# Patient Record
Sex: MALE | Race: WHITE | NOT HISPANIC OR LATINO | Employment: OTHER | URBAN - METROPOLITAN AREA
[De-identification: names, ages, dates, MRNs, and addresses within clinical notes are randomized per-mention and may not be internally consistent; named-entity substitution may affect disease eponyms.]

---

## 2017-01-06 ENCOUNTER — TREATMENT (OUTPATIENT)
Dept: CARDIAC REHAB | Facility: HOSPITAL | Age: 72
End: 2017-01-06

## 2017-01-06 DIAGNOSIS — Z00.00 PREVENTATIVE HEALTH CARE: Primary | ICD-10-CM

## 2017-01-09 ENCOUNTER — TREATMENT (OUTPATIENT)
Dept: CARDIAC REHAB | Facility: HOSPITAL | Age: 72
End: 2017-01-09

## 2017-01-09 DIAGNOSIS — Z00.00 PREVENTATIVE HEALTH CARE: Primary | ICD-10-CM

## 2017-01-11 ENCOUNTER — TREATMENT (OUTPATIENT)
Dept: CARDIAC REHAB | Facility: HOSPITAL | Age: 72
End: 2017-01-11

## 2017-01-11 DIAGNOSIS — Z00.00 PREVENTATIVE HEALTH CARE: Primary | ICD-10-CM

## 2017-01-13 ENCOUNTER — TREATMENT (OUTPATIENT)
Dept: CARDIAC REHAB | Facility: HOSPITAL | Age: 72
End: 2017-01-13

## 2017-01-13 DIAGNOSIS — Z00.00 PREVENTATIVE HEALTH CARE: Primary | ICD-10-CM

## 2017-01-15 ENCOUNTER — APPOINTMENT (OUTPATIENT)
Dept: GENERAL RADIOLOGY | Facility: HOSPITAL | Age: 72
End: 2017-01-15

## 2017-01-15 ENCOUNTER — HOSPITAL ENCOUNTER (INPATIENT)
Facility: HOSPITAL | Age: 72
LOS: 1 days | Discharge: HOME | End: 2017-01-16
Attending: EMERGENCY MEDICINE | Admitting: INTERNAL MEDICINE

## 2017-01-15 DIAGNOSIS — R07.9 CHEST PAIN, UNSPECIFIED TYPE: Primary | ICD-10-CM

## 2017-01-15 DIAGNOSIS — I25.110 CORONARY ARTERY DISEASE INVOLVING NATIVE CORONARY ARTERY OF NATIVE HEART WITH UNSTABLE ANGINA PECTORIS (HCC): ICD-10-CM

## 2017-01-15 DIAGNOSIS — R74.8 ELEVATED LIVER ENZYMES: ICD-10-CM

## 2017-01-15 DIAGNOSIS — R07.2 PRECORDIAL PAIN: ICD-10-CM

## 2017-01-15 DIAGNOSIS — I25.810 CORONARY ARTERY DISEASE INVOLVING CORONARY BYPASS GRAFT OF NATIVE HEART WITHOUT ANGINA PECTORIS: ICD-10-CM

## 2017-01-15 LAB
ALBUMIN SERPL-MCNC: 3.8 G/DL (ref 3.2–4.8)
ALBUMIN/GLOB SERPL: 1.5 G/DL (ref 1.5–2.5)
ALP SERPL-CCNC: 81 U/L (ref 25–100)
ALT SERPL W P-5'-P-CCNC: 111 U/L (ref 7–40)
ANION GAP SERPL CALCULATED.3IONS-SCNC: 11 MMOL/L (ref 3–11)
AST SERPL-CCNC: 85 U/L (ref 0–33)
BASOPHILS # BLD AUTO: 0.03 10*3/MM3 (ref 0–0.2)
BASOPHILS NFR BLD AUTO: 0.5 % (ref 0–1)
BILIRUB SERPL-MCNC: 0.6 MG/DL (ref 0.3–1.2)
BNP SERPL-MCNC: 134 PG/ML (ref 0–100)
BUN BLD-MCNC: 16 MG/DL (ref 9–23)
BUN/CREAT SERPL: 20 (ref 7–25)
CALCIUM SPEC-SCNC: 9.2 MG/DL (ref 8.7–10.4)
CHLORIDE SERPL-SCNC: 109 MMOL/L (ref 99–109)
CO2 SERPL-SCNC: 26 MMOL/L (ref 20–31)
CREAT BLD-MCNC: 0.8 MG/DL (ref 0.6–1.3)
DEPRECATED RDW RBC AUTO: 47.6 FL (ref 37–54)
EOSINOPHIL # BLD AUTO: 0.41 10*3/MM3 (ref 0.1–0.3)
EOSINOPHIL NFR BLD AUTO: 6.2 % (ref 0–3)
ERYTHROCYTE [DISTWIDTH] IN BLOOD BY AUTOMATED COUNT: 14.6 % (ref 11.3–14.5)
GFR SERPL CREATININE-BSD FRML MDRD: 95 ML/MIN/1.73
GLOBULIN UR ELPH-MCNC: 2.6 GM/DL
GLUCOSE BLD-MCNC: 143 MG/DL (ref 70–100)
HCT VFR BLD AUTO: 39.2 % (ref 38.9–50.9)
HGB BLD-MCNC: 12.6 G/DL (ref 13.1–17.5)
HOLD SPECIMEN: NORMAL
HOLD SPECIMEN: NORMAL
IMM GRANULOCYTES # BLD: 0.02 10*3/MM3 (ref 0–0.03)
IMM GRANULOCYTES NFR BLD: 0.3 % (ref 0–0.6)
LIPASE SERPL-CCNC: 53 U/L (ref 6–51)
LYMPHOCYTES # BLD AUTO: 2.02 10*3/MM3 (ref 0.6–4.8)
LYMPHOCYTES NFR BLD AUTO: 30.7 % (ref 24–44)
MCH RBC QN AUTO: 28.6 PG (ref 27–31)
MCHC RBC AUTO-ENTMCNC: 32.1 G/DL (ref 32–36)
MCV RBC AUTO: 89.1 FL (ref 80–99)
MONOCYTES # BLD AUTO: 0.57 10*3/MM3 (ref 0–1)
MONOCYTES NFR BLD AUTO: 8.7 % (ref 0–12)
NEUTROPHILS # BLD AUTO: 3.52 10*3/MM3 (ref 1.5–8.3)
NEUTROPHILS NFR BLD AUTO: 53.6 % (ref 41–71)
PLATELET # BLD AUTO: 225 10*3/MM3 (ref 150–450)
PMV BLD AUTO: 10.6 FL (ref 6–12)
POTASSIUM BLD-SCNC: 4.1 MMOL/L (ref 3.5–5.5)
PROT SERPL-MCNC: 6.4 G/DL (ref 5.7–8.2)
RBC # BLD AUTO: 4.4 10*6/MM3 (ref 4.2–5.76)
SODIUM BLD-SCNC: 146 MMOL/L (ref 132–146)
TROPONIN I SERPL-MCNC: 0 NG/ML (ref 0–0.07)
TROPONIN I SERPL-MCNC: 0 NG/ML (ref 0–0.07)
TROPONIN I SERPL-MCNC: <0.006 NG/ML
WBC NRBC COR # BLD: 6.57 10*3/MM3 (ref 3.5–10.8)
WHOLE BLOOD HOLD SPECIMEN: NORMAL
WHOLE BLOOD HOLD SPECIMEN: NORMAL

## 2017-01-15 PROCEDURE — 83880 ASSAY OF NATRIURETIC PEPTIDE: CPT | Performed by: EMERGENCY MEDICINE

## 2017-01-15 PROCEDURE — 84484 ASSAY OF TROPONIN QUANT: CPT

## 2017-01-15 PROCEDURE — 83690 ASSAY OF LIPASE: CPT | Performed by: EMERGENCY MEDICINE

## 2017-01-15 PROCEDURE — 99223 1ST HOSP IP/OBS HIGH 75: CPT | Performed by: FAMILY MEDICINE

## 2017-01-15 PROCEDURE — 84484 ASSAY OF TROPONIN QUANT: CPT | Performed by: FAMILY MEDICINE

## 2017-01-15 PROCEDURE — 93005 ELECTROCARDIOGRAM TRACING: CPT | Performed by: EMERGENCY MEDICINE

## 2017-01-15 PROCEDURE — 71010 HC CHEST PA OR AP: CPT

## 2017-01-15 PROCEDURE — 80053 COMPREHEN METABOLIC PANEL: CPT | Performed by: EMERGENCY MEDICINE

## 2017-01-15 PROCEDURE — 85025 COMPLETE CBC W/AUTO DIFF WBC: CPT | Performed by: EMERGENCY MEDICINE

## 2017-01-15 PROCEDURE — 93005 ELECTROCARDIOGRAM TRACING: CPT

## 2017-01-15 PROCEDURE — 99285 EMERGENCY DEPT VISIT HI MDM: CPT

## 2017-01-15 PROCEDURE — 99221 1ST HOSP IP/OBS SF/LOW 40: CPT | Performed by: INTERNAL MEDICINE

## 2017-01-15 RX ORDER — FINASTERIDE 5 MG/1
5 TABLET, FILM COATED ORAL DAILY
Status: DISCONTINUED | OUTPATIENT
Start: 2017-01-15 | End: 2017-01-16 | Stop reason: HOSPADM

## 2017-01-15 RX ORDER — TORSEMIDE 20 MG/1
10 TABLET ORAL DAILY
Status: DISCONTINUED | OUTPATIENT
Start: 2017-01-15 | End: 2017-01-16 | Stop reason: HOSPADM

## 2017-01-15 RX ORDER — ATORVASTATIN CALCIUM 40 MG/1
40 TABLET, FILM COATED ORAL NIGHTLY
Status: DISCONTINUED | OUTPATIENT
Start: 2017-01-15 | End: 2017-01-16 | Stop reason: HOSPADM

## 2017-01-15 RX ORDER — SODIUM CHLORIDE 0.9 % (FLUSH) 0.9 %
1-10 SYRINGE (ML) INJECTION AS NEEDED
Status: DISCONTINUED | OUTPATIENT
Start: 2017-01-15 | End: 2017-01-16 | Stop reason: HOSPADM

## 2017-01-15 RX ORDER — EPLERENONE 25 MG/1
25 TABLET, FILM COATED ORAL
Status: DISCONTINUED | OUTPATIENT
Start: 2017-01-15 | End: 2017-01-16 | Stop reason: HOSPADM

## 2017-01-15 RX ORDER — CLOPIDOGREL BISULFATE 75 MG/1
75 TABLET ORAL DAILY
Status: DISCONTINUED | OUTPATIENT
Start: 2017-01-15 | End: 2017-01-16 | Stop reason: HOSPADM

## 2017-01-15 RX ORDER — CARVEDILOL 12.5 MG/1
12.5 TABLET ORAL 2 TIMES DAILY
Status: DISCONTINUED | OUTPATIENT
Start: 2017-01-15 | End: 2017-01-16 | Stop reason: HOSPADM

## 2017-01-15 RX ORDER — POLYETHYLENE GLYCOL 3350 17 G/17G
17 POWDER, FOR SOLUTION ORAL EVERY OTHER DAY
Status: DISCONTINUED | OUTPATIENT
Start: 2017-01-15 | End: 2017-01-16 | Stop reason: HOSPADM

## 2017-01-15 RX ORDER — LEVOTHYROXINE SODIUM 0.03 MG/1
25 TABLET ORAL
Status: DISCONTINUED | OUTPATIENT
Start: 2017-01-15 | End: 2017-01-16 | Stop reason: HOSPADM

## 2017-01-15 RX ORDER — SODIUM CHLORIDE 0.9 % (FLUSH) 0.9 %
10 SYRINGE (ML) INJECTION AS NEEDED
Status: DISCONTINUED | OUTPATIENT
Start: 2017-01-15 | End: 2017-01-15

## 2017-01-15 RX ORDER — AMIODARONE HYDROCHLORIDE 200 MG/1
200 TABLET ORAL EVERY MORNING
Status: DISCONTINUED | OUTPATIENT
Start: 2017-01-15 | End: 2017-01-16 | Stop reason: HOSPADM

## 2017-01-15 RX ADMIN — EPLERENONE 25 MG: 25 TABLET ORAL at 16:06

## 2017-01-15 RX ADMIN — LEVOTHYROXINE SODIUM 25 MCG: 25 TABLET ORAL at 11:56

## 2017-01-15 RX ADMIN — ATORVASTATIN CALCIUM 40 MG: 40 TABLET, FILM COATED ORAL at 20:38

## 2017-01-15 RX ADMIN — CLOPIDOGREL 75 MG: 75 TABLET, FILM COATED ORAL at 11:56

## 2017-01-15 RX ADMIN — CARVEDILOL 12.5 MG: 12.5 TABLET, FILM COATED ORAL at 11:56

## 2017-01-15 RX ADMIN — TORSEMIDE 10 MG: 20 TABLET ORAL at 11:57

## 2017-01-15 RX ADMIN — SACUBITRIL AND VALSARTAN 1 TABLET: 49; 51 TABLET, FILM COATED ORAL at 12:00

## 2017-01-15 RX ADMIN — AMIODARONE HYDROCHLORIDE 200 MG: 200 TABLET ORAL at 11:57

## 2017-01-15 RX ADMIN — FINASTERIDE 5 MG: 5 TABLET, FILM COATED ORAL at 11:58

## 2017-01-15 RX ADMIN — CARVEDILOL 12.5 MG: 12.5 TABLET, FILM COATED ORAL at 17:37

## 2017-01-15 RX ADMIN — SACUBITRIL AND VALSARTAN 1 TABLET: 49; 51 TABLET, FILM COATED ORAL at 17:37

## 2017-01-15 NOTE — PLAN OF CARE
Problem: Patient Care Overview (Adult)  Goal: Plan of Care Review  Outcome: Ongoing (interventions implemented as appropriate)    01/15/17 7173   Coping/Psychosocial Response Interventions   Plan Of Care Reviewed With patient;spouse   Patient Care Overview   Progress progress toward functional goals as expected   Outcome Evaluation   Outcome Summary/Follow up Plan no c/p's of chest pain noted today. npo after mn for heart cath tomorrow. liver enzymes elevated.         Problem: Pain, Acute (Adult)  Goal: Identify Related Risk Factors and Signs and Symptoms  Outcome: Outcome(s) achieved Date Met:  01/15/17  Goal: Acceptable Pain Control/Comfort Level  Outcome: Outcome(s) achieved Date Met:  01/15/17    Problem: Acute Coronary Syndrome (ACS) (Adult)  Goal: Signs and Symptoms of Listed Potential Problems Will be Absent or Manageable (Acute Coronary Syndrome)  Outcome: Ongoing (interventions implemented as appropriate)

## 2017-01-15 NOTE — ED PROVIDER NOTES
Subjective   Patient is a 71 y.o. male presenting with chest pain.   History provided by:  Patient  Chest Pain   Pain location:  Substernal area  Pain quality: pressure    Pain radiates to:  Does not radiate  Pain severity:  Severe  Onset quality:  Sudden  Duration:  3 hours  Timing:  Intermittent  Progression:  Improving  Chronicity:  Recurrent  Context: at rest    Context: not drug use, not eating, not intercourse, not movement and not raising an arm    Relieved by:  Nothing  Worsened by:  Nothing  Ineffective treatments:  None tried  Associated symptoms: anxiety, cough, near-syncope and shortness of breath    Associated symptoms: no abdominal pain, no altered mental status, no diaphoresis, no dizziness, no nausea, no numbness and no vomiting    Risk factors: coronary artery disease and prior DVT/PE    Risk factors: no aortic disease, no diabetes mellitus, no Luis Daniel-Danlos syndrome, no high cholesterol, no hypertension, not obese and not pregnant        Review of Systems   Constitutional: Negative for diaphoresis.   Respiratory: Positive for cough and shortness of breath.    Cardiovascular: Positive for chest pain and near-syncope.   Gastrointestinal: Negative for abdominal pain, nausea and vomiting.   Neurological: Negative for dizziness and numbness.   All other systems reviewed and are negative.      Past Medical History   Diagnosis Date   • Abnormal CT scan, chest 12/28/2016   • BPH (benign prostatic hypertrophy) 12/28/2016   • CAD (coronary artery disease)    • Congestive heart failure    • DVT, lower extremity      right   • Dyslipidemia    • Edema    • IHD (ischemic heart disease) 12/28/2016   • LBBB (left bundle branch block) 12/28/2016   • Lung nodule    • Polio      as a child   • Pulmonary embolism    • Pulmonary emphysema 12/28/2016   • Sepsis      A. Secondary to [place] trauma with hematuria and urosepsis requiring hospitalization May 2015   • Ventricular dysrhythmia 12/28/2016       No Known  Allergies    Past Surgical History   Procedure Laterality Date   • Cardiac defibrillator placement        A. ICD generator change out with upgrade to BiV pacemaker implantable cardioverter    defibrillator, 12/17/2007.   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 Status post cystoscopy with clot evacuation and fulguration of the prostate secondary to hematuria, 5/29/2015.   • Foot surgery     • Prostate surgery       A. Status post laser vaporization, 08/19/2009.       Family History   Problem Relation Age of Onset   • Heart failure Father    • Stroke Father        Social History     Social History   • Marital status:      Spouse name: N/A   • Number of children: N/A   • Years of education: N/A     Social History Main Topics   • Smoking status: Former Smoker     Types: Cigarettes     Quit date: 04/1995   • Smokeless tobacco: None   • Alcohol use No   • Drug use: No   • Sexual activity: Defer     Other Topics Concern   • None     Social History Narrative           Objective   Physical Exam   Constitutional: He is oriented to person, place, and time. He appears well-developed and well-nourished. No distress.   HENT:   Head: Normocephalic and atraumatic.   Eyes: Conjunctivae and EOM are normal. Pupils are equal, round, and reactive to light.   Neck: Normal range of motion. Neck supple. No thyromegaly present.   Cardiovascular: Normal rate, regular rhythm and normal heart sounds.  Exam reveals no gallop and no friction rub.    No murmur heard.  Pulmonary/Chest: Effort normal and breath sounds normal. No respiratory distress. He exhibits no tenderness.   Abdominal: Soft. There is no tenderness.   Musculoskeletal: Normal range of motion. He exhibits no edema.   Lymphadenopathy:     He has no cervical adenopathy.   Neurological: He is alert and oriented to person, place, and time.   Skin: Skin is warm and dry.   Psychiatric: He has a normal mood and affect.   Nursing note and vitals  reviewed.      Procedures         ED Course  ED Course        Course of Care    Results for MARGOT BAIRD (MRN [id number]) as of 1/16/2017 06:13   Ref. Range 1/15/2017 05:22   Troponin I Latest Ref Range: 0.00 - 0.07 ng/mL 0.00   BNP Latest Ref Range: 0.0 - 100.0 pg/mL 134.0 (H)   Glucose Latest Ref Range: 70 - 100 mg/dL 143 (H)   Sodium Latest Ref Range: 132 - 146 mmol/L 146   Potassium Latest Ref Range: 3.5 - 5.5 mmol/L 4.1   CO2 Latest Ref Range: 20.0 - 31.0 mmol/L 26.0   Chloride Latest Ref Range: 99 - 109 mmol/L 109   Anion Gap Latest Ref Range: 3.0 - 11.0 mmol/L 11.0   Creatinine Latest Ref Range: 0.60 - 1.30 mg/dL 0.80   BUN Latest Ref Range: 9 - 23 mg/dL 16   BUN/Creatinine Ratio Latest Ref Range: 7.0 - 25.0  20.0   Calcium Latest Ref Range: 8.7 - 10.4 mg/dL 9.2   eGFR Non African Amer Latest Ref Range: >60 mL/min/1.73 95   Alkaline Phosphatase Latest Ref Range: 25 - 100 U/L 81   Total Protein Latest Ref Range: 5.7 - 8.2 g/dL 6.4   ALT (SGPT) Latest Ref Range: 7 - 40 U/L 111 (H)   AST (SGOT) Latest Ref Range: 0 - 33 U/L 85 (H)   Total Bilirubin Latest Ref Range: 0.3 - 1.2 mg/dL 0.6   Albumin Latest Ref Range: 3.20 - 4.80 g/dL 3.80   Globulin Latest Units: gm/dL 2.6   A/G Ratio Latest Ref Range: 1.5 - 2.5 g/dL 1.5   Lipase Latest Ref Range: 6 - 51 U/L 53 (H)   WBC Latest Ref Range: 3.50 - 10.80 10*3/mm3 6.57   RBC Latest Ref Range: 4.20 - 5.76 10*6/mm3 4.40   Hemoglobin Latest Ref Range: 13.1 - 17.5 g/dL 12.6 (L)   Hematocrit Latest Ref Range: 38.9 - 50.9 % 39.2   RDW Latest Ref Range: 11.3 - 14.5 % 14.6 (H)   MCV Latest Ref Range: 80.0 - 99.0 fL 89.1   MCH Latest Ref Range: 27.0 - 31.0 pg 28.6   MCHC Latest Ref Range: 32.0 - 36.0 g/dL 32.1   MPV Latest Ref Range: 6.0 - 12.0 fL 10.6   Platelets Latest Ref Range: 150 - 450 10*3/mm3 225   RDW-SD Latest Ref Range: 37.0 - 54.0 fl 47.6   Neutrophil % Latest Ref Range: 41.0 - 71.0 % 53.6   Lymphocyte % Latest Ref Range: 24.0 - 44.0 % 30.7   Monocyte % Latest  Ref Range: 0.0 - 12.0 % 8.7   Eosinophil % Latest Ref Range: 0.0 - 3.0 % 6.2 (H)   Basophil % Latest Ref Range: 0.0 - 1.0 % 0.5   Immature Grans % Latest Ref Range: 0.0 - 0.6 % 0.3   Neutrophils, Absolute Latest Ref Range: 1.50 - 8.30 10*3/mm3 3.52   Lymphocytes, Absolute Latest Ref Range: 0.60 - 4.80 10*3/mm3 2.02   Monocytes, Absolute Latest Ref Range: 0.00 - 1.00 10*3/mm3 0.57   Eosinophils, Absolute Latest Ref Range: 0.10 - 0.30 10*3/mm3 0.41 (H)   Basophils, Absolute Latest Ref Range: 0.00 - 0.20 10*3/mm3 0.03   Immature Grans, Absolute Latest Ref Range: 0.00 - 0.03 10*3/mm3 0.02         Lab Results (last 24 hours)     Procedure Component Value Units Date/Time    POC Troponin, Rapid [[id number]]  (Normal) Collected:  01/15/17 0728    Specimen:  Blood Updated:  01/15/17 0745     Troponin I 0.00 ng/mL       Serial Number: [id number]    : [id number]       Troponin [[id number]]  (Normal) Collected:  01/15/17 1449    Specimen:  Blood Updated:  01/15/17 1529     Troponin I <0.006 ng/mL     Narrative:       Ultra Troponin I Reference Range:         <=0.039 ng/mL: Negative    0.04-0.779 ng/mL: Indeterminate Range. Suspicious of MI.  Clinical correlation required.       >=0.78  ng/mL: Consistent with myocardial injury.  Clinical correlation required.          Note: In addition to lab results from this visit, the labs listed above may include labs taken at another facility or during a different encounter within the last 24 hours. Please correlate lab times with ED admission and discharge times for further clarification of the services performed during this visit.    XR Chest 1 View   Final Result   Abnormal     No acute findings.         THIS DOCUMENT HAS BEEN ELECTRONICALLY SIGNED BY BINA MARI MD          Vitals:    01/15/17 1700 01/15/17 2138 01/16/17 0100 01/16/17 0500   BP: 115/74 110/80 (!) 88/50 115/75   BP Location: Left arm Left arm Left arm Left arm   Patient Position: Lying Lying Lying Lying   Pulse: 70  74     Resp: 18 18 18 18   Temp: 97.2 °F (36.2 °C) 97.6 °F (36.4 °C) 97.8 °F (36.6 °C) 97.6 °F (36.4 °C)   TempSrc: Oral Oral Oral Oral   SpO2: 94%  92%    Weight:       Height:           Medications   clopidogrel (PLAVIX) tablet 75 mg (75 mg Oral Given 1/15/17 1156)   atorvastatin (LIPITOR) tablet 40 mg (40 mg Oral Given 1/15/17 2038)   amiodarone (PACERONE) tablet 200 mg (200 mg Oral Given 1/16/17 0600)   carvedilol (COREG) tablet 12.5 mg (12.5 mg Oral Given 1/15/17 1737)   eplerenone (INSPRA) tablet 25 mg (25 mg Oral Given 1/15/17 1606)   finasteride (PROSCAR) tablet 5 mg (5 mg Oral Given 1/15/17 1158)   levothyroxine (SYNTHROID, LEVOTHROID) tablet 25 mcg (25 mcg Oral Given 1/16/17 0600)   polyethylene glycol (MIRALAX) powder 17 g (17 g Oral Not Given 1/15/17 1158)   sacubitril-valsartan (ENTRESTO) 49-51 MG tablet 1 tablet (1 tablet Oral Given 1/15/17 1737)   torsemide (DEMADEX) tablet 10 mg (10 mg Oral Given 1/15/17 1157)   sodium chloride 0.9 % flush 1-10 mL (not administered)       ECG/EMG Results (last 24 hours)     Procedure Component Value Units Date/Time    ECG 12 Lead [[id number]] Collected:  01/15/17 0501     Updated:  01/15/17 0504        Discussed case with Dr. Mustafa.  Given pt's significant history and risk factors, he will be admitted for further evaluation and management.            MDM    Final diagnoses:   Chest pain, unspecified type   Elevated liver enzymes            Koko Riley DO  01/16/17 0614

## 2017-01-15 NOTE — CONSULTS
[facility] at [facility]  Consultation H&P    Patient: Jonnie Roth  1945  [phone]      Treatment Team:   Attending Provider: Maryann Mustafa MD  Nurse Practitioner: CHENTE Mota  Consulting Physician: Luiz Green MD  Admitting Provider: Maryann Mustafa MD   1/15/2017    DATE OF CONSULTATION: 1/15/2017 11:04 AM     IDENTIFICATION: A 71 y.o. male disabled .    REASON FOR CONSULTATION: chest pain    PROBLEM LIST:  1. Ischemic heart disease:  a. Remote progressive angina pectoris/acute extensive anterolateral myocardial infarction/delayed presentation with thrombolysis/severe 3-vessel coronary atherosclerosis with severe single vessel involvement/PTCA with intracoronary stent deployment proximal-mid segment LAD/moderate-severe compensated left ventricular dysfunction. LVEF (0.35)/abnormal positive signal averaged EKG/oral anticoagulation, April 1995.  b. Recent NYHA class I-II angina pectoris/class III CHF/abnormal quantitative SPECT gated Cardiolite GXT, July 1998.  c. Stable persistent MUGA, LVEF (0.33, January 1999 and January 2000).   d. Residual NYHA class I angina pectoris/CHF with reduced MUGA scan: LVEF (0.25), April 2002.  e. Left heart catheterization with distal circumflex disease: EF 20%, September 2002.   f. MUGA in May 2003: EF 32%.   g. Sestamibi GXT on 04/08/2005: No ischemia. EF 29%.   h. Residual NYHA class I angina pectoris/CHF with reduced acceptable MUGA scan: EF (0.32) and acceptable Pacesetter PCD interrogation/reprogramming, May 2003 with interrogation, September 2004.  i. Echocardiogram, September 2009 with EF 30%  j. Left heart catheterization by Dr. Bhupinder Gonzalez, August 2010, with LVEF of 35%, with 3-vessel native coronary artery disease, 95% mid-LAD status post PTCA and stenting with two 3 mm stents by Dr. Llanes.  k. Echocardiogram, 06/07/2012: Left ventricular ejection fraction of 30%.  l. Hospitalization, 02/01/2014, for non-ST elevation  MI, left heart catheterization by Dr. Ayala that demonstrated 60% mid stenosis of the right coronary artery that remains unchanged compared to previous, widely patent stents of the LAD with severe LV dysfunction of approximately 25%.   m. Left heart catheterization status post drug-eluting stent to the distal LAD and mid-RCA with an EF of 20% to 25% by Dr. Diego Ayala, 04/20/2015.   n. Residual CCS Class I/II angina pectoris/NYHA Class II exertional dyspnea and fatigue syndrome.  2. Sudden cardiac death with primary ventricular fibrillation status post Pacesetter [place] ICD, [place], [place] in September 2000:   a. ICD generator change out with upgrade to a biventricular pacemaker ICD on 12/17/2007, [employer] device.  b. ICD discharge, 08/09/2012, secondary to ventricular flutter with initiation of amiodarone therapy.   c. Hospitalization, 02/01/2014, secondary to ICD discharge for ventricular tachycardia with subsequent discontinuation of Coreg and initiation of sotalol therapy.   d. Hospitalization, February 2014, secondary to ICD discharge for VT with subsequent discontinuation of Coreg and initiation of sotalol.  e. Echocardiogram, 04/07/2015: EF less than 20%.  f. Hospitalization secondary to VF and ICD shocks, 04/18/2015.  g. NIPS procedure with defibrillation threshold testing for VF and noninvasive program stimulation, 04/18/2015.   h. Initiation of mexiletine for recurrent ventricular tachycardia with ICD shocks on 05/05/2015 with amiodarone load, recurrent VT and ICD shocks with hospitalization 05/16/2015-05/18/2015.   i. EP study with RFA of large anterior left ventricular scar extending from mid-left ventricular wall down apex by Dr. Ferdinand Alba, 05/21/2015.   3. Dyslipidemia.  4. Status post remote operations.  5. Remote chronic tobacco use/abnormal chest x-ray.  6. Left bundle branch block.  7. Burks trauma with hematuria with urosepsis requiring hospitalization, now resolved, May 2015.  8. Pulmonary  embolism, spring 2015.   9. Recent apparent hypothyroidism/replacement therapy - data deficit, January 2016.    Past Medical History   Diagnosis Date   • Abnormal CT scan, chest 12/28/2016   • BPH (benign prostatic hypertrophy) 12/28/2016   • CAD (coronary artery disease)    • Congestive heart failure    • DVT, lower extremity      right   • Dyslipidemia    • Edema    • IHD (ischemic heart disease) 12/28/2016   • LBBB (left bundle branch block) 12/28/2016   • Lung nodule    • Polio      as a child   • Pulmonary embolism    • Pulmonary emphysema 12/28/2016   • Sepsis      A. Secondary to Burks trauma with hematuria and urosepsis requiring hospitalization May 2015   • Ventricular dysrhythmia 12/28/2016     Past Surgical History   Procedure Laterality Date   • Cardiac defibrillator placement        A. ICD generator change out with upgrade to BiV pacemaker implantable cardioverter    defibrillator, 12/17/2007.   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 Status post cystoscopy with clot evacuation and fulguration of the prostate secondary to hematuria, 5/29/2015.   • Foot surgery     • Prostate surgery       A. Status post laser vaporization, 08/19/2009.       Allergies  No Known Allergies    Current Medications    Current Facility-Administered Medications:   •  amiodarone (PACERONE) tablet 200 mg, 200 mg, Oral, QAM, Susie Rowan, APRN  •  atorvastatin (LIPITOR) tablet 40 mg, 40 mg, Oral, Daily, Susie Rowan, APRN  •  carvedilol (COREG) tablet 12.5 mg, 12.5 mg, Oral, BID, Susie Rowan, APRN  •  clopidogrel (PLAVIX) tablet 75 mg, 75 mg, Oral, Daily, uSsie Rowan, APRN  •  eplerenone (INSPRA) tablet 25 mg, 25 mg, Oral, Q24H, Susie Rowan, APRN  •  finasteride (PROSCAR) tablet 5 mg, 5 mg, Oral, Daily, Susie Rowan, APRN  •  levothyroxine (SYNTHROID, LEVOTHROID) tablet 25 mcg, 25 mcg, Oral, Q AM, Susie Rowan, APRN  •  polyethylene glycol (MIRALAX) powder 17 g, 17 g, Oral, Every Other Day, Susie Rowan, APRN  •   rivaroxaban (XARELTO) tablet 20 mg, 20 mg, Oral, Daily With Dinner, CHENTE Mota  •  sacubitril-valsartan (ENTRESTO) 49-51 MG tablet 1 tablet, 1 tablet, Oral, BID, CHENTE Mota  •  sodium chloride 0.9 % flush 1-10 mL, 1-10 mL, Intravenous, PRN, CHENTE Mota  •  torsemide (DEMADEX) tablet 10 mg, 10 mg, Oral, Daily, CHENTE Mota       History of Present Illness   Patient is a 71-year-old male with history of ischemic heart disease, hypertension, dyslipidemia, and has a dual chamber ICD who presents to [facility] emergency department on 01/15/2017 after being awoken at 2 AM with substernal chest pressure that was nonradiating and did cause shortness of breath without nausea or diaphoresis.  He took 2 nitroglycerin which only minimally improved his symptoms and he called EMS.  He was transported to add cephalexin and given 2 more doses of sublingual nitroglycerin as well as a dose of IV morphine and had improvement in symptoms.  He has since been symptom free.  He's had 2 negative troponins and no significant EKG changes.  He performed cardiac rehabilitation on Friday without anginal limitations.  He states his blood pressures are low normal on his contrast.  He states he has not missed any medications.  He denies any changes in his health over the last month.  Currently on interview denies chest pain, dyspnea, dyspnea on exertion, orthopnea, PND, lower extremity edema, or palpitations.    ROS  ROS  All systems have been reviewed and are negative section as mentioned in history of present illness.    SOCIAL HX  Social History     Social History   • Marital status:      Spouse name: N/A   • Number of children: N/A   • Years of education: N/A     Occupational History   • Not on file.     Social History Main Topics   • Smoking status: Former Smoker     Types: Cigarettes     Quit date: 04/1995   • Smokeless tobacco: Not on file   • Alcohol use No   • Drug use: No   • Sexual activity:  Defer     Other Topics Concern   • Not on file     Social History Narrative       FAMILY HX  Family History   Problem Relation Age of Onset   • Heart failure Father    • Stroke Father        OBJECTIVE:  Vitals:    01/15/17 0630 01/15/17 0700 01/15/17 0730 01/15/17 1006   BP: 105/69 111/72 111/78 107/70   BP Location:    Right arm   Pulse: 71 77 76 72   Resp:    20   Temp:    97.5 °F (36.4 °C)   TempSrc:    Oral   SpO2: 91% 94% 93% 94%   Weight:       Height:            I/O this shift:  In: -   Out: 225 [Urine:225]      PHYSICAL EXAMINATION:  Physical Exam   Constitutional: He is oriented to person, place, and time. He appears well-developed and well-nourished. No distress.   HENT:   Head: Normocephalic and atraumatic.   Nose: Nose normal.   Mouth/Throat: Uvula is midline, oropharynx is clear and moist and mucous membranes are normal.   Eyes: Conjunctivae and EOM are normal. Pupils are equal, round, and reactive to light. No scleral icterus.   Neck: Normal range of motion. Neck supple. No hepatojugular reflux and no JVD present. Carotid bruit is not present. No tracheal deviation present. No thyromegaly present.   Cardiovascular: Normal rate, regular rhythm, S1 normal, S2 normal, intact distal pulses and normal pulses.  PMI is not displaced.  Exam reveals no gallop, no distant heart sounds, no friction rub, no midsystolic click and no opening snap.    No murmur heard.  Pulses:       Radial pulses are 2+ on the right side, and 2+ on the left side.        Dorsalis pedis pulses are 2+ on the right side, and 2+ on the left side.        Posterior tibial pulses are 2+ on the right side, and 2+ on the left side.   Pulmonary/Chest: Effort normal and breath sounds normal. He has no wheezes. He has no rhonchi. He has no rales.   Abdominal: Soft. Bowel sounds are normal. He exhibits no mass. There is no tenderness. There is no guarding.   Musculoskeletal: Normal range of motion. He exhibits no edema or tenderness.    Lymphadenopathy:     He has no cervical adenopathy.   Neurological: He is alert and oriented to person, place, and time.   Skin: Skin is warm, dry and intact. No rash noted. No cyanosis or erythema. Nails show no clubbing.   Psychiatric: He has a normal mood and affect. His behavior is normal.   Nursing note and vitals reviewed.      Diagnostic Data:  Lab Results (last 24 hours)     Procedure Component Value Units Date/Time    POC Troponin, Rapid [[id number]]  (Normal) Collected:  01/15/17 0522    Specimen:  Blood Updated:  01/15/17 0540     Troponin I 0.00 ng/mL       Serial Number: [id number]    : [id number]       CBC & Differential [[id number]] Collected:  01/15/17 0522    Specimen:  Blood Updated:  01/15/17 0611    Narrative:       The following orders were created for panel order CBC & Differential.  Procedure                               Abnormality         Status                     ---------                               -----------         ------                     CBC Auto Differential[[id number]]         Abnormal            Final result                 Please view results for these tests on the individual orders.    CBC Auto Differential [[id number]]  (Abnormal) Collected:  01/15/17 0522    Specimen:  Blood Updated:  01/15/17 0611     WBC 6.57 10*3/mm3      RBC 4.40 10*6/mm3      Hemoglobin 12.6 (L) g/dL      Hematocrit 39.2 %      MCV 89.1 fL      MCH 28.6 pg      MCHC 32.1 g/dL      RDW 14.6 (H) %      RDW-SD 47.6 fl      MPV 10.6 fL      Platelets 225 10*3/mm3      Neutrophil % 53.6 %      Lymphocyte % 30.7 %      Monocyte % 8.7 %      Eosinophil % 6.2 (H) %      Basophil % 0.5 %      Immature Grans % 0.3 %      Neutrophils, Absolute 3.52 10*3/mm3      Lymphocytes, Absolute 2.02 10*3/mm3      Monocytes, Absolute 0.57 10*3/mm3      Eosinophils, Absolute 0.41 (H) 10*3/mm3      Basophils, Absolute 0.03 10*3/mm3      Immature Grans, Absolute 0.02 10*3/mm3     BNP [[id number]]  (Abnormal) Collected:  01/15/17  0522    Specimen:  Blood Updated:  01/15/17 0646     .0 (H) pg/mL     Comprehensive Metabolic Panel [[id number]]  (Abnormal) Collected:  01/15/17 0522    Specimen:  Blood Updated:  01/15/17 0657     Glucose 143 (H) mg/dL      BUN 16 mg/dL      Creatinine 0.80 mg/dL      Sodium 146 mmol/L      Potassium 4.1 mmol/L      Chloride 109 mmol/L      CO2 26.0 mmol/L      Calcium 9.2 mg/dL      Total Protein 6.4 g/dL      Albumin 3.80 g/dL      ALT (SGPT) 111 (H) U/L      AST (SGOT) 85 (H) U/L      Alkaline Phosphatase 81 U/L      Total Bilirubin 0.6 mg/dL      eGFR Non African Amer 95 mL/min/1.73      Globulin 2.6 gm/dL      A/G Ratio 1.5 g/dL      BUN/Creatinine Ratio 20.0      Anion Gap 11.0 mmol/L     Narrative:       [employer] Guidelines    Stage                           Description                             GFR                      1                               Normal or High                          90+  2                               Mild decrease                            60-89  3                               Moderate decrease                   30-59  4                               Severe decrease                       15-29  5                               Kidney failure                             <15    Lipase [[id number]]  (Abnormal) Collected:  01/15/17 0522    Specimen:  Blood Updated:  01/15/17 0657     Lipase 53 (H) U/L     POC Troponin, Rapid [[id number]]  (Normal) Collected:  01/15/17 0728    Specimen:  Blood Updated:  01/15/17 0745     Troponin I 0.00 ng/mL       Serial Number: [id number]    : [id number]       [place] Draw [[id number]] Collected:  01/15/17 0522    Specimen:  Blood Updated:  01/15/17 1001    Narrative:       The following orders were created for panel order [place] Draw.  Procedure                               Abnormality         Status                     ---------                               -----------         ------                     Light Blue  Top[[id number]]                                    Final result               Green Top (Gel)[[id number]]                                   Final result               Lavender Top[[id number]]                                      Final result               Gold Top - SST[[id number]]                                    Final result               Green Top (No Gel)[[id number]]                                                             Please view results for these tests on the individual orders.    Light Blue Top [[id number]] Collected:  01/15/17 0522    Specimen:  Blood Updated:  01/15/17 1001     Extra Tube hold for add-on       Auto resulted       Green Top (Gel) [[id number]] Collected:  01/15/17 0522    Specimen:  Blood Updated:  01/15/17 1001     Extra Tube Hold for add-ons.       Auto resulted.       Lavender Top [[id number]] Collected:  01/15/17 0522    Specimen:  Blood Updated:  01/15/17 1001     Extra Tube hold for add-on       Auto resulted       Gold Top - SST [[id number]] Collected:  01/15/17 0522    Specimen:  Blood Updated:  01/15/17 1001     Extra Tube Hold for add-ons.       Auto resulted.               Principal Problem:    Chest pain  Active Problems:    h/o Pulmonary embolism    Dyslipidemia    h/o DVT, lower extremity    Congestive heart failure    CAD (coronary artery disease)        ASSESSMENT/PLAN:  1.  Acute coronary syndrome: 2 negative troponin so far.  Continue to trend.  When necessary nitroglycerin sublingual for recurrent episodes of chest pain.  We'll plan to undergo undergo left heart catheterization during this admission.  He took NOAC last pm so may need to delay for wash out period.  2.  Ischemic heart disease: Reevaluation with left heart catheterization.  Restart home medications.  Interrogate ICD.  3.  Dyslipidemia: Statin.    PAULA Bolton dictating as scribe for Dr. Green.  11:04 AM 1/15/2017   lavonne MAN md, personally performed the services described in this documentation as scribed  by the above named individual in my presence, and it is both accurate and complete.  1/15/2017  11:38 AM

## 2017-01-15 NOTE — H&P
[facility] Medicine Services  HISTORY AND PHYSICAL    Primary Care Physician: Brian Lewis MD    Subjective     Chief Complaint:  Chest pain/pressure    History of Present Illness:   71-year-old female with significant cardiac history, 7 stents (last 2 placed 2015), defibrillator, and congestive heart failure presented to the ED with sudden onset substernal chest pain/pressure.  The pain woke him up from sleep, was somewhat relieved with nitroglycerin but quickly returned after several minutes.  Patient was brought to the ED by EMS.  He received morphine in route and has been pain-free since that time.  Patient is being admitted to the hospitalist service for further evaluation and treatment.  Cardiology will be consulted.      Review of Systems   Constitutional: Negative for chills, diaphoresis and fever.   HENT: Negative for hearing loss.    Eyes: Negative for visual disturbance.   Respiratory: Positive for chest tightness and shortness of breath.    Cardiovascular: Positive for chest pain. Negative for palpitations and leg swelling.   Gastrointestinal: Negative for diarrhea, nausea and vomiting.   Genitourinary: Positive for dysuria. Negative for hematuria.   Neurological: Negative for syncope.   Psychiatric/Behavioral: Negative for confusion.        Past Medical History:   Past Medical History   Diagnosis Date   • Abnormal CT scan, chest 12/28/2016   • BPH (benign prostatic hypertrophy) 12/28/2016   • CAD (coronary artery disease)    • Congestive heart failure    • DVT, lower extremity      right   • Dyslipidemia    • Edema    • IHD (ischemic heart disease) 12/28/2016   • LBBB (left bundle branch block) 12/28/2016   • Lung nodule    • Polio      as a child   • Pulmonary embolism    • Pulmonary emphysema 12/28/2016   • Sepsis      A. Secondary to [place] trauma with hematuria and urosepsis requiring hospitalization May 2015   • Ventricular dysrhythmia 12/28/2016       Past  Surgical History:  Past Surgical History   Procedure Laterality Date   • Cardiac defibrillator placement        A. ICD generator change out with upgrade to BiV pacemaker implantable cardioverter    defibrillator, 12/17/2007.   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 Status post cystoscopy with clot evacuation and fulguration of the prostate secondary to hematuria, 5/29/2015.   • Foot surgery     • Prostate surgery       A. Status post laser vaporization, 08/19/2009.       Family History: family history includes Heart failure in his father; Stroke in his father.    Social History:  reports that he quit smoking about 21 years ago. His smoking use included Cigarettes. He does not have any smokeless tobacco history on file. He reports that he does not drink alcohol or use illicit drugs.    Medications:  Prescriptions Prior to Admission   Medication Sig Dispense Refill Last Dose   • amiodarone (PACERONE) 100 MG tablet Take  by mouth daily.   Taking   • atorvastatin (LIPITOR) 40 MG tablet Take 1 tablet by mouth daily. 90 tablet 1    • carvedilol (COREG) 12.5 MG tablet Take  by mouth 2 (two) times a day.   Taking   • clopidogrel (PLAVIX) 75 MG tablet Take  by mouth daily.   Taking   • eplerenone (INSPRA) 25 MG tablet Take  by mouth.   Taking   • finasteride (PROSCAR) 5 MG tablet Take  by mouth daily.   Taking   • levothyroxine (SYNTHROID, LEVOTHROID) 25 MCG tablet Take  by mouth daily.   Taking   • melatonin 3 MG tablet Take  by mouth daily.   Taking   • Multiple Vitamins-Minerals (MULTIVITAMIN ADULT PO) Take  by mouth daily.   Taking   • nitroglycerin (NITROSTAT) 0.4 MG SL tablet Place  under the tongue.   Taking   • polyethylene glycol (MIRALAX) packet Take 17 g by mouth every other day.   Taking   • rivaroxaban (XARELTO) 20 MG tablet Take  by mouth.   Taking   • sacubitril-valsartan (ENTRESTO) 49-51 MG tablet Take 1 tablet by mouth 2 (two) times a day. 60 tablet 1    • torsemide (DEMADEX) 10 MG  "tablet Take 1 tablet by mouth Daily. 30 tablet 3        Allergies:  No Known Allergies      Objective     Physical Exam:  Vital Signs:   Visit Vitals   • /70 (BP Location: Right arm)   • Pulse 72   • Temp 97.5 °F (36.4 °C) (Oral)   • Resp 20   • Ht 73\" (185.4 cm)   • Wt 210 lb (95.3 kg)   • SpO2 94%   • BMI 27.71 kg/m2     Physical Exam   Constitutional: He is oriented to person, place, and time. He appears well-developed and well-nourished. No distress.   HENT:   Head: Normocephalic.   Neck: Neck supple.   Cardiovascular: Normal rate and regular rhythm.  Exam reveals no gallop and no friction rub.    No murmur heard.  Pulmonary/Chest: Effort normal and breath sounds normal. No respiratory distress.   Abdominal: Soft. Bowel sounds are normal. He exhibits no distension.   Neurological: He is alert and oriented to person, place, and time.   Equal strength bilaterally   Skin: Skin is warm.   Psychiatric: He has a normal mood and affect.   Vitals reviewed.      Results Reviewed:    Results from last 7 days  Lab Units 01/15/17  0522   WBC 10*3/mm3 6.57   HEMOGLOBIN g/dL 12.6*   PLATELETS 10*3/mm3 225       Results from last 7 days  Lab Units 01/15/17  0522   SODIUM mmol/L 146   POTASSIUM mmol/L 4.1   TOTAL CO2 mmol/L 26.0   CREATININE mg/dL 0.80   GLUCOSE mg/dL 143*   CALCIUM mg/dL 9.2     Component      Latest Ref Rng 1/15/2017 1/15/2017           5:22 AM  7:28 AM   Troponin I      0.00 - 0.07 ng/mL 0.00 0.00         Assessment / Plan     Assessment/Problem List:   Principal Problem:    Chest pain  Active Problems:    Chronic combined systolic and diastolic congestive heart failure (EF<20%)    Hyperlipidemia    IHD (ischemic heart disease)    LBBB (left bundle branch block)    h/o Pulmonary embolism    Dyslipidemia    h/o DVT, lower extremity    CAD (coronary artery disease)          Plan:  --extensive PCI and arrythmia history related to ICM, high risk for stent occlusion or new significant disease   --cardiology " consult for likely LHC surveillance   --interrogate BiVICD  --trend troponins (neg x2 so far)  --on ASA/Plavix/Xarelto/statin/BB/ARB....holding Xarelto for now in anticipation of LHC (last dose last pm 1/14/17)          DVT prophylaxis: xarelto/mechanical  Code Status:  Full code      Maryann Mustafa MD 01/15/17 12:03 PM      Brief Attending Note       I have seen and examined the patient, performing an independent face-to-face diagnostic evaluation with plan of care reviewed and developed with the advanced practice clinician (APC).      Brief Summary Statement/HPI:   Pt with significant ischemic heart disease related to multivessel CAD with history of multpile PCI interventions and ICD for Vfib arrest, presents with CP similar to his previous ischemias which woke him from sleep described as midsternal, deep pressure associated with dyspnea and sense of doom with mild nausea.  Relieved by nitro and morphine.  Extensive Cardiology outpt notes and history reviewed.  Currently CP free and his dyspnea is at his normal baseline (moderate LU and mild dyspnea with conversation).      Attending Physical Exam:  Temp:  [97.5 °F (36.4 °C)] 97.5 °F (36.4 °C)  Heart Rate:  [69-77] 72  Resp:  [20-24] 20  BP: (105-118)/(69-78) 107/70  Constitutional: no acute distress, awake, alert, mildly dyspneic with conversation  Eyes: PERRLA, sclerae anicteric, no conjunctival injection  Neck: supple, no thyromegaly, trachea midline  Respiratory: Clear to auscultation bilaterally, no crackles , nonlabored respirations at rest  Cardiovascular: RRR, no murmurs  Gastrointestinal: Positive bowel sounds, soft, nontender, nondistended  Musculoskeletal: No bilateral ankle edema, no clubbing or cyanosis to bilateral lower extremities  Psychiatric: oriented x 3, appropriate affect, cooperative  Neurologic: Strength symmetric in all extremities, Cranial Nerves grossly intact to confrontation         Brief Assessment/Plan :      See above for further  detailed assessment and plan developed with [facility] which I have reviewed and/or edited.    I believe this patient meets INPATIENT status due to the need for care which can only be reasonably provided in an hospital setting such as aggressive/expedited ancillary services and/or consultation services and the necessity for IV medications, close physician monitoring and/or the possible need for procedures.  In such, I feel patient’s risk for adverse outcomes and need for care warrant INPATIENT evaluation and predict the patient’s care encounter to likely last beyond 2 midnights.    Maryann Mustafa MD  01/15/17  12:03 PM

## 2017-01-15 NOTE — Clinical Note
Hemostasis started on the Artery.  Radial compression device applied to vessel. Hemostasis achieved successfully. .

## 2017-01-16 VITALS
SYSTOLIC BLOOD PRESSURE: 105 MMHG | TEMPERATURE: 97.8 F | HEART RATE: 88 BPM | WEIGHT: 210 LBS | HEIGHT: 73 IN | DIASTOLIC BLOOD PRESSURE: 73 MMHG | RESPIRATION RATE: 16 BRPM | OXYGEN SATURATION: 95 % | BODY MASS INDEX: 27.83 KG/M2

## 2017-01-16 LAB
ANION GAP SERPL CALCULATED.3IONS-SCNC: 4 MMOL/L (ref 3–11)
BASOPHILS # BLD AUTO: 0.02 10*3/MM3 (ref 0–0.2)
BASOPHILS NFR BLD AUTO: 0.2 % (ref 0–1)
BUN BLD-MCNC: 15 MG/DL (ref 9–23)
BUN/CREAT SERPL: 16.7 (ref 7–25)
CALCIUM SPEC-SCNC: 9.5 MG/DL (ref 8.7–10.4)
CHLORIDE SERPL-SCNC: 106 MMOL/L (ref 99–109)
CO2 SERPL-SCNC: 32 MMOL/L (ref 20–31)
CREAT BLD-MCNC: 0.9 MG/DL (ref 0.6–1.3)
DEPRECATED RDW RBC AUTO: 47.7 FL (ref 37–54)
EOSINOPHIL # BLD AUTO: 0.41 10*3/MM3 (ref 0.1–0.3)
EOSINOPHIL NFR BLD AUTO: 4.7 % (ref 0–3)
ERYTHROCYTE [DISTWIDTH] IN BLOOD BY AUTOMATED COUNT: 14.7 % (ref 11.3–14.5)
GFR SERPL CREATININE-BSD FRML MDRD: 83 ML/MIN/1.73
GLUCOSE BLD-MCNC: 88 MG/DL (ref 70–100)
HCT VFR BLD AUTO: 42.5 % (ref 38.9–50.9)
HGB BLD-MCNC: 13.5 G/DL (ref 13.1–17.5)
IMM GRANULOCYTES # BLD: 0.02 10*3/MM3 (ref 0–0.03)
IMM GRANULOCYTES NFR BLD: 0.2 % (ref 0–0.6)
LYMPHOCYTES # BLD AUTO: 2.52 10*3/MM3 (ref 0.6–4.8)
LYMPHOCYTES NFR BLD AUTO: 28.7 % (ref 24–44)
MCH RBC QN AUTO: 28.4 PG (ref 27–31)
MCHC RBC AUTO-ENTMCNC: 31.8 G/DL (ref 32–36)
MCV RBC AUTO: 89.3 FL (ref 80–99)
MONOCYTES # BLD AUTO: 0.76 10*3/MM3 (ref 0–1)
MONOCYTES NFR BLD AUTO: 8.6 % (ref 0–12)
NEUTROPHILS # BLD AUTO: 5.06 10*3/MM3 (ref 1.5–8.3)
NEUTROPHILS NFR BLD AUTO: 57.6 % (ref 41–71)
PLATELET # BLD AUTO: 218 10*3/MM3 (ref 150–450)
PMV BLD AUTO: 10.8 FL (ref 6–12)
POTASSIUM BLD-SCNC: 3.9 MMOL/L (ref 3.5–5.5)
RBC # BLD AUTO: 4.76 10*6/MM3 (ref 4.2–5.76)
SODIUM BLD-SCNC: 142 MMOL/L (ref 132–146)
TROPONIN I SERPL-MCNC: <0.006 NG/ML
WBC NRBC COR # BLD: 8.79 10*3/MM3 (ref 3.5–10.8)

## 2017-01-16 PROCEDURE — 93458 L HRT ARTERY/VENTRICLE ANGIO: CPT | Performed by: INTERNAL MEDICINE

## 2017-01-16 PROCEDURE — 84484 ASSAY OF TROPONIN QUANT: CPT | Performed by: NURSE PRACTITIONER

## 2017-01-16 PROCEDURE — C1769 GUIDE WIRE: HCPCS | Performed by: INTERNAL MEDICINE

## 2017-01-16 PROCEDURE — C1894 INTRO/SHEATH, NON-LASER: HCPCS | Performed by: INTERNAL MEDICINE

## 2017-01-16 PROCEDURE — 0 IOPAMIDOL PER 1 ML: Performed by: INTERNAL MEDICINE

## 2017-01-16 PROCEDURE — 99239 HOSP IP/OBS DSCHRG MGMT >30: CPT | Performed by: INTERNAL MEDICINE

## 2017-01-16 PROCEDURE — 4A023N7 MEASUREMENT OF CARDIAC SAMPLING AND PRESSURE, LEFT HEART, PERCUTANEOUS APPROACH: ICD-10-PCS | Performed by: INTERNAL MEDICINE

## 2017-01-16 PROCEDURE — 85025 COMPLETE CBC W/AUTO DIFF WBC: CPT | Performed by: NURSE PRACTITIONER

## 2017-01-16 PROCEDURE — 25010000002 HEPARIN (PORCINE) PER 1000 UNITS: Performed by: INTERNAL MEDICINE

## 2017-01-16 PROCEDURE — B2111ZZ FLUOROSCOPY OF MULTIPLE CORONARY ARTERIES USING LOW OSMOLAR CONTRAST: ICD-10-PCS | Performed by: INTERNAL MEDICINE

## 2017-01-16 PROCEDURE — B2151ZZ FLUOROSCOPY OF LEFT HEART USING LOW OSMOLAR CONTRAST: ICD-10-PCS | Performed by: INTERNAL MEDICINE

## 2017-01-16 PROCEDURE — 80048 BASIC METABOLIC PNL TOTAL CA: CPT | Performed by: NURSE PRACTITIONER

## 2017-01-16 RX ORDER — LIDOCAINE HYDROCHLORIDE 10 MG/ML
INJECTION, SOLUTION INFILTRATION; PERINEURAL AS NEEDED
Status: DISCONTINUED | OUTPATIENT
Start: 2017-01-16 | End: 2017-01-16 | Stop reason: HOSPADM

## 2017-01-16 RX ORDER — ASPIRIN 325 MG
325 TABLET, DELAYED RELEASE (ENTERIC COATED) ORAL DAILY
Status: DISCONTINUED | OUTPATIENT
Start: 2017-01-17 | End: 2017-01-16 | Stop reason: HOSPADM

## 2017-01-16 RX ORDER — PANTOPRAZOLE SODIUM 40 MG/1
40 TABLET, DELAYED RELEASE ORAL DAILY
Qty: 30 TABLET | Refills: 1 | Status: SHIPPED | OUTPATIENT
Start: 2017-01-16 | End: 2017-10-01

## 2017-01-16 RX ORDER — SODIUM CHLORIDE 9 MG/ML
100 INJECTION, SOLUTION INTRAVENOUS CONTINUOUS
Status: ACTIVE | OUTPATIENT
Start: 2017-01-16 | End: 2017-01-16

## 2017-01-16 RX ORDER — ASPIRIN 325 MG
325 TABLET ORAL ONCE
Status: DISCONTINUED | OUTPATIENT
Start: 2017-01-16 | End: 2017-01-16 | Stop reason: HOSPADM

## 2017-01-16 RX ADMIN — AMIODARONE HYDROCHLORIDE 200 MG: 200 TABLET ORAL at 06:00

## 2017-01-16 RX ADMIN — SACUBITRIL AND VALSARTAN 1 TABLET: 49; 51 TABLET, FILM COATED ORAL at 09:08

## 2017-01-16 RX ADMIN — FINASTERIDE 5 MG: 5 TABLET, FILM COATED ORAL at 09:10

## 2017-01-16 RX ADMIN — TORSEMIDE 10 MG: 20 TABLET ORAL at 09:09

## 2017-01-16 RX ADMIN — CARVEDILOL 12.5 MG: 12.5 TABLET, FILM COATED ORAL at 17:59

## 2017-01-16 RX ADMIN — CLOPIDOGREL 75 MG: 75 TABLET, FILM COATED ORAL at 09:08

## 2017-01-16 RX ADMIN — EPLERENONE 25 MG: 25 TABLET ORAL at 09:20

## 2017-01-16 RX ADMIN — LEVOTHYROXINE SODIUM 25 MCG: 25 TABLET ORAL at 06:00

## 2017-01-16 RX ADMIN — SACUBITRIL AND VALSARTAN 1 TABLET: 49; 51 TABLET, FILM COATED ORAL at 17:58

## 2017-01-16 NOTE — DISCHARGE SUMMARY
[facility] Medicine Services  DISCHARGE SUMMARY       Date of Admission: 1/15/2017  Date of Discharge:  1/16/2017  Primary Care Physician: Brian Lewis MD    Discharge Diagnoses:  Chest pain, resolved   -negative left heart cath this admission (see below) but (per discussion w/ cards) possibly vasospastic angina vs gerd/esophageal spasm   -continue home medications, use prn nitroglycerin   -started PPI therapy  History of Chronic combined systolic and diastolic congestive heart failure (EF<20%), compensated  Hx Vfib arrest (icd pacer)  Hyperlipidemia  Hx CAD (ischemic heart disease)  Hx LBBB (left bundle branch block)  h/o Pulmonary embolism (on xarelto as outpt)  h/o DVT, lower extremity (on xarelto as outpt)    Presenting Problem/History of Present Illness  Chest pain, unspecified type [R07.9]  Chest pain, unspecified type [R07.9]     Chief Complaint on Day of Discharge: eager for discharge    History of Present Illness on Day of Discharge:   No chest pain, s/p left heart cath and still feeling well, no dyspnea, requiring no supplemental oxygen.  Hospital Course  Patient is a 71 y.o. male presented with chest pain similar (but not identical) to his prior ischemic pain. Resolved w/ nitroglycerin. Negative cardiac enzymes, but due to high risk and medical history. Underwent left heart cath which showed patent stents/coronaries (per discusison w/ dr. lucero of cardiology-the official report still pending in system). Cardiology recommends discharge home on previous medications. i will add protonix in case gerd/gastrointestinal related. Will ask [facility] to set up follow up w/ pcp in a week and cards per their recs.    Procedures Performed  Procedure(s):    Left Heart Catheterization (dr. lucero 1/16/17):  Official result pending but per discussion with dr. lucero patent stents and coronaries.     Consults:   Consults     Date and Time Order Name Status Description    1/15/2017 1012 Inpatient  "Consult to Cardiology Completed           Pertinent Test Results:    XR Chest, 1 View.    CLINICAL HISTORY:    71 years old, male; Signs and symptoms; Angina; Additional info: Chest pain   triage protocol    TECHNIQUE:    Frontal view of the chest.    COMPARISON:    No relevant prior studies available.    FINDINGS:    Lungs:  The lungs are clear.    Pleural space:  Unremarkable.  No pneumothorax.    Heart:  Mildly enlarged.    Mediastinum:  Unremarkable.    Bones/joints:  Unremarkable.    Tubes, lines and devices:  Pacemaker leads projecting over the right atrium,   right ventricle, and coronary sinus.          Impression:           No acute findings.              Condition on Discharge:  stable    Physical Exam on Discharge:  Visit Vitals   • /73   • Pulse 88   • Temp 97.8 °F (36.6 °C) (Oral)   • Resp 16   • Ht 73\" (185.4 cm)   • Wt 210 lb (95.3 kg)   • SpO2 95%   • BMI 27.71 kg/m2     Physical Exam  Alert, oriented x 4, nontoxic  Ncat, oroph clear  rrr  ctab  abd soft, obese, nontender  No cce  No extremity rash    Discharge Disposition  Home or Self Care    Discharge Medications   Jonnie Roth   Home Medication Instructions SANDI:[id number]    Printed on:01/16/17 3970   Medication Information                      amiodarone (PACERONE) 100 MG tablet  Take  by mouth daily.             atorvastatin (LIPITOR) 40 MG tablet  Take 1 tablet by mouth daily.             carvedilol (COREG) 12.5 MG tablet  Take  by mouth 2 (two) times a day.             clopidogrel (PLAVIX) 75 MG tablet  Take  by mouth daily.             eplerenone (INSPRA) 25 MG tablet  Take  by mouth.             finasteride (PROSCAR) 5 MG tablet  Take  by mouth daily.             levothyroxine (SYNTHROID, LEVOTHROID) 25 MCG tablet  Take  by mouth daily.             melatonin 3 MG tablet  Take  by mouth daily.             Multiple Vitamins-Minerals (MULTIVITAMIN ADULT PO)  Take  by mouth daily.             nitroglycerin (NITROSTAT) 0.4 MG SL " tablet  Place  under the tongue.             pantoprazole (PROTONIX) 40 MG EC tablet  Take 1 tablet by mouth Daily.             polyethylene glycol (MIRALAX) packet  Take 17 g by mouth every other day.             rivaroxaban (XARELTO) 20 MG tablet  Take  by mouth.             sacubitril-valsartan (ENTRESTO) 49-51 MG tablet  Take 1 tablet by mouth 2 (two) times a day.             torsemide (DEMADEX) 10 MG tablet  Take 1 tablet by mouth Daily.                 Discharge Diet: cardiac diet    Discharge Care Plan / Instructions: discharge home    Activity at Discharge: ad jackie    Follow-up Appointments  Future Appointments  Date Time Provider Department Center   1/18/2017 8:30 AM [facility]   1/20/2017 8:30 AM [facility]   1/23/2017 8:30 AM [facility] PHASE III -  [facility]   1/25/2017 8:30 AM [facility] PHASE III -  [facility]   1/27/2017 8:30 AM [facility] PH[facility] III -  [facility]   1/30/2017 8:30 AM [facility] PHASE III -  [facility]   2/1/2017 8:30 AM [facility]   2/3/2017 8:30 AM [facility] PHASE III -  [facility]   2/6/2017 8:30 AM [facility] PHASE III -  [facility]   2/8/2017 8:30 AM [facility] PHASE III -  [facility]   2/10/2017 8:30 AM [facility] PHASE III -  [facility]   2/13/2017 8:30 AM [facility] PHASE III -  [facility]   2/14/2017 3:15 PM Bhupinder Gonzalez MD [facility] None   2/15/2017 8:30 AM [facility] PHASE III -  [facility]   2/17/2017 8:30 AM [facility] PHASE III -  [facility]   2/20/2017 8:30 AM [facility] PHASE III -  [facility]   2/22/2017 8:30 AM [facility] PHASE III -  [facility]   2/24/2017 8:30 AM [facility]   2/27/2017 8:30 AM [facility] PHASE III -  [facility]   3/1/2017 8:30 AM [facility]   3/3/2017 8:30 AM [facility]   3/6/2017 8:30 AM [facility]   3/8/2017 8:30 AM [facility]   3/10/2017 8:30 AM [facility]   3/13/2017 8:30 AM [facility]   3/15/2017 8:30 AM [facility]   3/17/2017 8:30 AM [facility]   3/20/2017 8:30 AM [facility]   3/22/2017 8:30 AM [facility]   3/24/2017 8:30 AM [facility]   3/27/2017 8:30 AM [facility]   3/29/2017 8:30 AM [facility]   3/31/2017 8:30 AM [facility]   4/18/2017 9:45 AM Ferdinand Alba MD [facility] None     Referrals and Follow-ups to Schedule     Follow-Up    As directed    PCP 1 week  Cardiology per their recs                  Nigel Jett MD 01/16/17 3:30 PM    Time: 35 min total spent at bedside, reviewing data, discussing w/ consultant, and preparing/documenting discharge    Please note that portions of this note may have been completed with a voice recognition program. Efforts were made to edit the dictations, but occasionally words are mistranscribed.

## 2017-01-16 NOTE — PLAN OF CARE
Problem: Patient Care Overview (Adult)  Goal: Plan of Care Review  Outcome: Outcome(s) achieved Date Met:  01/16/17 01/16/17 4212   Coping/Psychosocial Response Interventions   Plan Of Care Reviewed With patient;spouse   Outcome Evaluation   Outcome Summary/Follow up Plan pt. being d/jeanmarie home       Goal: Discharge Needs Assessment  Outcome: Outcome(s) achieved Date Met:  01/16/17    Problem: Acute Coronary Syndrome (ACS) (Adult)  Goal: Signs and Symptoms of Listed Potential Problems Will be Absent or Manageable (Acute Coronary Syndrome)  Outcome: Outcome(s) achieved Date Met:  01/16/17

## 2017-01-16 NOTE — PROGRESS NOTES
Jonnie Rtoh  [id number]  1945   LOS: 1 day   Patient Care Team:  Brian Lewis MD as PCP - General (Internal Medicine)    Chief Complaint:  CHEST PAIN    Subjective      Currently comfortable off oxygen therapy.  He denies any recurrent symptoms of chest pressure, nausea, emesis, abdominal pain, pleurisy, cough, fever, or chills.  No complaints of focal motor-sensory changes.  He has been off Xarelto for 36 hours.        Objective     Vital Sign Min/Max for last 24 hours  Temp  Min: 97.2 °F (36.2 °C)  Max: 97.8 °F (36.6 °C)   BP  Min: 88/50  Max: 117/81   Pulse  Min: 70  Max: 78   Resp  Min: 18  Max: 20   SpO2  Min: 92 %  Max: 95 %           Last 2 weights    01/15/17  0505   Weight: 210 lb (95.3 kg)         Intake/Output Summary (Last 24 hours) at 01/16/17 0803  Last data filed at 01/16/17 0600   Gross per 24 hour   Intake    360 ml   Output   2825 ml   Net  -2465 ml       Physical Exam:     General Appearance:    Alert, cooperative, in no acute distress   Lungs:     Clear to auscultation,respirations regular, even and                   unlabored    Heart:    Irregular and normal rate, normal S1 and S2, grade 2/6            murmur, no gallop, no rub, no click   Abdomen:  Extremities:   Soft, non-tender        No edema             Pulses:   Pulses palpable and equal bilaterally      Results Review:     Results from last 7 days  Lab Units 01/16/17  0556 01/15/17  0522   SODIUM mmol/L 142 146   POTASSIUM mmol/L 3.9 4.1   CHLORIDE mmol/L 106 109   TOTAL CO2 mmol/L 32.0* 26.0   BUN mg/dL 15 16   CREATININE mg/dL 0.90 0.80   GLUCOSE mg/dL 88 143*   CALCIUM mg/dL 9.5 9.2       Results from last 7 days  Lab Units 01/16/17  0556 01/15/17  0522   WBC 10*3/mm3 8.79 6.57   HEMOGLOBIN g/dL 13.5 12.6*   HEMATOCRIT % 42.5 39.2   PLATELETS 10*3/mm3 218 225               Results from last 7 days  Lab Units 01/16/17  0556 01/15/17  1449   TROPONIN I ng/mL <0.006 <0.006     NO CXR / EKG.    Medication Review:  REVIEWED    Assessment/Plan      Prolonged rest chest discomfort without findings of myocardial injury.  His symptoms suggest acute coronary syndrome.  He had a right upper quadrant abdominal ultrasound performed at the direction of Dr. Brian Lewis one month ago without findings of gallbladder disease.  We will continue current medications and await results of diagnostic coronary angiography.  The procedure, risks, and potential complications discussed with the patient and his wife in room.      Principal Problem:    Chest pain  Active Problems:    Chronic combined systolic and diastolic congestive heart failure (EF<20%)    Hyperlipidemia    IHD (ischemic heart disease)    LBBB (left bundle branch block)    h/o Pulmonary embolism    Dyslipidemia    h/o DVT, lower extremity    CAD (coronary artery disease)        01/16/17  8:03 AM

## 2017-01-16 NOTE — PROGRESS NOTES
Discharge Planning Assessment  [facility]     Patient Name: Jonnie Roth  MRN: [id number]  Today's Date: 1/16/2017    Admit Date: 1/15/2017          Discharge Needs Assessment       01/16/17 1145    Living Environment    Lives With spouse    Living Arrangements house   L[facility] in [facility]    Provides Primary Care For no one    Quality Of Family Relationships supportive;helpful;involved    Able to Return to Prior Living Arrangements yes    Discharge Needs Assessment    Concerns To Be Addressed denies needs/concerns at this time;no discharge needs identified    Readmission Within The Last 30 Days no previous admission in last 30 days    Anticipated Changes Related to Illness none    Equipment Currently Used at Home none    Equipment Needed After Discharge none    Transportation Available car    Discharge Contact Information if Applicable [phone], or wife Trena at [phone]            Discharge Plan       01/16/17 1147    Case Management/Social Work Plan    Plan Home    Patient/Family In Agreement With Plan yes    Additional Comments Met with pt at bedside, Denies HH or DME. Is independent of ADL's. Has Medicare A&B with [employer] supplement with script coverage that is afffordable. Uses [facility] for 30 days fills and [facility] order for long term scripts. Denies any needs at this time.         Discharge Placement     No information found        Expected Discharge Date and Time     Expected Discharge Date Expected Discharge Time    Jan 17, 2017               Demographic Summary       01/16/17 1145    Referral Information    Referral Source admission list    Contact Information    Permission Granted to Share Information With family/designee;   May share info with wife    Primary Care Physician Information    Name Dr. Brian Lewis            Functional Status       01/16/17 1145    Functional Status Prior    Ambulation 0-->independent    Transferring 0-->independent     Toileting 0-->independent    Bathing 0-->independent    Dressing 0-->independent    Eating 0-->independent    Communication 0-->understands/communicates without difficulty    Swallowing 0-->swallows foods/liquids without difficulty    IADL    Medications independent    Meal Preparation independent    Housekeeping independent    Laundry independent    Shopping independent    Oral Care independent    Activity Tolerance    Current Activity Limitations none    Usual Activity Tolerance moderate    Current Activity Tolerance fair            Psychosocial     None            Abuse/Neglect     None            Legal     None            Substance Abuse     None            Patient Forms     None          Susie Almendarez

## 2017-01-16 NOTE — PLAN OF CARE
Problem: Patient Care Overview (Adult)  Goal: Plan of Care Review  Outcome: Ongoing (interventions implemented as appropriate)    Problem: Acute Coronary Syndrome (ACS) (Adult)  Goal: Signs and Symptoms of Listed Potential Problems Will be Absent or Manageable (Acute Coronary Syndrome)  Outcome: Ongoing (interventions implemented as appropriate)

## 2017-01-20 ENCOUNTER — TREATMENT (OUTPATIENT)
Dept: CARDIAC REHAB | Facility: HOSPITAL | Age: 72
End: 2017-01-20

## 2017-01-20 DIAGNOSIS — Z00.00 PREVENTATIVE HEALTH CARE: Primary | ICD-10-CM

## 2017-01-20 NOTE — PROGRESS NOTES
Attended Phase III Cardiac Rehab. No medication or health history changes reported. See flow sheet for details.

## 2017-01-23 ENCOUNTER — TREATMENT (OUTPATIENT)
Dept: CARDIAC REHAB | Facility: HOSPITAL | Age: 72
End: 2017-01-23

## 2017-01-23 DIAGNOSIS — Z00.00 PREVENTATIVE HEALTH CARE: Primary | ICD-10-CM

## 2017-01-25 ENCOUNTER — TREATMENT (OUTPATIENT)
Dept: CARDIAC REHAB | Facility: HOSPITAL | Age: 72
End: 2017-01-25

## 2017-01-25 DIAGNOSIS — Z00.00 PREVENTATIVE HEALTH CARE: Primary | ICD-10-CM

## 2017-01-26 ENCOUNTER — DOCUMENTATION (OUTPATIENT)
Dept: CARDIOLOGY | Facility: CLINIC | Age: 72
End: 2017-01-26

## 2017-01-26 NOTE — PROGRESS NOTES
Pt calling to make sure [facility] connecting.  I confirmed that it is.  Discussed advisery.  They stated understanding.

## 2017-01-27 ENCOUNTER — TREATMENT (OUTPATIENT)
Dept: CARDIAC REHAB | Facility: HOSPITAL | Age: 72
End: 2017-01-27

## 2017-01-27 DIAGNOSIS — Z00.00 PREVENTATIVE HEALTH CARE: Primary | ICD-10-CM

## 2017-02-01 ENCOUNTER — TREATMENT (OUTPATIENT)
Dept: CARDIAC REHAB | Facility: HOSPITAL | Age: 72
End: 2017-02-01

## 2017-02-01 DIAGNOSIS — Z00.00 PREVENTATIVE HEALTH CARE: Primary | ICD-10-CM

## 2017-02-03 ENCOUNTER — TREATMENT (OUTPATIENT)
Dept: CARDIAC REHAB | Facility: HOSPITAL | Age: 72
End: 2017-02-03

## 2017-02-03 DIAGNOSIS — Z00.00 PREVENTATIVE HEALTH CARE: Primary | ICD-10-CM

## 2017-02-06 ENCOUNTER — TREATMENT (OUTPATIENT)
Dept: CARDIAC REHAB | Facility: HOSPITAL | Age: 72
End: 2017-02-06

## 2017-02-06 DIAGNOSIS — Z00.00 PREVENTATIVE HEALTH CARE: Primary | ICD-10-CM

## 2017-02-08 ENCOUNTER — TREATMENT (OUTPATIENT)
Dept: CARDIAC REHAB | Facility: HOSPITAL | Age: 72
End: 2017-02-08

## 2017-02-08 DIAGNOSIS — Z00.00 PREVENTATIVE HEALTH CARE: Primary | ICD-10-CM

## 2017-02-09 DIAGNOSIS — R93.89 ABNORMAL CT SCAN: Primary | ICD-10-CM

## 2017-02-13 ENCOUNTER — HOSPITAL ENCOUNTER (OUTPATIENT)
Dept: CT IMAGING | Facility: HOSPITAL | Age: 72
Discharge: HOME | End: 2017-02-13
Admitting: PHYSICIAN ASSISTANT

## 2017-02-13 DIAGNOSIS — R22.1 NECK SWELLING: ICD-10-CM

## 2017-02-13 PROCEDURE — 70491 CT SOFT TISSUE NECK W/DYE: CPT

## 2017-02-13 PROCEDURE — 0 IOPAMIDOL 61 % SOLUTION: Performed by: PHYSICIAN ASSISTANT

## 2017-02-13 RX ADMIN — IOPAMIDOL 75 ML: 612 INJECTION, SOLUTION INTRAVENOUS at 11:17

## 2017-02-14 ENCOUNTER — OFFICE VISIT (OUTPATIENT)
Dept: CARDIOLOGY | Facility: CLINIC | Age: 72
End: 2017-02-14

## 2017-02-14 VITALS
BODY MASS INDEX: 28.76 KG/M2 | HEIGHT: 73 IN | HEART RATE: 80 BPM | SYSTOLIC BLOOD PRESSURE: 101 MMHG | WEIGHT: 217 LBS | DIASTOLIC BLOOD PRESSURE: 67 MMHG

## 2017-02-14 DIAGNOSIS — I50.42 CHRONIC COMBINED SYSTOLIC AND DIASTOLIC CONGESTIVE HEART FAILURE (HCC): ICD-10-CM

## 2017-02-14 DIAGNOSIS — I49.9 VENTRICULAR DYSRHYTHMIA: ICD-10-CM

## 2017-02-14 DIAGNOSIS — E78.2 MIXED HYPERLIPIDEMIA: ICD-10-CM

## 2017-02-14 DIAGNOSIS — I25.810 CORONARY ARTERY DISEASE INVOLVING CORONARY BYPASS GRAFT OF NATIVE HEART WITHOUT ANGINA PECTORIS: Primary | ICD-10-CM

## 2017-02-14 DIAGNOSIS — E78.5 DYSLIPIDEMIA: ICD-10-CM

## 2017-02-14 PROCEDURE — 99213 OFFICE O/P EST LOW 20 MIN: CPT | Performed by: INTERNAL MEDICINE

## 2017-02-14 PROCEDURE — 93000 ELECTROCARDIOGRAM COMPLETE: CPT | Performed by: INTERNAL MEDICINE

## 2017-02-14 RX ORDER — AMOXICILLIN 500 MG
CAPSULE ORAL NIGHTLY
COMMUNITY
End: 2017-10-01

## 2017-02-14 NOTE — PROGRESS NOTES
Subjective:     Encounter Date:02/14/2017      Patient ID: Jonnie Roth is a 72 y.o.  white male, disabled , from [place], [place].   .  INTERNIST: Brian Lewis [facility], [facility]  INTERVENTIONAL CARDIOLOGIST: Diego Ayala MD, [facility], [facility]   ELECTROPHYSIOLOGIST: Ferdinand Alba MD, [facility], [facility]  UROLOGIST: Simon Morin MD   GASTROENTEROLOGIST: Jose Barnard MD    Chief Complaint:   Chief Complaint   Patient presents with   • Shortness of Breath     HFU   • Edema     neck     Problem List:  1. Ischemic heart disease:  a. Remote progressive angina pectoris/acute extensive anterolateral myocardial infarction/delayed presentation with thrombolysis/severe 3-vessel coronary atherosclerosis with severe single vessel involvement/PTCA with intracoronary stent deployment proximal-mid segment LAD/moderate-severe compensated left ventricular dysfunction. LVEF (0.35)/abnormal positive signal averaged EKG/oral anticoagulation, April 1995.  b. Recent NYHA class I-II angina pectoris/class III CHF/abnormal quantitative SPECT gated Cardiolite GXT, July 1998.  c. Stable persistent MUGA, LVEF (0.33, January 1999 and January 2000).   d. Residual NYHA class I angina pectoris/CHF with reduced MUGA scan: LVEF (0.25), April 2002.  e. Left heart catheterization with distal circumflex disease: EF 20%, September 2002.   f. MUGA in May 2003: EF 32%.   g. Sestamibi GXT on 04/08/2005: No ischemia. EF 29%.   h. Residual NYHA class I angina pectoris/CHF with reduced acceptable MUGA scan: EF (0.32) and acceptable Pacesetter PCD interrogation/reprogramming, May 2003 with interrogation, September 2004.  i. Echocardiogram, September 2009 with EF 30%  j. Left heart catheterization by Dr. Bhupinder Gonzalez, August 2010, with LVEF of 35%, with 3-vessel native coronary artery disease, 95% mid-LAD status post PTCA and stenting with two 3 mm stents by Dr. Llanes.  k. Echocardiogram, 06/07/2012: Left ventricular ejection fraction  of 30%.  l. Hospitalization, 02/01/2014, for non-ST elevation MI, left heart catheterization by Dr. Ayala that demonstrated 60% mid stenosis of the right coronary artery that remains unchanged compared to previous, widely patent stents of the LAD with severe LV dysfunction of approximately 25%.   m. Left heart catheterization status post drug-eluting stent to the distal LAD and mid-RCA with an EF of 20% to 25% by Dr. Diego Ayala, 04/20/2015.   n. Left heart catheterization 1/16/17; nonobstructive CAD with patent previously placed stents, dilated cardiomyopathy with severe LV systolic dysfunction, EF less than 20%, normal hemodynamics, recommendations for continued medical therapy and risk factor, evaluation for noncardiac etiology of symptoms  o. Residual CCS Class I/II angina pectoris/NYHA Class II exertional dyspnea and fatigue syndrome.  2. Sudden cardiac death with primary ventricular fibrillation status post Pacesetter [place] ICD, [place], [place] in September 2000:   a. ICD generator change out with upgrade to a biventricular pacemaker ICD on 12/17/2007, [employer] device.  b. ICD discharge, 08/09/2012, secondary to ventricular flutter with initiation of amiodarone therapy.   c. Hospitalization, 02/01/2014, secondary to ICD discharge for ventricular tachycardia with subsequent discontinuation of Coreg and initiation of sotalol therapy.   d. Hospitalization, February 2014, secondary to ICD discharge for VT with subsequent discontinuation of Coreg and initiation of sotalol.  e. Echocardiogram, 04/07/2015: EF less than 20%.  f. Hospitalization secondary to VF and ICD shocks, 04/18/2015.  g. NIPS procedure with defibrillation threshold testing for VF and noninvasive program stimulation, 04/18/2015.   h. Initiation of mexiletine for recurrent ventricular tachycardia with ICD shocks on 05/05/2015 with amiodarone load, recurrent VT and ICD shocks with hospitalization 05/16/2015-05/18/2015.   i. EP study with RFA of  large anterior left ventricular scar extending from mid-left ventricular wall down apex by Dr. Ferdinand Alba, 05/21/2015.   3. Dyslipidemia.  4. Status post remote operations.  5. Remote chronic tobacco use/abnormal chest x-ray.  6. Left bundle branch block.  7. Burks trauma with hematuria with urosepsis requiring hospitalization, now resolved, May 2015.  8. Pulmonary embolism, spring 2015.   9. Recent apparent hypothyroidism/replacement therapy - data deficit, January 2016.    No Known Allergies      Current Outpatient Prescriptions:   •  amiodarone (PACERONE) 100 MG tablet, Take 200 mg by mouth Daily., Disp: , Rfl:   •  atorvastatin (LIPITOR) 40 MG tablet, Take 1 tablet by mouth daily., Disp: 90 tablet, Rfl: 1  •  carvedilol (COREG) 12.5 MG tablet, Take 6.25 mg by mouth 2 (Two) Times a Day., Disp: , Rfl:   •  clopidogrel (PLAVIX) 75 MG tablet, Take  by mouth daily., Disp: , Rfl:   •  eplerenone (INSPRA) 25 MG tablet, Take  by mouth., Disp: , Rfl:   •  finasteride (PROSCAR) 5 MG tablet, Take  by mouth daily., Disp: , Rfl:   •  levothyroxine (SYNTHROID, LEVOTHROID) 25 MCG tablet, Take  by mouth daily., Disp: , Rfl:   •  melatonin 3 MG tablet, Take  by mouth daily., Disp: , Rfl:   •  Multiple Vitamins-Minerals (MULTIVITAMIN ADULT PO), Take  by mouth daily., Disp: , Rfl:   •  nitroglycerin (NITROSTAT) 0.4 MG SL tablet, Place  under the tongue., Disp: , Rfl:   •  pantoprazole (PROTONIX) 40 MG EC tablet, Take 1 tablet by mouth Daily., Disp: 30 tablet, Rfl: 1  •  polyethylene glycol (MIRALAX) packet, Take 17 g by mouth every other day., Disp: , Rfl:   •  rivaroxaban (XARELTO) 20 MG tablet, Take  by mouth., Disp: , Rfl:   •  sacubitril-valsartan (ENTRESTO) 49-51 MG tablet, Take 1 tablet by mouth 2 (two) times a day., Disp: 60 tablet, Rfl: 1  •  torsemide (DEMADEX) 10 MG tablet, Take 1 tablet by mouth Daily., Disp: 30 tablet, Rfl: 3    History of Present Illness  Patient denies any chest pain, increased shortness of  "breath, fatigue, palpitations, edema.  He had a left heart catheterization last month which was acceptable.  He has a left jaw/neck pain and nodule that he had a CT scan on yesterday.  At first they thought it was an abscessed tooth but that was ruled out.  He is on doxycycline right now.  He had his flu vaccination last fall.  He goes to cardiac rehabilitation 3 times per week.  He had his device interrogated last month when he was in the hospital and he was found to have a 7 month longevity so he will have an ICD generator change soon.    ROS   Obtained and otherwise negative except as outlined in problem list and HPI.      ECG 12 Lead  Date/Time: 2/14/2017 3:34 PM  Performed by: CHING JERONIMO  Authorized by: CHING JERONIMO   Rhythm comments: AV dual paced rhythm, 77 bpm                 Objective:       Vitals:    02/14/17 1501 02/14/17 1503   BP: 109/75 101/67   BP Location: Left arm Left arm   Patient Position: Sitting Standing   Pulse: 80 80   Weight: 217 lb (98.4 kg)    Height: 73\" (185.4 cm)      Body mass index is 28.63 kg/(m^2).   Last weight in August 2016 was 207 pounds    Physical Exam   Constitutional: He appears well-developed and well-nourished.   HENT:   Head: Normocephalic and atraumatic.   Mouth/Throat: Oropharynx is clear and moist.   Neck: Neck supple. No JVD present. Carotid bruit is not present. No thyromegaly present.   Cardiovascular: Normal rate and regular rhythm.  Exam reveals no gallop, no S3 and no friction rub.    Murmur heard.   Medium-pitched systolic murmur is present with a grade of 2/6  at the lower left sternal border  Pulses:       Carotid pulses are 1+ on the right side, and 1+ on the left side.       Radial pulses are 1+ on the right side, and 1+ on the left side.        Femoral pulses are 1+ on the right side, and 1+ on the left side.       Popliteal pulses are 1+ on the right side, and 1+ on the left side.        Dorsalis pedis pulses are 1+ on the right side, and 1+ on " the left side.        Posterior tibial pulses are 1+ on the right side, and 1+ on the left side.   Pulmonary/Chest: Effort normal and breath sounds normal.   Abdominal: Soft. He exhibits no mass. There is no hepatosplenomegaly. There is no tenderness.   Lymphadenopathy:     He has no cervical adenopathy.   Neurological: He is alert.   Skin: Skin is warm, dry and intact.   Left radial artery catheterization site nominal       Lab Review:   Lab Results   Component Value Date    GLUCOSE 88 01/16/2017    BUN 15 01/16/2017    CREATININE 0.90 01/16/2017    EGFRIFNONA 83 01/16/2017    BCR 16.7 01/16/2017    CO2 32.0 (H) 01/16/2017    CALCIUM 9.5 01/16/2017    ALBUMIN 3.80 01/15/2017    LABIL2 1.5 01/15/2017    AST 85 (H) 01/15/2017     (H) 01/15/2017       Lab Results   Component Value Date    WBC 8.79 01/16/2017    HGB 13.5 01/16/2017    HCT 42.5 01/16/2017    MCV 89.3 01/16/2017     01/16/2017       Lab Results   Component Value Date    HGBA1C 6.4 (H) 01/30/2014       Lab Results   Component Value Date    TSH 3.832 03/25/2016       Lab Results   Component Value Date    TRIG 203 (H) 01/30/2014     Lab Results   Component Value Date    HDL 31 (L) 01/30/2014         Assessment:     Overall continued acceptable course with no interim cardiopulmonary complaints with acceptable functional status. We will defer additional diagnostic or therapeutic intervention from a cardiac perspective at this time. Patient with severe cardiomyopathy EF <20%; continue current cardiac medications. The assessment and recommendations were discussed with the patient and his wife who accompanies him to the office today.     Diagnosis Plan   1. Coronary artery disease involving coronary bypass graft of native heart without angina pectoris     2. Chronic combined systolic and diastolic congestive heart failure (EF<20%)     3. Mixed hyperlipidemia     4. Ventricular dysrhythmia, s/p ICD in 2000 and RFA May 2015, on amiodarone     5.  Dyslipidemia            Plan:         1. Patient to continue current medications and close follow up with the above providers.  2. Tentative cardiology follow up in 6 months or patient may return sooner PRN.        Scribed for Bhupinder Gonzalez MD by CHENTE Duncan. 2/14/2017  3:08 PM    I, Bhupinder Gonzalez MD, [facility], personally performed the services described in this documentation as scribed by the above named individual in my presence, and it is both accurate and complete. At 3:38 PM on 02/14/2017

## 2017-02-17 ENCOUNTER — TREATMENT (OUTPATIENT)
Dept: CARDIAC REHAB | Facility: HOSPITAL | Age: 72
End: 2017-02-17

## 2017-02-17 DIAGNOSIS — Z00.00 PREVENTATIVE HEALTH CARE: Primary | ICD-10-CM

## 2017-02-20 ENCOUNTER — TREATMENT (OUTPATIENT)
Dept: CARDIAC REHAB | Facility: HOSPITAL | Age: 72
End: 2017-02-20

## 2017-02-20 DIAGNOSIS — Z00.00 PREVENTATIVE HEALTH CARE: Primary | ICD-10-CM

## 2017-02-27 ENCOUNTER — TREATMENT (OUTPATIENT)
Dept: CARDIAC REHAB | Facility: HOSPITAL | Age: 72
End: 2017-02-27

## 2017-02-27 DIAGNOSIS — Z00.00 PREVENTATIVE HEALTH CARE: Primary | ICD-10-CM

## 2017-03-03 ENCOUNTER — TREATMENT (OUTPATIENT)
Dept: CARDIAC REHAB | Facility: HOSPITAL | Age: 72
End: 2017-03-03

## 2017-03-03 DIAGNOSIS — Z00.00 PREVENTATIVE HEALTH CARE: Primary | ICD-10-CM

## 2017-03-06 ENCOUNTER — TREATMENT (OUTPATIENT)
Dept: CARDIAC REHAB | Facility: HOSPITAL | Age: 72
End: 2017-03-06

## 2017-03-06 DIAGNOSIS — Z00.00 PREVENTATIVE HEALTH CARE: Primary | ICD-10-CM

## 2017-03-08 ENCOUNTER — TREATMENT (OUTPATIENT)
Dept: CARDIAC REHAB | Facility: HOSPITAL | Age: 72
End: 2017-03-08

## 2017-03-08 DIAGNOSIS — Z00.00 PREVENTATIVE HEALTH CARE: Primary | ICD-10-CM

## 2017-03-08 NOTE — PROGRESS NOTES
Adult Outpatient Nutrition--cardiac rehab    Patient Name:  Jonnie Roth  YOB: 1945  MRN: [id number]        Assessment Date:  3/8/2017 9:55am      RD measured pt's waist circumference while pt attended CR today; waist circumference= 45.5inches. Pt reports today's wt= 214lb and ht as 73 inches, therefore BMI is 28.2 (overwt).  RD provided BMI/Waist circumference counseling, and provided written materials to pt, re: BMI and waist circumference goals.  RD encouraged pt toward wt loss,  and to consider increasing frequency of weekly exercise since he exercises 3x/wk now. Pt states he has gained 10lb unintentionally over the past 6-7 months, and is motivated to work toward wt loss.  Wife present with pt, and supportive.                  Comments:  RD avail prn.        Electronically signed by:  Ailyn Mccarthy MS,ANUSHA,LD  03/08/17 10:20 AM

## 2017-03-10 ENCOUNTER — TREATMENT (OUTPATIENT)
Dept: CARDIAC REHAB | Facility: HOSPITAL | Age: 72
End: 2017-03-10

## 2017-03-10 DIAGNOSIS — Z00.00 PREVENTATIVE HEALTH CARE: Primary | ICD-10-CM

## 2017-03-13 PROBLEM — I50.9 CONGESTIVE HEART FAILURE: Status: ACTIVE | Noted: 2017-03-13

## 2017-03-14 ENCOUNTER — TREATMENT (OUTPATIENT)
Dept: CARDIAC REHAB | Facility: HOSPITAL | Age: 72
End: 2017-03-14

## 2017-03-14 DIAGNOSIS — Z00.00 PREVENTATIVE HEALTH CARE: Primary | ICD-10-CM

## 2017-03-15 ENCOUNTER — TREATMENT (OUTPATIENT)
Dept: CARDIAC REHAB | Facility: HOSPITAL | Age: 72
End: 2017-03-15

## 2017-03-15 ENCOUNTER — HOSPITAL ENCOUNTER (OUTPATIENT)
Dept: CT IMAGING | Facility: HOSPITAL | Age: 72
Discharge: HOME | End: 2017-03-15
Attending: INTERNAL MEDICINE | Admitting: INTERNAL MEDICINE

## 2017-03-15 DIAGNOSIS — Z00.00 PREVENTATIVE HEALTH CARE: Primary | ICD-10-CM

## 2017-03-15 DIAGNOSIS — R93.89 ABNORMAL CT SCAN: ICD-10-CM

## 2017-03-15 PROCEDURE — 71250 CT THORAX DX C-: CPT

## 2017-03-16 NOTE — PROGRESS NOTES
Patients wife called Cardiac Rehab staff to report Mr. Roth had fallen on his right knee this morning.  Mrs. Roth informed staff that he had a subsequent hematoma on kneecap. She placed an ace wrap on knee and is applying ice.  CR staff advised Mrs. Roth to brant edges of hematoma with permanent marker and monitor for enlargement and call PCP if hematoma progresses. Mrs Roth agreeable and teach back verified.

## 2017-03-17 ENCOUNTER — TREATMENT (OUTPATIENT)
Dept: CARDIAC REHAB | Facility: HOSPITAL | Age: 72
End: 2017-03-17

## 2017-03-17 DIAGNOSIS — Z00.00 PREVENTATIVE HEALTH CARE: Primary | ICD-10-CM

## 2017-03-20 ENCOUNTER — TREATMENT (OUTPATIENT)
Dept: CARDIAC REHAB | Facility: HOSPITAL | Age: 72
End: 2017-03-20

## 2017-03-20 DIAGNOSIS — Z00.00 PREVENTATIVE HEALTH CARE: Primary | ICD-10-CM

## 2017-03-22 ENCOUNTER — TREATMENT (OUTPATIENT)
Dept: CARDIAC REHAB | Facility: HOSPITAL | Age: 72
End: 2017-03-22

## 2017-03-22 DIAGNOSIS — Z00.00 PREVENTATIVE HEALTH CARE: Primary | ICD-10-CM

## 2017-03-23 ENCOUNTER — OFFICE VISIT (OUTPATIENT)
Dept: PULMONOLOGY | Facility: CLINIC | Age: 72
End: 2017-03-23

## 2017-03-23 VITALS
RESPIRATION RATE: 16 BRPM | HEART RATE: 84 BPM | TEMPERATURE: 96.9 F | BODY MASS INDEX: 29.24 KG/M2 | WEIGHT: 220.6 LBS | HEIGHT: 73 IN | SYSTOLIC BLOOD PRESSURE: 108 MMHG | DIASTOLIC BLOOD PRESSURE: 74 MMHG | OXYGEN SATURATION: 94 %

## 2017-03-23 DIAGNOSIS — I25.810 CORONARY ARTERY DISEASE INVOLVING CORONARY BYPASS GRAFT OF NATIVE HEART WITHOUT ANGINA PECTORIS: ICD-10-CM

## 2017-03-23 DIAGNOSIS — I26.99 OTHER PULMONARY EMBOLISM WITHOUT ACUTE COR PULMONALE, UNSPECIFIED CHRONICITY (HCC): ICD-10-CM

## 2017-03-23 DIAGNOSIS — I50.42 CHRONIC COMBINED SYSTOLIC AND DIASTOLIC CONGESTIVE HEART FAILURE (HCC): ICD-10-CM

## 2017-03-23 DIAGNOSIS — R93.89 ABNORMAL CT SCAN, CHEST: ICD-10-CM

## 2017-03-23 DIAGNOSIS — R91.1 LUNG NODULE: Primary | ICD-10-CM

## 2017-03-23 DIAGNOSIS — I49.9 VENTRICULAR DYSRHYTHMIA: ICD-10-CM

## 2017-03-23 PROCEDURE — 99213 OFFICE O/P EST LOW 20 MIN: CPT | Performed by: INTERNAL MEDICINE

## 2017-03-23 NOTE — PROGRESS NOTES
Pulmonary Follow-up      Patient Care Team:  Brian Lewis MD as PCP - General (Internal Medicine)    Chief Complaint / Reason: Follow-up of lung nodules, history of pulmonary embolism.      Chief Complaint   Patient presents with   • POST CT       Subjective    72-year-old male who I saw once in the hospital in May 2015 when he had a small left upper lobe segmental pulmonary embolism. He also had 2 incidental subcentimeter pulmonary nodules. He is on chronic anticoagulation. He has a history of ischemic cardiomyopathy and has a biventricular ICD. He has had previous drug-eluting stents, including an RCA stent placed in April 2015.     Interval History:   The patient is feeling quite well from a respiratory standpoint.  He has some mild exertional dyspnea which is improved from previous.  He has mild lower extremity edema also improved.  He denies any fevers, chills, or cough.  He denies unexplained weight loss or hemoptysis.  He is here to follow-up on a repeat CT scan.    History taken from: patient    PMH/FH/Social History were reviewed and updated appropriately in the electronic medical record.     Review of Systems:   Review of Systems   Constitutional: Positive for fatigue and unexpected weight change (gain due to age / inactivity he thinks). Negative for activity change, appetite change, chills, diaphoresis and fever.   HENT: Negative for congestion, dental problem, ear pain, hearing loss, nosebleeds, postnasal drip, rhinorrhea and sinus pressure.    Eyes: Negative for pain, discharge, redness and visual disturbance.   Respiratory: Positive for shortness of breath. Negative for apnea, cough, choking, chest tightness, wheezing and stridor.    Cardiovascular: Negative for chest pain, palpitations and leg swelling.   Gastrointestinal: Negative for abdominal distention, abdominal pain, blood in stool, constipation, diarrhea, nausea and vomiting.   Endocrine: Negative for cold intolerance, heat  intolerance, polydipsia and polyuria.   Genitourinary: Negative for dysuria, frequency, hematuria and urgency.   Musculoskeletal: Negative for arthralgias, joint swelling and myalgias.   Skin: Negative for color change, rash and wound.   Allergic/Immunologic: Negative for environmental allergies and immunocompromised state.   Neurological: Positive for light-headedness. Negative for dizziness, syncope, weakness, numbness and headaches.   Hematological: Negative for adenopathy. Bruises/bleeds easily.   Psychiatric/Behavioral: Negative for dysphoric mood, sleep disturbance and suicidal ideas. The patient is not nervous/anxious.    All other systems reviewed and are negative.    All other systems were reviewed and are negative.  Exceptions are noted in the subjective or above.      Objective     Vital Signs  Temp:  [96.9 °F (36.1 °C)] 96.9 °F (36.1 °C)  Heart Rate:  [84] 84  Resp:  [16] 16  BP: (108)/(74) 108/74     Body mass index is 29.1 kg/(m^2).  Oxygen saturation: 94% on room air.  Physical Exam:     Constitutional:    Alert, cooperative, in no acute distress   Head:    Normocephalic, without obvious abnormality, atraumatic   Eyes:            Lids and lashes normal, conjunctivae and sclerae normal, no   icterus, no pallor, corneas clear, PER   ENMT:   Ears appear intact with no abnormalities noted      No oral lesions, no thrush, oral mucosa moist   Neck:   No adenopathy, supple, trachea midline, no thyromegaly, no JVD       Lungs/Resp:     Normal effort, symmetric chest rise, no crepitus, mild bibasilar rales, no chest wall tenderness                 Heart/CV:    Regular rhythm and normal rate, normal S1 and S2, no            murmur   Abdomen/GI:     Soft, non-tender, non-distended, normal bowel sounds   :     Deferred   Extremities/MSK:   No clubbing or cyanosis. Trace edema.  Normal tone.  No deformities.    Pulses:   Pulses palpable and equal bilaterally   Skin:   No bleeding, bruising or rash   Heme/Lymph:   " No cervical or supraclavicular adenopathy.    Neurologic:    Psychiatric:       Moves all extremities with no obvious focal motor deficit.  Cranial nerves 2 - 12 grossly intact   Oriented x 3, normal affect.             Results Review:     I reviewed the patient's new clinical results.       Imaging:  I reviewed the patient's new imaging including reviewing the images and radiologist's report.  CT scan of the chest done on 3/15/2017 was read as, \"1.  Bilateral diffuse perimeter scarring and fibrosis is noted.  2.  Bilateral diffuse subcentimeter scattered noncalcified nodules are noted.  3.  Pulmonary pattern is stable when compared to 2/22/2016.  New progressive disease is not identified.  All nodules are morphologically stable.  4.  There is cardiomegaly without pericardial effusion.  Central mediastinal mass or adenopathy is not identified.  5.  Impressive dense calcium burden is noted in the coronary circulation.\".    PFTs: None today    Medication Review:       No current facility-administered medications for this visit.     Assessment/Plan   Problems Addressed this Visit        Cardiovascular and Mediastinum    Coronary artery disease involving coronary bypass graft of native heart without angina pectoris    Chronic combined systolic and diastolic congestive heart failure (EF<20%)    Ventricular dysrhythmia, s/p ICD in 2000 and RFA May 2015, on amiodarone    h/o Pulmonary embolism       Respiratory    Lung nodule - Primary       Other    Abnormal CT scan, chest         72-year-old male with a history of congestive heart failure and ventricular arrhythmias, pulmonary embolism, chronic anticoagulation with Xarelto is here for follow-up.  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    Plan:  1.  No need to follow-up with repeat CT scans.  The patient quit smoking 20 years ago and therefore does not meet criteria for low dose CT " screening for lung cancer.  2.  Recommend continued anticoagulation.  3.  We'll follow-up on an as-needed basis.          James Spaulding MD  03/23/17  5:36 PM    *. Please note that portions of this note were completed with a voice recognition program. Efforts were made to edit the dictations, but occasionally words are mistranscribed.

## 2017-03-24 ENCOUNTER — TREATMENT (OUTPATIENT)
Dept: CARDIAC REHAB | Facility: HOSPITAL | Age: 72
End: 2017-03-24

## 2017-03-24 DIAGNOSIS — Z00.00 PREVENTATIVE HEALTH CARE: Primary | ICD-10-CM

## 2017-03-27 ENCOUNTER — TREATMENT (OUTPATIENT)
Dept: CARDIAC REHAB | Facility: HOSPITAL | Age: 72
End: 2017-03-27

## 2017-03-27 DIAGNOSIS — Z00.00 PREVENTATIVE HEALTH CARE: Primary | ICD-10-CM

## 2017-03-29 ENCOUNTER — TREATMENT (OUTPATIENT)
Dept: CARDIAC REHAB | Facility: HOSPITAL | Age: 72
End: 2017-03-29

## 2017-03-29 DIAGNOSIS — Z00.00 PREVENTATIVE HEALTH CARE: Primary | ICD-10-CM

## 2017-03-31 DIAGNOSIS — E78.5 DYSLIPIDEMIA: Primary | ICD-10-CM

## 2017-03-31 RX ORDER — ATORVASTATIN CALCIUM 40 MG/1
TABLET, FILM COATED ORAL
Qty: 90 TABLET | Refills: 0 | Status: SHIPPED | OUTPATIENT
Start: 2017-03-31 | End: 2017-06-27 | Stop reason: SDUPTHER

## 2017-04-03 ENCOUNTER — TREATMENT (OUTPATIENT)
Dept: CARDIAC REHAB | Facility: HOSPITAL | Age: 72
End: 2017-04-03

## 2017-04-03 DIAGNOSIS — Z00.00 PREVENTATIVE HEALTH CARE: Primary | ICD-10-CM

## 2017-04-05 ENCOUNTER — TREATMENT (OUTPATIENT)
Dept: CARDIAC REHAB | Facility: HOSPITAL | Age: 72
End: 2017-04-05

## 2017-04-05 DIAGNOSIS — Z00.00 PREVENTATIVE HEALTH CARE: Primary | ICD-10-CM

## 2017-04-06 ENCOUNTER — DOCUMENTATION (OUTPATIENT)
Dept: CARDIOLOGY | Facility: CLINIC | Age: 72
End: 2017-04-06

## 2017-04-07 ENCOUNTER — TREATMENT (OUTPATIENT)
Dept: CARDIAC REHAB | Facility: HOSPITAL | Age: 72
End: 2017-04-07

## 2017-04-07 DIAGNOSIS — Z00.00 PREVENTATIVE HEALTH CARE: Primary | ICD-10-CM

## 2017-04-10 ENCOUNTER — TREATMENT (OUTPATIENT)
Dept: CARDIAC REHAB | Facility: HOSPITAL | Age: 72
End: 2017-04-10

## 2017-04-10 DIAGNOSIS — Z00.00 PREVENTATIVE HEALTH CARE: Primary | ICD-10-CM

## 2017-04-18 ENCOUNTER — OFFICE VISIT (OUTPATIENT)
Dept: CARDIOLOGY | Facility: CLINIC | Age: 72
End: 2017-04-18

## 2017-04-18 VITALS
WEIGHT: 218.6 LBS | SYSTOLIC BLOOD PRESSURE: 100 MMHG | DIASTOLIC BLOOD PRESSURE: 64 MMHG | BODY MASS INDEX: 28.97 KG/M2 | HEIGHT: 73 IN | HEART RATE: 76 BPM

## 2017-04-18 DIAGNOSIS — I47.20 VENTRICULAR TACHYCARDIA (HCC): Primary | ICD-10-CM

## 2017-04-18 DIAGNOSIS — I50.42 CHRONIC COMBINED SYSTOLIC AND DIASTOLIC CONGESTIVE HEART FAILURE (HCC): ICD-10-CM

## 2017-04-18 DIAGNOSIS — I25.810 CORONARY ARTERY DISEASE INVOLVING CORONARY BYPASS GRAFT OF NATIVE HEART WITHOUT ANGINA PECTORIS: ICD-10-CM

## 2017-04-18 DIAGNOSIS — I44.7 LBBB (LEFT BUNDLE BRANCH BLOCK): ICD-10-CM

## 2017-04-18 PROCEDURE — 93289 INTERROG DEVICE EVAL HEART: CPT | Performed by: INTERNAL MEDICINE

## 2017-04-18 PROCEDURE — 99213 OFFICE O/P EST LOW 20 MIN: CPT | Performed by: INTERNAL MEDICINE

## 2017-04-18 NOTE — PROGRESS NOTES
Jonnie Roth  1945  [phone]      04/18/2017    [facility] CARDIOLOGY     Brian Lweis MD  [address] [place] [address]    Chief Complaint   Patient presents with   • Palpitations   • Congestive Heart Failure       Problem List:   PROBLEM LIST:  1. Ischemic heart disease:  a. Remote progressive angina pectoris/acute extensive anterolateral myocardial infarction/delayed presentation with thrombolysis/severe 3-vessel coronary atherosclerosis with severe single vessel involvement/PTCA with intracoronary stent deployment proximal-mid segment LAD/moderate-severe compensated left ventricular dysfunction. LVEF (0.35)/abnormal positive signal averaged EKG/oral anticoagulation, April 1995.  b. Recent NYHA class I-II angina pectoris/class III CHF/abnormal quantitative SPECT gated Cardiolite GXT, July 1998.  c. Stable persistent MUGA, LVEF (0.33, January 1999 and January 2000).   d. Residual NYHA class I angina pectoris/CHF with reduced MUGA scan: LVEF (0.25), April 2002.  e. Left heart catheterization with distal circumflex disease: EF 20%, September 2002.   f. MUGA in May 2003: EF 32%.   g. Sestamibi GXT on 04/08/2005: No ischemia. EF 29%.   h. Residual NYHA class I angina pectoris/CHF with reduced acceptable MUGA scan: EF (0.32) and acceptable Pacesetter PCD interrogation/reprogramming, May 2003 with interrogation, September 2004.  i. Echocardiogram, September 2009 with EF 30%  j. Left heart catheterization by Dr. Bhupinder Gonzalez, August 2010, with LVEF of 35%, with 3-vessel native coronary artery disease, 95% mid-LAD status post PTCA and stenting with two 3 mm stents by Dr. Llanes.  k. Echocardiogram, 06/07/2012: Left ventricular ejection fraction of 30%.  l. Hospitalization, 02/01/2014, for non-ST elevation MI, left heart catheterization by Dr. Ayala that demonstrated 60% mid stenosis of the right coronary artery that remains unchanged compared to  previous, widely patent stents of the LAD with severe LV dysfunction of approximately 25%.   m. Left heart catheterization status post drug-eluting stent to the distal LAD and mid-RCA with an EF of 20% to 25% by Dr. Diego Ayala, 04/20/2015.   2. Sudden cardiac death with primary ventricular fibrillation status post Pacesetter [employer] ICD, [place], [place] in September 2000:   a. ICD generator change out with upgrade to a biventricular pacemaker ICD on 12/17/2007, [employer] device.  b. ICD discharge, 08/09/2012, secondary to ventricular flutter with initiation of amiodarone therapy.   c. Hospitalization, 02/01/2014, secondary to ICD discharge for ventricular tachycardia with subsequent discontinuation of Coreg and initiation of sotalol therapy.   d. Hospitalization, February 2014, secondary to ICD discharge for VT with subsequent discontinuation of Coreg and initiation of sotalol.  e. Echocardiogram, 04/07/2015: EF less than 20%.  f. Hospitalization secondary to VF and ICD shocks, 04/18/2015.  g. NIPS procedure with defibrillation threshold testing for VF and noninvasive program stimulation, 04/18/2015.   h. Initiation of mexiletine for recurrent ventricular tachycardia with ICD shocks on 05/05/2015 with amiodarone load, recurrent VT and ICD shocks with hospitalization 05/16/2015-05/18/2015.   i. EP study with RFA of large anterior left ventricular scar extending from mid-left ventricular wall down apex by Dr. Ferdinand Alba, 05/21/2015.   3. Dyslipidemia.  4. Status post remote operations.  5. Remote chronic tobacco use/abnormal chest x-ray.  6. Left bundle branch block.  7. Burks trauma with hematuria with urosepsis requiring hospitalization, now resolved, May 2015.  8. Pulmonary embolism, spring 2015      Allergies  No Known Allergies    Current Medications    Current Outpatient Prescriptions:   •  atorvastatin (LIPITOR) 40 MG tablet, TAKE 1 TABLET BY MOUTH DAILY, Disp: 90 tablet, Rfl: 0  •  carvedilol (COREG) 12.5  MG tablet, Take 6.25 mg by mouth 2 (Two) Times a Day., Disp: , Rfl:   •  clopidogrel (PLAVIX) 75 MG tablet, Take  by mouth daily., Disp: , Rfl:   •  eplerenone (INSPRA) 25 MG tablet, Take 12.5 mg by mouth Daily., Disp: , Rfl:   •  finasteride (PROSCAR) 5 MG tablet, Take  by mouth daily., Disp: , Rfl:   •  L-Tryptophan 500 MG capsule, Take  by mouth Every Night. Pt takes 3 tablets at bedtime, Disp: , Rfl:   •  levothyroxine (SYNTHROID, LEVOTHROID) 25 MCG tablet, Take  by mouth daily., Disp: , Rfl:   •  melatonin 3 MG tablet, Take  by mouth daily., Disp: , Rfl:   •  Multiple Vitamins-Minerals (MULTIVITAMIN ADULT PO), Take  by mouth daily., Disp: , Rfl:   •  nitroglycerin (NITROSTAT) 0.4 MG SL tablet, Place  under the tongue., Disp: , Rfl:   •  pantoprazole (PROTONIX) 40 MG EC tablet, Take 1 tablet by mouth Daily., Disp: 30 tablet, Rfl: 1  •  polyethylene glycol (MIRALAX) packet, Take 17 g by mouth every other day., Disp: , Rfl:   •  rivaroxaban (XARELTO) 20 MG tablet, Take  by mouth Daily., Disp: , Rfl:   •  sacubitril-valsartan (ENTRESTO) 49-51 MG tablet, Take 1 tablet by mouth 2 (two) times a day., Disp: 60 tablet, Rfl: 1  •  torsemide (DEMADEX) 10 MG tablet, Take 1 tablet by mouth Daily. (Patient taking differently: Take 10 mg by mouth Daily. Alternating days between 5mg and 10mg), Disp: 30 tablet, Rfl: 3    History of Present Illness   HPI    Pt presents for follow up of CHF/VT/CAD. Since the pt has seen us, I stopped amiodarone 10 days ago after a CT scan of the chest showed possible fibrosis. No palpitations, CP, LH, and dizziness. Denies any ICD shocks, or TIA/CVA symptoms. Overall feels well except for chronic SOB with exertion. Does not were oxygen. Was diagnosed with sleep apnea awaiting CPAP trial.     ROS:  General:  + fatigue, no weight gain or loss  Cardiovascular:  Denies CP, PND, syncope, near syncope, edema or palpitations.  Pulmonary:  Denies LU, cough, or wheezing    Vitals:    04/18/17 0948   BP:  "100/64   BP Location: Right arm   Patient Position: Sitting   Pulse: 76   Weight: 218 lb 9.6 oz (99.2 kg)   Height: 73\" (185.4 cm)       PE:  General: NAD  Neck: no JVD, no carotid bruits, no TM  Heart RRR, NL S1, S2, S4 present, no rubs, murmurs  Lungs: CTA, no wheezes, rhonchi, or rales  Abd: soft, non-tender, NL BS  Ext: No musculoskeletal deformities, no edema, cyanosis, or clubbing  Psych: normal mood and affect    Diagnostic Data:  Procedures.    1. Ventricular tachycardia    2. Chronic combined systolic and diastolic congestive heart failure (EF<20%)    3. Coronary artery disease involving coronary bypass graft of native heart without angina pectoris    4. LBBB (left bundle branch block)        ICD interrogation: NL fxn, NL battery fxn, No VT or AF, 5 months on battery    Plan:  1) VT: off amiodarone for 10 days: monitor   Continue present medications. ICD function normal.   2) CHF s/p BiV ICD: doing well all things considered. Better on entresto  3) Anticoagulation  Continue NOAC        F/up in 6 months, 3M device         "

## 2017-04-21 ENCOUNTER — TREATMENT (OUTPATIENT)
Dept: CARDIAC REHAB | Facility: HOSPITAL | Age: 72
End: 2017-04-21

## 2017-04-21 DIAGNOSIS — Z00.00 PREVENTATIVE HEALTH CARE: Primary | ICD-10-CM

## 2017-04-24 ENCOUNTER — TREATMENT (OUTPATIENT)
Dept: CARDIAC REHAB | Facility: HOSPITAL | Age: 72
End: 2017-04-24

## 2017-04-24 DIAGNOSIS — Z00.00 PREVENTATIVE HEALTH CARE: Primary | ICD-10-CM

## 2017-04-26 ENCOUNTER — TREATMENT (OUTPATIENT)
Dept: CARDIAC REHAB | Facility: HOSPITAL | Age: 72
End: 2017-04-26

## 2017-04-26 DIAGNOSIS — Z00.00 PREVENTATIVE HEALTH CARE: Primary | ICD-10-CM

## 2017-04-28 ENCOUNTER — TREATMENT (OUTPATIENT)
Dept: CARDIAC REHAB | Facility: HOSPITAL | Age: 72
End: 2017-04-28

## 2017-04-28 DIAGNOSIS — Z00.00 PREVENTATIVE HEALTH CARE: Primary | ICD-10-CM

## 2017-05-01 ENCOUNTER — TREATMENT (OUTPATIENT)
Dept: CARDIAC REHAB | Facility: HOSPITAL | Age: 72
End: 2017-05-01

## 2017-05-01 DIAGNOSIS — Z00.00 PREVENTATIVE HEALTH CARE: Primary | ICD-10-CM

## 2017-05-03 ENCOUNTER — TREATMENT (OUTPATIENT)
Dept: CARDIAC REHAB | Facility: HOSPITAL | Age: 72
End: 2017-05-03

## 2017-05-03 DIAGNOSIS — Z00.00 PREVENTATIVE HEALTH CARE: Primary | ICD-10-CM

## 2017-05-22 ENCOUNTER — TREATMENT (OUTPATIENT)
Dept: CARDIAC REHAB | Facility: HOSPITAL | Age: 72
End: 2017-05-22

## 2017-05-22 DIAGNOSIS — Z00.00 PREVENTATIVE HEALTH CARE: Primary | ICD-10-CM

## 2017-05-24 ENCOUNTER — TRANSCRIBE ORDERS (OUTPATIENT)
Dept: CARDIAC REHAB | Facility: HOSPITAL | Age: 72
End: 2017-05-24

## 2017-05-24 ENCOUNTER — TREATMENT (OUTPATIENT)
Dept: CARDIAC REHAB | Facility: HOSPITAL | Age: 72
End: 2017-05-24

## 2017-05-24 DIAGNOSIS — Z00.00 PREVENTATIVE HEALTH CARE: Primary | ICD-10-CM

## 2017-06-08 ENCOUNTER — HOSPITAL ENCOUNTER (OUTPATIENT)
Dept: GENERAL RADIOLOGY | Facility: HOSPITAL | Age: 72
Discharge: HOME | End: 2017-06-08
Admitting: PHYSICIAN ASSISTANT

## 2017-06-08 ENCOUNTER — TRANSCRIBE ORDERS (OUTPATIENT)
Dept: ADMINISTRATIVE | Facility: HOSPITAL | Age: 72
End: 2017-06-08

## 2017-06-08 DIAGNOSIS — R05.9 COUGH: Primary | ICD-10-CM

## 2017-06-08 PROCEDURE — 71020 HC CHEST PA AND LATERAL: CPT

## 2017-06-27 RX ORDER — ATORVASTATIN CALCIUM 40 MG/1
TABLET, FILM COATED ORAL
Qty: 90 TABLET | Refills: 0 | Status: SHIPPED | OUTPATIENT
Start: 2017-06-27 | End: 2017-10-01

## 2017-07-03 ENCOUNTER — TREATMENT (OUTPATIENT)
Dept: CARDIAC REHAB | Facility: HOSPITAL | Age: 72
End: 2017-07-03

## 2017-07-03 DIAGNOSIS — Z00.00 PREVENTATIVE HEALTH CARE: Primary | ICD-10-CM

## 2017-07-05 ENCOUNTER — TREATMENT (OUTPATIENT)
Dept: CARDIAC REHAB | Facility: HOSPITAL | Age: 72
End: 2017-07-05

## 2017-07-05 DIAGNOSIS — Z00.00 PREVENTATIVE HEALTH CARE: Primary | ICD-10-CM

## 2017-07-07 ENCOUNTER — TREATMENT (OUTPATIENT)
Dept: CARDIAC REHAB | Facility: HOSPITAL | Age: 72
End: 2017-07-07

## 2017-07-07 DIAGNOSIS — Z00.00 PREVENTATIVE HEALTH CARE: Primary | ICD-10-CM

## 2017-07-10 ENCOUNTER — TREATMENT (OUTPATIENT)
Dept: CARDIAC REHAB | Facility: HOSPITAL | Age: 72
End: 2017-07-10

## 2017-07-10 DIAGNOSIS — Z00.00 PREVENTATIVE HEALTH CARE: Primary | ICD-10-CM

## 2017-07-14 ENCOUNTER — TREATMENT (OUTPATIENT)
Dept: CARDIAC REHAB | Facility: HOSPITAL | Age: 72
End: 2017-07-14

## 2017-07-14 DIAGNOSIS — Z00.00 PREVENTATIVE HEALTH CARE: Primary | ICD-10-CM

## 2017-07-18 ENCOUNTER — TREATMENT (OUTPATIENT)
Dept: CARDIAC REHAB | Facility: HOSPITAL | Age: 72
End: 2017-07-18

## 2017-07-18 DIAGNOSIS — Z00.00 PREVENTATIVE HEALTH CARE: Primary | ICD-10-CM

## 2017-07-19 ENCOUNTER — TREATMENT (OUTPATIENT)
Dept: CARDIAC REHAB | Facility: HOSPITAL | Age: 72
End: 2017-07-19

## 2017-07-19 DIAGNOSIS — Z00.00 PREVENTATIVE HEALTH CARE: Primary | ICD-10-CM

## 2017-07-21 ENCOUNTER — TREATMENT (OUTPATIENT)
Dept: CARDIAC REHAB | Facility: HOSPITAL | Age: 72
End: 2017-07-21

## 2017-07-21 DIAGNOSIS — Z00.00 PREVENTATIVE HEALTH CARE: Primary | ICD-10-CM

## 2017-07-24 ENCOUNTER — TREATMENT (OUTPATIENT)
Dept: CARDIAC REHAB | Facility: HOSPITAL | Age: 72
End: 2017-07-24

## 2017-07-24 DIAGNOSIS — Z00.00 PREVENTATIVE HEALTH CARE: Primary | ICD-10-CM

## 2017-07-26 ENCOUNTER — TREATMENT (OUTPATIENT)
Dept: CARDIAC REHAB | Facility: HOSPITAL | Age: 72
End: 2017-07-26

## 2017-07-26 DIAGNOSIS — Z00.00 PREVENTATIVE HEALTH CARE: Primary | ICD-10-CM

## 2017-07-28 ENCOUNTER — TREATMENT (OUTPATIENT)
Dept: CARDIAC REHAB | Facility: HOSPITAL | Age: 72
End: 2017-07-28

## 2017-07-28 DIAGNOSIS — Z00.00 PREVENTATIVE HEALTH CARE: Primary | ICD-10-CM

## 2017-07-31 ENCOUNTER — TREATMENT (OUTPATIENT)
Dept: CARDIAC REHAB | Facility: HOSPITAL | Age: 72
End: 2017-07-31

## 2017-07-31 DIAGNOSIS — Z00.00 PREVENTATIVE HEALTH CARE: Primary | ICD-10-CM

## 2017-08-02 ENCOUNTER — TREATMENT (OUTPATIENT)
Dept: CARDIAC REHAB | Facility: HOSPITAL | Age: 72
End: 2017-08-02

## 2017-08-02 DIAGNOSIS — Z00.00 PREVENTATIVE HEALTH CARE: Primary | ICD-10-CM

## 2017-08-07 ENCOUNTER — TREATMENT (OUTPATIENT)
Dept: CARDIAC REHAB | Facility: HOSPITAL | Age: 72
End: 2017-08-07

## 2017-08-07 DIAGNOSIS — Z00.00 PREVENTATIVE HEALTH CARE: Primary | ICD-10-CM

## 2017-08-09 ENCOUNTER — TREATMENT (OUTPATIENT)
Dept: CARDIAC REHAB | Facility: HOSPITAL | Age: 72
End: 2017-08-09

## 2017-08-09 DIAGNOSIS — Z00.00 PREVENTATIVE HEALTH CARE: Primary | ICD-10-CM

## 2017-08-11 ENCOUNTER — TREATMENT (OUTPATIENT)
Dept: CARDIAC REHAB | Facility: HOSPITAL | Age: 72
End: 2017-08-11

## 2017-08-11 DIAGNOSIS — Z00.00 PREVENTATIVE HEALTH CARE: Primary | ICD-10-CM

## 2017-08-14 ENCOUNTER — LAB (OUTPATIENT)
Dept: LAB | Facility: HOSPITAL | Age: 72
End: 2017-08-14

## 2017-08-14 ENCOUNTER — TREATMENT (OUTPATIENT)
Dept: CARDIAC REHAB | Facility: HOSPITAL | Age: 72
End: 2017-08-14

## 2017-08-14 DIAGNOSIS — Z00.00 PREVENTATIVE HEALTH CARE: Primary | ICD-10-CM

## 2017-08-14 DIAGNOSIS — E78.5 DYSLIPIDEMIA: ICD-10-CM

## 2017-08-14 LAB
ARTICHOKE IGE QN: 77 MG/DL (ref 0–130)
CHOLEST SERPL-MCNC: 130 MG/DL (ref 0–200)
HDLC SERPL-MCNC: 40 MG/DL (ref 40–60)
TRIGL SERPL-MCNC: 70 MG/DL (ref 0–150)

## 2017-08-14 PROCEDURE — 80061 LIPID PANEL: CPT | Performed by: INTERNAL MEDICINE

## 2017-08-16 ENCOUNTER — CLINICAL SUPPORT NO REQUIREMENTS (OUTPATIENT)
Dept: CARDIOLOGY | Facility: CLINIC | Age: 72
End: 2017-08-16

## 2017-08-16 ENCOUNTER — TREATMENT (OUTPATIENT)
Dept: CARDIAC REHAB | Facility: HOSPITAL | Age: 72
End: 2017-08-16

## 2017-08-16 DIAGNOSIS — Z00.00 PREVENTATIVE HEALTH CARE: Primary | ICD-10-CM

## 2017-08-16 DIAGNOSIS — I49.9 VENTRICULAR DYSRHYTHMIA: ICD-10-CM

## 2017-08-16 DIAGNOSIS — I50.42 CHRONIC COMBINED SYSTOLIC AND DIASTOLIC CONGESTIVE HEART FAILURE (HCC): ICD-10-CM

## 2017-08-16 PROCEDURE — 93295 DEV INTERROG REMOTE 1/2/MLT: CPT | Performed by: INTERNAL MEDICINE

## 2017-08-16 PROCEDURE — 93296 REM INTERROG EVL PM/IDS: CPT | Performed by: INTERNAL MEDICINE

## 2017-08-18 ENCOUNTER — TREATMENT (OUTPATIENT)
Dept: CARDIAC REHAB | Facility: HOSPITAL | Age: 72
End: 2017-08-18

## 2017-08-18 ENCOUNTER — OFFICE VISIT (OUTPATIENT)
Dept: CARDIOLOGY | Facility: CLINIC | Age: 72
End: 2017-08-18

## 2017-08-18 VITALS
HEART RATE: 84 BPM | BODY MASS INDEX: 28.1 KG/M2 | HEIGHT: 73 IN | SYSTOLIC BLOOD PRESSURE: 97 MMHG | WEIGHT: 212 LBS | DIASTOLIC BLOOD PRESSURE: 75 MMHG

## 2017-08-18 DIAGNOSIS — I50.42 CHRONIC COMBINED SYSTOLIC AND DIASTOLIC CONGESTIVE HEART FAILURE (HCC): ICD-10-CM

## 2017-08-18 DIAGNOSIS — I25.810 CORONARY ARTERY DISEASE INVOLVING CORONARY BYPASS GRAFT OF NATIVE HEART WITHOUT ANGINA PECTORIS: Primary | ICD-10-CM

## 2017-08-18 DIAGNOSIS — E78.5 DYSLIPIDEMIA: ICD-10-CM

## 2017-08-18 DIAGNOSIS — R93.89 ABNORMAL CT SCAN, CHEST: ICD-10-CM

## 2017-08-18 DIAGNOSIS — Z00.00 PREVENTATIVE HEALTH CARE: Primary | ICD-10-CM

## 2017-08-18 DIAGNOSIS — I26.99 OTHER PULMONARY EMBOLISM WITHOUT ACUTE COR PULMONALE, UNSPECIFIED CHRONICITY (HCC): ICD-10-CM

## 2017-08-18 DIAGNOSIS — I47.20 VENTRICULAR TACHYCARDIA (HCC): ICD-10-CM

## 2017-08-18 PROCEDURE — 99214 OFFICE O/P EST MOD 30 MIN: CPT | Performed by: INTERNAL MEDICINE

## 2017-08-21 ENCOUNTER — TREATMENT (OUTPATIENT)
Dept: CARDIAC REHAB | Facility: HOSPITAL | Age: 72
End: 2017-08-21

## 2017-08-21 ENCOUNTER — TELEPHONE (OUTPATIENT)
Dept: CARDIOLOGY | Facility: CLINIC | Age: 72
End: 2017-08-21

## 2017-08-21 DIAGNOSIS — Z00.00 PREVENTATIVE HEALTH CARE: Primary | ICD-10-CM

## 2017-08-21 NOTE — TELEPHONE ENCOUNTER
Called and left message to inform pt we are going to go ahead and schedule a battery change in about 6 weeks.

## 2017-08-23 ENCOUNTER — TREATMENT (OUTPATIENT)
Dept: CARDIAC REHAB | Facility: HOSPITAL | Age: 72
End: 2017-08-23

## 2017-08-23 DIAGNOSIS — Z00.00 PREVENTATIVE HEALTH CARE: Primary | ICD-10-CM

## 2017-08-25 ENCOUNTER — TREATMENT (OUTPATIENT)
Dept: CARDIAC REHAB | Facility: HOSPITAL | Age: 72
End: 2017-08-25

## 2017-08-25 DIAGNOSIS — Z00.00 PREVENTATIVE HEALTH CARE: Primary | ICD-10-CM

## 2017-08-28 ENCOUNTER — TREATMENT (OUTPATIENT)
Dept: CARDIAC REHAB | Facility: HOSPITAL | Age: 72
End: 2017-08-28

## 2017-08-28 DIAGNOSIS — Z00.00 PREVENTATIVE HEALTH CARE: Primary | ICD-10-CM

## 2017-08-28 PROBLEM — I50.22 CHRONIC LEFT VENTRICULAR SYSTOLIC HEART FAILURE: Status: ACTIVE | Noted: 2017-08-28

## 2017-08-30 ENCOUNTER — TREATMENT (OUTPATIENT)
Dept: CARDIAC REHAB | Facility: HOSPITAL | Age: 72
End: 2017-08-30

## 2017-08-30 DIAGNOSIS — Z00.00 PREVENTATIVE HEALTH CARE: Primary | ICD-10-CM

## 2017-09-01 RX ORDER — RIVAROXABAN 20 MG/1
TABLET, FILM COATED ORAL
Qty: 90 TABLET | Refills: 2 | Status: SHIPPED | OUTPATIENT
Start: 2017-09-01 | End: 2017-10-01

## 2017-09-01 RX ORDER — SACUBITRIL AND VALSARTAN 49; 51 MG/1; MG/1
TABLET, FILM COATED ORAL
Qty: 180 TABLET | Refills: 2 | Status: SHIPPED | OUTPATIENT
Start: 2017-09-01 | End: 2017-10-01

## 2017-09-13 ENCOUNTER — PREP FOR SURGERY (OUTPATIENT)
Dept: OTHER | Facility: HOSPITAL | Age: 72
End: 2017-09-13

## 2017-09-13 DIAGNOSIS — I50.22 CHRONIC SYSTOLIC CHF (CONGESTIVE HEART FAILURE) (HCC): Primary | ICD-10-CM

## 2017-09-13 RX ORDER — VANCOMYCIN/0.9 % SOD CHLORIDE 1.5G/250ML
15 PLASTIC BAG, INJECTION (ML) INTRAVENOUS
Status: CANCELLED | OUTPATIENT
Start: 2017-09-14 | End: 2017-09-15

## 2017-09-13 RX ORDER — SODIUM CHLORIDE 0.9 % (FLUSH) 0.9 %
1-10 SYRINGE (ML) INJECTION AS NEEDED
Status: CANCELLED | OUTPATIENT
Start: 2017-09-13

## 2017-09-13 RX ORDER — PROMETHAZINE HYDROCHLORIDE 25 MG/ML
12.5 INJECTION, SOLUTION INTRAMUSCULAR; INTRAVENOUS EVERY 4 HOURS PRN
Status: CANCELLED | OUTPATIENT
Start: 2017-09-13

## 2017-09-13 RX ORDER — ACETAMINOPHEN 325 MG/1
650 TABLET ORAL EVERY 4 HOURS PRN
Status: CANCELLED | OUTPATIENT
Start: 2017-09-13

## 2017-09-22 RX ORDER — CLOPIDOGREL BISULFATE 75 MG/1
TABLET ORAL
Qty: 90 TABLET | Refills: 2 | Status: SHIPPED | OUTPATIENT
Start: 2017-09-22 | End: 2017-10-01

## 2017-10-01 ENCOUNTER — APPOINTMENT (OUTPATIENT)
Dept: PREADMISSION TESTING | Facility: HOSPITAL | Age: 72
End: 2017-10-01

## 2017-10-01 DIAGNOSIS — I50.22 CHRONIC SYSTOLIC CHF (CONGESTIVE HEART FAILURE) (HCC): ICD-10-CM

## 2017-10-01 LAB
ANION GAP SERPL CALCULATED.3IONS-SCNC: 6 MMOL/L (ref 3–11)
BUN BLD-MCNC: 19 MG/DL (ref 9–23)
BUN/CREAT SERPL: 23.8 (ref 7–25)
CALCIUM SPEC-SCNC: 8.9 MG/DL (ref 8.7–10.4)
CHLORIDE SERPL-SCNC: 106 MMOL/L (ref 99–109)
CO2 SERPL-SCNC: 24 MMOL/L (ref 20–31)
CREAT BLD-MCNC: 0.8 MG/DL (ref 0.6–1.3)
DEPRECATED RDW RBC AUTO: 48.4 FL (ref 37–54)
ERYTHROCYTE [DISTWIDTH] IN BLOOD BY AUTOMATED COUNT: 14.3 % (ref 11.3–14.5)
GFR SERPL CREATININE-BSD FRML MDRD: 95 ML/MIN/1.73
GLUCOSE BLD-MCNC: 114 MG/DL (ref 70–100)
HBA1C MFR BLD: 6.4 % (ref 4.8–5.6)
HCT VFR BLD AUTO: 47.1 % (ref 38.9–50.9)
HGB BLD-MCNC: 15.2 G/DL (ref 13.1–17.5)
INR PPP: 1.16
MCH RBC QN AUTO: 29.5 PG (ref 27–31)
MCHC RBC AUTO-ENTMCNC: 32.3 G/DL (ref 32–36)
MCV RBC AUTO: 91.5 FL (ref 80–99)
PLATELET # BLD AUTO: 174 10*3/MM3 (ref 150–450)
PMV BLD AUTO: 10 FL (ref 6–12)
POTASSIUM BLD-SCNC: 4.3 MMOL/L (ref 3.5–5.5)
PROTHROMBIN TIME: 12.7 SECONDS (ref 9.6–11.5)
RBC # BLD AUTO: 5.15 10*6/MM3 (ref 4.2–5.76)
SODIUM BLD-SCNC: 136 MMOL/L (ref 132–146)
WBC NRBC COR # BLD: 12.49 10*3/MM3 (ref 3.5–10.8)

## 2017-10-01 PROCEDURE — 85610 PROTHROMBIN TIME: CPT | Performed by: PHYSICIAN ASSISTANT

## 2017-10-01 PROCEDURE — 85027 COMPLETE CBC AUTOMATED: CPT | Performed by: PHYSICIAN ASSISTANT

## 2017-10-01 PROCEDURE — 36415 COLL VENOUS BLD VENIPUNCTURE: CPT

## 2017-10-01 PROCEDURE — 80048 BASIC METABOLIC PNL TOTAL CA: CPT | Performed by: PHYSICIAN ASSISTANT

## 2017-10-01 PROCEDURE — 83036 HEMOGLOBIN GLYCOSYLATED A1C: CPT | Performed by: PHYSICIAN ASSISTANT

## 2017-10-01 RX ORDER — NITROGLYCERIN 0.4 MG/1
0.4 TABLET SUBLINGUAL
COMMUNITY
End: 2020-01-16 | Stop reason: SDUPTHER

## 2017-10-01 RX ORDER — ATORVASTATIN CALCIUM 40 MG/1
40 TABLET, FILM COATED ORAL NIGHTLY
COMMUNITY
End: 2018-01-03 | Stop reason: SDUPTHER

## 2017-10-01 RX ORDER — POLYETHYLENE GLYCOL 3350 17 G/17G
17 POWDER, FOR SOLUTION ORAL EVERY OTHER DAY
COMMUNITY

## 2017-10-01 RX ORDER — CARVEDILOL 12.5 MG/1
12.5 TABLET ORAL 2 TIMES DAILY WITH MEALS
COMMUNITY
End: 2023-03-07 | Stop reason: SDUPTHER

## 2017-10-01 RX ORDER — CLOPIDOGREL BISULFATE 75 MG/1
75 TABLET ORAL EVERY MORNING
Status: ON HOLD | COMMUNITY
End: 2022-09-13 | Stop reason: SDUPTHER

## 2017-10-01 RX ORDER — PANTOPRAZOLE SODIUM 40 MG/1
40 TABLET, DELAYED RELEASE ORAL EVERY MORNING
COMMUNITY

## 2017-10-01 RX ORDER — LEVOTHYROXINE SODIUM 0.05 MG/1
50 TABLET ORAL EVERY MORNING
COMMUNITY

## 2017-10-01 RX ORDER — FINASTERIDE 5 MG/1
5 TABLET, FILM COATED ORAL EVERY MORNING
COMMUNITY

## 2017-10-01 NOTE — DISCHARGE INSTRUCTIONS
The following instructions given during Pre Admission Testing visit:    Do not eat or drink anything after MN except for sips of water with your a.m. Prescription meds unless otherwise instructed by your physician.    Glasses and jewelry may be worn, but dentures must be removed prior to cath/procedure.    Leave any items you consider valuable at home.    Family members may wait in [facility] waiting area during procedure.    Bring all medications in their original containers the day of procedure.    Bring photo ID and insurance cards on the day of procedure.    Need to make arrangements for transportation prior to discharge.    The following handouts were given:     Heart Cath pathway (if applicable)   Cardiac Cath booklet published by [facility]    OR appropriate [facility] procedure booklet    If applicable, pt instructed to bring CPAP mask and tubing the day of procedure.

## 2017-10-02 ENCOUNTER — HOSPITAL ENCOUNTER (OUTPATIENT)
Facility: HOSPITAL | Age: 72
Setting detail: OBSERVATION
Discharge: HOME | End: 2017-10-02
Attending: INTERNAL MEDICINE | Admitting: INTERNAL MEDICINE

## 2017-10-02 VITALS
DIASTOLIC BLOOD PRESSURE: 72 MMHG | HEART RATE: 69 BPM | WEIGHT: 211.86 LBS | SYSTOLIC BLOOD PRESSURE: 120 MMHG | BODY MASS INDEX: 28.08 KG/M2 | TEMPERATURE: 96.9 F | OXYGEN SATURATION: 94 % | RESPIRATION RATE: 11 BRPM | HEIGHT: 73 IN

## 2017-10-02 DIAGNOSIS — I50.22 CHRONIC LEFT VENTRICULAR SYSTOLIC HEART FAILURE (HCC): ICD-10-CM

## 2017-10-02 PROCEDURE — 25010000002 ONDANSETRON PER 1 MG: Performed by: INTERNAL MEDICINE

## 2017-10-02 PROCEDURE — C1882 AICD, OTHER THAN SING/DUAL: HCPCS | Performed by: INTERNAL MEDICINE

## 2017-10-02 PROCEDURE — 25010000002 MIDAZOLAM PER 1 MG: Performed by: INTERNAL MEDICINE

## 2017-10-02 PROCEDURE — G0378 HOSPITAL OBSERVATION PER HR: HCPCS

## 2017-10-02 PROCEDURE — 94799 UNLISTED PULMONARY SVC/PX: CPT

## 2017-10-02 PROCEDURE — S0260 H&P FOR SURGERY: HCPCS | Performed by: INTERNAL MEDICINE

## 2017-10-02 PROCEDURE — 94660 CPAP INITIATION&MGMT: CPT

## 2017-10-02 PROCEDURE — 25010000002 VANCOMYCIN HCL IN NACL 1.5-0.9 GM/500ML-% SOLUTION: Performed by: PHYSICIAN ASSISTANT

## 2017-10-02 PROCEDURE — 93641 EP EVL 1/2CHMB PAC CVDFB TST: CPT | Performed by: INTERNAL MEDICINE

## 2017-10-02 PROCEDURE — 33264 RMVL & RPLCMT DFB GEN MLT LD: CPT | Performed by: INTERNAL MEDICINE

## 2017-10-02 PROCEDURE — 25010000002 FENTANYL CITRATE (PF) 100 MCG/2ML SOLUTION: Performed by: INTERNAL MEDICINE

## 2017-10-02 DEVICE — ICD UNIFY ASSURA CRTD CD335740C: Type: IMPLANTABLE DEVICE | Site: CHEST WALL | Status: FUNCTIONAL

## 2017-10-02 RX ORDER — SODIUM CHLORIDE 0.9 % (FLUSH) 0.9 %
1-10 SYRINGE (ML) INJECTION AS NEEDED
Status: DISCONTINUED | OUTPATIENT
Start: 2017-10-02 | End: 2017-10-02 | Stop reason: HOSPADM

## 2017-10-02 RX ORDER — HYDROCODONE BITARTRATE AND ACETAMINOPHEN 5; 325 MG/1; MG/1
1 TABLET ORAL EVERY 4 HOURS PRN
Status: DISCONTINUED | OUTPATIENT
Start: 2017-10-02 | End: 2017-10-02 | Stop reason: HOSPADM

## 2017-10-02 RX ORDER — MIDAZOLAM HYDROCHLORIDE 1 MG/ML
INJECTION INTRAMUSCULAR; INTRAVENOUS AS NEEDED
Status: DISCONTINUED | OUTPATIENT
Start: 2017-10-02 | End: 2017-10-02 | Stop reason: HOSPADM

## 2017-10-02 RX ORDER — FENTANYL CITRATE 50 UG/ML
INJECTION, SOLUTION INTRAMUSCULAR; INTRAVENOUS AS NEEDED
Status: DISCONTINUED | OUTPATIENT
Start: 2017-10-02 | End: 2017-10-02 | Stop reason: HOSPADM

## 2017-10-02 RX ORDER — ONDANSETRON 2 MG/ML
4 INJECTION INTRAMUSCULAR; INTRAVENOUS EVERY 6 HOURS PRN
Status: DISCONTINUED | OUTPATIENT
Start: 2017-10-02 | End: 2017-10-02 | Stop reason: HOSPADM

## 2017-10-02 RX ORDER — ACETAMINOPHEN 325 MG/1
650 TABLET ORAL EVERY 4 HOURS PRN
Status: DISCONTINUED | OUTPATIENT
Start: 2017-10-02 | End: 2017-10-02 | Stop reason: HOSPADM

## 2017-10-02 RX ORDER — FLUMAZENIL 0.1 MG/ML
INJECTION INTRAVENOUS AS NEEDED
Status: DISCONTINUED | OUTPATIENT
Start: 2017-10-02 | End: 2017-10-02 | Stop reason: HOSPADM

## 2017-10-02 RX ORDER — PROMETHAZINE HYDROCHLORIDE 25 MG/ML
12.5 INJECTION, SOLUTION INTRAMUSCULAR; INTRAVENOUS EVERY 4 HOURS PRN
Status: DISCONTINUED | OUTPATIENT
Start: 2017-10-02 | End: 2017-10-02 | Stop reason: HOSPADM

## 2017-10-02 RX ORDER — ONDANSETRON 2 MG/ML
INJECTION INTRAMUSCULAR; INTRAVENOUS AS NEEDED
Status: DISCONTINUED | OUTPATIENT
Start: 2017-10-02 | End: 2017-10-02 | Stop reason: HOSPADM

## 2017-10-02 RX ORDER — BUPIVACAINE HYDROCHLORIDE 5 MG/ML
INJECTION, SOLUTION EPIDURAL; INTRACAUDAL AS NEEDED
Status: DISCONTINUED | OUTPATIENT
Start: 2017-10-02 | End: 2017-10-02 | Stop reason: HOSPADM

## 2017-10-02 RX ORDER — EPLERENONE 25 MG/1
12.5 TABLET, FILM COATED ORAL EVERY MORNING
COMMUNITY
End: 2022-12-13 | Stop reason: HOSPADM

## 2017-10-02 RX ORDER — LIDOCAINE HYDROCHLORIDE 10 MG/ML
INJECTION, SOLUTION INFILTRATION; PERINEURAL AS NEEDED
Status: DISCONTINUED | OUTPATIENT
Start: 2017-10-02 | End: 2017-10-02 | Stop reason: HOSPADM

## 2017-10-02 RX ORDER — SODIUM CHLORIDE 9 MG/ML
INJECTION, SOLUTION INTRAVENOUS CONTINUOUS PRN
Status: DISCONTINUED | OUTPATIENT
Start: 2017-10-02 | End: 2017-10-02 | Stop reason: HOSPADM

## 2017-10-02 RX ORDER — VANCOMYCIN/0.9 % SOD CHLORIDE 1.5G/250ML
15 PLASTIC BAG, INJECTION (ML) INTRAVENOUS
Status: COMPLETED | OUTPATIENT
Start: 2017-10-02 | End: 2017-10-02

## 2017-10-02 RX ADMIN — VANCOMYCIN HCL-SODIUM CHLORIDE IV SOLN 1.5 GM/250ML-0.9% 1500 MG: 1.5-0.9/25 SOLUTION at 08:10

## 2017-10-02 NOTE — H&P
[facility] - Cardiology History & Physical     Jonnie Roth  1945  2501/1      PCP:  Brian Lewis MD  Jonnie Roth is a 72 y.o.  male.    10/02/17    Chief Complaint:  [employer] BiV ICD at [place]  PROBLEM LIST:  1. Ischemic heart disease:  a. Remote progressive angina pectoris/acute extensive anterolateral myocardial infarction/delayed presentation with thrombolysis/severe 3-vessel coronary atherosclerosis with severe single vessel involvement/PTCA with intracoronary stent deployment proximal-mid segment LAD/moderate-severe compensated left ventricular dysfunction. LVEF (0.35)/abnormal positive signal averaged EKG/oral anticoagulation, April 1995.  b. Recent NYHA class I-II angina pectoris/class III CHF/abnormal quantitative SPECT gated Cardiolite GXT, July 1998.  c. Stable persistent MUGA, LVEF (0.33, January 1999 and January 2000).   d. Residual NYHA class I angina pectoris/CHF with reduced MUGA scan: LVEF (0.25), April 2002.  e. Left heart catheterization with distal circumflex disease: EF 20%, September 2002.   f. MUGA in May 2003: EF 32%.   g. Sestamibi GXT on 04/08/2005: No ischemia. EF 29%.   h. Residual NYHA class I angina pectoris/CHF with reduced acceptable MUGA scan: EF (0.32) and acceptable Pacesetter PCD interrogation/reprogramming, May 2003 with interrogation, September 2004.  i. Echocardiogram, September 2009 with EF 30%  j. Left heart catheterization by Dr. Bhupinder Gonzalez, August 2010, with LVEF of 35%, with 3-vessel native coronary artery disease, 95% mid-LAD status post PTCA and stenting with two 3 mm stents by Dr. Llanes.  k. Echocardiogram, 06/07/2012: Left ventricular ejection fraction of 30%.  l. Hospitalization, 02/01/2014, for non-ST elevation MI, left heart catheterization by Dr. Ayala that demonstrated 60% mid stenosis of the right coronary artery that remains unchanged compared to previous, widely patent stents of the LAD with severe  LV dysfunction of approximately 25%.   m. Left heart catheterization status post drug-eluting stent to the distal LAD and mid-RCA with an EF of 20% to 25% by Dr. Diego Ayala, 04/20/2015.   2. Sudden cardiac death with primary ventricular fibrillation status post Pacesetter [place] ICD, [place], [place] in September 2000:   a. ICD generator change out with upgrade to a biventricular pacemaker ICD on 12/17/2007, [employer] device.  b. ICD discharge, 08/09/2012, secondary to ventricular flutter with initiation of amiodarone therapy.   c. Hospitalization, 02/01/2014, secondary to ICD discharge for ventricular tachycardia with subsequent discontinuation of Coreg and initiation of sotalol therapy.   d. Hospitalization, February 2014, secondary to ICD discharge for VT with subsequent discontinuation of Coreg and initiation of sotalol.  e. Echocardiogram, 04/07/2015: EF less than 20%.  f. Hospitalization secondary to VF and ICD shocks, 04/18/2015.  g. NIPS procedure with defibrillation threshold testing for VF and noninvasive program stimulation, 04/18/2015.   h. Initiation of mexiletine for recurrent ventricular tachycardia with ICD shocks on 05/05/2015 with amiodarone load, recurrent VT and ICD shocks with hospitalization 05/16/2015-05/18/2015.   i. EP study with RFA of large anterior left ventricular scar extending from mid-left ventricular wall down apex by Dr. Ferdinand Alba, 05/21/2015.   3. Dyslipidemia.  4. Remote chronic tobacco use/abnormal chest x-ray.  5. Left bundle branch block.  6. Burks trauma with hematuria with urosepsis requiring hospitalization, now resolved, May 2015.  7. Pulmonary embolism, spring 2015        Allergies:  has No Known Allergies.    Prescriptions Prior to Admission   Medication Sig Dispense Refill Last Dose   • atorvastatin (LIPITOR) 40 MG tablet Take 40 mg by mouth Every Night.   10/1/2017 at Unknown time   • carvedilol (COREG) 12.5 MG tablet Take 12.5 mg by mouth 2 (Two) Times a Day With  Meals.   10/2/2017 at 0510   • clopidogrel (PLAVIX) 75 MG tablet Take 75 mg by mouth Every Morning.   10/2/2017 at 0510   • eplerenone (INSPRA) 25 MG tablet Take 12.5 mg by mouth Every Morning.   10/2/2017 at 0510   • finasteride (PROSCAR) 5 MG tablet Take 5 mg by mouth Every Morning.   10/2/2017 at 0510   • L-TRYPTOPHAN PO Take 1,000 mg by mouth Every Night.   10/1/2017 at Unknown time   • levothyroxine (SYNTHROID, LEVOTHROID) 25 MCG tablet Take 25 mcg by mouth Every Morning.   10/2/2017 at 0510   • MELATONIN PO Take 9 mg by mouth Every Night.   10/1/2017 at Unknown time   • pantoprazole (PROTONIX) 40 MG EC tablet Take 40 mg by mouth Every Morning.   10/2/2017 at 0510   • rivaroxaban (XARELTO) 20 MG tablet Take 20 mg by mouth Every Evening.   10/1/2017 at Unknown time   • sacubitril-valsartan (ENTRESTO) 49-51 MG tablet Take 1 tablet by mouth 2 (Two) Times a Day.   10/2/2017 at 0510   • TORSEMIDE PO Take 5 mg by mouth Every Morning.   10/1/2017 at Unknown time   • nitroglycerin (NITROSTAT) 0.4 MG SL tablet Place 0.4 mg under the tongue Every 5 (Five) Minutes As Needed for Chest Pain. Take no more than 3 doses in 15 minutes.   Unknown at Unknown time   • polyethylene glycol (MIRALAX) packet Take 17 g by mouth Every Other Day.   10/1/2017       CARDIAC RISK FACTORS:   advanced age (older than 55 for men, 65 for women), dyslipidemia, hypertension, male gender and smoking/ tobacco exposure.     HPI:  Mr. Roth is a 71 yo CM followed by both Dr. Gonzalez and Dr. Alba for Chronic Systolic CHF with a BiV ICD.  He was recently seen in clinic by Dr. Gonzalez on 8/18/17 and was doing quite well.  He continues with routine exercise through cardiac rehab.     A remote interrogation through our device clinic showed that his device was nearing BAILEY and he is here today to undergo generator change. He has had no ED visits, no illnesses or change to medications since he was seen in the clinic.          Social History     Social  "History   • Marital status:      Spouse name: N/A   • Number of children: N/A   • Years of education: N/A     Occupational History   • Not on file.     Social History Main Topics   • Smoking status: Former Smoker     Packs/day: 2.00     Years: 30.00     Types: Cigarettes     Quit date: 04/1995   • Smokeless tobacco: Never Used   • Alcohol use No   • Drug use: No   • Sexual activity: Defer     Other Topics Concern   • Not on file     Social History Narrative     Family History   Problem Relation Age of Onset   • Heart failure Father    • Stroke Father      Past Surgical History:   Procedure Laterality Date   • BIVENTRICULLAR IMPLANTABLE CARDIOVERTER DEFIBRILLATOR PLACEMENT     • CARDIAC CATHETERIZATION N/A 1/16/2017    Procedure: Left Heart Cath;  Surgeon: Diego Ayala MD;  Location: [facility] CATH INVASIVE LOCATION;  Service:    • CARDIAC DEFIBRILLATOR PLACEMENT       A. ICD generator change out with upgrade to BiV pacemaker implantable cardioverter    defibrillator, 12/17/2007. Ventricular fibrillation s/p pacesetter [place] ICD in [place]. [place] 09/2000   • COLONOSCOPY     • CORONARY ANGIOPLASTY WITH STENT PLACEMENT     • CYSTOSCOPY URETERAL TUMOR FULGURATION  05/29/2015     A. Status post cystoscopy with clot evacuation and fulguration of the prostate secondary to hematuria, 5/29/2015.   • FOOT SURGERY     • PROSTATE SURGERY      A. Status post laser vaporization, 08/19/2009.       Review of Systems:  Pertinent positives are listed above and in physical exam.  All others have been reviewed and are negative.     Objective:   Vitals:   height is 73\" (185.4 cm) and weight is 211 lb 13.8 oz (96.1 kg). His tympanic temperature is 96.9 °F (36.1 °C). His blood pressure is 104/73 and his pulse is 76. His respiration is 18 and oxygen saturation is 94%.  No intake or output data in the 24 hours ending 10/02/17 0735      Physical Exam:  General Appearance:    Alert, cooperative, in no acute distress   Head:    " Normocephalic, without obvious abnormality, atraumatic   Eyes:            Lids and lashes normal, conjunctivae and sclerae normal, no   icterus, no pallor, corneas clear, PERRLA   Ears:    Ears appear intact with no abnormalities noted   Throat:   No oral lesions, no thrush, oral mucosa moist   Neck:   No adenopathy, supple, trachea midline, no thyromegaly, no     carotid bruit, no JVD   Back:     No kyphosis present, no scoliosis present, no skin lesions,       erythema or scars, no tenderness to percussion or                   palpation,   range of motion normal   Lungs:     Clear to auscultation,respirations regular, even and                   unlabored    Heart:    Regular rhythm and normal rate, normal S1 and S2, 2/6            murmur, no gallop, no rub, no click       Abdomen:     Normal bowel sounds, no masses, no organomegaly, soft        non-tender, non-distended, no guarding, no rebound                 tenderness       Extremities:   Moves all extremities well,  no cyanosis, no redness, no edema   Pulses:   Pulses palpable and equal bilaterally   Skin:   No bleeding, bruising or rash   Lymph nodes:   No palpable adenopathy   Neurologic:   Cranial nerves 2 - 12 grossly intact, sensation intact, DTR        present and equal bilaterally          Results Review:  I personally reviewed the patient's clinical results.    Results from last 7 days  Lab Units 10/01/17  1355   WBC 10*3/mm3 12.49*   HEMOGLOBIN g/dL 15.2   HEMATOCRIT % 47.1   PLATELETS 10*3/mm3 174       Results from last 7 days  Lab Units 10/01/17  1355   SODIUM mmol/L 136   POTASSIUM mmol/L 4.3   CHLORIDE mmol/L 106   CO2 mmol/L 24.0   BUN mg/dL 19   CREATININE mg/dL 0.80   CALCIUM mg/dL 8.9   GLUCOSE mg/dL 114*           Results from last 7 days  Lab Units 10/01/17  1355   INR  1.16                   Radiology:  Imaging Results (last 72 hours)     ** No results found for the last 72 hours. **          Tele:  AV Paced    Assessment and Plan:    1.   BiV ICD at BAILEY:  Patient to undergo generator change today.  Risks and benefits reviewed.  Further recommendations based on outcomes.    I discussed the patients findings and my recommendations with the patient, any present family members, and the nursing staff.  Ferdinand Alba MD saw and examined patient, verified hx and PE, read all radiographic studies, reviewed labs and micro data, and formulated dx, plan for treatment and all medical decision making.      Adelia Ames PA-C for Ferdinand Alba MD  10/02/17 7:35 AM      EMR Dragon/Transcription disclaimer:   Much of this encounter note is an electronic transcription/translation of spoken language to printed text. The electronic translation of spoken language may permit erroneous, or at times, nonsensical words or phrases to be inadvertently transcribed; Although I have reviewed the note for such errors, some may still exist.          I, Ferdinand Alba MD, personally performed the services face to face as described and documented by the above named individual. I have made any necessary edits and it is both accurate and complete 10/2/2017  8:50 AM

## 2017-10-02 NOTE — PLAN OF CARE
Problem: Patient Care Overview (Adult)  Goal: Plan of Care Review  Outcome: Ongoing (interventions implemented as appropriate)  HERE FOR BIV ICD GEN CHANGE; PROCEDURE TEACHING DONE AT [facility] PER PAULA SANCHEZ    10/02/17 0714   Coping/Psychosocial Response Interventions   Plan Of Care Reviewed With patient;spouse   Patient Care Overview   Progress no change         Problem: Cardiac Rhythm Management Device (Adult)  Goal: Signs and Symptoms of Listed Potential Problems Will be Absent or Manageable (Cardiac Rhythm Management Device)  Outcome: Ongoing (interventions implemented as appropriate)  ORIENTED TO ROOM AND PROCEDURE UPON ARRIVAL TO [facility]     10/02/17 0618   Cardiac Rhythm Management Device   Problems Assessed (Cardiac Rhythm Management Device) other (see comments)   Problems Present (Cardiac Rhythm Management Device) other (see comments)

## 2017-10-09 ENCOUNTER — OFFICE VISIT (OUTPATIENT)
Dept: CARDIOLOGY | Facility: CLINIC | Age: 72
End: 2017-10-09

## 2017-10-09 DIAGNOSIS — I50.42 CHRONIC COMBINED SYSTOLIC AND DIASTOLIC CONGESTIVE HEART FAILURE (HCC): Primary | ICD-10-CM

## 2017-10-09 PROCEDURE — 99024 POSTOP FOLLOW-UP VISIT: CPT | Performed by: INTERNAL MEDICINE

## 2017-10-09 NOTE — PROGRESS NOTES
WOUND CHECK    10/09/2017    Jonnie Roth, : 1945    WOUND CHECK    B/P:  (Sitting) 110/70 RIGHT ARM    (Standing)     Pulse: 71    Patient has fever: [] Temperature if indicated: 97.6     Wound Location: LEFT INFRACLAVICULAR     Dressing Removed [x]        Old Dressing Appearance:  Clean, dry []                 Old, bloody drainage [x]   MINIMAL                             Moist, serous drainage []                Moist, thick yellow/green drainage []       Wound Appearance: Redness []                  Drainage []                  Culture obtained []        Color: N/A     Consistency: N/A     Amount: none         Gloves used, wound cleansed with sterile 4x4 and peroxide [x]       MD notified [] MD orders:   Antibiotic started []  If checked, type   Other:     Appointment for follow-up scheduled for 3 months post procedure [x]    Future Appointments  Date Time Provider Department Center   10/11/2017 9:30 AM [facility]   10/13/2017 9:30 AM [facility]   10/16/2017 9:30 AM [facility]   10/18/2017 9:30 AM [facility]   10/20/2017 9:30 AM [facility]   10/23/2017 9:30 AM [facility]   10/25/2017 9:30 AM [facility]   10/27/2017 9:30 AM [facility]   10/30/2017 9:30 AM [facility]   2017 9:30 AM [facility]   11/3/2017 9:30 AM [facility]   2017 9:30 AM [facility]   2017 9:30 AM [facility]   11/10/2017 9:30 AM [facility]   11/13/2017 9:30 AM [facility]   11/15/2017 9:30 AM [facility]   11/17/2017 9:30 AM [facility]   11/20/2017 9:30 AM [facility]   11/22/2017 9:30 AM [facility]   11/24/2017 9:30 AM [facility]   11/27/2017 9:30 AM [facility]   11/29/2017 9:30 AM [facility]   12/1/2017 9:30 AM [facility]   12/4/2017 9:30 AM [facility]   12/6/2017 9:30 AM [facility] III [facility]   12/8/2017 9:30 AM [facility] III [facility]   12/11/2017 9:30 AM [facility] III [facility]   12/13/2017 9:30 AM [facility] III [facility]   12/15/2017 9:30 AM [facility] III [facility]   12/18/2017 9:30 AM [facility] III [facility]   12/20/2017 9:30 AM [facility]   12/22/2017 9:30 AM [facility] III [facility]   12/25/2017 9:30 AM [facility] III [facility]   12/27/2017 9:30 AM [facility] III [facility]   12/29/2017 9:30 AM [facility] III [facility]   1/3/2018 2:45 PM MD [facility] Manjarrez [facility] None   2/23/2018 9:15 AM MD [facility] Douglass [facility] None           Mare Turcios MA, 10/09/17      MD Signature:______________________________ Completed By/Date:

## 2017-10-11 ENCOUNTER — TREATMENT (OUTPATIENT)
Dept: CARDIAC REHAB | Facility: HOSPITAL | Age: 72
End: 2017-10-11

## 2017-10-11 DIAGNOSIS — Z00.00 PREVENTATIVE HEALTH CARE: Primary | ICD-10-CM

## 2017-10-13 ENCOUNTER — TREATMENT (OUTPATIENT)
Dept: CARDIAC REHAB | Facility: HOSPITAL | Age: 72
End: 2017-10-13

## 2017-10-13 DIAGNOSIS — Z00.00 PREVENTATIVE HEALTH CARE: Primary | ICD-10-CM

## 2017-10-16 ENCOUNTER — TREATMENT (OUTPATIENT)
Dept: CARDIAC REHAB | Facility: HOSPITAL | Age: 72
End: 2017-10-16

## 2017-10-16 DIAGNOSIS — Z00.00 PREVENTATIVE HEALTH CARE: Primary | ICD-10-CM

## 2017-10-18 ENCOUNTER — APPOINTMENT (OUTPATIENT)
Dept: CARDIAC REHAB | Facility: HOSPITAL | Age: 72
End: 2017-10-18

## 2017-10-20 ENCOUNTER — APPOINTMENT (OUTPATIENT)
Dept: CARDIAC REHAB | Facility: HOSPITAL | Age: 72
End: 2017-10-20

## 2017-10-23 ENCOUNTER — APPOINTMENT (OUTPATIENT)
Dept: CARDIAC REHAB | Facility: HOSPITAL | Age: 72
End: 2017-10-23

## 2017-10-23 ENCOUNTER — TREATMENT (OUTPATIENT)
Dept: CARDIAC REHAB | Facility: HOSPITAL | Age: 72
End: 2017-10-23

## 2017-10-23 DIAGNOSIS — Z00.00 PREVENTATIVE HEALTH CARE: Primary | ICD-10-CM

## 2017-10-25 ENCOUNTER — TREATMENT (OUTPATIENT)
Dept: CARDIAC REHAB | Facility: HOSPITAL | Age: 72
End: 2017-10-25

## 2017-10-25 ENCOUNTER — APPOINTMENT (OUTPATIENT)
Dept: CARDIAC REHAB | Facility: HOSPITAL | Age: 72
End: 2017-10-25

## 2017-10-25 DIAGNOSIS — Z00.00 PREVENTATIVE HEALTH CARE: Primary | ICD-10-CM

## 2017-10-27 ENCOUNTER — APPOINTMENT (OUTPATIENT)
Dept: CARDIAC REHAB | Facility: HOSPITAL | Age: 72
End: 2017-10-27

## 2017-10-30 ENCOUNTER — TREATMENT (OUTPATIENT)
Dept: CARDIAC REHAB | Facility: HOSPITAL | Age: 72
End: 2017-10-30

## 2017-10-30 ENCOUNTER — APPOINTMENT (OUTPATIENT)
Dept: CARDIAC REHAB | Facility: HOSPITAL | Age: 72
End: 2017-10-30

## 2017-10-30 DIAGNOSIS — Z00.00 PREVENTATIVE HEALTH CARE: Primary | ICD-10-CM

## 2017-10-31 ENCOUNTER — CLINICAL SUPPORT NO REQUIREMENTS (OUTPATIENT)
Dept: CARDIOLOGY | Facility: CLINIC | Age: 72
End: 2017-10-31

## 2017-10-31 DIAGNOSIS — I47.20 VENTRICULAR TACHYCARDIA (HCC): ICD-10-CM

## 2017-11-01 ENCOUNTER — TREATMENT (OUTPATIENT)
Dept: CARDIAC REHAB | Facility: HOSPITAL | Age: 72
End: 2017-11-01

## 2017-11-01 ENCOUNTER — APPOINTMENT (OUTPATIENT)
Dept: CARDIAC REHAB | Facility: HOSPITAL | Age: 72
End: 2017-11-01

## 2017-11-01 DIAGNOSIS — Z00.00 PREVENTATIVE HEALTH CARE: Primary | ICD-10-CM

## 2017-11-03 ENCOUNTER — TREATMENT (OUTPATIENT)
Dept: CARDIAC REHAB | Facility: HOSPITAL | Age: 72
End: 2017-11-03

## 2017-11-03 ENCOUNTER — APPOINTMENT (OUTPATIENT)
Dept: CARDIAC REHAB | Facility: HOSPITAL | Age: 72
End: 2017-11-03

## 2017-11-03 DIAGNOSIS — Z00.00 PREVENTATIVE HEALTH CARE: Primary | ICD-10-CM

## 2017-11-06 ENCOUNTER — APPOINTMENT (OUTPATIENT)
Dept: CARDIAC REHAB | Facility: HOSPITAL | Age: 72
End: 2017-11-06

## 2017-11-06 ENCOUNTER — TREATMENT (OUTPATIENT)
Dept: CARDIAC REHAB | Facility: HOSPITAL | Age: 72
End: 2017-11-06

## 2017-11-06 DIAGNOSIS — Z00.00 PREVENTATIVE HEALTH CARE: Primary | ICD-10-CM

## 2017-11-08 ENCOUNTER — APPOINTMENT (OUTPATIENT)
Dept: CARDIAC REHAB | Facility: HOSPITAL | Age: 72
End: 2017-11-08

## 2017-11-08 ENCOUNTER — TREATMENT (OUTPATIENT)
Dept: CARDIAC REHAB | Facility: HOSPITAL | Age: 72
End: 2017-11-08

## 2017-11-08 DIAGNOSIS — Z00.00 PREVENTATIVE HEALTH CARE: Primary | ICD-10-CM

## 2017-11-10 ENCOUNTER — APPOINTMENT (OUTPATIENT)
Dept: CARDIAC REHAB | Facility: HOSPITAL | Age: 72
End: 2017-11-10

## 2017-11-13 ENCOUNTER — APPOINTMENT (OUTPATIENT)
Dept: CARDIAC REHAB | Facility: HOSPITAL | Age: 72
End: 2017-11-13

## 2017-11-15 ENCOUNTER — APPOINTMENT (OUTPATIENT)
Dept: CARDIAC REHAB | Facility: HOSPITAL | Age: 72
End: 2017-11-15

## 2017-11-15 ENCOUNTER — TREATMENT (OUTPATIENT)
Dept: CARDIAC REHAB | Facility: HOSPITAL | Age: 72
End: 2017-11-15

## 2017-11-15 DIAGNOSIS — Z00.00 PREVENTATIVE HEALTH CARE: Primary | ICD-10-CM

## 2017-11-17 ENCOUNTER — TREATMENT (OUTPATIENT)
Dept: CARDIAC REHAB | Facility: HOSPITAL | Age: 72
End: 2017-11-17

## 2017-11-17 ENCOUNTER — APPOINTMENT (OUTPATIENT)
Dept: CARDIAC REHAB | Facility: HOSPITAL | Age: 72
End: 2017-11-17

## 2017-11-17 DIAGNOSIS — Z00.00 PREVENTATIVE HEALTH CARE: Primary | ICD-10-CM

## 2017-11-20 ENCOUNTER — TREATMENT (OUTPATIENT)
Dept: CARDIAC REHAB | Facility: HOSPITAL | Age: 72
End: 2017-11-20

## 2017-11-20 ENCOUNTER — APPOINTMENT (OUTPATIENT)
Dept: CARDIAC REHAB | Facility: HOSPITAL | Age: 72
End: 2017-11-20

## 2017-11-20 DIAGNOSIS — Z00.00 PREVENTATIVE HEALTH CARE: Primary | ICD-10-CM

## 2017-11-22 ENCOUNTER — APPOINTMENT (OUTPATIENT)
Dept: CARDIAC REHAB | Facility: HOSPITAL | Age: 72
End: 2017-11-22

## 2017-11-24 ENCOUNTER — APPOINTMENT (OUTPATIENT)
Dept: CARDIAC REHAB | Facility: HOSPITAL | Age: 72
End: 2017-11-24

## 2017-11-27 ENCOUNTER — TREATMENT (OUTPATIENT)
Dept: CARDIAC REHAB | Facility: HOSPITAL | Age: 72
End: 2017-11-27

## 2017-11-27 ENCOUNTER — APPOINTMENT (OUTPATIENT)
Dept: CARDIAC REHAB | Facility: HOSPITAL | Age: 72
End: 2017-11-27

## 2017-11-27 DIAGNOSIS — Z00.00 PREVENTATIVE HEALTH CARE: Primary | ICD-10-CM

## 2017-11-29 ENCOUNTER — TREATMENT (OUTPATIENT)
Dept: CARDIAC REHAB | Facility: HOSPITAL | Age: 72
End: 2017-11-29

## 2017-11-29 ENCOUNTER — APPOINTMENT (OUTPATIENT)
Dept: CARDIAC REHAB | Facility: HOSPITAL | Age: 72
End: 2017-11-29

## 2017-11-29 DIAGNOSIS — Z00.00 PREVENTATIVE HEALTH CARE: Primary | ICD-10-CM

## 2017-12-01 ENCOUNTER — APPOINTMENT (OUTPATIENT)
Dept: CARDIAC REHAB | Facility: HOSPITAL | Age: 72
End: 2017-12-01

## 2017-12-04 ENCOUNTER — APPOINTMENT (OUTPATIENT)
Dept: CARDIAC REHAB | Facility: HOSPITAL | Age: 72
End: 2017-12-04

## 2017-12-04 ENCOUNTER — TREATMENT (OUTPATIENT)
Dept: CARDIAC REHAB | Facility: HOSPITAL | Age: 72
End: 2017-12-04

## 2017-12-04 DIAGNOSIS — Z00.00 PREVENTATIVE HEALTH CARE: Primary | ICD-10-CM

## 2017-12-06 ENCOUNTER — APPOINTMENT (OUTPATIENT)
Dept: CARDIAC REHAB | Facility: HOSPITAL | Age: 72
End: 2017-12-06

## 2017-12-06 ENCOUNTER — TREATMENT (OUTPATIENT)
Dept: CARDIAC REHAB | Facility: HOSPITAL | Age: 72
End: 2017-12-06

## 2017-12-06 DIAGNOSIS — Z00.00 PREVENTATIVE HEALTH CARE: Primary | ICD-10-CM

## 2017-12-08 ENCOUNTER — APPOINTMENT (OUTPATIENT)
Dept: CARDIAC REHAB | Facility: HOSPITAL | Age: 72
End: 2017-12-08

## 2017-12-08 RX ORDER — ATORVASTATIN CALCIUM 40 MG/1
TABLET, FILM COATED ORAL
Qty: 90 TABLET | Refills: 0 | Status: SHIPPED | OUTPATIENT
Start: 2017-12-08 | End: 2018-03-20 | Stop reason: SDUPTHER

## 2017-12-11 ENCOUNTER — APPOINTMENT (OUTPATIENT)
Dept: CARDIAC REHAB | Facility: HOSPITAL | Age: 72
End: 2017-12-11

## 2017-12-11 ENCOUNTER — TREATMENT (OUTPATIENT)
Dept: CARDIAC REHAB | Facility: HOSPITAL | Age: 72
End: 2017-12-11

## 2017-12-11 DIAGNOSIS — Z00.00 PREVENTATIVE HEALTH CARE: Primary | ICD-10-CM

## 2017-12-13 ENCOUNTER — TREATMENT (OUTPATIENT)
Dept: CARDIAC REHAB | Facility: HOSPITAL | Age: 72
End: 2017-12-13

## 2017-12-13 ENCOUNTER — APPOINTMENT (OUTPATIENT)
Dept: CARDIAC REHAB | Facility: HOSPITAL | Age: 72
End: 2017-12-13

## 2017-12-13 DIAGNOSIS — Z00.00 PREVENTATIVE HEALTH CARE: Primary | ICD-10-CM

## 2017-12-15 ENCOUNTER — TREATMENT (OUTPATIENT)
Dept: CARDIAC REHAB | Facility: HOSPITAL | Age: 72
End: 2017-12-15

## 2017-12-15 ENCOUNTER — APPOINTMENT (OUTPATIENT)
Dept: CARDIAC REHAB | Facility: HOSPITAL | Age: 72
End: 2017-12-15

## 2017-12-15 DIAGNOSIS — Z00.00 PREVENTATIVE HEALTH CARE: Primary | ICD-10-CM

## 2017-12-18 ENCOUNTER — APPOINTMENT (OUTPATIENT)
Dept: CARDIAC REHAB | Facility: HOSPITAL | Age: 72
End: 2017-12-18

## 2017-12-18 ENCOUNTER — TREATMENT (OUTPATIENT)
Dept: CARDIAC REHAB | Facility: HOSPITAL | Age: 72
End: 2017-12-18

## 2017-12-18 DIAGNOSIS — Z00.00 PREVENTATIVE HEALTH CARE: Primary | ICD-10-CM

## 2017-12-20 ENCOUNTER — APPOINTMENT (OUTPATIENT)
Dept: CARDIAC REHAB | Facility: HOSPITAL | Age: 72
End: 2017-12-20

## 2017-12-20 ENCOUNTER — TREATMENT (OUTPATIENT)
Dept: CARDIAC REHAB | Facility: HOSPITAL | Age: 72
End: 2017-12-20

## 2017-12-20 DIAGNOSIS — Z00.00 PREVENTATIVE HEALTH CARE: Primary | ICD-10-CM

## 2017-12-22 ENCOUNTER — APPOINTMENT (OUTPATIENT)
Dept: CARDIAC REHAB | Facility: HOSPITAL | Age: 72
End: 2017-12-22

## 2017-12-25 ENCOUNTER — APPOINTMENT (OUTPATIENT)
Dept: CARDIAC REHAB | Facility: HOSPITAL | Age: 72
End: 2017-12-25

## 2017-12-27 ENCOUNTER — TREATMENT (OUTPATIENT)
Dept: CARDIAC REHAB | Facility: HOSPITAL | Age: 72
End: 2017-12-27

## 2017-12-27 ENCOUNTER — APPOINTMENT (OUTPATIENT)
Dept: CARDIAC REHAB | Facility: HOSPITAL | Age: 72
End: 2017-12-27

## 2017-12-27 ENCOUNTER — TRANSCRIBE ORDERS (OUTPATIENT)
Dept: CARDIAC REHAB | Facility: HOSPITAL | Age: 72
End: 2017-12-27

## 2017-12-27 DIAGNOSIS — Z00.00 PREVENTATIVE HEALTH CARE: Primary | ICD-10-CM

## 2017-12-29 ENCOUNTER — APPOINTMENT (OUTPATIENT)
Dept: CARDIAC REHAB | Facility: HOSPITAL | Age: 72
End: 2017-12-29

## 2017-12-29 ENCOUNTER — TREATMENT (OUTPATIENT)
Dept: CARDIAC REHAB | Facility: HOSPITAL | Age: 72
End: 2017-12-29

## 2017-12-29 DIAGNOSIS — Z00.00 PREVENTATIVE HEALTH CARE: Primary | ICD-10-CM

## 2018-01-03 ENCOUNTER — APPOINTMENT (OUTPATIENT)
Dept: LAB | Facility: HOSPITAL | Age: 73
End: 2018-01-03

## 2018-01-03 ENCOUNTER — TREATMENT (OUTPATIENT)
Dept: CARDIAC REHAB | Facility: HOSPITAL | Age: 73
End: 2018-01-03

## 2018-01-03 ENCOUNTER — OFFICE VISIT (OUTPATIENT)
Dept: CARDIOLOGY | Facility: CLINIC | Age: 73
End: 2018-01-03

## 2018-01-03 VITALS
BODY MASS INDEX: 28.13 KG/M2 | HEART RATE: 70 BPM | HEIGHT: 74 IN | DIASTOLIC BLOOD PRESSURE: 60 MMHG | WEIGHT: 219.2 LBS | SYSTOLIC BLOOD PRESSURE: 102 MMHG

## 2018-01-03 DIAGNOSIS — Z00.00 PREVENTATIVE HEALTH CARE: ICD-10-CM

## 2018-01-03 DIAGNOSIS — I47.20 VENTRICULAR TACHYCARDIA (HCC): Primary | ICD-10-CM

## 2018-01-03 DIAGNOSIS — I25.9 IHD (ISCHEMIC HEART DISEASE): ICD-10-CM

## 2018-01-03 DIAGNOSIS — I50.22 CHRONIC LEFT VENTRICULAR SYSTOLIC HEART FAILURE (HCC): ICD-10-CM

## 2018-01-03 DIAGNOSIS — I26.99 OTHER PULMONARY EMBOLISM WITHOUT ACUTE COR PULMONALE, UNSPECIFIED CHRONICITY (HCC): ICD-10-CM

## 2018-01-03 LAB
ANION GAP SERPL CALCULATED.3IONS-SCNC: 10 MMOL/L (ref 3–11)
BUN BLD-MCNC: 23 MG/DL (ref 9–23)
BUN/CREAT SERPL: 25.6 (ref 7–25)
CALCIUM SPEC-SCNC: 9.1 MG/DL (ref 8.7–10.4)
CHLORIDE SERPL-SCNC: 100 MMOL/L (ref 99–109)
CO2 SERPL-SCNC: 28 MMOL/L (ref 20–31)
CREAT BLD-MCNC: 0.9 MG/DL (ref 0.6–1.3)
GFR SERPL CREATININE-BSD FRML MDRD: 83 ML/MIN/1.73
GLUCOSE BLD-MCNC: 109 MG/DL (ref 70–100)
POTASSIUM BLD-SCNC: 4.4 MMOL/L (ref 3.5–5.5)
SODIUM BLD-SCNC: 138 MMOL/L (ref 132–146)

## 2018-01-03 PROCEDURE — 93284 PRGRMG EVAL IMPLANTABLE DFB: CPT | Performed by: INTERNAL MEDICINE

## 2018-01-03 PROCEDURE — 36415 COLL VENOUS BLD VENIPUNCTURE: CPT | Performed by: INTERNAL MEDICINE

## 2018-01-03 PROCEDURE — 99213 OFFICE O/P EST LOW 20 MIN: CPT | Performed by: INTERNAL MEDICINE

## 2018-01-03 PROCEDURE — 80048 BASIC METABOLIC PNL TOTAL CA: CPT | Performed by: INTERNAL MEDICINE

## 2018-01-03 NOTE — PROGRESS NOTES
Jonnie Roth  1945  [phone]      01/03/2018    [facility] CARDIOLOGY     Brian Lewis MD  [address] [place] [address]    Chief Complaint   Patient presents with   • Cardiomyopathy   • Congestive Heart Failure       Problem List:   1. Ischemic heart disease:  a. Remote progressive angina pectoris/acute extensive anterolateral myocardial infarction/delayed presentation with thrombolysis/severe 3-vessel coronary atherosclerosis with severe single vessel involvement/PTCA with intracoronary stent deployment proximal-mid segment LAD/moderate-severe compensated left ventricular dysfunction. LVEF (0.35)/abnormal positive signal averaged EKG/oral anticoagulation, April 1995.  b. Recent NYHA class I-II angina pectoris/class III CHF/abnormal quantitative SPECT gated Cardiolite GXT, July 1998.  c. Stable persistent MUGA, LVEF (0.33, January 1999 and January 2000).   d. Residual NYHA class I angina pectoris/CHF with reduced MUGA scan: LVEF (0.25), April 2002.  e. Left heart catheterization with distal circumflex disease: EF 20%, September 2002.   f. MUGA in May 2003: EF 32%.   g. Sestamibi GXT on 04/08/2005: No ischemia. EF 29%.   h. Residual NYHA class I angina pectoris/CHF with reduced acceptable MUGA scan: EF (0.32) and acceptable Pacesetter PCD interrogation/reprogramming, May 2003 with interrogation, September 2004.  i. Echocardiogram, September 2009 with EF 30%  j. Left heart catheterization by Dr. Bhupinder Gonzalez, August 2010, with LVEF of 35%, with 3-vessel native coronary artery disease, 95% mid-LAD status post PTCA and stenting with two 3 mm stents by Dr. Llanes.  k. Echocardiogram, 06/07/2012: Left ventricular ejection fraction of 30%.  l. Hospitalization, 02/01/2014, for non-ST elevation MI, left heart catheterization by Dr. Ayala that demonstrated 60% mid stenosis of the right coronary artery that remains unchanged compared to previous, widely  patent stents of the LAD with severe LV dysfunction of approximately 25%.   m. Left heart catheterization status post drug-eluting stent to the distal LAD and mid-RCA with an EF of 20% to 25% by Dr. Diego Ayala, 04/20/2015.   2. Sudden cardiac death with primary ventricular fibrillation status post Pacesetter [place] ICD, [place], [place] in September 2000:   a. ICD generator change out with upgrade to a biventricular pacemaker ICD on 12/17/2007, [employer] device.  b. ICD discharge, 08/09/2012, secondary to ventricular flutter with initiation of amiodarone therapy.   c. Hospitalization, 02/01/2014, secondary to ICD discharge for ventricular tachycardia with subsequent discontinuation of Coreg and initiation of sotalol therapy.   d. Hospitalization, February 2014, secondary to ICD discharge for VT with subsequent discontinuation of Coreg and initiation of sotalol.  e. Echocardiogram, 04/07/2015: EF less than 20%.  f. Hospitalization secondary to VF and ICD shocks, 04/18/2015.  g. NIPS procedure with defibrillation threshold testing for VF and noninvasive program stimulation, 04/18/2015.   h. Initiation of mexiletine for recurrent ventricular tachycardia with ICD shocks on 05/05/2015 with amiodarone load, recurrent VT and ICD shocks with hospitalization 05/16/2015-05/18/2015.   i. EP study with RFA of large anterior left ventricular scar extending from mid-left ventricular wall down apex by Dr. Ferdinand Alba, 05/21/2015.  j. ICD generator change 10/2/17   3. Dyslipidemia.  4. Status post remote operations.  5. Remote chronic tobacco use/abnormal chest x-ray.  6. Left bundle branch block.  7. Burks trauma with hematuria with urosepsis requiring hospitalization, now resolved, May 2015.  8. Pulmonary embolism, spring 2015     Allergies  No Known Allergies    Current Medications    Current Outpatient Prescriptions:   •  atorvastatin (LIPITOR) 40 MG tablet, TAKE 1 TABLET BY MOUTH DAILY (NEED LAB WORK FOR ADDITIONAL  REFILLS), Disp: 90 tablet, Rfl: 0  •  carvedilol (COREG) 12.5 MG tablet, Take 12.5 mg by mouth 2 (Two) Times a Day With Meals., Disp: , Rfl:   •  clopidogrel (PLAVIX) 75 MG tablet, Take 75 mg by mouth Every Morning., Disp: , Rfl:   •  eplerenone (INSPRA) 25 MG tablet, Take 12.5 mg by mouth Every Morning., Disp: , Rfl:   •  finasteride (PROSCAR) 5 MG tablet, Take 5 mg by mouth Every Morning., Disp: , Rfl:   •  L-TRYPTOPHAN PO, Take 1,000 mg by mouth Every Night., Disp: , Rfl:   •  levothyroxine (SYNTHROID, LEVOTHROID) 25 MCG tablet, Take 25 mcg by mouth Every Morning., Disp: , Rfl:   •  MELATONIN PO, Take 9 mg by mouth Every Night., Disp: , Rfl:   •  Multiple Vitamin (MULTI VITAMIN DAILY PO), Take  by mouth., Disp: , Rfl:   •  nitroglycerin (NITROSTAT) 0.4 MG SL tablet, Place 0.4 mg under the tongue Every 5 (Five) Minutes As Needed for Chest Pain. Take no more than 3 doses in 15 minutes., Disp: , Rfl:   •  pantoprazole (PROTONIX) 40 MG EC tablet, Take 40 mg by mouth Every Morning., Disp: , Rfl:   •  polyethylene glycol (MIRALAX) packet, Take 17 g by mouth Every Other Day., Disp: , Rfl:   •  rivaroxaban (XARELTO) 20 MG tablet, Take 20 mg by mouth Every Evening., Disp: , Rfl:   •  sacubitril-valsartan (ENTRESTO) 49-51 MG tablet, Take 1 tablet by mouth 2 (Two) Times a Day., Disp: , Rfl:   •  TORSEMIDE PO, Take 5 mg by mouth Every Morning., Disp: , Rfl:     History of Present Illness   HPI    Pt presents for follow up of CHF/VT/LBBB. Since the pt has seen us, pt denies any palpitations,  SOB, CP, LH, and dizziness. Denies any hospitalizations, ER visits, ICD shocks, or TIA/CVA symptoms. Overall feels well. No side effects to medications. Chronic LU without change. Biggest issue with wt gain past two months with increase in diurectics over past two weeks    ROS:  General:  + fatigue, + 7 weight loss  Cardiovascular:  Denies  PND, syncope, near syncope, + edema No palpitations.  Pulmonary:  + LU, No cough, or  "wheezing    Vitals:    01/03/18 1406   BP: 102/60   BP Location: Left arm   Patient Position: Sitting   Pulse: 70   Weight: 99.4 kg (219 lb 3.2 oz)   Height: 188 cm (74\")       PE:  General: NAD  Neck: no JVD, no carotid bruits, no TM  Heart RRR, NL S1, S2, S4 present, no rubs, murmurs  Lungs: CTA, no wheezes, rhonchi, or rales  Abd: soft, non-tender, NL BS  Ext: No musculoskeletal deformities, no edema, cyanosis, or clubbing  Psych: normal mood and affect    Diagnostic Data:  Procedures.    1. Ventricular tachycardia    2. Chronic left ventricular systolic heart failure    3. IHD (ischemic heart disease)    4. Other pulmonary embolism without acute cor pulmonale, unspecified chronicity         ICD interrogation: NL fxn, NL battery fxn, No VT or AF, 99% BiV paced, LV threshold 4.75 at 1.5 ms.  All other LV configurations resulted in PN stimulation    Plan:  1) VT s/p RFA: no recurrence  2) CHF Class III now Class II on meds. LV lead threshold elevated. No other options Monitor for now: check BMP stay on higer dose of torsemide  Continue present medications. ICD function normal.   3) Anticoagulation  Continue NOAC/plavix  4) HTN  Wt loss, exercise, salt reduction  5) PE on xarelto  6) CAD on plavix    F/up in 6 months         "

## 2018-01-10 ENCOUNTER — TREATMENT (OUTPATIENT)
Dept: CARDIAC REHAB | Facility: HOSPITAL | Age: 73
End: 2018-01-10

## 2018-01-10 DIAGNOSIS — Z00.00 PREVENTATIVE HEALTH CARE: Primary | ICD-10-CM

## 2018-01-12 ENCOUNTER — TREATMENT (OUTPATIENT)
Dept: CARDIAC REHAB | Facility: HOSPITAL | Age: 73
End: 2018-01-12

## 2018-01-12 DIAGNOSIS — Z00.00 PREVENTATIVE HEALTH CARE: Primary | ICD-10-CM

## 2018-01-22 ENCOUNTER — TREATMENT (OUTPATIENT)
Dept: CARDIAC REHAB | Facility: HOSPITAL | Age: 73
End: 2018-01-22

## 2018-01-22 DIAGNOSIS — Z00.00 PREVENTATIVE HEALTH CARE: Primary | ICD-10-CM

## 2018-01-24 ENCOUNTER — TREATMENT (OUTPATIENT)
Dept: CARDIAC REHAB | Facility: HOSPITAL | Age: 73
End: 2018-01-24

## 2018-01-24 DIAGNOSIS — Z00.00 PREVENTATIVE HEALTH CARE: Primary | ICD-10-CM

## 2018-01-29 ENCOUNTER — TREATMENT (OUTPATIENT)
Dept: CARDIAC REHAB | Facility: HOSPITAL | Age: 73
End: 2018-01-29

## 2018-01-29 DIAGNOSIS — Z00.00 PREVENTATIVE HEALTH CARE: Primary | ICD-10-CM

## 2018-01-31 ENCOUNTER — TREATMENT (OUTPATIENT)
Dept: CARDIAC REHAB | Facility: HOSPITAL | Age: 73
End: 2018-01-31

## 2018-01-31 DIAGNOSIS — Z00.00 PREVENTATIVE HEALTH CARE: Primary | ICD-10-CM

## 2018-02-02 ENCOUNTER — TREATMENT (OUTPATIENT)
Dept: CARDIAC REHAB | Facility: HOSPITAL | Age: 73
End: 2018-02-02

## 2018-02-02 DIAGNOSIS — Z00.00 PREVENTATIVE HEALTH CARE: Primary | ICD-10-CM

## 2018-02-09 ENCOUNTER — TREATMENT (OUTPATIENT)
Dept: CARDIAC REHAB | Facility: HOSPITAL | Age: 73
End: 2018-02-09

## 2018-02-09 DIAGNOSIS — Z00.00 PREVENTATIVE HEALTH CARE: Primary | ICD-10-CM

## 2018-02-14 ENCOUNTER — TREATMENT (OUTPATIENT)
Dept: CARDIAC REHAB | Facility: HOSPITAL | Age: 73
End: 2018-02-14

## 2018-02-14 DIAGNOSIS — Z00.00 PREVENTATIVE HEALTH CARE: Primary | ICD-10-CM

## 2018-02-16 ENCOUNTER — TREATMENT (OUTPATIENT)
Dept: CARDIAC REHAB | Facility: HOSPITAL | Age: 73
End: 2018-02-16

## 2018-02-16 DIAGNOSIS — Z00.00 PREVENTATIVE HEALTH CARE: Primary | ICD-10-CM

## 2018-02-22 ENCOUNTER — TREATMENT (OUTPATIENT)
Dept: CARDIAC REHAB | Facility: HOSPITAL | Age: 73
End: 2018-02-22

## 2018-02-22 DIAGNOSIS — Z00.00 PREVENTATIVE HEALTH CARE: Primary | ICD-10-CM

## 2018-02-23 ENCOUNTER — OFFICE VISIT (OUTPATIENT)
Dept: CARDIOLOGY | Facility: CLINIC | Age: 73
End: 2018-02-23

## 2018-02-23 ENCOUNTER — TREATMENT (OUTPATIENT)
Dept: CARDIAC REHAB | Facility: HOSPITAL | Age: 73
End: 2018-02-23

## 2018-02-23 VITALS
SYSTOLIC BLOOD PRESSURE: 94 MMHG | BODY MASS INDEX: 27.96 KG/M2 | HEIGHT: 73 IN | HEART RATE: 72 BPM | WEIGHT: 211 LBS | DIASTOLIC BLOOD PRESSURE: 66 MMHG

## 2018-02-23 DIAGNOSIS — I50.42 CHRONIC COMBINED SYSTOLIC AND DIASTOLIC CONGESTIVE HEART FAILURE (HCC): ICD-10-CM

## 2018-02-23 DIAGNOSIS — Z00.00 PREVENTATIVE HEALTH CARE: Primary | ICD-10-CM

## 2018-02-23 DIAGNOSIS — E78.5 DYSLIPIDEMIA: ICD-10-CM

## 2018-02-23 DIAGNOSIS — I25.9 IHD (ISCHEMIC HEART DISEASE): Primary | ICD-10-CM

## 2018-02-23 PROCEDURE — 99214 OFFICE O/P EST MOD 30 MIN: CPT | Performed by: INTERNAL MEDICINE

## 2018-02-23 NOTE — PROGRESS NOTES
Subjective:     Encounter Date:02/23/2018    Patient ID: Jonnie Roth is a 73 y.o.  white male, disabled , from [place], [place].      INTERNIST: Brian Lewis MD, [facility]  INTERVENTIONAL CARDIOLOGIST: Diego Ayala MD, [facility], [facility]   ELECTROPHYSIOLOGIST: Ferdinand Alba MD, [facility], [facility]  UROLOGIST: Simon Morin MD   GASTROENTEROLOGIST: Jose Barnard MD    Chief Complaint:   Chief Complaint   Patient presents with   • Coronary Artery Disease     Problem List:  1. Ischemic heart disease:  a. Remote progressive angina pectoris/acute extensive anterolateral myocardial infarction/delayed presentation with thrombolysis/severe 3-vessel coronary atherosclerosis with severe single vessel involvement/PTCA with intracoronary stent deployment proximal-mid segment LAD/moderate-severe compensated left ventricular dysfunction. LVEF (0.35)/abnormal positive signal averaged EKG/oral anticoagulation, April 1995.  b. Remote NYHA class I-II angina pectoris/class III CHF/abnormal quantitative SPECT gated Cardiolite GXT, July 1998.  c. Stable persistent MUGA, LVEF (0.33, January 1999 and January 2000).   d. Residual NYHA class I angina pectoris/CHF with reduced MUGA scan: LVEF (0.25), April 2002.  e. Left heart catheterization with distal circumflex disease: EF 20%, September 2002.   f. MUGA in May 2003: EF 32%.   g. Sestamibi GXT on 04/08/2005: No ischemia. EF 29%.   h. Residual NYHA class I angina pectoris/CHF with reduced acceptable MUGA scan: EF (0.32) and acceptable Pacesetter PCD interrogation/reprogramming, May 2003 with interrogation, September 2004.  i. Echocardiogram, September 2009 with EF 30%  j. Left heart catheterization by Dr. Bhupinder Gonzalez, August 2010, with LVEF of 35%, with 3-vessel native coronary artery disease, 95% mid-LAD status post PTCA and stenting with two 3 mm stents by Dr. Llanes.  k. Echocardiogram, 06/07/2012: Left ventricular ejection fraction of  30%.  l. Hospitalization, 02/01/2014, for non-ST elevation MI, left heart catheterization by Dr. Ayala that demonstrated 60% mid stenosis of the right coronary artery that remains unchanged compared to previous, widely patent stents of the LAD with severe LV dysfunction of approximately 25%.   m. Left heart catheterization status post drug-eluting stent to the distal LAD and mid-RCA with an EF of 20% to 25% by Dr. Diego Ayala, 04/20/2015.   n. Left heart catheterization 1/16/17; nonobstructive CAD with patent previously placed stents, dilated cardiomyopathy with severe LV systolic dysfunction, EF less than 20%, normal hemodynamics, recommendations for continued medical therapy and risk factor, evaluation for noncardiac etiology of symptoms.  o. Residual CCS Class I/II angina pectoris/NYHA Class II exertional dyspnea and fatigue syndrome, summer 2017  p. Residual CCS 0 angina pectoris/NYHA class I-II exertional dyspnea and fatigue, February 2018.  2. Sudden cardiac death with primary ventricular fibrillation status post Pacesetter [place] ICD, [place], [place] in September 2000:   a. ICD generator change out with upgrade to a biventricular pacemaker ICD on 12/17/2007, [employer] device.  b. ICD discharge, 08/09/2012, secondary to ventricular flutter with initiation of amiodarone therapy.   c. Hospitalization, 02/01/2014, secondary to ICD discharge for ventricular tachycardia with subsequent discontinuation of Coreg and initiation of sotalol therapy.   d. Hospitalization, February 2014, secondary to ICD discharge for VT with subsequent discontinuation of Coreg and initiation of sotalol.  e. Echocardiogram, 04/07/2015: EF less than 20%.  f. Hospitalization secondary to VF and ICD shocks, 04/18/2015.  g. NIPS procedure with defibrillation threshold testing for VF and noninvasive program stimulation, 04/18/2015.   h. Initiation of mexiletine for recurrent ventricular tachycardia with ICD shocks on 05/05/2015 with amiodarone  load, recurrent VT and ICD shocks with hospitalization 05/16/2015-05/18/2015.   i. EP study with RFA of large anterior left ventricular scar extending from mid-left ventricular wall down apex by Dr. Ferdinand Alba, 05/21/2015.   j. Acceptable ICD interrogation with normal function, normal battery without V-tach or atrial fibrillation, 5 months on battery, April 2017, with subsequent [employer] and [employer] replacement and acceptable DFT, October 2017, and acceptable interrogation, January 2018.  3. Dyslipidemia.  4. Status post remote operations.  5. Remote chronic tobacco use/abnormal chest x-ray with stable abnormal thoracic  CT scan without contrast demonstrating bilateral diffuse perimeter scarring and fibrosis with scattered subcentimeter noncalcified nodules (unchanged from February 2016), March 2017.  6. Left bundle branch block.  7. Burks trauma with hematuria with urosepsis requiring hospitalization, now resolved, May 2015.  8. Pulmonary embolism, spring 2015.   9. Remote apparent hypothyroidism/replacement therapy - data deficit, January 2016.  10. Remote diagnosis of obstructive sleep apnea with CPAP use, 2017.     No Known Allergies      Current Outpatient Prescriptions:   •  atorvastatin (LIPITOR) 40 MG tablet, TAKE 1 TABLET BY MOUTH DAILY (NEED LAB WORK FOR ADDITIONAL REFILLS), Disp: 90 tablet, Rfl: 0  •  calcium citrate-vitamin d (CALCITRATE) 315-250 MG-UNIT tablet tablet, Take 1 tablet by mouth Daily., Disp: , Rfl:   •  carvedilol (COREG) 12.5 MG tablet, Take 6.25 mg by mouth 2 (Two) Times a Day With Meals., Disp: , Rfl:   •  clopidogrel (PLAVIX) 75 MG tablet, Take 75 mg by mouth Every Morning., Disp: , Rfl:   •  eplerenone (INSPRA) 25 MG tablet, Take 12.5 mg by mouth Every Morning., Disp: , Rfl:   •  finasteride (PROSCAR) 5 MG tablet, Take 5 mg by mouth Every Morning., Disp: , Rfl:   •  L-TRYPTOPHAN PO, Take 1,000 mg by mouth Every Night., Disp: , Rfl:   •  levothyroxine (SYNTHROID,  LEVOTHROID) 25 MCG tablet, Take 25 mcg by mouth Every Morning., Disp: , Rfl:   •  MELATONIN PO, Take 9 mg by mouth Every Night., Disp: , Rfl:   •  nitroglycerin (NITROSTAT) 0.4 MG SL tablet, Place 0.4 mg under the tongue Every 5 (Five) Minutes As Needed for Chest Pain. Take no more than 3 doses in 15 minutes., Disp: , Rfl:   •  pantoprazole (PROTONIX) 40 MG EC tablet, Take 40 mg by mouth Every Morning., Disp: , Rfl:   •  rivaroxaban (XARELTO) 20 MG tablet, Take 20 mg by mouth Every Evening., Disp: , Rfl:   •  sacubitril-valsartan (ENTRESTO) 49-51 MG tablet, Take 1 tablet by mouth 2 (Two) Times a Day., Disp: , Rfl:   •  TORSEMIDE PO, Take 5 mg by mouth Every Morning., Disp: , Rfl:   •  polyethylene glycol (MIRALAX) packet, Take 17 g by mouth Every Other Day., Disp: , Rfl:     HISTORY OF PRESENT ILLNESS: Patient returns for scheduled 6-month followup. He says that they did get to take [place] trip out west and discusses all the places they visited.  They did have an early snowstorm in [facility], so they did not get to see much of the park because of the weather.  They really enjoyed their trip.  He states that he took an oxygenator with him and kept it in the car and used it every now and then when he got short of breath.  He had no problems heart-wise while he was there.  He is up and about on a daily basis and has no symptoms from a cardiopulmonary perspective.  He continues to attend cardiac rehab and just came from there this morning.  He received influenza immunization in the fall.  He had his ICD replaced in October 2017, and that site is healed well.  He indicates no chest pain, tightness, or pressure.  He notes occasional lightheadedness and fatigue.  He and his wife are planning to go back to [place] this summer and may also go to [facility].  Patient otherwise denies chest pain, shortness of breath, PND, edema, palpitations, syncope or presyncope at this time.        Review of  "Systems   Constitution: Positive for malaise/fatigue, night sweats and weight loss.   Endocrine: Positive for cold intolerance and heat intolerance.   Hematologic/Lymphatic: Bruises/bleeds easily.   Neurological: Positive for dizziness.      Obtained and otherwise negative except as outlined in problem list and HPI.    Procedures       Objective:       Vitals:    02/23/18 0909 02/23/18 0911   BP: 95/75 94/66   BP Location: Left arm Left arm   Patient Position: Sitting Standing   Pulse: 83 72   Weight: 95.7 kg (211 lb)    Height: 185.4 cm (73\")      Body mass index is 27.84 kg/(m^2).   Last weight:  212 lbs.    Physical Exam   Constitutional: He is oriented to person, place, and time. He appears well-developed and well-nourished.   Neck: No JVD present. Carotid bruit is not present. No thyromegaly present.   Cardiovascular: Regular rhythm, S1 normal and S2 normal.  Exam reveals distant heart sounds. Exam reveals no gallop, no S3 and no friction rub.    Murmur heard.   Medium-pitched early systolic murmur is present with a grade of 2/6  at the lower left sternal border  Pulses:       Carotid pulses are 1+ on the right side, and 1+ on the left side.       Radial pulses are 1+ on the right side, and 1+ on the left side.        Femoral pulses are 1+ on the right side, and 1+ on the left side.       Popliteal pulses are 1+ on the right side, and 1+ on the left side.        Dorsalis pedis pulses are 1+ on the right side, and 1+ on the left side.        Posterior tibial pulses are 1+ on the right side, and 1+ on the left side.   Pulmonary/Chest: Effort normal. He has decreased breath sounds. He has no wheezes. He has no rhonchi. He has no rales.   PCD site in the left precordium is well healed   Abdominal: Soft. He exhibits no mass. There is no hepatosplenomegaly. There is no tenderness. There is no guarding.   Bowel sounds audible x4   Musculoskeletal: Normal range of motion. He exhibits no edema.   Lymphadenopathy:     He " has no cervical adenopathy.   Neurological: He is alert and oriented to person, place, and time.   Skin: Skin is warm, dry and intact. No rash noted.   Vitals reviewed.        Lab Review:   Lab Results   Component Value Date    GLUCOSE 109 (H) 01/03/2018    BUN 23 01/03/2018    CREATININE 0.90 01/03/2018    EGFRIFNONA 83 01/03/2018    BCR 25.6 (H) 01/03/2018    CO2 28.0 01/03/2018    CALCIUM 9.1 01/03/2018    ALBUMIN 3.80 01/15/2017    LABIL2 1.5 01/15/2017    AST 85 (H) 01/15/2017     (H) 01/15/2017       Lab Results   Component Value Date    WBC 12.49 (H) 10/01/2017    HGB 15.2 10/01/2017    HCT 47.1 10/01/2017    MCV 91.5 10/01/2017     10/01/2017       Lab Results   Component Value Date    HGBA1C 6.40 (H) 10/01/2017       Lab Results   Component Value Date    TSH 3.832 03/25/2016       Lab Results   Component Value Date    CHOL 130 08/14/2017     Lab Results   Component Value Date    TRIG 70 08/14/2017    TRIG 203 (H) 01/30/2014     Lab Results   Component Value Date    HDL 40 08/14/2017    HDL 31 (L) 01/30/2014     Lab Results   Component Value Date    LDL 77 08/14/2017    LDL 65 01/30/2014       Lab Results   Component Value Date    BNP 77.0 06/08/2017           Assessment:   Overall continued acceptable course with no interim cardiopulmonary complaints with acceptable functional status. We will defer additional diagnostic or therapeutic intervention from a cardiac perspective at this time.       Diagnosis Plan   1. IHD (ischemic heart disease)  No recurrent angina pectoris or CHF.     2. Chronic combined systolic and diastolic congestive heart failure (EF<20%)  Acceptable control on current treatment with marginal systolic blood pressure reading   3. Dyslipidemia  Acceptable control          Plan:         1. Patient to continue current medications and close follow up with the above providers.  2. Tentative cardiology follow up in August 2018, or patient may return sooner PRN.       Transcribed  by Rosana Swenson for Dr. Bhupinder Gonzalez at 9:28 AM on 02/23/2018      I, Bhupinder Gonzalez MD, FAC, personally performed the services described in this documentation as scribed by the above named individual in my presence, and it is both accurate and complete. At 10:32 AM on 02/23/2018

## 2018-02-28 ENCOUNTER — TREATMENT (OUTPATIENT)
Dept: CARDIAC REHAB | Facility: HOSPITAL | Age: 73
End: 2018-02-28

## 2018-02-28 DIAGNOSIS — Z00.00 PREVENTATIVE HEALTH CARE: Primary | ICD-10-CM

## 2018-03-02 ENCOUNTER — TREATMENT (OUTPATIENT)
Dept: CARDIAC REHAB | Facility: HOSPITAL | Age: 73
End: 2018-03-02

## 2018-03-02 DIAGNOSIS — Z00.00 PREVENTATIVE HEALTH CARE: Primary | ICD-10-CM

## 2018-03-06 ENCOUNTER — TREATMENT (OUTPATIENT)
Dept: CARDIAC REHAB | Facility: HOSPITAL | Age: 73
End: 2018-03-06

## 2018-03-06 DIAGNOSIS — Z00.00 PREVENTATIVE HEALTH CARE: Primary | ICD-10-CM

## 2018-03-08 ENCOUNTER — TREATMENT (OUTPATIENT)
Dept: CARDIAC REHAB | Facility: HOSPITAL | Age: 73
End: 2018-03-08

## 2018-03-08 DIAGNOSIS — Z00.00 PREVENTATIVE HEALTH CARE: Primary | ICD-10-CM

## 2018-03-09 RX ORDER — TORSEMIDE 5 MG/1
TABLET ORAL
Qty: 90 TABLET | Refills: 2 | Status: SHIPPED | OUTPATIENT
Start: 2018-03-09 | End: 2018-11-25 | Stop reason: SDUPTHER

## 2018-03-15 ENCOUNTER — TREATMENT (OUTPATIENT)
Dept: CARDIAC REHAB | Facility: HOSPITAL | Age: 73
End: 2018-03-15

## 2018-03-15 DIAGNOSIS — Z00.00 PREVENTATIVE HEALTH CARE: Primary | ICD-10-CM

## 2018-03-16 ENCOUNTER — TREATMENT (OUTPATIENT)
Dept: CARDIAC REHAB | Facility: HOSPITAL | Age: 73
End: 2018-03-16

## 2018-03-16 DIAGNOSIS — Z00.00 PREVENTATIVE HEALTH CARE: Primary | ICD-10-CM

## 2018-03-20 ENCOUNTER — TREATMENT (OUTPATIENT)
Dept: CARDIAC REHAB | Facility: HOSPITAL | Age: 73
End: 2018-03-20

## 2018-03-20 DIAGNOSIS — Z00.00 PREVENTATIVE HEALTH CARE: Primary | ICD-10-CM

## 2018-03-21 RX ORDER — ATORVASTATIN CALCIUM 40 MG/1
TABLET, FILM COATED ORAL
Qty: 90 TABLET | Refills: 2 | Status: SHIPPED | OUTPATIENT
Start: 2018-03-21 | End: 2019-11-21

## 2018-03-23 ENCOUNTER — TREATMENT (OUTPATIENT)
Dept: CARDIAC REHAB | Facility: HOSPITAL | Age: 73
End: 2018-03-23

## 2018-03-23 DIAGNOSIS — Z00.00 PREVENTATIVE HEALTH CARE: Primary | ICD-10-CM

## 2018-03-28 ENCOUNTER — TREATMENT (OUTPATIENT)
Dept: CARDIAC REHAB | Facility: HOSPITAL | Age: 73
End: 2018-03-28

## 2018-03-28 DIAGNOSIS — Z00.00 PREVENTATIVE HEALTH CARE: Primary | ICD-10-CM

## 2018-03-30 ENCOUNTER — TREATMENT (OUTPATIENT)
Dept: CARDIAC REHAB | Facility: HOSPITAL | Age: 73
End: 2018-03-30

## 2018-03-30 DIAGNOSIS — Z00.00 PREVENTATIVE HEALTH CARE: Primary | ICD-10-CM

## 2018-04-02 ENCOUNTER — TREATMENT (OUTPATIENT)
Dept: CARDIAC REHAB | Facility: HOSPITAL | Age: 73
End: 2018-04-02

## 2018-04-02 DIAGNOSIS — Z00.00 PREVENTATIVE HEALTH CARE: Primary | ICD-10-CM

## 2018-04-04 ENCOUNTER — TREATMENT (OUTPATIENT)
Dept: CARDIAC REHAB | Facility: HOSPITAL | Age: 73
End: 2018-04-04

## 2018-04-04 DIAGNOSIS — Z00.00 PREVENTATIVE HEALTH CARE: Primary | ICD-10-CM

## 2018-04-10 ENCOUNTER — TREATMENT (OUTPATIENT)
Dept: CARDIAC REHAB | Facility: HOSPITAL | Age: 73
End: 2018-04-10

## 2018-04-10 DIAGNOSIS — Z00.00 PREVENTATIVE HEALTH CARE: Primary | ICD-10-CM

## 2018-04-12 ENCOUNTER — TREATMENT (OUTPATIENT)
Dept: CARDIAC REHAB | Facility: HOSPITAL | Age: 73
End: 2018-04-12

## 2018-04-12 DIAGNOSIS — Z00.00 PREVENTATIVE HEALTH CARE: Primary | ICD-10-CM

## 2018-04-13 ENCOUNTER — TREATMENT (OUTPATIENT)
Dept: CARDIAC REHAB | Facility: HOSPITAL | Age: 73
End: 2018-04-13

## 2018-04-13 DIAGNOSIS — Z00.00 PREVENTATIVE HEALTH CARE: Primary | ICD-10-CM

## 2018-04-16 ENCOUNTER — TREATMENT (OUTPATIENT)
Dept: CARDIAC REHAB | Facility: HOSPITAL | Age: 73
End: 2018-04-16

## 2018-04-16 DIAGNOSIS — Z00.00 PREVENTATIVE HEALTH CARE: Primary | ICD-10-CM

## 2018-04-19 ENCOUNTER — TREATMENT (OUTPATIENT)
Dept: CARDIAC REHAB | Facility: HOSPITAL | Age: 73
End: 2018-04-19

## 2018-04-19 DIAGNOSIS — Z00.00 PREVENTATIVE HEALTH CARE: Primary | ICD-10-CM

## 2018-04-20 ENCOUNTER — TREATMENT (OUTPATIENT)
Dept: CARDIAC REHAB | Facility: HOSPITAL | Age: 73
End: 2018-04-20

## 2018-04-20 DIAGNOSIS — Z00.00 PREVENTATIVE HEALTH CARE: Primary | ICD-10-CM

## 2018-04-24 ENCOUNTER — TREATMENT (OUTPATIENT)
Dept: CARDIAC REHAB | Facility: HOSPITAL | Age: 73
End: 2018-04-24

## 2018-04-24 DIAGNOSIS — Z00.00 PREVENTATIVE HEALTH CARE: Primary | ICD-10-CM

## 2018-04-27 ENCOUNTER — TREATMENT (OUTPATIENT)
Dept: CARDIAC REHAB | Facility: HOSPITAL | Age: 73
End: 2018-04-27

## 2018-04-27 DIAGNOSIS — Z00.00 PREVENTATIVE HEALTH CARE: Primary | ICD-10-CM

## 2018-04-30 ENCOUNTER — TREATMENT (OUTPATIENT)
Dept: CARDIAC REHAB | Facility: HOSPITAL | Age: 73
End: 2018-04-30

## 2018-04-30 DIAGNOSIS — Z00.00 PREVENTATIVE HEALTH CARE: Primary | ICD-10-CM

## 2018-05-04 ENCOUNTER — TREATMENT (OUTPATIENT)
Dept: CARDIAC REHAB | Facility: HOSPITAL | Age: 73
End: 2018-05-04

## 2018-05-04 DIAGNOSIS — Z00.00 PREVENTATIVE HEALTH CARE: Primary | ICD-10-CM

## 2018-05-07 ENCOUNTER — TREATMENT (OUTPATIENT)
Dept: CARDIAC REHAB | Facility: HOSPITAL | Age: 73
End: 2018-05-07

## 2018-05-07 DIAGNOSIS — Z00.00 PREVENTATIVE HEALTH CARE: Primary | ICD-10-CM

## 2018-05-09 ENCOUNTER — CLINICAL SUPPORT NO REQUIREMENTS (OUTPATIENT)
Dept: CARDIOLOGY | Facility: CLINIC | Age: 73
End: 2018-05-09

## 2018-05-09 DIAGNOSIS — I50.42 CHRONIC COMBINED SYSTOLIC AND DIASTOLIC CONGESTIVE HEART FAILURE (HCC): ICD-10-CM

## 2018-05-09 DIAGNOSIS — I47.20 VENTRICULAR TACHYCARDIA (HCC): ICD-10-CM

## 2018-05-09 PROCEDURE — 93296 REM INTERROG EVL PM/IDS: CPT | Performed by: INTERNAL MEDICINE

## 2018-05-09 PROCEDURE — 93295 DEV INTERROG REMOTE 1/2/MLT: CPT | Performed by: INTERNAL MEDICINE

## 2018-05-14 RX ORDER — SACUBITRIL AND VALSARTAN 49; 51 MG/1; MG/1
TABLET, FILM COATED ORAL
Qty: 180 TABLET | Refills: 1 | Status: SHIPPED | OUTPATIENT
Start: 2018-05-14 | End: 2018-10-29 | Stop reason: SDUPTHER

## 2018-05-14 RX ORDER — RIVAROXABAN 20 MG/1
TABLET, FILM COATED ORAL
Qty: 90 TABLET | Refills: 1 | Status: SHIPPED | OUTPATIENT
Start: 2018-05-14 | End: 2018-09-07 | Stop reason: SDUPTHER

## 2018-05-16 ENCOUNTER — TREATMENT (OUTPATIENT)
Dept: CARDIAC REHAB | Facility: HOSPITAL | Age: 73
End: 2018-05-16

## 2018-05-16 DIAGNOSIS — Z00.00 PREVENTATIVE HEALTH CARE: Primary | ICD-10-CM

## 2018-05-21 ENCOUNTER — TREATMENT (OUTPATIENT)
Dept: CARDIAC REHAB | Facility: HOSPITAL | Age: 73
End: 2018-05-21

## 2018-05-21 DIAGNOSIS — Z00.00 PREVENTATIVE HEALTH CARE: Primary | ICD-10-CM

## 2018-05-23 ENCOUNTER — TREATMENT (OUTPATIENT)
Dept: CARDIAC REHAB | Facility: HOSPITAL | Age: 73
End: 2018-05-23

## 2018-05-23 DIAGNOSIS — Z00.00 PREVENTATIVE HEALTH CARE: Primary | ICD-10-CM

## 2018-06-06 ENCOUNTER — TREATMENT (OUTPATIENT)
Dept: CARDIAC REHAB | Facility: HOSPITAL | Age: 73
End: 2018-06-06

## 2018-06-06 DIAGNOSIS — Z00.00 PREVENTATIVE HEALTH CARE: Primary | ICD-10-CM

## 2018-06-08 ENCOUNTER — TREATMENT (OUTPATIENT)
Dept: CARDIAC REHAB | Facility: HOSPITAL | Age: 73
End: 2018-06-08

## 2018-06-08 DIAGNOSIS — Z00.00 PREVENTATIVE HEALTH CARE: Primary | ICD-10-CM

## 2018-06-11 ENCOUNTER — TREATMENT (OUTPATIENT)
Dept: CARDIAC REHAB | Facility: HOSPITAL | Age: 73
End: 2018-06-11

## 2018-06-11 DIAGNOSIS — Z00.00 PREVENTATIVE HEALTH CARE: Primary | ICD-10-CM

## 2018-06-13 ENCOUNTER — TREATMENT (OUTPATIENT)
Dept: CARDIAC REHAB | Facility: HOSPITAL | Age: 73
End: 2018-06-13

## 2018-06-13 DIAGNOSIS — Z00.00 PREVENTATIVE HEALTH CARE: Primary | ICD-10-CM

## 2018-06-18 ENCOUNTER — TREATMENT (OUTPATIENT)
Dept: CARDIAC REHAB | Facility: HOSPITAL | Age: 73
End: 2018-06-18

## 2018-06-18 DIAGNOSIS — Z00.00 PREVENTATIVE HEALTH CARE: Primary | ICD-10-CM

## 2018-06-20 ENCOUNTER — TREATMENT (OUTPATIENT)
Dept: CARDIAC REHAB | Facility: HOSPITAL | Age: 73
End: 2018-06-20

## 2018-06-20 DIAGNOSIS — Z00.00 PREVENTATIVE HEALTH CARE: Primary | ICD-10-CM

## 2018-06-22 ENCOUNTER — TREATMENT (OUTPATIENT)
Dept: CARDIAC REHAB | Facility: HOSPITAL | Age: 73
End: 2018-06-22

## 2018-06-22 DIAGNOSIS — Z00.00 PREVENTATIVE HEALTH CARE: Primary | ICD-10-CM

## 2018-06-23 NOTE — IP AVS SNAPSHOT
AFTER VISIT SUMMARY             Jonnie Roth           About your hospitalization     You were admitted on:  January 15, 2017 You last received care in the:  [address]       Procedures & Surgeries      Procedure(s) (LRB):  Left Heart Cath (N/A)     1/15/2017 - 1/16/2017     Surgeon(s):  Diego Ayala MD  -------------------      Medications    If you or your caregiver advised us that you are currently taking a medication and that medication is marked below as “Resume”, this simply indicates that we have reviewed those medications to make sure our new therapy recommendations do not interfere.  If you have concerns about medications other than those new ones which we are prescribing today, please consult the physician who prescribed them (or your primary physician).  Our review of your home medications is not meant to indicate that we are directing their use.             Your Medications      START taking these medications     pantoprazole 40 MG EC tablet   Take 1 tablet by mouth Daily.   Commonly known as:  PROTONIX             CONTINUE taking these medications     amiodarone 100 MG tablet   Take  by mouth daily.   Last time this was given:  1/16/2017  6:00 AM   Commonly known as:  PACERONE           atorvastatin 40 MG tablet   Take 1 tablet by mouth daily.   Last time this was given:  1/15/2017  8:38 PM   Commonly known as:  LIPITOR           carvedilol 12.5 MG tablet   Take  by mouth 2 (two) times a day.   Last time this was given:  1/15/2017  5:37 PM   Commonly known as:  COREG           clopidogrel 75 MG tablet   Take  by mouth daily.   Last time this was given:  1/16/2017  9:08 AM   Commonly known as:  PLAVIX           eplerenone 25 MG tablet   Take  by mouth.   Last time this was given:  1/16/2017  9:20 AM   Commonly known as:  INSPRA           finasteride 5 MG tablet   Take  by mouth daily.   Last time this was given:  1/16/2017  9:10 AM   Commonly known as:  PROSCAR           Problem: Pain:  Goal: Pain level will decrease  Pain level will decrease   Outcome: Met This Shift    Goal: Control of acute pain  Control of acute pain   Outcome: Met This Shift    Goal: Control of chronic pain  Control of chronic pain   Outcome: Met This Shift levothyroxine 25 MCG tablet   Take  by mouth daily.   Last time this was given:  1/16/2017  6:00 AM   Commonly known as:  SYNTHROID, LEVOTHROID           melatonin 3 MG tablet   Take  by mouth daily.           MULTIVITAMIN ADULT PO   Take  by mouth daily.           nitroglycerin 0.4 MG SL tablet   Place  under the tongue.   Commonly known as:  NITROSTAT           polyethylene glycol packet   Take 17 g by mouth every other day.   Commonly known as:  MIRALAX           sacubitril-valsartan 49-51 MG tablet   Take 1 tablet by mouth 2 (two) times a day.   Last time this was given:  1/16/2017  9:08 AM   Commonly known as:  ENTRESTO           torsemide 10 MG tablet   Take 1 tablet by mouth Daily.   Last time this was given:  1/16/2017  9:09 AM   Commonly known as:  DEMADEX           XARELTO 20 MG tablet   Take  by mouth.   Generic drug:  rivaroxaban                Where to Get Your Medications      These medications were sent to [address] [place] [address] AT [facility] [phone] [facility] [phone] [address] [address]     Phone:  [phone]     pantoprazole 40 MG EC tablet                  Your Medications      Your Medication List           Morning Noon Evening Bedtime As Needed    amiodarone 100 MG tablet   Take  by mouth daily.   Commonly known as:  PACERONE                                atorvastatin 40 MG tablet   Take 1 tablet by mouth daily.   Commonly known as:  LIPITOR                                carvedilol 12.5 MG tablet   Take  by mouth 2 (two) times a day.   Commonly known as:  COREG                                clopidogrel 75 MG tablet   Take  by mouth daily.   Commonly known as:  PLAVIX                                eplerenone 25 MG tablet   Take  by mouth.   Commonly known as:  INSPRA                                finasteride 5 MG tablet   Take  by mouth daily.   Commonly known as:  PROSCAR                                levothyroxine 25 MCG  tablet   Take  by mouth daily.   Commonly known as:  SYNTHROID, LEVOTHROID                                melatonin 3 MG tablet   Take  by mouth daily.                                MULTIVITAMIN ADULT PO   Take  by mouth daily.                                nitroglycerin 0.4 MG SL tablet   Place  under the tongue.   Commonly known as:  NITROSTAT                                pantoprazole 40 MG EC tablet   Take 1 tablet by mouth Daily.   Commonly known as:  PROTONIX                                polyethylene glycol packet   Take 17 g by mouth every other day.   Commonly known as:  MIRALAX                                sacubitril-valsartan 49-51 MG tablet   Take 1 tablet by mouth 2 (two) times a day.   Commonly known as:  ENTRESTO                                torsemide 10 MG tablet   Take 1 tablet by mouth Daily.   Commonly known as:  DEMADEX                                XARELTO 20 MG tablet   Take  by mouth.   Generic drug:  rivaroxaban                                         Instructions for After Discharge        Discharge References/Attachments     CHEST PAIN (NONSPECIFIC) (ENGLISH)    RADIAL SITE CARE (ENGLISH)    PANTOPRAZOLE TABLETS (ENGLISH)       Follow-ups for After Discharge        Follow-up Information     Follow up with Bhupinder Gonzalez MD Follow up in 1 month(s).    Specialty:  Cardiology    Contact information:    [address]  [facility] [address]  [place] [id number] [phone]          Follow up with Brian Lewis MD .    Specialty:  Internal Medicine    Why:  monday 01/23/17  1 p.m. with oscar starr p.aKeily    Contact information:    [address]  [facility] [address]  [place] [id number] [phone]        Referrals and Follow-ups to Schedule     Follow-Up    As directed    PCP 1 week  Cardiology per their recs             Scheduled Appointments     Jan 18, 2017  8:30 AM EST   PHASE III with [facility] PHASE III - [facility] REHAB   [facility] CARDIAC REHABILIATATION ([place])     [address] [facility] [address]   [phone]            Jan 20, 2017  8:30 AM EST   PHASE III with [facility] PHASE III - [facility]   [facility] ([place])    [address] [place] [address]   [phone]            Jan 23, 2017  8:30 AM EST   PHASE III with [facility] PHASE III - [facility]   [facility] ([place])    [address] [place] [address]   [phone]            Jan 25, 2017  8:30 AM EST   PHASE III with [facility] PHASE III - [facility]   [facility] ([place])    [address] [place] [address]   [phone]            Jan 27, 2017  8:30 AM EST   PHASE III with [facility] PHASE III - [facility]   [facility] ([place])    [address] [place] [address]   [phone]            Jan 30, 2017  8:30 AM EST   PHASE III with [facility] PHASE III - [facility]   [facility] ([place])    [address] [place] [address]   [phone]            Feb 01, 2017  8:30 AM EST   PHASE III with [facility] PHASE III - [facility]   [facility] ([place])    [address] [place] [address]   [phone]            Feb 03, 2017  8:30 AM EST   PHASE III with [facility] PHASE III - [facility]   [facility] ([place])    [address] [place] [address]   [phone]            Feb 06, 2017  8:30 AM EST   PHASE III with [facility] PHASE III - [facility]   [facility] ([place])    [address] [place] [address]   [phone]            Feb 08, 2017  8:30 AM EST   PHASE III with [facility] PHASE III - [facility]   [facility]  ([place])    [address] [place] [id number]   [phone]            Feb 10, 2017  8:30 AM EST   PHASE III with [facility] PHASE III - [facility]   [facility] ([place])    [address] [place] [address]   [phone]            Feb 13, 2017  8:30 AM EST   PHASE III with [facility] PHASE III - [facility]   [facility] ([place])    [address] [place] [address]   [phone]            Feb 14, 2017  3:15 PM EST   Follow Up with Bhupinder Gonzalez MD   [facility] (--)    [address] [place] [address] [address]  [facility] [address]   [phone]           Arrive 15 minutes prior to appointment.            Feb 15, 2017  8:30 AM EST   PHASE III with [facility] PHASE III - [facility] RE[facility]   [facility] CARDIAC REHABILIATATION ([place])    [address] [place] [address]   [phone]            Feb 17, 2017  8:30 AM EST   PHASE III with [facility] PHASE III - [facility] RE[facility]   [facility] ([place])    [address] [place] [address]   [phone]            Feb 20, 2017  8:30 AM EST   PHASE III with [facility] PHASE III - [facility] RE[facility]   [facility] ([place])    [address] [place] [address]   [phone]            Feb 22, 2017  8:30 AM EST   PHASE III with [facility] PHASE III - [facility] RE[facility]   [facility] ([place])    [address] [place] [address]   [phone]            Feb 24, 2017  8:30 AM EST   PHASE III with [facility] PHASE III - [facility] RE[facility]   [facility] ([place])    [address] [place] [address]   [phone]            Feb 27, 2017  8:30 AM EST   PHASE III with [facility] PHASE III -  [facility] REH[facility]    [facility] ([place])    [address] [facility] [address]   [phone]            Mar 01, 2017  8:30 AM EST   PHASE III with [facility] PHASE III - [facility] RE[facility]   [facility] ([place])    [address] [place] [address]   [phone]            Mar 03, 2017  8:30 AM EST   PHASE III with [facility] PHASE III - [facility] RE[facility]   [facility] ([place])    [address] [place] [address]   [phone]            Mar 06, 2017  8:30 AM EST   PHASE III with [facility] PHASE III - [facility] RE[facility]   [facility] ([place])    [address] [place] [address]   [phone]            Mar 08, 2017  8:30 AM EST   PHASE III with [facility] PHASE III - [facility] RE[facility]   [facility] ([place])    [address] [place] [address]   [phone]            Mar 10, 2017  8:30 AM EST   PHASE III with [facility] PHASE III - [facility] RE[facility]   [facility] ([place])    [address] [place] [address]   [phone]            Mar 13, 2017  8:30 AM EDT   PHASE III with [facility] PHASE III - [facility] REH[facility]   [facility] ([place])    [address] [place] [address]   [phone]            Mar 15, 2017  8:30 AM EDT   PHASE III with [facility] PHASE III - [facility] REH[facility]   [facility] ([place])    [address] [place] [address]   [phone]            Mar 17, 2017  8:30 AM EDT   PHASE III with [facility] PHASE III - [facility] RE[facility]   [facility] ([place])    [address] [place] [address]   [phone]            Mar 20, 2017  8:30 AM EDT   PHASE III with [facility] PHASE III - [facility]  [facility] REHAB   [facility] ([place])    [address] [facility] [address] [phone]            Mar 22, 2017  8:30 AM EDT   PHASE III with [facility] PHASE III - [facility] REH[facility]   [facility] ([place])    [address] [place] [address]   [phone]            Mar 24, 2017  8:30 AM EDT   PHASE III with [facility] PHASE III - [facility] RE[facility]   [facility] ([place])    [address] [place] [address]   [phone]            Mar 27, 2017  8:30 AM EDT   PHASE III with [facility] PHASE III - [facility] REHAB   [facility] CARDIAC RE[facility]ILIATATION ([place])    [address] [place] [address]   [phone]            Mar 29, 2017  8:30 AM EDT   PHASE III with [facility]H PHASE III - [facility] REHAB   [facility] ([place])    [address] [place] [address]   [phone]            Mar 31, 2017  8:30 AM EDT   PHASE III with [facility]H PHASE III - [facility] REHAB   [facility] RE[facility]ATATION ([place])    [address] [place] [address]   [phone]            Apr 18, 2017  9:45 AM EDT   Follow Up with Ferdinand Alba MD   [facility] (--)    [address] [place] [address] [address]  [facility] [address]   [phone]           Arrive 15 minutes prior to appointment.              9flats Signup     Our records indicate that you have an active [employer] account.    You can view your After Visit Summary by going to [email or web address] and logging in with your 9flats username and password.  If you don't have a 9flats username and password but a parent or guardian has access to your record, the parent or guardian should login with their own 9flats username and password and access your record to view  the After Visit Summary.    If you have questions, you can email [email or web address] or call [phone] to talk to our MyChart staff.  Remember, MyChart is NOT to be used for urgent needs.  For medical emergencies, dial 911.           Summary of Your Hospitalization        Reason for Hospitalization     Your primary diagnosis was:  Chest Pain    Your diagnoses also included:  Blood Clot To Lung, Dyslipidemia, Blood Clot In Leg, Coronary Heart Disease, Heart Failure, Ihd (Ischemic Heart Disease), High Cholesterol Or Triglycerides, Lbbb (Left Bundle Branch Block)      Care Providers     Provider Service Role Specialty    Nigel Jett MD Medicine Attending Provider Hospitalist      Your Allergies  Date Reviewed: 1/15/2017    No active allergies      Patient Belongings Returned     Document Return of Belongings Flowsheet     Were the patient bedside belongings sent home?   --   Belongings Retrieved from Security & Sent Home   --    Belongings Sent to Safe   --   Medications Retrieved from Pharmacy & Sent Home   --              More Information      Nonspecific Chest Pain   Chest pain can be caused by many different conditions. There is always a chance that your pain could be related to something serious, such as a heart attack or a blood clot in your lungs. Chest pain can also be caused by conditions that are not life-threatening. If you have chest pain, it is very important to follow up with your health care provider.  CAUSES   Chest pain can be caused by:  · Heartburn.  · Pneumonia or bronchitis.  · Anxiety or stress.  · Inflammation around your heart (pericarditis) or lung (pleuritis or pleurisy).  · A blood clot in your lung.  · A collapsed lung (pneumothorax). It can develop suddenly on its own (spontaneous pneumothorax) or from trauma to the chest.  · Shingles infection (varicella-zoster virus).  · Heart attack.  · Damage to the bones, muscles, and cartilage that make up your chest wall. This can  include:    Bruised bones due to injury.    Strained muscles or cartilage due to frequent or repeated coughing or overwork.    Fracture to one or more ribs.    Sore cartilage due to inflammation (costochondritis).  RISK FACTORS   Risk factors for chest pain may include:  · Activities that increase your risk for trauma or injury to your chest.  · Respiratory infections or conditions that cause frequent coughing.  · Medical conditions or overeating that can cause heartburn.  · Heart disease or family history of heart disease.  · Conditions or health behaviors that increase your risk of developing a blood clot.  · Having had chicken pox (varicella zoster).  SIGNS AND SYMPTOMS  Chest pain can feel like:  · Burning or tingling on the surface of your chest or deep in your chest.  · Crushing, pressure, aching, or squeezing pain.  · Dull or sharp pain that is worse when you move, cough, or take a deep breath.  · Pain that is also felt in your back, neck, shoulder, or arm, or pain that spreads to any of these areas.  Your chest pain may come and go, or it may stay constant.  DIAGNOSIS  Lab tests or other studies may be needed to find the cause of your pain. Your health care provider may have you take a test called an ambulatory ECG (electrocardiogram). An ECG records your heartbeat patterns at the time the test is performed. You may also have other tests, such as:  · Transthoracic echocardiogram (TTE). During echocardiography, sound waves are used to create a picture of all of the heart structures and to look at how blood flows through your heart.  · Transesophageal echocardiogram ([place]). This is a more advanced imaging test that obtains images from inside your body. It allows your health care provider to see your heart in finer detail.  · Cardiac monitoring. This allows your health care provider to monitor your heart rate and rhythm in real time.  · Holter monitor. This is a portable device that records your heartbeat and  can help to diagnose abnormal heartbeats. It allows your health care provider to track your heart activity for several days, if needed.  · Stress tests. These can be done through exercise or by taking medicine that makes your heart beat more quickly.  · Blood tests.  · Imaging tests.  TREATMENT   Your treatment depends on what is causing your chest pain. Treatment may include:  · Medicines. These may include:    Acid blockers for heartburn.    Anti-inflammatory medicine.    Pain medicine for inflammatory conditions.    Antibiotic medicine, if an infection is present.    Medicines to dissolve blood clots.    Medicines to treat coronary artery disease.  · Supportive care for conditions that do not require medicines. This may include:    Resting.    Applying heat or cold packs to injured areas.    Limiting activities until pain decreases.  HOME CARE INSTRUCTIONS  · If you were prescribed an antibiotic medicine, finish it all even if you start to feel better.  · Avoid any activities that bring on chest pain.  · Do not use any tobacco products, including cigarettes, chewing tobacco, or electronic cigarettes. If you need help quitting, ask your health care provider.  · Do not drink alcohol.  · Take medicines only as directed by your health care provider.  · Keep all follow-up visits as directed by your health care provider. This is important. This includes any further testing if your chest pain does not go away.  · If heartburn is the cause for your chest pain, you may be told to keep your head raised (elevated) while sleeping. This reduces the chance that acid will go from your stomach into your esophagus.  · Make lifestyle changes as directed by your health care provider. These may include:    Getting regular exercise. Ask your health care provider to suggest some activities that are safe for you.    Eating a heart-healthy diet. A registered dietitian can help you to learn healthy eating options.    Maintaining a  healthy weight.    Managing diabetes, if necessary.    Reducing stress.  SEEK MEDICAL CARE IF:  · Your chest pain does not go away after treatment.  · You have a rash with blisters on your chest.  · You have a fever.  SEEK IMMEDIATE MEDICAL CARE IF:   · Your chest pain is worse.  · You have an increasing cough, or you cough up blood.  · You have severe abdominal pain.  · You have severe weakness.  · You faint.  · You have chills.  · You have sudden, unexplained chest discomfort.  · You have sudden, unexplained discomfort in your arms, back, neck, or jaw.  · You have shortness of breath at any time.  · You suddenly start to sweat, or your skin gets clammy.  · You feel nauseous or you vomit.  · You suddenly feel light-headed or dizzy.  · Your heart begins to beat quickly, or it feels like it is skipping beats.  These symptoms may represent a serious problem that is an emergency. Do not wait to see if the symptoms will go away. Get medical help right away. Call your local emergency services (911 in the [place] Do not drive yourself to the hospital.     This information is not intended to replace advice given to you by your health care provider. Make sure you discuss any questions you have with your health care provider.     Document Released: 09/27/2006 Document Revised: 01/08/2016 Document Reviewed: 07/24/2015  [employer] Interactive Patient Education ©2016 [employer]          Radial Site Care  Refer to this sheet in the next few weeks. These instructions provide you with information about caring for yourself after your procedure. Your health care provider may also give you more specific instructions. Your treatment has been planned according to current medical practices, but problems sometimes occur. Call your health care provider if you have any problems or questions after your procedure.  WHAT TO EXPECT AFTER THE PROCEDURE  After your procedure, it is typical to have the following:  · Bruising at the radial site  that usually fades within 1-2 weeks.  · Blood collecting in the tissue (hematoma) that may be painful to the touch. It should usually decrease in size and tenderness within 1-2 weeks.  HOME CARE INSTRUCTIONS  · Take medicines only as directed by your health care provider.  · You may shower 24-48 hours after the procedure or as directed by your health care provider. Remove the bandage (dressing) and gently wash the site with plain soap and water. Pat the area dry with a clean towel. Do not rub the site, because this may cause bleeding.  · Do not take baths, swim, or use a hot tub until your health care provider approves.  · Check your insertion site every day for redness, swelling, or drainage.  · Do not apply powder or lotion to the site.  · Do not flex or bend the affected arm for 24 hours or as directed by your health care provider.  · Do not push or pull heavy objects with the affected arm for 24 hours or as directed by your health care provider.  · Do not lift over 10 lb (4.5 kg) for 5 days after your procedure or as directed by your health care provider.  · Ask your health care provider when it is okay to:    Return to work or school.    Resume usual physical activities or sports.    Resume sexual activity.  · Do not drive home if you are discharged the same day as the procedure. Have someone else drive you.  · You may drive 24 hours after the procedure unless otherwise instructed by your health care provider.  · Do not operate machinery or power tools for 24 hours after the procedure.  · If your procedure was done as an outpatient procedure, which means that you went home the same day as your procedure, a responsible adult should be with you for the first 24 hours after you arrive home.  · Keep all follow-up visits as directed by your health care provider. This is important.  SEEK MEDICAL CARE IF:  · You have a fever.  · You have chills.  · You have increased bleeding from the radial site. Hold pressure on the  site.  SEEK IMMEDIATE MEDICAL CARE IF:  · You have unusual pain at the radial site.  · You have redness, warmth, or swelling at the radial site.  · You have drainage (other than a small amount of blood on the dressing) from the radial site.  · The radial site is bleeding, and the bleeding does not stop after 30 minutes of holding steady pressure on the site.  · Your arm or hand becomes pale, cool, tingly, or numb.     This information is not intended to replace advice given to you by your health care provider. Make sure you discuss any questions you have with your health care provider.     Document Released: 01/20/2012 Document Revised: 01/08/2016 Document Reviewed: 07/06/2015  [employer] Interactive Patient Education ©2016 [employer]          Pantoprazole tablets  What is this medicine?  PANTOPRAZOLE (pan TOE pra zole) prevents the production of acid in the stomach. It is used to treat gastroesophageal reflux disease (GERD), inflammation of the esophagus, and Zollinger-Jack syndrome.  This medicine may be used for other purposes; ask your health care provider or pharmacist if you have questions.  What should I tell my health care provider before I take this medicine?  They need to know if you have any of these conditions:  -liver disease  -low levels of magnesium in the blood  -an unusual or allergic reaction to omeprazole, lansoprazole, pantoprazole, rabeprazole, other medicines, foods, dyes, or preservatives  -pregnant or trying to get pregnant  -breast-feeding  How should I use this medicine?  Take this medicine by mouth. Swallow the tablets whole with a drink of water. Follow the directions on the prescription label. Do not crush, break, or chew. Take your medicine at regular intervals. Do not take your medicine more often than directed.  Talk to your pediatrician regarding the use of this medicine in children. While this drug may be prescribed for children as young as 5 years for selected conditions,  precautions do apply.  Overdosage: If you think you have taken too much of this medicine contact a poison control center or emergency room at once.  NOTE: This medicine is only for you. Do not share this medicine with others.  What if I miss a dose?  If you miss a dose, take it as soon as you can. If it is almost time for your next dose, take only that dose. Do not take double or extra doses.  What may interact with this medicine?  Do not take this medicine with any of the following medications:  -atazanavir  -nelfinavir  This medicine may also interact with the following medications:  -ampicillin  -delavirdine  -erlotinib  -iron salts  -medicines for fungal infections like ketoconazole, itraconazole and voriconazole  -methotrexate  -mycophenolate mofetil  -warfarin  This list may not describe all possible interactions. Give your health care provider a list of all the medicines, herbs, non-prescription drugs, or dietary supplements you use. Also tell them if you smoke, drink alcohol, or use illegal drugs. Some items may interact with your medicine.  What should I watch for while using this medicine?  It can take several days before your stomach pain gets better. Check with your doctor or health care professional if your condition does not start to get better, or if it gets worse.  You may need blood work done while you are taking this medicine.  What side effects may I notice from receiving this medicine?  Side effects that you should report to your doctor or health care professional as soon as possible:  -allergic reactions like skin rash, itching or hives, swelling of the face, lips, or tongue  -bone, muscle or joint pain  -breathing problems  -chest pain or chest tightness  -dark yellow or brown urine  -dizziness  -fast, irregular heartbeat  -feeling faint or lightheaded  -fever or sore throat  -muscle spasm  -palpitations  -redness, blistering, peeling or loosening of the skin, including inside the  mouth  -seizures  -tremors  -unusual bleeding or bruising  -unusually weak or tired  -yellowing of the eyes or skin  Side effects that usually do not require medical attention (Report these to your doctor or health care professional if they continue or are bothersome.):  -constipation  -diarrhea  -dry mouth  -headache  -nausea  This list may not describe all possible side effects. Call your doctor for medical advice about side effects. You may report side effects to FDA at 3-587-VYC-6137.  Where should I keep my medicine?  Keep out of the reach of children.  Store at room temperature between 15 and 30 degrees C (59 and 86 degrees F). Protect from light and moisture. Throw away any unused medicine after the expiration date.  NOTE: This sheet is a summary. It may not cover all possible information. If you have questions about this medicine, talk to your doctor, pharmacist, or health care provider.     © 2016, [employer]. (2016-02-05 14:45:56)         PREVENTING SURGICAL SITE INFECTIONS     Surgical Site Infections FAQs  What is a Surgical Site Infection (SSI)?  A surgical site infection is an infection that occurs after surgery in the part of the body where the surgery took place. Most patients who have surgery do not develop an infection. However, infections develop in about 1 to 3 out of every 100 patients who have surgery.  Some of the common symptoms of a surgical site infection are:  · Redness and pain around the area where you had surgery  · Drainage of cloudy fluid from your surgical wound  · Fever  Can SSIs be treated?  Yes. Most surgical site infections can be treated with antibiotics. The antibiotic given to you depends on the bacteria (germs) causing the infection. Sometimes patients with SSIs also need another surgery to treat the infection.  What are some of the things that hospitals are doing to prevent SSIs?  To prevent SSIs, doctors, nurses, and other healthcare providers:  · Clean their  hands and arms up to their elbows with an antiseptic agent just before the surgery.  · Clean their hands with soap and water or an alcohol-based hand rub before and after caring for each patient.  · May remove some of your hair immediately before your surgery using electric clippers if the hair is in the same area where the procedure will occur. They should not shave you with a razor.  · Wear special hair covers, masks, gowns, and gloves during surgery to keep the surgery area clean.  · Give you antibiotics before your surgery starts. In most cases, you should get antibiotics within 60 minutes before the surgery starts and the antibiotics should be stopped within 24 hours after surgery.  · Clean the skin at the site of your surgery with a special soap that kills germs.  What can I do to help prevent SSIs?  Before your surgery:  · Tell your doctor about other medical problems you may have. Health problems such as allergies, diabetes, and obesity could affect your surgery and your treatment.  · Quit smoking. Patients who smoke get more infections. Talk to your doctor about how you can quit before your surgery.  · Do not shave near where you will have surgery. Shaving with a razor can irritate your skin and make it easier to develop an infection.  At the time of your surgery:  · Speak up if someone tries to shave you with a razor before surgery. Ask why you need to be shaved and talk with your surgeon if you have any concerns.  · Ask if you will get antibiotics before surgery.  After your surgery:  · Make sure that your healthcare providers clean their hands before examining you, either with soap and water or an alcohol-based hand rub.    If you do not see your providers clean their hands, please ask them to do so.  · Family and friends who visit you should not touch the surgical wound or dressings.  · Family and friends should clean their hands with soap and water or an alcohol-based hand rub before and after visiting  you. If you do not see them clean their hands, ask them to clean their hands.  What do I need to do when I go home from the hospital?  · Before you go home, your doctor or nurse should explain everything you need to know about taking care of your wound. Make sure you understand how to care for your wound before you leave the hospital.  · Always clean your hands before and after caring for your wound.  · Before you go home, make sure you know who to contact if you have questions or problems after you get home.  · If you have any symptoms of an infection, such as redness and pain at the surgery site, drainage, or fever, call your doctor immediately.  If you have additional questions, please ask your doctor or nurse.  Developed and co-sponsored by The Society for Healthcare Epidemiology of [place] (SHEA); Infectious Diseases Society of [place] (IDSA); American Hospital Association; Association for Professionals in Infection Control and Epidemiology (APIC); Centers for Disease Control and Prevention (CDC); and The Joint Commission.     This information is not intended to replace advice given to you by your health care provider. Make sure you discuss any questions you have with your health care provider.     Document Released: 12/23/2014 Document Revised: 01/08/2016 Document Reviewed: 03/02/2016  [employer] Interactive Patient Education ©2016 [employer]             SYMPTOMS OF A STROKE    Call 911 or have someone take you to the Emergency Department if you have any of the following:    · Sudden numbness or weakness of your face, arm or leg especially on one side of the body  · Sudden confusion, diffiiculty speaking or trouble understanding   · Changes in your vision or loss of sight in one eye  · Sudden severe headache with no known cause  · sudden dizziness, trouble walking, loss of balance or coordination    It is important to seek emergency care right away if you have further stroke symptoms. If you get emergency  help quickly, the powerful clot-dissolving medicines can reduce the disabilities caused by a stroke.     For more information:    American Stroke Association  5-294-8-STROKE  www.strokeassociation.org           IF YOU SMOKE OR USE TOBACCO PLEASE READ THE FOLLOWING:    Why is smoking bad for me?  Smoking increases the risk of heart disease, lung disease, vascular disease, stroke, and cancer.     If you smoke, STOP!    If you would like more information on quitting smoking, please visit the [employer] website: [email or web address]   This link will provide additional resources including the QUIT line and the [employer] support groups.     For more information:    American Cancer Society  ([phone]    American Heart Association  1-222.372.1655               YOU ARE THE MOST IMPORTANT FACTOR IN YOUR RECOVERY.     Follow all instructions carefully.     I have reviewed my discharge instructions with my nurse, including the following information, if applicable:     Information about my illness and diagnosis   Follow up appointments (including lab draws)   Wound Care   Equipment Needs   Medications (new and continuing) along with side effects   Preventative information such as vaccines and smoking cessations   Diet   Pain   I know when to contact my Doctor's office or seek emergency care      I want my nurse to describe the side effects of my medications: YES NO   If the answer is no, I understand the side effects of my medications: YES NO   My nurse described the side effects of my medications in a way that I could understand: YES NO   I have taken my personal belongings and my own medications with me at discharge: YES NO            I have received this information and my questions have been answered. I have discussed any concerns I see with this plan with the nurse or physician. I understand these instructions.    Signature of Patient or Responsible Person:  _____________________________________    Date: _________________  Time: __________________    Signature of Healthcare Provider: _______________________________________  Date: _________________  Time: __________________

## 2018-06-25 ENCOUNTER — TREATMENT (OUTPATIENT)
Dept: CARDIAC REHAB | Facility: HOSPITAL | Age: 73
End: 2018-06-25

## 2018-06-25 DIAGNOSIS — Z00.00 PREVENTATIVE HEALTH CARE: Primary | ICD-10-CM

## 2018-06-27 ENCOUNTER — TREATMENT (OUTPATIENT)
Dept: CARDIAC REHAB | Facility: HOSPITAL | Age: 73
End: 2018-06-27

## 2018-06-27 DIAGNOSIS — Z00.00 PREVENTATIVE HEALTH CARE: Primary | ICD-10-CM

## 2018-06-29 ENCOUNTER — TREATMENT (OUTPATIENT)
Dept: CARDIAC REHAB | Facility: HOSPITAL | Age: 73
End: 2018-06-29

## 2018-06-29 ENCOUNTER — TRANSCRIBE ORDERS (OUTPATIENT)
Dept: CARDIAC REHAB | Facility: HOSPITAL | Age: 73
End: 2018-06-29

## 2018-06-29 DIAGNOSIS — Z00.00 PREVENTATIVE HEALTH CARE: Primary | ICD-10-CM

## 2018-07-03 ENCOUNTER — TREATMENT (OUTPATIENT)
Dept: CARDIAC REHAB | Facility: HOSPITAL | Age: 73
End: 2018-07-03

## 2018-07-03 DIAGNOSIS — Z00.00 PREVENTATIVE HEALTH CARE: Primary | ICD-10-CM

## 2018-07-05 ENCOUNTER — TREATMENT (OUTPATIENT)
Dept: CARDIAC REHAB | Facility: HOSPITAL | Age: 73
End: 2018-07-05

## 2018-07-05 DIAGNOSIS — Z00.00 PREVENTATIVE HEALTH CARE: Primary | ICD-10-CM

## 2018-07-06 ENCOUNTER — TREATMENT (OUTPATIENT)
Dept: CARDIAC REHAB | Facility: HOSPITAL | Age: 73
End: 2018-07-06

## 2018-07-06 DIAGNOSIS — Z00.00 PREVENTATIVE HEALTH CARE: Primary | ICD-10-CM

## 2018-07-09 ENCOUNTER — TREATMENT (OUTPATIENT)
Dept: CARDIAC REHAB | Facility: HOSPITAL | Age: 73
End: 2018-07-09

## 2018-07-09 DIAGNOSIS — Z00.00 PREVENTATIVE HEALTH CARE: Primary | ICD-10-CM

## 2018-07-11 ENCOUNTER — TREATMENT (OUTPATIENT)
Dept: CARDIAC REHAB | Facility: HOSPITAL | Age: 73
End: 2018-07-11

## 2018-07-11 DIAGNOSIS — Z00.00 PREVENTATIVE HEALTH CARE: Primary | ICD-10-CM

## 2018-07-13 ENCOUNTER — TREATMENT (OUTPATIENT)
Dept: CARDIAC REHAB | Facility: HOSPITAL | Age: 73
End: 2018-07-13

## 2018-07-13 DIAGNOSIS — Z00.00 PREVENTATIVE HEALTH CARE: Primary | ICD-10-CM

## 2018-07-16 ENCOUNTER — TREATMENT (OUTPATIENT)
Dept: CARDIAC REHAB | Facility: HOSPITAL | Age: 73
End: 2018-07-16

## 2018-07-16 DIAGNOSIS — Z00.00 PREVENTATIVE HEALTH CARE: Primary | ICD-10-CM

## 2018-07-23 ENCOUNTER — TREATMENT (OUTPATIENT)
Dept: CARDIAC REHAB | Facility: HOSPITAL | Age: 73
End: 2018-07-23

## 2018-07-23 DIAGNOSIS — Z00.00 PREVENTATIVE HEALTH CARE: Primary | ICD-10-CM

## 2018-07-26 ENCOUNTER — TREATMENT (OUTPATIENT)
Dept: CARDIAC REHAB | Facility: HOSPITAL | Age: 73
End: 2018-07-26

## 2018-07-26 DIAGNOSIS — Z00.00 PREVENTATIVE HEALTH CARE: ICD-10-CM

## 2018-07-30 ENCOUNTER — TREATMENT (OUTPATIENT)
Dept: CARDIAC REHAB | Facility: HOSPITAL | Age: 73
End: 2018-07-30

## 2018-07-30 DIAGNOSIS — Z00.00 PREVENTATIVE HEALTH CARE: Primary | ICD-10-CM

## 2018-08-03 ENCOUNTER — TREATMENT (OUTPATIENT)
Dept: CARDIAC REHAB | Facility: HOSPITAL | Age: 73
End: 2018-08-03

## 2018-08-03 DIAGNOSIS — Z00.00 PREVENTATIVE HEALTH CARE: Primary | ICD-10-CM

## 2018-08-06 ENCOUNTER — TREATMENT (OUTPATIENT)
Dept: CARDIAC REHAB | Facility: HOSPITAL | Age: 73
End: 2018-08-06

## 2018-08-06 DIAGNOSIS — Z00.00 PREVENTATIVE HEALTH CARE: Primary | ICD-10-CM

## 2018-08-08 ENCOUNTER — TREATMENT (OUTPATIENT)
Dept: CARDIAC REHAB | Facility: HOSPITAL | Age: 73
End: 2018-08-08

## 2018-08-08 DIAGNOSIS — Z00.00 PREVENTATIVE HEALTH CARE: Primary | ICD-10-CM

## 2018-08-10 ENCOUNTER — TREATMENT (OUTPATIENT)
Dept: CARDIAC REHAB | Facility: HOSPITAL | Age: 73
End: 2018-08-10

## 2018-08-10 DIAGNOSIS — Z00.00 PREVENTATIVE HEALTH CARE: Primary | ICD-10-CM

## 2018-08-15 ENCOUNTER — TREATMENT (OUTPATIENT)
Dept: CARDIAC REHAB | Facility: HOSPITAL | Age: 73
End: 2018-08-15

## 2018-08-15 DIAGNOSIS — Z00.00 PREVENTATIVE HEALTH CARE: Primary | ICD-10-CM

## 2018-08-17 ENCOUNTER — TREATMENT (OUTPATIENT)
Dept: CARDIAC REHAB | Facility: HOSPITAL | Age: 73
End: 2018-08-17

## 2018-08-17 DIAGNOSIS — Z00.00 PREVENTATIVE HEALTH CARE: Primary | ICD-10-CM

## 2018-08-20 ENCOUNTER — TREATMENT (OUTPATIENT)
Dept: CARDIAC REHAB | Facility: HOSPITAL | Age: 73
End: 2018-08-20

## 2018-08-20 DIAGNOSIS — Z00.00 PREVENTATIVE HEALTH CARE: Primary | ICD-10-CM

## 2018-08-22 ENCOUNTER — TREATMENT (OUTPATIENT)
Dept: CARDIAC REHAB | Facility: HOSPITAL | Age: 73
End: 2018-08-22

## 2018-08-22 DIAGNOSIS — Z00.00 PREVENTATIVE HEALTH CARE: Primary | ICD-10-CM

## 2018-08-27 ENCOUNTER — TREATMENT (OUTPATIENT)
Dept: CARDIAC REHAB | Facility: HOSPITAL | Age: 73
End: 2018-08-27

## 2018-08-27 DIAGNOSIS — Z00.00 PREVENTATIVE HEALTH CARE: Primary | ICD-10-CM

## 2018-08-29 ENCOUNTER — OFFICE VISIT (OUTPATIENT)
Dept: CARDIOLOGY | Facility: CLINIC | Age: 73
End: 2018-08-29

## 2018-08-29 ENCOUNTER — TREATMENT (OUTPATIENT)
Dept: CARDIAC REHAB | Facility: HOSPITAL | Age: 73
End: 2018-08-29

## 2018-08-29 VITALS
WEIGHT: 215.4 LBS | DIASTOLIC BLOOD PRESSURE: 62 MMHG | HEIGHT: 74 IN | SYSTOLIC BLOOD PRESSURE: 110 MMHG | BODY MASS INDEX: 27.64 KG/M2 | HEART RATE: 70 BPM

## 2018-08-29 DIAGNOSIS — Z00.00 PREVENTATIVE HEALTH CARE: Primary | ICD-10-CM

## 2018-08-29 DIAGNOSIS — I50.20 SYSTOLIC CONGESTIVE HEART FAILURE, UNSPECIFIED CONGESTIVE HEART FAILURE CHRONICITY: Primary | ICD-10-CM

## 2018-08-29 PROCEDURE — 93284 PRGRMG EVAL IMPLANTABLE DFB: CPT | Performed by: PHYSICIAN ASSISTANT

## 2018-08-29 PROCEDURE — 99214 OFFICE O/P EST MOD 30 MIN: CPT | Performed by: PHYSICIAN ASSISTANT

## 2018-08-29 NOTE — PROGRESS NOTES
[facility]   OFFICE NOTE      Jonnie Roth  1945  PCP: Brian Lewis MD    SUBJECTIVE:   Jonnie Roth is a 73 y.o. male seen for a follow up visit regarding the following: VT, ICM, CHF, [employer] BIVICD    CC:VT  Problem List:  1. Ischemic heart disease:  a. Remote progressive angina pectoris/acute extensive anterolateral myocardial infarction/delayed presentation with thrombolysis/severe 3-vessel coronary atherosclerosis with severe single vessel involvement/PTCA with intracoronary stent deployment proximal-mid segment LAD/moderate-severe compensated left ventricular dysfunction. LVEF (0.35)/abnormal positive signal averaged EKG/oral anticoagulation, April 1995.  b. Remote NYHA class I-II angina pectoris/class III CHF/abnormal quantitative SPECT gated Cardiolite GXT, July 1998.  c. Stable persistent MUGA, LVEF (0.33, January 1999 and January 2000).   d. Residual NYHA class I angina pectoris/CHF with reduced MUGA scan: LVEF (0.25), April 2002.  e. Left heart catheterization with distal circumflex disease: EF 20%, September 2002.   f. MUGA in May 2003: EF 32%.   g. Sestamibi GXT on 04/08/2005: No ischemia. EF 29%.   h. Residual NYHA class I angina pectoris/CHF with reduced acceptable MUGA scan: EF (0.32) and acceptable Pacesetter PCD interrogation/reprogramming, May 2003 with interrogation, September 2004.  i. Echocardiogram, September 2009 with EF 30%  j. Left heart catheterization by Dr. Bhupinder Gonzalez, August 2010, with LVEF of 35%, with 3-vessel native coronary artery disease, 95% mid-LAD status post PTCA and stenting with two 3 mm stents by Dr. Llanes.  k. Echocardiogram, 06/07/2012: Left ventricular ejection fraction of 30%.  l. Hospitalization, 02/01/2014, for non-ST elevation MI, left heart catheterization by Dr. Ayala that demonstrated 60% mid stenosis of the right coronary artery that remains unchanged compared to previous, widely patent stents of  the LAD with severe LV dysfunction of approximately 25%.   m. Left heart catheterization status post drug-eluting stent to the distal LAD and mid-RCA with an EF of 20% to 25% by Dr. Diego Ayala, 04/20/2015.   n. Left heart catheterization 1/16/17; nonobstructive CAD with patent previously placed stents, dilated cardiomyopathy with severe LV systolic dysfunction, EF less than 20%, normal hemodynamics, recommendations for continued medical therapy and risk factor, evaluation for noncardiac etiology of symptoms.  o. Residual CCS Class I/II angina pectoris/NYHA Class II exertional dyspnea and fatigue syndrome, summer 2017  p. Residual CCS 0 angina pectoris/NYHA class I-II exertional dyspnea and fatigue, February 2018.  2. Sudden cardiac death with primary ventricular fibrillation status post Pacesetter [place] ICD, [place], [place] in September 2000:   a. ICD generator change out with upgrade to a biventricular pacemaker ICD on 12/17/2007, [employer]i device.  b. ICD discharge, 08/09/2012, secondary to ventricular flutter with initiation of amiodarone therapy.   c. Hospitalization, 02/01/2014, secondary to ICD discharge for ventricular tachycardia with subsequent discontinuation of Coreg and initiation of sotalol therapy.   d. Hospitalization, February 2014, secondary to ICD discharge for VT with subsequent discontinuation of Coreg and initiation of sotalol.  e. Echocardiogram, 04/07/2015: EF less than 20%.  f. Hospitalization secondary to VF and ICD shocks, 04/18/2015.  g. NIPS procedure with defibrillation threshold testing for VF and noninvasive program stimulation, 04/18/2015.   h. Initiation of mexiletine for recurrent ventricular tachycardia with ICD shocks on 05/05/2015 with amiodarone load, recurrent VT and ICD shocks with hospitalization 05/16/2015-05/18/2015.   i. EP study with RFA of large anterior left ventricular scar extending from mid-left ventricular wall down apex by Dr. Ferdinand Alba, 05/21/2015.    j. Acceptable ICD interrogation with normal function, normal battery without V-tach or atrial fibrillation, 5 months on battery, April 2017, with subsequent BAILEY and [employer] replacement and acceptable DFT, October 2017, and acceptable interrogation, January 2018.  3. Dyslipidemia.  4. Status post remote operations.  5. Remote chronic tobacco use/abnormal chest x-ray with stable abnormal thoracic  CT scan without contrast demonstrating bilateral diffuse perimeter scarring and fibrosis with scattered subcentimeter noncalcified nodules (unchanged from February 2016), March 2017.  6. Left bundle branch block.  7. Burks trauma with hematuria with urosepsis requiring hospitalization, now resolved, May 2015.  8. Pulmonary embolism, spring 2015.   9. Remote apparent hypothyroidism/replacement therapy - data deficit, January 2016.  10. Remote diagnosis of obstructive sleep apnea with CPAP use, 2017.     HPI:   Mr. Barth was seen in follow-up today regarding his history of VT, ischemic cardiomyopathy, chronic congestive heart failure, and [employer]i bi-V device.  He is a pleasant 73-year-old general extensive cardiac history.  He reports since our last visit with our office she's had a few episodes of palpitations occurring once or twice a month.  Reports these are random in nature and not associated with exertion.  He denies any ICD shocks.  Denies sudden syncope events.  He reports his heart failure symptoms are controlled.  He states he is tolerating his current medications.  He goes to cardiac rehabilitation 3 times a week with no symptoms he states he works very hard during this time he has no symptoms of chest pain or palpitations while doing cardiac rehabilitation.        ROS:  Review of Symptoms:  General: no recent weight loss/gain, weakness or fatigue  Skin: no rashes, lumps, or other skin changes  HEENT: no dizziness, lightheadedness, or vision changes  Respiratory: no cough or  hemoptysis  Cardiovascular: + palpitations, and tachycardia, No ICD shocks  Gastrointestinal: no black/tarry stools or diarrhea  Urinary: no change in frequency or urgency  Peripheral Vascular: no claudication or leg cramps  Musculoskeletal: no muscle or joint pain/stiffness  Psychiatric: no depression or excessive stress  Neurological: no sensory or motor loss, no syncope  Hematologic: no anemia, easy bruising or bleeding  Endocrine: no thyroid problems, nor heat or cold intolerance    Cardiac PMH: (Old records have been reviewed and summarized below)      Past Medical History, Past Surgical History, Family history, Social History, and Medications were all reviewed with the patient today and updated as necessary.         No Known Allergies  Patient Active Problem List   Diagnosis   • Coronary artery disease involving coronary bypass graft of native heart without angina pectoris   • Chronic combined systolic and diastolic congestive heart failure (EF<20%)   • Ventricular tachycardia (CMS/HCC)   • Hyperlipidemia   • Pulmonary embolus (CMS/HCC)   • Abnormal CT scan, chest   • BPH (benign prostatic hypertrophy)   • IHD (ischemic heart disease)   • LBBB (left bundle branch block)   • Lung nodule   • Ventricular dysrhythmia, s/p ICD in 2000 and RFA May 2015, on amiodarone   • Pulmonary emphysema (CMS/HCC)   • h/o Pulmonary embolism   • Dyslipidemia   • h/o DVT, lower extremity   • CAD (coronary artery disease)   • Chest pain   • Congestive heart failure (CMS/HCC)   • Chronic left ventricular systolic heart failure (CMS/HCC)     Past Medical History:   Diagnosis Date   • Abnormal CT scan, chest 12/28/2016   • BPH (benign prostatic hypertrophy) 12/28/2016   • CAD (coronary artery disease)    • Congestive heart failure (CMS/HCC)    • Disease of thyroid gland    • DVT, lower extremity (CMS/HCC)     right   • Dyslipidemia    • Edema    • Enlarged prostate    • H/O chest x-ray 06/05/2015    Stable chest x ray with no evidence  of pneumonia or other active disease   • H/O chest x-ray 05/23/2015    Negative chest, no active disease. Chronic findings are noted above   • H/O chest x-ray 05/11/2015    No acute chest patholgy, stable   • H/O echocardiogram 05/24/2015    Est EF 20-25%. Mid anteroseptal , mid anterior, mid anterolateral, mid inferospetal, apical septal , apical anterior, apical lateral and apical inferior wall segments are akinetic. All valves are structurally and functionally normal with no hemodynamically sig valve disease. RV cavity size normal   • History of heart attack    • History of transfusion    • IHD (ischemic heart disease) 12/28/2016   • LBBB (left bundle branch block) 12/28/2016   • Lung nodule    • Polio     as a child   • Pulmonary embolism (CMS/HCC)    • Pulmonary emphysema (CMS/HCC) 12/28/2016   • Sepsis (CMS/HCC)     A. Secondary to Burks trauma with hematuria and urosepsis requiring hospitalization May 2015   • Sleep apnea with use of continuous positive airway pressure (CPAP)    • Ventricular dysrhythmia 12/28/2016   • Wears glasses      Past Surgical History:   Procedure Laterality Date   • BIVENTRICULLAR IMPLANTABLE CARDIOVERTER DEFIBRILLATOR PLACEMENT     • CARDIAC CATHETERIZATION N/A 1/16/2017    Procedure: Left Heart Cath;  Surgeon: Diego Ayala MD;  Location:  [facility];  Service:    • CARDIAC DEFIBRILLATOR PLACEMENT       A. ICD generator change out with upgrade to BiV pacemaker implantable cardioverter    defibrillator, 12/17/2007. Ventricular fibrillation s/p pacesetter [place] ICD in [place]. [place] 09/2000   • CARDIAC ELECTROPHYSIOLOGY PROCEDURE N/A 10/2/2017    Procedure: BIV ICD  generator change [facility];  Surgeon: Ferdinand Alba MD;  Location:  [facility];  Service:    • COLONOSCOPY     • CORONARY ANGIOPLASTY WITH STENT PLACEMENT     • CYSTOSCOPY URETERAL TUMOR FULGURATION  05/29/2015     A. Status post cystoscopy with clot evacuation and fulguration of the prostate  "secondary to hematuria, 5/29/2015.   • FOOT SURGERY     • PROSTATE SURGERY      A. Status post laser vaporization, 08/19/2009.     Family History   Problem Relation Age of Onset   • Heart failure Father    • Stroke Father      Social History   Substance Use Topics   • Smoking status: Former Smoker     Packs/day: 2.00     Years: 30.00     Types: Cigarettes     Quit date: 04/1995   • Smokeless tobacco: Never Used   • Alcohol use No         PHYSICAL EXAM:    /62 (BP Location: Left arm, Patient Position: Sitting)   Pulse 70   Ht 188 cm (74\")   Wt 97.7 kg (215 lb 6.4 oz)   BMI 27.66 kg/m²        Wt Readings from Last 5 Encounters:   08/29/18 97.7 kg (215 lb 6.4 oz)   02/23/18 95.7 kg (211 lb)   01/03/18 99.4 kg (219 lb 3.2 oz)   10/02/17 96.1 kg (211 lb 13.8 oz)   08/18/17 96.2 kg (212 lb)       BP Readings from Last 5 Encounters:   08/29/18 110/62   02/23/18 94/66   01/03/18 102/60   10/02/17 120/72   08/18/17 97/75       General appearance - Alert, well appearing, and in no distress   Mental status - Affect appropriate to mood.  Eyes - Sclerae anicteric,  ENMT - Hearing grossly normal bilaterally, Dental hygiene good.  Neck - Carotids upstroke normal bilaterally, no bruits, no JVD.  Resp - Clear to auscultation, no wheezes, rales or rhonchi, symmetric air entry.  Heart - Normal rate, regular rhythm, normal S1, S2, no murmurs, rubs, clicks or gallops.  GI - Soft, nontender, nondistended, no masses or organomegaly.  Neurological - Grossly intact - normal speech, no focal findings  Musculoskeletal - No joint tenderness, deformity or swelling, no muscular tenderness noted.  Extremities - Peripheral pulses normal, trace pedal edema, no clubbing or cyanosis.  Skin - Normal coloration and turgor.  Psych -  oriented to person, place, and time.    Medical problems and test results were reviewed with the patient today.     No results found for this or any previous visit (from the past 672 hour(s)).      EKG: (EKG has " been independently visualized by me and summarized below)    ECG 12 Lead  Date/Time: 8/29/2018 11:28 AM  Performed by: FOUZIA BRUMFIELD  Authorized by: FOUZIA BRUMFIELD   Comparison: compared with previous ECG from 2/4/2017  Rhythm: paced  Rate: normal  QRS axis: normal  Clinical impression: abnormal ECG            Device Interrogation:  [employer] K[employer]i bi-V ICD device.  A paced 99%.  RV and LV paced 97%.  P wave 1.2 mV.  Atrial threshold 0.5 V at 0.5 ms.  Atrial impedance 520 ohms.  RV threshold 0.75 V at 0.5 ms.  R-wave grade 12.0 mV.  RV impedance 3:30 ohms.  Shock impedance 49 ohms.  LV paced 97% with LV threshold 3.75 V at 1.5 ms.  LV impedance 700 ohms.  Battery voltage is 2.3 years.chargeTime 9.3 seconds.  3 episodes NSVT with 1 episode March 2018 requiring ATP.    ASSESSMENT   1) VT s/p RFA: One episode 4/18 ATP.  No recurrent events.  Increase BB.   2) CHF Class III now Class II on meds:  No recent worsening symptoms.  Titrate coreg 12.5 mg BID  3) Anticoagulation  Continue NOAC/plavix  4) HTN: controlled  Wt loss, exercise, salt reduction  5) PE on xarelto  6) CAD on plavix, No angina with cardiac Rehab.   7)BIVICD:  Lead threshold elevated, Continue to monitor. No further options at this time.     PLAN  · Continue current medical therpay, except increase coreg 12.5mg BID  · Follow up with Dr. Alba 6 months       8/29/2018  10:56 AM    Will Natacha NICHOLS

## 2018-09-07 ENCOUNTER — OFFICE VISIT (OUTPATIENT)
Dept: CARDIOLOGY | Facility: CLINIC | Age: 73
End: 2018-09-07

## 2018-09-07 VITALS
DIASTOLIC BLOOD PRESSURE: 61 MMHG | WEIGHT: 212.4 LBS | HEIGHT: 74 IN | SYSTOLIC BLOOD PRESSURE: 94 MMHG | BODY MASS INDEX: 27.26 KG/M2 | HEART RATE: 71 BPM

## 2018-09-07 DIAGNOSIS — I49.9 VENTRICULAR DYSRHYTHMIA: ICD-10-CM

## 2018-09-07 DIAGNOSIS — I50.42 CHRONIC COMBINED SYSTOLIC AND DIASTOLIC CONGESTIVE HEART FAILURE (HCC): ICD-10-CM

## 2018-09-07 DIAGNOSIS — I25.810 CORONARY ARTERY DISEASE INVOLVING CORONARY BYPASS GRAFT OF NATIVE HEART WITHOUT ANGINA PECTORIS: Primary | ICD-10-CM

## 2018-09-07 DIAGNOSIS — E78.5 DYSLIPIDEMIA: ICD-10-CM

## 2018-09-07 PROCEDURE — 99214 OFFICE O/P EST MOD 30 MIN: CPT | Performed by: INTERNAL MEDICINE

## 2018-09-07 NOTE — PROGRESS NOTES
Subjective:     Encounter Date:09/07/2018    Patient ID: Jonnie Roth is a 73 y.o.  white male, disabled , from [place], [place].      INTERNIST: Brian Lewis MD, [facility]  INTERVENTIONAL CARDIOLOGIST: Diego Ayala MD, [facility], [facility]   ELECTROPHYSIOLOGIST: Ferdinand Alba MD, [facility], [facility]  UROLOGIST: Simon Morin MD   GASTROENTEROLOGIST: Jose Barnard MD    Chief Complaint:   Chief Complaint   Patient presents with   • Coronary Artery Disease     Problem List:  1. Ischemic heart disease:  a. Remote progressive angina pectoris/acute extensive anterolateral myocardial infarction/delayed presentation with thrombolysis/severe 3-vessel coronary atherosclerosis with severe single vessel involvement/PTCA with intracoronary stent deployment proximal-mid segment LAD/moderate-severe compensated left ventricular dysfunction. LVEF (0.35)/abnormal positive signal averaged EKG/oral anticoagulation, April 1995.  b. Remote NYHA class I-II angina pectoris/class III CHF/abnormal quantitative SPECT gated Cardiolite GXT, July 1998.  c. Stable persistent MUGA, LVEF (0.33, January 1999 and January 2000).   d. Residual NYHA class I angina pectoris/CHF with reduced MUGA scan: LVEF (0.25), April 2002.  e. Left heart catheterization with distal circumflex disease: EF 20%, September 2002.   f. MUGA in May 2003: EF 32%.   g. Sestamibi GXT on 04/08/2005: No ischemia. EF 29%.   h. Residual NYHA class I angina pectoris/CHF with reduced acceptable MUGA scan: EF (0.32) and acceptable Pacesetter PCD interrogation/reprogramming, May 2003 with interrogation, September 2004.  i. Echocardiogram, September 2009 with EF 30%  j. Left heart catheterization by Dr. Bhupinder Gonzalez, August 2010, with LVEF of 35%, with 3-vessel native coronary artery disease, 95% mid-LAD status post PTCA and stenting with two 3 mm stents by Dr. Llanes.  k. Echocardiogram, 06/07/2012: Left ventricular ejection fraction of  30%.  l. Hospitalization, 02/01/2014, for non-ST elevation MI, left heart catheterization by Dr. Ayala that demonstrated 60% mid stenosis of the right coronary artery that remains unchanged compared to previous, widely patent stents of the LAD with severe LV dysfunction of approximately 25%.   m. Left heart catheterization status post drug-eluting stent to the distal LAD and mid-RCA with an EF of 20% to 25% by Dr. Diego Ayala, 04/20/2015.   n. Left heart catheterization 1/16/17; nonobstructive CAD with patent previously placed stents, dilated cardiomyopathy with severe LV systolic dysfunction, EF less than 20%, normal hemodynamics, recommendations for continued medical therapy and risk factor, evaluation for noncardiac etiology of symptoms.  o. Residual CCS Class I/II angina pectoris/NYHA Class II exertional dyspnea and fatigue syndrome, summer 2017  p. Residual CCS 0 angina pectoris/NYHA class I-II exertional dyspnea and fatigue, February 2018, September 2018.  2. Sudden cardiac death with primary ventricular fibrillation status post Pacesetter [place] ICD, [place], [place] in September 2000:   a. ICD generator change out with upgrade to a biventricular pacemaker ICD on 12/17/2007, [employer] device.  b. ICD discharge, 08/09/2012, secondary to ventricular flutter with initiation of amiodarone therapy.   c. Hospitalization, 02/01/2014, secondary to ICD discharge for ventricular tachycardia with subsequent discontinuation of Coreg and initiation of sotalol therapy.   d. Hospitalization, February 2014, secondary to ICD discharge for VT with subsequent discontinuation of Coreg and initiation of sotalol.  e. Echocardiogram, 04/07/2015: EF less than 20%.  f. Hospitalization secondary to VF and ICD shocks, 04/18/2015.  g. NIPS procedure with defibrillation threshold testing for VF and noninvasive program stimulation, 04/18/2015.   h. Initiation of mexiletine for recurrent ventricular tachycardia with ICD shocks on 05/05/2015  with amiodarone load, recurrent VT and ICD shocks with hospitalization 05/16/2015-05/18/2015.   i. EP study with RFA of large anterior left ventricular scar extending from mid-left ventricular wall down apex by Dr. Ferdinand Alba, 05/21/2015.   j. Acceptable ICD interrogation with normal function, normal battery without V-tach or atrial fibrillation, 5 months on battery, April 2017, with subsequent [employer] and [employer] replacement and acceptable DFT, October 2017, and acceptable interrogation, January 2018, August 2018.  3. Dyslipidemia.  4. Status post remote operations.  5. Remote chronic tobacco use/abnormal chest x-ray with stable abnormal thoracic  CT scan without contrast demonstrating bilateral diffuse perimeter scarring and fibrosis with scattered subcentimeter noncalcified nodules (unchanged from February 2016), March 2017.  6. Left bundle branch block.  7. Burks trauma with hematuria with urosepsis requiring hospitalization, May 2015.  8. Pulmonary embolism, spring 2015.   9. Remote apparent hypothyroidism/replacement therapy - data deficit, January 2016.  10. Remote diagnosis of obstructive sleep apnea with CPAP use, 2017.     No Known Allergies      Current Outpatient Prescriptions:   •  atorvastatin (LIPITOR) 40 MG tablet, TAKE 1 TABLET BY MOUTH DAILY (NEED LAB WORK FOR ADDITIONAL REFILLS), Disp: 90 tablet, Rfl: 2  •  carvedilol (COREG) 12.5 MG tablet, Take 12.5 mg by mouth 2 (Two) Times a Day With Meals., Disp: , Rfl:   •  clopidogrel (PLAVIX) 75 MG tablet, Take 75 mg by mouth Every Morning., Disp: , Rfl:   •  ENTRESTO 49-51 MG tablet, TAKE 1 TABLET BY MOUTH TWICE DAILY, Disp: 180 tablet, Rfl: 1  •  eplerenone (INSPRA) 25 MG tablet, Take 12.5 mg by mouth Every Morning., Disp: , Rfl:   •  finasteride (PROSCAR) 5 MG tablet, Take 5 mg by mouth Every Morning., Disp: , Rfl:   •  L-TRYPTOPHAN PO, Take 1,000 mg by mouth Every Night., Disp: , Rfl:   •  levothyroxine (SYNTHROID, LEVOTHROID) 25 MCG  "tablet, Take 25 mcg by mouth Every Morning., Disp: , Rfl:   •  MELATONIN PO, Take 9 mg by mouth Every Night., Disp: , Rfl:   •  nitroglycerin (NITROSTAT) 0.4 MG SL tablet, Place 0.4 mg under the tongue Every 5 (Five) Minutes As Needed for Chest Pain. Take no more than 3 doses in 15 minutes., Disp: , Rfl:   •  pantoprazole (PROTONIX) 40 MG EC tablet, Take 40 mg by mouth Every Morning., Disp: , Rfl:   •  polyethylene glycol (MIRALAX) packet, Take 17 g by mouth Every Other Day., Disp: , Rfl:   •  rivaroxaban (XARELTO) 20 MG tablet, Take 20 mg by mouth Every Evening., Disp: , Rfl:   •  torsemide (DEMADEX) 5 MG tablet, TAKE 1 TABLET BY MOUTH DAILY AS DIRECTED, Disp: 90 tablet, Rfl: 2    HISTORY OF PRESENT ILLNESS: Patient returns for scheduled 6-month followup. He is accompanied to the office today by his wife, and they are both wearing masks because they have colds.  He goes to rehab 3 days a week and has no symptoms from a cardiopulmonary perspective with his daily activities.  He has \"random\" chest pain in the center of his chest that is a \"stinging.\"  It lasts only a few seconds and goes away on its own.  It does not seem to be related to anything he is doing.  He has not had any labs drawn in the interim, but his wife indicates he is scheduled for a checkup within the next month or so; they will ask that our office be provided the results of any labs that are done.  It has been 18 years this weekend since the patient suffered sudden cardiac death at an airport in [place] when he had traveled there for the [employer] football game at that time.  He and his wife are now planning to go back out to [facility] later this month because the last time they went, they got snowed out.  They fly into [place] to meet up with another couple that lives in [place], [place].  His wife says that he has been working on losing weight.  Patient otherwise denies chest pain, shortness of breath, PND, edema, palpitations, syncope or " "presyncope at this time.      Review of Systems   Constitution: Positive for weakness.   Cardiovascular: Positive for chest pain and dyspnea on exertion.   Respiratory: Positive for cough (currently has cold).    Endocrine: Positive for cold intolerance.   Hematologic/Lymphatic: Bruises/bleeds easily.   Skin: Positive for dry skin.   Musculoskeletal: Positive for muscle weakness.   Neurological: Positive for excessive daytime sleepiness and dizziness.      Obtained and otherwise negative except as outlined in problem list and HPI.    Procedures       Objective:       Vitals:    09/07/18 0919 09/07/18 0920   BP: 103/59 94/61   BP Location: Right arm Right arm   Patient Position: Sitting Standing   Pulse: 70 71   Weight: 96.3 kg (212 lb 6.4 oz)    Height: 188 cm (74\")      Body mass index is 27.27 kg/m².   Last weight:  211 lbs.    Physical Exam   Constitutional: He is oriented to person, place, and time. He appears well-developed and well-nourished.   Neck: No JVD present. Carotid bruit is not present. No thyromegaly present.   Cardiovascular: Regular rhythm, S1 normal and S2 normal.  Exam reveals no gallop, no S3 and no friction rub.    Murmur heard.   Medium-pitched blowing early systolic murmur is present with a grade of 2/6  at the apex  Pulses:       Carotid pulses are 1+ on the right side, and 1+ on the left side.       Radial pulses are 1+ on the right side, and 1+ on the left side.        Femoral pulses are 1+ on the right side, and 1+ on the left side.       Popliteal pulses are 1+ on the right side, and 1+ on the left side.        Dorsalis pedis pulses are 1+ on the right side, and 1+ on the left side.        Posterior tibial pulses are 1+ on the right side, and 1+ on the left side.   Pulmonary/Chest: Effort normal. He has no wheezes. He has rhonchi (scattered). He has rales in the left lower field.   Left precordial pacemaker site is nominal   Abdominal: Soft. He exhibits no mass. There is no " hepatosplenomegaly. There is no tenderness. There is no guarding.   Bowel sounds audible x4   Musculoskeletal: Normal range of motion. He exhibits no edema.   Lymphadenopathy:     He has no cervical adenopathy.   Neurological: He is alert and oriented to person, place, and time.   Skin: Skin is warm, dry and intact. No rash noted.   Vitals reviewed.        Lab Review:   Lab Results   Component Value Date    GLUCOSE 109 (H) 01/03/2018    BUN 23 01/03/2018    CREATININE 0.90 01/03/2018    EGFRIFNONA 83 01/03/2018    BCR 25.6 (H) 01/03/2018    CO2 28.0 01/03/2018    CALCIUM 9.1 01/03/2018    ALBUMIN 3.80 01/15/2017    AST 85 (H) 01/15/2017     (H) 01/15/2017       Lab Results   Component Value Date    WBC 12.49 (H) 10/01/2017    HGB 15.2 10/01/2017    HCT 47.1 10/01/2017    MCV 91.5 10/01/2017     10/01/2017       Lab Results   Component Value Date    HGBA1C 6.40 (H) 10/01/2017       Lab Results   Component Value Date    TSH 3.832 03/25/2016       Lab Results   Component Value Date    CHOL 130 08/14/2017     Lab Results   Component Value Date    TRIG 70 08/14/2017    TRIG 203 (H) 01/30/2014     Lab Results   Component Value Date    HDL 40 08/14/2017    HDL 31 (L) 01/30/2014     Lab Results   Component Value Date    LDL 77 08/14/2017    LDL 65 01/30/2014       Lab Results   Component Value Date    BNP 77.0 06/08/2017           Assessment:   Overall continued acceptable course with no interim cardiopulmonary complaints with acceptable functional status. We will defer additional diagnostic or therapeutic intervention from a cardiac perspective at this time.  We have encouraged him to continue his phase III outpatient cardiac rehabilitation and to obtain influenza immunization this autumn.  Hopefully, we will be allowed to review his upcoming laboratory studies.       Diagnosis Plan   1. Coronary artery disease involving coronary bypass graft of native heart without angina pectoris  No recurrent angina  pectoris or CHF on limited activity; continue current treatment     2. Chronic combined systolic and diastolic congestive heart failure (EF<20%)  Continue current treatment   3. Ventricular dysrhythmia, s/p ICD in 2000 and RFA May 2015, on amiodarone  No breakthrough VT/VF   4. Dyslipidemia  No recent data to review          Plan:         1. Patient to continue current medications and close follow up with the above providers.  2. Tentative cardiology follow up in March or April 2019, or patient may return sooner PRN.    Transcribed by Rosana Swenson for Dr. Bhupinder Gonzalez at 9:26 AM on 09/07/2018    I, Bhupinder Gonzalez MD, [facility], personally performed the services described in this documentation as scribed by the above named individual in my presence, and it is both accurate and complete. At 9:59 AM on 09/07/2018

## 2018-09-10 ENCOUNTER — TREATMENT (OUTPATIENT)
Dept: CARDIAC REHAB | Facility: HOSPITAL | Age: 73
End: 2018-09-10

## 2018-09-10 DIAGNOSIS — Z00.00 PREVENTATIVE HEALTH CARE: Primary | ICD-10-CM

## 2018-09-12 ENCOUNTER — TREATMENT (OUTPATIENT)
Dept: CARDIAC REHAB | Facility: HOSPITAL | Age: 73
End: 2018-09-12

## 2018-09-12 DIAGNOSIS — Z00.00 PREVENTATIVE HEALTH CARE: Primary | ICD-10-CM

## 2018-09-14 ENCOUNTER — TREATMENT (OUTPATIENT)
Dept: CARDIAC REHAB | Facility: HOSPITAL | Age: 73
End: 2018-09-14

## 2018-09-14 DIAGNOSIS — Z00.00 PREVENTATIVE HEALTH CARE: Primary | ICD-10-CM

## 2018-09-17 ENCOUNTER — TREATMENT (OUTPATIENT)
Dept: CARDIAC REHAB | Facility: HOSPITAL | Age: 73
End: 2018-09-17

## 2018-09-17 DIAGNOSIS — Z00.00 PREVENTATIVE HEALTH CARE: Primary | ICD-10-CM

## 2018-09-19 ENCOUNTER — TREATMENT (OUTPATIENT)
Dept: CARDIAC REHAB | Facility: HOSPITAL | Age: 73
End: 2018-09-19

## 2018-09-19 DIAGNOSIS — Z00.00 PREVENTATIVE HEALTH CARE: Primary | ICD-10-CM

## 2018-09-21 ENCOUNTER — TREATMENT (OUTPATIENT)
Dept: CARDIAC REHAB | Facility: HOSPITAL | Age: 73
End: 2018-09-21

## 2018-09-21 DIAGNOSIS — Z00.00 PREVENTATIVE HEALTH CARE: Primary | ICD-10-CM

## 2018-09-24 ENCOUNTER — TREATMENT (OUTPATIENT)
Dept: CARDIAC REHAB | Facility: HOSPITAL | Age: 73
End: 2018-09-24

## 2018-09-24 DIAGNOSIS — Z00.00 PREVENTATIVE HEALTH CARE: Primary | ICD-10-CM

## 2018-09-26 ENCOUNTER — TREATMENT (OUTPATIENT)
Dept: CARDIAC REHAB | Facility: HOSPITAL | Age: 73
End: 2018-09-26

## 2018-09-26 DIAGNOSIS — Z00.00 PREVENTATIVE HEALTH CARE: Primary | ICD-10-CM

## 2018-09-28 ENCOUNTER — TREATMENT (OUTPATIENT)
Dept: CARDIAC REHAB | Facility: HOSPITAL | Age: 73
End: 2018-09-28

## 2018-09-28 DIAGNOSIS — Z00.00 PREVENTATIVE HEALTH CARE: Primary | ICD-10-CM

## 2018-10-31 RX ORDER — RIVAROXABAN 20 MG/1
TABLET, FILM COATED ORAL
Qty: 90 TABLET | Refills: 3 | Status: SHIPPED | OUTPATIENT
Start: 2018-10-31 | End: 2019-10-15 | Stop reason: SDUPTHER

## 2018-10-31 RX ORDER — SACUBITRIL AND VALSARTAN 49; 51 MG/1; MG/1
TABLET, FILM COATED ORAL
Qty: 180 TABLET | Refills: 3 | Status: SHIPPED | OUTPATIENT
Start: 2018-10-31 | End: 2019-10-15 | Stop reason: SDUPTHER

## 2018-11-05 ENCOUNTER — TREATMENT (OUTPATIENT)
Dept: CARDIAC REHAB | Facility: HOSPITAL | Age: 73
End: 2018-11-05

## 2018-11-05 DIAGNOSIS — Z00.00 PREVENTATIVE HEALTH CARE: Primary | ICD-10-CM

## 2018-11-07 ENCOUNTER — TREATMENT (OUTPATIENT)
Dept: CARDIAC REHAB | Facility: HOSPITAL | Age: 73
End: 2018-11-07

## 2018-11-07 DIAGNOSIS — Z00.00 PREVENTATIVE HEALTH CARE: Primary | ICD-10-CM

## 2018-11-08 ENCOUNTER — CLINICAL SUPPORT NO REQUIREMENTS (OUTPATIENT)
Dept: CARDIOLOGY | Facility: CLINIC | Age: 73
End: 2018-11-08

## 2018-11-08 ENCOUNTER — TELEPHONE (OUTPATIENT)
Dept: CARDIOLOGY | Facility: CLINIC | Age: 73
End: 2018-11-08

## 2018-11-08 DIAGNOSIS — I47.20 VENTRICULAR TACHYCARDIA (HCC): ICD-10-CM

## 2018-11-08 DIAGNOSIS — I50.42 CHRONIC COMBINED SYSTOLIC AND DIASTOLIC CONGESTIVE HEART FAILURE (HCC): ICD-10-CM

## 2018-11-08 NOTE — TELEPHONE ENCOUNTER
Called pt due to Merlin home monitor isn't connecting.  Left my name and number for pt to return my call.    Pt called back and going to send cellular adapter.

## 2018-11-09 ENCOUNTER — TREATMENT (OUTPATIENT)
Dept: CARDIAC REHAB | Facility: HOSPITAL | Age: 73
End: 2018-11-09

## 2018-11-09 DIAGNOSIS — Z00.00 PREVENTATIVE HEALTH CARE: Primary | ICD-10-CM

## 2018-11-16 ENCOUNTER — TREATMENT (OUTPATIENT)
Dept: CARDIAC REHAB | Facility: HOSPITAL | Age: 73
End: 2018-11-16

## 2018-11-16 DIAGNOSIS — Z00.00 PREVENTATIVE HEALTH CARE: Primary | ICD-10-CM

## 2018-11-19 ENCOUNTER — TREATMENT (OUTPATIENT)
Dept: CARDIAC REHAB | Facility: HOSPITAL | Age: 73
End: 2018-11-19

## 2018-11-19 DIAGNOSIS — Z00.00 PREVENTATIVE HEALTH CARE: Primary | ICD-10-CM

## 2018-11-21 ENCOUNTER — TREATMENT (OUTPATIENT)
Dept: CARDIAC REHAB | Facility: HOSPITAL | Age: 73
End: 2018-11-21

## 2018-11-21 DIAGNOSIS — Z00.00 PREVENTATIVE HEALTH CARE: Primary | ICD-10-CM

## 2018-11-26 ENCOUNTER — TREATMENT (OUTPATIENT)
Dept: CARDIAC REHAB | Facility: HOSPITAL | Age: 73
End: 2018-11-26

## 2018-11-26 DIAGNOSIS — Z00.00 PREVENTATIVE HEALTH CARE: Primary | ICD-10-CM

## 2018-11-27 RX ORDER — TORSEMIDE 5 MG/1
TABLET ORAL
Qty: 90 TABLET | Refills: 1 | Status: SHIPPED | OUTPATIENT
Start: 2018-11-27 | End: 2019-06-11 | Stop reason: SDUPTHER

## 2018-11-28 ENCOUNTER — TREATMENT (OUTPATIENT)
Dept: CARDIAC REHAB | Facility: HOSPITAL | Age: 73
End: 2018-11-28

## 2018-11-28 DIAGNOSIS — Z00.00 PREVENTATIVE HEALTH CARE: Primary | ICD-10-CM

## 2018-11-30 ENCOUNTER — TREATMENT (OUTPATIENT)
Dept: CARDIAC REHAB | Facility: HOSPITAL | Age: 73
End: 2018-11-30

## 2018-11-30 DIAGNOSIS — Z00.00 PREVENTATIVE HEALTH CARE: Primary | ICD-10-CM

## 2018-12-07 ENCOUNTER — TREATMENT (OUTPATIENT)
Dept: CARDIAC REHAB | Facility: HOSPITAL | Age: 73
End: 2018-12-07

## 2018-12-07 DIAGNOSIS — Z00.00 PREVENTATIVE HEALTH CARE: Primary | ICD-10-CM

## 2018-12-10 ENCOUNTER — TREATMENT (OUTPATIENT)
Dept: CARDIAC REHAB | Facility: HOSPITAL | Age: 73
End: 2018-12-10

## 2018-12-10 DIAGNOSIS — Z00.00 PREVENTATIVE HEALTH CARE: Primary | ICD-10-CM

## 2018-12-12 ENCOUNTER — TREATMENT (OUTPATIENT)
Dept: CARDIAC REHAB | Facility: HOSPITAL | Age: 73
End: 2018-12-12

## 2018-12-12 DIAGNOSIS — Z00.00 PREVENTATIVE HEALTH CARE: Primary | ICD-10-CM

## 2018-12-14 ENCOUNTER — TREATMENT (OUTPATIENT)
Dept: CARDIAC REHAB | Facility: HOSPITAL | Age: 73
End: 2018-12-14

## 2018-12-14 DIAGNOSIS — Z00.00 PREVENTATIVE HEALTH CARE: Primary | ICD-10-CM

## 2018-12-17 ENCOUNTER — TREATMENT (OUTPATIENT)
Dept: CARDIAC REHAB | Facility: HOSPITAL | Age: 73
End: 2018-12-17

## 2018-12-17 DIAGNOSIS — Z00.00 PREVENTATIVE HEALTH CARE: Primary | ICD-10-CM

## 2018-12-19 ENCOUNTER — TREATMENT (OUTPATIENT)
Dept: CARDIAC REHAB | Facility: HOSPITAL | Age: 73
End: 2018-12-19

## 2018-12-19 DIAGNOSIS — Z00.00 PREVENTATIVE HEALTH CARE: Primary | ICD-10-CM

## 2018-12-21 ENCOUNTER — TREATMENT (OUTPATIENT)
Dept: CARDIAC REHAB | Facility: HOSPITAL | Age: 73
End: 2018-12-21

## 2018-12-21 DIAGNOSIS — Z00.00 PREVENTATIVE HEALTH CARE: Primary | ICD-10-CM

## 2018-12-28 ENCOUNTER — TRANSCRIBE ORDERS (OUTPATIENT)
Dept: CARDIAC REHAB | Facility: HOSPITAL | Age: 73
End: 2018-12-28

## 2018-12-28 ENCOUNTER — TREATMENT (OUTPATIENT)
Dept: CARDIAC REHAB | Facility: HOSPITAL | Age: 73
End: 2018-12-28

## 2018-12-28 DIAGNOSIS — Z00.00 PREVENTATIVE HEALTH CARE: Primary | ICD-10-CM

## 2019-01-04 ENCOUNTER — TREATMENT (OUTPATIENT)
Dept: CARDIAC REHAB | Facility: HOSPITAL | Age: 74
End: 2019-01-04

## 2019-01-04 DIAGNOSIS — Z00.00 PREVENTATIVE HEALTH CARE: Primary | ICD-10-CM

## 2019-01-07 ENCOUNTER — TREATMENT (OUTPATIENT)
Dept: CARDIAC REHAB | Facility: HOSPITAL | Age: 74
End: 2019-01-07

## 2019-01-07 DIAGNOSIS — Z00.00 PREVENTATIVE HEALTH CARE: Primary | ICD-10-CM

## 2019-01-09 ENCOUNTER — TREATMENT (OUTPATIENT)
Dept: CARDIAC REHAB | Facility: HOSPITAL | Age: 74
End: 2019-01-09

## 2019-01-09 DIAGNOSIS — Z00.00 PREVENTATIVE HEALTH CARE: Primary | ICD-10-CM

## 2019-01-11 ENCOUNTER — TREATMENT (OUTPATIENT)
Dept: CARDIAC REHAB | Facility: HOSPITAL | Age: 74
End: 2019-01-11

## 2019-01-11 DIAGNOSIS — Z00.00 PREVENTATIVE HEALTH CARE: Primary | ICD-10-CM

## 2019-01-14 ENCOUNTER — TREATMENT (OUTPATIENT)
Dept: CARDIAC REHAB | Facility: HOSPITAL | Age: 74
End: 2019-01-14

## 2019-01-14 DIAGNOSIS — Z00.00 PREVENTATIVE HEALTH CARE: Primary | ICD-10-CM

## 2019-01-16 ENCOUNTER — TREATMENT (OUTPATIENT)
Dept: CARDIAC REHAB | Facility: HOSPITAL | Age: 74
End: 2019-01-16

## 2019-01-16 DIAGNOSIS — Z00.00 PREVENTATIVE HEALTH CARE: Primary | ICD-10-CM

## 2019-01-18 ENCOUNTER — TREATMENT (OUTPATIENT)
Dept: CARDIAC REHAB | Facility: HOSPITAL | Age: 74
End: 2019-01-18

## 2019-01-18 DIAGNOSIS — Z00.00 PREVENTATIVE HEALTH CARE: Primary | ICD-10-CM

## 2019-01-21 ENCOUNTER — TREATMENT (OUTPATIENT)
Dept: CARDIAC REHAB | Facility: HOSPITAL | Age: 74
End: 2019-01-21

## 2019-01-21 DIAGNOSIS — Z00.00 PREVENTATIVE HEALTH CARE: Primary | ICD-10-CM

## 2019-01-23 ENCOUNTER — TREATMENT (OUTPATIENT)
Dept: CARDIAC REHAB | Facility: HOSPITAL | Age: 74
End: 2019-01-23

## 2019-01-23 DIAGNOSIS — Z00.00 PREVENTATIVE HEALTH CARE: Primary | ICD-10-CM

## 2019-01-25 ENCOUNTER — TREATMENT (OUTPATIENT)
Dept: CARDIAC REHAB | Facility: HOSPITAL | Age: 74
End: 2019-01-25

## 2019-01-25 DIAGNOSIS — Z00.00 PREVENTATIVE HEALTH CARE: Primary | ICD-10-CM

## 2019-01-28 ENCOUNTER — TREATMENT (OUTPATIENT)
Dept: CARDIAC REHAB | Facility: HOSPITAL | Age: 74
End: 2019-01-28

## 2019-01-28 DIAGNOSIS — Z00.00 PREVENTATIVE HEALTH CARE: Primary | ICD-10-CM

## 2019-01-29 ENCOUNTER — TREATMENT (OUTPATIENT)
Dept: CARDIAC REHAB | Facility: HOSPITAL | Age: 74
End: 2019-01-29

## 2019-01-29 DIAGNOSIS — Z00.00 PREVENTATIVE HEALTH CARE: ICD-10-CM

## 2019-02-01 ENCOUNTER — TREATMENT (OUTPATIENT)
Dept: CARDIAC REHAB | Facility: HOSPITAL | Age: 74
End: 2019-02-01

## 2019-02-01 DIAGNOSIS — Z00.00 PREVENTATIVE HEALTH CARE: Primary | ICD-10-CM

## 2019-02-08 ENCOUNTER — TREATMENT (OUTPATIENT)
Dept: CARDIAC REHAB | Facility: HOSPITAL | Age: 74
End: 2019-02-08

## 2019-02-08 DIAGNOSIS — Z00.00 PREVENTATIVE HEALTH CARE: Primary | ICD-10-CM

## 2019-02-11 ENCOUNTER — TREATMENT (OUTPATIENT)
Dept: CARDIAC REHAB | Facility: HOSPITAL | Age: 74
End: 2019-02-11

## 2019-02-11 DIAGNOSIS — Z00.00 PREVENTATIVE HEALTH CARE: Primary | ICD-10-CM

## 2019-02-12 ENCOUNTER — CLINICAL SUPPORT NO REQUIREMENTS (OUTPATIENT)
Dept: CARDIOLOGY | Facility: CLINIC | Age: 74
End: 2019-02-12

## 2019-02-12 DIAGNOSIS — I50.42 CHRONIC COMBINED SYSTOLIC AND DIASTOLIC CONGESTIVE HEART FAILURE (HCC): ICD-10-CM

## 2019-02-12 DIAGNOSIS — I47.20 VENTRICULAR TACHYCARDIA (HCC): Primary | ICD-10-CM

## 2019-02-12 PROCEDURE — 93296 REM INTERROG EVL PM/IDS: CPT | Performed by: INTERNAL MEDICINE

## 2019-02-12 PROCEDURE — 93295 DEV INTERROG REMOTE 1/2/MLT: CPT | Performed by: INTERNAL MEDICINE

## 2019-02-13 ENCOUNTER — TREATMENT (OUTPATIENT)
Dept: CARDIAC REHAB | Facility: HOSPITAL | Age: 74
End: 2019-02-13

## 2019-02-13 DIAGNOSIS — Z00.00 PREVENTATIVE HEALTH CARE: Primary | ICD-10-CM

## 2019-02-18 ENCOUNTER — TREATMENT (OUTPATIENT)
Dept: CARDIAC REHAB | Facility: HOSPITAL | Age: 74
End: 2019-02-18

## 2019-02-18 DIAGNOSIS — Z00.00 PREVENTATIVE HEALTH CARE: Primary | ICD-10-CM

## 2019-02-22 ENCOUNTER — TREATMENT (OUTPATIENT)
Dept: CARDIAC REHAB | Facility: HOSPITAL | Age: 74
End: 2019-02-22

## 2019-02-22 DIAGNOSIS — Z00.00 PREVENTATIVE HEALTH CARE: Primary | ICD-10-CM

## 2019-02-25 ENCOUNTER — TREATMENT (OUTPATIENT)
Dept: CARDIAC REHAB | Facility: HOSPITAL | Age: 74
End: 2019-02-25

## 2019-02-25 DIAGNOSIS — Z00.00 PREVENTATIVE HEALTH CARE: Primary | ICD-10-CM

## 2019-03-01 ENCOUNTER — TREATMENT (OUTPATIENT)
Dept: CARDIAC REHAB | Facility: HOSPITAL | Age: 74
End: 2019-03-01

## 2019-03-01 DIAGNOSIS — Z00.00 PREVENTATIVE HEALTH CARE: Primary | ICD-10-CM

## 2019-03-04 ENCOUNTER — TREATMENT (OUTPATIENT)
Dept: CARDIAC REHAB | Facility: HOSPITAL | Age: 74
End: 2019-03-04

## 2019-03-04 DIAGNOSIS — Z00.00 PREVENTATIVE HEALTH CARE: Primary | ICD-10-CM

## 2019-03-06 ENCOUNTER — TREATMENT (OUTPATIENT)
Dept: CARDIAC REHAB | Facility: HOSPITAL | Age: 74
End: 2019-03-06

## 2019-03-06 DIAGNOSIS — Z00.00 PREVENTATIVE HEALTH CARE: Primary | ICD-10-CM

## 2019-03-08 ENCOUNTER — TREATMENT (OUTPATIENT)
Dept: CARDIAC REHAB | Facility: HOSPITAL | Age: 74
End: 2019-03-08

## 2019-03-08 DIAGNOSIS — Z00.00 PREVENTATIVE HEALTH CARE: Primary | ICD-10-CM

## 2019-03-18 ENCOUNTER — TREATMENT (OUTPATIENT)
Dept: CARDIAC REHAB | Facility: HOSPITAL | Age: 74
End: 2019-03-18

## 2019-03-18 DIAGNOSIS — Z00.00 PREVENTATIVE HEALTH CARE: Primary | ICD-10-CM

## 2019-03-20 ENCOUNTER — TREATMENT (OUTPATIENT)
Dept: CARDIAC REHAB | Facility: HOSPITAL | Age: 74
End: 2019-03-20

## 2019-03-20 DIAGNOSIS — Z00.00 PREVENTATIVE HEALTH CARE: Primary | ICD-10-CM

## 2019-03-25 ENCOUNTER — TREATMENT (OUTPATIENT)
Dept: CARDIAC REHAB | Facility: HOSPITAL | Age: 74
End: 2019-03-25

## 2019-03-25 DIAGNOSIS — Z00.00 PREVENTATIVE HEALTH CARE: Primary | ICD-10-CM

## 2019-03-26 NOTE — PROGRESS NOTES
Subjective:     Encounter Date:03/28/2019      Patient ID: Jonnie Roth is a 74 y.o.   white male, disabled , from [place], [place].      INTERNIST: Brian Lewis MD, [facility]  INTERVENTIONAL CARDIOLOGIST: Diego Ayala MD, [facility], [facility]   ELECTROPHYSIOLOGIST: Ferdinand Alba MD, [facility], [facility] [[email or web address]]  UROLOGIST: Simon Morin MD   GASTROENTEROLOGIST: Jose Barnard MD.    Chief Complaint:   Chief Complaint   Patient presents with   • Coronary Artery Disease   • Systolic congestive heart failure   • Shortness of Breath   • Numbness     Feet   • Fatigue     Problem List:  1. Ischemic heart disease:  a. Remote progressive angina pectoris/acute extensive anterolateral myocardial infarction/delayed presentation with thrombolysis/severe 3-vessel coronary atherosclerosis with severe single vessel involvement/PTCA with intracoronary stent deployment proximal-mid segment LAD/moderate-severe compensated left ventricular dysfunction. LVEF (0.35)/abnormal positive signal averaged EKG/oral anticoagulation, April 1995.  b. Remote NYHA class I-II angina pectoris/class III CHF/abnormal quantitative SPECT gated Cardiolite GXT, July 1998.  c. Stable persistent MUGA, LVEF (0.33, January 1999 and January 2000).   d. Residual NYHA class I angina pectoris/CHF with reduced MUGA scan: LVEF (0.25), April 2002.  e. Left heart catheterization with distal circumflex disease: EF 20%, September 2002.   f. MUGA in May 2003: EF 32%.   g. Sestamibi GXT on 04/08/2005: No ischemia. EF 29%.   h. Residual NYHA class I angina pectoris/CHF with reduced acceptable MUGA scan: EF (0.32) and acceptable Pacesetter PCD interrogation/reprogramming, May 2003 with interrogation, September 2004.  i. Echocardiogram, September 2009 with EF 30%  j. Left heart catheterization by Dr. Bhupinder Gonzalez, August 2010, with LVEF of 35%, with 3-vessel native coronary artery disease, 95% mid-LAD status post PTCA and stenting with two 3 mm stents by   Job nuñez. Echocardiogram, 06/07/2012: Left ventricular ejection fraction of 30%.  l. Hospitalization, 02/01/2014, for non-ST elevation MI, left heart catheterization by Dr. Ayala that demonstrated 60% mid stenosis of the right coronary artery that remains unchanged compared to previous, widely patent stents of the LAD with severe LV dysfunction of approximately 25%.   m. Left heart catheterization status post drug-eluting stent to the distal LAD and mid-RCA with an EF of 20% to 25% by Dr. Diego Ayala, 04/20/2015.   n. Left heart catheterization 1/16/17; nonobstructive CAD with patent previously placed stents, dilated cardiomyopathy with severe LV systolic dysfunction, EF less than 20%, normal hemodynamics, recommendations for continued medical therapy and risk factor, evaluation for noncardiac etiology of symptoms.  o. Residual CCS Class I/II angina pectoris/NYHA Class II exertional dyspnea and fatigue syndrome, summer 2017  p. Residual CCS 0 angina pectoris/NYHA class I-II exertional dyspnea and fatigue, February 2018, September 2018, March 2019.  2. Sudden cardiac death with primary ventricular fibrillation status post Pacesetter [place] ICD, [place], [place] in September 2000:   a. ICD generator change out with upgrade to a biventricular pacemaker ICD on 12/17/2007, [employer] device.  b. ICD discharge, 08/09/2012, secondary to ventricular flutter with initiation of amiodarone therapy.   c. Hospitalization, 02/01/2014, secondary to ICD discharge for ventricular tachycardia with subsequent discontinuation of Coreg and initiation of sotalol therapy.   d. Hospitalization, February 2014, secondary to ICD discharge for VT with subsequent discontinuation of Coreg and initiation of sotalol.  e. Echocardiogram, 04/07/2015: EF less than 20%.  f. Hospitalization secondary to VF and ICD shocks, 04/18/2015.  g. NIPS procedure with defibrillation threshold testing for VF and noninvasive program stimulation, 04/18/2015.    h. Initiation of mexiletine for recurrent ventricular tachycardia with ICD shocks on 05/05/2015 with amiodarone load, recurrent VT and ICD shocks with hospitalization 05/16/2015-05/18/2015.   i. EP study with RFA of large anterior left ventricular scar extending from mid-left ventricular wall down apex by Dr. Ferdinand Alba, 05/21/2015.   j. Acceptable ICD interrogation with normal function, normal battery without V-tach or atrial fibrillation, 5 months on battery, April 2017, with subsequent BAILEY and [employer] replacement and acceptable DFT, October 2017, and acceptable interrogation, January 2018, August 2018, February 2019.  3. Dyslipidemia.  4. Status post remote operations.  5. Remote chronic tobacco use/abnormal chest x-ray with stable abnormal thoracic  CT scan without contrast demonstrating bilateral diffuse perimeter scarring and fibrosis with scattered subcentimeter noncalcified nodules (unchanged from February 2016), March 2017.  6. Left bundle branch block.  7. Burks trauma with hematuria with urosepsis requiring hospitalization, May 2015.  8. Pulmonary embolism, spring 2015.   9. Remote apparent hypothyroidism/replacement therapy - data deficit, January 2016.  10. Remote diagnosis of obstructive sleep apnea with CPAP use, 2017.  11. Bilateral cataract extraction November 2018, February 2019  No Known Allergies      Current Outpatient Medications:   •  atorvastatin (LIPITOR) 40 MG tablet, TAKE 1 TABLET BY MOUTH DAILY (NEED LAB WORK FOR ADDITIONAL REFILLS), Disp: 90 tablet, Rfl: 2  •  carvedilol (COREG) 12.5 MG tablet, Take 12.5 mg by mouth 2 (Two) Times a Day With Meals., Disp: , Rfl:   •  clopidogrel (PLAVIX) 75 MG tablet, Take 75 mg by mouth Every Morning., Disp: , Rfl:   •  ENTRESTO 49-51 MG tablet, TAKE 1 TABLET BY MOUTH TWICE DAILY, Disp: 180 tablet, Rfl: 3  •  eplerenone (INSPRA) 25 MG tablet, Take 12.5 mg by mouth Every Morning., Disp: , Rfl:   •  finasteride (PROSCAR) 5 MG tablet,  "Take 5 mg by mouth Every Morning., Disp: , Rfl:   •  L-TRYPTOPHAN PO, Take 1,000 mg by mouth Every Night., Disp: , Rfl:   •  levothyroxine (SYNTHROID, LEVOTHROID) 25 MCG tablet, Take 25 mcg by mouth Every Morning., Disp: , Rfl:   •  MELATONIN PO, Take 9 mg by mouth Every Night., Disp: , Rfl:   •  nitroglycerin (NITROSTAT) 0.4 MG SL tablet, Place 0.4 mg under the tongue Every 5 (Five) Minutes As Needed for Chest Pain. Take no more than 3 doses in 15 minutes., Disp: , Rfl:   •  pantoprazole (PROTONIX) 40 MG EC tablet, Take 40 mg by mouth Every Morning., Disp: , Rfl:   •  polyethylene glycol (MIRALAX) packet, Take 17 g by mouth Every Other Day., Disp: , Rfl:   •  torsemide (DEMADEX) 5 MG tablet, TAKE 1 TABLET BY MOUTH DAILY AS DIRECTED, Disp: 90 tablet, Rfl: 1  •  XARELTO 20 MG tablet, TAKE 1 TABLET BY MOUTH EVERY DAY, Disp: 90 tablet, Rfl: 3    HISTORY OF PRESENT ILLNESS:  Patient is here for six-month follow-up.  The patient has been going to cardiac rehab since September 2018.  He had an acceptable ICD interrogation February 2019.  He will sometimes have some shortness of breath on exertion, but this has not worsened over the past 6-12 months.  He is enjoying going to cardiac rehab and feels that it has helped.  He \"tries to push myself, unlike other people that also are attending cardiac rehab.\"  He denies any chest pain, palpitations, dizziness, presyncope, or syncope.  He recently saw Dr. Lewis and he wanted to start him on nicotinic acid.  The patient and his wife had questions regarding Dr. Gonzalez's letter regarding his laboratory testing.  Dr. Gonzalez in the letter suggested either a low dose of nicotinic acid or to begin a PCSK9 inhibitor.  The patient and his wife will discuss this with Dr. Lewis when they follow-up with him.  He had recent cataract surgery in November 2018 and February 2019.      Review of Systems   Constitution: Positive for night sweats and weight loss.   HENT: Positive for tinnitus.  " "  Respiratory: Positive for shortness of breath and snoring.    Hematologic/Lymphatic: Bruises/bleeds easily.   Skin: Positive for dry skin.   Genitourinary: Positive for frequency.   Neurological: Positive for excessive daytime sleepiness and dizziness.      Obtained and otherwise negative except as outlined in problem list and HPI.    Procedures       Objective:       Vitals:    03/28/19 1004 03/28/19 1007   BP: 111/72 102/67   BP Location: Right arm Right arm   Patient Position: Sitting Standing   Pulse: 70 72   Weight: 94.5 kg (208 lb 6.4 oz)    Height: 185.4 cm (73\")      Body mass index is 27.5 kg/m².  Last weight September 2018 was 212 pounds  Physical Exam   Constitutional: He is oriented to person, place, and time. He appears well-developed and well-nourished.   Neck: No JVD present. Carotid bruit is not present. No thyromegaly present.   Cardiovascular: Regular rhythm, S1 normal and S2 normal. Exam reveals no gallop, no S3 and no friction rub.   Murmur heard.  High-pitched blowing holosystolic murmur is present with a grade of 2/6 at the apex.  Pulses:       Dorsalis pedis pulses are 1+ on the right side, and 1+ on the left side.        Posterior tibial pulses are 1+ on the right side, and 1+ on the left side.   Pulmonary/Chest: Effort normal. He has decreased breath sounds. He has no wheezes. He has no rhonchi. He has no rales.   Abdominal: Soft. He exhibits no mass. There is no hepatosplenomegaly. There is no tenderness. There is no guarding.   Bowel sounds audible x4   Musculoskeletal: Normal range of motion. He exhibits no edema.   Lymphadenopathy:     He has no cervical adenopathy.   Neurological: He is alert and oriented to person, place, and time.   Skin: Skin is warm, dry and intact. No rash noted.   Vitals reviewed.        Lab Review:   Lab Results   Component Value Date    GLUCOSE 109 (H) 01/03/2018    BUN 23 01/03/2018    CREATININE 0.90 01/03/2018    EGFRIFNONA 83 01/03/2018    BCR 25.6 (H) " 01/03/2018    CO2 28.0 01/03/2018    CALCIUM 9.1 01/03/2018    ALBUMIN 3.80 01/15/2017    AST 85 (H) 01/15/2017     (H) 01/15/2017       Lab Results   Component Value Date    WBC 12.49 (H) 10/01/2017    HGB 15.2 10/01/2017    HCT 47.1 10/01/2017    MCV 91.5 10/01/2017     10/01/2017       Lab Results   Component Value Date    HGBA1C 6.40 (H) 10/01/2017       Lab Results   Component Value Date    TSH 3.832 03/25/2016       Lab Results   Component Value Date    CHOL 130 08/14/2017     Lab Results   Component Value Date    TRIG 70 08/14/2017    TRIG 203 (H) 01/30/2014     Lab Results   Component Value Date    HDL 40 08/14/2017    HDL 31 (L) 01/30/2014     Lab Results   Component Value Date    LDL 77 08/14/2017    LDL 65 01/30/2014       Lab Results   Component Value Date    BNP 77.0 06/08/2017     ·   2/8/19 (reviewed per physician letter):  · Lipoprotein 190 with recommendations for attempting a trial of nicotinic acid or PCSK 9 inhibitor  · Hemoglobin A1c 6.1%  · Lipid panel: Cholesterol 122, triglycerides 137, HDL 32  · [employer] biVICD interrogation 2/12/19: Normal biventricular ICD function, longevity 1.9 years, atrial pacing 98%, biventricular pacing 99%, DDDR 70-1 30, less than 0.1% mode switching    Assessment:     Overall continued acceptable course with no interim cardiopulmonary complaints with acceptable functional status. We will defer additional diagnostic or therapeutic intervention from a cardiac perspective at this time.  He had an acceptable BiVICD interrogation February 2019.  Consideration of PCSK9 inhibitor versus low dose nicotinic acid will be deferred to Dr. Lewis. I encouraged the patient to continue cardiac rehab.        Diagnosis Plan   1. Coronary artery disease involving coronary bypass graft of native heart without angina pectoris  Stable   2. Chronic combined systolic and diastolic congestive heart failure (EF<20%)   Patient encouraged to continue cardiac rehab   3.  Dyslipidemia  PCSK9 inhibitor versus low dose nicotinic acid deferred to Dr. Lewis          Plan:         1. Patient to continue current medications and close follow up with the above providers.  2. Tentative cardiology follow up in 6 months or patient may return sooner PRN.      Electronically signed by CHENTE Rhodes, 03/28/19, 12:52 PM.

## 2019-03-27 ENCOUNTER — TREATMENT (OUTPATIENT)
Dept: CARDIAC REHAB | Facility: HOSPITAL | Age: 74
End: 2019-03-27

## 2019-03-27 DIAGNOSIS — Z00.00 PREVENTATIVE HEALTH CARE: Primary | ICD-10-CM

## 2019-03-28 ENCOUNTER — OFFICE VISIT (OUTPATIENT)
Dept: CARDIOLOGY | Facility: CLINIC | Age: 74
End: 2019-03-28

## 2019-03-28 VITALS
HEIGHT: 73 IN | BODY MASS INDEX: 27.62 KG/M2 | SYSTOLIC BLOOD PRESSURE: 102 MMHG | HEART RATE: 72 BPM | DIASTOLIC BLOOD PRESSURE: 67 MMHG | WEIGHT: 208.4 LBS

## 2019-03-28 DIAGNOSIS — I50.42 CHRONIC COMBINED SYSTOLIC AND DIASTOLIC CONGESTIVE HEART FAILURE (HCC): ICD-10-CM

## 2019-03-28 DIAGNOSIS — I25.810 CORONARY ARTERY DISEASE INVOLVING CORONARY BYPASS GRAFT OF NATIVE HEART WITHOUT ANGINA PECTORIS: Primary | ICD-10-CM

## 2019-03-28 DIAGNOSIS — E78.5 DYSLIPIDEMIA: ICD-10-CM

## 2019-03-28 PROCEDURE — 99214 OFFICE O/P EST MOD 30 MIN: CPT | Performed by: NURSE PRACTITIONER

## 2019-03-29 ENCOUNTER — TREATMENT (OUTPATIENT)
Dept: CARDIAC REHAB | Facility: HOSPITAL | Age: 74
End: 2019-03-29

## 2019-03-29 DIAGNOSIS — Z00.00 PREVENTATIVE HEALTH CARE: Primary | ICD-10-CM

## 2019-04-01 ENCOUNTER — TREATMENT (OUTPATIENT)
Dept: CARDIAC REHAB | Facility: HOSPITAL | Age: 74
End: 2019-04-01

## 2019-04-01 DIAGNOSIS — Z00.00 PREVENTATIVE HEALTH CARE: Primary | ICD-10-CM

## 2019-04-03 ENCOUNTER — OFFICE VISIT (OUTPATIENT)
Dept: CARDIOLOGY | Facility: CLINIC | Age: 74
End: 2019-04-03

## 2019-04-03 ENCOUNTER — TREATMENT (OUTPATIENT)
Dept: CARDIAC REHAB | Facility: HOSPITAL | Age: 74
End: 2019-04-03

## 2019-04-03 VITALS
HEART RATE: 70 BPM | BODY MASS INDEX: 27.51 KG/M2 | OXYGEN SATURATION: 97 % | HEIGHT: 73 IN | DIASTOLIC BLOOD PRESSURE: 70 MMHG | SYSTOLIC BLOOD PRESSURE: 108 MMHG | WEIGHT: 207.6 LBS

## 2019-04-03 DIAGNOSIS — I50.42 CHRONIC COMBINED SYSTOLIC AND DIASTOLIC CONGESTIVE HEART FAILURE (HCC): ICD-10-CM

## 2019-04-03 DIAGNOSIS — I25.810 CORONARY ARTERY DISEASE INVOLVING CORONARY BYPASS GRAFT OF NATIVE HEART WITHOUT ANGINA PECTORIS: ICD-10-CM

## 2019-04-03 DIAGNOSIS — Z00.00 PREVENTATIVE HEALTH CARE: Primary | ICD-10-CM

## 2019-04-03 DIAGNOSIS — I26.09 OTHER ACUTE PULMONARY EMBOLISM WITH ACUTE COR PULMONALE (HCC): ICD-10-CM

## 2019-04-03 DIAGNOSIS — I47.20 VENTRICULAR TACHYCARDIA (HCC): Primary | ICD-10-CM

## 2019-04-03 PROCEDURE — 99213 OFFICE O/P EST LOW 20 MIN: CPT | Performed by: INTERNAL MEDICINE

## 2019-04-03 PROCEDURE — 93284 PRGRMG EVAL IMPLANTABLE DFB: CPT | Performed by: INTERNAL MEDICINE

## 2019-04-03 NOTE — PROGRESS NOTES
Jonnie Roth  1945  [phone]    04/03/2019    [facility] CARDIOLOGY     Borders, Brian Ferraro MD  [address] [place] [address]    Chief Complaint   Patient presents with   • Ventricular tachycardia       Problem List:     1. Ischemic heart disease:  a. Remote progressive angina pectoris/acute extensive anterolateral myocardial infarction/delayed presentation with thrombolysis/severe 3-vessel coronary atherosclerosis with severe single vessel involvement/PTCA with intracoronary stent deployment proximal-mid segment LAD/moderate-severe compensated left ventricular dysfunction. LVEF (0.35)/abnormal positive signal averaged EKG/oral anticoagulation, April 1995.  b. Remote NYHA class I-II angina pectoris/class III CHF/abnormal quantitative SPECT gated Cardiolite GXT, July 1998.  c. Stable persistent MUGA, LVEF (0.33, January 1999 and January 2000).   d. Residual NYHA class I angina pectoris/CHF with reduced MUGA scan: LVEF (0.25), April 2002.  e. Left heart catheterization with distal circumflex disease: EF 20%, September 2002.   f. MUGA in May 2003: EF 32%.   g. Sestamibi GXT on 04/08/2005: No ischemia. EF 29%.   h. Residual NYHA class I angina pectoris/CHF with reduced acceptable MUGA scan: EF (0.32) and acceptable Pacesetter PCD interrogation/reprogramming, May 2003 with interrogation, September 2004.  i. Echocardiogram, September 2009 with EF 30%  j. Left heart catheterization by Dr. Bhupinder Gonzalez, August 2010, with LVEF of 35%, with 3-vessel native coronary artery disease, 95% mid-LAD status post PTCA and stenting with two 3 mm stents by Dr. Llanes.  k. Echocardiogram, 06/07/2012: Left ventricular ejection fraction of 30%.  l. Hospitalization, 02/01/2014, for non-ST elevation MI, left heart catheterization by Dr. Ayala that demonstrated 60% mid stenosis of the right coronary artery that remains unchanged compared to previous, widely patent stents of the  LAD with severe LV dysfunction of approximately 25%.   m. Left heart catheterization status post drug-eluting stent to the distal LAD and mid-RCA with an EF of 20% to 25% by Dr. Diego Ayala, 04/20/2015.   n. Left heart catheterization 1/16/17; nonobstructive CAD with patent previously placed stents, dilated cardiomyopathy with severe LV systolic dysfunction, EF less than 20%, normal hemodynamics, recommendations for continued medical therapy and risk factor, evaluation for noncardiac etiology of symptoms.  o. Residual CCS Class I/II angina pectoris/NYHA Class II exertional dyspnea and fatigue syndrome, summer 2017  p. Residual CCS 0 angina pectoris/NYHA class I-II exertional dyspnea and fatigue, February 2018, September 2018, March 2019.  2. Sudden cardiac death with primary ventricular fibrillation status post Pacesetter [place] ICD, [place], [place] in September 2000:   a. ICD generator change out with upgrade to a biventricular pacemaker ICD on 12/17/2007, [employer] device.  b. ICD discharge, 08/09/2012, secondary to ventricular flutter with initiation of amiodarone therapy.   c. Hospitalization, 02/01/2014, secondary to ICD discharge for ventricular tachycardia with subsequent discontinuation of Coreg and initiation of sotalol therapy.   d. Hospitalization, February 2014, secondary to ICD discharge for VT with subsequent discontinuation of Coreg and initiation of sotalol.  e. Echocardiogram, 04/07/2015: EF less than 20%.  f. Hospitalization secondary to VF and ICD shocks, 04/18/2015.  g. NIPS procedure with defibrillation threshold testing for VF and noninvasive program stimulation, 04/18/2015.   h. Initiation of mexiletine for recurrent ventricular tachycardia with ICD shocks on 05/05/2015 with amiodarone load, recurrent VT and ICD shocks with hospitalization 05/16/2015-05/18/2015.   i. EP study with RFA of large anterior left ventricular scar extending from mid-left ventricular wall down apex by Dr. Streeter  Anjali, 05/21/2015.   j. Acceptable ICD interrogation with normal function, normal battery without V-tach or atrial fibrillation, 5 months on battery, April 2017, with subsequent [employer] and [employer] replacement and acceptable DFT, October 2017, and acceptable interrogation, January 2018, August 2018, February 2019.  3. Dyslipidemia.  4. Status post remote operations.  5. Remote chronic tobacco use/abnormal chest x-ray with stable abnormal thoracic  CT scan without contrast demonstrating bilateral diffuse perimeter scarring and fibrosis with scattered subcentimeter noncalcified nodules (unchanged from February 2016), March 2017.  6. Left bundle branch block.  7. Burks trauma with hematuria with urosepsis requiring hospitalization, May 2015.  8. Pulmonary embolism, spring 2015.   9. Remote apparent hypothyroidism/replacement therapy - data deficit, January 2016.  10. Remote diagnosis of obstructive sleep apnea with CPAP use, 2017.  11. Bilateral cataract extraction November 2018, February 2019      Allergies  No Known Allergies    Current Medications    Current Outpatient Medications:   •  atorvastatin (LIPITOR) 40 MG tablet, TAKE 1 TABLET BY MOUTH DAILY (NEED LAB WORK FOR ADDITIONAL REFILLS), Disp: 90 tablet, Rfl: 2  •  carvedilol (COREG) 12.5 MG tablet, Take 12.5 mg by mouth 2 (Two) Times a Day With Meals., Disp: , Rfl:   •  clopidogrel (PLAVIX) 75 MG tablet, Take 75 mg by mouth Every Morning., Disp: , Rfl:   •  ENTRESTO 49-51 MG tablet, TAKE 1 TABLET BY MOUTH TWICE DAILY, Disp: 180 tablet, Rfl: 3  •  eplerenone (INSPRA) 25 MG tablet, Take 12.5 mg by mouth Every Morning., Disp: , Rfl:   •  finasteride (PROSCAR) 5 MG tablet, Take 5 mg by mouth Every Morning., Disp: , Rfl:   •  L-TRYPTOPHAN PO, Take 1,000 mg by mouth Every Night., Disp: , Rfl:   •  levothyroxine (SYNTHROID, LEVOTHROID) 25 MCG tablet, Take 25 mcg by mouth Every Morning., Disp: , Rfl:   •  MELATONIN PO, Take 9 mg by mouth Every Night.,  "Disp: , Rfl:   •  metFORMIN (GLUCOPHAGE) 500 MG tablet, metformin 500 mg tablet  take 2 tablets (1,000 mg) by oral route once daily with the evening meal for 90 days, Disp: , Rfl:   •  nitroglycerin (NITROSTAT) 0.4 MG SL tablet, Place 0.4 mg under the tongue Every 5 (Five) Minutes As Needed for Chest Pain. Take no more than 3 doses in 15 minutes., Disp: , Rfl:   •  pantoprazole (PROTONIX) 40 MG EC tablet, Take 40 mg by mouth Every Morning., Disp: , Rfl:   •  polyethylene glycol (MIRALAX) packet, Take 17 g by mouth Every Other Day., Disp: , Rfl:   •  torsemide (DEMADEX) 5 MG tablet, TAKE 1 TABLET BY MOUTH DAILY AS DIRECTED (Patient taking differently: TAKE 1 TABLET BY MOUTH DAILY), Disp: 90 tablet, Rfl: 1  •  XARELTO 20 MG tablet, TAKE 1 TABLET BY MOUTH EVERY DAY, Disp: 90 tablet, Rfl: 3    History of Present Illness:     Pt presents for follow up of VT, ICM, CHF, and BiV ICD device check. Since we last saw the pt, he has overall been doing well. He is working out three days per week. He denies exertional SOB, CP, LH, and dizziness or syncope. He has occasional mild orthostasis.  Denies any hospitalizations, ER visits, bleeding on Xarelto, or TIA/CVA symptoms.     ROS:  General:  Denies fatigue, weight gain + intentional 15lb loss  Cardiovascular:  Denies CP, PND, syncope, near syncope, - edema or palpitations.  Pulmonary:  Denies LU, cough, or wheezing      Vitals:    04/03/19 1119   BP: 108/70   BP Location: Left arm   Patient Position: Sitting   Pulse: 70   SpO2: 97%   Weight: 94.2 kg (207 lb 9.6 oz)   Height: 185.4 cm (73\")     Body mass index is 27.39 kg/m².  PE:  General: NAD. A & O x 3  Neck: no JVD, no carotid bruits, no TM  Heart RRR, NL S1, S2, S4 present, no rubs, +murmur  Lungs: CTA, no wheezes, rhonchi, or rales  Abd: soft, non-tender, NL BS  Ext: No musculoskeletal deformities, no edema, cyanosis, or clubbing  Psych: normal mood and affect.     Diagnostic Data:    BiV ICD Manual Interrogation: normal " function. LV threshold chronically high. 4.0 V @ 1.5 ms. 1.9 years. (was 3.75 V @ 1.5 ms in August 2018) 98% BiV paced. Less than 1 % mode switched. 3 NSVT. No sustained VT.     Procedures    1. Ventricular tachycardia (CMS/HCC)    2. Chronic combined systolic and diastolic congestive heart failure (EF<20%)    3. Coronary artery disease involving coronary bypass graft of native heart without angina pectoris    4. Other acute pulmonary embolism with acute cor pulmonale (CMS/HCC)          Plan:  1. VT:  - s/p RFA in 2015 with no recurrence  - no sustained VT on interrogation    2. Chronic CHF Systolic:  Class II on meds coreg and Entresto  - BiV ICD check within normal limits. LV threshold is chronically elevated and stable.   - consider echocardiogram at next visit    3. CAD:  - on Plavix, follows with Dr. Gonzalez. Stable    4. History of PE:  - on Xarelto      Electronically signed by PAULA Arrington, 04/03/19, 12:03 PM.

## 2019-04-05 ENCOUNTER — TREATMENT (OUTPATIENT)
Dept: CARDIAC REHAB | Facility: HOSPITAL | Age: 74
End: 2019-04-05

## 2019-04-05 DIAGNOSIS — Z00.00 PREVENTATIVE HEALTH CARE: Primary | ICD-10-CM

## 2019-04-08 ENCOUNTER — TREATMENT (OUTPATIENT)
Dept: CARDIAC REHAB | Facility: HOSPITAL | Age: 74
End: 2019-04-08

## 2019-04-08 DIAGNOSIS — Z00.00 PREVENTATIVE HEALTH CARE: Primary | ICD-10-CM

## 2019-04-10 ENCOUNTER — TREATMENT (OUTPATIENT)
Dept: CARDIAC REHAB | Facility: HOSPITAL | Age: 74
End: 2019-04-10

## 2019-04-10 DIAGNOSIS — Z00.00 PREVENTATIVE HEALTH CARE: Primary | ICD-10-CM

## 2019-04-15 ENCOUNTER — TREATMENT (OUTPATIENT)
Dept: CARDIAC REHAB | Facility: HOSPITAL | Age: 74
End: 2019-04-15

## 2019-04-15 DIAGNOSIS — Z00.00 PREVENTATIVE HEALTH CARE: Primary | ICD-10-CM

## 2019-04-17 ENCOUNTER — TREATMENT (OUTPATIENT)
Dept: CARDIAC REHAB | Facility: HOSPITAL | Age: 74
End: 2019-04-17

## 2019-04-17 DIAGNOSIS — Z00.00 PREVENTATIVE HEALTH CARE: Primary | ICD-10-CM

## 2019-04-19 ENCOUNTER — TREATMENT (OUTPATIENT)
Dept: CARDIAC REHAB | Facility: HOSPITAL | Age: 74
End: 2019-04-19

## 2019-04-19 DIAGNOSIS — Z00.00 PREVENTATIVE HEALTH CARE: Primary | ICD-10-CM

## 2019-04-24 ENCOUNTER — TREATMENT (OUTPATIENT)
Dept: CARDIAC REHAB | Facility: HOSPITAL | Age: 74
End: 2019-04-24

## 2019-04-24 DIAGNOSIS — Z00.00 PREVENTATIVE HEALTH CARE: Primary | ICD-10-CM

## 2019-04-26 ENCOUNTER — TREATMENT (OUTPATIENT)
Dept: CARDIAC REHAB | Facility: HOSPITAL | Age: 74
End: 2019-04-26

## 2019-04-26 DIAGNOSIS — Z00.00 PREVENTATIVE HEALTH CARE: Primary | ICD-10-CM

## 2019-04-29 ENCOUNTER — TREATMENT (OUTPATIENT)
Dept: CARDIAC REHAB | Facility: HOSPITAL | Age: 74
End: 2019-04-29

## 2019-04-29 DIAGNOSIS — Z00.00 PREVENTATIVE HEALTH CARE: Primary | ICD-10-CM

## 2019-05-15 ENCOUNTER — CLINICAL SUPPORT NO REQUIREMENTS (OUTPATIENT)
Dept: CARDIOLOGY | Facility: CLINIC | Age: 74
End: 2019-05-15

## 2019-05-15 DIAGNOSIS — I47.20 VENTRICULAR TACHYCARDIA (HCC): ICD-10-CM

## 2019-05-15 DIAGNOSIS — I50.42 CHRONIC COMBINED SYSTOLIC AND DIASTOLIC CONGESTIVE HEART FAILURE (HCC): ICD-10-CM

## 2019-05-15 PROCEDURE — 93296 REM INTERROG EVL PM/IDS: CPT | Performed by: INTERNAL MEDICINE

## 2019-05-15 PROCEDURE — 93295 DEV INTERROG REMOTE 1/2/MLT: CPT | Performed by: INTERNAL MEDICINE

## 2019-06-03 ENCOUNTER — TREATMENT (OUTPATIENT)
Dept: CARDIAC REHAB | Facility: HOSPITAL | Age: 74
End: 2019-06-03

## 2019-06-03 DIAGNOSIS — Z00.00 PREVENTATIVE HEALTH CARE: Primary | ICD-10-CM

## 2019-06-07 ENCOUNTER — TREATMENT (OUTPATIENT)
Dept: CARDIAC REHAB | Facility: HOSPITAL | Age: 74
End: 2019-06-07

## 2019-06-07 DIAGNOSIS — Z00.00 PREVENTATIVE HEALTH CARE: Primary | ICD-10-CM

## 2019-06-10 ENCOUNTER — TREATMENT (OUTPATIENT)
Dept: CARDIAC REHAB | Facility: HOSPITAL | Age: 74
End: 2019-06-10

## 2019-06-10 DIAGNOSIS — Z00.00 PREVENTATIVE HEALTH CARE: Primary | ICD-10-CM

## 2019-06-11 RX ORDER — TORSEMIDE 5 MG/1
TABLET ORAL
Qty: 90 TABLET | Refills: 3 | Status: SHIPPED | OUTPATIENT
Start: 2019-06-11 | End: 2020-01-16 | Stop reason: SDUPTHER

## 2019-06-12 ENCOUNTER — TREATMENT (OUTPATIENT)
Dept: CARDIAC REHAB | Facility: HOSPITAL | Age: 74
End: 2019-06-12

## 2019-06-12 DIAGNOSIS — Z00.00 PREVENTATIVE HEALTH CARE: Primary | ICD-10-CM

## 2019-06-14 ENCOUNTER — TREATMENT (OUTPATIENT)
Dept: CARDIAC REHAB | Facility: HOSPITAL | Age: 74
End: 2019-06-14

## 2019-06-14 DIAGNOSIS — Z00.00 PREVENTATIVE HEALTH CARE: Primary | ICD-10-CM

## 2019-06-17 ENCOUNTER — TREATMENT (OUTPATIENT)
Dept: CARDIAC REHAB | Facility: HOSPITAL | Age: 74
End: 2019-06-17

## 2019-06-17 DIAGNOSIS — Z00.00 PREVENTATIVE HEALTH CARE: Primary | ICD-10-CM

## 2019-06-19 ENCOUNTER — TREATMENT (OUTPATIENT)
Dept: CARDIAC REHAB | Facility: HOSPITAL | Age: 74
End: 2019-06-19

## 2019-06-19 DIAGNOSIS — Z00.00 PREVENTATIVE HEALTH CARE: Primary | ICD-10-CM

## 2019-06-21 ENCOUNTER — TREATMENT (OUTPATIENT)
Dept: CARDIAC REHAB | Facility: HOSPITAL | Age: 74
End: 2019-06-21

## 2019-06-21 DIAGNOSIS — Z00.00 PREVENTATIVE HEALTH CARE: Primary | ICD-10-CM

## 2019-06-24 ENCOUNTER — TREATMENT (OUTPATIENT)
Dept: CARDIAC REHAB | Facility: HOSPITAL | Age: 74
End: 2019-06-24

## 2019-06-24 DIAGNOSIS — Z00.00 PREVENTATIVE HEALTH CARE: Primary | ICD-10-CM

## 2019-06-27 ENCOUNTER — TREATMENT (OUTPATIENT)
Dept: CARDIAC REHAB | Facility: HOSPITAL | Age: 74
End: 2019-06-27

## 2019-06-27 DIAGNOSIS — Z00.00 PREVENTATIVE HEALTH CARE: Primary | ICD-10-CM

## 2019-06-28 ENCOUNTER — TREATMENT (OUTPATIENT)
Dept: CARDIAC REHAB | Facility: HOSPITAL | Age: 74
End: 2019-06-28

## 2019-06-28 DIAGNOSIS — Z00.00 PREVENTATIVE HEALTH CARE: Primary | ICD-10-CM

## 2019-07-01 ENCOUNTER — TREATMENT (OUTPATIENT)
Dept: CARDIAC REHAB | Facility: HOSPITAL | Age: 74
End: 2019-07-01

## 2019-07-01 DIAGNOSIS — Z00.00 PREVENTATIVE HEALTH CARE: Primary | ICD-10-CM

## 2019-07-03 ENCOUNTER — TREATMENT (OUTPATIENT)
Dept: CARDIAC REHAB | Facility: HOSPITAL | Age: 74
End: 2019-07-03

## 2019-07-03 DIAGNOSIS — Z00.00 PREVENTATIVE HEALTH CARE: Primary | ICD-10-CM

## 2019-07-05 ENCOUNTER — TREATMENT (OUTPATIENT)
Dept: CARDIAC REHAB | Facility: HOSPITAL | Age: 74
End: 2019-07-05

## 2019-07-05 DIAGNOSIS — Z00.00 PREVENTATIVE HEALTH CARE: Primary | ICD-10-CM

## 2019-07-08 ENCOUNTER — TREATMENT (OUTPATIENT)
Dept: CARDIAC REHAB | Facility: HOSPITAL | Age: 74
End: 2019-07-08

## 2019-07-08 DIAGNOSIS — Z00.00 PREVENTATIVE HEALTH CARE: Primary | ICD-10-CM

## 2019-07-10 ENCOUNTER — TREATMENT (OUTPATIENT)
Dept: CARDIAC REHAB | Facility: HOSPITAL | Age: 74
End: 2019-07-10

## 2019-07-10 DIAGNOSIS — Z00.00 PREVENTATIVE HEALTH CARE: Primary | ICD-10-CM

## 2019-07-15 ENCOUNTER — TREATMENT (OUTPATIENT)
Dept: CARDIAC REHAB | Facility: HOSPITAL | Age: 74
End: 2019-07-15

## 2019-07-15 DIAGNOSIS — Z00.00 PREVENTATIVE HEALTH CARE: Primary | ICD-10-CM

## 2019-07-17 ENCOUNTER — TREATMENT (OUTPATIENT)
Dept: CARDIAC REHAB | Facility: HOSPITAL | Age: 74
End: 2019-07-17

## 2019-07-17 DIAGNOSIS — Z00.00 PREVENTATIVE HEALTH CARE: Primary | ICD-10-CM

## 2019-07-22 ENCOUNTER — TREATMENT (OUTPATIENT)
Dept: CARDIAC REHAB | Facility: HOSPITAL | Age: 74
End: 2019-07-22

## 2019-07-22 DIAGNOSIS — Z00.00 PREVENTATIVE HEALTH CARE: Primary | ICD-10-CM

## 2019-07-24 ENCOUNTER — TREATMENT (OUTPATIENT)
Dept: CARDIAC REHAB | Facility: HOSPITAL | Age: 74
End: 2019-07-24

## 2019-07-24 DIAGNOSIS — Z00.00 PREVENTATIVE HEALTH CARE: Primary | ICD-10-CM

## 2019-07-26 ENCOUNTER — TREATMENT (OUTPATIENT)
Dept: CARDIAC REHAB | Facility: HOSPITAL | Age: 74
End: 2019-07-26

## 2019-07-26 DIAGNOSIS — Z00.00 PREVENTATIVE HEALTH CARE: Primary | ICD-10-CM

## 2019-07-29 ENCOUNTER — TREATMENT (OUTPATIENT)
Dept: CARDIAC REHAB | Facility: HOSPITAL | Age: 74
End: 2019-07-29

## 2019-07-29 DIAGNOSIS — Z00.00 PREVENTATIVE HEALTH CARE: Primary | ICD-10-CM

## 2019-07-31 ENCOUNTER — TREATMENT (OUTPATIENT)
Dept: CARDIAC REHAB | Facility: HOSPITAL | Age: 74
End: 2019-07-31

## 2019-07-31 DIAGNOSIS — Z00.00 PREVENTATIVE HEALTH CARE: Primary | ICD-10-CM

## 2019-08-02 ENCOUNTER — TREATMENT (OUTPATIENT)
Dept: CARDIAC REHAB | Facility: HOSPITAL | Age: 74
End: 2019-08-02

## 2019-08-02 DIAGNOSIS — Z00.00 PREVENTATIVE HEALTH CARE: Primary | ICD-10-CM

## 2019-08-05 ENCOUNTER — TREATMENT (OUTPATIENT)
Dept: CARDIAC REHAB | Facility: HOSPITAL | Age: 74
End: 2019-08-05

## 2019-08-05 ENCOUNTER — TELEPHONE (OUTPATIENT)
Dept: CARDIOLOGY | Facility: CLINIC | Age: 74
End: 2019-08-05

## 2019-08-05 DIAGNOSIS — Z00.00 PREVENTATIVE HEALTH CARE: Primary | ICD-10-CM

## 2019-08-05 NOTE — TELEPHONE ENCOUNTER
Patient's wife called and asked if pt needed prophylactic antibiotics for patient having a root canal 8/6/19. Pt has ICD.    Advised pt's wife that prophylactic antibiotics are not indicated for dental procedures with ICDs.    Pt's wife verbalizes understanding and agreeable to plan.

## 2019-08-07 ENCOUNTER — TREATMENT (OUTPATIENT)
Dept: CARDIAC REHAB | Facility: HOSPITAL | Age: 74
End: 2019-08-07

## 2019-08-07 DIAGNOSIS — Z00.00 PREVENTATIVE HEALTH CARE: Primary | ICD-10-CM

## 2019-08-09 ENCOUNTER — TREATMENT (OUTPATIENT)
Dept: CARDIAC REHAB | Facility: HOSPITAL | Age: 74
End: 2019-08-09

## 2019-08-09 DIAGNOSIS — Z00.00 PREVENTATIVE HEALTH CARE: Primary | ICD-10-CM

## 2019-08-12 ENCOUNTER — TREATMENT (OUTPATIENT)
Dept: CARDIAC REHAB | Facility: HOSPITAL | Age: 74
End: 2019-08-12

## 2019-08-12 DIAGNOSIS — Z00.00 PREVENTATIVE HEALTH CARE: Primary | ICD-10-CM

## 2019-08-14 ENCOUNTER — TREATMENT (OUTPATIENT)
Dept: CARDIAC REHAB | Facility: HOSPITAL | Age: 74
End: 2019-08-14

## 2019-08-14 DIAGNOSIS — Z00.00 PREVENTATIVE HEALTH CARE: Primary | ICD-10-CM

## 2019-08-20 ENCOUNTER — TREATMENT (OUTPATIENT)
Dept: CARDIAC REHAB | Facility: HOSPITAL | Age: 74
End: 2019-08-20

## 2019-08-20 DIAGNOSIS — Z00.00 PREVENTATIVE HEALTH CARE: Primary | ICD-10-CM

## 2019-08-21 ENCOUNTER — TREATMENT (OUTPATIENT)
Dept: CARDIAC REHAB | Facility: HOSPITAL | Age: 74
End: 2019-08-21

## 2019-08-21 DIAGNOSIS — Z00.00 PREVENTATIVE HEALTH CARE: Primary | ICD-10-CM

## 2019-08-23 ENCOUNTER — TREATMENT (OUTPATIENT)
Dept: CARDIAC REHAB | Facility: HOSPITAL | Age: 74
End: 2019-08-23

## 2019-08-23 DIAGNOSIS — Z00.00 PREVENTATIVE HEALTH CARE: Primary | ICD-10-CM

## 2019-08-26 ENCOUNTER — TREATMENT (OUTPATIENT)
Dept: CARDIAC REHAB | Facility: HOSPITAL | Age: 74
End: 2019-08-26

## 2019-08-26 DIAGNOSIS — Z00.00 PREVENTATIVE HEALTH CARE: Primary | ICD-10-CM

## 2019-08-28 ENCOUNTER — TREATMENT (OUTPATIENT)
Dept: CARDIAC REHAB | Facility: HOSPITAL | Age: 74
End: 2019-08-28

## 2019-08-28 DIAGNOSIS — Z00.00 PREVENTATIVE HEALTH CARE: Primary | ICD-10-CM

## 2019-09-05 ENCOUNTER — TREATMENT (OUTPATIENT)
Dept: CARDIAC REHAB | Facility: HOSPITAL | Age: 74
End: 2019-09-05

## 2019-09-05 DIAGNOSIS — Z00.00 PREVENTATIVE HEALTH CARE: Primary | ICD-10-CM

## 2019-09-09 ENCOUNTER — TREATMENT (OUTPATIENT)
Dept: CARDIAC REHAB | Facility: HOSPITAL | Age: 74
End: 2019-09-09

## 2019-09-09 DIAGNOSIS — Z00.00 PREVENTATIVE HEALTH CARE: Primary | ICD-10-CM

## 2019-09-11 ENCOUNTER — TREATMENT (OUTPATIENT)
Dept: CARDIAC REHAB | Facility: HOSPITAL | Age: 74
End: 2019-09-11

## 2019-09-11 DIAGNOSIS — Z00.00 PREVENTATIVE HEALTH CARE: Primary | ICD-10-CM

## 2019-09-13 ENCOUNTER — TREATMENT (OUTPATIENT)
Dept: CARDIAC REHAB | Facility: HOSPITAL | Age: 74
End: 2019-09-13

## 2019-09-13 DIAGNOSIS — Z00.00 PREVENTATIVE HEALTH CARE: Primary | ICD-10-CM

## 2019-09-16 ENCOUNTER — TREATMENT (OUTPATIENT)
Dept: CARDIAC REHAB | Facility: HOSPITAL | Age: 74
End: 2019-09-16

## 2019-09-16 DIAGNOSIS — Z00.00 PREVENTATIVE HEALTH CARE: Primary | ICD-10-CM

## 2019-09-18 ENCOUNTER — TREATMENT (OUTPATIENT)
Dept: CARDIAC REHAB | Facility: HOSPITAL | Age: 74
End: 2019-09-18

## 2019-09-18 DIAGNOSIS — Z00.00 PREVENTATIVE HEALTH CARE: Primary | ICD-10-CM

## 2019-09-20 ENCOUNTER — TREATMENT (OUTPATIENT)
Dept: CARDIAC REHAB | Facility: HOSPITAL | Age: 74
End: 2019-09-20

## 2019-09-20 DIAGNOSIS — Z00.00 PREVENTATIVE HEALTH CARE: Primary | ICD-10-CM

## 2019-09-23 ENCOUNTER — TREATMENT (OUTPATIENT)
Dept: CARDIAC REHAB | Facility: HOSPITAL | Age: 74
End: 2019-09-23

## 2019-09-23 DIAGNOSIS — Z00.00 PREVENTATIVE HEALTH CARE: Primary | ICD-10-CM

## 2019-09-25 ENCOUNTER — TREATMENT (OUTPATIENT)
Dept: CARDIAC REHAB | Facility: HOSPITAL | Age: 74
End: 2019-09-25

## 2019-09-25 DIAGNOSIS — Z00.00 PREVENTATIVE HEALTH CARE: Primary | ICD-10-CM

## 2019-09-27 ENCOUNTER — TREATMENT (OUTPATIENT)
Dept: CARDIAC REHAB | Facility: HOSPITAL | Age: 74
End: 2019-09-27

## 2019-09-27 DIAGNOSIS — Z00.00 PREVENTATIVE HEALTH CARE: Primary | ICD-10-CM

## 2019-09-30 ENCOUNTER — TREATMENT (OUTPATIENT)
Dept: CARDIAC REHAB | Facility: HOSPITAL | Age: 74
End: 2019-09-30

## 2019-09-30 DIAGNOSIS — Z00.00 PREVENTATIVE HEALTH CARE: Primary | ICD-10-CM

## 2019-10-02 ENCOUNTER — TREATMENT (OUTPATIENT)
Dept: CARDIAC REHAB | Facility: HOSPITAL | Age: 74
End: 2019-10-02

## 2019-10-02 DIAGNOSIS — Z00.00 PREVENTATIVE HEALTH CARE: Primary | ICD-10-CM

## 2019-10-07 ENCOUNTER — TREATMENT (OUTPATIENT)
Dept: CARDIAC REHAB | Facility: HOSPITAL | Age: 74
End: 2019-10-07

## 2019-10-07 DIAGNOSIS — Z00.00 PREVENTATIVE HEALTH CARE: Primary | ICD-10-CM

## 2019-10-09 ENCOUNTER — TREATMENT (OUTPATIENT)
Dept: CARDIAC REHAB | Facility: HOSPITAL | Age: 74
End: 2019-10-09

## 2019-10-09 DIAGNOSIS — Z00.00 PREVENTATIVE HEALTH CARE: Primary | ICD-10-CM

## 2019-10-11 ENCOUNTER — TREATMENT (OUTPATIENT)
Dept: CARDIAC REHAB | Facility: HOSPITAL | Age: 74
End: 2019-10-11

## 2019-10-11 DIAGNOSIS — Z00.00 PREVENTATIVE HEALTH CARE: Primary | ICD-10-CM

## 2019-10-14 ENCOUNTER — TREATMENT (OUTPATIENT)
Dept: CARDIAC REHAB | Facility: HOSPITAL | Age: 74
End: 2019-10-14

## 2019-10-14 DIAGNOSIS — Z00.00 PREVENTATIVE HEALTH CARE: Primary | ICD-10-CM

## 2019-10-15 RX ORDER — SACUBITRIL AND VALSARTAN 49; 51 MG/1; MG/1
TABLET, FILM COATED ORAL
Qty: 180 TABLET | Refills: 3 | Status: SHIPPED | OUTPATIENT
Start: 2019-10-15 | End: 2020-01-16 | Stop reason: SDUPTHER

## 2019-10-15 RX ORDER — RIVAROXABAN 20 MG/1
TABLET, FILM COATED ORAL
Qty: 90 TABLET | Refills: 3 | Status: SHIPPED | OUTPATIENT
Start: 2019-10-15 | End: 2019-10-16 | Stop reason: DRUGHIGH

## 2019-10-16 ENCOUNTER — TREATMENT (OUTPATIENT)
Dept: CARDIAC REHAB | Facility: HOSPITAL | Age: 74
End: 2019-10-16

## 2019-10-16 DIAGNOSIS — Z00.00 PREVENTATIVE HEALTH CARE: Primary | ICD-10-CM

## 2019-10-16 NOTE — PROGRESS NOTES
[facility] sent a letter questioning pt's Xarelto 20 mg daily with Plavix 75 mg daily.    Per KTS, pt is on Xarelto for PE prevention and dose needs to be altered to Xarelto 10 mg daily.  New RX sent to [facility].

## 2019-10-18 ENCOUNTER — OFFICE VISIT (OUTPATIENT)
Dept: CARDIOLOGY | Facility: CLINIC | Age: 74
End: 2019-10-18

## 2019-10-18 ENCOUNTER — TREATMENT (OUTPATIENT)
Dept: CARDIAC REHAB | Facility: HOSPITAL | Age: 74
End: 2019-10-18

## 2019-10-18 VITALS
SYSTOLIC BLOOD PRESSURE: 112 MMHG | HEIGHT: 73 IN | DIASTOLIC BLOOD PRESSURE: 71 MMHG | HEART RATE: 71 BPM | WEIGHT: 205 LBS | BODY MASS INDEX: 27.17 KG/M2

## 2019-10-18 DIAGNOSIS — I26.09 OTHER ACUTE PULMONARY EMBOLISM WITH ACUTE COR PULMONALE (HCC): ICD-10-CM

## 2019-10-18 DIAGNOSIS — E78.5 DYSLIPIDEMIA: ICD-10-CM

## 2019-10-18 DIAGNOSIS — Z00.00 PREVENTATIVE HEALTH CARE: Primary | ICD-10-CM

## 2019-10-18 DIAGNOSIS — I25.9 IHD (ISCHEMIC HEART DISEASE): ICD-10-CM

## 2019-10-18 DIAGNOSIS — J43.9 PULMONARY EMPHYSEMA, UNSPECIFIED EMPHYSEMA TYPE (HCC): ICD-10-CM

## 2019-10-18 DIAGNOSIS — I50.22 CHRONIC LEFT VENTRICULAR SYSTOLIC HEART FAILURE (HCC): Primary | ICD-10-CM

## 2019-10-18 PROCEDURE — 99214 OFFICE O/P EST MOD 30 MIN: CPT | Performed by: INTERNAL MEDICINE

## 2019-10-18 NOTE — PROGRESS NOTES
Subjective:     Encounter Date:10/18/2019    Patient ID: Jonnie Roth is a 74 y.o.  white male, disabled , from [place], [place].      INTERNIST: Brian Lewis MD, [facility]  INTERVENTIONAL CARDIOLOGIST: Diego Ayala MD, [facility], [facility]   ELECTROPHYSIOLOGIST: Ferdinand Alba MD, [facility], [facility]  UROLOGIST: Simon Morin MD   GASTROENTEROLOGIST: Jose Barnard MD    Chief Complaint:   Chief Complaint   Patient presents with   • Coronary Artery Disease   • IHD (ischemic heart disease)     Problem List:  1. Ischemic heart disease:  a. Remote progressive angina pectoris/acute extensive anterolateral myocardial infarction/delayed presentation with thrombolysis/severe 3-vessel coronary atherosclerosis with severe single vessel involvement/PTCA with intracoronary stent deployment proximal-mid segment LAD/moderate-severe compensated left ventricular dysfunction. LVEF (0.35)/abnormal positive signal averaged EKG/oral anticoagulation, April 1995.  b. Remote NYHA class I-II angina pectoris/class III CHF/abnormal quantitative SPECT gated Cardiolite GXT, July 1998.  c. Stable persistent MUGA, LVEF (0.33, January 1999 and January 2000).   d. Residual NYHA class I angina pectoris/CHF with reduced MUGA scan: LVEF (0.25), April 2002.  e. Left heart catheterization with distal circumflex disease: EF 20%, September 2002.   f. MUGA in May 2003: EF 32%.   g. Sestamibi GXT on 04/08/2005: No ischemia. EF 0.29.   h. Residual NYHA class I angina pectoris/CHF with reduced acceptable MUGA scan: EF (0.32) and acceptable Pacesetter PCD interrogation/reprogramming, May 2003 with interrogation, September 2004.  i. Echocardiogram, September 2009 with EF 30%  j. Left heart catheterization by Dr. Bhupinder Gonzalez, August 2010, with LVEF of 35%, with 3-vessel native coronary artery disease, 95% mid-LAD status post PTCA and stenting with two 3 mm stents by Dr. Llanes.  k. Echocardiogram, 06/07/2012: Left ventricular ejection  fraction of 30%.  l. Hospitalization, 02/01/2014, for non-ST elevation MI, left heart catheterization by Dr. Ayala that demonstrated 60% mid stenosis of the right coronary artery that remains unchanged compared to previous, widely patent stents of the LAD with severe LV dysfunction of approximately 25%.   m. Left heart catheterization status post drug-eluting stent to the distal LAD and mid-RCA with an EF of 20% to 25% by Dr. Diego Ayala, 04/20/2015.   n. Left heart catheterization 1/16/17; nonobstructive CAD with patent previously placed stents, dilated cardiomyopathy with severe LV systolic dysfunction, EF less than 20%, normal hemodynamics, recommendations for continued medical therapy and risk factor, evaluation for noncardiac etiology of symptoms.  o. Residual CCS Class I/II angina pectoris/NYHA Class II exertional dyspnea and fatigue syndrome, summer 2017  p. Residual CCS 0 angina pectoris/NYHA class I-II exertional dyspnea and fatigue, February 2018, September 2018, March 2019, October 2019  2. Sudden cardiac death with primary ventricular fibrillation status post Pacesetter [place] ICD, [place], [place] in September 2000:   a. ICD generator change out with upgrade to a biventricular pacemaker ICD on 12/17/2007, [employer] device.  b. ICD discharge, 08/09/2012, secondary to ventricular flutter with initiation of amiodarone therapy.   c. Hospitalization, 02/01/2014, secondary to ICD discharge for ventricular tachycardia with subsequent discontinuation of Coreg and initiation of sotalol therapy.   d. Hospitalization, February 2014, secondary to ICD discharge for VT with subsequent discontinuation of Coreg and initiation of sotalol.  e. Echocardiogram, 04/07/2015: EF less than 20%.  f. Hospitalization secondary to VF and ICD shocks, 04/18/2015.  g. NIPS procedure with defibrillation threshold testing for VF and noninvasive program stimulation, 04/18/2015.   h. Initiation of mexiletine for recurrent ventricular  tachycardia with ICD shocks on 05/05/2015 with amiodarone load, recurrent VT and ICD shocks with hospitalization 05/16/2015-05/18/2015.   i. EP study with RFA of large anterior left ventricular scar extending from mid-left ventricular wall down apex by Dr. Ferdinand Alba, 05/21/2015.   j. Acceptable ICD interrogation with normal function, normal battery without V-tach or atrial fibrillation, 5 months on battery, April 2017, with subsequent [employer]LEY and [employer] replacement and acceptable DFT, October 2017, and acceptable interrogation, January 2018, August 2018, February 2019.  3. Dyslipidemia.  4. Status post remote operations.  5. Remote chronic tobacco use/abnormal chest x-ray with stable abnormal thoracic  CT scan without contrast demonstrating bilateral diffuse perimeter scarring and fibrosis with scattered subcentimeter noncalcified nodules (unchanged from February 2016), March 2017.  6. Left bundle branch block.  7. Burks trauma with hematuria with urosepsis requiring hospitalization, May 2015.  8. Pulmonary embolism, spring 2015.   9. Remote apparent hypothyroidism/replacement therapy - data deficit, January 2016.  10. Remote diagnosis of obstructive sleep apnea with CPAP use, 2017.  11. Bilateral cataract extraction November 2018, February 2019    No Known Allergies      Current Outpatient Medications:   •  atorvastatin (LIPITOR) 40 MG tablet, TAKE 1 TABLET BY MOUTH DAILY (NEED LAB WORK FOR ADDITIONAL REFILLS), Disp: 90 tablet, Rfl: 2  •  carvedilol (COREG) 12.5 MG tablet, Take 12.5 mg by mouth 2 (Two) Times a Day With Meals., Disp: , Rfl:   •  clopidogrel (PLAVIX) 75 MG tablet, Take 75 mg by mouth Every Morning., Disp: , Rfl:   •  ENTRESTO 49-51 MG tablet, TAKE 1 TABLET BY MOUTH TWICE DAILY, Disp: 180 tablet, Rfl: 3  •  eplerenone (INSPRA) 25 MG tablet, Take 12.5 mg by mouth Every Morning., Disp: , Rfl:   •  finasteride (PROSCAR) 5 MG tablet, Take 5 mg by mouth Every Morning., Disp: , Rfl:   •   "L-TRYPTOPHAN PO, Take 1,000 mg by mouth Every Night., Disp: , Rfl:   •  levothyroxine (SYNTHROID, LEVOTHROID) 25 MCG tablet, Take 25 mcg by mouth Every Morning., Disp: , Rfl:   •  MELATONIN PO, Take 9 mg by mouth Every Night., Disp: , Rfl:   •  metFORMIN (GLUCOPHAGE) 500 MG tablet, metformin 500 mg tablet  take 2 tablets (1,000 mg) by oral route once daily with the evening meal for 90 days, Disp: , Rfl:   •  nitroglycerin (NITROSTAT) 0.4 MG SL tablet, Place 0.4 mg under the tongue Every 5 (Five) Minutes As Needed for Chest Pain. Take no more than 3 doses in 15 minutes., Disp: , Rfl:   •  pantoprazole (PROTONIX) 40 MG EC tablet, Take 40 mg by mouth Every Morning., Disp: , Rfl:   •  polyethylene glycol (MIRALAX) packet, Take 17 g by mouth Every Other Day., Disp: , Rfl:   •  rivaroxaban (XARELTO) 10 MG tablet, Take 1 tablet by mouth Daily. (Patient taking differently: Take 5 mg by mouth Daily.), Disp: 90 tablet, Rfl: 3  •  torsemide (DEMADEX) 5 MG tablet, TAKE 1 TABLET BY MOUTH EVERY DAY AS DIRECTED, Disp: 90 tablet, Rfl: 3    HISTORY OF PRESENT ILLNESS: Patient returns for followup after a nearly 7-month hiatus.  He has been attending cardiac rehab on a regular basis.  He is accompanied to the office today by his wife.  He says he is \"not bad,\" but he seems to have decreased strength that he attributes to being almost 75 years old.  He has no chest pain, tightness, or pressure with his daily activities.  His wife says that he gets short of breath when he talks a lot.  He is able to get outside and walk and go to the grocery without any problems.  He sleeps well with his CPAP.  They have not traveled at all since their trip out west, but they are planning a train trip through the [facility] in June or July 2020.  The patient indicates that he never tried nicotinic acid as discussed after his labs drawn in February 2019; he has an appointment with Dr. Lewis next week for his wellness check and will ask him about " "that.  This is discussed with him today.  He is asked to forward any upcoming lab results to us for review, if at all possible.  Patient is advised that he should be taking the full Xarelto 10 mg daily.  Patient otherwise denies chest pain, severe shortness of breath, PND, edema, palpitations, syncope or presyncope at this time.        Review of Systems   Constitution: Positive for weakness and malaise/fatigue.   Cardiovascular: Positive for dyspnea on exertion.   Respiratory: Positive for shortness of breath.    Endocrine: Positive for cold intolerance and heat intolerance.   Hematologic/Lymphatic: Bruises/bleeds easily (Xarelto?).   Musculoskeletal: Positive for arthritis (shoulder and knees), muscle weakness, neck pain (head back, probable with dizziness) and stiffness.   Genitourinary: Positive for frequency.   Neurological: Positive for excessive daytime sleepiness and dizziness.      All other systems reviewed and otherwise negative.    Procedures       Objective:       Vitals:    10/18/19 0937 10/18/19 0939   BP: 119/77 112/71   BP Location: Left arm Left arm   Patient Position: Sitting Standing   Pulse: 71 71   Weight: 93 kg (205 lb)    Height: 185.4 cm (73\")      Body mass index is 27.05 kg/m².   Last weight:  208 lbs.    Physical Exam   Constitutional: He is oriented to person, place, and time. He appears well-developed and well-nourished.   Neck: No JVD present. Carotid bruit is not present. No thyromegaly present.   Cardiovascular: Regular rhythm, S1 normal and S2 normal. Exam reveals no gallop, no S3 and no friction rub.   Murmur heard.   Medium-pitched early systolic murmur is present with a grade of 2/6 at the lower left sternal border.  Pulses:       Carotid pulses are 1+ on the right side, and 1+ on the left side.       Radial pulses are 1+ on the right side, and 1+ on the left side.        Femoral pulses are 1+ on the right side, and 1+ on the left side.       Popliteal pulses are 1+ on the right " side, and 1+ on the left side.        Dorsalis pedis pulses are 1+ on the right side, and 1+ on the left side.        Posterior tibial pulses are 1+ on the right side, and 1+ on the left side.   Pulmonary/Chest: Effort normal. He has decreased breath sounds. He has no wheezes. He has no rhonchi. He has no rales.   Left precordial PCD site is nominal   Abdominal: Soft. He exhibits no mass. There is no hepatosplenomegaly. There is no tenderness. There is no guarding.   Bowel sounds audible x4   Musculoskeletal: Normal range of motion. He exhibits no edema.   Lymphadenopathy:     He has no cervical adenopathy.   Neurological: He is alert and oriented to person, place, and time.   Skin: Skin is warm, dry and intact. No rash noted.   Vitals reviewed.        Lab Review:   Lab Results   Component Value Date    GLUCOSE 109 (H) 01/03/2018    BUN 23 01/03/2018    CREATININE 0.90 01/03/2018    EGFRIFNONA 83 01/03/2018    BCR 25.6 (H) 01/03/2018    CO2 28.0 01/03/2018    CALCIUM 9.1 01/03/2018    ALBUMIN 3.80 01/15/2017    AST 85 (H) 01/15/2017     (H) 01/15/2017       Lab Results   Component Value Date    WBC 12.49 (H) 10/01/2017    HGB 15.2 10/01/2017    HCT 47.1 10/01/2017    MCV 91.5 10/01/2017     10/01/2017       Lab Results   Component Value Date    HGBA1C 6.40 (H) 10/01/2017       Lab Results   Component Value Date    TSH 3.832 03/25/2016       Lab Results   Component Value Date    CHOL 130 08/14/2017     Lab Results   Component Value Date    TRIG 70 08/14/2017    TRIG 203 (H) 01/30/2014     Lab Results   Component Value Date    HDL 40 08/14/2017    HDL 31 (L) 01/30/2014     Lab Results   Component Value Date    LDL 77 08/14/2017    LDL 65 01/30/2014       Lab Results   Component Value Date    BNP 77.0 06/08/2017           Assessment:   Overall continued acceptable course with no new interim cardiopulmonary complaints with good functional status. We will defer additional diagnostic or therapeutic  intervention from a cardiac perspective at this time.  Hopefully, we will be allowed to review any upcoming laboratory studies with him by letter.       Diagnosis Plan   1. Chronic left ventricular systolic heart failure (CMS/HCC)  Adult Transthoracic Echo Complete W/ Cont if Necessary Per Protocol   2. IHD (ischemic heart disease)  No angina pectoris currently; continue rehabilitation   3. Other acute pulmonary embolism with acute cor pulmonale (CMS/HCC)  Continue Xarelto 10 mg daily for DVT/PTE prophylaxis   4. Pulmonary emphysema, unspecified emphysema type (CMS/HCC)  Stable examination without active bronchospasm/bronchitis   5. Dyslipidemia  No recent laboratory studies to review; continue atorvastatin          Plan:         1. Patient to continue current medications and close follow up with the above providers.  2. Echocardiogram in the next 1-2 months to reassess LV size and function  3. Influenza immunization is strongly recommended.  4. Tentative cardiology follow up in April 2020, or patient may return sooner PRN.    Transcribed by Rosana Swenson for Dr. Bhupinder Gonzalez at 9:46 AM on 10/18/2019    I, Bhupinder Gonzalez MD, [facility], personally performed the services described in this documentation as scribed by the above named individual in my presence, and it is both accurate and complete. At 11:03 AM on 10/18/2019

## 2019-10-21 ENCOUNTER — TREATMENT (OUTPATIENT)
Dept: CARDIAC REHAB | Facility: HOSPITAL | Age: 74
End: 2019-10-21

## 2019-10-21 DIAGNOSIS — Z00.00 PREVENTATIVE HEALTH CARE: Primary | ICD-10-CM

## 2019-10-23 ENCOUNTER — TREATMENT (OUTPATIENT)
Dept: CARDIAC REHAB | Facility: HOSPITAL | Age: 74
End: 2019-10-23

## 2019-10-23 DIAGNOSIS — Z00.00 PREVENTATIVE HEALTH CARE: Primary | ICD-10-CM

## 2019-10-28 ENCOUNTER — TREATMENT (OUTPATIENT)
Dept: CARDIAC REHAB | Facility: HOSPITAL | Age: 74
End: 2019-10-28

## 2019-10-28 DIAGNOSIS — Z00.00 PREVENTATIVE HEALTH CARE: Primary | ICD-10-CM

## 2019-11-01 ENCOUNTER — TREATMENT (OUTPATIENT)
Dept: CARDIAC REHAB | Facility: HOSPITAL | Age: 74
End: 2019-11-01

## 2019-11-01 DIAGNOSIS — Z00.00 PREVENTATIVE HEALTH CARE: Primary | ICD-10-CM

## 2019-11-04 ENCOUNTER — TREATMENT (OUTPATIENT)
Dept: CARDIAC REHAB | Facility: HOSPITAL | Age: 74
End: 2019-11-04

## 2019-11-04 DIAGNOSIS — Z00.00 PREVENTATIVE HEALTH CARE: Primary | ICD-10-CM

## 2019-11-06 ENCOUNTER — TREATMENT (OUTPATIENT)
Dept: CARDIAC REHAB | Facility: HOSPITAL | Age: 74
End: 2019-11-06

## 2019-11-06 DIAGNOSIS — Z00.00 PREVENTATIVE HEALTH CARE: Primary | ICD-10-CM

## 2019-11-08 ENCOUNTER — TREATMENT (OUTPATIENT)
Dept: CARDIAC REHAB | Facility: HOSPITAL | Age: 74
End: 2019-11-08

## 2019-11-08 ENCOUNTER — HOSPITAL ENCOUNTER (OUTPATIENT)
Dept: CARDIOLOGY | Facility: HOSPITAL | Age: 74
Discharge: HOME | End: 2019-11-08
Admitting: INTERNAL MEDICINE

## 2019-11-08 VITALS — HEIGHT: 73 IN | WEIGHT: 205 LBS | BODY MASS INDEX: 27.17 KG/M2

## 2019-11-08 DIAGNOSIS — I50.22 CHRONIC LEFT VENTRICULAR SYSTOLIC HEART FAILURE (HCC): ICD-10-CM

## 2019-11-08 DIAGNOSIS — Z00.00 PREVENTATIVE HEALTH CARE: Primary | ICD-10-CM

## 2019-11-08 LAB
BH CV ECHO MEAS - AO ROOT AREA (BSA CORRECTED): 1.7
BH CV ECHO MEAS - AO ROOT AREA: 11 CM^2
BH CV ECHO MEAS - AO ROOT DIAM: 3.7 CM
BH CV ECHO MEAS - BSA(HAYCOCK): 2.2 M^2
BH CV ECHO MEAS - BSA: 2.2 M^2
BH CV ECHO MEAS - BZI_BMI: 27 KILOGRAMS/M^2
BH CV ECHO MEAS - BZI_METRIC_HEIGHT: 185.4 CM
BH CV ECHO MEAS - BZI_METRIC_WEIGHT: 93 KG
BH CV ECHO MEAS - EDV(CUBED): 183.9 ML
BH CV ECHO MEAS - EDV(MOD-SP2): 282 ML
BH CV ECHO MEAS - EDV(MOD-SP4): 289 ML
BH CV ECHO MEAS - EDV(TEICH): 159.2 ML
BH CV ECHO MEAS - EF(CUBED): -2.2 %
BH CV ECHO MEAS - EF(MOD-BP): 35 %
BH CV ECHO MEAS - EF(MOD-SP2): 39.4 %
BH CV ECHO MEAS - EF(MOD-SP4): 28.4 %
BH CV ECHO MEAS - EF(TEICH): -1.7 %
BH CV ECHO MEAS - ESV(CUBED): 187.9 ML
BH CV ECHO MEAS - ESV(MOD-SP2): 171 ML
BH CV ECHO MEAS - ESV(MOD-SP4): 207 ML
BH CV ECHO MEAS - ESV(TEICH): 161.9 ML
BH CV ECHO MEAS - FS: -0.73 %
BH CV ECHO MEAS - IVS/LVPW: 0.61
BH CV ECHO MEAS - IVSD: 1 CM
BH CV ECHO MEAS - LA DIMENSION: 4.5 CM
BH CV ECHO MEAS - LA/AO: 1.2
BH CV ECHO MEAS - LAD MAJOR: 5.8 CM
BH CV ECHO MEAS - LAT PEAK E' VEL: 2.1 CM/SEC
BH CV ECHO MEAS - LATERAL E/E' RATIO: 15.3
BH CV ECHO MEAS - LV DIASTOLIC VOL/BSA (35-75): 132.9 ML/M^2
BH CV ECHO MEAS - LV MASS(C)D: 314.7 GRAMS
BH CV ECHO MEAS - LV MASS(C)DI: 144.7 GRAMS/M^2
BH CV ECHO MEAS - LV MAX PG: 1.5 MMHG
BH CV ECHO MEAS - LV MEAN PG: 0.65 MMHG
BH CV ECHO MEAS - LV SYSTOLIC VOL/BSA (12-30): 95.2 ML/M^2
BH CV ECHO MEAS - LV V1 MAX: 61.2 CM/SEC
BH CV ECHO MEAS - LV V1 MEAN: 37 CM/SEC
BH CV ECHO MEAS - LV V1 VTI: 10.2 CM
BH CV ECHO MEAS - LVIDD: 7.2 CM
BH CV ECHO MEAS - LVIDS: 5.7 CM
BH CV ECHO MEAS - LVLD AP2: 10.4 CM
BH CV ECHO MEAS - LVLD AP4: 10.6 CM
BH CV ECHO MEAS - LVLS AP2: 9.8 CM
BH CV ECHO MEAS - LVLS AP4: 9.9 CM
BH CV ECHO MEAS - LVOT AREA (M): 4.2 CM^2
BH CV ECHO MEAS - LVOT AREA: 4 CM^2
BH CV ECHO MEAS - LVOT DIAM: 2.3 CM
BH CV ECHO MEAS - LVPWD: 1.1 CM
BH CV ECHO MEAS - MED PEAK E' VEL: 3.7 CM/SEC
BH CV ECHO MEAS - MEDIAL E/E' RATIO: 8.8
BH CV ECHO MEAS - MV A MAX VEL: 64.7 CM/SEC
BH CV ECHO MEAS - MV DEC TIME: 0.23 SEC
BH CV ECHO MEAS - MV E MAX VEL: 34.1 CM/SEC
BH CV ECHO MEAS - MV E/A: 0.53
BH CV ECHO MEAS - PA ACC SLOPE: 581 CM/SEC^2
BH CV ECHO MEAS - PA ACC TIME: 0.12 SEC
BH CV ECHO MEAS - PA PR(ACCEL): 24.3 MMHG
BH CV ECHO MEAS - PI END-D VEL: 76 CM/SEC
BH CV ECHO MEAS - RAP SYSTOLE: 8 MMHG
BH CV ECHO MEAS - RVDD: 2.6 CM
BH CV ECHO MEAS - RVSP: 26 MMHG
BH CV ECHO MEAS - SI(CUBED): -1.9 ML/M^2
BH CV ECHO MEAS - SI(LVOT): 18.8 ML/M^2
BH CV ECHO MEAS - SI(MOD-SP2): 51 ML/M^2
BH CV ECHO MEAS - SI(MOD-SP4): 37.7 ML/M^2
BH CV ECHO MEAS - SI(TEICH): -1.2 ML/M^2
BH CV ECHO MEAS - SV(CUBED): -4.1 ML
BH CV ECHO MEAS - SV(LVOT): 40.9 ML
BH CV ECHO MEAS - SV(MOD-SP2): 111 ML
BH CV ECHO MEAS - SV(MOD-SP4): 82 ML
BH CV ECHO MEAS - SV(TEICH): -2.7 ML
BH CV ECHO MEAS - TAPSE (>1.6): 1.5 CM2
BH CV ECHO MEAS - TR MAX PG: 18 MMHG
BH CV ECHO MEAS - TR MAX VEL: 209.7 CM/SEC
BH CV ECHO MEASUREMENTS AVERAGE E/E' RATIO: 11.76
BH CV VAS BP LEFT ARM: NORMAL MMHG
BH CV XLRA - RV BASE: 3.7 CM
BH CV XLRA - RV LENGTH: 6.6 CM
BH CV XLRA - RV MID: 3.5 CM
BH CV XLRA - TDI S': 5.48 CM/SEC
LEFT ATRIUM VOLUME INDEX: 24.8 ML/M^2
LEFT ATRIUM VOLUME: 54 ML
LV EF 2D ECHO EST: 30 %
MAXIMAL PREDICTED HEART RATE: 145 BPM
STRESS TARGET HR: 123 BPM

## 2019-11-08 PROCEDURE — 93306 TTE W/DOPPLER COMPLETE: CPT

## 2019-11-08 PROCEDURE — 25010000002 SULFUR HEXAFLUORIDE MICROSPH 60.7-25 MG RECONSTITUTED SUSPENSION: Performed by: INTERNAL MEDICINE

## 2019-11-08 PROCEDURE — 93306 TTE W/DOPPLER COMPLETE: CPT | Performed by: INTERNAL MEDICINE

## 2019-11-08 RX ADMIN — SULFUR HEXAFLUORIDE 3 ML: KIT at 08:45

## 2019-11-11 ENCOUNTER — TREATMENT (OUTPATIENT)
Dept: CARDIAC REHAB | Facility: HOSPITAL | Age: 74
End: 2019-11-11

## 2019-11-11 DIAGNOSIS — Z00.00 PREVENTATIVE HEALTH CARE: Primary | ICD-10-CM

## 2019-11-18 ENCOUNTER — TREATMENT (OUTPATIENT)
Dept: CARDIAC REHAB | Facility: HOSPITAL | Age: 74
End: 2019-11-18

## 2019-11-18 DIAGNOSIS — Z00.00 PREVENTATIVE HEALTH CARE: Primary | ICD-10-CM

## 2019-11-20 ENCOUNTER — TREATMENT (OUTPATIENT)
Dept: CARDIAC REHAB | Facility: HOSPITAL | Age: 74
End: 2019-11-20

## 2019-11-20 DIAGNOSIS — Z00.00 PREVENTATIVE HEALTH CARE: Primary | ICD-10-CM

## 2019-11-21 ENCOUNTER — OFFICE VISIT (OUTPATIENT)
Dept: CARDIOLOGY | Facility: CLINIC | Age: 74
End: 2019-11-21

## 2019-11-21 VITALS
HEIGHT: 73 IN | DIASTOLIC BLOOD PRESSURE: 70 MMHG | SYSTOLIC BLOOD PRESSURE: 112 MMHG | HEART RATE: 70 BPM | WEIGHT: 204 LBS | BODY MASS INDEX: 27.04 KG/M2

## 2019-11-21 DIAGNOSIS — I25.810 CORONARY ARTERY DISEASE INVOLVING CORONARY BYPASS GRAFT OF NATIVE HEART WITHOUT ANGINA PECTORIS: ICD-10-CM

## 2019-11-21 DIAGNOSIS — I47.20 VENTRICULAR TACHYCARDIA (HCC): Primary | ICD-10-CM

## 2019-11-21 DIAGNOSIS — I50.42 CHRONIC COMBINED SYSTOLIC AND DIASTOLIC CONGESTIVE HEART FAILURE (HCC): ICD-10-CM

## 2019-11-21 DIAGNOSIS — I26.99 OTHER PULMONARY EMBOLISM WITHOUT ACUTE COR PULMONALE, UNSPECIFIED CHRONICITY (HCC): ICD-10-CM

## 2019-11-21 PROCEDURE — 93284 PRGRMG EVAL IMPLANTABLE DFB: CPT | Performed by: INTERNAL MEDICINE

## 2019-11-21 PROCEDURE — 99213 OFFICE O/P EST LOW 20 MIN: CPT | Performed by: INTERNAL MEDICINE

## 2019-11-21 RX ORDER — ROSUVASTATIN CALCIUM 40 MG/1
40 TABLET, COATED ORAL DAILY
COMMUNITY
End: 2023-03-07 | Stop reason: SDUPTHER

## 2019-11-21 NOTE — PROGRESS NOTES
Jonnie Roth  1945  [phone]      11/21/2019    [facility] CARDIOLOGY     Borders, Brian Ferraro MD  [address] [place] [address]    Chief Complaint   Patient presents with   • VT       1. Sudden cardiac death with primary ventricular fibrillation status post Pacesetter [place] ICD, [place], [place] in September 2000:   a. ICD generator change out with upgrade to a biventricular pacemaker ICD on 12/17/2007, [employer] device.  b. ICD discharge, 08/09/2012, secondary to ventricular flutter with initiation of amiodarone therapy.   c. Hospitalization, 02/01/2014, secondary to ICD discharge for ventricular tachycardia with subsequent discontinuation of Coreg and initiation of sotalol therapy.   d. Hospitalization, February 2014, secondary to ICD discharge for VT with subsequent discontinuation of Coreg and initiation of sotalol.  e. Echocardiogram, 04/07/2015: EF less than 20%.  f. Hospitalization secondary to VF and ICD shocks, 04/18/2015.  g. NIPS procedure with defibrillation threshold testing for VF and noninvasive program stimulation, 04/18/2015.   h. Initiation of mexiletine for recurrent ventricular tachycardia with ICD shocks on 05/05/2015 with amiodarone load, recurrent VT and ICD shocks with hospitalization 05/16/2015-05/18/2015.   i. EP study with RFA of large anterior left ventricular scar extending from mid-left ventricular wall down apex by Dr. Ferdinand Alba, 05/21/2015.     Acceptable ICD interrogation with normal function, normal battery without V-tach or atrial    fibrillation, 5 months on battery, April 2017, with subsequent [employer] and [employer]   replacement and acceptable DFT, October 2017, and acceptable interrogation, January 2018,   August 2018, February 2019.          j.  Echo 11/19 LVEF 30%, mild TR  2. Ischemic heart disease:  a. Remote progressive angina pectoris/acute extensive anterolateral myocardial infarction/delayed  presentation with thrombolysis/severe 3-vessel coronary atherosclerosis with severe single vessel involvement/PTCA with intracoronary stent deployment proximal-mid segment LAD/moderate-severe compensated left ventricular dysfunction. LVEF (0.35)/abnormal positive signal averaged EKG/oral anticoagulation, April 1995.  b. Remote NYHA class I-II angina pectoris/class III CHF/abnormal quantitative SPECT gated Cardiolite GXT, July 1998.  c. Stable persistent MUGA, LVEF (0.33, January 1999 and January 2000).   d. Residual NYHA class I angina pectoris/CHF with reduced MUGA scan: LVEF (0.25), April 2002.  e. Left heart catheterization with distal circumflex disease: EF 20%, September 2002.   f. MUGA in May 2003: EF 32%.   g. Sestamibi GXT on 04/08/2005: No ischemia. EF 29%.   h. Residual NYHA class I angina pectoris/CHF with reduced acceptable MUGA scan: EF (0.32) and acceptable Pacesetter PCD interrogation/reprogramming, May 2003 with interrogation, September 2004.  i. Echocardiogram, September 2009 with EF 30%  j. Left heart catheterization by Dr. Bhupinder Gonzalez, August 2010, with LVEF of 35%, with 3-vessel native coronary artery disease, 95% mid-LAD status post PTCA and stenting with two 3 mm stents by Dr. Llanes.  k. Echocardiogram, 06/07/2012: Left ventricular ejection fraction of 30%.  l. Hospitalization, 02/01/2014, for non-ST elevation MI, left heart catheterization by Dr. Ayala that demonstrated 60% mid stenosis of the right coronary artery that remains unchanged compared to previous, widely patent stents of the LAD with severe LV dysfunction of approximately 25%.   m. Left heart catheterization status post drug-eluting stent to the distal LAD and mid-RCA with an EF of 20% to 25% by Dr. Diego Ayala, 04/20/2015.   n. Left heart catheterization 1/16/17; nonobstructive CAD with patent previously placed stents, dilated cardiomyopathy with severe LV systolic dysfunction, EF less than 20%, normal hemodynamics,  recommendations for continued medical therapy and risk factor, evaluation for noncardiac etiology of symptoms.  o. Residual CCS Class I/II angina pectoris/NYHA Class II exertional dyspnea and fatigue syndrome, summer 2017  p. Residual CCS 0 angina pectoris/NYHA class I-II exertional dyspnea and fatigue, February 2018, September 2018, March 2019.  3. Dyslipidemia.  4. Status post remote operations.  5. Remote chronic tobacco use/abnormal chest x-ray with stable abnormal thoracic  CT scan without contrast demonstrating bilateral diffuse perimeter scarring and fibrosis with scattered subcentimeter noncalcified nodules (unchanged from February 2016), March 2017.  6. Left bundle branch block.  7. Burks trauma with hematuria with urosepsis requiring hospitalization, May 2015.  8. Pulmonary embolism, spring 2015.   9. Remote apparent hypothyroidism/replacement therapy - data deficit, January 2016.  10. Remote diagnosis of obstructive sleep apnea with CPAP use, 2017.  11. Bilateral cataract extraction November 2018, February 2019      Allergies  No Known Allergies    Current Medications    Current Outpatient Medications:   •  carvedilol (COREG) 12.5 MG tablet, Take 12.5 mg by mouth 2 (Two) Times a Day With Meals., Disp: , Rfl:   •  clopidogrel (PLAVIX) 75 MG tablet, Take 75 mg by mouth Every Morning., Disp: , Rfl:   •  ENTRESTO 49-51 MG tablet, TAKE 1 TABLET BY MOUTH TWICE DAILY, Disp: 180 tablet, Rfl: 3  •  eplerenone (INSPRA) 25 MG tablet, Take 12.5 mg by mouth Every Morning., Disp: , Rfl:   •  finasteride (PROSCAR) 5 MG tablet, Take 5 mg by mouth Every Morning., Disp: , Rfl:   •  L-TRYPTOPHAN PO, Take 1,000 mg by mouth Every Night., Disp: , Rfl:   •  levothyroxine (SYNTHROID, LEVOTHROID) 25 MCG tablet, Take 25 mcg by mouth Every Morning., Disp: , Rfl:   •  MELATONIN PO, Take 9 mg by mouth Every Night., Disp: , Rfl:   •  metFORMIN (GLUCOPHAGE) 500 MG tablet, metformin 500 mg tablet  take 2 tablets (1,000 mg) by oral route  "once daily with the evening meal for 90 days, Disp: , Rfl:   •  nitroglycerin (NITROSTAT) 0.4 MG SL tablet, Place 0.4 mg under the tongue Every 5 (Five) Minutes As Needed for Chest Pain. Take no more than 3 doses in 15 minutes., Disp: , Rfl:   •  pantoprazole (PROTONIX) 40 MG EC tablet, Take 40 mg by mouth Every Morning., Disp: , Rfl:   •  polyethylene glycol (MIRALAX) packet, Take 17 g by mouth Every Other Day., Disp: , Rfl:   •  rivaroxaban (XARELTO) 10 MG tablet, Take 1 tablet by mouth Daily., Disp: 90 tablet, Rfl: 3  •  rosuvastatin (CRESTOR) 20 MG tablet, Take 40 mg by mouth Daily., Disp: , Rfl:   •  torsemide (DEMADEX) 5 MG tablet, TAKE 1 TABLET BY MOUTH EVERY DAY AS DIRECTED, Disp: 90 tablet, Rfl: 3    History of Present Illness     Pt presents for follow up of VF/CAD/HTN. Since the pt has seen us, pt denies any palpitations, SOB, CP, LH, and dizziness. Denies any hospitalizations, ER visits, ICD shocks, or TIA/CVA symptoms. Overall feels well. No side effects to medications.    ROS:  General:  Denies fatigue, weight gain or loss  Cardiovascular:  Denies CP, PND, syncope, near syncope, edema or palpitations.  Pulmonary:  Denies LU, cough, or wheezing    Vitals:    11/21/19 1152   BP: 112/70   BP Location: Right arm   Patient Position: Sitting   Pulse: 70   Weight: 92.5 kg (204 lb)   Height: 185.4 cm (73\")       PE:  General: NAD  Neck: no JVD, no carotid bruits, no TM  Heart RRR, NL S1, S2, S4 present, no rubs, murmurs  Lungs: CTA, no wheezes, rhonchi, or rales  Abd: soft, non-tender, NL BS  Ext: No musculoskeletal deformities, no edema, cyanosis, or clubbing  Psych: normal mood and affect    Diagnostic Data:  Procedures.    1. Ventricular tachycardia (CMS/HCC)    2. Coronary artery disease involving coronary bypass graft of native heart without angina pectoris    3. Chronic combined systolic and diastolic congestive heart failure (EF<20%)    4. Other pulmonary embolism without acute cor pulmonale, " unspecified chronicity (CMS/HCC)        ICD interrogation: NL fxn, NL battery fxn, No VT or AF, 98% BV paced, 18 M on battery    Plan:  1. VT:  - s/p RFA in 2015 with no recurrence  - no sustained VT on interrogation     2. Chronic CHF Systolic: LVEF up to 30%  Class II on meds coreg and Entresto  - BiV ICD check within normal limits. LV threshold is chronically elevated and stable.      3. CAD:  - on Plavix, follows with Dr. Gonzalez. Stable     4. History of PE:  - on Xarelto    F/up in 6 months

## 2019-11-22 ENCOUNTER — TREATMENT (OUTPATIENT)
Dept: CARDIAC REHAB | Facility: HOSPITAL | Age: 74
End: 2019-11-22

## 2019-11-22 DIAGNOSIS — Z00.00 PREVENTATIVE HEALTH CARE: Primary | ICD-10-CM

## 2019-11-25 ENCOUNTER — TREATMENT (OUTPATIENT)
Dept: CARDIAC REHAB | Facility: HOSPITAL | Age: 74
End: 2019-11-25

## 2019-11-25 DIAGNOSIS — Z00.00 PREVENTATIVE HEALTH CARE: Primary | ICD-10-CM

## 2019-11-27 ENCOUNTER — TREATMENT (OUTPATIENT)
Dept: CARDIAC REHAB | Facility: HOSPITAL | Age: 74
End: 2019-11-27

## 2019-11-27 DIAGNOSIS — Z00.00 PREVENTATIVE HEALTH CARE: Primary | ICD-10-CM

## 2019-12-02 ENCOUNTER — TREATMENT (OUTPATIENT)
Dept: CARDIAC REHAB | Facility: HOSPITAL | Age: 74
End: 2019-12-02

## 2019-12-02 DIAGNOSIS — Z00.00 PREVENTATIVE HEALTH CARE: Primary | ICD-10-CM

## 2019-12-04 ENCOUNTER — TREATMENT (OUTPATIENT)
Dept: CARDIAC REHAB | Facility: HOSPITAL | Age: 74
End: 2019-12-04

## 2019-12-04 DIAGNOSIS — Z00.00 PREVENTATIVE HEALTH CARE: Primary | ICD-10-CM

## 2019-12-06 ENCOUNTER — TREATMENT (OUTPATIENT)
Dept: CARDIAC REHAB | Facility: HOSPITAL | Age: 74
End: 2019-12-06

## 2019-12-06 DIAGNOSIS — Z00.00 PREVENTATIVE HEALTH CARE: Primary | ICD-10-CM

## 2019-12-09 ENCOUNTER — TREATMENT (OUTPATIENT)
Dept: CARDIAC REHAB | Facility: HOSPITAL | Age: 74
End: 2019-12-09

## 2019-12-09 DIAGNOSIS — Z00.00 PREVENTATIVE HEALTH CARE: Primary | ICD-10-CM

## 2019-12-11 ENCOUNTER — TREATMENT (OUTPATIENT)
Dept: CARDIAC REHAB | Facility: HOSPITAL | Age: 74
End: 2019-12-11

## 2019-12-11 DIAGNOSIS — Z00.00 PREVENTATIVE HEALTH CARE: Primary | ICD-10-CM

## 2019-12-13 ENCOUNTER — TREATMENT (OUTPATIENT)
Dept: CARDIAC REHAB | Facility: HOSPITAL | Age: 74
End: 2019-12-13

## 2019-12-13 DIAGNOSIS — Z00.00 PREVENTATIVE HEALTH CARE: Primary | ICD-10-CM

## 2019-12-16 ENCOUNTER — TREATMENT (OUTPATIENT)
Dept: CARDIAC REHAB | Facility: HOSPITAL | Age: 74
End: 2019-12-16

## 2019-12-16 DIAGNOSIS — Z00.00 PREVENTATIVE HEALTH CARE: Primary | ICD-10-CM

## 2019-12-20 ENCOUNTER — TREATMENT (OUTPATIENT)
Dept: CARDIAC REHAB | Facility: HOSPITAL | Age: 74
End: 2019-12-20

## 2019-12-20 DIAGNOSIS — Z00.00 PREVENTATIVE HEALTH CARE: Primary | ICD-10-CM

## 2019-12-23 ENCOUNTER — TREATMENT (OUTPATIENT)
Dept: CARDIAC REHAB | Facility: HOSPITAL | Age: 74
End: 2019-12-23

## 2019-12-23 DIAGNOSIS — Z00.00 PREVENTATIVE HEALTH CARE: Primary | ICD-10-CM

## 2019-12-27 ENCOUNTER — TREATMENT (OUTPATIENT)
Dept: CARDIAC REHAB | Facility: HOSPITAL | Age: 74
End: 2019-12-27

## 2019-12-27 DIAGNOSIS — Z00.00 PREVENTATIVE HEALTH CARE: Primary | ICD-10-CM

## 2019-12-30 ENCOUNTER — TREATMENT (OUTPATIENT)
Dept: CARDIAC REHAB | Facility: HOSPITAL | Age: 74
End: 2019-12-30

## 2019-12-30 DIAGNOSIS — Z00.00 PREVENTATIVE HEALTH CARE: Primary | ICD-10-CM

## 2020-01-06 ENCOUNTER — TREATMENT (OUTPATIENT)
Dept: CARDIAC REHAB | Facility: HOSPITAL | Age: 75
End: 2020-01-06

## 2020-01-06 DIAGNOSIS — Z00.00 PREVENTATIVE HEALTH CARE: Primary | ICD-10-CM

## 2020-01-08 ENCOUNTER — TREATMENT (OUTPATIENT)
Dept: CARDIAC REHAB | Facility: HOSPITAL | Age: 75
End: 2020-01-08

## 2020-01-08 DIAGNOSIS — Z00.00 PREVENTATIVE HEALTH CARE: Primary | ICD-10-CM

## 2020-01-10 ENCOUNTER — TREATMENT (OUTPATIENT)
Dept: CARDIAC REHAB | Facility: HOSPITAL | Age: 75
End: 2020-01-10

## 2020-01-10 DIAGNOSIS — Z00.00 PREVENTATIVE HEALTH CARE: Primary | ICD-10-CM

## 2020-01-13 ENCOUNTER — TREATMENT (OUTPATIENT)
Dept: CARDIAC REHAB | Facility: HOSPITAL | Age: 75
End: 2020-01-13

## 2020-01-13 DIAGNOSIS — Z00.00 PREVENTATIVE HEALTH CARE: Primary | ICD-10-CM

## 2020-01-15 ENCOUNTER — TREATMENT (OUTPATIENT)
Dept: CARDIAC REHAB | Facility: HOSPITAL | Age: 75
End: 2020-01-15

## 2020-01-15 DIAGNOSIS — Z00.00 PREVENTATIVE HEALTH CARE: Primary | ICD-10-CM

## 2020-01-16 RX ORDER — NITROGLYCERIN 0.4 MG/1
0.4 TABLET SUBLINGUAL
Qty: 30 TABLET | Refills: 11 | Status: SHIPPED | OUTPATIENT
Start: 2020-01-16 | End: 2023-03-07

## 2020-01-16 RX ORDER — TORSEMIDE 5 MG/1
5 TABLET ORAL DAILY
Qty: 90 TABLET | Refills: 1 | Status: SHIPPED | OUTPATIENT
Start: 2020-01-16 | End: 2020-06-17

## 2020-01-17 ENCOUNTER — APPOINTMENT (OUTPATIENT)
Dept: CARDIAC REHAB | Facility: HOSPITAL | Age: 75
End: 2020-01-17

## 2020-01-20 ENCOUNTER — TREATMENT (OUTPATIENT)
Dept: CARDIAC REHAB | Facility: HOSPITAL | Age: 75
End: 2020-01-20

## 2020-01-20 DIAGNOSIS — Z00.00 PREVENTATIVE HEALTH CARE: Primary | ICD-10-CM

## 2020-01-22 ENCOUNTER — TREATMENT (OUTPATIENT)
Dept: CARDIAC REHAB | Facility: HOSPITAL | Age: 75
End: 2020-01-22

## 2020-01-22 DIAGNOSIS — Z00.00 PREVENTATIVE HEALTH CARE: Primary | ICD-10-CM

## 2020-01-24 ENCOUNTER — TREATMENT (OUTPATIENT)
Dept: CARDIAC REHAB | Facility: HOSPITAL | Age: 75
End: 2020-01-24

## 2020-01-24 DIAGNOSIS — Z00.00 PREVENTATIVE HEALTH CARE: Primary | ICD-10-CM

## 2020-01-27 ENCOUNTER — TREATMENT (OUTPATIENT)
Dept: CARDIAC REHAB | Facility: HOSPITAL | Age: 75
End: 2020-01-27

## 2020-01-27 DIAGNOSIS — Z00.00 PREVENTATIVE HEALTH CARE: Primary | ICD-10-CM

## 2020-02-03 ENCOUNTER — TREATMENT (OUTPATIENT)
Dept: CARDIAC REHAB | Facility: HOSPITAL | Age: 75
End: 2020-02-03

## 2020-02-03 DIAGNOSIS — Z00.00 PREVENTATIVE HEALTH CARE: Primary | ICD-10-CM

## 2020-02-05 ENCOUNTER — TREATMENT (OUTPATIENT)
Dept: CARDIAC REHAB | Facility: HOSPITAL | Age: 75
End: 2020-02-05

## 2020-02-05 DIAGNOSIS — Z00.00 PREVENTATIVE HEALTH CARE: Primary | ICD-10-CM

## 2020-02-07 ENCOUNTER — TREATMENT (OUTPATIENT)
Dept: CARDIAC REHAB | Facility: HOSPITAL | Age: 75
End: 2020-02-07

## 2020-02-07 DIAGNOSIS — Z00.00 PREVENTATIVE HEALTH CARE: Primary | ICD-10-CM

## 2020-02-10 ENCOUNTER — TREATMENT (OUTPATIENT)
Dept: CARDIAC REHAB | Facility: HOSPITAL | Age: 75
End: 2020-02-10

## 2020-02-10 DIAGNOSIS — Z00.00 PREVENTATIVE HEALTH CARE: Primary | ICD-10-CM

## 2020-02-12 ENCOUNTER — CLINICAL SUPPORT NO REQUIREMENTS (OUTPATIENT)
Dept: CARDIOLOGY | Facility: CLINIC | Age: 75
End: 2020-02-12

## 2020-02-12 ENCOUNTER — TREATMENT (OUTPATIENT)
Dept: CARDIAC REHAB | Facility: HOSPITAL | Age: 75
End: 2020-02-12

## 2020-02-12 DIAGNOSIS — I50.42 CHRONIC COMBINED SYSTOLIC AND DIASTOLIC CONGESTIVE HEART FAILURE (HCC): ICD-10-CM

## 2020-02-12 DIAGNOSIS — Z00.00 PREVENTATIVE HEALTH CARE: Primary | ICD-10-CM

## 2020-02-12 DIAGNOSIS — I47.20 VENTRICULAR TACHYCARDIA (HCC): ICD-10-CM

## 2020-02-12 PROCEDURE — 93296 REM INTERROG EVL PM/IDS: CPT | Performed by: INTERNAL MEDICINE

## 2020-02-12 PROCEDURE — 93295 DEV INTERROG REMOTE 1/2/MLT: CPT | Performed by: INTERNAL MEDICINE

## 2020-02-17 ENCOUNTER — TREATMENT (OUTPATIENT)
Dept: CARDIAC REHAB | Facility: HOSPITAL | Age: 75
End: 2020-02-17

## 2020-02-17 DIAGNOSIS — Z00.00 PREVENTATIVE HEALTH CARE: Primary | ICD-10-CM

## 2020-02-19 ENCOUNTER — TREATMENT (OUTPATIENT)
Dept: CARDIAC REHAB | Facility: HOSPITAL | Age: 75
End: 2020-02-19

## 2020-02-19 DIAGNOSIS — Z00.00 PREVENTATIVE HEALTH CARE: Primary | ICD-10-CM

## 2020-02-21 ENCOUNTER — TREATMENT (OUTPATIENT)
Dept: CARDIAC REHAB | Facility: HOSPITAL | Age: 75
End: 2020-02-21

## 2020-02-21 DIAGNOSIS — Z00.00 PREVENTATIVE HEALTH CARE: Primary | ICD-10-CM

## 2020-02-24 ENCOUNTER — TELEPHONE (OUTPATIENT)
Dept: CARDIOLOGY | Facility: CLINIC | Age: 75
End: 2020-02-24

## 2020-02-24 ENCOUNTER — TREATMENT (OUTPATIENT)
Dept: CARDIAC REHAB | Facility: HOSPITAL | Age: 75
End: 2020-02-24

## 2020-02-24 DIAGNOSIS — Z00.00 PREVENTATIVE HEALTH CARE: Primary | ICD-10-CM

## 2020-02-24 RX ORDER — AMOXICILLIN 500 MG/1
2000 CAPSULE ORAL ONCE
Qty: 4 CAPSULE | Refills: 0 | Status: SHIPPED | OUTPATIENT
Start: 2020-02-24 | End: 2020-02-24

## 2020-02-24 NOTE — TELEPHONE ENCOUNTER
Noted.  I have no problem with him taking prophylactic antibiotics 1 hour prior to dental extraction in view of his PCD device.  Would hold Plavix x 5 days and would hold Xarelto the night before procedure and resume that night if no further bleeding noted.    Thanks!

## 2020-02-24 NOTE — TELEPHONE ENCOUNTER
Called pt and gave KTS recommendations above.    Pt states he is not allergic to PCNs.    ABX called it. Pt advised to take dose 60 minutes before dental extraction.     Pt verbalizes understanding and agreeable to plan.

## 2020-02-24 NOTE — TELEPHONE ENCOUNTER
Pt's wife called and stated that pt was going to have tooth pulled and they needed to know how long to hold the pt's Plavix and Xarelto, and it pt needed to have prophylactic antibiotics.  Half of pt's back molar has broken off.     Pt has appt with oral surgeon on 3/2/20. Tooth removal not yet scheduled.     Pt states that he has taken prophylactic antiobiotics in the past before a root canal.    How long should pt hold Xarelto and Plavix and is antibiotics necessary for tooth extraction?        Please advise.

## 2020-02-28 ENCOUNTER — TREATMENT (OUTPATIENT)
Dept: CARDIAC REHAB | Facility: HOSPITAL | Age: 75
End: 2020-02-28

## 2020-02-28 DIAGNOSIS — Z00.00 PREVENTATIVE HEALTH CARE: Primary | ICD-10-CM

## 2020-03-02 ENCOUNTER — TREATMENT (OUTPATIENT)
Dept: CARDIAC REHAB | Facility: HOSPITAL | Age: 75
End: 2020-03-02

## 2020-03-02 DIAGNOSIS — Z00.00 PREVENTATIVE HEALTH CARE: Primary | ICD-10-CM

## 2020-03-04 ENCOUNTER — TREATMENT (OUTPATIENT)
Dept: CARDIAC REHAB | Facility: HOSPITAL | Age: 75
End: 2020-03-04

## 2020-03-04 DIAGNOSIS — Z00.00 PREVENTATIVE HEALTH CARE: Primary | ICD-10-CM

## 2020-03-06 ENCOUNTER — TREATMENT (OUTPATIENT)
Dept: CARDIAC REHAB | Facility: HOSPITAL | Age: 75
End: 2020-03-06

## 2020-03-06 DIAGNOSIS — Z00.00 PREVENTATIVE HEALTH CARE: Primary | ICD-10-CM

## 2020-03-09 ENCOUNTER — TREATMENT (OUTPATIENT)
Dept: CARDIAC REHAB | Facility: HOSPITAL | Age: 75
End: 2020-03-09

## 2020-03-09 DIAGNOSIS — Z00.00 PREVENTATIVE HEALTH CARE: Primary | ICD-10-CM

## 2020-03-11 ENCOUNTER — TREATMENT (OUTPATIENT)
Dept: CARDIAC REHAB | Facility: HOSPITAL | Age: 75
End: 2020-03-11

## 2020-03-11 DIAGNOSIS — Z00.00 PREVENTATIVE HEALTH CARE: Primary | ICD-10-CM

## 2020-04-27 ENCOUNTER — OFFICE VISIT (OUTPATIENT)
Dept: CARDIOLOGY | Facility: CLINIC | Age: 75
End: 2020-04-27

## 2020-04-27 VITALS
SYSTOLIC BLOOD PRESSURE: 106 MMHG | DIASTOLIC BLOOD PRESSURE: 65 MMHG | HEART RATE: 70 BPM | HEIGHT: 73 IN | BODY MASS INDEX: 26.64 KG/M2 | WEIGHT: 201 LBS

## 2020-04-27 DIAGNOSIS — I50.42 CHRONIC COMBINED SYSTOLIC AND DIASTOLIC CONGESTIVE HEART FAILURE (HCC): Primary | ICD-10-CM

## 2020-04-27 DIAGNOSIS — E78.2 MIXED HYPERLIPIDEMIA: ICD-10-CM

## 2020-04-27 DIAGNOSIS — I25.9 IHD (ISCHEMIC HEART DISEASE): ICD-10-CM

## 2020-04-27 PROCEDURE — 99441 PR PHYS/QHP TELEPHONE EVALUATION 5-10 MIN: CPT | Performed by: INTERNAL MEDICINE

## 2020-04-27 NOTE — PROGRESS NOTES
Subjective:     Encounter Date:04/27/2020      Patient ID: Jonnie Roth is a 75 y.o.   white male, disabled , from [place], [place].      INTERNIST: Brian Lewis MD, [facility]  INTERVENTIONAL CARDIOLOGIST: Diego Ayala MD, [facility], [facility]   ELECTROPHYSIOLOGIST: Ferdinand Alba MD, [facility], [facility]  UROLOGIST: Simon Morin MD   GASTROENTEROLOGIST: Jose Barnard MD .    Chief Complaint:   Chief Complaint   Patient presents with   • Chronic left ventricular systolic heart failure     f/u     Problem List:  1. Ischemic heart disease:  a. Remote progressive angina pectoris/acute extensive anterolateral myocardial infarction/delayed presentation with thrombolysis/severe 3-vessel coronary atherosclerosis with severe single vessel involvement/PTCA with intracoronary stent deployment proximal-mid segment LAD/moderate-severe compensated left ventricular dysfunction. LVEF (0.35)/abnormal positive signal averaged EKG/oral anticoagulation, April 1995.  b. Remote NYHA class I-II angina pectoris/class III CHF/abnormal quantitative SPECT gated Cardiolite GXT, July 1998.  c. Stable persistent MUGA, LVEF (0.33, January 1999 and January 2000).   d. Residual NYHA class I angina pectoris/CHF with reduced MUGA scan: LVEF (0.25), April 2002.  e. Left heart catheterization with distal circumflex disease: EF 20%, September 2002.   f. MUGA in May 2003: EF 32%.   g. Sestamibi GXT on 04/08/2005: No ischemia. EF 0.29.   h. Residual NYHA class I angina pectoris/CHF with reduced acceptable MUGA scan: EF (0.32) and acceptable Pacesetter PCD interrogation/reprogramming, May 2003 with interrogation, September 2004.  i. Echocardiogram, September 2009 with EF 30%  j. Left heart catheterization by Dr. Bhupinder Gonzalez, August 2010, with LVEF of 35%, with 3-vessel native coronary artery disease, 95% mid-LAD status post PTCA and stenting with two 3 mm stents by Dr. Llanes.  k. Echocardiogram, 06/07/2012: Left ventricular  ejection fraction of 30%.  l. Hospitalization, 02/01/2014, for non-ST elevation MI, left heart catheterization by Dr. Ayala that demonstrated 60% mid stenosis of the right coronary artery that remains unchanged compared to previous, widely patent stents of the LAD with severe LV dysfunction of approximately 25%.   m. Left heart catheterization status post drug-eluting stent to the distal LAD and mid-RCA with an EF of 20% to 25% by Dr. Diego Ayala, 04/20/2015.   n. Left heart catheterization 1/16/17; nonobstructive CAD with patent previously placed stents, dilated cardiomyopathy with severe LV systolic dysfunction, EF less than 20%, normal hemodynamics, recommendations for continued medical therapy and risk factor, evaluation for noncardiac etiology of symptoms.  o. Residual CCS Class I/II angina pectoris/NYHA Class II exertional dyspnea and fatigue syndrome, summer 2017  p. Echocardiogram 11/8/2019: LV severely dilated, the following LV wall segments are hypokinetic: Mid anterior, apical inferior, mid inferoseptal and mid anteroseptal.  The following LV wall segments are dyskinetic: [place].  The following LV wall segments are akinetic: Apical anterior, apical lateral and apical septal.  LV diastolic dysfunction grade 1 consistent with impaired relaxation, LA mild to moderately dilated, mild MR, EF 30%, and anterior apical LV aneurysm is present.  Mild TR, normal RV wall thickness, systolic function and septal motion noted with RV cavity mildly dilated.  No pulmonary hypertension, no pericardial effusion  q. Residual CCS 0 angina pectoris/NYHA class I-II exertional dyspnea and fatigue, February 2018, September 2018, March 2019, October 2019, April 2020  2. Sudden cardiac death with primary ventricular fibrillation status post Pacesetter [place] ICD, [place], [place] in September 2000:   a. ICD generator change out with upgrade to a biventricular pacemaker ICD on 12/17/2007, [employer] device.  b. ICD discharge,  08/09/2012, secondary to ventricular flutter with initiation of amiodarone therapy.   c. Hospitalization, 02/01/2014, secondary to ICD discharge for ventricular tachycardia with subsequent discontinuation of Coreg and initiation of sotalol therapy.   d. Hospitalization, February 2014, secondary to ICD discharge for VT with subsequent discontinuation of Coreg and initiation of sotalol.  e. Echocardiogram, 04/07/2015: EF less than 20%.  f. Hospitalization secondary to VF and ICD shocks, 04/18/2015.  g. NIPS procedure with defibrillation threshold testing for VF and noninvasive program stimulation, 04/18/2015.   h. Initiation of mexiletine for recurrent ventricular tachycardia with ICD shocks on 05/05/2015 with amiodarone load, recurrent VT and ICD shocks with hospitalization 05/16/2015-05/18/2015.   i. EP study with RFA of large anterior left ventricular scar extending from mid-left ventricular wall down apex by Dr. Ferdinand Alba, 05/21/2015.   j. Acceptable ICD interrogation with normal function, normal battery without V-tach or atrial fibrillation, 5 months on battery, April 2017, with subsequent [employer] and [employer] replacement and acceptable DFT, October 2017, and acceptable interrogation, January 2018, August 2018, February 2019, November 2019.  3. Dyslipidemia; on statin therapy.  4. Status post remote operations.  5. Remote chronic tobacco use/abnormal chest x-ray with stable abnormal thoracic  CT scan without contrast demonstrating bilateral diffuse perimeter scarring and fibrosis with scattered subcentimeter noncalcified nodules (unchanged from February 2016), March 2017.  6. Left bundle branch block.  7. Burks trauma with hematuria with urosepsis requiring hospitalization, May 2015.  8. Pulmonary embolism, spring 2015.   9. Remote apparent hypothyroidism/replacement therapy - data deficit, January 2016.  10. Remote diagnosis of obstructive sleep apnea with CPAP use, 2017.  11. Bilateral cataract  extraction November 2018, February 2019    No Known Allergies      Current Outpatient Medications:   •  carvedilol (COREG) 12.5 MG tablet, Take 12.5 mg by mouth 2 (Two) Times a Day With Meals., Disp: , Rfl:   •  clopidogrel (PLAVIX) 75 MG tablet, Take 75 mg by mouth Every Morning., Disp: , Rfl:   •  eplerenone (INSPRA) 25 MG tablet, Take 12.5 mg by mouth Every Morning., Disp: , Rfl:   •  finasteride (PROSCAR) 5 MG tablet, Take 5 mg by mouth Every Morning., Disp: , Rfl:   •  L-TRYPTOPHAN PO, Take 1,000 mg by mouth Every Night., Disp: , Rfl:   •  levothyroxine (SYNTHROID, LEVOTHROID) 25 MCG tablet, Take 25 mcg by mouth Every Morning., Disp: , Rfl:   •  MELATONIN PO, Take 9 mg by mouth Every Night., Disp: , Rfl:   •  metFORMIN (GLUCOPHAGE) 500 MG tablet, metformin 500 mg tablet  take 2 tablets (1,000 mg) by oral route once daily with the evening meal for 90 days, Disp: , Rfl:   •  nitroglycerin (NITROSTAT) 0.4 MG SL tablet, Place 1 tablet under the tongue Every 5 (Five) Minutes As Needed for Chest Pain. Take no more than 3 doses in 15 minutes., Disp: 30 tablet, Rfl: 11  •  pantoprazole (PROTONIX) 40 MG EC tablet, Take 40 mg by mouth Every Morning., Disp: , Rfl:   •  polyethylene glycol (MIRALAX) packet, Take 17 g by mouth Every Other Day., Disp: , Rfl:   •  rivaroxaban (XARELTO) 10 MG tablet, Take 1 tablet by mouth Daily., Disp: 90 tablet, Rfl: 3  •  rosuvastatin (CRESTOR) 20 MG tablet, Take 40 mg by mouth Daily., Disp: , Rfl:   •  sacubitril-valsartan (ENTRESTO) 49-51 MG tablet, Take 1 tablet by mouth 2 (Two) Times a Day., Disp: 180 tablet, Rfl: 3  •  torsemide (DEMADEX) 5 MG tablet, Take 1 tablet by mouth Daily., Disp: 90 tablet, Rfl: 1    HISTORY OF PRESENT ILLNESS:  The patient is here via telephone visit for a 6 month follow up. The patient denies chest pain, shortness of breath, PND, edema, palpitations, syncope or presyncope at this time. He had been able to go to cardiac rehab until the quarantine. He tries to  "ambulate when the weather permits and denies any cardiopulmonary complaints. He has not had any recent laboratory testing but had some a few months ago. He is going to go get tested for covid-19 even thought he is asymptomatic but there is free testing. He uses the [employer] at [employer] to get groceries.  He states that he has had interim laboratory studies with Dr. Brian Lewis that were acceptable; data deficit.  No interim medical or surgical events or prolonged antibiotic therapy.  He hss felt well and has remained asymptomatic.  No current tobacco or NTG use.      Review of Systems   All other systems reviewed and are negative.     All other systems reviewed and otherwise negative.    Procedures       Objective:       Vitals:    04/27/20 1049 04/27/20 1050   BP: 113/69 106/65   BP Location: Right arm Right arm   Patient Position: Sitting Standing   Pulse: 70 70   Weight: 91.2 kg (201 lb)    Height: 185.4 cm (73\")      Body mass index is 26.52 kg/m².  Last weight October 2019 was 205 pounds  Physical Exam  Physical exam not able to be performed due to telehealth visit in view of coronavirus.    Lab Review:   Lab Results   Component Value Date    GLUCOSE 109 (H) 01/03/2018    BUN 23 01/03/2018    CREATININE 0.90 01/03/2018    EGFRIFNONA 83 01/03/2018    BCR 25.6 (H) 01/03/2018    CO2 28.0 01/03/2018    CALCIUM 9.1 01/03/2018    ALBUMIN 3.80 01/15/2017    AST 85 (H) 01/15/2017     (H) 01/15/2017       Lab Results   Component Value Date    WBC 12.49 (H) 10/01/2017    HGB 15.2 10/01/2017    HCT 47.1 10/01/2017    MCV 91.5 10/01/2017     10/01/2017       Lab Results   Component Value Date    HGBA1C 6.40 (H) 10/01/2017       Lab Results   Component Value Date    TSH 3.832 03/25/2016       Lab Results   Component Value Date    CHOL 130 08/14/2017     Lab Results   Component Value Date    TRIG 70 08/14/2017    TRIG 203 (H) 01/30/2014     Lab Results   Component Value Date    HDL 40 08/14/2017    HDL 31 (L) " 01/30/2014     Lab Results   Component Value Date    LDL 77 08/14/2017    LDL 65 01/30/2014         Lab Results   Component Value Date    BNP 77.0 06/08/2017      · Echocardiogram 11/8/2019:    · The left ventricular cavity is severely dilated.  · The following left ventricular wall segments are hypokinetic: mid anterior, apical inferior, mid inferoseptal and mid anteroseptal. The following left ventricular wall segments are dyskinetic: apex. The following left ventricular wall segments are akinetic: apical anterior, apical lateral and apical septal.  · Left ventricular diastolic dysfunction (grade I) consistent with impaired relaxation.  · Left atrial cavity size is mild-to-moderately dilated.  · Mild mitral valve regurgitation is present.  · Estimated EF = 30%.  · Left ventricular systolic function is severely decreased.  · An anterior apical left ventricular aneurysm is present.  · Mild tricuspid valve regurgitation is present.  · Normal right ventricular wall thickness, systolic function and septal motion noted with right ventricular cavity mildly dilated.  · There is no evidence of a left ventricular mass or thrombus present.  · No evidence of pulmonary hypertension is present.  · There is no evidence of pericardial effusion.    · Paceart interrogation 2/12/2020: Battery longevity 1 year, 1 month, atrial pacing 97%, biventricular pacing 98%, less than 1% mode switching, not atrial fibrillation, 5-second episode of PAT    This patient has consented to a telehealth visit via telephone. The visit was scheduled as a telephone visit to comply with patient safety concerns in accordance with CDC recommendations.  All vitals recorded within this visit are reported by the patient.  I spent  25 minutes in total including but not limited to the 10 minutes spent in direct conversation with this patient.           Assessment:       Overall continued acceptable course with no new interim cardiopulmonary complaints with  acceptable functional status. We will defer additional diagnostic or therapeutic intervention from a cardiac perspective at this time. Hopefully we will get to see the patient's laboratory testing.      Diagnosis Plan   1. Chronic combined systolic and diastolic congestive heart failure (EF<20%)  No recurrent angina pectoris or CHF on current activity schedule; continue current treatment   2. IHD (ischemic heart disease)  No recurrent angina pectoris or CHF on current activity schedule; continue current treatment   3. Mixed hyperlipidemia  No new labs to review, continue rosuvastatin          Plan:         1. Patient to continue current medications and close follow up with the above providers.  2. Tentative cardiology follow up in October 2020 or patient may return sooner PRN.      Scribed for Bhupinder Gonzalez MD by Meenakshi Lacey, CHENTE. 4/27/2020  10:53     I, Bhupinder Gonzalez MD, [facility], personally performed the services described in this documentation as scribed by the above named individual in my presence, and it is both accurate and complete. At 11:05 AM on 04/27/2020

## 2020-05-12 ENCOUNTER — TELEPHONE (OUTPATIENT)
Dept: CARDIOLOGY | Facility: CLINIC | Age: 75
End: 2020-05-12

## 2020-05-12 NOTE — TELEPHONE ENCOUNTER
Called pt to ask him to send a transmission. There was no answer so I left my name and number and ask him to return my call.

## 2020-06-17 RX ORDER — TORSEMIDE 5 MG/1
TABLET ORAL
Qty: 90 TABLET | Refills: 1 | Status: SHIPPED | OUTPATIENT
Start: 2020-06-17 | End: 2023-03-07 | Stop reason: SDUPTHER

## 2020-07-01 ENCOUNTER — OFFICE VISIT (OUTPATIENT)
Dept: CARDIOLOGY | Facility: CLINIC | Age: 75
End: 2020-07-01

## 2020-07-01 VITALS
HEIGHT: 73 IN | SYSTOLIC BLOOD PRESSURE: 96 MMHG | WEIGHT: 206.6 LBS | BODY MASS INDEX: 27.38 KG/M2 | DIASTOLIC BLOOD PRESSURE: 58 MMHG | OXYGEN SATURATION: 96 % | TEMPERATURE: 98 F | HEART RATE: 70 BPM

## 2020-07-01 DIAGNOSIS — I47.20 VENTRICULAR TACHYCARDIA (HCC): Primary | ICD-10-CM

## 2020-07-01 DIAGNOSIS — I26.09 OTHER ACUTE PULMONARY EMBOLISM WITH ACUTE COR PULMONALE (HCC): ICD-10-CM

## 2020-07-01 DIAGNOSIS — I50.22 CHRONIC LEFT VENTRICULAR SYSTOLIC HEART FAILURE (HCC): ICD-10-CM

## 2020-07-01 DIAGNOSIS — I25.110 CORONARY ARTERY DISEASE INVOLVING NATIVE CORONARY ARTERY OF NATIVE HEART WITH UNSTABLE ANGINA PECTORIS (HCC): ICD-10-CM

## 2020-07-01 PROCEDURE — 99213 OFFICE O/P EST LOW 20 MIN: CPT | Performed by: INTERNAL MEDICINE

## 2020-07-01 PROCEDURE — 93284 PRGRMG EVAL IMPLANTABLE DFB: CPT | Performed by: INTERNAL MEDICINE

## 2020-07-01 RX ORDER — PHENOL 1.4 %
1 AEROSOL, SPRAY (ML) MUCOUS MEMBRANE DAILY
COMMUNITY

## 2020-07-01 RX ORDER — MELATONIN
4000 DAILY
COMMUNITY

## 2020-07-01 NOTE — PROGRESS NOTES
Jonnie Roth  1945  [phone]      07/01/2020    [facility] CARDIOLOGY     Borders, Brian Ferraro MD  [address] [address]    Chief Complaint   Patient presents with   • Ventricular Tachycardia       Problem List:   1. Sudden cardiac death with primary ventricular fibrillation status post Pacesetter [place] ICD, [place], [place] in September 2000:   a. ICD generator change out with upgrade to a biventricular pacemaker ICD on 12/17/2007, [employer] device.  b. ICD discharge, 08/09/2012, secondary to ventricular flutter with initiation of amiodarone therapy.   c. Hospitalization, 02/01/2014, secondary to ICD discharge for ventricular tachycardia with subsequent discontinuation of Coreg and initiation of sotalol therapy.   d. Hospitalization, February 2014, secondary to ICD discharge for VT with subsequent discontinuation of Coreg and initiation of sotalol.  e. Echocardiogram, 04/07/2015: EF less than 20%.  f. Hospitalization secondary to VF and ICD shocks, 04/18/2015.  g. NIPS procedure with defibrillation threshold testing for VF and noninvasive program stimulation, 04/18/2015.   h. Initiation of mexiletine for recurrent ventricular tachycardia with ICD shocks on 05/05/2015 with amiodarone load, recurrent VT and ICD shocks with hospitalization 05/16/2015-05/18/2015.   i. EP study with RFA of large anterior left ventricular scar extending from mid-left ventricular wall down apex by Dr. Ferdinand Alba, 05/21/2015.                      j.   ICD generator change 10/2017          K.   Echo 11/19 LVEF 30%, mild TR  2. Ischemic heart disease:  a. Remote progressive angina pectoris/acute extensive anterolateral myocardial infarction/delayed presentation with thrombolysis/severe 3-vessel coronary atherosclerosis with severe single vessel involvement/PTCA with intracoronary stent deployment proximal-mid segment LAD/moderate-severe compensated left ventricular  dysfunction. LVEF (0.35)/abnormal positive signal averaged EKG/oral anticoagulation, April 1995.  b. Remote NYHA class I-II angina pectoris/class III CHF/abnormal quantitative SPECT gated Cardiolite GXT, July 1998.  c. Stable persistent MUGA, LVEF (0.33, January 1999 and January 2000).   d. Residual NYHA class I angina pectoris/CHF with reduced MUGA scan: LVEF (0.25), April 2002.  e. Left heart catheterization with distal circumflex disease: EF 20%, September 2002.   f. MUGA in May 2003: EF 32%.   g. Sestamibi GXT on 04/08/2005: No ischemia. EF 29%.   h. Residual NYHA class I angina pectoris/CHF with reduced acceptable MUGA scan: EF (0.32) and acceptable Pacesetter PCD interrogation/reprogramming, May 2003 with interrogation, September 2004.  i. Echocardiogram, September 2009 with EF 30%  j. Left heart catheterization by Dr. Bhupinder Gonzalez, August 2010, with LVEF of 35%, with 3-vessel native coronary artery disease, 95% mid-LAD status post PTCA and stenting with two 3 mm stents by Dr. Llanes.  k. Echocardiogram, 06/07/2012: Left ventricular ejection fraction of 30%.  l. Hospitalization, 02/01/2014, for non-ST elevation MI, left heart catheterization by Dr. Ayala that demonstrated 60% mid stenosis of the right coronary artery that remains unchanged compared to previous, widely patent stents of the LAD with severe LV dysfunction of approximately 25%.   m. Left heart catheterization status post drug-eluting stent to the distal LAD and mid-RCA with an EF of 20% to 25% by Dr. Diego Ayala, 04/20/2015.   n. Left heart catheterization 1/16/17; nonobstructive CAD with patent previously placed stents, dilated cardiomyopathy with severe LV systolic dysfunction, EF less than 20%, normal hemodynamics, recommendations for continued medical therapy and risk factor, evaluation for noncardiac etiology of symptoms.  o. Residual CCS Class I/II angina pectoris/NYHA Class II exertional dyspnea and fatigue syndrome, summer  2017  p. Residual CCS 0 angina pectoris/NYHA class I-II exertional dyspnea and fatigue, February 2018, September 2018, March 2019.  3. Dyslipidemia.  4. Status post remote operations.  5. Remote chronic tobacco use/abnormal chest x-ray with stable abnormal thoracic  CT scan without contrast demonstrating bilateral diffuse perimeter scarring and fibrosis with scattered subcentimeter noncalcified nodules (unchanged from February 2016), March 2017.  6. Left bundle branch block.  7. Burks trauma with hematuria with urosepsis requiring hospitalization, May 2015.  8. Pulmonary embolism, spring 2015.   9. Remote apparent hypothyroidism/replacement therapy - data deficit, January 2016.  10. Remote diagnosis of obstructive sleep apnea with CPAP use, 2017.  11. Bilateral cataract extraction November 2018, February 2019      Allergies  No Known Allergies    Current Medications    Current Outpatient Medications:   •  carvedilol (COREG) 12.5 MG tablet, Take 12.5 mg by mouth 2 (Two) Times a Day With Meals., Disp: , Rfl:   •  cholecalciferol (VITAMIN D3) 25 MCG (1000 UT) tablet, Take 1,000 Units by mouth Daily., Disp: , Rfl:   •  clopidogrel (PLAVIX) 75 MG tablet, Take 75 mg by mouth Every Morning., Disp: , Rfl:   •  eplerenone (INSPRA) 25 MG tablet, Take 12.5 mg by mouth Every Morning., Disp: , Rfl:   •  finasteride (PROSCAR) 5 MG tablet, Take 5 mg by mouth Every Morning., Disp: , Rfl:   •  L-TRYPTOPHAN PO, Take 1,000 mg by mouth Every Night., Disp: , Rfl:   •  levothyroxine (SYNTHROID, LEVOTHROID) 25 MCG tablet, Take 25 mcg by mouth Every Morning., Disp: , Rfl:   •  MELATONIN PO, Take 9 mg by mouth Every Night., Disp: , Rfl:   •  metFORMIN (GLUCOPHAGE) 500 MG tablet, Take 1,000 mg by mouth 2 (Two) Times a Day With Meals., Disp: , Rfl:   •  Multiple Vitamins-Minerals (MULTIVITAMIN ADULTS 50+) tablet, Take 1 tablet by mouth Daily., Disp: , Rfl:   •  nitroglycerin (NITROSTAT) 0.4 MG SL tablet, Place 1 tablet under the tongue Every  "5 (Five) Minutes As Needed for Chest Pain. Take no more than 3 doses in 15 minutes., Disp: 30 tablet, Rfl: 11  •  pantoprazole (PROTONIX) 40 MG EC tablet, Take 40 mg by mouth Every Morning., Disp: , Rfl:   •  polyethylene glycol (MIRALAX) packet, Take 17 g by mouth Every Other Day., Disp: , Rfl:   •  rivaroxaban (XARELTO) 10 MG tablet, Take 1 tablet by mouth Daily., Disp: 90 tablet, Rfl: 3  •  rosuvastatin (CRESTOR) 40 MG tablet, Take 40 mg by mouth Daily., Disp: , Rfl:   •  sacubitril-valsartan (ENTRESTO) 49-51 MG tablet, Take 1 tablet by mouth 2 (Two) Times a Day., Disp: 180 tablet, Rfl: 3  •  torsemide (DEMADEX) 5 MG tablet, TAKE 1 TABLET DAILY, Disp: 90 tablet, Rfl: 1    History of Present Illness     Pt presents for follow up of CAD/VF/VT. Since the pt has seen us, pt denies any palpitations, CP, LH, and dizziness. Denies any hospitalizations, ER visits, ICD shocks, or TIA/CVA symptoms. Overall feels well except for fatigue. No side effects to medications. BP at home stable    ROS:  General:  + fatigue, No weight gain or loss  Cardiovascular:  Denies CP, PND, syncope, near syncope, edema or palpitations.  Pulmonary:  + mild chronic LU, No cough, or wheezing    Vitals:    07/01/20 1131   BP: 96/58   BP Location: Left arm   Patient Position: Sitting   Pulse: 70   Temp: 98 °F (36.7 °C)   SpO2: 96%   Weight: 93.7 kg (206 lb 9.6 oz)   Height: 185.4 cm (73\")       PE:  General: NAD  Neck: no JVD, no carotid bruits, no TM  Heart RRR, NL S1, S2, S4 present, no rubs, murmurs  Lungs: CTA, no wheezes, rhonchi, or rales  Abd: soft, non-tender, NL BS  Ext: No musculoskeletal deformities, no edema, cyanosis, or clubbing  Psych: normal mood and affect    Diagnostic Data:  Procedures.    1. Ventricular tachycardia (CMS/HCC)    2. Chronic left ventricular systolic heart failure (CMS/HCC)    3. Coronary artery disease involving native coronary artery of native heart with unstable angina pectoris (CMS/HCC)    4. Other acute " pulmonary embolism with acute cor pulmonale (CMS/HCC)        ICD interrogation: NL fxn, NL battery fxn, No VT or AF, 98% BiV paced, LV threshold 4V @ 1.5 ms    Plan:  1. VT:  - s/p RFA in 2015 with no recurrence  - no sustained VT on interrogation     2. Chronic CHF Systolic: LVEF up to 30%  Class II on meds coreg and Entresto  - BiV ICD check within normal limits. LV threshold is chronically elevated and stable.      3. CAD:  - on Plavix, follows with Dr. Gonzalez. Stable     4. History of PE:  - on Xarelto    F/up in 6 months

## 2020-07-22 NOTE — PROGRESS NOTES
Subjective:     Encounter Date:08/18/2017      Patient ID: Jonnie Roth is a 72 y.o.  white male, disabled , from [place], [place].     INTERNIST: Brian [facility], [facility]  INTERVENTIONAL CARDIOLOGIST: Diego Ayala MD, [facility], [facility]   ELECTROPHYSIOLOGIST: Ferdinand Alba MD, [facility], [facility]  UROLOGIST: Simon Morin MD   GASTROENTEROLOGIST: Jose Barnard MD    Chief Complaint:   Chief Complaint   Patient presents with   • Coronary Artery Disease   • LU     Problem List:  1. Ischemic heart disease:  a. Remote progressive angina pectoris/acute extensive anterolateral myocardial infarction/delayed presentation with thrombolysis/severe 3-vessel coronary atherosclerosis with severe single vessel involvement/PTCA with intracoronary stent deployment proximal-mid segment LAD/moderate-severe compensated left ventricular dysfunction. LVEF (0.35)/abnormal positive signal averaged EKG/oral anticoagulation, April 1995.  b. Remote NYHA class I-II angina pectoris/class III CHF/abnormal quantitative SPECT gated Cardiolite GXT, July 1998.  c. Stable persistent MUGA, LVEF (0.33, January 1999 and January 2000).   d. Residual NYHA class I angina pectoris/CHF with reduced MUGA scan: LVEF (0.25), April 2002.  e. Left heart catheterization with distal circumflex disease: EF 20%, September 2002.   f. MUGA in May 2003: EF 32%.   g. Sestamibi GXT on 04/08/2005: No ischemia. EF 29%.   h. Residual NYHA class I angina pectoris/CHF with reduced acceptable MUGA scan: EF (0.32) and acceptable Pacesetter PCD interrogation/reprogramming, May 2003 with interrogation, September 2004.  i. Echocardiogram, September 2009 with EF 30%  j. Left heart catheterization by Dr. Bhupinder Gonzalez, August 2010, with LVEF of 35%, with 3-vessel native coronary artery disease, 95% mid-LAD status post PTCA and stenting with two 3 mm stents by Dr. Llanes.  k. Echocardiogram, 06/07/2012: Left ventricular ejection fraction of  30%.  l. Hospitalization, 02/01/2014, for non-ST elevation MI, left heart catheterization by Dr. Ayala that demonstrated 60% mid stenosis of the right coronary artery that remains unchanged compared to previous, widely patent stents of the LAD with severe LV dysfunction of approximately 25%.   m. Left heart catheterization status post drug-eluting stent to the distal LAD and mid-RCA with an EF of 20% to 25% by Dr. Diego Ayala, 04/20/2015.   n. Left heart catheterization 1/16/17; nonobstructive CAD with patent previously placed stents, dilated cardiomyopathy with severe LV systolic dysfunction, EF less than 20%, normal hemodynamics, recommendations for continued medical therapy and risk factor, evaluation for noncardiac etiology of symptoms.  o. Residual CCS Class I/II angina pectoris/NYHA Class II exertional dyspnea and fatigue syndrome.  2. Sudden cardiac death with primary ventricular fibrillation status post Pacesetter [place] ICD, [place], [place] in September 2000:   a. ICD generator change out with upgrade to a biventricular pacemaker ICD on 12/17/2007, [employer] device.  b. ICD discharge, 08/09/2012, secondary to ventricular flutter with initiation of amiodarone therapy.   c. Hospitalization, 02/01/2014, secondary to ICD discharge for ventricular tachycardia with subsequent discontinuation of Coreg and initiation of sotalol therapy.   d. Hospitalization, February 2014, secondary to ICD discharge for VT with subsequent discontinuation of Coreg and initiation of sotalol.  e. Echocardiogram, 04/07/2015: EF less than 20%.  f. Hospitalization secondary to VF and ICD shocks, 04/18/2015.  g. NIPS procedure with defibrillation threshold testing for VF and noninvasive program stimulation, 04/18/2015.   h. Initiation of mexiletine for recurrent ventricular tachycardia with ICD shocks on 05/05/2015 with amiodarone load, recurrent VT and ICD shocks with hospitalization 05/16/2015-05/18/2015.   i. EP study with RFA of large  anterior left ventricular scar extending from mid-left ventricular wall down apex by Dr. Ferdinand Alba, 05/21/2015.   j. Acceptable ICD interrogation with normal function, normal battery without V-tach or atrial fibrillation, 5 months on battery, April 2017.  3. Dyslipidemia.  4. Status post remote operations.  5. Remote chronic tobacco use/abnormal chest x-ray with stable abnormal thoracic  CT scan without contrast demonstrating bilateral diffuse perimeter scarring and fibrosis with scattered subcentimeter noncalcified nodules (unchanged from February 2016), March 2017.  6. Left bundle branch block.  7. Burks trauma with hematuria with urosepsis requiring hospitalization, now resolved, May 2015.  8. Pulmonary embolism, spring 2015.   9. Remote apparent hypothyroidism/replacement therapy - data deficit, January 2016.  10. Recent diagnosis of obstructive sleep apnea with CPAP use, 2017.    No Known Allergies      Current Outpatient Prescriptions:   •  atorvastatin (LIPITOR) 40 MG tablet, TAKE 1 TABLET BY MOUTH DAILY (NEED LAB WORK FOR ADDITIONAL REFILLS), Disp: 90 tablet, Rfl: 0  •  carvedilol (COREG) 12.5 MG tablet, Take 6.25 mg by mouth 2 (Two) Times a Day., Disp: , Rfl:   •  clopidogrel (PLAVIX) 75 MG tablet, Take  by mouth daily., Disp: , Rfl:   •  eplerenone (INSPRA) 25 MG tablet, Take 12.5 mg by mouth Daily., Disp: , Rfl:   •  finasteride (PROSCAR) 5 MG tablet, Take  by mouth daily., Disp: , Rfl:   •  L-Tryptophan 500 MG capsule, Take  by mouth Every Night. Pt takes 3 tablets at bedtime, Disp: , Rfl:   •  levothyroxine (SYNTHROID, LEVOTHROID) 25 MCG tablet, Take  by mouth Every Night., Disp: , Rfl:   •  melatonin 3 MG tablet, Take  by mouth daily., Disp: , Rfl:   •  Multiple Vitamins-Minerals (MULTIVITAMIN ADULT PO), Take  by mouth daily., Disp: , Rfl:   •  nitroglycerin (NITROSTAT) 0.4 MG SL tablet, Place  under the tongue., Disp: , Rfl:   •  pantoprazole (PROTONIX) 40 MG EC tablet, Take 1 tablet by mouth  "Daily., Disp: 30 tablet, Rfl: 1  •  polyethylene glycol (MIRALAX) packet, Take 17 g by mouth every other day., Disp: , Rfl:   •  rivaroxaban (XARELTO) 20 MG tablet, Take  by mouth Daily., Disp: , Rfl:   •  sacubitril-valsartan (ENTRESTO) 49-51 MG tablet, Take 1 tablet by mouth 2 (two) times a day., Disp: 60 tablet, Rfl: 1  •  torsemide (DEMADEX) 10 MG tablet, Take 1 tablet by mouth Daily. (Patient taking differently: Take 10 mg by mouth Daily. Alternating days between 5mg and 10mg), Disp: 30 tablet, Rfl: 3    History of Present Illness Patient returns for scheduled 6-month followup. He is accompanied to the office today by his wife, and he has come directly from cardiac rehab and says \"she thinks I'm trying to impress you with my sweat!\"  He notes that his wife is concerned about a trip they are planning to take out west in September 2017, with the elevation; he is assured that the route they are taking should be fine and should not cause a problem.  He is encouraged to \"sight-see gently.\"  He notes that he had his device checked remotely last month, and he was told that he has another 4 months on his battery.  He has had no problems heart-wise since his last visit here; he says that he thinks Entresto is a \"magic drug.\"  He has had no episodes of chest pain, tightness, or pressure.  His wife says that he has been diagnosed with sleep apnea and has been getting adjusted to using the CPAP; he has a portable machine to take on their trip.  Patient otherwise denies chest pain, shortness of breath, PND, edema, palpitations, syncope or presyncope at this time.  He continues to participate very regularly in his exercise program.      Review of Systems   Constitution: Positive for diaphoresis.   HENT: Positive for tinnitus.    Eyes: Positive for blurred vision.   Cardiovascular: Positive for dyspnea on exertion.   Respiratory: Positive for shortness of breath, sleep disturbances due to breathing and snoring.    Endocrine: " "Positive for cold intolerance.   Hematologic/Lymphatic: Bruises/bleeds easily.   Skin: Positive for dry skin.   Musculoskeletal: Positive for back pain (periodic).   Neurological: Positive for excessive daytime sleepiness (improved) and dizziness.      Obtained and otherwise negative except as outlined in problem list and HPI.    Procedures       Objective:       Vitals:    08/18/17 0949 08/18/17 0952   BP: 106/78 97/75   BP Location: Left arm Left arm   Patient Position: Sitting Standing   Pulse: 79 84   Weight: 212 lb (96.2 kg)    Height: 73\" (185.4 cm)      Body mass index is 27.97 kg/(m^2).   Last weight:  217 lbs.    Physical Exam   Constitutional: He is oriented to person, place, and time. He appears well-developed and well-nourished.   Neck: No JVD present. Carotid bruit is not present. No thyromegaly present.   Cardiovascular: Regular rhythm, S1 normal and S2 normal.  Exam reveals no gallop, no S3 and no friction rub.    Murmur heard.   Medium-pitched early systolic murmur is present with a grade of 2/6  at the lower left sternal border  Pulses:       Carotid pulses are 1+ on the right side, and 1+ on the left side.       Radial pulses are 1+ on the right side, and 1+ on the left side.        Femoral pulses are 1+ on the right side, and 1+ on the left side.       Popliteal pulses are 1+ on the right side, and 1+ on the left side.        Dorsalis pedis pulses are 1+ on the right side, and 1+ on the left side.        Posterior tibial pulses are 1+ on the right side, and 1+ on the left side.   Pulmonary/Chest: Effort normal. He has decreased breath sounds. He has no wheezes. He has rhonchi. He has no rales.   Left precordial PCD site is nominal   Abdominal: Soft. He exhibits no mass. There is no hepatosplenomegaly. There is no tenderness. There is no guarding.   Lymphadenopathy:     He has no cervical adenopathy.   Neurological: He is alert and oriented to person, place, and time.   Skin: Skin is warm, dry and " intact. No rash noted.   Vitals reviewed.      Lab Review:   Lab Results   Component Value Date    GLUCOSE 88 01/16/2017    BUN 15 01/16/2017    CREATININE 0.90 01/16/2017    EGFRIFNONA 83 01/16/2017    BCR 16.7 01/16/2017    CO2 32.0 (H) 01/16/2017    CALCIUM 9.5 01/16/2017    ALBUMIN 3.80 01/15/2017    LABIL2 1.5 01/15/2017    AST 85 (H) 01/15/2017     (H) 01/15/2017       Lab Results   Component Value Date    WBC 8.79 01/16/2017    HGB 13.5 01/16/2017    HCT 42.5 01/16/2017    MCV 89.3 01/16/2017     01/16/2017       Lab Results   Component Value Date    HGBA1C 6.4 (H) 01/30/2014       Lab Results   Component Value Date    TSH 3.832 03/25/2016       Lab Results   Component Value Date    CHOL 130 08/14/2017     Lab Results   Component Value Date    TRIG 70 08/14/2017    TRIG 203 (H) 01/30/2014     Lab Results   Component Value Date    HDL 40 08/14/2017    HDL 31 (L) 01/30/2014   LDL - 77 (08/14/2017)    BNP - 77.0 (06/08/2017)  BNP - 106.0 (03/27/2017)      Assessment:   Overall continued acceptable course with no interim cardiopulmonary complaints with acceptable functional status. We will defer additional diagnostic or therapeutic intervention from a cardiac perspective at this time.  We will provide him with a travel packet; he seems well prepared for his trip out west from [place] to [facility].       Diagnosis Plan   1. Coronary artery disease involving coronary bypass graft of native heart without angina pectoris  No recurrent angina pectoris or CHF.     2. Chronic combined systolic and diastolic congestive heart failure (EF<20%)  Controlled   3. Ventricular tachycardia  No recurrence; followed closely by Dr. Alba with scheduled reassessment in 2 months   4. Other pulmonary embolism without acute cor pulmonale, unspecified chronicity  Continue Xarelto   5. Dyslipidemia  Controlled; results reviewed with patient and wife in office   6. Abnormal CT scan, chest  Stable          Plan:          1. Patient to continue current medications and close follow up with the above providers.  2. Patient will need a travel pack for his upcoming trip out west in September 2017.  3. Tentative cardiology follow up in February 2018, or patient may return sooner PRN.       Transcribed by Rosana Swenson for Dr. Bhupinder Gonzalez at 9:55 AM on 08/18/2017    I, Bhupinder Gonzalez MD, FACC, personally performed the services described in this documentation as scribed by the above named individual in my presence, and it is both accurate and complete. At 10:15 AM on 08/18/2017     1

## 2020-09-15 ENCOUNTER — TREATMENT (OUTPATIENT)
Dept: CARDIAC REHAB | Facility: HOSPITAL | Age: 75
End: 2020-09-15

## 2020-09-15 DIAGNOSIS — Z00.00 PREVENTATIVE HEALTH CARE: Primary | ICD-10-CM

## 2020-09-17 ENCOUNTER — TREATMENT (OUTPATIENT)
Dept: CARDIAC REHAB | Facility: HOSPITAL | Age: 75
End: 2020-09-17

## 2020-09-17 DIAGNOSIS — Z00.00 PREVENTATIVE HEALTH CARE: Primary | ICD-10-CM

## 2020-09-22 ENCOUNTER — TREATMENT (OUTPATIENT)
Dept: CARDIAC REHAB | Facility: HOSPITAL | Age: 75
End: 2020-09-22

## 2020-09-22 DIAGNOSIS — Z00.00 PREVENTATIVE HEALTH CARE: Primary | ICD-10-CM

## 2020-09-24 ENCOUNTER — TREATMENT (OUTPATIENT)
Dept: CARDIAC REHAB | Facility: HOSPITAL | Age: 75
End: 2020-09-24

## 2020-09-24 DIAGNOSIS — Z00.00 PREVENTATIVE HEALTH CARE: Primary | ICD-10-CM

## 2020-09-29 ENCOUNTER — TREATMENT (OUTPATIENT)
Dept: CARDIAC REHAB | Facility: HOSPITAL | Age: 75
End: 2020-09-29

## 2020-09-29 DIAGNOSIS — Z00.00 PREVENTATIVE HEALTH CARE: Primary | ICD-10-CM

## 2020-10-01 ENCOUNTER — TREATMENT (OUTPATIENT)
Dept: CARDIAC REHAB | Facility: HOSPITAL | Age: 75
End: 2020-10-01

## 2020-10-01 DIAGNOSIS — Z00.00 PREVENTATIVE HEALTH CARE: Primary | ICD-10-CM

## 2020-10-08 ENCOUNTER — TREATMENT (OUTPATIENT)
Dept: CARDIAC REHAB | Facility: HOSPITAL | Age: 75
End: 2020-10-08

## 2020-10-08 DIAGNOSIS — Z00.00 PREVENTATIVE HEALTH CARE: Primary | ICD-10-CM

## 2020-10-13 ENCOUNTER — TREATMENT (OUTPATIENT)
Dept: CARDIAC REHAB | Facility: HOSPITAL | Age: 75
End: 2020-10-13

## 2020-10-13 DIAGNOSIS — Z00.00 PREVENTATIVE HEALTH CARE: Primary | ICD-10-CM

## 2020-10-15 ENCOUNTER — TREATMENT (OUTPATIENT)
Dept: CARDIAC REHAB | Facility: HOSPITAL | Age: 75
End: 2020-10-15

## 2020-10-15 DIAGNOSIS — Z00.00 PREVENTATIVE HEALTH CARE: Primary | ICD-10-CM

## 2020-10-20 ENCOUNTER — TREATMENT (OUTPATIENT)
Dept: CARDIAC REHAB | Facility: HOSPITAL | Age: 75
End: 2020-10-20

## 2020-10-20 DIAGNOSIS — Z00.00 PREVENTATIVE HEALTH CARE: Primary | ICD-10-CM

## 2020-10-22 ENCOUNTER — TREATMENT (OUTPATIENT)
Dept: CARDIAC REHAB | Facility: HOSPITAL | Age: 75
End: 2020-10-22

## 2020-10-22 DIAGNOSIS — Z00.00 PREVENTATIVE HEALTH CARE: Primary | ICD-10-CM

## 2020-10-27 ENCOUNTER — TREATMENT (OUTPATIENT)
Dept: CARDIAC REHAB | Facility: HOSPITAL | Age: 75
End: 2020-10-27

## 2020-10-27 DIAGNOSIS — Z00.00 PREVENTATIVE HEALTH CARE: Primary | ICD-10-CM

## 2020-10-29 ENCOUNTER — TREATMENT (OUTPATIENT)
Dept: CARDIAC REHAB | Facility: HOSPITAL | Age: 75
End: 2020-10-29

## 2020-10-29 DIAGNOSIS — Z00.00 PREVENTATIVE HEALTH CARE: Primary | ICD-10-CM

## 2020-11-03 ENCOUNTER — TREATMENT (OUTPATIENT)
Dept: CARDIAC REHAB | Facility: HOSPITAL | Age: 75
End: 2020-11-03

## 2020-11-03 DIAGNOSIS — Z00.00 PREVENTATIVE HEALTH CARE: Primary | ICD-10-CM

## 2020-11-03 RX ORDER — RIVAROXABAN 10 MG/1
TABLET, FILM COATED ORAL
Qty: 90 TABLET | Refills: 3 | Status: ON HOLD | OUTPATIENT
Start: 2020-11-03 | End: 2021-07-06 | Stop reason: SDUPTHER

## 2020-11-05 ENCOUNTER — TREATMENT (OUTPATIENT)
Dept: CARDIAC REHAB | Facility: HOSPITAL | Age: 75
End: 2020-11-05

## 2020-11-05 DIAGNOSIS — Z00.00 PREVENTATIVE HEALTH CARE: Primary | ICD-10-CM

## 2020-11-10 ENCOUNTER — TREATMENT (OUTPATIENT)
Dept: CARDIAC REHAB | Facility: HOSPITAL | Age: 75
End: 2020-11-10

## 2020-11-10 DIAGNOSIS — Z00.00 PREVENTATIVE HEALTH CARE: Primary | ICD-10-CM

## 2020-11-12 ENCOUNTER — TREATMENT (OUTPATIENT)
Dept: CARDIAC REHAB | Facility: HOSPITAL | Age: 75
End: 2020-11-12

## 2020-11-12 DIAGNOSIS — Z00.00 PREVENTATIVE HEALTH CARE: Primary | ICD-10-CM

## 2020-11-17 ENCOUNTER — TREATMENT (OUTPATIENT)
Dept: CARDIAC REHAB | Facility: HOSPITAL | Age: 75
End: 2020-11-17

## 2020-11-17 DIAGNOSIS — Z00.00 PREVENTATIVE HEALTH CARE: Primary | ICD-10-CM

## 2020-11-18 NOTE — PROGRESS NOTES
Subjective:     Encounter Date:11/20/2020    Patient ID: Jonnie Roth is a 75 y.o.  white male, disabled , from [place], [place].      INTERNIST: Brian Lewis MD, [facility]  INTERVENTIONAL CARDIOLOGIST: Diego Ayala MD, Located within [facility], [facility]   ELECTROPHYSIOLOGIST: Ferdinand Alba MD, Located within [facility], [facility]  UROLOGIST: Simon Morin MD   GASTROENTEROLOGIST: Jose Barnard MD    Chief Complaint:   Chief Complaint   Patient presents with   • Chronic combined systolic and diastolic congestive heart dominick     f/u       Problem List:  1. Ischemic heart disease:  a. Remote progressive angina pectoris/acute extensive anterolateral myocardial infarction/delayed presentation with thrombolysis/severe 3-vessel coronary atherosclerosis with severe single vessel involvement/PTCA with intracoronary stent deployment proximal-mid segment LAD/moderate-severe compensated left ventricular dysfunction. LVEF (0.35)/abnormal positive signal averaged EKG/oral anticoagulation, April 1995.  b. Remote NYHA class I-II angina pectoris/class III CHF/abnormal quantitative SPECT gated Cardiolite GXT, July 1998.  c. Stable persistent MUGA, LVEF (0.33, January 1999 and January 2000).   d. Residual NYHA class I angina pectoris/CHF with reduced MUGA scan: LVEF (0.25), April 2002.  e. Left heart catheterization with distal circumflex disease: EF 20%, September 2002.   f. MUGA in May 2003: EF 32%.   g. Sestamibi GXT on 04/08/2005: No ischemia. EF 0.29.   h. Residual NYHA class I angina pectoris/CHF with reduced acceptable MUGA scan: EF (0.32) and acceptable Pacesetter PCD interrogation/reprogramming, May 2003 with interrogation, September 2004.  i. Echocardiogram, September 2009 with EF 30%  j. Left heart catheterization by Dr. Bhupinder Gonzalez, August 2010, with LVEF of 35%, with 3-vessel native coronary artery disease, 95% mid-LAD status post PTCA and stenting with two 3 mm stents by Dr. Llanes.  k. Echocardiogram, 06/07/2012: Left  ventricular ejection fraction of 30%.  l. Hospitalization, 02/01/2014, for non-ST elevation MI, left heart catheterization by Dr. Ayala that demonstrated 60% mid stenosis of the right coronary artery that remains unchanged compared to previous, widely patent stents of the LAD with severe LV dysfunction of approximately 25%.   m. Left heart catheterization status post drug-eluting stent to the distal LAD and mid-RCA with an EF of 20% to 25% by Dr. Diego Ayala, 04/20/2015.   n. Left heart catheterization 1/16/17; nonobstructive CAD with patent previously placed stents, dilated cardiomyopathy with severe LV systolic dysfunction, EF less than 20%, normal hemodynamics, recommendations for continued medical therapy and risk factor, evaluation for noncardiac etiology of symptoms.  o. Residual CCS Class I/II angina pectoris/NYHA Class II exertional dyspnea and fatigue syndrome, summer 2017  p. Echocardiogram 11/8/2019: LV severely dilated, the following LV wall segments are hypokinetic: Mid anterior, apical inferior, mid inferoseptal and mid anteroseptal.  The following LV wall segments are dyskinetic: [place].  The following LV wall segments are akinetic: Apical anterior, apical lateral and apical septal.  LV diastolic dysfunction grade 1 consistent with impaired relaxation, LA mild to moderately dilated, mild MR, EF 30%, and anterior apical LV aneurysm is present.  Mild TR, normal RV wall thickness, systolic function and septal motion noted with RV cavity mildly dilated.  No pulmonary hypertension, no pericardial effusion  q. Residual CCS 0 angina pectoris/NYHA class II exertional dyspnea and fatigue, February 2018, September 2018, March 2019, October 2019, April 2020, November 2020  2. Sudden cardiac death with primary ventricular fibrillation status post Pacesetter [place] ICD, [place], [place] in September 2000:   a. ICD generator change out with upgrade to a biventricular pacemaker ICD on 12/17/2007, [employer]  device.  b. ICD discharge, 08/09/2012, secondary to ventricular flutter with initiation of amiodarone therapy.   c. Hospitalization, 02/01/2014, secondary to ICD discharge for ventricular tachycardia with subsequent discontinuation of Coreg and initiation of sotalol therapy.   d. Hospitalization, February 2014, secondary to ICD discharge for VT with subsequent discontinuation of Coreg and initiation of sotalol.  e. Echocardiogram, 04/07/2015: EF less than 20%.  f. Hospitalization secondary to VF and ICD shocks, 04/18/2015.  g. NIPS procedure with defibrillation threshold testing for VF and noninvasive program stimulation, 04/18/2015.   h. Initiation of mexiletine for recurrent ventricular tachycardia with ICD shocks on 05/05/2015 with amiodarone load, recurrent VT and ICD shocks with hospitalization 05/16/2015-05/18/2015.   i. EP study with RFA of large anterior left ventricular scar extending from mid-left ventricular wall down apex by Dr. Ferdinand Alba, 05/21/2015.   j. Acceptable ICD interrogation with normal function, normal battery without V-tach or atrial fibrillation, 5 months on battery, April 2017, with subsequent B[employer]LEY and [employer] replacement and acceptable DFT, October 2017, and acceptable interrogation, January 2018, August 2018, February 2019, November 2019, July 2020  3. Dyslipidemia; on statin therapy.  4. Status post remote operations.  5. Remote chronic tobacco use/abnormal chest x-ray with stable abnormal thoracic  CT scan without contrast demonstrating bilateral diffuse perimeter scarring and fibrosis with scattered subcentimeter noncalcified nodules (unchanged from February 2016), March 2017.  6. Left bundle branch block.  7. Burks trauma with hematuria with urosepsis requiring hospitalization, May 2015.  8. Pulmonary embolism, spring 2015.   9. Remote apparent hypothyroidism/replacement therapy - data deficit, January 2016.  10. Remote diagnosis of obstructive sleep apnea with  CPAP use, 2017.  11. Bilateral cataract extraction November 2018, February 2019      No Known Allergies    Current Outpatient Medications:   •  carvedilol (COREG) 12.5 MG tablet, Take 12.5 mg by mouth 2 (Two) Times a Day With Meals., Disp: , Rfl:   •  eplerenone (INSPRA) 25 MG tablet, Take 12.5 mg by mouth Every Morning., Disp: , Rfl:   •  finasteride (PROSCAR) 5 MG tablet, Take 5 mg by mouth Every Morning., Disp: , Rfl:   •  L-TRYPTOPHAN PO, Take 1,000 mg by mouth Every Night., Disp: , Rfl:   •  levothyroxine (SYNTHROID, LEVOTHROID) 25 MCG tablet, Take 25 mcg by mouth Every Morning., Disp: , Rfl:   •  MELATONIN PO, Take 9 mg by mouth Every Night., Disp: , Rfl:   •  metFORMIN (GLUCOPHAGE) 500 MG tablet, Take 1,000 mg by mouth 2 (Two) Times a Day With Meals., Disp: , Rfl:   •  Multiple Vitamins-Minerals (MULTIVITAMIN ADULTS 50+) tablet, Take 1 tablet by mouth Daily., Disp: , Rfl:   •  nitroglycerin (NITROSTAT) 0.4 MG SL tablet, Place 1 tablet under the tongue Every 5 (Five) Minutes As Needed for Chest Pain. Take no more than 3 doses in 15 minutes., Disp: 30 tablet, Rfl: 11  •  pantoprazole (PROTONIX) 40 MG EC tablet, Take 40 mg by mouth Every Morning., Disp: , Rfl:   •  rosuvastatin (CRESTOR) 40 MG tablet, Take 40 mg by mouth Daily., Disp: , Rfl:   •  sacubitril-valsartan (ENTRESTO) 49-51 MG tablet, Take 1 tablet by mouth 2 (Two) Times a Day., Disp: 180 tablet, Rfl: 3  •  Xarelto 10 MG tablet, TAKE 1 TABLET DAILY, Disp: 90 tablet, Rfl: 3  •  cholecalciferol (VITAMIN D3) 25 MCG (1000 UT) tablet, Take 1,000 Units by mouth Daily., Disp: , Rfl:   •  clopidogrel (PLAVIX) 75 MG tablet, Take 75 mg by mouth Every Morning., Disp: , Rfl:   •  polyethylene glycol (MIRALAX) packet, Take 17 g by mouth Every Other Day., Disp: , Rfl:   •  torsemide (DEMADEX) 5 MG tablet, TAKE 1 TABLET DAILY, Disp: 90 tablet, Rfl: 1    History of Present Illness: Patient returns for scheduled 6-month follow up. Patient had a follow up with   "Bry in July 2020 with acceptable ICD interrogation. Since last visit, patient has been doing well overall and has been sheltering in place. He is wanting to participate in the Tam and Tam COVID-19 vaccination trial and is wanting Dr. Gonzalez's advice on if he should move forward with this. His recent laboratory studies with Dr. Lewis have been acceptable- data deficit. He reports Dr. Lewis is still wanting to prescribe Repatha if Dr. Gonzalez concurs. He is able to perform daily activities without cardiopulmonary complaints. He states he \"can do anything\" for a short period of time before becoming shortness of breath. He states he is supposed to have his ICD generator changed in the next 3-4 months. He has had no interim ER visits, hospitalizations, serious illnesses, or surgeries. Patient otherwise denies chest pain, shortness of breath, PND, edema, palpitations, syncope, or presyncope at this time. He is hoping to go to [place] in July 2021 if allowed to travel to take a train trip from [place] to [facility].          ROS   Obtained and negative except as outlined in problem list and HPI.    Procedures       Objective:       Vitals:    11/20/20 1054 11/20/20 1057   BP: 110/62 90/60   BP Location: Right arm Right arm   Patient Position: Sitting Standing   Pulse: 71 74   Weight: 93.9 kg (207 lb)    Height: 185.4 cm (73\")      Body mass index is 27.31 kg/m².  Last weight: 206 lbs    Vitals signs reviewed.   Constitutional:       Appearance: Well-developed.   Neck:      Thyroid: No thyromegaly.      Vascular: No carotid bruit or JVD.      Lymphadenopathy: No cervical adenopathy.   Pulmonary:      Effort: Pulmonary effort is normal.      Breath sounds: Normal breath sounds. No wheezing. No rhonchi. No rales.   Chest:      Comments: Left precordial PCD site nominal  Cardiovascular:      Regular rhythm.      Murmurs: There is a grade 2/6 mid frequency early systolic murmur at the LLSB.      No gallop. No S3 " gallop.   Pulses:     Dorsalis pedis: 1+ bilaterally.     Posterior tibial: 1+ bilaterally.  Abdominal:      Palpations: Abdomen is soft. There is no abdominal mass.      Tenderness: There is no abdominal tenderness. There is no guarding.   Musculoskeletal: Normal range of motion.   Skin:     General: Skin is warm and dry.      Findings: No rash.   Neurological:      Mental Status: Alert and oriented to person, place, and time.           Lab Review: No recent lab results available for review.      Lab Results   Component Value Date    GLUCOSE 109 (H) 01/03/2018    BUN 23 01/03/2018    CREATININE 0.90 01/03/2018    EGFRIFNONA 83 01/03/2018    BCR 25.6 (H) 01/03/2018     01/03/2018    K 4.4 01/03/2018     01/03/2018    MG 2.5 05/18/2015    CO2 28.0 01/03/2018    CALCIUM 9.1 01/03/2018    ALBUMIN 3.80 01/15/2017    AST 85 (H) 01/15/2017     (H) 01/15/2017       Lab Results   Component Value Date    WBC 12.49 (H) 10/01/2017    HGB 15.2 10/01/2017    HCT 47.1 10/01/2017    MCV 91.5 10/01/2017     10/01/2017       Lab Results   Component Value Date    HGBA1C 6.40 (H) 10/01/2017       Lab Results   Component Value Date    TSH 3.832 03/25/2016       Lab Results   Component Value Date    CHOL 130 08/14/2017     Lab Results   Component Value Date    TRIG 70 08/14/2017    TRIG 203 (H) 01/30/2014     Lab Results   Component Value Date    HDL 40 08/14/2017    HDL 31 (L) 01/30/2014     Lab Results   Component Value Date    LDL 77 08/14/2017    LDL 65 01/30/2014             Assessment:       Overall continued acceptable course with no new interim cardiopulmonary complaints with acceptable functional status. We will defer additional diagnostic or therapeutic intervention from a cardiac perspective at this time. In view of the results with Farxiga with reduced hospitalizations and improved mortality we will add 10 mg daily as a trial. He has our fax number and will attempt to have his laboratory results  with us.      Diagnosis Plan   1. Chronic combined systolic and diastolic congestive heart failure (EF<20%)  No recurrent angina pectoris or CHF on current activity schedule; see below    2. Ischemic heart disease  No recurrent angina pectoris or CHF on current activity schedule; continue current treatment    3. Dyslipidemia  No new data to review; Continue current treatment.           Plan:         1. Patient to continue current medications and close follow up with the above providers with the initiation of Farxiga 10 mg as a trial.  2. Tentative cardiology follow up in May 2021 or patient may return sooner PRN.  3. Influenza immunization encouraged.   4. 1 800 card provided; hopefully we will be allowed to review his recent laboratory studies.     Scribed for Bhupinder Gonzalez MD by Tammi Liu. 11/20/2020  11:15 EST     I, Bhupinder Gonzalez MD, [facility], personally performed the services described in this documentation as scribed by the above named individual in my presence, and it is both accurate and complete. At 11:18 EST on 11/20/2020

## 2020-11-19 ENCOUNTER — TREATMENT (OUTPATIENT)
Dept: CARDIAC REHAB | Facility: HOSPITAL | Age: 75
End: 2020-11-19

## 2020-11-19 DIAGNOSIS — Z00.00 PREVENTATIVE HEALTH CARE: Primary | ICD-10-CM

## 2020-11-20 ENCOUNTER — OFFICE VISIT (OUTPATIENT)
Dept: CARDIOLOGY | Facility: CLINIC | Age: 75
End: 2020-11-20

## 2020-11-20 VITALS
HEART RATE: 74 BPM | WEIGHT: 207 LBS | HEIGHT: 73 IN | DIASTOLIC BLOOD PRESSURE: 60 MMHG | SYSTOLIC BLOOD PRESSURE: 90 MMHG | BODY MASS INDEX: 27.43 KG/M2

## 2020-11-20 DIAGNOSIS — I50.42 CHRONIC COMBINED SYSTOLIC AND DIASTOLIC CONGESTIVE HEART FAILURE (HCC): Primary | ICD-10-CM

## 2020-11-20 DIAGNOSIS — I25.9 ISCHEMIC HEART DISEASE: ICD-10-CM

## 2020-11-20 DIAGNOSIS — E78.5 DYSLIPIDEMIA: ICD-10-CM

## 2020-11-20 PROCEDURE — 99214 OFFICE O/P EST MOD 30 MIN: CPT | Performed by: INTERNAL MEDICINE

## 2020-11-24 ENCOUNTER — TREATMENT (OUTPATIENT)
Dept: CARDIAC REHAB | Facility: HOSPITAL | Age: 75
End: 2020-11-24

## 2020-11-24 DIAGNOSIS — Z00.00 PREVENTATIVE HEALTH CARE: Primary | ICD-10-CM

## 2020-12-03 ENCOUNTER — TREATMENT (OUTPATIENT)
Dept: CARDIAC REHAB | Facility: HOSPITAL | Age: 75
End: 2020-12-03

## 2020-12-03 DIAGNOSIS — Z00.00 PREVENTATIVE HEALTH CARE: Primary | ICD-10-CM

## 2020-12-08 ENCOUNTER — TREATMENT (OUTPATIENT)
Dept: CARDIAC REHAB | Facility: HOSPITAL | Age: 75
End: 2020-12-08

## 2020-12-08 DIAGNOSIS — Z00.00 PREVENTATIVE HEALTH CARE: Primary | ICD-10-CM

## 2020-12-10 ENCOUNTER — TREATMENT (OUTPATIENT)
Dept: CARDIAC REHAB | Facility: HOSPITAL | Age: 75
End: 2020-12-10

## 2020-12-10 DIAGNOSIS — Z00.00 PREVENTATIVE HEALTH CARE: Primary | ICD-10-CM

## 2020-12-15 ENCOUNTER — TREATMENT (OUTPATIENT)
Dept: CARDIAC REHAB | Facility: HOSPITAL | Age: 75
End: 2020-12-15

## 2020-12-15 DIAGNOSIS — Z00.00 PREVENTATIVE HEALTH CARE: Primary | ICD-10-CM

## 2020-12-17 ENCOUNTER — TREATMENT (OUTPATIENT)
Dept: CARDIAC REHAB | Facility: HOSPITAL | Age: 75
End: 2020-12-17

## 2020-12-17 DIAGNOSIS — Z00.00 PREVENTATIVE HEALTH CARE: Primary | ICD-10-CM

## 2020-12-22 ENCOUNTER — TREATMENT (OUTPATIENT)
Dept: CARDIAC REHAB | Facility: HOSPITAL | Age: 75
End: 2020-12-22

## 2020-12-22 DIAGNOSIS — Z00.00 PREVENTATIVE HEALTH CARE: Primary | ICD-10-CM

## 2020-12-29 ENCOUNTER — TREATMENT (OUTPATIENT)
Dept: CARDIAC REHAB | Facility: HOSPITAL | Age: 75
End: 2020-12-29

## 2020-12-29 DIAGNOSIS — Z00.00 PREVENTATIVE HEALTH CARE: Primary | ICD-10-CM

## 2021-01-05 ENCOUNTER — TREATMENT (OUTPATIENT)
Dept: CARDIAC REHAB | Facility: HOSPITAL | Age: 76
End: 2021-01-05

## 2021-01-05 DIAGNOSIS — Z00.00 PREVENTATIVE HEALTH CARE: Primary | ICD-10-CM

## 2021-01-12 ENCOUNTER — TREATMENT (OUTPATIENT)
Dept: CARDIAC REHAB | Facility: HOSPITAL | Age: 76
End: 2021-01-12

## 2021-01-12 DIAGNOSIS — Z00.00 PREVENTATIVE HEALTH CARE: Primary | ICD-10-CM

## 2021-01-14 ENCOUNTER — TREATMENT (OUTPATIENT)
Dept: CARDIAC REHAB | Facility: HOSPITAL | Age: 76
End: 2021-01-14

## 2021-01-14 DIAGNOSIS — Z00.00 PREVENTATIVE HEALTH CARE: Primary | ICD-10-CM

## 2021-01-19 ENCOUNTER — TREATMENT (OUTPATIENT)
Dept: CARDIAC REHAB | Facility: HOSPITAL | Age: 76
End: 2021-01-19

## 2021-01-19 DIAGNOSIS — Z00.00 PREVENTATIVE HEALTH CARE: Primary | ICD-10-CM

## 2021-01-26 ENCOUNTER — TREATMENT (OUTPATIENT)
Dept: CARDIAC REHAB | Facility: HOSPITAL | Age: 76
End: 2021-01-26

## 2021-01-26 DIAGNOSIS — Z00.00 PREVENTATIVE HEALTH CARE: Primary | ICD-10-CM

## 2021-01-28 ENCOUNTER — TREATMENT (OUTPATIENT)
Dept: CARDIAC REHAB | Facility: HOSPITAL | Age: 76
End: 2021-01-28

## 2021-01-28 DIAGNOSIS — Z00.00 PREVENTATIVE HEALTH CARE: Primary | ICD-10-CM

## 2021-02-02 ENCOUNTER — TREATMENT (OUTPATIENT)
Dept: CARDIAC REHAB | Facility: HOSPITAL | Age: 76
End: 2021-02-02

## 2021-02-02 DIAGNOSIS — Z00.00 PREVENTATIVE HEALTH CARE: Primary | ICD-10-CM

## 2021-02-09 ENCOUNTER — TREATMENT (OUTPATIENT)
Dept: CARDIAC REHAB | Facility: HOSPITAL | Age: 76
End: 2021-02-09

## 2021-02-09 DIAGNOSIS — Z00.00 PREVENTATIVE HEALTH CARE: Primary | ICD-10-CM

## 2021-02-19 PROCEDURE — 93296 REM INTERROG EVL PM/IDS: CPT | Performed by: INTERNAL MEDICINE

## 2021-02-19 PROCEDURE — 93295 DEV INTERROG REMOTE 1/2/MLT: CPT | Performed by: INTERNAL MEDICINE

## 2021-02-23 ENCOUNTER — TREATMENT (OUTPATIENT)
Dept: CARDIAC REHAB | Facility: HOSPITAL | Age: 76
End: 2021-02-23

## 2021-02-23 DIAGNOSIS — Z00.00 PREVENTATIVE HEALTH CARE: Primary | ICD-10-CM

## 2021-02-25 ENCOUNTER — TREATMENT (OUTPATIENT)
Dept: CARDIAC REHAB | Facility: HOSPITAL | Age: 76
End: 2021-02-25

## 2021-02-25 DIAGNOSIS — Z00.00 PREVENTATIVE HEALTH CARE: Primary | ICD-10-CM

## 2021-03-02 ENCOUNTER — TREATMENT (OUTPATIENT)
Dept: CARDIAC REHAB | Facility: HOSPITAL | Age: 76
End: 2021-03-02

## 2021-03-02 DIAGNOSIS — Z00.00 PREVENTATIVE HEALTH CARE: Primary | ICD-10-CM

## 2021-03-04 ENCOUNTER — TREATMENT (OUTPATIENT)
Dept: CARDIAC REHAB | Facility: HOSPITAL | Age: 76
End: 2021-03-04

## 2021-03-04 DIAGNOSIS — Z00.00 PREVENTATIVE HEALTH CARE: Primary | ICD-10-CM

## 2021-03-09 ENCOUNTER — TREATMENT (OUTPATIENT)
Dept: CARDIAC REHAB | Facility: HOSPITAL | Age: 76
End: 2021-03-09

## 2021-03-09 DIAGNOSIS — Z00.00 PREVENTATIVE HEALTH CARE: Primary | ICD-10-CM

## 2021-03-11 ENCOUNTER — TREATMENT (OUTPATIENT)
Dept: CARDIAC REHAB | Facility: HOSPITAL | Age: 76
End: 2021-03-11

## 2021-03-11 DIAGNOSIS — Z00.00 PREVENTATIVE HEALTH CARE: Primary | ICD-10-CM

## 2021-03-16 ENCOUNTER — TREATMENT (OUTPATIENT)
Dept: CARDIAC REHAB | Facility: HOSPITAL | Age: 76
End: 2021-03-16

## 2021-03-16 DIAGNOSIS — Z00.00 PREVENTATIVE HEALTH CARE: Primary | ICD-10-CM

## 2021-03-17 ENCOUNTER — OFFICE VISIT (OUTPATIENT)
Dept: CARDIOLOGY | Facility: CLINIC | Age: 76
End: 2021-03-17

## 2021-03-17 VITALS
SYSTOLIC BLOOD PRESSURE: 114 MMHG | OXYGEN SATURATION: 96 % | WEIGHT: 211.6 LBS | DIASTOLIC BLOOD PRESSURE: 66 MMHG | TEMPERATURE: 97.3 F | BODY MASS INDEX: 28.04 KG/M2 | HEIGHT: 73 IN | HEART RATE: 70 BPM

## 2021-03-17 DIAGNOSIS — I25.810 CORONARY ARTERY DISEASE INVOLVING CORONARY BYPASS GRAFT OF NATIVE HEART WITHOUT ANGINA PECTORIS: ICD-10-CM

## 2021-03-17 DIAGNOSIS — I50.42 CHRONIC COMBINED SYSTOLIC AND DIASTOLIC CONGESTIVE HEART FAILURE (HCC): ICD-10-CM

## 2021-03-17 DIAGNOSIS — I47.20 VENTRICULAR TACHYCARDIA (HCC): Primary | ICD-10-CM

## 2021-03-17 PROCEDURE — 93284 PRGRMG EVAL IMPLANTABLE DFB: CPT | Performed by: INTERNAL MEDICINE

## 2021-03-17 PROCEDURE — 99213 OFFICE O/P EST LOW 20 MIN: CPT | Performed by: INTERNAL MEDICINE

## 2021-03-17 NOTE — PROGRESS NOTES
Jonnie Roth  1945  [phone]    03/17/2021    [facility] CARDIOLOGY     Referring Provider: No ref. provider found     Brian Lewis MD  [address] [id number]    Chief Complaint   Patient presents with   • Ventricular Tachycardia       Problem List:     1. Sudden cardiac death with primary ventricular fibrillation status post Pacesetter [place] ICD, [place], [place] in September 2000:   a. ICD generator change out with upgrade to a biventricular pacemaker ICD on 12/17/2007, [employer] device.  b. ICD discharge, 08/09/2012, secondary to ventricular flutter with initiation of amiodarone therapy.   c. Hospitalization, 02/01/2014, secondary to ICD discharge for ventricular tachycardia with subsequent discontinuation of Coreg and initiation of sotalol therapy.   d. Hospitalization, February 2014, secondary to ICD discharge for VT with subsequent discontinuation of Coreg and initiation of sotalol.  e. Echocardiogram, 04/07/2015: EF less than 20%.  f. Hospitalization secondary to VF and ICD shocks, 04/18/2015.  g. NIPS procedure with defibrillation threshold testing for VF and noninvasive program stimulation, 04/18/2015.   h. Initiation of mexiletine for recurrent ventricular tachycardia with ICD shocks on 05/05/2015 with amiodarone load, recurrent VT and ICD shocks with hospitalization 05/16/2015-05/18/2015.   i. EP study with RFA of large anterior left ventricular scar extending from mid-left ventricular wall down apex by Dr. Ferdinand Alba, 05/21/2015.                      j.   ICD generator change 10/2017                     K.   Echo 11/19 LVEF 30%, mild TR  2. Ischemic heart disease:  a. Remote progressive angina pectoris/acute extensive anterolateral myocardial infarction/delayed presentation with thrombolysis/severe 3-vessel coronary atherosclerosis with severe single vessel involvement/PTCA with intracoronary stent deployment proximal-mid segment  LAD/moderate-severe compensated left ventricular dysfunction. LVEF (0.35)/abnormal positive signal averaged EKG/oral anticoagulation, April 1995.  b. Remote NYHA class I-II angina pectoris/class III CHF/abnormal quantitative SPECT gated Cardiolite GXT, July 1998.  c. Stable persistent MUGA, LVEF (0.33, January 1999 and January 2000).   d. Residual NYHA class I angina pectoris/CHF with reduced MUGA scan: LVEF (0.25), April 2002.  e. Left heart catheterization with distal circumflex disease: EF 20%, September 2002.   f. MUGA in May 2003: EF 32%.   g. Sestamibi GXT on 04/08/2005: No ischemia. EF 29%.   h. Residual NYHA class I angina pectoris/CHF with reduced acceptable MUGA scan: EF (0.32) and acceptable Pacesetter PCD interrogation/reprogramming, May 2003 with interrogation, September 2004.  i. Echocardiogram, September 2009 with EF 30%  j. Left heart catheterization by Dr. Bhupinder Gonzalez, August 2010, with LVEF of 35%, with 3-vessel native coronary artery disease, 95% mid-LAD status post PTCA and stenting with two 3 mm stents by Dr. Llanes.  k. Echocardiogram, 06/07/2012: Left ventricular ejection fraction of 30%.  l. Hospitalization, 02/01/2014, for non-ST elevation MI, left heart catheterization by Dr. Ayala that demonstrated 60% mid stenosis of the right coronary artery that remains unchanged compared to previous, widely patent stents of the LAD with severe LV dysfunction of approximately 25%.   m. Left heart catheterization status post drug-eluting stent to the distal LAD and mid-RCA with an EF of 20% to 25% by Dr. Diego Ayala, 04/20/2015.   n. Left heart catheterization 1/16/17; nonobstructive CAD with patent previously placed stents, dilated cardiomyopathy with severe LV systolic dysfunction, EF less than 20%, normal hemodynamics, recommendations for continued medical therapy and risk factor, evaluation for noncardiac etiology of symptoms.  o. Residual CCS Class I/II angina pectoris/NYHA Class II exertional  dyspnea and fatigue syndrome, summer 2017  p. Residual CCS 0 angina pectoris/NYHA class I-II exertional dyspnea and fatigue, February 2018, September 2018, March 2019.  3. Dyslipidemia.  4. Status post remote operations.  5. Remote chronic tobacco use/abnormal chest x-ray with stable abnormal thoracic  CT scan without contrast demonstrating bilateral diffuse perimeter scarring and fibrosis with scattered subcentimeter noncalcified nodules (unchanged from February 2016), March 2017.  6. Left bundle branch block.  7. Burks trauma with hematuria with urosepsis requiring hospitalization, May 2015.  8. Pulmonary embolism, spring 2015.   9. Remote apparent hypothyroidism/replacement therapy - data deficit, January 2016.  10. Remote diagnosis of obstructive sleep apnea with CPAP use, 2017.  11. Bilateral cataract extraction November 2018, February 2019    Allergies  No Known Allergies    Current Medications    Current Outpatient Medications:   •  carvedilol (COREG) 12.5 MG tablet, Take 12.5 mg by mouth 2 (Two) Times a Day With Meals., Disp: , Rfl:   •  cholecalciferol (VITAMIN D3) 25 MCG (1000 UT) tablet, Take 1,000 Units by mouth Daily., Disp: , Rfl:   •  clopidogrel (PLAVIX) 75 MG tablet, Take 75 mg by mouth Every Morning., Disp: , Rfl:   •  eplerenone (INSPRA) 25 MG tablet, Take 12.5 mg by mouth Every Morning., Disp: , Rfl:   •  finasteride (PROSCAR) 5 MG tablet, Take 5 mg by mouth Every Morning., Disp: , Rfl:   •  L-TRYPTOPHAN PO, Take 1,000 mg by mouth Every Night., Disp: , Rfl:   •  levothyroxine (SYNTHROID, LEVOTHROID) 25 MCG tablet, Take 25 mcg by mouth Every Morning., Disp: , Rfl:   •  MELATONIN PO, Take 9 mg by mouth Every Night., Disp: , Rfl:   •  metFORMIN (GLUCOPHAGE) 500 MG tablet, Take 1,000 mg by mouth 2 (Two) Times a Day With Meals., Disp: , Rfl:   •  Multiple Vitamins-Minerals (MULTIVITAMIN ADULTS 50+) tablet, Take 1 tablet by mouth Daily., Disp: , Rfl:   •  nitroglycerin (NITROSTAT) 0.4 MG SL tablet,  "Place 1 tablet under the tongue Every 5 (Five) Minutes As Needed for Chest Pain. Take no more than 3 doses in 15 minutes., Disp: 30 tablet, Rfl: 11  •  pantoprazole (PROTONIX) 40 MG EC tablet, Take 40 mg by mouth Every Morning., Disp: , Rfl:   •  polyethylene glycol (MIRALAX) packet, Take 17 g by mouth Every Other Day., Disp: , Rfl:   •  rosuvastatin (CRESTOR) 40 MG tablet, Take 40 mg by mouth Daily., Disp: , Rfl:   •  sacubitril-valsartan (ENTRESTO) 49-51 MG tablet, Take 1 tablet by mouth 2 (Two) Times a Day., Disp: 180 tablet, Rfl: 3  •  torsemide (DEMADEX) 5 MG tablet, TAKE 1 TABLET DAILY, Disp: 90 tablet, Rfl: 1  •  Xarelto 10 MG tablet, TAKE 1 TABLET DAILY, Disp: 90 tablet, Rfl: 3    History of Present Illness     Pt presents for follow up of CAD/VF/VT. Since the pt has seen us, pt denies any palpitations, CP, LH, and dizziness. Denies any hospitalizations, ER visits, ICD shocks, or TIA/CVA symptoms. Overall feels well except for fatigue. No side effects to medications. BP at home stable. Has had both doses of pfizer vaccines and did well. + Chronic LU    ROS:  General:  Denies fatigue, weight gain or loss  Cardiovascular:  Denies CP, PND, syncope, near syncope, +edema - palpitations.  Pulmonary:  + chronic mild LU, No cough, or wheezing      Vitals:    03/17/21 1340   BP: 114/66   BP Location: Left arm   Patient Position: Sitting   Pulse: 70   Temp: 97.3 °F (36.3 °C)   SpO2: 96%   Weight: 96 kg (211 lb 9.6 oz)   Height: 185.4 cm (73\")     Body mass index is 27.92 kg/m².     PE:  General: NAD  Neck: no JVD, no carotid bruits, no TM  Heart RRR, NL S1, S2, S3 S4 no rubs, murmurs  Lungs: CTA, no wheezes, rhonchi, or rales  Abd: soft, non-tender, NL BS  Ext: No musculoskeletal deformities, +trace BLE edema, - cyanosis, or clubbing  Psych: normal mood and affect    Diagnostic Data:  Manual Device interrogation: [employer], BiV ICD, DDDR rate 70 bpm, RA paced 98%, RV paced 98%. LV threshold 4.5v @1.5ms. 4 months on " battery. AMS <1%, longest 20 seconds. NSVT x 4, longest 16 seconds (RV over sensing)    Procedures    1. Ventricular tachycardia (CMS/HCC)    2. Chronic combined systolic and diastolic congestive heart failure (EF<20%)    3. Coronary artery disease involving coronary bypass graft of native heart without angina pectoris          Plan:    1. VT:  - s/p RFA in 2015 with no recurrence  - no sustained VT on interrogation     2. Chronic CHF Systolic: LVEF up to 30%  Class II on meds coreg and Entresto  - BiV ICD check,  LV threshold is chronically elevated and stable. 4 months on battery.    3. CAD:  - on Plavix, follows with Dr. Gonzalez. Stable     4. History of PE:  - on Xarelto     F/up in 6 months    Scribed for Ferdinand Alba MD by CHENTE Olsen. 3/17/2021 14:07 EDT     I, Ferdinand Alba MD, personally performed the services described in this documentation as scribed by the above named individual in my presence, and it is both accurate and complete.  3/17/2021  14:13 EDT

## 2021-03-18 ENCOUNTER — TREATMENT (OUTPATIENT)
Dept: CARDIAC REHAB | Facility: HOSPITAL | Age: 76
End: 2021-03-18

## 2021-03-18 DIAGNOSIS — Z00.00 PREVENTATIVE HEALTH CARE: Primary | ICD-10-CM

## 2021-03-23 ENCOUNTER — TREATMENT (OUTPATIENT)
Dept: CARDIAC REHAB | Facility: HOSPITAL | Age: 76
End: 2021-03-23

## 2021-03-23 DIAGNOSIS — Z00.00 PREVENTATIVE HEALTH CARE: Primary | ICD-10-CM

## 2021-03-25 ENCOUNTER — TREATMENT (OUTPATIENT)
Dept: CARDIAC REHAB | Facility: HOSPITAL | Age: 76
End: 2021-03-25

## 2021-03-25 DIAGNOSIS — Z00.00 PREVENTATIVE HEALTH CARE: Primary | ICD-10-CM

## 2021-03-30 ENCOUNTER — TREATMENT (OUTPATIENT)
Dept: CARDIAC REHAB | Facility: HOSPITAL | Age: 76
End: 2021-03-30

## 2021-03-30 DIAGNOSIS — Z00.00 PREVENTATIVE HEALTH CARE: Primary | ICD-10-CM

## 2021-04-01 ENCOUNTER — TREATMENT (OUTPATIENT)
Dept: CARDIAC REHAB | Facility: HOSPITAL | Age: 76
End: 2021-04-01

## 2021-04-01 DIAGNOSIS — Z00.00 PREVENTATIVE HEALTH CARE: Primary | ICD-10-CM

## 2021-04-06 ENCOUNTER — TREATMENT (OUTPATIENT)
Dept: CARDIAC REHAB | Facility: HOSPITAL | Age: 76
End: 2021-04-06

## 2021-04-06 DIAGNOSIS — Z00.00 PREVENTATIVE HEALTH CARE: Primary | ICD-10-CM

## 2021-04-13 ENCOUNTER — TREATMENT (OUTPATIENT)
Dept: CARDIAC REHAB | Facility: HOSPITAL | Age: 76
End: 2021-04-13

## 2021-04-13 DIAGNOSIS — Z00.00 PREVENTATIVE HEALTH CARE: Primary | ICD-10-CM

## 2021-04-15 ENCOUNTER — TREATMENT (OUTPATIENT)
Dept: CARDIAC REHAB | Facility: HOSPITAL | Age: 76
End: 2021-04-15

## 2021-04-15 DIAGNOSIS — Z00.00 PREVENTATIVE HEALTH CARE: Primary | ICD-10-CM

## 2021-04-22 ENCOUNTER — TREATMENT (OUTPATIENT)
Dept: CARDIAC REHAB | Facility: HOSPITAL | Age: 76
End: 2021-04-22

## 2021-04-22 DIAGNOSIS — Z00.00 PREVENTATIVE HEALTH CARE: Primary | ICD-10-CM

## 2021-04-27 ENCOUNTER — TREATMENT (OUTPATIENT)
Dept: CARDIAC REHAB | Facility: HOSPITAL | Age: 76
End: 2021-04-27

## 2021-04-27 DIAGNOSIS — Z00.00 PREVENTATIVE HEALTH CARE: Primary | ICD-10-CM

## 2021-04-29 ENCOUNTER — TREATMENT (OUTPATIENT)
Dept: CARDIAC REHAB | Facility: HOSPITAL | Age: 76
End: 2021-04-29

## 2021-04-29 DIAGNOSIS — Z00.00 PREVENTATIVE HEALTH CARE: Primary | ICD-10-CM

## 2021-05-04 ENCOUNTER — TREATMENT (OUTPATIENT)
Dept: CARDIAC REHAB | Facility: HOSPITAL | Age: 76
End: 2021-05-04

## 2021-05-04 DIAGNOSIS — Z00.00 PREVENTATIVE HEALTH CARE: Primary | ICD-10-CM

## 2021-05-11 ENCOUNTER — TREATMENT (OUTPATIENT)
Dept: CARDIAC REHAB | Facility: HOSPITAL | Age: 76
End: 2021-05-11

## 2021-05-11 ENCOUNTER — TELEPHONE (OUTPATIENT)
Dept: CARDIOLOGY | Facility: CLINIC | Age: 76
End: 2021-05-11

## 2021-05-11 DIAGNOSIS — Z00.00 PREVENTATIVE HEALTH CARE: Primary | ICD-10-CM

## 2021-05-12 ENCOUNTER — TELEPHONE (OUTPATIENT)
Dept: CARDIOLOGY | Facility: CLINIC | Age: 76
End: 2021-05-12

## 2021-05-12 NOTE — TELEPHONE ENCOUNTER
Patient was seen on 3/17/21. When his device was checked, it showed that he had about 4 months of battery left on his BIV ICD. He is planning a trip the last week of September through the first week in August. He just wants to make sure that this trip will not interfere with scheduling his battery change.     Diagnostic Data:  Manual Device interrogation: [employer], BiV ICD, DDDR rate 70 bpm, RA paced 98%, RV paced 98%. LV threshold 4.5v @1.5ms. 4 months on battery. AMS <1%, longest 20 seconds. NSVT x 4, longest 16 seconds (RV over sensing)hange.

## 2021-05-13 ENCOUNTER — TREATMENT (OUTPATIENT)
Dept: CARDIAC REHAB | Facility: HOSPITAL | Age: 76
End: 2021-05-13

## 2021-05-13 DIAGNOSIS — Z00.00 PREVENTATIVE HEALTH CARE: Primary | ICD-10-CM

## 2021-05-13 DIAGNOSIS — I47.29 VENTRICULAR TACHYCARDIA (PAROXYSMAL) (HCC): Primary | ICD-10-CM

## 2021-05-13 NOTE — TELEPHONE ENCOUNTER
Dr. Alba would like you to check a remote download to see when the patient will need to have his battery changed.

## 2021-05-14 PROBLEM — I47.29 VENTRICULAR TACHYCARDIA (PAROXYSMAL): Status: ACTIVE | Noted: 2021-05-14

## 2021-05-18 ENCOUNTER — TREATMENT (OUTPATIENT)
Dept: CARDIAC REHAB | Facility: HOSPITAL | Age: 76
End: 2021-05-18

## 2021-05-18 DIAGNOSIS — Z00.00 PREVENTATIVE HEALTH CARE: Primary | ICD-10-CM

## 2021-05-20 ENCOUNTER — TREATMENT (OUTPATIENT)
Dept: CARDIAC REHAB | Facility: HOSPITAL | Age: 76
End: 2021-05-20

## 2021-05-20 DIAGNOSIS — Z00.00 PREVENTATIVE HEALTH CARE: Primary | ICD-10-CM

## 2021-05-21 PROCEDURE — 93296 REM INTERROG EVL PM/IDS: CPT | Performed by: INTERNAL MEDICINE

## 2021-05-21 PROCEDURE — 93295 DEV INTERROG REMOTE 1/2/MLT: CPT | Performed by: INTERNAL MEDICINE

## 2021-05-25 ENCOUNTER — TREATMENT (OUTPATIENT)
Dept: CARDIAC REHAB | Facility: HOSPITAL | Age: 76
End: 2021-05-25

## 2021-05-25 DIAGNOSIS — Z00.00 PREVENTATIVE HEALTH CARE: Primary | ICD-10-CM

## 2021-06-02 ENCOUNTER — OFFICE VISIT (OUTPATIENT)
Dept: CARDIOLOGY | Facility: CLINIC | Age: 76
End: 2021-06-02

## 2021-06-02 VITALS
HEIGHT: 73 IN | SYSTOLIC BLOOD PRESSURE: 144 MMHG | DIASTOLIC BLOOD PRESSURE: 66 MMHG | HEART RATE: 80 BPM | OXYGEN SATURATION: 98 % | BODY MASS INDEX: 27.57 KG/M2 | WEIGHT: 208 LBS

## 2021-06-02 DIAGNOSIS — E78.5 DYSLIPIDEMIA: ICD-10-CM

## 2021-06-02 DIAGNOSIS — I50.42 CHRONIC COMBINED SYSTOLIC AND DIASTOLIC CONGESTIVE HEART FAILURE (HCC): Primary | ICD-10-CM

## 2021-06-02 DIAGNOSIS — I25.9 ISCHEMIC HEART DISEASE: ICD-10-CM

## 2021-06-02 PROCEDURE — 99214 OFFICE O/P EST MOD 30 MIN: CPT | Performed by: INTERNAL MEDICINE

## 2021-06-02 NOTE — PROGRESS NOTES
Subjective:     Encounter Date:06/02/2021    Patient ID: Jonnie Roth is a 76 y.o.  white male, disabled , from [place], [place].      INTERNIST: Brian Lewis MD, [facility]  INTERVENTIONAL CARDIOLOGIST: Diego Ayala MD, [facility], [facility]   ELECTROPHYSIOLOGIST: Ferdinand Alba MD, [facility], [facility]  UROLOGIST: Simon Morin MD   GASTROENTEROLOGIST: Jose Barnard MD    Chief Complaint:   Chief Complaint   Patient presents with   • Chronic combined systolic and diastolic congestive heart dominick     f/u       Problem List:  1. Ischemic heart disease:  a. Remote progressive angina pectoris/acute extensive anterolateral myocardial infarction/delayed presentation with thrombolysis/severe 3-vessel coronary atherosclerosis with severe single vessel involvement/PTCA with intracoronary stent deployment proximal-mid segment LAD/moderate-severe compensated left ventricular dysfunction. LVEF (0.35)/abnormal positive signal averaged EKG/oral anticoagulation, April 1995.  b. Remote NYHA class I-II angina pectoris/class III CHF/abnormal quantitative SPECT gated Cardiolite GXT, July 1998.  c. Stable persistent MUGA, LVEF (0.33, January 1999 and January 2000).   d. Residual NYHA class I angina pectoris/CHF with reduced MUGA scan: LVEF (0.25), April 2002.  e. Left heart catheterization with distal circumflex disease: EF 20%, September 2002.   f. MUGA in May 2003: EF 32%.   g. Sestamibi GXT on 04/08/2005: No ischemia. EF 0.29.   h. Residual NYHA class I angina pectoris/CHF with reduced acceptable MUGA scan: EF (0.32) and acceptable Pacesetter PCD interrogation/reprogramming, May 2003 with interrogation, September 2004.  i. Echocardiogram, September 2009 with EF 30%  j. Left heart catheterization by Dr. Bhupinder Gonzalez, August 2010, with LVEF of 35%, with 3-vessel native coronary artery disease, 95% mid-LAD status post PTCA and stenting with two 3 mm stents by Dr. Llanes.  k. Echocardiogram, 06/07/2012: Left  ventricular ejection fraction of 30%.  l. Hospitalization, 02/01/2014, for non-ST elevation MI, left heart catheterization by Dr. Ayala that demonstrated 60% mid stenosis of the right coronary artery that remains unchanged compared to previous, widely patent stents of the LAD with severe LV dysfunction of approximately 25%.   m. Left heart catheterization status post drug-eluting stent to the distal LAD and mid-RCA with an EF of 20% to 25% by Dr. Diego Ayala, 04/20/2015.   n. Left heart catheterization 1/16/17; nonobstructive CAD with patent previously placed stents, dilated cardiomyopathy with severe LV systolic dysfunction, EF less than 20%, normal hemodynamics, recommendations for continued medical therapy and risk factor, evaluation for noncardiac etiology of symptoms.  o. Residual CCS Class I/II angina pectoris/NYHA Class II exertional dyspnea and fatigue syndrome, summer 2017  p. Echocardiogram 11/8/2019: LV severely dilated, the following LV wall segments are hypokinetic: Mid anterior, apical inferior, mid inferoseptal and mid anteroseptal.  The following LV wall segments are dyskinetic: [place].  The following LV wall segments are akinetic: Apical anterior, apical lateral and apical septal.  LV diastolic dysfunction grade 1 consistent with impaired relaxation, LA mild to moderately dilated, mild MR, EF 30%, and anterior apical LV aneurysm is present.  Mild TR, normal RV wall thickness, systolic function and septal motion noted with RV cavity mildly dilated.  No pulmonary hypertension, no pericardial effusion  q. Residual CCS 0 angina pectoris/NYHA class I-II exertional dyspnea and fatigue, February 2018, September 2018, March 2019, October 2019, April 2020, November 2020, June 2021  2. Sudden cardiac death with primary ventricular fibrillation status post Pacesetter [place] ICD, [place], [place] in September 2000:   a. ICD generator change out with upgrade to a biventricular pacemaker ICD on 12/17/2007, [employer]  device.  b. ICD discharge, 08/09/2012, secondary to ventricular flutter with initiation of amiodarone therapy.   c. Hospitalization, 02/01/2014, secondary to ICD discharge for ventricular tachycardia with subsequent discontinuation of Coreg and initiation of sotalol therapy.   d. Hospitalization, February 2014, secondary to ICD discharge for VT with subsequent discontinuation of Coreg and initiation of sotalol.  e. Echocardiogram, 04/07/2015: EF less than 20%.  f. Hospitalization secondary to VF and ICD shocks, 04/18/2015.  g. NIPS procedure with defibrillation threshold testing for VF and noninvasive program stimulation, 04/18/2015.   h. Initiation of mexiletine for recurrent ventricular tachycardia with ICD shocks on 05/05/2015 with amiodarone load, recurrent VT and ICD shocks with hospitalization 05/16/2015-05/18/2015.   i. EP study with RFA of large anterior left ventricular scar extending from mid-left ventricular wall down apex by Dr. Ferdinand Alba, 05/21/2015.   j. Acceptable ICD interrogation with normal function, normal battery without V-tach or atrial fibrillation, 5 months on battery, April 2017, with subsequent [employer] and [employer] replacement and acceptable DFT, October 2017, and acceptable interrogation, January 2018, August 2018, February 2019, November 2019, July 2020, upcoming gen change 7/06/2021  3. Dyslipidemia; on statin therapy.  4. Status post remote operations.  5. Remote chronic tobacco use/abnormal chest x-ray with stable abnormal thoracic  CT scan without contrast demonstrating bilateral diffuse perimeter scarring and fibrosis with scattered subcentimeter noncalcified nodules (unchanged from February 2016), March 2017.  6. Left bundle branch block.  7. Burks trauma with hematuria with urosepsis requiring hospitalization, May 2015.  8. Pulmonary embolism, spring 2015.   9. Remote apparent hypothyroidism/replacement therapy - data deficit, January 2016.  10. Remote diagnosis of  obstructive sleep apnea with CPAP use, 2017.  11. Bilateral cataract extraction November 2018, February 2019  12. Prediabetes      No Known Allergies    Current Outpatient Medications:   •  carvedilol (COREG) 12.5 MG tablet, Take 12.5 mg by mouth 2 (Two) Times a Day With Meals., Disp: , Rfl:   •  cholecalciferol (VITAMIN D3) 25 MCG (1000 UT) tablet, Take 1,000 Units by mouth Daily., Disp: , Rfl:   •  clopidogrel (PLAVIX) 75 MG tablet, Take 75 mg by mouth Every Morning., Disp: , Rfl:   •  eplerenone (INSPRA) 25 MG tablet, Take 12.5 mg by mouth Every Morning., Disp: , Rfl:   •  finasteride (PROSCAR) 5 MG tablet, Take 5 mg by mouth Every Morning., Disp: , Rfl:   •  L-TRYPTOPHAN PO, Take 1,000 mg by mouth Every Night., Disp: , Rfl:   •  levothyroxine (SYNTHROID, LEVOTHROID) 25 MCG tablet, Take 25 mcg by mouth Every Morning., Disp: , Rfl:   •  MELATONIN PO, Take 9 mg by mouth Every Night., Disp: , Rfl:   •  Multiple Vitamins-Minerals (MULTIVITAMIN ADULTS 50+) tablet, Take 1 tablet by mouth Daily., Disp: , Rfl:   •  nitroglycerin (NITROSTAT) 0.4 MG SL tablet, Place 1 tablet under the tongue Every 5 (Five) Minutes As Needed for Chest Pain. Take no more than 3 doses in 15 minutes., Disp: 30 tablet, Rfl: 11  •  pantoprazole (PROTONIX) 40 MG EC tablet, Take 40 mg by mouth Every Morning., Disp: , Rfl:   •  polyethylene glycol (MIRALAX) packet, Take 17 g by mouth Every Other Day., Disp: , Rfl:   •  rosuvastatin (CRESTOR) 40 MG tablet, Take 40 mg by mouth Daily., Disp: , Rfl:   •  sacubitril-valsartan (ENTRESTO) 49-51 MG tablet, Take 1 tablet by mouth 2 (Two) Times a Day., Disp: 180 tablet, Rfl: 3  •  torsemide (DEMADEX) 5 MG tablet, TAKE 1 TABLET DAILY, Disp: 90 tablet, Rfl: 1  •  Xarelto 10 MG tablet, TAKE 1 TABLET DAILY, Disp: 90 tablet, Rfl: 3  •  metFORMIN (GLUCOPHAGE) 1000 MG tablet, Take 1,000 mg by mouth 2 (two) times a day., Disp: , Rfl:     History of Present Illness: Patient returns today for a scheduled 6-month  "follow up.  The patient is scheduled to have a biventricular ICD generator change 07/06/2021 with Dr. Alba.  He has had both of his Moderna covid vaccinations. He notes that his blood pressure when he is at rehab is generally around 140 systolic.  His Coreg was reduced because he was having low blood pressures, but he questions whether he needs to increase his Coreg again for his blood pressure but he does not monitor it at home.  He denies any dizziness, presyncope, syncope, chest pain, or increased shortness of breath.  He has not sustained any defibrillator shocks.  When he is at cardiac rehab twice a week, he uses the Nu[employer], treadmill, and rowing machine.  He had laboratory testing about 2 weeks ago with Dr. Lewis and felt that his lab results were acceptable; data deficit.  He was told that he was prediabetic.  He will try and have his laboratory results sent to us for review.  [place] in [place] 2021 and will journey by train as well as a rented motor home.         Review of Systems   All other systems reviewed and are negative.     Obtained and negative except as outlined in problem list and HPI.    Procedures       Objective:       Vitals:    06/02/21 1055 06/02/21 1056   BP: 139/72 144/66   BP Location: Right arm Right arm   Patient Position: Sitting Standing   Pulse: 76 80   SpO2: 98%    Weight: 94.3 kg (208 lb)    Height: 185.4 cm (73\")    Recheck blood pressure right arm sitting was 104/58  Body mass index is 27.44 kg/m².  Last weight: 211 lb    Vitals reviewed.   Constitutional:       Appearance: Well-developed.   Neck:      Thyroid: No thyromegaly.      Vascular: No carotid bruit or JVD.      Lymphadenopathy: No cervical adenopathy.   Pulmonary:      Effort: Pulmonary effort is normal.      Breath sounds: Normal breath sounds. No wheezing. No rhonchi. No rales.   Cardiovascular:      Regular rhythm.      Murmurs: There is a grade 2/6 mid frequency " harsh early systolic murmur at the LLSB.      No gallop. No S3 gallop.   Pulses:     Dorsalis pedis: 2+ bilaterally.     Posterior tibial: 2+ bilaterally.  Edema:     Peripheral edema absent.   Abdominal:      Palpations: Abdomen is soft. There is no abdominal mass.      Tenderness: There is no abdominal tenderness. There is no guarding.   Musculoskeletal: Normal range of motion. Skin:     General: Skin is warm and dry.      Findings: No rash.      Comments: Left precordial PCD site nominal   Neurological:      Mental Status: Alert and oriented to person, place, and time.           Lab Review:     Lab date: 10/13/2020  • FLP: , , HDL 41, LDL 52  • CMP: Glu 95, BUN 14, Creat 0.86, eGFR 84, Na 143, K 4.7, Cl 107, CO2 25, Ca 9.1  • HbA1c: 5.9  • TSH: 4.590  • B12: 237      Lab Results   Component Value Date    CHOL 130 08/14/2017     Lab Results   Component Value Date    TRIG 70 08/14/2017    TRIG 203 (H) 01/30/2014     Lab Results   Component Value Date    HDL 40 08/14/2017    HDL 31 (L) 01/30/2014     Lab Results   Component Value Date    LDL 77 08/14/2017    LDL 65 01/30/2014             Assessment:       Overall continued acceptable course with no new interim cardiopulmonary complaints with acceptable functional status. We will defer additional diagnostic or therapeutic intervention from a cardiac perspective at this time to initiate a trial of Farxiga to reduce long term risk of progression in his ischemic LV dysfunction.  The patient will try and have his recent laboratory testing from Dr. Lewis sent to us for review.     Diagnosis Plan   1. Chronic combined systolic and diastolic congestive heart failure (EF<20%)  BiVICD generator change with Dr. Alba 7/06/2021   2. Ischemic heart disease  No recurrent angina pectoris or CHF on current activity schedule; continue current treatment   3. Dyslipidemia  No new labs to review, continue rosuvastatin          Plan:         1. Patient to continue  current medications and close follow up with the above providers.  2. Tentative cardiology follow up in November 2021 or patient may return sooner PRN.  3. Samples of Farxiga 10 mg daily issued to patient and he is aware of side effects and will update us concerning symptoms in one to two weeks.     Scribed for Bhupinder Gonzalez MD by Meenakshi Lacey, APRN. 6/2/2021  11:34 EDT    I, Bhupinder Gonzalez MD, [facility], personally performed the services described in this documentation as scribed by the above named individual in my presence, and it is both accurate and complete. At 11:51 EDT on 06/02/2021

## 2021-06-03 ENCOUNTER — TREATMENT (OUTPATIENT)
Dept: CARDIAC REHAB | Facility: HOSPITAL | Age: 76
End: 2021-06-03

## 2021-06-03 DIAGNOSIS — Z00.00 PREVENTATIVE HEALTH CARE: Primary | ICD-10-CM

## 2021-06-03 NOTE — PROGRESS NOTES
Pt. Attended Phase III Cardiac[facility]hab. See flow sheet for details.   
H/O hernia repair    History of cholecystectomy

## 2021-06-08 ENCOUNTER — TREATMENT (OUTPATIENT)
Dept: CARDIAC REHAB | Facility: HOSPITAL | Age: 76
End: 2021-06-08

## 2021-06-08 DIAGNOSIS — Z00.00 PREVENTATIVE HEALTH CARE: Primary | ICD-10-CM

## 2021-06-10 ENCOUNTER — TREATMENT (OUTPATIENT)
Dept: CARDIAC REHAB | Facility: HOSPITAL | Age: 76
End: 2021-06-10

## 2021-06-10 DIAGNOSIS — Z00.00 PREVENTATIVE HEALTH CARE: Primary | ICD-10-CM

## 2021-06-11 ENCOUNTER — PREP FOR SURGERY (OUTPATIENT)
Dept: OTHER | Facility: HOSPITAL | Age: 76
End: 2021-06-11

## 2021-06-11 DIAGNOSIS — I47.20 VENTRICULAR TACHYCARDIA (HCC): Primary | ICD-10-CM

## 2021-06-11 RX ORDER — ACETAMINOPHEN 325 MG/1
650 TABLET ORAL EVERY 4 HOURS PRN
Status: CANCELLED | OUTPATIENT
Start: 2021-06-11

## 2021-06-11 RX ORDER — SODIUM CHLORIDE 0.9 % (FLUSH) 0.9 %
10 SYRINGE (ML) INJECTION AS NEEDED
Status: CANCELLED | OUTPATIENT
Start: 2021-06-11

## 2021-06-11 RX ORDER — SODIUM CHLORIDE 9 MG/ML
1 INJECTION, SOLUTION INTRAVENOUS CONTINUOUS
Status: CANCELLED | OUTPATIENT
Start: 2021-06-11 | End: 2021-06-11

## 2021-06-11 RX ORDER — SODIUM CHLORIDE 0.9 % (FLUSH) 0.9 %
3 SYRINGE (ML) INJECTION EVERY 12 HOURS SCHEDULED
Status: CANCELLED | OUTPATIENT
Start: 2021-06-11

## 2021-06-11 RX ORDER — CEFAZOLIN SODIUM 2 G/100ML
2 INJECTION, SOLUTION INTRAVENOUS ONCE
Status: CANCELLED | OUTPATIENT
Start: 2021-06-11 | End: 2021-06-11

## 2021-06-17 ENCOUNTER — TREATMENT (OUTPATIENT)
Dept: CARDIAC REHAB | Facility: HOSPITAL | Age: 76
End: 2021-06-17

## 2021-06-17 DIAGNOSIS — Z00.00 PREVENTATIVE HEALTH CARE: Primary | ICD-10-CM

## 2021-06-17 NOTE — TELEPHONE ENCOUNTER
"Patient called, stated that he has not had any side effects from Farxiga 10 mg daily and is feeling like he has \"more pumping power\".     Patient would like RX sent to [facility].  "

## 2021-06-22 ENCOUNTER — TREATMENT (OUTPATIENT)
Dept: CARDIAC REHAB | Facility: HOSPITAL | Age: 76
End: 2021-06-22

## 2021-06-22 DIAGNOSIS — Z00.00 PREVENTATIVE HEALTH CARE: Primary | ICD-10-CM

## 2021-06-24 ENCOUNTER — TREATMENT (OUTPATIENT)
Dept: CARDIAC REHAB | Facility: HOSPITAL | Age: 76
End: 2021-06-24

## 2021-06-24 DIAGNOSIS — Z00.00 PREVENTATIVE HEALTH CARE: Primary | ICD-10-CM

## 2021-06-25 ENCOUNTER — TRANSCRIBE ORDERS (OUTPATIENT)
Dept: CARDIAC REHAB | Facility: HOSPITAL | Age: 76
End: 2021-06-25

## 2021-06-25 DIAGNOSIS — Z00.00 PREVENTATIVE HEALTH CARE: Primary | ICD-10-CM

## 2021-06-29 ENCOUNTER — TREATMENT (OUTPATIENT)
Dept: CARDIAC REHAB | Facility: HOSPITAL | Age: 76
End: 2021-06-29

## 2021-06-29 DIAGNOSIS — Z00.00 PREVENTATIVE HEALTH CARE: Primary | ICD-10-CM

## 2021-07-01 ENCOUNTER — PRE-ADMISSION TESTING (OUTPATIENT)
Dept: PREADMISSION TESTING | Facility: HOSPITAL | Age: 76
End: 2021-07-01

## 2021-07-01 ENCOUNTER — TREATMENT (OUTPATIENT)
Dept: CARDIAC REHAB | Facility: HOSPITAL | Age: 76
End: 2021-07-01

## 2021-07-01 DIAGNOSIS — I47.20 VENTRICULAR TACHYCARDIA (HCC): ICD-10-CM

## 2021-07-01 DIAGNOSIS — Z00.00 PREVENTATIVE HEALTH CARE: ICD-10-CM

## 2021-07-01 LAB
ANION GAP SERPL CALCULATED.3IONS-SCNC: 11 MMOL/L (ref 5–15)
BUN SERPL-MCNC: 21 MG/DL (ref 8–23)
BUN/CREAT SERPL: 23.6 (ref 7–25)
CALCIUM SPEC-SCNC: 9.1 MG/DL (ref 8.6–10.5)
CHLORIDE SERPL-SCNC: 105 MMOL/L (ref 98–107)
CO2 SERPL-SCNC: 22 MMOL/L (ref 22–29)
CREAT SERPL-MCNC: 0.89 MG/DL (ref 0.76–1.27)
DEPRECATED RDW RBC AUTO: 43.3 FL (ref 37–54)
ERYTHROCYTE [DISTWIDTH] IN BLOOD BY AUTOMATED COUNT: 13.2 % (ref 12.3–15.4)
GFR SERPL CREATININE-BSD FRML MDRD: 83 ML/MIN/1.73
GLUCOSE SERPL-MCNC: 95 MG/DL (ref 65–99)
HBA1C MFR BLD: 5.8 % (ref 4.8–5.6)
HCT VFR BLD AUTO: 40.1 % (ref 37.5–51)
HGB BLD-MCNC: 12.8 G/DL (ref 13–17.7)
MCH RBC QN AUTO: 28.8 PG (ref 26.6–33)
MCHC RBC AUTO-ENTMCNC: 31.9 G/DL (ref 31.5–35.7)
MCV RBC AUTO: 90.3 FL (ref 79–97)
PLATELET # BLD AUTO: 189 10*3/MM3 (ref 140–450)
PMV BLD AUTO: 10.5 FL (ref 6–12)
POTASSIUM SERPL-SCNC: 4.5 MMOL/L (ref 3.5–5.2)
RBC # BLD AUTO: 4.44 10*6/MM3 (ref 4.14–5.8)
SODIUM SERPL-SCNC: 138 MMOL/L (ref 136–145)
WBC # BLD AUTO: 8.56 10*3/MM3 (ref 3.4–10.8)

## 2021-07-01 PROCEDURE — 36415 COLL VENOUS BLD VENIPUNCTURE: CPT

## 2021-07-01 PROCEDURE — 83036 HEMOGLOBIN GLYCOSYLATED A1C: CPT | Performed by: NURSE PRACTITIONER

## 2021-07-01 PROCEDURE — 85027 COMPLETE CBC AUTOMATED: CPT

## 2021-07-01 PROCEDURE — 80048 BASIC METABOLIC PNL TOTAL CA: CPT

## 2021-07-01 RX ORDER — MULTIPLE VITAMINS W/ MINERALS TAB 9MG-400MCG
1 TAB ORAL 2 TIMES DAILY
COMMUNITY
End: 2021-10-12

## 2021-07-01 RX ORDER — ACETAMINOPHEN 500 MG
1000 TABLET ORAL EVERY 6 HOURS PRN
COMMUNITY
End: 2023-02-20 | Stop reason: SDDI

## 2021-07-01 RX ORDER — LANOLIN ALCOHOL/MO/W.PET/CERES
1000 CREAM (GRAM) TOPICAL DAILY
COMMUNITY

## 2021-07-01 NOTE — DISCHARGE INSTRUCTIONS

## 2021-07-06 ENCOUNTER — HOSPITAL ENCOUNTER (OUTPATIENT)
Facility: HOSPITAL | Age: 76
Discharge: HOME | End: 2021-07-06
Attending: INTERNAL MEDICINE | Admitting: INTERNAL MEDICINE

## 2021-07-06 VITALS
DIASTOLIC BLOOD PRESSURE: 70 MMHG | WEIGHT: 203 LBS | RESPIRATION RATE: 18 BRPM | SYSTOLIC BLOOD PRESSURE: 109 MMHG | OXYGEN SATURATION: 93 % | HEART RATE: 70 BPM | BODY MASS INDEX: 26.9 KG/M2 | TEMPERATURE: 97 F | HEIGHT: 73 IN

## 2021-07-06 DIAGNOSIS — I47.29 VENTRICULAR TACHYCARDIA (PAROXYSMAL) (HCC): ICD-10-CM

## 2021-07-06 DIAGNOSIS — I47.20 VENTRICULAR TACHYCARDIA (HCC): ICD-10-CM

## 2021-07-06 PROCEDURE — 25010000003 LIDOCAINE 1 % SOLUTION: Performed by: INTERNAL MEDICINE

## 2021-07-06 PROCEDURE — C1882 AICD, OTHER THAN SING/DUAL: HCPCS | Performed by: INTERNAL MEDICINE

## 2021-07-06 PROCEDURE — 99152 MOD SED SAME PHYS/QHP 5/>YRS: CPT | Performed by: INTERNAL MEDICINE

## 2021-07-06 PROCEDURE — 33264 RMVL & RPLCMT DFB GEN MLT LD: CPT | Performed by: INTERNAL MEDICINE

## 2021-07-06 PROCEDURE — 25010000003 CEFAZOLIN IN DEXTROSE 2-4 GM/100ML-% SOLUTION: Performed by: NURSE PRACTITIONER

## 2021-07-06 PROCEDURE — 25010000002 FENTANYL CITRATE (PF) 50 MCG/ML SOLUTION: Performed by: INTERNAL MEDICINE

## 2021-07-06 PROCEDURE — 25010000002 MIDAZOLAM PER 1 MG: Performed by: INTERNAL MEDICINE

## 2021-07-06 PROCEDURE — 25010000002 ONDANSETRON PER 1 MG: Performed by: INTERNAL MEDICINE

## 2021-07-06 DEVICE — ICD UNIFY ASSURA CRTD CD335740C: Type: IMPLANTABLE DEVICE | Status: FUNCTIONAL

## 2021-07-06 RX ORDER — ACETAMINOPHEN 325 MG/1
650 TABLET ORAL EVERY 4 HOURS PRN
Status: DISCONTINUED | OUTPATIENT
Start: 2021-07-06 | End: 2021-07-06 | Stop reason: HOSPADM

## 2021-07-06 RX ORDER — SODIUM CHLORIDE 0.9 % (FLUSH) 0.9 %
10 SYRINGE (ML) INJECTION AS NEEDED
Status: DISCONTINUED | OUTPATIENT
Start: 2021-07-06 | End: 2021-07-06 | Stop reason: HOSPADM

## 2021-07-06 RX ORDER — CEFAZOLIN SODIUM 2 G/100ML
2 INJECTION, SOLUTION INTRAVENOUS ONCE
Status: COMPLETED | OUTPATIENT
Start: 2021-07-06 | End: 2021-07-06

## 2021-07-06 RX ORDER — LIDOCAINE HYDROCHLORIDE 10 MG/ML
INJECTION, SOLUTION INFILTRATION; PERINEURAL AS NEEDED
Status: DISCONTINUED | OUTPATIENT
Start: 2021-07-06 | End: 2021-07-06 | Stop reason: HOSPADM

## 2021-07-06 RX ORDER — SODIUM CHLORIDE 0.9 % (FLUSH) 0.9 %
3 SYRINGE (ML) INJECTION EVERY 12 HOURS SCHEDULED
Status: DISCONTINUED | OUTPATIENT
Start: 2021-07-06 | End: 2021-07-06 | Stop reason: HOSPADM

## 2021-07-06 RX ORDER — BUPIVACAINE HYDROCHLORIDE 5 MG/ML
INJECTION, SOLUTION PERINEURAL AS NEEDED
Status: DISCONTINUED | OUTPATIENT
Start: 2021-07-06 | End: 2021-07-06 | Stop reason: HOSPADM

## 2021-07-06 RX ORDER — SODIUM CHLORIDE 9 MG/ML
1 INJECTION, SOLUTION INTRAVENOUS CONTINUOUS
Status: ACTIVE | OUTPATIENT
Start: 2021-07-06 | End: 2021-07-06

## 2021-07-06 RX ORDER — ACETAMINOPHEN 650 MG/1
650 SUPPOSITORY RECTAL EVERY 4 HOURS PRN
Status: DISCONTINUED | OUTPATIENT
Start: 2021-07-06 | End: 2021-07-06 | Stop reason: HOSPADM

## 2021-07-06 RX ORDER — ONDANSETRON 2 MG/ML
4 INJECTION INTRAMUSCULAR; INTRAVENOUS EVERY 6 HOURS PRN
Status: DISCONTINUED | OUTPATIENT
Start: 2021-07-06 | End: 2021-07-06 | Stop reason: HOSPADM

## 2021-07-06 RX ORDER — MIDAZOLAM HYDROCHLORIDE 1 MG/ML
INJECTION INTRAMUSCULAR; INTRAVENOUS AS NEEDED
Status: DISCONTINUED | OUTPATIENT
Start: 2021-07-06 | End: 2021-07-06 | Stop reason: HOSPADM

## 2021-07-06 RX ORDER — FENTANYL CITRATE 50 UG/ML
INJECTION, SOLUTION INTRAMUSCULAR; INTRAVENOUS AS NEEDED
Status: DISCONTINUED | OUTPATIENT
Start: 2021-07-06 | End: 2021-07-06 | Stop reason: HOSPADM

## 2021-07-06 RX ORDER — ONDANSETRON 2 MG/ML
INJECTION INTRAMUSCULAR; INTRAVENOUS AS NEEDED
Status: DISCONTINUED | OUTPATIENT
Start: 2021-07-06 | End: 2021-07-06 | Stop reason: HOSPADM

## 2021-07-06 RX ORDER — HYDROCODONE BITARTRATE AND ACETAMINOPHEN 5; 325 MG/1; MG/1
1 TABLET ORAL EVERY 4 HOURS PRN
Status: DISCONTINUED | OUTPATIENT
Start: 2021-07-06 | End: 2021-07-06 | Stop reason: HOSPADM

## 2021-07-06 RX ORDER — SODIUM CHLORIDE 9 MG/ML
INJECTION, SOLUTION INTRAVENOUS CONTINUOUS PRN
Status: COMPLETED | OUTPATIENT
Start: 2021-07-06 | End: 2021-07-06

## 2021-07-06 RX ADMIN — CEFAZOLIN SODIUM 2 G: 10 INJECTION, POWDER, FOR SOLUTION INTRAVENOUS at 08:11

## 2021-07-06 NOTE — H&P
Cardiology H&P     Jonnie Roth  1945  [phone]    07/06/21    DATE OF ADMISSION: 7/6/2021  [facility]    Borders, Brian Ferraro MD  [address] / [facility] [address]    CC: CHF    Problem List:      1. Sudden cardiac death with primary ventricular fibrillation status post Pacesetter [place] ICD, [place], [place] in September 2000:   a. ICD generator change out with upgrade to a biventricular pacemaker ICD on 12/17/2007, [employer] device.  b. ICD discharge, 08/09/2012, secondary to ventricular flutter with initiation of amiodarone therapy.   c. Hospitalization, 02/01/2014, secondary to ICD discharge for ventricular tachycardia with subsequent discontinuation of Coreg and initiation of sotalol therapy.   d. Hospitalization, February 2014, secondary to ICD discharge for VT with subsequent discontinuation of Coreg and initiation of sotalol.  e. Echocardiogram, 04/07/2015: EF less than 20%.  f. Hospitalization secondary to VF and ICD shocks, 04/18/2015.  g. NIPS procedure with defibrillation threshold testing for VF and noninvasive program stimulation, 04/18/2015.   h. Initiation of mexiletine for recurrent ventricular tachycardia with ICD shocks on 05/05/2015 with amiodarone load, recurrent VT and ICD shocks with hospitalization 05/16/2015-05/18/2015.   i. EP study with RFA of large anterior left ventricular scar extending from mid-left ventricular wall down apex by Dr. Ferdinand Alba, 05/21/2015.                      j.   ICD generator change 10/2017                     K.   Echo 11/19 LVEF 30%, mild TR  2. Ischemic heart disease:  a. Remote progressive angina pectoris/acute extensive anterolateral myocardial infarction/delayed presentation with thrombolysis/severe 3-vessel coronary atherosclerosis with severe single vessel involvement/PTCA with intracoronary stent deployment proximal-mid segment LAD/moderate-severe compensated left ventricular dysfunction. LVEF  (0.35)/abnormal positive signal averaged EKG/oral anticoagulation, April 1995.  b. Remote NYHA class I-II angina pectoris/class III CHF/abnormal quantitative SPECT gated Cardiolite GXT, July 1998.  c. Stable persistent MUGA, LVEF (0.33, January 1999 and January 2000).   d. Residual NYHA class I angina pectoris/CHF with reduced MUGA scan: LVEF (0.25), April 2002.  e. Left heart catheterization with distal circumflex disease: EF 20%, September 2002.   f. MUGA in May 2003: EF 32%.   g. Sestamibi GXT on 04/08/2005: No ischemia. EF 29%.   h. Residual NYHA class I angina pectoris/CHF with reduced acceptable MUGA scan: EF (0.32) and acceptable Pacesetter PCD interrogation/reprogramming, May 2003 with interrogation, September 2004.  i. Echocardiogram, September 2009 with EF 30%  j. Left heart catheterization by Dr. Bhupinder Gonzalez, August 2010, with LVEF of 35%, with 3-vessel native coronary artery disease, 95% mid-LAD status post PTCA and stenting with two 3 mm stents by Dr. Llanes.  k. Echocardiogram, 06/07/2012: Left ventricular ejection fraction of 30%.  l. Hospitalization, 02/01/2014, for non-ST elevation MI, left heart catheterization by Dr. Ayala that demonstrated 60% mid stenosis of the right coronary artery that remains unchanged compared to previous, widely patent stents of the LAD with severe LV dysfunction of approximately 25%.   m. Left heart catheterization status post drug-eluting stent to the distal LAD and mid-RCA with an EF of 20% to 25% by Dr. Diego Ayala, 04/20/2015.   n. Left heart catheterization 1/16/17; nonobstructive CAD with patent previously placed stents, dilated cardiomyopathy with severe LV systolic dysfunction, EF less than 20%, normal hemodynamics, recommendations for continued medical therapy and risk factor, evaluation for noncardiac etiology of symptoms.  o. Residual CCS Class I/II angina pectoris/NYHA Class II exertional dyspnea and fatigue syndrome, summer 2017  p. Residual CCS 0 angina  pectoris/NYHA class I-II exertional dyspnea and fatigue, February 2018, September 2018, March 2019.  3. Dyslipidemia.  4. Status post remote operations.  5. Remote chronic tobacco use/abnormal chest x-ray with stable abnormal thoracic  CT scan without contrast demonstrating bilateral diffuse perimeter scarring and fibrosis with scattered subcentimeter noncalcified nodules (unchanged from February 2016), March 2017.  6. Left bundle branch block.  7. Burks trauma with hematuria with urosepsis requiring hospitalization, May 2015.  8. Pulmonary embolism, spring 2015.   9. Remote apparent hypothyroidism/replacement therapy - data deficit, January 2016.  10. Remote diagnosis of obstructive sleep apnea with CPAP use, 2017.  11. Bilateral cataract extraction November 2018, February 2019        History of Present Illness: Mr. Roth is a pleasant 76-year-old gentleman followed by Dr. Gonzalez and Dr. Alba for chronic systolic heart failure, left bundle branch block, ventricular tachycardia status post 2015 VT ablation, coronary artery disease, and  [employer] biventricular ICD.  As of late he has maintained a stable course.  He continues with regular visits to cardiac rehab.  He has some ongoing fatigue and LU  but no recent CP, syncope, or ICD shocks.   He recently started on Farxiga by Dr. Gonzalez but no other recent medication changes, ER visits, or medical procedures.     He presents today for generator change of his biventricular ICD.  He denies any recent cough, cold, or flu symptoms.  No fevers or chills.  No other signs or symptoms of infection.        No Known Allergies    Prior to Admission Medications     Prescriptions Last Dose Informant Patient Reported? Taking?    acetaminophen (TYLENOL) 500 MG tablet Past Month Medication Bottle Yes Yes    Take 1,000 mg by mouth Every 6 (Six) Hours As Needed for Mild Pain .    carvedilol (COREG) 12.5 MG tablet 7/6/2021 Medication Bottle Yes Yes    Take 12.5 mg by mouth 2  (Two) Times a Day With Meals.    cholecalciferol (VITAMIN D3) 25 MCG (1000 UT) tablet 7/6/2021 Medication Bottle Yes Yes    Take 4,000 Units by mouth Daily.    clopidogrel (PLAVIX) 75 MG tablet 7/6/2021 Medication Bottle Yes Yes    Take 75 mg by mouth Every Morning.    Dapagliflozin Propanediol 10 MG tablet 7/6/2021 Medication Bottle No Yes    Take 10 mg by mouth Daily.    eplerenone (INSPRA) 25 MG tablet 7/6/2021 Medication Bottle Yes Yes    Take 12.5 mg by mouth Every Morning.    finasteride (PROSCAR) 5 MG tablet 7/6/2021 Medication Bottle Yes Yes    Take 5 mg by mouth Every Morning.    L-TRYPTOPHAN PO 7/5/2021 Medication Bottle Yes Yes    Take  by mouth Every Night. 3 TABLETS NIGHTLY-     1500 MG EACH    levothyroxine (SYNTHROID, LEVOTHROID) 25 MCG tablet 7/6/2021 Medication Bottle Yes Yes    Take 25 mcg by mouth Every Morning.    MELATONIN PO 7/5/2021 Medication Bottle Yes Yes    Take 10 mg by mouth Every Night. 2 GUMMIES    metFORMIN (GLUCOPHAGE) 1000 MG tablet 7/5/2021 Medication Bottle Yes Yes    Take 1,000 mg by mouth 2 (two) times a day.    Multiple Vitamins-Minerals (MULTIVITAMIN ADULTS 50+) tablet 7/5/2021 Medication Bottle Yes Yes    Take 1 tablet by mouth Daily.    multivitamin with minerals (VISION VITAMINS PO) 7/5/2021 Medication Bottle Yes Yes    Take 1 tablet by mouth 2 (two) times a day. VISION SHIELD    pantoprazole (PROTONIX) 40 MG EC tablet 7/6/2021 Medication Bottle Yes Yes    Take 40 mg by mouth Every Morning.    polyethylene glycol (MIRALAX) packet Past Week Medication Bottle Yes Yes    Take 17 g by mouth Every Other Day.    rosuvastatin (CRESTOR) 40 MG tablet 7/5/2021 Medication Bottle Yes Yes    Take 40 mg by mouth Daily.    sacubitril-valsartan (ENTRESTO) 49-51 MG tablet 7/6/2021 Medication Bottle No Yes    Take 1 tablet by mouth 2 (Two) Times a Day.    torsemide (DEMADEX) 5 MG tablet 7/6/2021 Medication Bottle No Yes    TAKE 1 TABLET DAILY    Patient taking differently:  Take 5 mg by  mouth Daily.    vitamin B-12 (CYANOCOBALAMIN) 1000 MCG tablet 2021 Medication Bottle Yes Yes    Take 1,000 mcg by mouth Daily.    nitroglycerin (NITROSTAT) 0.4 MG SL tablet Unknown Medication Bottle No No    Place 1 tablet under the tongue Every 5 (Five) Minutes As Needed for Chest Pain. Take no more than 3 doses in 15 minutes.    Xarelto 10 MG tablet 2021 Medication Bottle No No    TAKE 1 TABLET DAILY    Patient taking differently:  Take 10 mg by mouth Daily. LD             Current Facility-Administered Medications:   •  acetaminophen (TYLENOL) tablet 650 mg, 650 mg, Oral, Q4H PRN, Lesley Gilbert APRN  •  ceFAZolin in dextrose (ANCEF) IVPB solution 2 g, 2 g, Intravenous, Once, Lesley Gilbert APRN  •  sodium chloride 0.9 % flush 10 mL, 10 mL, Intravenous, PRN, Lesley Gilbert APRN  •  sodium chloride 0.9 % flush 3 mL, 3 mL, Intravenous, Q12H, Lesley Gilbert APRN  •  sodium chloride 0.9 % infusion, 1 mL/kg/hr (Order-Specific), Intravenous, Continuous, Lesley Gilbert APRN    Social History     Socioeconomic History   • Marital status:      Spouse name: Not on file   • Number of children: Not on file   • Years of education: Not on file   • Highest education level: Not on file   Tobacco Use   • Smoking status: Former Smoker     Packs/day: 2.00     Years: 30.00     Pack years: 60.00     Types: Cigarettes     Quit date: 1995     Years since quittin.2   • Smokeless tobacco: Never Used   Vaping Use   • Vaping Use: Never used   Substance and Sexual Activity   • Alcohol use: No   • Drug use: No   • Sexual activity: Defer       Family History   Problem Relation Age of Onset   • Heart failure Father    • Stroke Father        REVIEW OF SYSTEMS:   CONST:  No weight loss, fever, chills, weakness or +  fatigue.   HEENT:  No visual loss, blurred vision, double vision, yellow sclerae.                   No hearing loss, congestion, sore throat.   SKIN:      No  "rashes, urticaria, ulcers, sores.     RESP:     + ongoing  shortness of breath, No hemoptysis, cough, sputum.   GI:           No anorexia, nausea, vomiting, diarrhea. No abdominal pain, melena.   :         No burning on urination, hematuria or increased frequency.  ENDO:    No diaphoresis, cold or heat intolerance. No polyuria or polydipsia.   NEURO:  No headache, dizziness, syncope, paralysis, ataxia, or parasthesias.                  No change in bowel or bladder control. No history of CVA/TIA  MUSC:    No muscle, back pain, joint pain or stiffness.   HEME:    No anemia, bleeding, bruising. No history of DVT/PE.  PSYCH:  No history of depression, anxiety    Vitals:    07/06/21 0630   BP: 108/67   BP Location: Left arm   Patient Position: Lying   Pulse: 70   Resp: 16   Temp: 97 °F (36.1 °C)   TempSrc: Temporal   SpO2: 96%   Weight: 92.1 kg (203 lb)   Height: 185.4 cm (73\")         Vital Sign Min/Max for last 24 hours  Temp  Min: 97 °F (36.1 °C)  Max: 97 °F (36.1 °C)   BP  Min: 108/67  Max: 108/67   Pulse  Min: 70  Max: 70   Resp  Min: 16  Max: 16   SpO2  Min: 96 %  Max: 96 %   No data recorded    No intake or output data in the 24 hours ending 07/06/21 0719          Physical Exam:  GEN: Well nourished, Well- developed  No acute distress  HEENT: Normocephalic, Atraumatic, PERRLA, moist mucous membranes  NECK: supple, NO JVD, no thyromegaly, no lymphadenopathy  CARD: S1S2  RRR no murmur, gallop, rub  LUNGS: Clear to auscultataion, normal respiratory effort  ABDOMEN: Soft, nontender, normal bowel sounds  EXTREMITIES:No gross deformities,  No clubbing, cyanosis, or edema  SKIN: Warm, dry- left chest ICD site scars noted.   NEURO: No focal deficits  PSYCHIATRIC: Normal affect and mood      I personally viewed and interpreted the patient's EKG/Telemetry/lab data    Data:   Results from last 7 days   Lab Units 07/01/21  0812   WBC 10*3/mm3 8.56   HEMOGLOBIN g/dL 12.8*   HEMATOCRIT % 40.1   PLATELETS 10*3/mm3 189 "     Results from last 7 days   Lab Units 07/01/21  0812   SODIUM mmol/L 138   POTASSIUM mmol/L 4.5   CHLORIDE mmol/L 105   CO2 mmol/L 22.0   BUN mg/dL 21   CREATININE mg/dL 0.89   GLUCOSE mg/dL 95      Results from last 7 days   Lab Units 07/01/21  0812   HEMOGLOBIN A1C % 5.80*       7/3/2021 COVID-19 screening: Negative-ee scanned document under media      No intake or output data in the 24 hours ending 07/06/21 0719    Chest X-Ray:  Imaging Results (Last 24 Hours)     ** No results found for the last 24 hours. **          Telemetry: A/V paced 70s       Assessment and Plan:   1. VT:  - s/p RFA in 2015 with no recurrence  - no sustained VT per most recent interrogation.      2. Chronic CHF Systolic: LVEF up to 30%  -Class II on meds coreg and Entresto   -[employer] BiV ICD with chronically elevated but stable LV threshold.  Generator has reached elective replacement interval.   He presents today for BiV ICD generator change.  The risk, benefits, alternatives of BiV ICD generator change have been reviewed with the patient and wife who is at the bedside today.   He wishes to proceed.  Xarelto held-last dose 7/4/2021.     3. CAD:  - on Plavix, follows with Dr. Gonzalez. Doug     4. History of PE:  - on Xarelto-held since 7/4/2021 anticipation of today's procedure.    Electronically signed by CHENTE Riley, 07/06/21, 7:07 AM EDT.

## 2021-07-13 ENCOUNTER — OFFICE VISIT (OUTPATIENT)
Dept: CARDIOLOGY | Facility: CLINIC | Age: 76
End: 2021-07-13

## 2021-07-13 DIAGNOSIS — I47.20 VENTRICULAR TACHYCARDIA (HCC): Primary | ICD-10-CM

## 2021-07-13 PROCEDURE — 99024 POSTOP FOLLOW-UP VISIT: CPT | Performed by: INTERNAL MEDICINE

## 2021-07-13 NOTE — PROGRESS NOTES
2021    Jonnie Roth, : 1945    WOUND CHECK    B/P:     Pulse:     Patient has fever: [] Temperature if indicated:     Wound Location: left intraclavicular    Dressing Removed []        Old Dressing Appearance:  Clean, dry []                 Old, bloody drainage [x]                            Moist, serous drainage []                Moist, thick yellow/green drainage []       Wound Appearance: Redness []                  Drainage []                  Culture obtained []        Color: Clear     Consistency: n/a     Amount: none         Gloves used, wound cleansed with sterile 4x4 and peroxide [x]       MD notified [] MD orders:   Antibiotic started []  If checked, type   Other:    Appointment for follow-up scheduled for 3 months post procedure [x]    Future Appointments   Date Time Provider Department Center   7/15/2021  7:00 AM [facility]   2021  7:00 AM [facility]   2021  7:00 AM [facility]   2021  7:00 AM [facility]   2021  7:00 AM [facility]   8/3/2021  7:00 AM [facility]   2021  7:00 AM [facility]   8/10/2021  7:00 AM [facility]   2021  7:00 AM [facility]   2021  7:00 AM [facility]   2021  7:00 AM [facility]   2021  7:00 AM [facility]   2021  7:00 AM [facility]   2021  7:00 AM [facility]   2021  7:00 AM [facility]   2021  7:00 AM [facility] -  [facility]   9/9/2021  7:00 AM [facility]   9/14/2021  7:00 AM [facility] III [facility]   9/16/2021  7:00 AM [facility] III [facility]   9/21/2021  7:00 AM [facility] III -  [facility]   9/23/2021  7:00 AM [facility] III -  [facility]   9/28/2021  7:00 AM [facility] III -  [facility]   9/30/2021  7:00 AM [facility] III -  [facility]   10/12/2021  9:15 AM Viet Manzano PA [facility]   3/23/2022  3:45 PM Ferdinand Alba MD [facility]           Darvin Snow MA, 07/13/21      MD Signature:______________________________ Completed By/Date:

## 2021-07-15 ENCOUNTER — TREATMENT (OUTPATIENT)
Dept: CARDIAC REHAB | Facility: HOSPITAL | Age: 76
End: 2021-07-15

## 2021-07-15 DIAGNOSIS — Z00.00 PREVENTATIVE HEALTH CARE: Primary | ICD-10-CM

## 2021-07-20 ENCOUNTER — TREATMENT (OUTPATIENT)
Dept: CARDIAC REHAB | Facility: HOSPITAL | Age: 76
End: 2021-07-20

## 2021-07-20 DIAGNOSIS — Z00.00 PREVENTATIVE HEALTH CARE: Primary | ICD-10-CM

## 2021-07-22 ENCOUNTER — TREATMENT (OUTPATIENT)
Dept: CARDIAC REHAB | Facility: HOSPITAL | Age: 76
End: 2021-07-22

## 2021-07-22 DIAGNOSIS — Z00.00 PREVENTATIVE HEALTH CARE: Primary | ICD-10-CM

## 2021-07-27 ENCOUNTER — TREATMENT (OUTPATIENT)
Dept: CARDIAC REHAB | Facility: HOSPITAL | Age: 76
End: 2021-07-27

## 2021-07-27 DIAGNOSIS — Z00.00 PREVENTATIVE HEALTH CARE: Primary | ICD-10-CM

## 2021-07-29 ENCOUNTER — TREATMENT (OUTPATIENT)
Dept: CARDIAC REHAB | Facility: HOSPITAL | Age: 76
End: 2021-07-29

## 2021-07-29 DIAGNOSIS — Z00.00 PREVENTATIVE HEALTH CARE: Primary | ICD-10-CM

## 2021-08-03 ENCOUNTER — TREATMENT (OUTPATIENT)
Dept: CARDIAC REHAB | Facility: HOSPITAL | Age: 76
End: 2021-08-03

## 2021-08-03 DIAGNOSIS — Z00.00 PREVENTATIVE HEALTH CARE: Primary | ICD-10-CM

## 2021-08-05 ENCOUNTER — TREATMENT (OUTPATIENT)
Dept: CARDIAC REHAB | Facility: HOSPITAL | Age: 76
End: 2021-08-05

## 2021-08-05 DIAGNOSIS — Z00.00 PREVENTATIVE HEALTH CARE: Primary | ICD-10-CM

## 2021-08-10 ENCOUNTER — TREATMENT (OUTPATIENT)
Dept: CARDIAC REHAB | Facility: HOSPITAL | Age: 76
End: 2021-08-10

## 2021-08-10 DIAGNOSIS — Z00.00 PREVENTATIVE HEALTH CARE: Primary | ICD-10-CM

## 2021-08-12 ENCOUNTER — TREATMENT (OUTPATIENT)
Dept: CARDIAC REHAB | Facility: HOSPITAL | Age: 76
End: 2021-08-12

## 2021-08-12 DIAGNOSIS — Z00.00 PREVENTATIVE HEALTH CARE: Primary | ICD-10-CM

## 2021-08-17 ENCOUNTER — TREATMENT (OUTPATIENT)
Dept: CARDIAC REHAB | Facility: HOSPITAL | Age: 76
End: 2021-08-17

## 2021-08-17 DIAGNOSIS — Z00.00 PREVENTATIVE HEALTH CARE: Primary | ICD-10-CM

## 2021-08-19 ENCOUNTER — TREATMENT (OUTPATIENT)
Dept: CARDIAC REHAB | Facility: HOSPITAL | Age: 76
End: 2021-08-19

## 2021-08-19 DIAGNOSIS — Z00.00 PREVENTATIVE HEALTH CARE: Primary | ICD-10-CM

## 2021-08-20 PROCEDURE — 93296 REM INTERROG EVL PM/IDS: CPT | Performed by: INTERNAL MEDICINE

## 2021-08-20 PROCEDURE — 93295 DEV INTERROG REMOTE 1/2/MLT: CPT | Performed by: INTERNAL MEDICINE

## 2021-08-24 ENCOUNTER — TREATMENT (OUTPATIENT)
Dept: CARDIAC REHAB | Facility: HOSPITAL | Age: 76
End: 2021-08-24

## 2021-08-24 DIAGNOSIS — Z00.00 PREVENTATIVE HEALTH CARE: Primary | ICD-10-CM

## 2021-08-26 ENCOUNTER — TREATMENT (OUTPATIENT)
Dept: CARDIAC REHAB | Facility: HOSPITAL | Age: 76
End: 2021-08-26

## 2021-08-26 DIAGNOSIS — Z00.00 PREVENTATIVE HEALTH CARE: Primary | ICD-10-CM

## 2021-08-31 ENCOUNTER — TREATMENT (OUTPATIENT)
Dept: CARDIAC REHAB | Facility: HOSPITAL | Age: 76
End: 2021-08-31

## 2021-08-31 DIAGNOSIS — Z00.00 PREVENTATIVE HEALTH CARE: Primary | ICD-10-CM

## 2021-09-02 ENCOUNTER — TREATMENT (OUTPATIENT)
Dept: CARDIAC REHAB | Facility: HOSPITAL | Age: 76
End: 2021-09-02

## 2021-09-02 DIAGNOSIS — Z00.00 PREVENTATIVE HEALTH CARE: Primary | ICD-10-CM

## 2021-09-07 ENCOUNTER — TREATMENT (OUTPATIENT)
Dept: CARDIAC REHAB | Facility: HOSPITAL | Age: 76
End: 2021-09-07

## 2021-09-07 DIAGNOSIS — Z00.00 PREVENTATIVE HEALTH CARE: Primary | ICD-10-CM

## 2021-09-08 ENCOUNTER — TELEPHONE (OUTPATIENT)
Dept: CARDIOLOGY | Facility: CLINIC | Age: 76
End: 2021-09-08

## 2021-09-08 NOTE — TELEPHONE ENCOUNTER
Fernanda from [facility] called and stated that patient needs to have one tooth extracted with local anesthesia.    Pt's LOV  6/02/21.    What is patient's cardiac risk assessment? How long to hold Xarelto 10 mg daily and Plavix 75 mg daily?    Please advise.              P# [phone]

## 2021-09-08 NOTE — TELEPHONE ENCOUNTER
Noted; low cardiovascular risk for dental extraction.  Would hold Plavix for 5 days and Xarelto for 2 days prior to dental extraction and would take oral AHA SBE prophylaxis in view of PCD in place.      Thanks!

## 2021-09-08 NOTE — TELEPHONE ENCOUNTER
Called Fernanda and gave KTS recommendations above. Fernanda verbalizes understanding and agreeable to plan.     Called pt and gave KTS recommendations above. Pt verbalizes understanding and agreeable to plan.

## 2021-09-16 ENCOUNTER — TREATMENT (OUTPATIENT)
Dept: CARDIAC REHAB | Facility: HOSPITAL | Age: 76
End: 2021-09-16

## 2021-09-16 DIAGNOSIS — Z00.00 PREVENTATIVE HEALTH CARE: Primary | ICD-10-CM

## 2021-09-21 ENCOUNTER — TREATMENT (OUTPATIENT)
Dept: CARDIAC REHAB | Facility: HOSPITAL | Age: 76
End: 2021-09-21

## 2021-09-21 DIAGNOSIS — Z00.00 PREVENTATIVE HEALTH CARE: Primary | ICD-10-CM

## 2021-09-23 ENCOUNTER — TREATMENT (OUTPATIENT)
Dept: CARDIAC REHAB | Facility: HOSPITAL | Age: 76
End: 2021-09-23

## 2021-09-23 DIAGNOSIS — Z00.00 PREVENTATIVE HEALTH CARE: Primary | ICD-10-CM

## 2021-09-28 ENCOUNTER — TREATMENT (OUTPATIENT)
Dept: CARDIAC REHAB | Facility: HOSPITAL | Age: 76
End: 2021-09-28

## 2021-09-28 DIAGNOSIS — Z00.00 PREVENTATIVE HEALTH CARE: Primary | ICD-10-CM

## 2021-09-30 ENCOUNTER — TREATMENT (OUTPATIENT)
Dept: CARDIAC REHAB | Facility: HOSPITAL | Age: 76
End: 2021-09-30

## 2021-09-30 DIAGNOSIS — Z00.00 PREVENTATIVE HEALTH CARE: Primary | ICD-10-CM

## 2021-10-05 ENCOUNTER — TREATMENT (OUTPATIENT)
Dept: CARDIAC REHAB | Facility: HOSPITAL | Age: 76
End: 2021-10-05

## 2021-10-05 DIAGNOSIS — Z00.00 PREVENTATIVE HEALTH CARE: Primary | ICD-10-CM

## 2021-10-07 ENCOUNTER — TREATMENT (OUTPATIENT)
Dept: CARDIAC REHAB | Facility: HOSPITAL | Age: 76
End: 2021-10-07

## 2021-10-07 DIAGNOSIS — Z00.00 PREVENTATIVE HEALTH CARE: Primary | ICD-10-CM

## 2021-10-12 ENCOUNTER — OFFICE VISIT (OUTPATIENT)
Dept: CARDIOLOGY | Facility: CLINIC | Age: 76
End: 2021-10-12

## 2021-10-12 ENCOUNTER — TREATMENT (OUTPATIENT)
Dept: CARDIAC REHAB | Facility: HOSPITAL | Age: 76
End: 2021-10-12

## 2021-10-12 VITALS
SYSTOLIC BLOOD PRESSURE: 118 MMHG | DIASTOLIC BLOOD PRESSURE: 74 MMHG | WEIGHT: 203.8 LBS | OXYGEN SATURATION: 97 % | HEIGHT: 73 IN | BODY MASS INDEX: 27.01 KG/M2 | HEART RATE: 74 BPM

## 2021-10-12 DIAGNOSIS — Z00.00 PREVENTATIVE HEALTH CARE: Primary | ICD-10-CM

## 2021-10-12 DIAGNOSIS — I44.7 LBBB (LEFT BUNDLE BRANCH BLOCK): ICD-10-CM

## 2021-10-12 DIAGNOSIS — I47.29 VENTRICULAR TACHYCARDIA (PAROXYSMAL) (HCC): Primary | ICD-10-CM

## 2021-10-12 DIAGNOSIS — I25.810 CORONARY ARTERY DISEASE INVOLVING CORONARY BYPASS GRAFT OF NATIVE HEART WITHOUT ANGINA PECTORIS: ICD-10-CM

## 2021-10-12 DIAGNOSIS — I50.42 CHRONIC COMBINED SYSTOLIC AND DIASTOLIC CONGESTIVE HEART FAILURE (HCC): ICD-10-CM

## 2021-10-12 PROCEDURE — 93000 ELECTROCARDIOGRAM COMPLETE: CPT | Performed by: PHYSICIAN ASSISTANT

## 2021-10-12 PROCEDURE — 93284 PRGRMG EVAL IMPLANTABLE DFB: CPT | Performed by: PHYSICIAN ASSISTANT

## 2021-10-12 PROCEDURE — 99213 OFFICE O/P EST LOW 20 MIN: CPT | Performed by: PHYSICIAN ASSISTANT

## 2021-10-12 RX ORDER — CHOLECALCIFEROL (VITAMIN D3) 125 MCG
5 CAPSULE ORAL NIGHTLY PRN
COMMUNITY

## 2021-10-12 NOTE — PROGRESS NOTES
Jonnie Roth  1945  [phone]    03/17/2021    [facility] CARDIOLOGY     Referring Provider: No ref. provider found     Brian Lewis MD  [address] [id number]    Chief Complaint   Patient presents with   • Ventricular tachycardia (paroxysmal)   • Coronary Artery Disease       Problem List:     1. Sudden cardiac death with primary ventricular fibrillation status post Pacesetter [place] ICD, [place], [place] in September 2000:   a. ICD generator change out with upgrade to a biventricular pacemaker ICD on 12/17/2007, [employer] device.  b. ICD discharge, 08/09/2012, secondary to ventricular flutter with initiation of amiodarone therapy.   c. Hospitalization, 02/01/2014, secondary to ICD discharge for ventricular tachycardia with subsequent discontinuation of Coreg and initiation of sotalol therapy.   d. Hospitalization, February 2014, secondary to ICD discharge for VT with subsequent discontinuation of Coreg and initiation of sotalol.  e. Echocardiogram, 04/07/2015: EF less than 20%.  f. Hospitalization secondary to VF and ICD shocks, 04/18/2015.  g. NIPS procedure with defibrillation threshold testing for VF and noninvasive program stimulation, 04/18/2015.   h. Initiation of mexiletine for recurrent ventricular tachycardia with ICD shocks on 05/05/2015 with amiodarone load, recurrent VT and ICD shocks with hospitalization 05/16/2015-05/18/2015.   i. EP study with RFA of large anterior left ventricular scar extending from mid-left ventricular wall down apex by Dr. Ferdinand Alba, 05/21/2015.                      j.   ICD generator change 10/2017                     K.   Echo 11/19 LVEF 30%, mild TR  2. Ischemic heart disease:  a. Remote progressive angina pectoris/acute extensive anterolateral myocardial infarction/delayed presentation with thrombolysis/severe 3-vessel coronary atherosclerosis with severe single vessel involvement/PTCA with intracoronary  stent deployment proximal-mid segment LAD/moderate-severe compensated left ventricular dysfunction. LVEF (0.35)/abnormal positive signal averaged EKG/oral anticoagulation, April 1995.  b. Remote NYHA class I-II angina pectoris/class III CHF/abnormal quantitative SPECT gated Cardiolite GXT, July 1998.  c. Stable persistent MUGA, LVEF (0.33, January 1999 and January 2000).   d. Residual NYHA class I angina pectoris/CHF with reduced MUGA scan: LVEF (0.25), April 2002.  e. Left heart catheterization with distal circumflex disease: EF 20%, September 2002.   f. MUGA in May 2003: EF 32%.   g. Sestamibi GXT on 04/08/2005: No ischemia. EF 29%.   h. Residual NYHA class I angina pectoris/CHF with reduced acceptable MUGA scan: EF (0.32) and acceptable Pacesetter PCD interrogation/reprogramming, May 2003 with interrogation, September 2004.  i. Echocardiogram, September 2009 with EF 30%  j. Left heart catheterization by Dr. Bhupinder Gonzalez, August 2010, with LVEF of 35%, with 3-vessel native coronary artery disease, 95% mid-LAD status post PTCA and stenting with two 3 mm stents by Dr. Llanes.  k. Echocardiogram, 06/07/2012: Left ventricular ejection fraction of 30%.  l. Hospitalization, 02/01/2014, for non-ST elevation MI, left heart catheterization by Dr. Ayala that demonstrated 60% mid stenosis of the right coronary artery that remains unchanged compared to previous, widely patent stents of the LAD with severe LV dysfunction of approximately 25%.   m. Left heart catheterization status post drug-eluting stent to the distal LAD and mid-RCA with an EF of 20% to 25% by Dr. Diego Ayala, 04/20/2015.   n. Left heart catheterization 1/16/17; nonobstructive CAD with patent previously placed stents, dilated cardiomyopathy with severe LV systolic dysfunction, EF less than 20%, normal hemodynamics, recommendations for continued medical therapy and risk factor, evaluation for noncardiac etiology of symptoms.  o. Residual CCS Class I/II angina  pectoris/NYHA Class II exertional dyspnea and fatigue syndrome, summer 2017  p. Residual CCS 0 angina pectoris/NYHA class I-II exertional dyspnea and fatigue, February 2018, September 2018, March 2019.  3. Dyslipidemia.  4. Status post remote operations.  5. Remote chronic tobacco use/abnormal chest x-ray with stable abnormal thoracic  CT scan without contrast demonstrating bilateral diffuse perimeter scarring and fibrosis with scattered subcentimeter noncalcified nodules (unchanged from February 2016), March 2017.  6. Left bundle branch block.  7. Burks trauma with hematuria with urosepsis requiring hospitalization, May 2015.  8. Pulmonary embolism, spring 2015.   9. Remote apparent hypothyroidism/replacement therapy - data deficit, January 2016.  10. Remote diagnosis of obstructive sleep apnea with CPAP use, 2017.  11. Bilateral cataract extraction November 2018, February 2019    Allergies  No Known Allergies    Current Medications    Current Outpatient Medications:   •  acetaminophen (TYLENOL) 500 MG tablet, Take 1,000 mg by mouth Every 6 (Six) Hours As Needed for Mild Pain ., Disp: , Rfl:   •  carvedilol (COREG) 12.5 MG tablet, Take 12.5 mg by mouth 2 (Two) Times a Day With Meals., Disp: , Rfl:   •  cholecalciferol (VITAMIN D3) 25 MCG (1000 UT) tablet, Take 4,000 Units by mouth Daily., Disp: , Rfl:   •  clopidogrel (PLAVIX) 75 MG tablet, Take 75 mg by mouth Every Morning., Disp: , Rfl:   •  Dapagliflozin Propanediol 10 MG tablet, Take 10 mg by mouth Daily., Disp: 90 tablet, Rfl: 3  •  eplerenone (INSPRA) 25 MG tablet, Take 12.5 mg by mouth Every Morning., Disp: , Rfl:   •  finasteride (PROSCAR) 5 MG tablet, Take 5 mg by mouth Every Morning., Disp: , Rfl:   •  L-TRYPTOPHAN PO, Take  by mouth Every Night. 3 TABLETS NIGHTLY-     1500 MG EACH, Disp: , Rfl:   •  levothyroxine (SYNTHROID, LEVOTHROID) 25 MCG tablet, Take 25 mcg by mouth Every Morning., Disp: , Rfl:   •  melatonin 5 MG tablet tablet, Take 5 mg by mouth  At Night As Needed. 2 tabs at night, Disp: , Rfl:   •  metFORMIN (GLUCOPHAGE) 1000 MG tablet, Take 1,000 mg by mouth 2 (two) times a day., Disp: , Rfl:   •  Multiple Vitamins-Minerals (ICAPS AREDS 2 PO), Take 1 tablet by mouth 2 (two) times a day., Disp: , Rfl:   •  Multiple Vitamins-Minerals (MULTIVITAMIN ADULTS 50+) tablet, Take 1 tablet by mouth Daily., Disp: , Rfl:   •  nitroglycerin (NITROSTAT) 0.4 MG SL tablet, Place 1 tablet under the tongue Every 5 (Five) Minutes As Needed for Chest Pain. Take no more than 3 doses in 15 minutes., Disp: 30 tablet, Rfl: 11  •  pantoprazole (PROTONIX) 40 MG EC tablet, Take 40 mg by mouth Every Morning., Disp: , Rfl:   •  polyethylene glycol (MIRALAX) packet, Take 17 g by mouth Every Other Day., Disp: , Rfl:   •  rivaroxaban (Xarelto) 10 MG tablet, Take 1 tablet by mouth Daily. First dose Thursday 7/8/2021, Disp: 90 tablet, Rfl: 3  •  rosuvastatin (CRESTOR) 40 MG tablet, Take 40 mg by mouth Daily., Disp: , Rfl:   •  sacubitril-valsartan (ENTRESTO) 49-51 MG tablet, Take 1 tablet by mouth 2 (Two) Times a Day., Disp: 180 tablet, Rfl: 3  •  torsemide (DEMADEX) 5 MG tablet, TAKE 1 TABLET DAILY (Patient taking differently: Take 5 mg by mouth Daily.), Disp: 90 tablet, Rfl: 1  •  vitamin B-12 (CYANOCOBALAMIN) 1000 MCG tablet, Take 1,000 mcg by mouth Daily., Disp: , Rfl:     History of Present Illness     Pt presents for follow up of CAD/VF/VT. Since the pt has seen us, pt denies any palpitations, CP, LH, and dizziness. Denies any hospitalizations, ER visits, ICD shocks, or TIA/CVA symptoms. Overall feels well except for fatigue. No side effects to medications. BP at home stable. Has had both doses of pfizer vaccines and did well. + Chronic LU    ROS:  General:  Denies fatigue, weight gain or loss  Cardiovascular:  Denies CP, PND, syncope, near syncope, +edema - palpitations.  Pulmonary:  + chronic mild LU, No cough, or wheezing      Vitals:    10/12/21 0859   BP: 118/74   BP Location:  "Left arm   Patient Position: Sitting   Pulse: 74   SpO2: 97%   Weight: 92.4 kg (203 lb 12.8 oz)   Height: 185.4 cm (73\")     Body mass index is 26.89 kg/m².     PE:  General: NAD  Neck: no JVD, no carotid bruits, no TM  Heart RRR, NL S1, S2, S3 S4 no rubs, murmurs  Lungs: CTA, no wheezes, rhonchi, or rales  Abd: soft, non-tender, NL BS  Ext: No musculoskeletal deformities, +trace BLE edema, - cyanosis, or clubbing  Psych: normal mood and affect    Diagnostic Data:  Manual Device interrogation: [employer], BiV ICD, DDDR rate 70 bpm, a paced 98%, BiV paced 98%.  P wave 1.4 mV.  R wave 11.0 mV.  Atrial lead and RV lead thresholds stable.  LV threshold elevated 3.0 at 0.15 ms.  Impedance is stable.  Better voltage 90%.  Charge time 10.3 sec charge impedance 43 ohms.  Increased PV ARB due to PMT.          ,   ECG 12 Lead    Date/Time: 10/12/2021 12:01 PM  Performed by: Viet Manzano PA  Authorized by: Viet Manzano PA   Rhythm: paced  Rate: normal  ST Segments: ST segments normal    Clinical impression: normal ECG            1. Ventricular tachycardia (paroxysmal) (HCC)    2. LBBB (left bundle branch block)    3. Coronary artery disease involving coronary bypass graft of native heart without angina pectoris    4. Chronic combined systolic and diastolic congestive heart failure (EF<20%)          Plan:    1. VT:  - s/p RFA in 2015 with no recurrence  - no sustained VT on interrogation     2. Chronic CHF Systolic: LVEF up to 30%  Class II on meds coreg and Entresto  - BiV ICD recent generator change 7/6/2021, interrogation stable today with exception of elevated LV threshold which has been persistent.  3. CAD:  - on Plavix, follows with Dr. Gonzalez. Stable     4. History of PE:  - on Xarelto     F/up in 6 months    Electronically signed by PAULA Alcantar, 10/12/21, 9:33 AM EDT.    "

## 2021-10-14 ENCOUNTER — TREATMENT (OUTPATIENT)
Dept: CARDIAC REHAB | Facility: HOSPITAL | Age: 76
End: 2021-10-14

## 2021-10-14 DIAGNOSIS — Z00.00 PREVENTATIVE HEALTH CARE: Primary | ICD-10-CM

## 2021-10-19 ENCOUNTER — TREATMENT (OUTPATIENT)
Dept: CARDIAC REHAB | Facility: HOSPITAL | Age: 76
End: 2021-10-19

## 2021-10-19 DIAGNOSIS — Z00.00 PREVENTATIVE HEALTH CARE: Primary | ICD-10-CM

## 2021-10-19 PROCEDURE — 93798 PHYS/QHP OP CAR RHAB W/ECG: CPT

## 2021-10-19 NOTE — PROGRESS NOTES
Attended Phase II Cardiac Rehab. No medication or health history changes reported. See [facility] for details.

## 2021-10-21 ENCOUNTER — TREATMENT (OUTPATIENT)
Dept: CARDIAC REHAB | Facility: HOSPITAL | Age: 76
End: 2021-10-21

## 2021-10-21 DIAGNOSIS — Z00.00 PREVENTATIVE HEALTH CARE: Primary | ICD-10-CM

## 2021-10-26 ENCOUNTER — TREATMENT (OUTPATIENT)
Dept: CARDIAC REHAB | Facility: HOSPITAL | Age: 76
End: 2021-10-26

## 2021-10-26 DIAGNOSIS — Z00.00 PREVENTATIVE HEALTH CARE: Primary | ICD-10-CM

## 2021-10-28 ENCOUNTER — TREATMENT (OUTPATIENT)
Dept: CARDIAC REHAB | Facility: HOSPITAL | Age: 76
End: 2021-10-28

## 2021-10-28 DIAGNOSIS — Z00.00 PREVENTATIVE HEALTH CARE: Primary | ICD-10-CM

## 2021-11-02 ENCOUNTER — TREATMENT (OUTPATIENT)
Dept: CARDIAC REHAB | Facility: HOSPITAL | Age: 76
End: 2021-11-02

## 2021-11-02 DIAGNOSIS — Z00.00 PREVENTATIVE HEALTH CARE: Primary | ICD-10-CM

## 2021-11-04 ENCOUNTER — TREATMENT (OUTPATIENT)
Dept: CARDIAC REHAB | Facility: HOSPITAL | Age: 76
End: 2021-11-04

## 2021-11-04 DIAGNOSIS — Z00.00 PREVENTATIVE HEALTH CARE: Primary | ICD-10-CM

## 2021-11-09 ENCOUNTER — TREATMENT (OUTPATIENT)
Dept: CARDIAC REHAB | Facility: HOSPITAL | Age: 76
End: 2021-11-09

## 2021-11-09 DIAGNOSIS — Z00.00 PREVENTATIVE HEALTH CARE: Primary | ICD-10-CM

## 2021-11-11 ENCOUNTER — TREATMENT (OUTPATIENT)
Dept: CARDIAC REHAB | Facility: HOSPITAL | Age: 76
End: 2021-11-11

## 2021-11-11 DIAGNOSIS — Z00.00 PREVENTATIVE HEALTH CARE: Primary | ICD-10-CM

## 2021-11-16 ENCOUNTER — TREATMENT (OUTPATIENT)
Dept: CARDIAC REHAB | Facility: HOSPITAL | Age: 76
End: 2021-11-16

## 2021-11-16 DIAGNOSIS — Z00.00 PREVENTATIVE HEALTH CARE: Primary | ICD-10-CM

## 2021-11-18 ENCOUNTER — TREATMENT (OUTPATIENT)
Dept: CARDIAC REHAB | Facility: HOSPITAL | Age: 76
End: 2021-11-18

## 2021-11-18 DIAGNOSIS — Z00.00 PREVENTATIVE HEALTH CARE: Primary | ICD-10-CM

## 2021-11-18 PROCEDURE — 93798 PHYS/QHP OP CAR RHAB W/ECG: CPT

## 2021-11-19 PROCEDURE — 93295 DEV INTERROG REMOTE 1/2/MLT: CPT | Performed by: INTERNAL MEDICINE

## 2021-11-19 PROCEDURE — 93296 REM INTERROG EVL PM/IDS: CPT | Performed by: INTERNAL MEDICINE

## 2021-11-23 ENCOUNTER — TREATMENT (OUTPATIENT)
Dept: CARDIAC REHAB | Facility: HOSPITAL | Age: 76
End: 2021-11-23

## 2021-11-23 DIAGNOSIS — Z00.00 PREVENTATIVE HEALTH CARE: Primary | ICD-10-CM

## 2021-11-29 RX ORDER — RIVAROXABAN 10 MG/1
TABLET, FILM COATED ORAL
Qty: 90 TABLET | Refills: 3 | Status: ON HOLD | OUTPATIENT
Start: 2021-11-29 | End: 2022-09-13 | Stop reason: SDUPTHER

## 2021-11-30 ENCOUNTER — TREATMENT (OUTPATIENT)
Dept: CARDIAC REHAB | Facility: HOSPITAL | Age: 76
End: 2021-11-30

## 2021-11-30 DIAGNOSIS — Z00.00 PREVENTATIVE HEALTH CARE: Primary | ICD-10-CM

## 2021-12-02 ENCOUNTER — TREATMENT (OUTPATIENT)
Dept: CARDIAC REHAB | Facility: HOSPITAL | Age: 76
End: 2021-12-02

## 2021-12-02 DIAGNOSIS — Z00.00 PREVENTATIVE HEALTH CARE: Primary | ICD-10-CM

## 2021-12-07 ENCOUNTER — TREATMENT (OUTPATIENT)
Dept: CARDIAC REHAB | Facility: HOSPITAL | Age: 76
End: 2021-12-07

## 2021-12-07 DIAGNOSIS — Z00.00 PREVENTATIVE HEALTH CARE: Primary | ICD-10-CM

## 2021-12-09 ENCOUNTER — TREATMENT (OUTPATIENT)
Dept: CARDIAC REHAB | Facility: HOSPITAL | Age: 76
End: 2021-12-09

## 2021-12-09 DIAGNOSIS — Z00.00 PREVENTATIVE HEALTH CARE: Primary | ICD-10-CM

## 2021-12-14 ENCOUNTER — TREATMENT (OUTPATIENT)
Dept: CARDIAC REHAB | Facility: HOSPITAL | Age: 76
End: 2021-12-14

## 2021-12-14 DIAGNOSIS — Z00.00 PREVENTATIVE HEALTH CARE: Primary | ICD-10-CM

## 2021-12-16 ENCOUNTER — TREATMENT (OUTPATIENT)
Dept: CARDIAC REHAB | Facility: HOSPITAL | Age: 76
End: 2021-12-16

## 2021-12-16 DIAGNOSIS — Z00.00 PREVENTATIVE HEALTH CARE: Primary | ICD-10-CM

## 2021-12-22 ENCOUNTER — TRANSCRIBE ORDERS (OUTPATIENT)
Dept: CARDIAC REHAB | Facility: HOSPITAL | Age: 76
End: 2021-12-22

## 2021-12-22 DIAGNOSIS — Z00.00 PREVENTATIVE HEALTH CARE: Primary | ICD-10-CM

## 2021-12-23 ENCOUNTER — TREATMENT (OUTPATIENT)
Dept: CARDIAC REHAB | Facility: HOSPITAL | Age: 76
End: 2021-12-23

## 2021-12-23 DIAGNOSIS — Z00.00 PREVENTATIVE HEALTH CARE: Primary | ICD-10-CM

## 2021-12-28 ENCOUNTER — TREATMENT (OUTPATIENT)
Dept: CARDIAC REHAB | Facility: HOSPITAL | Age: 76
End: 2021-12-28

## 2021-12-28 DIAGNOSIS — Z00.00 PREVENTATIVE HEALTH CARE: Primary | ICD-10-CM

## 2022-01-11 ENCOUNTER — HOSPITAL ENCOUNTER (OUTPATIENT)
Dept: GENERAL RADIOLOGY | Facility: HOSPITAL | Age: 77
Discharge: HOME | End: 2022-01-11
Admitting: INTERNAL MEDICINE

## 2022-01-11 ENCOUNTER — TRANSCRIBE ORDERS (OUTPATIENT)
Dept: ADMINISTRATIVE | Facility: HOSPITAL | Age: 77
End: 2022-01-11

## 2022-01-11 DIAGNOSIS — J20.9 ACUTE BRONCHITIS, UNSPECIFIED ORGANISM: Primary | ICD-10-CM

## 2022-01-11 PROCEDURE — 71046 X-RAY EXAM CHEST 2 VIEWS: CPT

## 2022-02-01 ENCOUNTER — TREATMENT (OUTPATIENT)
Dept: CARDIAC REHAB | Facility: HOSPITAL | Age: 77
End: 2022-02-01

## 2022-02-01 DIAGNOSIS — Z00.00 PREVENTATIVE HEALTH CARE: Primary | ICD-10-CM

## 2022-02-03 ENCOUNTER — TREATMENT (OUTPATIENT)
Dept: CARDIAC REHAB | Facility: HOSPITAL | Age: 77
End: 2022-02-03

## 2022-02-03 DIAGNOSIS — Z00.00 PREVENTATIVE HEALTH CARE: Primary | ICD-10-CM

## 2022-02-08 ENCOUNTER — TREATMENT (OUTPATIENT)
Dept: CARDIAC REHAB | Facility: HOSPITAL | Age: 77
End: 2022-02-08

## 2022-02-08 DIAGNOSIS — Z00.00 PREVENTATIVE HEALTH CARE: Primary | ICD-10-CM

## 2022-02-10 ENCOUNTER — TREATMENT (OUTPATIENT)
Dept: CARDIAC REHAB | Facility: HOSPITAL | Age: 77
End: 2022-02-10

## 2022-02-10 DIAGNOSIS — Z00.00 PREVENTATIVE HEALTH CARE: Primary | ICD-10-CM

## 2022-02-17 ENCOUNTER — TREATMENT (OUTPATIENT)
Dept: CARDIAC REHAB | Facility: HOSPITAL | Age: 77
End: 2022-02-17

## 2022-02-17 DIAGNOSIS — Z00.00 PREVENTATIVE HEALTH CARE: Primary | ICD-10-CM

## 2022-02-18 PROCEDURE — 93296 REM INTERROG EVL PM/IDS: CPT | Performed by: INTERNAL MEDICINE

## 2022-02-18 PROCEDURE — 93295 DEV INTERROG REMOTE 1/2/MLT: CPT | Performed by: INTERNAL MEDICINE

## 2022-02-22 ENCOUNTER — TREATMENT (OUTPATIENT)
Dept: CARDIAC REHAB | Facility: HOSPITAL | Age: 77
End: 2022-02-22

## 2022-02-22 DIAGNOSIS — Z00.00 PREVENTATIVE HEALTH CARE: Primary | ICD-10-CM

## 2022-02-24 ENCOUNTER — TREATMENT (OUTPATIENT)
Dept: CARDIAC REHAB | Facility: HOSPITAL | Age: 77
End: 2022-02-24

## 2022-02-24 DIAGNOSIS — Z00.00 PREVENTATIVE HEALTH CARE: Primary | ICD-10-CM

## 2022-03-03 ENCOUNTER — TREATMENT (OUTPATIENT)
Dept: CARDIAC REHAB | Facility: HOSPITAL | Age: 77
End: 2022-03-03

## 2022-03-03 DIAGNOSIS — Z00.00 PREVENTATIVE HEALTH CARE: Primary | ICD-10-CM

## 2022-03-08 ENCOUNTER — TREATMENT (OUTPATIENT)
Dept: CARDIAC REHAB | Facility: HOSPITAL | Age: 77
End: 2022-03-08

## 2022-03-08 DIAGNOSIS — Z00.00 PREVENTATIVE HEALTH CARE: Primary | ICD-10-CM

## 2022-03-10 ENCOUNTER — TREATMENT (OUTPATIENT)
Dept: CARDIAC REHAB | Facility: HOSPITAL | Age: 77
End: 2022-03-10

## 2022-03-10 DIAGNOSIS — Z00.00 PREVENTATIVE HEALTH CARE: Primary | ICD-10-CM

## 2022-03-22 ENCOUNTER — TREATMENT (OUTPATIENT)
Dept: CARDIAC REHAB | Facility: HOSPITAL | Age: 77
End: 2022-03-22

## 2022-03-22 DIAGNOSIS — Z00.00 PREVENTATIVE HEALTH CARE: Primary | ICD-10-CM

## 2022-03-23 ENCOUNTER — OFFICE VISIT (OUTPATIENT)
Dept: CARDIOLOGY | Facility: CLINIC | Age: 77
End: 2022-03-23

## 2022-03-23 VITALS
HEART RATE: 82 BPM | DIASTOLIC BLOOD PRESSURE: 64 MMHG | SYSTOLIC BLOOD PRESSURE: 114 MMHG | OXYGEN SATURATION: 97 % | HEIGHT: 73 IN | BODY MASS INDEX: 25.87 KG/M2 | WEIGHT: 195.2 LBS

## 2022-03-23 DIAGNOSIS — I25.9 ISCHEMIC HEART DISEASE: ICD-10-CM

## 2022-03-23 DIAGNOSIS — I47.29 VENTRICULAR TACHYCARDIA (PAROXYSMAL): Primary | ICD-10-CM

## 2022-03-23 DIAGNOSIS — I50.22 CHRONIC LEFT VENTRICULAR SYSTOLIC HEART FAILURE: ICD-10-CM

## 2022-03-23 DIAGNOSIS — I27.82 CHRONIC PULMONARY EMBOLISM, UNSPECIFIED PULMONARY EMBOLISM TYPE, UNSPECIFIED WHETHER ACUTE COR PULMONALE PRESENT: ICD-10-CM

## 2022-03-23 PROCEDURE — 99213 OFFICE O/P EST LOW 20 MIN: CPT | Performed by: INTERNAL MEDICINE

## 2022-03-23 PROCEDURE — 93284 PRGRMG EVAL IMPLANTABLE DFB: CPT | Performed by: INTERNAL MEDICINE

## 2022-03-23 NOTE — PROGRESS NOTES
Jonnie Roth  1945  [phone]      03/23/2022    [facility] CARDIOLOGY     Borders, Brian Ferraro MD  [address] [address]    Chief Complaint   Patient presents with   • Ventricular tachycardia (CMS/HCC)       Problem List:      1. Sudden cardiac death with primary ventricular fibrillation status post Pacesetter [place] ICD, [place], [place] in September 2000:   a. ICD generator change out with upgrade to a biventricular pacemaker ICD on 12/17/2007, [employer] device.  b. ICD discharge, 08/09/2012, secondary to ventricular flutter with initiation of amiodarone therapy.   c. Hospitalization, 02/01/2014, secondary to ICD discharge for ventricular tachycardia with subsequent discontinuation of Coreg and initiation of sotalol therapy.   d. Hospitalization, February 2014, secondary to ICD discharge for VT with subsequent discontinuation of Coreg and initiation of sotalol.  e. Echocardiogram, 04/07/2015: EF less than 20%.  f. Hospitalization secondary to VF and ICD shocks, 04/18/2015.  g. NIPS procedure with defibrillation threshold testing for VF and noninvasive program stimulation, 04/18/2015.   h. Initiation of mexiletine for recurrent ventricular tachycardia with ICD shocks on 05/05/2015 with amiodarone load, recurrent VT and ICD shocks with hospitalization 05/16/2015-05/18/2015.   i. EP study with RFA of large anterior left ventricular scar extending from mid-left ventricular wall down apex by Dr. Ferdinand Alba, 05/21/2015.                      j.   ICD generator change 10/2017                     K.   Echo 11/19 LVEF 30%, mild TR          L.  ICD generatot change 07/2021  2. Ischemic heart disease:  a. Remote progressive angina pectoris/acute extensive anterolateral myocardial infarction/delayed presentation with thrombolysis/severe 3-vessel coronary atherosclerosis with severe single vessel involvement/PTCA with intracoronary stent deployment proximal-mid  segment LAD/moderate-severe compensated left ventricular dysfunction. LVEF (0.35)/abnormal positive signal averaged EKG/oral anticoagulation, April 1995.  b. Remote NYHA class I-II angina pectoris/class III CHF/abnormal quantitative SPECT gated Cardiolite GXT, July 1998.  c. Stable persistent MUGA, LVEF (0.33, January 1999 and January 2000).   d. Residual NYHA class I angina pectoris/CHF with reduced MUGA scan: LVEF (0.25), April 2002.  e. Left heart catheterization with distal circumflex disease: EF 20%, September 2002.   f. MUGA in May 2003: EF 32%.   g. Sestamibi GXT on 04/08/2005: No ischemia. EF 29%.   h. Residual NYHA class I angina pectoris/CHF with reduced acceptable MUGA scan: EF (0.32) and acceptable Pacesetter PCD interrogation/reprogramming, May 2003 with interrogation, September 2004.  i. Echocardiogram, September 2009 with EF 30%  j. Left heart catheterization by Dr. Bhupinder Gonzalez, August 2010, with LVEF of 35%, with 3-vessel native coronary artery disease, 95% mid-LAD status post PTCA and stenting with two 3 mm stents by Dr. Llanes.  k. Echocardiogram, 06/07/2012: Left ventricular ejection fraction of 30%.  l. Hospitalization, 02/01/2014, for non-ST elevation MI, left heart catheterization by Dr. Ayala that demonstrated 60% mid stenosis of the right coronary artery that remains unchanged compared to previous, widely patent stents of the LAD with severe LV dysfunction of approximately 25%.   m. Left heart catheterization status post drug-eluting stent to the distal LAD and mid-RCA with an EF of 20% to 25% by Dr. Diego Ayala, 04/20/2015.   n. Left heart catheterization 1/16/17; nonobstructive CAD with patent previously placed stents, dilated cardiomyopathy with severe LV systolic dysfunction, EF less than 20%, normal hemodynamics, recommendations for continued medical therapy and risk factor, evaluation for noncardiac etiology of symptoms.  o. Residual CCS Class I/II angina pectoris/NYHA Class II  exertional dyspnea and fatigue syndrome, summer 2017  p. Residual CCS 0 angina pectoris/NYHA class I-II exertional dyspnea and fatigue, February 2018, September 2018, March 2019.  3. Dyslipidemia.  4. Status post remote operations.  5. Remote chronic tobacco use/abnormal chest x-ray with stable abnormal thoracic  CT scan without contrast demonstrating bilateral diffuse perimeter scarring and fibrosis with scattered subcentimeter noncalcified nodules (unchanged from February 2016), March 2017.  6. Left bundle branch block.  7. Burks trauma with hematuria with urosepsis requiring hospitalization, May 2015.  8. Pulmonary embolism, spring 2015.   9. Remote apparent hypothyroidism/replacement therapy - data deficit, January 2016.  10. Remote diagnosis of obstructive sleep apnea with CPAP use, 2017.  11. Bilateral cataract extraction November 2018, February 2019     Allergies  No Known Allergies    Current Medications    Current Outpatient Medications:   •  acetaminophen (TYLENOL) 500 MG tablet, Take 1,000 mg by mouth Every 6 (Six) Hours As Needed for Mild Pain ., Disp: , Rfl:   •  carvedilol (COREG) 12.5 MG tablet, Take 12.5 mg by mouth 2 (Two) Times a Day With Meals., Disp: , Rfl:   •  cholecalciferol (VITAMIN D3) 25 MCG (1000 UT) tablet, Take 4,000 Units by mouth Daily., Disp: , Rfl:   •  clopidogrel (PLAVIX) 75 MG tablet, Take 75 mg by mouth Every Morning., Disp: , Rfl:   •  Dapagliflozin Propanediol 10 MG tablet, Take 10 mg by mouth Daily., Disp: 90 tablet, Rfl: 3  •  eplerenone (INSPRA) 25 MG tablet, Take 12.5 mg by mouth Every Morning., Disp: , Rfl:   •  finasteride (PROSCAR) 5 MG tablet, Take 5 mg by mouth Every Morning., Disp: , Rfl:   •  L-TRYPTOPHAN PO, Take  by mouth Every Night. 3 TABLETS NIGHTLY-     1500 MG EACH, Disp: , Rfl:   •  levothyroxine (SYNTHROID, LEVOTHROID) 25 MCG tablet, Take 25 mcg by mouth Every Morning., Disp: , Rfl:   •  melatonin 5 MG tablet tablet, Take 5 mg by mouth At Night As Needed. 2  "tabs at night, Disp: , Rfl:   •  metFORMIN (GLUCOPHAGE) 1000 MG tablet, Take 1,000 mg by mouth 2 (two) times a day., Disp: , Rfl:   •  Multiple Vitamins-Minerals (ICAPS AREDS 2 PO), Take 1 tablet by mouth 2 (two) times a day., Disp: , Rfl:   •  Multiple Vitamins-Minerals (MULTIVITAMIN ADULTS 50+) tablet, Take 1 tablet by mouth Daily., Disp: , Rfl:   •  nitroglycerin (NITROSTAT) 0.4 MG SL tablet, Place 1 tablet under the tongue Every 5 (Five) Minutes As Needed for Chest Pain. Take no more than 3 doses in 15 minutes., Disp: 30 tablet, Rfl: 11  •  pantoprazole (PROTONIX) 40 MG EC tablet, Take 40 mg by mouth Every Morning., Disp: , Rfl:   •  polyethylene glycol (MIRALAX) packet, Take 17 g by mouth Every Other Day., Disp: , Rfl:   •  rosuvastatin (CRESTOR) 40 MG tablet, Take 40 mg by mouth Daily., Disp: , Rfl:   •  sacubitril-valsartan (ENTRESTO) 49-51 MG tablet, Take 1 tablet by mouth 2 (Two) Times a Day., Disp: 180 tablet, Rfl: 3  •  torsemide (DEMADEX) 5 MG tablet, TAKE 1 TABLET DAILY (Patient taking differently: Take 5 mg by mouth Daily.), Disp: 90 tablet, Rfl: 1  •  vitamin B-12 (CYANOCOBALAMIN) 1000 MCG tablet, Take 1,000 mcg by mouth Daily., Disp: , Rfl:   •  Xarelto 10 MG tablet, TAKE 1 TABLET DAILY, Disp: 90 tablet, Rfl: 3    History of Present Illness     Pt presents for follow up of VT/CAD/CHF. Since the pt has seen us, pt denies any palpitations, CP, LH, and dizziness. Denies any hospitalizations, ER visits, ICD shocks, or TIA/CVA symptoms. Overall feels well except for chronic LU. No side effects to medications.    ROS:  General:  + fatigue, weight gain or loss  Cardiovascular:  Denies CP, PND, syncope, near syncope, edema or palpitations.  Pulmonary:  + chronic LU, No cough, or wheezing    Vitals:    03/23/22 1542   BP: 114/64   BP Location: Left arm   Patient Position: Sitting   Pulse: 82   SpO2: 97%   Weight: 88.5 kg (195 lb 3.2 oz)   Height: 185.4 cm (73\")       PE:  General: NAD  Neck: no JVD, no " carotid bruits, no TM  Heart RRR, NL S1, S2, S4 present, no rubs, murmurs  Lungs: CTA, no wheezes, rhonchi, or rales  Abd: soft, non-tender, NL BS  Ext: No musculoskeletal deformities, no edema, cyanosis, or clubbing  Psych: normal mood and affect    Diagnostic Data:  Procedures.    1. Ventricular tachycardia (paroxysmal) (HCC)    2. Chronic left ventricular systolic heart failure (HCC)    3. Ischemic heart disease    4. Chronic pulmonary embolism, unspecified pulmonary embolism type, unspecified whether acute cor pulmonale present ([facility])        ICD interrogation: DDDR 98% RA paced, 99% BiV paced. One VT with ATP, LV threshold 2.75V at 1.5 ms    Plan:    1. VT:  - s/p RFA in 2015 with one recurrence during very well     2. Chronic CHF Systolic: LVEF up to 30%  Class II on meds coreg and Entresto  - BiV ICD recent generator change 7/6/2021, interrogation stable today with exception of elevated LV threshold which has been persistent.    3. CAD:  - on Plavix, follows with Dr. Gonzalez. Stable     4. History of PE:  - on Xarelto    F/up in 6 months

## 2022-03-24 ENCOUNTER — TREATMENT (OUTPATIENT)
Dept: CARDIAC REHAB | Facility: HOSPITAL | Age: 77
End: 2022-03-24

## 2022-03-24 DIAGNOSIS — Z00.00 PREVENTATIVE HEALTH CARE: Primary | ICD-10-CM

## 2022-03-29 ENCOUNTER — TREATMENT (OUTPATIENT)
Dept: CARDIAC REHAB | Facility: HOSPITAL | Age: 77
End: 2022-03-29

## 2022-03-29 DIAGNOSIS — Z00.00 PREVENTATIVE HEALTH CARE: Primary | ICD-10-CM

## 2022-03-31 ENCOUNTER — TREATMENT (OUTPATIENT)
Dept: CARDIAC REHAB | Facility: HOSPITAL | Age: 77
End: 2022-03-31

## 2022-03-31 DIAGNOSIS — Z00.00 PREVENTATIVE HEALTH CARE: Primary | ICD-10-CM

## 2022-04-05 ENCOUNTER — TREATMENT (OUTPATIENT)
Dept: CARDIAC REHAB | Facility: HOSPITAL | Age: 77
End: 2022-04-05

## 2022-04-05 DIAGNOSIS — Z00.00 PREVENTATIVE HEALTH CARE: ICD-10-CM

## 2022-04-07 ENCOUNTER — TREATMENT (OUTPATIENT)
Dept: CARDIAC REHAB | Facility: HOSPITAL | Age: 77
End: 2022-04-07

## 2022-04-07 DIAGNOSIS — Z00.00 PREVENTATIVE HEALTH CARE: Primary | ICD-10-CM

## 2022-04-14 ENCOUNTER — TREATMENT (OUTPATIENT)
Dept: CARDIAC REHAB | Facility: HOSPITAL | Age: 77
End: 2022-04-14

## 2022-04-14 DIAGNOSIS — Z00.00 PREVENTATIVE HEALTH CARE: Primary | ICD-10-CM

## 2022-04-19 ENCOUNTER — TREATMENT (OUTPATIENT)
Dept: CARDIAC REHAB | Facility: HOSPITAL | Age: 77
End: 2022-04-19

## 2022-04-19 DIAGNOSIS — Z00.00 PREVENTATIVE HEALTH CARE: Primary | ICD-10-CM

## 2022-04-21 ENCOUNTER — TREATMENT (OUTPATIENT)
Dept: CARDIAC REHAB | Facility: HOSPITAL | Age: 77
End: 2022-04-21

## 2022-04-21 DIAGNOSIS — Z00.00 PREVENTATIVE HEALTH CARE: Primary | ICD-10-CM

## 2022-04-26 ENCOUNTER — TREATMENT (OUTPATIENT)
Dept: CARDIAC REHAB | Facility: HOSPITAL | Age: 77
End: 2022-04-26

## 2022-04-26 DIAGNOSIS — Z00.00 PREVENTATIVE HEALTH CARE: Primary | ICD-10-CM

## 2022-04-28 ENCOUNTER — TREATMENT (OUTPATIENT)
Dept: CARDIAC REHAB | Facility: HOSPITAL | Age: 77
End: 2022-04-28

## 2022-04-28 DIAGNOSIS — Z00.00 PREVENTATIVE HEALTH CARE: Primary | ICD-10-CM

## 2022-05-03 RX ORDER — DAPAGLIFLOZIN 10 MG/1
TABLET, FILM COATED ORAL
Qty: 90 TABLET | Refills: 3 | Status: SHIPPED | OUTPATIENT
Start: 2022-05-03 | End: 2022-09-13 | Stop reason: HOSPADM

## 2022-05-05 ENCOUNTER — TREATMENT (OUTPATIENT)
Dept: CARDIAC REHAB | Facility: HOSPITAL | Age: 77
End: 2022-05-05

## 2022-05-05 DIAGNOSIS — Z00.00 PREVENTATIVE HEALTH CARE: Primary | ICD-10-CM

## 2022-05-10 ENCOUNTER — TREATMENT (OUTPATIENT)
Dept: CARDIAC REHAB | Facility: HOSPITAL | Age: 77
End: 2022-05-10

## 2022-05-10 DIAGNOSIS — Z00.00 PREVENTATIVE HEALTH CARE: Primary | ICD-10-CM

## 2022-05-12 ENCOUNTER — TREATMENT (OUTPATIENT)
Dept: CARDIAC REHAB | Facility: HOSPITAL | Age: 77
End: 2022-05-12

## 2022-05-12 DIAGNOSIS — Z00.00 PREVENTATIVE HEALTH CARE: Primary | ICD-10-CM

## 2022-05-20 PROCEDURE — 93295 DEV INTERROG REMOTE 1/2/MLT: CPT | Performed by: INTERNAL MEDICINE

## 2022-05-20 PROCEDURE — 93296 REM INTERROG EVL PM/IDS: CPT | Performed by: INTERNAL MEDICINE

## 2022-05-24 ENCOUNTER — TREATMENT (OUTPATIENT)
Dept: CARDIAC REHAB | Facility: HOSPITAL | Age: 77
End: 2022-05-24

## 2022-05-24 DIAGNOSIS — Z00.00 PREVENTATIVE HEALTH CARE: Primary | ICD-10-CM

## 2022-05-26 ENCOUNTER — TREATMENT (OUTPATIENT)
Dept: CARDIAC REHAB | Facility: HOSPITAL | Age: 77
End: 2022-05-26

## 2022-05-26 DIAGNOSIS — Z00.00 PREVENTATIVE HEALTH CARE: Primary | ICD-10-CM

## 2022-05-31 ENCOUNTER — TREATMENT (OUTPATIENT)
Dept: CARDIAC REHAB | Facility: HOSPITAL | Age: 77
End: 2022-05-31

## 2022-05-31 DIAGNOSIS — Z00.00 PREVENTATIVE HEALTH CARE: Primary | ICD-10-CM

## 2022-06-07 ENCOUNTER — TREATMENT (OUTPATIENT)
Dept: CARDIAC REHAB | Facility: HOSPITAL | Age: 77
End: 2022-06-07

## 2022-06-07 DIAGNOSIS — Z00.00 PREVENTATIVE HEALTH CARE: Primary | ICD-10-CM

## 2022-06-09 ENCOUNTER — TREATMENT (OUTPATIENT)
Dept: CARDIAC REHAB | Facility: HOSPITAL | Age: 77
End: 2022-06-09

## 2022-06-09 DIAGNOSIS — Z00.00 PREVENTATIVE HEALTH CARE: Primary | ICD-10-CM

## 2022-06-16 ENCOUNTER — TREATMENT (OUTPATIENT)
Dept: CARDIAC REHAB | Facility: HOSPITAL | Age: 77
End: 2022-06-16

## 2022-06-16 DIAGNOSIS — Z00.00 PREVENTATIVE HEALTH CARE: Primary | ICD-10-CM

## 2022-06-23 ENCOUNTER — TREATMENT (OUTPATIENT)
Dept: CARDIAC REHAB | Facility: HOSPITAL | Age: 77
End: 2022-06-23

## 2022-06-23 DIAGNOSIS — Z00.00 PREVENTATIVE HEALTH CARE: Primary | ICD-10-CM

## 2022-06-28 ENCOUNTER — TREATMENT (OUTPATIENT)
Dept: CARDIAC REHAB | Facility: HOSPITAL | Age: 77
End: 2022-06-28

## 2022-06-28 DIAGNOSIS — Z00.00 PREVENTATIVE HEALTH CARE: Primary | ICD-10-CM

## 2022-06-30 ENCOUNTER — TREATMENT (OUTPATIENT)
Dept: CARDIAC REHAB | Facility: HOSPITAL | Age: 77
End: 2022-06-30

## 2022-06-30 DIAGNOSIS — Z00.00 PREVENTATIVE HEALTH CARE: Primary | ICD-10-CM

## 2022-07-05 ENCOUNTER — TREATMENT (OUTPATIENT)
Dept: CARDIAC REHAB | Facility: HOSPITAL | Age: 77
End: 2022-07-05

## 2022-07-05 DIAGNOSIS — Z00.00 PREVENTATIVE HEALTH CARE: Primary | ICD-10-CM

## 2022-07-07 ENCOUNTER — TREATMENT (OUTPATIENT)
Dept: CARDIAC REHAB | Facility: HOSPITAL | Age: 77
End: 2022-07-07

## 2022-07-07 DIAGNOSIS — Z00.00 PREVENTATIVE HEALTH CARE: Primary | ICD-10-CM

## 2022-07-14 ENCOUNTER — TREATMENT (OUTPATIENT)
Dept: CARDIAC REHAB | Facility: HOSPITAL | Age: 77
End: 2022-07-14

## 2022-07-14 DIAGNOSIS — Z00.00 PREVENTATIVE HEALTH CARE: Primary | ICD-10-CM

## 2022-07-19 ENCOUNTER — TREATMENT (OUTPATIENT)
Dept: CARDIAC REHAB | Facility: HOSPITAL | Age: 77
End: 2022-07-19

## 2022-07-19 DIAGNOSIS — Z00.00 PREVENTATIVE HEALTH CARE: Primary | ICD-10-CM

## 2022-07-21 ENCOUNTER — TREATMENT (OUTPATIENT)
Dept: CARDIAC REHAB | Facility: HOSPITAL | Age: 77
End: 2022-07-21

## 2022-07-21 DIAGNOSIS — Z00.00 PREVENTATIVE HEALTH CARE: Primary | ICD-10-CM

## 2022-08-16 ENCOUNTER — TREATMENT (OUTPATIENT)
Dept: CARDIAC REHAB | Facility: HOSPITAL | Age: 77
End: 2022-08-16

## 2022-08-16 DIAGNOSIS — Z00.00 PREVENTATIVE HEALTH CARE: Primary | ICD-10-CM

## 2022-08-18 ENCOUNTER — TREATMENT (OUTPATIENT)
Dept: CARDIAC REHAB | Facility: HOSPITAL | Age: 77
End: 2022-08-18

## 2022-08-18 DIAGNOSIS — Z00.00 PREVENTATIVE HEALTH CARE: Primary | ICD-10-CM

## 2022-08-19 PROCEDURE — 93296 REM INTERROG EVL PM/IDS: CPT | Performed by: INTERNAL MEDICINE

## 2022-08-19 PROCEDURE — 93295 DEV INTERROG REMOTE 1/2/MLT: CPT | Performed by: INTERNAL MEDICINE

## 2022-08-23 ENCOUNTER — TREATMENT (OUTPATIENT)
Dept: CARDIAC REHAB | Facility: HOSPITAL | Age: 77
End: 2022-08-23

## 2022-08-23 DIAGNOSIS — Z00.00 PREVENTATIVE HEALTH CARE: Primary | ICD-10-CM

## 2022-08-25 ENCOUNTER — TREATMENT (OUTPATIENT)
Dept: CARDIAC REHAB | Facility: HOSPITAL | Age: 77
End: 2022-08-25

## 2022-08-25 DIAGNOSIS — Z00.00 PREVENTATIVE HEALTH CARE: Primary | ICD-10-CM

## 2022-08-26 NOTE — PROGRESS NOTES
PHYSICAL THERAPY: SHOULDER Daily Note and PM  (Link to Caseload Tracking: PT Visit Days : 1  Acknowledge Orders Time In/Out  PT Charge Capture  Rehab Caseload Tracker    Santiago Flowers is a 66 y.o. 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 ([facility]) [M05.711]  Rheumatoid arthritis of right shoulder without organ or system involvement with positive rheumatoid factor ([facility] [address] [M05.711]  Procedure(s) (LRB):  REVERSE RIGHT TOTAL SHOULDER ARTHROPLASTY WITH DELTA EXTEND PROSTHESIS, BICEPS TENODESIS (Right)  1 Day Post-Op  Reason for Referral: Pain in Right Shoulder (M25.511)  Stiffness of Right Shoulder, Not elsewhere classified (M25.611)  Other abnormalities of gait and mobility (R26.89)  Outpatient in a bed: Payor: MEDICARE / Plan: MEDICARE PART A / Product Type: *No Product type* /     MOVEMENT PRECAUTIONS   TWICE A DAY   Active and passive range of motion right elbow hand   Active assisted and passive range of motion right Shoulder to tolerance   Pulleys and pendulums   Do not push motion     May use right arm with walker   May put weight on shoulder to use walker   Please train   nwb right shoulder   wbat bilateral lower extremity   Sling right shoulder     REHAB RECOMMENDATIONS:   Recommendation to date pending progress:  Setting:  Home Health Therapy    Equipment:    None     ASSESSMENT:   ASSESSMENT:  Ms. Kari Guzman presents with decreased functional mobility and decreased rom/strength of right UE s/p Procedure(s) (LRB):  REVERSE RIGHT TOTAL SHOULDER ARTHROPLASTY WITH DELTA EXTEND PROSTHESIS, BICEPS TENODESIS (Right). She lives with her  and does need some assistance with adl's. She uses a rollator with gait. She has had lots of joint replacements. Her  will be with her at home. She has no steps. MD bolaños says ok to use her right UE for use with rollator.     She is supine this am and Pt. Attended Phase III CardiacRehab. See flow sheet for details.    agreeable. Reviewed safety and precautions in detail including no behind the back and reviewed HEP and did exercises listed below. She ambulated in the room with rollator to restroom(cna assisted in restroom) and back to the chair. She had no questions. Plans are for HHPT. Will see her this afternoon before going home to review everything with her . Issued HEP sheet and pulleys for use later with HHPT. PM - pt. Still up in chair,  present and very attentive. Pt. Having a lot more pain. Reviewed safety in detail again and precautions with reverse tsa and MD order. Reviewed not putting arm behind back and not pushing up from the chair with it. Reviewed HEP and pt. Performed HEP exercises listed below in chair with  present, more pain with exercises than this am.   assisted in sit to stand for patient as she had some difficulty only able to use left UE. Pt. Ambulated 50 feet with rollator into the pena CGA. Pt. Then used restroom with  assisting as needed. She returned to chair, arm positioned with support and ice pack applied. Pt. Does not sling as it pulls her neck. She uses right UE on walker with gait as ok'd per MD order but doesn't put much weight on her arm at all. No steps at home-they have a stair chair lift. She plans to leave today with HHPT to follow. They had no other questions and felt comfortable going home.      [facility] [address] [address] AM-PAC 6 Clicks Basic Mobility Inpatient Short Form  AM-PAC Mobility Inpatient   How much difficulty turning over in bed?: A Little  How much difficulty sitting down on / standing up from a chair with arms?: A Little  How much difficulty moving from lying on back to sitting on side of bed?: A Little  How much help from another person moving to and from a bed to a chair?: A Little  How much help from another person needed to walk in hospital room?: None  How much help from another person for climbing 3-5 steps with a railing?: A Little  AM-PAC Inpatient Mobility Raw Score : 19  AM-PAC Inpatient T-Scale Score : 45.44  Mobility Inpatient CMS 0-100% Score: 41.77  Mobility Inpatient CMS G-Code Modifier : CK    SUBJECTIVE:   Ms. Carmine Concepcion states, \"ok, more pain\"     Social/Functional Lives With: Spouse  Type of Home: House  Home Layout: One level  Home Access: Level entry  Bathroom Equipment: Shower chair, Toilet raiser  Home Equipment: Rollator    OBJECTIVE:     PAIN: VITAL SIGNS: LINES/DRAINS:   Pre Treatment:         Post Treatment: right shoulder, did not rate the pain but more pain this pm Vitals        Oxygen    None    RESTRICTIONS/PRECAUTIONS:  Restrictions/Precautions: ROM Restrictions, Weight Bearing   TWICE A DAY   Active and passive range of motion right elbow hand   Active assisted and passive range of motion right Shoulder to tolerance   Pulleys and pendulums   Do not push motion     May use right arm with walker   May put weight on shoulder to use walker   Please train   nwb right shoulder   wbat bilateral lower extremity   Sling right shoulder  Right Upper Extremity Weight Bearing: Non Weight Bearing (but MD order says ok to use with rollator)     Restrictions/Precautions: ROM Restrictions, Weight Bearing        HAND DOMINANCE:  Left []  Right [x]      UPPER EXTREMITY GROSS EVALUATION:  RIGHT UE   Within Functional Limits   Abnormal   Comments   Strength [] []  Shoulder very limited due to surgery, can make a fist   Active Range of Motion [] [] AROM RUE (degrees)  RUE AROM : Exceptions  R Shoulder Flexion 0-180: 20 (aa)  R Elbow Flexion 0-145: 100  R Elbow Extension 145-0: 10  R Wrist Flexion 0-80: wfl  R Wrist Extension 0-70: wfl   Passive Range of Motion           [] []        LEFT UE Within Functional Limits   Abnormal   Comments   Strength [] []  Left UE limited, shoulder limited   Active Range of Motion [] []  Left UE limited   Passive Range of Motion [] []       LOWER EXTREMITY GROSS EVALUATION:     Within ACUTE PHYSICAL THERAPY GOALS:   (Developed with and agreed upon by patient and/or caregiver.)  GOALS (1-4 days):  (1.)  Patient will move from supine to sit and sit to supine  in bed with CONTACT GUARD ASSIST.    (2.)  Patient will transfer from bed to chair and chair to bed with CONTACT GUARD ASSIST using the least restrictive device. (3.)  Patient will ambulate with CONTACT GUARD ASSIST for 75 feet with the least restrictive device. (4.)  Patient will be independent with shoulder HEP to increase range of motion per MD orders. ________________________________________________________________________________________________      FREQUENCY AND DURATION: BID for duration of hospital stay or until stated goals are met, whichever comes first.    THERAPY PROGNOSIS: Good    PROBLEM LIST:   (Skilled intervention is medically necessary to address:)  Decreased Activity Tolerance  Decreased AROM/PROM  Decreased Balance  Decreased Coordination  Decreased Gait Ability  Decreased Safety Awareness  Decreased Strength  Decreased Transfer Abilities  Increased Pain   INTERVENTIONS PLANNED:   (Benefits and precautions of physical therapy have been discussed with the patient.)  Therapeutic Activity  Therapeutic Exercise/HEP  Neuromuscular Re-education  Gait Training  Manual Therapy  Education       TREATMENT:       TREATMENT:   Therapeutic Activity (45 Minutes): Therapeutic activity included Lateral Scooting, Ambulation on level ground, Sitting balance , Standing balance, and exercises to improve functional Activity tolerance, Balance, Coordination, Mobility, Strength, and ROM.     TREATMENT GRID:   Date:  8/26 Date:   Date:     ACTIVITY/EXERCISE: AM PM AM PM AM PM   Gripping 10 10       Wrist Flexion/Extension 10 10       Wrist Ulnar/Radial Deviation         Pronation/Supination 10 10       Elbow Flexion/Extension 10 10       Shoulder Flexion/Extension 10aa        Shoulder AB/ADduction         Shoulder IR/ER         Pulleys Pendulums 10 seated 10 seated       Shrugs         ISOMETRIC:                          Flexion         Extension         ABduction         ADduction         Biceps/Triceps         B = bilateral; AA = active assistive; A = active; P = passive      EDUCATION: Education Given To: Patient  Education Provided: Role of Therapy;Home Exercise Program;Transfer Training  Education Method: Demonstration;Verbal;Printed Information/Hand-outs  Education Outcome: Verbalized understanding  EDUCATION:  [x]  Home Exercises  [x]  Sling Application [x]  Movement Precautions   [x]  Pulleys for later with HHPT [x]  Use of Ice  []  Other:      AFTER TREATMENT PRECAUTIONS: Bed/Chair Locked, Call light within reach, Chair, Needs within reach, and Visitors at bedside    INTERDISCIPLINARY COLLABORATION:  RN/ PCT    COMPLIANCE WITH PROGRAM/EXERCISES: compliant most of the time    RECOMMENDATIONS/INTENT FOR NEXT TREATMENT SESSION: Treatment next visit will focus on increasing Ms. Angela Heard independence with bed mobility, transfers, gait training, strength/ROM exercises, modalities for pain, and patient education.      TIME IN/OUT:  Time In: 1330  Time Out: 1415  Minutes: 9889 [address] [place] [address],

## 2022-08-30 ENCOUNTER — TREATMENT (OUTPATIENT)
Dept: CARDIAC REHAB | Facility: HOSPITAL | Age: 77
End: 2022-08-30

## 2022-08-30 DIAGNOSIS — Z00.00 PREVENTATIVE HEALTH CARE: Primary | ICD-10-CM

## 2022-09-06 ENCOUNTER — TREATMENT (OUTPATIENT)
Dept: CARDIAC REHAB | Facility: HOSPITAL | Age: 77
End: 2022-09-06

## 2022-09-06 DIAGNOSIS — Z00.00 PREVENTATIVE HEALTH CARE: Primary | ICD-10-CM

## 2022-09-08 ENCOUNTER — TREATMENT (OUTPATIENT)
Dept: CARDIAC REHAB | Facility: HOSPITAL | Age: 77
End: 2022-09-08

## 2022-09-08 DIAGNOSIS — Z00.00 PREVENTATIVE HEALTH CARE: Primary | ICD-10-CM

## 2022-09-10 ENCOUNTER — HOSPITAL ENCOUNTER (INPATIENT)
Facility: HOSPITAL | Age: 77
LOS: 3 days | Discharge: HOME | End: 2022-09-13
Attending: EMERGENCY MEDICINE | Admitting: INTERNAL MEDICINE

## 2022-09-10 ENCOUNTER — APPOINTMENT (OUTPATIENT)
Dept: CT IMAGING | Facility: HOSPITAL | Age: 77
End: 2022-09-10

## 2022-09-10 ENCOUNTER — APPOINTMENT (OUTPATIENT)
Dept: GENERAL RADIOLOGY | Facility: HOSPITAL | Age: 77
End: 2022-09-10

## 2022-09-10 ENCOUNTER — APPOINTMENT (OUTPATIENT)
Dept: CARDIOLOGY | Facility: HOSPITAL | Age: 77
End: 2022-09-10

## 2022-09-10 DIAGNOSIS — Z45.02 AICD DISCHARGE: ICD-10-CM

## 2022-09-10 DIAGNOSIS — R55 SYNCOPE AND COLLAPSE: Primary | ICD-10-CM

## 2022-09-10 DIAGNOSIS — Z79.01 ANTICOAGULATED: ICD-10-CM

## 2022-09-10 DIAGNOSIS — I50.22 CHRONIC SYSTOLIC CONGESTIVE HEART FAILURE: ICD-10-CM

## 2022-09-10 DIAGNOSIS — I49.9 VENTRICULAR DYSRHYTHMIA: ICD-10-CM

## 2022-09-10 DIAGNOSIS — I47.20 VENTRICULAR TACHYCARDIA: ICD-10-CM

## 2022-09-10 DIAGNOSIS — I49.01 VENTRICULAR FIBRILLATION: ICD-10-CM

## 2022-09-10 DIAGNOSIS — I44.7 LBBB (LEFT BUNDLE BRANCH BLOCK): ICD-10-CM

## 2022-09-10 DIAGNOSIS — S06.5X9A TRAUMATIC SUBDURAL HEMATOMA WITH LOSS OF CONSCIOUSNESS, INITIAL ENCOUNTER: ICD-10-CM

## 2022-09-10 DIAGNOSIS — I25.119 CORONARY ARTERY DISEASE INVOLVING NATIVE CORONARY ARTERY OF NATIVE HEART WITH ANGINA PECTORIS: ICD-10-CM

## 2022-09-10 PROBLEM — I50.9 CONGESTIVE HEART FAILURE: Status: RESOLVED | Noted: 2017-03-13 | Resolved: 2022-09-10

## 2022-09-10 PROBLEM — I47.29 VENTRICULAR TACHYCARDIA (PAROXYSMAL) (HCC): Status: RESOLVED | Noted: 2021-05-14 | Resolved: 2022-09-10

## 2022-09-10 PROBLEM — R07.9 CHEST PAIN: Status: RESOLVED | Noted: 2017-01-15 | Resolved: 2022-09-10

## 2022-09-10 PROBLEM — R74.01 ELEVATED AST (SGOT): Status: ACTIVE | Noted: 2022-09-10

## 2022-09-10 PROBLEM — S06.5XAA SUBDURAL HEMATOMA, POST-TRAUMATIC: Status: ACTIVE | Noted: 2022-09-10

## 2022-09-10 LAB
ALBUMIN SERPL-MCNC: 4.2 G/DL (ref 3.5–5.2)
ALBUMIN/GLOB SERPL: 1.8 G/DL
ALP SERPL-CCNC: 52 U/L (ref 39–117)
ALT SERPL W P-5'-P-CCNC: 25 U/L (ref 1–41)
ANION GAP SERPL CALCULATED.3IONS-SCNC: 13 MMOL/L (ref 5–15)
ASCENDING AORTA: 3 CM
AST SERPL-CCNC: 44 U/L (ref 1–40)
BASOPHILS # BLD AUTO: 0.04 10*3/MM3 (ref 0–0.2)
BASOPHILS NFR BLD AUTO: 0.5 % (ref 0–1.5)
BH CV ECHO MEAS - AO MAX PG: 4.9 MMHG
BH CV ECHO MEAS - AO MEAN PG: 2.12 MMHG
BH CV ECHO MEAS - AO ROOT DIAM: 2.8 CM
BH CV ECHO MEAS - AO V2 MAX: 110.5 CM/SEC
BH CV ECHO MEAS - AO V2 VTI: 18.9 CM
BH CV ECHO MEAS - AVA(I,D): 3.1 CM2
BH CV ECHO MEAS - EDV(CUBED): 272.4 ML
BH CV ECHO MEAS - EDV(MOD-SP2): 178 ML
BH CV ECHO MEAS - EDV(MOD-SP4): 190 ML
BH CV ECHO MEAS - EF(MOD-BP): 14 %
BH CV ECHO MEAS - EF(MOD-SP2): 9.6 %
BH CV ECHO MEAS - EF(MOD-SP4): 16.8 %
BH CV ECHO MEAS - ESV(CUBED): 220.9 ML
BH CV ECHO MEAS - ESV(MOD-SP2): 161 ML
BH CV ECHO MEAS - ESV(MOD-SP4): 158 ML
BH CV ECHO MEAS - FS: 6.7 %
BH CV ECHO MEAS - IVS/LVPW: 0.95 CM
BH CV ECHO MEAS - IVSD: 1.12 CM
BH CV ECHO MEAS - LA DIMENSION: 4.5 CM
BH CV ECHO MEAS - LV MASS(C)D: 337.5 GRAMS
BH CV ECHO MEAS - LV MAX PG: 2.8 MMHG
BH CV ECHO MEAS - LV MEAN PG: 1.32 MMHG
BH CV ECHO MEAS - LV V1 MAX: 82.9 CM/SEC
BH CV ECHO MEAS - LV V1 VTI: 13.1 CM
BH CV ECHO MEAS - LVIDD: 6.5 CM
BH CV ECHO MEAS - LVIDS: 6 CM
BH CV ECHO MEAS - LVOT AREA: 4.4 CM2
BH CV ECHO MEAS - LVOT DIAM: 2.37 CM
BH CV ECHO MEAS - LVPWD: 1.18 CM
BH CV ECHO MEAS - MV A MAX VEL: 76.5 CM/SEC
BH CV ECHO MEAS - MV E MAX VEL: 43.4 CM/SEC
BH CV ECHO MEAS - MV E/A: 0.57
BH CV ECHO MEAS - PA ACC SLOPE: 623 CM/SEC2
BH CV ECHO MEAS - PA ACC TIME: 0.13 SEC
BH CV ECHO MEAS - PA PR(ACCEL): 19.6 MMHG
BH CV ECHO MEAS - SV(LVOT): 58.1 ML
BH CV ECHO MEAS - SV(MOD-SP2): 17 ML
BH CV ECHO MEAS - SV(MOD-SP4): 32 ML
BH CV ECHO MEAS - TAPSE (>1.6): 1.7 CM
BH CV VAS BP RIGHT ARM: NORMAL MMHG
BH CV XLRA - RV BASE: 2.8 CM
BH CV XLRA - RV LENGTH: 6.1 CM
BH CV XLRA - RV MID: 2.3 CM
BH CV XLRA - TDI S': 9.3 CM/SEC
BILIRUB SERPL-MCNC: 0.4 MG/DL (ref 0–1.2)
BUN SERPL-MCNC: 20 MG/DL (ref 8–23)
BUN/CREAT SERPL: 31.7 (ref 7–25)
CALCIUM SPEC-SCNC: 9.1 MG/DL (ref 8.6–10.5)
CHLORIDE SERPL-SCNC: 105 MMOL/L (ref 98–107)
CHOLEST SERPL-MCNC: 116 MG/DL (ref 0–200)
CO2 SERPL-SCNC: 22 MMOL/L (ref 22–29)
CREAT SERPL-MCNC: 0.63 MG/DL (ref 0.76–1.27)
DEPRECATED RDW RBC AUTO: 47.6 FL (ref 37–54)
DEPRECATED RDW RBC AUTO: 47.6 FL (ref 37–54)
EGFRCR SERPLBLD CKD-EPI 2021: 97.4 ML/MIN/1.73
EOSINOPHIL # BLD AUTO: 0.12 10*3/MM3 (ref 0–0.4)
EOSINOPHIL NFR BLD AUTO: 1.4 % (ref 0.3–6.2)
ERYTHROCYTE [DISTWIDTH] IN BLOOD BY AUTOMATED COUNT: 14.6 % (ref 12.3–15.4)
ERYTHROCYTE [DISTWIDTH] IN BLOOD BY AUTOMATED COUNT: 14.6 % (ref 12.3–15.4)
GLOBULIN UR ELPH-MCNC: 2.3 GM/DL
GLUCOSE BLDC GLUCOMTR-MCNC: 94 MG/DL (ref 70–130)
GLUCOSE SERPL-MCNC: 116 MG/DL (ref 65–99)
HBA1C MFR BLD: 5.9 % (ref 4.8–5.6)
HCT VFR BLD AUTO: 37.6 % (ref 37.5–51)
HCT VFR BLD AUTO: 38.7 % (ref 37.5–51)
HDLC SERPL-MCNC: 43 MG/DL (ref 40–60)
HGB BLD-MCNC: 12.3 G/DL (ref 13–17.7)
HGB BLD-MCNC: 12.5 G/DL (ref 13–17.7)
HOLD SPECIMEN: NORMAL
IMM GRANULOCYTES # BLD AUTO: 0.04 10*3/MM3 (ref 0–0.05)
IMM GRANULOCYTES NFR BLD AUTO: 0.5 % (ref 0–0.5)
LDLC SERPL CALC-MCNC: 53 MG/DL (ref 0–100)
LDLC/HDLC SERPL: 1.18 {RATIO}
LEFT ATRIUM VOLUME INDEX: 35.7 ML/M2
LYMPHOCYTES # BLD AUTO: 1.9 10*3/MM3 (ref 0.7–3.1)
LYMPHOCYTES NFR BLD AUTO: 22 % (ref 19.6–45.3)
MAGNESIUM SERPL-MCNC: 1.9 MG/DL (ref 1.6–2.4)
MAGNESIUM SERPL-MCNC: 2 MG/DL (ref 1.6–2.4)
MAXIMAL PREDICTED HEART RATE: 142 BPM
MCH RBC QN AUTO: 28.4 PG (ref 26.6–33)
MCH RBC QN AUTO: 28.7 PG (ref 26.6–33)
MCHC RBC AUTO-ENTMCNC: 32.3 G/DL (ref 31.5–35.7)
MCHC RBC AUTO-ENTMCNC: 32.7 G/DL (ref 31.5–35.7)
MCV RBC AUTO: 87.9 FL (ref 79–97)
MCV RBC AUTO: 88 FL (ref 79–97)
MONOCYTES # BLD AUTO: 0.7 10*3/MM3 (ref 0.1–0.9)
MONOCYTES NFR BLD AUTO: 8.1 % (ref 5–12)
NEUTROPHILS NFR BLD AUTO: 5.82 10*3/MM3 (ref 1.7–7)
NEUTROPHILS NFR BLD AUTO: 67.5 % (ref 42.7–76)
NRBC BLD AUTO-RTO: 0 /100 WBC (ref 0–0.2)
PLATELET # BLD AUTO: 205 10*3/MM3 (ref 140–450)
PLATELET # BLD AUTO: 220 10*3/MM3 (ref 140–450)
PMV BLD AUTO: 10.2 FL (ref 6–12)
PMV BLD AUTO: 10.3 FL (ref 6–12)
POTASSIUM SERPL-SCNC: 3.6 MMOL/L (ref 3.5–5.2)
PROT SERPL-MCNC: 6.5 G/DL (ref 6–8.5)
RBC # BLD AUTO: 4.28 10*6/MM3 (ref 4.14–5.8)
RBC # BLD AUTO: 4.4 10*6/MM3 (ref 4.14–5.8)
SODIUM SERPL-SCNC: 140 MMOL/L (ref 136–145)
STRESS TARGET HR: 121 BPM
TRIGL SERPL-MCNC: 111 MG/DL (ref 0–150)
TROPONIN T SERPL-MCNC: <0.01 NG/ML (ref 0–0.03)
VLDLC SERPL-MCNC: 20 MG/DL (ref 5–40)
WBC NRBC COR # BLD: 8.62 10*3/MM3 (ref 3.4–10.8)
WBC NRBC COR # BLD: 9.71 10*3/MM3 (ref 3.4–10.8)
WHOLE BLOOD HOLD COAG: NORMAL
WHOLE BLOOD HOLD SPECIMEN: NORMAL

## 2022-09-10 PROCEDURE — 70450 CT HEAD/BRAIN W/O DYE: CPT

## 2022-09-10 PROCEDURE — 85027 COMPLETE CBC AUTOMATED: CPT | Performed by: INTERNAL MEDICINE

## 2022-09-10 PROCEDURE — 71045 X-RAY EXAM CHEST 1 VIEW: CPT

## 2022-09-10 PROCEDURE — 99285 EMERGENCY DEPT VISIT HI MDM: CPT

## 2022-09-10 PROCEDURE — 93010 ELECTROCARDIOGRAM REPORT: CPT | Performed by: INTERNAL MEDICINE

## 2022-09-10 PROCEDURE — 93306 TTE W/DOPPLER COMPLETE: CPT

## 2022-09-10 PROCEDURE — 25010000002 AMIODARONE IN DEXTROSE 5% 360-4.14 MG/200ML-% SOLUTION: Performed by: INTERNAL MEDICINE

## 2022-09-10 PROCEDURE — 82962 GLUCOSE BLOOD TEST: CPT

## 2022-09-10 PROCEDURE — 80053 COMPREHEN METABOLIC PANEL: CPT | Performed by: EMERGENCY MEDICINE

## 2022-09-10 PROCEDURE — 83036 HEMOGLOBIN GLYCOSYLATED A1C: CPT | Performed by: INTERNAL MEDICINE

## 2022-09-10 PROCEDURE — 93005 ELECTROCARDIOGRAM TRACING: CPT | Performed by: INTERNAL MEDICINE

## 2022-09-10 PROCEDURE — 84484 ASSAY OF TROPONIN QUANT: CPT | Performed by: EMERGENCY MEDICINE

## 2022-09-10 PROCEDURE — 93005 ELECTROCARDIOGRAM TRACING: CPT | Performed by: EMERGENCY MEDICINE

## 2022-09-10 PROCEDURE — 25010000002 AMIODARONE IN DEXTROSE 5% 150-4.21 MG/100ML-% SOLUTION: Performed by: INTERNAL MEDICINE

## 2022-09-10 PROCEDURE — 99222 1ST HOSP IP/OBS MODERATE 55: CPT | Performed by: INTERNAL MEDICINE

## 2022-09-10 PROCEDURE — 83735 ASSAY OF MAGNESIUM: CPT | Performed by: EMERGENCY MEDICINE

## 2022-09-10 PROCEDURE — 93306 TTE W/DOPPLER COMPLETE: CPT | Performed by: INTERNAL MEDICINE

## 2022-09-10 PROCEDURE — 80061 LIPID PANEL: CPT | Performed by: INTERNAL MEDICINE

## 2022-09-10 PROCEDURE — 83735 ASSAY OF MAGNESIUM: CPT | Performed by: INTERNAL MEDICINE

## 2022-09-10 PROCEDURE — 73030 X-RAY EXAM OF SHOULDER: CPT

## 2022-09-10 PROCEDURE — 99223 1ST HOSP IP/OBS HIGH 75: CPT | Performed by: INTERNAL MEDICINE

## 2022-09-10 PROCEDURE — 85025 COMPLETE CBC W/AUTO DIFF WBC: CPT | Performed by: EMERGENCY MEDICINE

## 2022-09-10 RX ORDER — AMIODARONE HYDROCHLORIDE 200 MG/1
200 TABLET ORAL DAILY
Status: DISCONTINUED | OUTPATIENT
Start: 2022-10-03 | End: 2022-09-13 | Stop reason: HOSPADM

## 2022-09-10 RX ORDER — INSULIN LISPRO 100 [IU]/ML
0-7 INJECTION, SOLUTION INTRAVENOUS; SUBCUTANEOUS
Status: DISCONTINUED | OUTPATIENT
Start: 2022-09-10 | End: 2022-09-13 | Stop reason: HOSPADM

## 2022-09-10 RX ORDER — NICOTINE POLACRILEX 4 MG
15 LOZENGE BUCCAL
Status: DISCONTINUED | OUTPATIENT
Start: 2022-09-10 | End: 2022-09-13 | Stop reason: HOSPADM

## 2022-09-10 RX ORDER — EPLERENONE 25 MG/1
12.5 TABLET, FILM COATED ORAL DAILY
Status: DISCONTINUED | OUTPATIENT
Start: 2022-09-10 | End: 2022-09-13 | Stop reason: HOSPADM

## 2022-09-10 RX ORDER — POTASSIUM CHLORIDE 750 MG/1
40 CAPSULE, EXTENDED RELEASE ORAL AS NEEDED
Status: DISCONTINUED | OUTPATIENT
Start: 2022-09-10 | End: 2022-09-13 | Stop reason: HOSPADM

## 2022-09-10 RX ORDER — AMIODARONE HYDROCHLORIDE 200 MG/1
200 TABLET ORAL ONCE
Status: COMPLETED | OUTPATIENT
Start: 2022-09-11 | End: 2022-09-11

## 2022-09-10 RX ORDER — POLYETHYLENE GLYCOL 3350 17 G/17G
17 POWDER, FOR SOLUTION ORAL EVERY OTHER DAY
Status: DISCONTINUED | OUTPATIENT
Start: 2022-09-10 | End: 2022-09-13 | Stop reason: HOSPADM

## 2022-09-10 RX ORDER — SODIUM CHLORIDE 0.9 % (FLUSH) 0.9 %
10 SYRINGE (ML) INJECTION AS NEEDED
Status: DISCONTINUED | OUTPATIENT
Start: 2022-09-10 | End: 2022-09-13 | Stop reason: HOSPADM

## 2022-09-10 RX ORDER — LEVOTHYROXINE SODIUM 0.03 MG/1
25 TABLET ORAL
Status: DISCONTINUED | OUTPATIENT
Start: 2022-09-11 | End: 2022-09-13 | Stop reason: HOSPADM

## 2022-09-10 RX ORDER — TRAMADOL HYDROCHLORIDE 50 MG/1
50 TABLET ORAL EVERY 6 HOURS PRN
Status: DISCONTINUED | OUTPATIENT
Start: 2022-09-10 | End: 2022-09-13 | Stop reason: HOSPADM

## 2022-09-10 RX ORDER — POTASSIUM CHLORIDE 1.5 G/1.77G
40 POWDER, FOR SOLUTION ORAL AS NEEDED
Status: DISCONTINUED | OUTPATIENT
Start: 2022-09-10 | End: 2022-09-13 | Stop reason: HOSPADM

## 2022-09-10 RX ORDER — AMIODARONE HYDROCHLORIDE 200 MG/1
200 TABLET ORAL EVERY 12 HOURS
Status: DISCONTINUED | OUTPATIENT
Start: 2022-09-18 | End: 2022-09-13 | Stop reason: HOSPADM

## 2022-09-10 RX ORDER — MAGNESIUM SULFATE HEPTAHYDRATE 40 MG/ML
4 INJECTION, SOLUTION INTRAVENOUS AS NEEDED
Status: DISCONTINUED | OUTPATIENT
Start: 2022-09-10 | End: 2022-09-13 | Stop reason: HOSPADM

## 2022-09-10 RX ORDER — TORSEMIDE 10 MG/1
5 TABLET ORAL DAILY
Status: DISCONTINUED | OUTPATIENT
Start: 2022-09-10 | End: 2022-09-13 | Stop reason: HOSPADM

## 2022-09-10 RX ORDER — NITROGLYCERIN 0.4 MG/1
0.4 TABLET SUBLINGUAL
Status: DISCONTINUED | OUTPATIENT
Start: 2022-09-10 | End: 2022-09-13 | Stop reason: HOSPADM

## 2022-09-10 RX ORDER — PANTOPRAZOLE SODIUM 40 MG/1
40 TABLET, DELAYED RELEASE ORAL
Status: DISCONTINUED | OUTPATIENT
Start: 2022-09-10 | End: 2022-09-13 | Stop reason: HOSPADM

## 2022-09-10 RX ORDER — DEXTROSE MONOHYDRATE 25 G/50ML
25 INJECTION, SOLUTION INTRAVENOUS
Status: DISCONTINUED | OUTPATIENT
Start: 2022-09-10 | End: 2022-09-13 | Stop reason: HOSPADM

## 2022-09-10 RX ORDER — FINASTERIDE 5 MG/1
5 TABLET, FILM COATED ORAL DAILY
Status: DISCONTINUED | OUTPATIENT
Start: 2022-09-10 | End: 2022-09-13 | Stop reason: HOSPADM

## 2022-09-10 RX ORDER — ACETAMINOPHEN 500 MG
1000 TABLET ORAL EVERY 6 HOURS PRN
Status: DISCONTINUED | OUTPATIENT
Start: 2022-09-10 | End: 2022-09-13 | Stop reason: HOSPADM

## 2022-09-10 RX ORDER — ROSUVASTATIN CALCIUM 20 MG/1
40 TABLET, COATED ORAL NIGHTLY
Status: DISCONTINUED | OUTPATIENT
Start: 2022-09-10 | End: 2022-09-13 | Stop reason: HOSPADM

## 2022-09-10 RX ORDER — CHOLECALCIFEROL (VITAMIN D3) 125 MCG
10 CAPSULE ORAL NIGHTLY PRN
Status: DISCONTINUED | OUTPATIENT
Start: 2022-09-10 | End: 2022-09-13 | Stop reason: HOSPADM

## 2022-09-10 RX ORDER — ASPIRIN 81 MG/1
81 TABLET ORAL DAILY
Status: DISCONTINUED | OUTPATIENT
Start: 2022-09-10 | End: 2022-09-13 | Stop reason: HOSPADM

## 2022-09-10 RX ORDER — AMIODARONE HYDROCHLORIDE 200 MG/1
200 TABLET ORAL EVERY 8 HOURS
Status: DISCONTINUED | OUTPATIENT
Start: 2022-09-11 | End: 2022-09-13 | Stop reason: HOSPADM

## 2022-09-10 RX ORDER — CARVEDILOL 12.5 MG/1
12.5 TABLET ORAL 2 TIMES DAILY
Status: DISCONTINUED | OUTPATIENT
Start: 2022-09-10 | End: 2022-09-13 | Stop reason: HOSPADM

## 2022-09-10 RX ORDER — MAGNESIUM SULFATE HEPTAHYDRATE 40 MG/ML
2 INJECTION, SOLUTION INTRAVENOUS AS NEEDED
Status: DISCONTINUED | OUTPATIENT
Start: 2022-09-10 | End: 2022-09-13 | Stop reason: HOSPADM

## 2022-09-10 RX ADMIN — SACUBITRIL AND VALSARTAN 1 TABLET: 49; 51 TABLET, FILM COATED ORAL at 20:05

## 2022-09-10 RX ADMIN — ROSUVASTATIN CALCIUM 40 MG: 20 TABLET, FILM COATED ORAL at 20:05

## 2022-09-10 RX ADMIN — AMIODARONE HYDROCHLORIDE 1 MG/MIN: 1.8 INJECTION, SOLUTION INTRAVENOUS at 16:27

## 2022-09-10 RX ADMIN — EPLERENONE 12.5 MG: 25 TABLET, FILM COATED ORAL at 17:27

## 2022-09-10 RX ADMIN — AMIODARONE HYDROCHLORIDE 150 MG: 1.5 INJECTION, SOLUTION INTRAVENOUS at 16:12

## 2022-09-10 RX ADMIN — CARVEDILOL 12.5 MG: 12.5 TABLET, FILM COATED ORAL at 20:05

## 2022-09-10 RX ADMIN — FINASTERIDE 5 MG: 5 TABLET, FILM COATED ORAL at 16:58

## 2022-09-10 RX ADMIN — EMPAGLIFLOZIN 10 MG: 10 TABLET, FILM COATED ORAL at 17:26

## 2022-09-10 RX ADMIN — POTASSIUM CHLORIDE 40 MEQ: 750 CAPSULE, EXTENDED RELEASE ORAL at 17:34

## 2022-09-10 RX ADMIN — PANTOPRAZOLE SODIUM 40 MG: 40 TABLET, DELAYED RELEASE ORAL at 16:56

## 2022-09-10 RX ADMIN — ASPIRIN 81 MG: 81 TABLET, COATED ORAL at 16:56

## 2022-09-10 RX ADMIN — ACETAMINOPHEN 1000 MG: 500 TABLET, FILM COATED ORAL at 17:33

## 2022-09-10 RX ADMIN — AMIODARONE HYDROCHLORIDE 0.5 MG/MIN: 1.8 INJECTION, SOLUTION INTRAVENOUS at 22:00

## 2022-09-10 RX ADMIN — AMIODARONE HYDROCHLORIDE 1 MG/MIN: 1.8 INJECTION, SOLUTION INTRAVENOUS at 16:21

## 2022-09-10 RX ADMIN — TORSEMIDE 5 MG: 10 TABLET ORAL at 17:27

## 2022-09-10 NOTE — CONSULTS
Cardiology Consult   [facility] Cardiology      Reason for consultation:  · VT status post ICD shocks    Requesting provider: PAULA Porras  Consulting provider: Bro Flanagan MD    IDENTIFICATION: 77-year-old gentleman who resides in [place], [place].  Retired from [employer] health insurance      Active Hospital Problems    Diagnosis  POA   • **Ventricular fibrillation (HCC) [I49.01]  Yes     Priority: High     · Sudden cardiac death with primary VF status post ICD, September 2020  · Multiple ICD discharges for VF/flutter/tachycardia  · Amiodarone therapy initiated 2012 and 2015  · EP study with radiofrequency ablation of large LV scar, 5/21/2015  · [facility] admission for recurrent VT/VF status post shock after ineffective ATP, 9/10/2022     • Subdural hematoma, post-traumatic ([facility]) [S06.5X9A]  Yes     Priority: High     · Subdural hematoma following fall due to ventricular arrhythmia, 9/10/2022     • Coronary artery disease involving native coronary artery of native heart with angina pectoris (HCC) [I25.119]  Yes     Priority: Medium     · Cardiac catheterization for anterior MI (4/1995):  PCI of the proximal/mid LAD, 4/1995.  LVEF 35%  · Cardiac catheterization for NSTEMI (2/1/2014): Moderate RCA stenosis.  Widely patent LAD stents.  LVEF 25%.  · Cardiac catheterization by Dr. Ayala (4/20/2015): PCI of distal LAD.  · Cardiac catheterization (1/16/2017): Nonobstructive CAD.  LVEF <20%.  Normal hemodynamics.     • Chronic systolic congestive heart failure (HCC) [I50.22]  Yes     Priority: Medium     · Large anterior MI with LVEF 35%, 1995  · Echo (11/8/2019): LVEF 30%.  Akinesis/dyskinesis of the mid to distal anterior wall and apex.                77-year-old gentleman with the above history who was brought to the ER via EMS due to syncopal episode resulting in a fall.  The patient has longstanding history of ischemic heart disease following anterior MI in 1995 with chronic systolic heart failure  and history of ventricular tachycardia status post ICD.  Strokelike, the patient had issues with recurrent VT and has been treated historically with amiodarone and mexiletine.  In 2015, the patient underwent VT ablation with Dr. Alba and has been essentially free of ventricular arrhythmias since that time.  He was taken off amiodarone and mexiletine sometime following ablation.    The patient was in his normal state of health--active without angina or heart failure symptoms--until this morning when he developed acute palpitation symptoms and lost consciousness.  He fell on his left side and hit his left thorax, shoulder, and left forehead.  There is no prodrome or chest pain, nausea, lightheadedness.    In the emergency room, he was found to have a subdural hematoma.  His ICD was interrogated and showed the patient had experienced ventricular tachycardia which did not respond to 3 rounds of ATP and evolved into ventricular fibrillation requiring 2 shocks before converting.    The patient presently feels well with the exception of pleuritic left-sided chest pain.    The patient has been maintained on clopidogrel since receiving PCI in 2017.  He has been on rivaroxaban for history of DVT/PE.    No Known Allergies    Prior to Admission medications    Medication Sig Start Date End Date Taking? Authorizing Provider   acetaminophen (TYLENOL) 500 MG tablet Take 1,000 mg by mouth Every 6 (Six) Hours As Needed for Mild Pain .   Yes ProviderRadha MD   carvedilol (COREG) 12.5 MG tablet Take 12.5 mg by mouth 2 (Two) Times a Day With Meals.   Yes ProviderRadha MD   cholecalciferol (VITAMIN D3) 25 MCG (1000 UT) tablet Take 4,000 Units by mouth Daily.   Yes Provider, MD Radha   clopidogrel (PLAVIX) 75 MG tablet Take 75 mg by mouth Every Morning.   Yes Provider, MD Radha   eplerenone (INSPRA) 25 MG tablet Take 12.5 mg by mouth Every Morning.   Yes ProviderRadha MD   Farxiga 10 MG tablet TAKE  1 TABLET DAILY 5/3/22  Yes Bhupinder Gonzalez MD   finasteride (PROSCAR) 5 MG tablet Take 5 mg by mouth Every Morning.   Yes Radha Kuo MD   L-TRYPTOPHAN PO Take  by mouth Every Night. 3 TABLETS NIGHTLY-     1500 MG EACH   Yes Radha Kuo MD   levothyroxine (SYNTHROID, LEVOTHROID) 25 MCG tablet Take 25 mcg by mouth Every Morning.   Yes Radha Kuo MD   melatonin 5 MG tablet tablet Take 5 mg by mouth At Night As Needed. 2 tabs at night   Yes Radha Kou MD   metFORMIN (GLUCOPHAGE) 1000 MG tablet Take 1,000 mg by mouth 2 (two) times a day. 5/9/21  Yes Radha Kuo MD   Multiple Vitamins-Minerals (ICAPS AREDS 2 PO) Take 1 tablet by mouth 2 (two) times a day.   Yes Radha Kuo MD   Multiple Vitamins-Minerals (MULTIVITAMIN ADULTS 50+) tablet Take 1 tablet by mouth Daily.   Yes Radha Kuo MD   nitroglycerin (NITROSTAT) 0.4 MG SL tablet Place 1 tablet under the tongue Every 5 (Five) Minutes As Needed for Chest Pain. Take no more than 3 doses in 15 minutes. 1/16/20  Yes Bhupinder Gonzalez MD   pantoprazole (PROTONIX) 40 MG EC tablet Take 40 mg by mouth Every Morning.   Yes Radha Kuo MD   polyethylene glycol (MIRALAX) packet Take 17 g by mouth Every Other Day.   Yes Radha Kuo MD   rosuvastatin (CRESTOR) 40 MG tablet Take 40 mg by mouth Daily.   Yes Radha Kuo MD   sacubitril-valsartan (ENTRESTO) 49-51 MG tablet Take 1 tablet by mouth 2 (Two) Times a Day. 1/16/20  Yes Bhupinder Gonzalez MD   torsemide (DEMADEX) 5 MG tablet TAKE 1 TABLET DAILY  Patient taking differently: Take 5 mg by mouth Daily. 6/17/20  Yes Bhupinder Gonzalez MD   vitamin B-12 (CYANOCOBALAMIN) 1000 MCG tablet Take 1,000 mcg by mouth Daily.   Yes Radha Kuo MD   Xarelto 10 MG tablet TAKE 1 TABLET DAILY 11/29/21  Yes Bhupinder Gonzalez MD       Past Medical History:   Diagnosis Date   • Arthritis    • BPH (benign prostatic hypertrophy) 12/28/2016   • CAD  (coronary artery disease)    • Disease of thyroid gland    • Enlarged prostate    • HFrEF (heart failure with reduced ejection fraction) (HCC)    • History of heart attack     X3 ABOUT    • History of transfusion     NO REACTION RECALLED    • Hypertension    • IHD (ischemic heart disease) 12/28/2016   • LBBB (left bundle branch block) 12/28/2016   • Lung nodule    • Macular degeneration    • Polio     as a child   • Pulmonary embolism (HCC)    • Pulmonary emphysema (HCC) 12/28/2016   • Sepsis (HCC)     A. Secondary to [place] trauma with hematuria and urosepsis requiring hospitalization May 2015   • Sleep apnea with use of continuous positive airway pressure (CPAP)     CPAP- SETTING 4    • Ventricular tachycardia (HCC) 12/28/2016   • Wears glasses     READERS       Past Surgical History:   Procedure Laterality Date   • BIVENTRICULLAR IMPLANTABLE CARDIOVERTER DEFIBRILLATOR PLACEMENT     • CARDIAC CATHETERIZATION N/A 1/16/2017    Procedure: Left Heart Cath;  Surgeon: Diego Ayala MD;  Location:  [facility];  Service:    • CARDIAC DEFIBRILLATOR PLACEMENT       A. ICD generator change out with upgrade to BiV pacemaker implantable cardioverter    defibrillator, 12/17/2007. Ventricular fibrillation s/p pacesetter [place] ICD in [place] 09/2000   • CARDIAC ELECTROPHYSIOLOGY PROCEDURE N/A 10/2/2017    Procedure: BIV ICD  generator change [facility];  Surgeon: Ferdinand Alba MD;  Location:  [facility];  Service:    • CARDIAC ELECTROPHYSIOLOGY PROCEDURE N/A 7/6/2021    Procedure: BIV ICD generator change with [employer]. This will need to be done in late June or early July. Hold Xarelto one day prior.;  Surgeon: Ferdinand Alba MD;  Location:  [facility];  Service: Cardiology;  Laterality: N/A;   • CATARACT EXTRACTION      Bilateral    • COLONOSCOPY     • CORONARY ANGIOPLASTY WITH STENT PLACEMENT      X7 STENTS TOTAL    • CYSTOSCOPY URETERAL TUMOR FULGURATION  05/29/2015     A.  Status post cystoscopy with clot evacuation and fulguration of the prostate secondary to hematuria, 2015.   • FOOT SURGERY      RIGHT - POLIO RELATED    • PROSTATE SURGERY      A. Status post laser vaporization, 2009.   • ROOT CANAL     • TOOTH EXTRACTION         Family History   Problem Relation Age of Onset   • Heart failure Father    • Stroke Father        Social History     Tobacco Use   Smoking Status Former Smoker   • Packs/day: 2.00   • Years: 30.00   • Pack years: 60.00   • Types: Cigarettes   • Quit date: 1995   • Years since quittin.4   Smokeless Tobacco Never Used       Social History     Substance and Sexual Activity   Alcohol Use No         Review of Systems:   Review of Systems   Constitutional: Negative for malaise/fatigue.   Eyes: Negative for vision loss in left eye and vision loss in right eye.   Cardiovascular: Positive for chest pain. Negative for dyspnea on exertion, near-syncope, orthopnea, palpitations, paroxysmal nocturnal dyspnea and syncope.        From fall   Musculoskeletal: Negative for myalgias.        Shoulder pain from fall   Neurological: Negative for brief paralysis, excessive daytime sleepiness, focal weakness, numbness, paresthesias and weakness.   All other systems reviewed and are negative.           Vital Sign Min/Max for last 24 hours  Temp  Min: 98.4 °F (36.9 °C)  Max: 98.4 °F (36.9 °C)   BP  Min: 121/69  Max: 146/79   Pulse  Min: 69  Max: 70   Resp  Min: 18  Max: 20   SpO2  Min: 94 %  Max: 96 %   No data recorded    No intake or output data in the 24 hours ending 09/10/22 1429          Constitutional:       Appearance: Healthy appearance. Well-developed.   Eyes:      General: Lids are normal. No scleral icterus.     Conjunctiva/sclera: Conjunctivae normal.   HENT:      Head: Normocephalic and atraumatic.   Neck:      Thyroid: No thyromegaly.      Vascular: No carotid bruit or JVD.   Pulmonary:      Effort: Pulmonary effort is normal.      Breath sounds:  Normal breath sounds. No decreased breath sounds. No wheezing. No rhonchi. No rales.   Chest:      Chest wall: No deformity, swelling or crepitus.          Comments: Tenderness to palpation in left chest wall  Cardiovascular:      Normal rate. Regular rhythm.      Murmurs: There is no murmur.      No gallop. No rub.   Pulses:     Intact distal pulses.   Edema:     Peripheral edema absent.   Abdominal:      General: There is no distension.      Palpations: Abdomen is soft. There is no abdominal mass.   Musculoskeletal:      Cervical back: Normal range of motion. Skin:     General: Skin is warm and dry.      Findings: No rash.   Neurological:      General: No focal deficit present.      Mental Status: Alert and oriented to person, place, and time.      Gait: Gait is intact.   Psychiatric:         Attention and Perception: Attention normal.         Mood and Affect: Mood normal.         Behavior: Behavior normal.          DATA REVIEW:    [employer] dual-chamber pacemaker interrogated.  Patient had ventricular tachycardia which did not respond to 3 rounds of ATP.  2 shocks required to convert.  No other recent arrhythmia.  Device reprogrammed to only 1 round of ATP      Results from last 7 days   Lab Units 09/10/22  0921   SODIUM mmol/L 140   POTASSIUM mmol/L 3.6   CHLORIDE mmol/L 105   BUN mg/dL 20   CREATININE mg/dL 0.63*   MAGNESIUM mg/dL 1.9     Results from last 7 days   Lab Units 09/10/22  0921   TROPONIN T ng/mL <0.010     Results from last 7 days   Lab Units 09/10/22  0921   WBC 10*3/mm3 8.62   HEMOGLOBIN g/dL 12.5*   HEMATOCRIT % 38.7   PLATELETS 10*3/mm3 220     Lab Results   Component Value Date    HGBA1C 5.80 (H) 07/01/2021     Lab Results   Component Value Date    CHOL 130 08/14/2017    TRIG 70 08/14/2017    HDL 40 08/14/2017    LDL 77 08/14/2017    AST 44 (H) 09/10/2022    ALT 25 09/10/2022                Ventricular tachycardia  · First episode of VT since VT ablation 2015  · ATP ineffective and required 2  shocks on ICD  · ICD reprogrammed with less ATP attempts  · Discussed with Dr. Alab.  Does not feel ischemic evaluation needed in the absence of CHF/angina  · Start IV to oral amiodarone per protocol  · Echocardiogram    Chronic systolic heart failure  · Stage C HFrEF with stable NYHA class II symptoms  · On GDMT  · Obtain echocardiogram    Coronary artery disease  · No anginal symptoms  · Discontinue clopidogrel due to subdural  · Start low-dose aspirin  · Defer ischemic evaluation    Subdural hematoma  · Discussed with Dr. Bansal.  He is concerned of potential progression of SDH on antiplatelet and NOAC  · Discontinue NOAC  · Discontinue clopidogrel and start low-dose aspirin  · Repeat CT head at 1500 hrs. to evaluate progression of SDH  · Neuro ICU admission recommended by Dr. Bansal    Previous VTE  · Hold rivaroxaban due to SDH         · Admit to neuro ICU under intensivist service  · Amiodarone IV to oral per protocol  · Echocardiogram  · Discontinue clopidogrel.  Use aspirin 81 mg daily  · Discontinue rivaroxaban  · Continue GDMT for HFrEF  · ICD reprogrammed to reduce ATP duration      Electronically signed by Michele Flanagan IV, MD, 09/10/22, 2:06 PM EDT.

## 2022-09-10 NOTE — H&P
INTENSIVIST   INITIAL HOSPITAL VISIT NOTE     Hospital:  LOS: 0 days     Mr. Jonnie Roth, 77 y.o. male is seen for a Chief Complaint of:  Chief Complaint   Patient presents with   • Syncope       Ventricular fibrillation (HCC)    Coronary artery disease involving native coronary artery of native heart with angina pectoris (HCC)    Chronic systolic congestive heart failure (HCC)    Subdural hematoma, post-traumatic (HCC)    Elevated AST (SGOT)      Subjective   S     Mr. Roth is a 77-year-old male with past medical history of arthritis, BPH, CAD, thyroid disease, reduced ejection fraction heart failure, MI, hypertension, ischemic heart disease, left bundle branch block, lung nodule, pulmonary embolism, emphysema, sleep apnea with the use of CPAP, and ventricular tachycardia who presents to the ED on 9/10/2022 after syncopal episode.  Apparently he woke up feeling normal and went to the bathroom.  After finishing he was walking down the beltran towards his kitchen and began to feel dizzy and lightheaded.  He sat down in the kitchen and the next thing he remembers was being on the floor with his wife checking on him.  He did strike the left side of his forehead against the floor and also complains of left shoulder pain.  His wife notes that after he passed out he jerked once making her believe he got shocked by his internal pacemaker.    Vital signs on arrival: Temp 98.4, heart rate 70, respiratory rate 20, blood pressure 146/79, SPO2 94%.  Significant labs include glucose 116, BUN 20, creatinine 0.63, sodium 140, potassium 3.6, AST 44, ALT of 25, alk phos 52, total bili 0.4, albumin 4.2, WBCs 8.62, hemoglobin 12.5, platelets 220.    X-ray of left shoulder showed relatively advanced left shoulder joint degenerative disease and associated myositis ossificans.  No visible fracture.    Chest x-ray showed minimal bibasilar atelectasis.  No other evidence of active chest disease.    CT head shows small subdural  hematoma along the falx midline.    While in the ED he received amiodarone load and initiation of drip protocol.  Cardiology and neurosurgery have been consulted.  With need for every hour neurochecks he is being admitted to the ICU service for management of his critical illness.    I spent 10 minutes of time on this.  Merary Rojas, MSN, AGACNP-BC, APRN    Electronically signed by CHENTE Berry, 09/10/22, 3:12 PM EDT.         PMH: He  has a past medical history of Arthritis, BPH (benign prostatic hypertrophy) (12/28/2016), CAD (coronary artery disease), Disease of thyroid gland, Enlarged prostate, HFrEF (heart failure with reduced ejection fraction) ([facility]), History of heart attack, History of transfusion, Hypertension, IHD (ischemic heart disease) (12/28/2016), LBBB (left bundle branch block) (12/28/2016), Lung nodule, Macular degeneration, Polio, Pulmonary embolism ([facility]), Pulmonary emphysema ([facility]) (12/28/2016), Sepsis ([facility]), Sleep apnea with use of continuous positive airway pressure (CPAP), Ventricular tachycardia ([facility]) (12/28/2016), and Wears glasses.   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      Medications:  No current facility-administered medications on file prior to encounter.     Current Outpatient Medications on File Prior to Encounter   Medication Sig   • acetaminophen (TYLENOL) 500 MG tablet Take 1,000 mg by mouth Every 6 (Six) Hours As Needed for Mild Pain .   • carvedilol (COREG) 12.5 MG tablet Take 12.5 mg by mouth 2 (Two) Times a Day With Meals.   •  cholecalciferol (VITAMIN D3) 25 MCG (1000 UT) tablet Take 4,000 Units by mouth Daily.   • clopidogrel (PLAVIX) 75 MG tablet Take 75 mg by mouth Every Morning.   • eplerenone (INSPRA) 25 MG tablet Take 12.5 mg by mouth Every Morning.   • Farxiga 10 MG tablet TAKE 1 TABLET DAILY   • finasteride (PROSCAR) 5 MG tablet Take 5 mg by mouth Every Morning.   • L-TRYPTOPHAN PO Take  by mouth Every Night. 3 TABLETS NIGHTLY-     1500 MG EACH   • levothyroxine (SYNTHROID, LEVOTHROID) 25 MCG tablet Take 25 mcg by mouth Every Morning.   • melatonin 5 MG tablet tablet Take 5 mg by mouth At Night As Needed. 2 tabs at night   • metFORMIN (GLUCOPHAGE) 1000 MG tablet Take 1,000 mg by mouth 2 (two) times a day.   • Multiple Vitamins-Minerals (ICAPS AREDS 2 PO) Take 1 tablet by mouth 2 (two) times a day.   • Multiple Vitamins-Minerals (MULTIVITAMIN ADULTS 50+) tablet Take 1 tablet by mouth Daily.   • nitroglycerin (NITROSTAT) 0.4 MG SL tablet Place 1 tablet under the tongue Every 5 (Five) Minutes As Needed for Chest Pain. Take no more than 3 doses in 15 minutes.   • pantoprazole (PROTONIX) 40 MG EC tablet Take 40 mg by mouth Every Morning.   • polyethylene glycol (MIRALAX) packet Take 17 g by mouth Every Other Day.   • rosuvastatin (CRESTOR) 40 MG tablet Take 40 mg by mouth Daily.   • sacubitril-valsartan (ENTRESTO) 49-51 MG tablet Take 1 tablet by mouth 2 (Two) Times a Day.   • torsemide (DEMADEX) 5 MG tablet TAKE 1 TABLET DAILY (Patient taking differently: Take 5 mg by mouth Daily.)   • vitamin B-12 (CYANOCOBALAMIN) 1000 MCG tablet Take 1,000 mcg by mouth Daily.   • Xarelto 10 MG tablet TAKE 1 TABLET DAILY       Allergies: He has No Known Allergies.   FH: His family history includes Heart failure in his father; Stroke in his father.   SH: He  reports that he quit smoking about 27 years ago. His smoking use included cigarettes. He has a 60.00 pack-year smoking history. He has never used smokeless tobacco. He reports that he does not  drink alcohol and does not use drugs.     The patient's relevant past medical, surgical and social history were reviewed and updated in Epic as appropriate.     ROS (14):   Review of Systems   Constitutional: Negative.    HENT: Negative.    Eyes: Negative.    Respiratory: Negative.    Cardiovascular: Positive for chest pain.   Gastrointestinal: Negative.    Endocrine: Negative.    Genitourinary: Negative.    Musculoskeletal: Negative.    Skin: Negative.    Allergic/Immunologic: Negative.    Neurological: Positive for dizziness, syncope and light-headedness.   Hematological: Negative.    Psychiatric/Behavioral: Negative.        Objective   O     Vitals    Temp  Min: 98.4 °F (36.9 °C)  Max: 98.4 °F (36.9 °C)  BP  Min: 112/87  Max: 146/79  Pulse  Min: 69  Max: 70  Resp  Min: 18  Max: 20  SpO2  Min: 94 %  Max: 96 % No data recorded     I/O 24 hrs (7:00AM - 6:59 AM)  Intake/Output     None          Medications (drips):  amiodarone, Last Rate: 1 mg/min (09/10/22 1627)   Followed by  amiodarone        Physical Examination    Telemetry: Normal sinus rhythm.    Constitutional:  No acute distress.  Conversant. Sitting up in bed.    HEENT: Normocephalic and atraumatic.   Neck:  Normal range of motion. Neck supple.   No JVD present.   No thyromegaly.    Cardiovascular: Regular rate and rhythm.  No murmurs, rub or gallop.  No peripheral edema.   Respiratory: Normal respiratory effort.  Clear to ascultation.   Abdominal:  Soft. No masses.   Non-tender. No distension.   No hepatosplenomegaly.   MSK: Normal muscle strength and tone.   Extremities: No digital cyanosis or clubbing.   Skin: Skin is warm and dry.  No rashes, lesions or ulcers noted.    Neurological:   Alert and Oriented to person, place, and time.   Moves all extremities.   Psychiatric:  Normal affect.   Normal judgment.            Results from last 7 days   Lab Units 09/10/22  0921   WBC 10*3/mm3 8.62   HEMOGLOBIN g/dL 12.5*   MCV fL 88.0   PLATELETS 10*3/mm3 220      Results from last 7 days   Lab Units 09/10/22  0921   SODIUM mmol/L 140   POTASSIUM mmol/L 3.6   CO2 mmol/L 22.0   CREATININE mg/dL 0.63*   MAGNESIUM mg/dL 1.9     Estimated Creatinine Clearance: 116.6 mL/min (A) (by C-G formula based on SCr of 0.63 mg/dL (L)).  Results from last 7 days   Lab Units 09/10/22  0921   ALK PHOS U/L 52   BILIRUBIN mg/dL 0.4   ALT (SGPT) U/L 25   AST (SGOT) U/L 44*             Images:    Imaging Results (Last 24 Hours)     Procedure Component Value Units Date/Time    CT Head Without Contrast [[id number]] Collected: 09/10/22 1515     Updated: 09/10/22 1525    Narrative:      DATE OF EXAM: 9/10/2022 3:01 PM     PROCEDURE: CT HEAD WO CONTRAST-     INDICATIONS: Stroke, follow up     COMPARISON: 9/10/2022 11:02 AM     TECHNIQUE: Routine transaxial and coronal reconstruction images were  obtained through the head without the administration of contrast.  Automated exposure control and iterative reconstruction methods were  used.     The radiation dose reduction device was turned on for each scan per the  ALARA (As Low as Reasonably Achievable) protocol.     FINDINGS:  Previously noted small left parafalcine subdural hematoma is stable.  There is no evidence of new hemorrhage elsewhere, no evidence of mass  mass effect, edema or infarct. The calvarium appears intact. Chronic  left maxillary sinus disease is again noted.        Impression:      Stable small left parafalcine hematoma compared to 11:02 AM exam. No new  intracranial disease is seen.     This report was finalized on 9/10/2022 3:22 PM by Dr. Jono Vazquez MD.       CT Head Without Contrast [[id number]] Collected: 09/10/22 1119     Updated: 09/10/22 1125    Narrative:      CT HEAD WO CONTRAST-     Date of Exam: 9/10/2022 10:56 AM     Indication: fall/head injury/plavix& xarelto.     Comparison: 9/29/2015     Technique:  Contiguous axial CT images of the head were obtained without  contrast from skull base to vertex.  Coronal and  sagittal  reconstructions were performed.  Automated exposure control and  iterative reconstruction methods were used.       FINDINGS:  Brain/Ventricles: Small subdural hematoma along the falx measuring up to  3.5 mm. No acute intracranial hemorrhage is otherwise noted. There is a  mild degree of atrophy. Gray-white matter differentiation is maintained  with minimal decreased attenuation compatible with small vessel ischemic  disease in this age group.     Orbits: The visualized portion of the orbits demonstrate no acute  abnormality.     Sinuses:Mucosal thickening of the paranasal sinuses is noted.     Soft Tissues/Skull: Mild soft tissue contusion of the left frontal  calvarium. Underlying osseous structures are intact.       Impression:      Small subdural hematoma along the falx midline.     Findings were called to Willy Shaw who is caring for the patient at  the time of interpretation at 11:19 AM     This report was finalized on 9/10/2022 11:22 AM by Leif Ortiz.       XR Chest 1 View [[id number]] Collected: 09/10/22 1039     Updated: 09/10/22 1043    Narrative:      DATE OF EXAM: 9/10/2022 9:37 AM     PROCEDURE: XR CHEST 1 VW-     INDICATIONS: chest pain     COMPARISON: 1/11/2022     TECHNIQUE: Single radiographic AP view of the chest was obtained.     FINDINGS:  Left-sided triple lead ICD is again noted. The heart is upper normal  size. The vasculature appears normal. There is minimal bibasilar discoid  atelectasis. Lungs otherwise appear clear. There is bilateral shoulder  joint DJD.        Impression:      Minimal bibasilar atelectasis. No other evidence of active chest  disease.     This report was finalized on 9/10/2022 10:40 AM by Dr. Jono Vazquez MD.       XR Shoulder 2+ View Left [[id number]] Collected: 09/10/22 1037     Updated: 09/10/22 1042    Narrative:      DATE OF EXAM: 9/10/2022 9:37 AM     PROCEDURE: XR SHOULDER 2+ VW LEFT-     INDICATIONS: left shoulder pain s/p sycnope and fall      COMPARISON: No comparisons available.     TECHNIQUE: A minimum of two radiologic views of the left shoulder were  obtained.     FINDINGS:  Degenerative calcifications are noted superimposed over the humerus, the  largest of which, almost 3 cm in diameter is apparently extra-articular,  visible in retrospect on 3/15/2017 chest CT scan. The glenohumeral joint  shows moderately advanced changes of osteoarthritis, but humeral head  contour remains normally rounded. No destructive bony changes are seen.  No fracture or avulsion is appreciated. There is moderate AC joint DJD.        Impression:      Relatively advanced left shoulder joint DJD, and associated myositis  ossificans. No visible fracture.     This report was finalized on 9/10/2022 10:39 AM by Dr. Jono Vazquez MD.           ECG/EMG Results (last 24 hours)     Procedure Component Value Units Date/Time    ECG 12 Lead [[id number]] Collected: 09/10/22 0915     Updated: 09/10/22 0916     QT Interval 150 ms      QTC Interval 223 ms     Narrative:      Test Reason : Syncope triage protocol  Blood Pressure :   */*   mmHG  Vent. Rate : 133 BPM     Atrial Rate :  69 BPM     P-R Int :   * ms          QRS Dur : 154 ms      QT Int : 150 ms       P-R-T Axes :   * 172   0 degrees     QTc Int : 223 ms    Atrial fibrillation with rapid ventricular response with frequent AV dual-paced complexes  Indeterminate axis  Right bundle branch block  Possible Lateral infarct , age undetermined  Inferior infarct , age undetermined  Abnormal ECG  When compared with ECG of 15-YADY-2017 07:20,  Vent. rate has increased BY  57 BPM    Referred By: ED MD           Confirmed By:     ECG 12 Lead [[id number]] Collected: 09/10/22 1418     Updated: 09/10/22 1419     QT Interval 618 ms      QTC Interval 667 ms     Narrative:      Test Reason : Amiodarone Protocol  Blood Pressure :   */*   mmHG  Vent. Rate :  70 BPM     Atrial Rate :  69 BPM     P-R Int : 158 ms          QRS Dur : 174 ms      QT Int :  618 ms       P-R-T Axes :   *  -7 114 degrees     QTc Int : 667 ms    AV dual-paced rhythm  Abnormal ECG  When compared with ECG of 10-SEP-2022 09:15, (Unconfirmed)  Vent. rate has decreased BY  63 BPM    Referred By: ED MD           Confirmed By:           I reviewed the patient's new laboratory and imaging results.  I independently reviewed the patient's new images.    Assessment & Plan   A / P     Mr. Roth is a 76yo M with a history of chronic systolic heart failure, VT w/ ablation and ICD placement, and CAD who presented to the [facility] ED on 9/10/22 with a syncopal episode after a shock from his ICD. He did hit his left forehead against the floor. He was evaluated by Cardiology and his ICD was interrogated and noted to have delivered 1 shock for VT. CT head showed a small subdural hematoma along the falx midline. Neurosurgery was consulted and recommended ICU monitoring for neurologic change.       Nutrition:   No diet orders on file  Advance Directives:   Code Status and Medical Interventions:   Ordered at: 09/10/22 1349     Code Status (Patient has no pulse and is not breathing):    CPR (Attempt to Resuscitate)     Medical Interventions (Patient has pulse or is breathing):    Full Support         Ventricular fibrillation (HCC)    Coronary artery disease involving native coronary artery of native heart with angina pectoris (HCC)    Chronic systolic congestive heart failure (HCC)    Subdural hematoma, post-traumatic (HCC)    Elevated AST (SGOT)      Assessment / Plan:    1. Admit to Neuro ICU  2. Neuro Checks  3. Repeat CT head  4. Hold Plavix and Xarelto  5. ASA to continue  6. Amiodarone per Cardiology  7. SSI while inpatient  8. Tramadol PRN for left rib pain.   9. Incentive spirometer.   10. AM labs    High risk secondary to recent ventricular tachycardia with ICD shock and new subdural hemorrhage.    Plan of care and goals reviewed during interdisciplinary rounds.  I discussed the patient's findings and my  recommendations with patient, family and nursing staff      Jessica OG Case, DO  Pulmonary and Critical Care Medicine    Electronically signed by CHENTE Berry, 09/10/22, 2:58 PM EDT.

## 2022-09-10 NOTE — PLAN OF CARE
Asked to review directly by Dr. Flanagan small parafalcine subdural patient anticoagulated Xarelto and Plavix    Repeat hyperacute scan shows no evidence of immediate progression    Given extensive cardiovascular risk of reversal I think it is reasonable to monitor this patient nonsurgically    Would hold Xarelto Plavix till a.m. repeat CT scan    Needs every hour neurochecks if stable can expand to every 2 hours after 6 PM tonight

## 2022-09-10 NOTE — ED PROVIDER NOTES
"Subjective   PIT    Mr. Roth is a 77-year-old male who presents to the emergency department after a syncopal episode this morning.  The patient states that he woke up feeling fine and went to the bathroom to urinate.  After voiding, he was walking down toward his kitchen and began to feel dizzy and lightheaded.  He sat down at the kitchen island and the next thing he recalls, he was being attended to on the floor by his wife after passing out.  The patient struck the left forehead against the floor.  He also complains of some left shoulder pain.  The patient's wife states that shortly after he passed out, he jerked once and she believes he may have gotten shocked by his internal pacemaker/defibrillator.  The pt states he has some mild left chest \"soreness\".  No shortness of breath.  No nausea or vomiting.  The pt has a hx of ischemic cardiomyopathy, CAD, DM, HTN, CHF, KLD, GEO.  His electrophysiciologist is Dr. Alba.            Review of Systems   Constitutional: Negative for chills, diaphoresis and fever.   HENT: Negative for sore throat.    Respiratory: Negative for cough and shortness of breath.    Cardiovascular: Positive for chest pain. Negative for leg swelling.   Gastrointestinal: Negative for abdominal pain, diarrhea, nausea and vomiting.   Genitourinary: Negative for dysuria.   Musculoskeletal: Positive for arthralgias (left shoulder pain). Negative for back pain and neck pain.   Skin: Negative for wound.   Neurological: Positive for syncope and headaches.   Hematological: Bruises/bleeds easily (on Xarelto).   Psychiatric/Behavioral: Negative for confusion.       Past Medical History:   Diagnosis Date   • Abnormal CT scan, chest 12/28/2016   • Arthritis    • BPH (benign prostatic hypertrophy) 12/28/2016   • CAD (coronary artery disease)    • Congestive heart failure (HCC)    • Diabetes mellitus (HCC)     DOESNT CHECK SUGAR    • Disease of thyroid gland    • Dyslipidemia    • Edema    • Enlarged " prostate    • H/O chest x-ray 06/05/2015    Stable chest x ray with no evidence of pneumonia or other active disease   • H/O chest x-ray 05/23/2015    Negative chest, no active disease. Chronic findings are noted above   • H/O chest x-ray 05/11/2015    No acute chest patholgy, stable   • H/O echocardiogram 05/24/2015    Est EF 20-25%. Mid anteroseptal , mid anterior, mid anterolateral, mid inferospetal, apical septal , apical anterior, apical lateral and apical inferior wall segments are akinetic. All valves are structurally and functionally normal with no hemodynamically sig valve disease. RV cavity size normal   • History of heart attack     X3 ABOUT    • History of transfusion     NO REACTION RECALLED    • Hypertension    • IHD (ischemic heart disease) 12/28/2016   • LBBB (left bundle branch block) 12/28/2016   • Lung nodule    • Macular degeneration    • Polio     as a child   • Pulmonary embolism (HCC)    • Pulmonary emphysema (HCC) 12/28/2016   • Sepsis (HCC)     A. Secondary to [place] trauma with hematuria and urosepsis requiring hospitalization May 2015   • Sleep apnea with use of continuous positive airway pressure (CPAP)     CPAP- SETTING 4    • Ventricular dysrhythmia 12/28/2016   • Wears glasses     READERS       No Known Allergies    Past Surgical History:   Procedure Laterality Date   • BIVENTRICULLAR IMPLANTABLE CARDIOVERTER DEFIBRILLATOR PLACEMENT     • CARDIAC CATHETERIZATION N/A 1/16/2017    Procedure: Left Heart Cath;  Surgeon: Diego Ayala MD;  Location:  [facility];  Service:    • CARDIAC DEFIBRILLATOR PLACEMENT       A. ICD generator change out with upgrade to BiV pacemaker implantable cardioverter    defibrillator, 12/17/2007. Ventricular fibrillation s/p pacesetter [employer] ICD in [place]. [place] 09/2000   • CARDIAC ELECTROPHYSIOLOGY PROCEDURE N/A 10/2/2017    Procedure: BIV ICD  generator change SJ;  Surgeon: Ferdinand Alba MD;  Location:  [facility];  Service:     • CARDIAC ELECTROPHYSIOLOGY PROCEDURE N/A 2021    Procedure: BIV ICD generator change with [employer]. This will need to be done in late  or early July. Hold Xarelto one day prior.;  Surgeon: Ferdinand Alba MD;  Location: [facility] INVAS[facility] LOC[facility];  Service: Cardiology;  Laterality: N/A;   • CATARACT EXTRACTION      Bilateral    • COLONOSCOPY     • CORONARY ANGIOPLASTY WITH STENT PLACEMENT      X7 STENTS TOTAL    • CYSTOSCOPY URETERAL TUMOR FULGURATION  2015     A. Status post cystoscopy with clot evacuation and fulguration of the prostate secondary to hematuria, 2015.   • FOOT SURGERY      RIGHT - POLIO RELATED    • PROSTATE SURGERY      A. Status post laser vaporization, 2009.   • ROOT CANAL     • TOOTH EXTRACTION         Family History   Problem Relation Age of Onset   • Heart failure Father    • Stroke Father        Social History     Socioeconomic History   • Marital status:    Tobacco Use   • Smoking status: Former Smoker     Packs/day: 2.00     Years: 30.00     Pack years: 60.00     Types: Cigarettes     Quit date: 1995     Years since quittin.4   • Smokeless tobacco: Never Used   Vaping Use   • Vaping Use: Never used   Substance and Sexual Activity   • Alcohol use: No   • Drug use: No   • Sexual activity: Defer           Objective   Physical Exam  Constitutional:       General: He is not in acute distress.  HENT:      Head:      Comments: Bruising left forehead secondary to contusion       Right Ear: External ear normal.      Left Ear: External ear normal.      Nose: Nose normal.      Mouth/Throat:      Mouth: Mucous membranes are moist.   Eyes:      General: No scleral icterus.     Conjunctiva/sclera: Conjunctivae normal.      Pupils: Pupils are equal, round, and reactive to light.   Cardiovascular:      Rate and Rhythm: Normal rate and regular rhythm.      Pulses: Normal pulses.      Heart sounds: Normal heart sounds.   Pulmonary:      Effort: Pulmonary effort  is normal. No respiratory distress.      Breath sounds: Normal breath sounds.   Chest:      Chest wall: No tenderness.   Abdominal:      General: Bowel sounds are normal.      Tenderness: There is no abdominal tenderness. There is no guarding.   Musculoskeletal:      Cervical back: Normal range of motion and neck supple.      Comments: Moderate tenderness left anterior shoulder.  Some increased pain on ROM.  No deformity.     Skin:     General: Skin is warm and dry.   Neurological:      General: No focal deficit present.      Mental Status: He is alert and oriented to person, place, and time.   Psychiatric:         Mood and Affect: Mood normal.         Critical Care  Performed by: Willy Shaw PA  Authorized by: Yamil Quinn MD     Critical care provider statement:     Critical care time (minutes):  60    Critical care time was exclusive of:  Separately billable procedures and treating other patients    Critical care was necessary to treat or prevent imminent or life-threatening deterioration of the following conditions: traumatic subdural hematoma;  syncope, AICD discharge for VT.    Critical care was time spent personally by me on the following activities:  Development of treatment plan with patient or surrogate, discussions with consultants, examination of patient, obtaining history from patient or surrogate, ordering and performing treatments and interventions, ordering and review of laboratory studies, ordering and review of radiographic studies, pulse oximetry, re-evaluation of patient's condition and review of old charts               ED Course      Labs and EKG ordered.  CXR and left shoulder xray obtained.  AICD interrogated.      Labs are bland.  No electrolyte abnormality.  Normal troponin.      EKG shows paced rhythm with rate around 70 BPM.    His AICD was interrogated and the pacemaker rep came in and spoke with me about it.  He states the pt went into V-tach and the device attempted to pace  him without success for 7 times before he accelerated into VF and was successfully defibrillated.  This occurrred two separate times this morning.      CT head:  Small subdural hematoma along the falx midline.       Left shoulder xray:  Relatively advanced left shoulder joint DJD, and associated myositis  ossificans. No visible fracture    CXR:  Minimal bibasilar atelectasis. No other evidence of active chest  Disease.    I spoke with Dr. Flanagan (cardiology) who advised he would come down and see the pt in the ED and admit him.      I also spoke with Liam Shaw PA-C, who felt that he needs a repeat CT head in the morning but no other acute intervention from a neurosurgical standpoint.      14:27 EDT  I was updated by Dr. Flanagan who has now seen the pt and reprogrammed his AICD and ordered amiodarone.  He spoke with Dr. Bansal, who expressed a bit more concern about his subdural and would like me to speak directly to Dr Bansal and plan to admit pt to neuro ICU.    14:27 EDT  I spoke with Dr. Bansal by phone.  He advised to hold his anticoagulants and to get repeat head CT in 2 hours and to place in setting where he would get q 1 hr neuro checks and if he is stable by 6 pm this evening, could step down to floor bed.  He advised Liam Shaw will manage his orders, etc.                                                          MDM    Final diagnoses:   Syncope and collapse   Ventricular tachycardia (HCC)   AICD discharge   Traumatic subdural hematoma with loss of consciousness, initial encounter (HCC)   Anticoagulated       ED Disposition  ED Disposition     ED Disposition   Decision to Admit    Condition   --    Comment   Level of Care: Critical Care [6]   Diagnosis: Ventricular fibrillation (HCC) [427.41.ICD-9-CM]   Admitting Physician: ROCHELLE FLANAGAN IV [[id number]]   Certification: I Certify That Inpatient Hospital Services Are Medically Necessary For Greater Than 2 Midnights               No follow-up  provider specified.       Medication List      No changes were made to your prescriptions during this visit.          Willy Shaw PA  09/10/22 [id number]

## 2022-09-11 ENCOUNTER — APPOINTMENT (OUTPATIENT)
Dept: CT IMAGING | Facility: HOSPITAL | Age: 77
End: 2022-09-11

## 2022-09-11 LAB
ALBUMIN SERPL-MCNC: 3.9 G/DL (ref 3.5–5.2)
ALBUMIN/GLOB SERPL: 1.8 G/DL
ALP SERPL-CCNC: 51 U/L (ref 39–117)
ALT SERPL W P-5'-P-CCNC: 20 U/L (ref 1–41)
ANION GAP SERPL CALCULATED.3IONS-SCNC: 9 MMOL/L (ref 5–15)
AST SERPL-CCNC: 28 U/L (ref 1–40)
BILIRUB SERPL-MCNC: 0.5 MG/DL (ref 0–1.2)
BUN SERPL-MCNC: 15 MG/DL (ref 8–23)
BUN/CREAT SERPL: 22.1 (ref 7–25)
CALCIUM SPEC-SCNC: 9 MG/DL (ref 8.6–10.5)
CHLORIDE SERPL-SCNC: 110 MMOL/L (ref 98–107)
CO2 SERPL-SCNC: 24 MMOL/L (ref 22–29)
CREAT SERPL-MCNC: 0.68 MG/DL (ref 0.76–1.27)
DEPRECATED RDW RBC AUTO: 48.1 FL (ref 37–54)
EGFRCR SERPLBLD CKD-EPI 2021: 95.1 ML/MIN/1.73
ERYTHROCYTE [DISTWIDTH] IN BLOOD BY AUTOMATED COUNT: 14.6 % (ref 12.3–15.4)
GLOBULIN UR ELPH-MCNC: 2.2 GM/DL
GLUCOSE BLDC GLUCOMTR-MCNC: 123 MG/DL (ref 70–130)
GLUCOSE BLDC GLUCOMTR-MCNC: 129 MG/DL (ref 70–130)
GLUCOSE BLDC GLUCOMTR-MCNC: 95 MG/DL (ref 70–130)
GLUCOSE SERPL-MCNC: 99 MG/DL (ref 65–99)
HCT VFR BLD AUTO: 38.3 % (ref 37.5–51)
HGB BLD-MCNC: 12.3 G/DL (ref 13–17.7)
MAGNESIUM SERPL-MCNC: 2.1 MG/DL (ref 1.6–2.4)
MCH RBC QN AUTO: 28.5 PG (ref 26.6–33)
MCHC RBC AUTO-ENTMCNC: 32.1 G/DL (ref 31.5–35.7)
MCV RBC AUTO: 88.7 FL (ref 79–97)
PLATELET # BLD AUTO: 206 10*3/MM3 (ref 140–450)
PMV BLD AUTO: 10.5 FL (ref 6–12)
POTASSIUM SERPL-SCNC: 4.3 MMOL/L (ref 3.5–5.2)
PROT SERPL-MCNC: 6.1 G/DL (ref 6–8.5)
QT INTERVAL: 150 MS
QTC INTERVAL: 223 MS
RBC # BLD AUTO: 4.32 10*6/MM3 (ref 4.14–5.8)
SODIUM SERPL-SCNC: 143 MMOL/L (ref 136–145)
WBC NRBC COR # BLD: 9.15 10*3/MM3 (ref 3.4–10.8)

## 2022-09-11 PROCEDURE — 82962 GLUCOSE BLOOD TEST: CPT

## 2022-09-11 PROCEDURE — 80053 COMPREHEN METABOLIC PANEL: CPT | Performed by: INTERNAL MEDICINE

## 2022-09-11 PROCEDURE — 93005 ELECTROCARDIOGRAM TRACING: CPT | Performed by: INTERNAL MEDICINE

## 2022-09-11 PROCEDURE — 99222 1ST HOSP IP/OBS MODERATE 55: CPT | Performed by: PHYSICIAN ASSISTANT

## 2022-09-11 PROCEDURE — 85027 COMPLETE CBC AUTOMATED: CPT | Performed by: INTERNAL MEDICINE

## 2022-09-11 PROCEDURE — 93010 ELECTROCARDIOGRAM REPORT: CPT | Performed by: INTERNAL MEDICINE

## 2022-09-11 PROCEDURE — 99232 SBSQ HOSP IP/OBS MODERATE 35: CPT | Performed by: NURSE PRACTITIONER

## 2022-09-11 PROCEDURE — 25010000002 AMIODARONE IN DEXTROSE 5% 360-4.14 MG/200ML-% SOLUTION: Performed by: INTERNAL MEDICINE

## 2022-09-11 PROCEDURE — 83735 ASSAY OF MAGNESIUM: CPT | Performed by: INTERNAL MEDICINE

## 2022-09-11 PROCEDURE — 70450 CT HEAD/BRAIN W/O DYE: CPT

## 2022-09-11 RX ADMIN — ASPIRIN 81 MG: 81 TABLET, COATED ORAL at 08:07

## 2022-09-11 RX ADMIN — TORSEMIDE 5 MG: 10 TABLET ORAL at 08:06

## 2022-09-11 RX ADMIN — FINASTERIDE 5 MG: 5 TABLET, FILM COATED ORAL at 08:09

## 2022-09-11 RX ADMIN — SACUBITRIL AND VALSARTAN 1 TABLET: 49; 51 TABLET, FILM COATED ORAL at 08:07

## 2022-09-11 RX ADMIN — AMIODARONE HYDROCHLORIDE 0.5 MG/MIN: 1.8 INJECTION, SOLUTION INTRAVENOUS at 10:53

## 2022-09-11 RX ADMIN — CARVEDILOL 12.5 MG: 12.5 TABLET, FILM COATED ORAL at 20:49

## 2022-09-11 RX ADMIN — EPLERENONE 12.5 MG: 25 TABLET, FILM COATED ORAL at 08:07

## 2022-09-11 RX ADMIN — EMPAGLIFLOZIN 10 MG: 10 TABLET, FILM COATED ORAL at 08:07

## 2022-09-11 RX ADMIN — CARVEDILOL 12.5 MG: 12.5 TABLET, FILM COATED ORAL at 08:07

## 2022-09-11 RX ADMIN — AMIODARONE HYDROCHLORIDE 200 MG: 200 TABLET ORAL at 16:06

## 2022-09-11 RX ADMIN — LEVOTHYROXINE SODIUM 25 MCG: 25 TABLET ORAL at 06:02

## 2022-09-11 RX ADMIN — SACUBITRIL AND VALSARTAN 1 TABLET: 49; 51 TABLET, FILM COATED ORAL at 20:49

## 2022-09-11 RX ADMIN — PANTOPRAZOLE SODIUM 40 MG: 40 TABLET, DELAYED RELEASE ORAL at 08:07

## 2022-09-11 RX ADMIN — ROSUVASTATIN CALCIUM 40 MG: 20 TABLET, FILM COATED ORAL at 20:49

## 2022-09-11 RX ADMIN — ACETAMINOPHEN 1000 MG: 500 TABLET, FILM COATED ORAL at 02:06

## 2022-09-11 NOTE — PLAN OF CARE
OK TO TRANSFER TO FLOOR ETC IF CT HEAD NO CHANGES THIS AM     CAN RESUME ANTICOAG IF SCAN STABLE. WOULD PREFER TO HOLD ANTIPLATLETS FOR 72 HOURS DEPENDING ON CARDIAC RISK ETC.

## 2022-09-11 NOTE — NURSING NOTE
Patient rested well overnight, q2 neuro checks negative. Patient complains of left shoulder pain that is controlled with tylenol. VSS, adequate urine output per urinal.

## 2022-09-11 NOTE — PROGRESS NOTES
Cardiology Progress Note      Reason for visit:    · VT status post ICD shocks    IDENTIFICATION: 77-year-old gentleman who resides in [place], [place]    Active Hospital Problems    Diagnosis  POA   • **Ventricular fibrillation (HCC) [I49.01]  Yes     Priority: High     · Sudden cardiac death with primary VF status post ICD, September 2020  · Multiple ICD discharges for VF/flutter/tachycardia  · Amiodarone therapy initiated 2012 and 2015  · EP study with radiofrequency ablation of large LV scar, 5/21/2015  · [facility] admission for recurrent VT/VF status post shock after ineffective ATP, 9/10/2022     • Subdural hematoma, post-traumatic ([facility]) [S06.5X9A]  Yes     Priority: High     · Subdural hematoma following fall due to ventricular arrhythmia, 9/10/2022     • Coronary artery disease involving native coronary artery of native heart with angina pectoris ([facility]) [I25.119]  Yes     Priority: High     · Cardiac catheterization for anterior MI (4/1995):  PCI of the proximal/mid LAD, 4/1995.  LVEF 35%  · Cardiac catheterization for NSTEMI (2/1/2014): Moderate RCA stenosis.  Widely patent LAD stents.  LVEF 25%.  · Cardiac catheterization by Dr. Ayala (4/20/2015): PCI of distal LAD.  · Cardiac catheterization (1/16/2017): Nonobstructive CAD.  LVEF <20%.  Normal hemodynamics.     • Chronic systolic congestive heart failure (HCC) [I50.22]  Yes     Priority: High     · Large anterior MI with LVEF 35%, 1995  · Echo (11/8/2019): LVEF 30%.  Akinesis/dyskinesis of the mid to distal anterior wall and apex.  · Echo (9/10/2022): LVEF 14%.  LV hypokinetic              No more VT or ICD shocks overnight.  Patient is sitting up in the chair without complaints.  He denies any chest pain or shortness of breath.  Echocardiogram performed yesterday showed worsening of his LVEF from previously 30% to currently 14%.           Vital Sign Min/Max for last 24 hours  Temp  Min: 97.3 °F (36.3 °C)  Max: 98.4 °F (36.9 °C)   BP  Min: 98/62   Max: 161/92   Pulse  Min: 68  Max: 102   Resp  Min: 12  Max: 20   SpO2  Min: 91 %  Max: 96 %   No data recorded      Intake/Output Summary (Last 24 hours) at 9/11/2022 0835  Last data filed at 9/11/2022 0200  Gross per 24 hour   Intake 464.2 ml   Output 1850 ml   Net -1385.8 ml           Physical Exam  Constitutional:       Appearance: He is well-developed.   HENT:      Head: Normocephalic.   Neck:      Vascular: No carotid bruit or JVD.   Cardiovascular:      Rate and Rhythm: Normal rate and regular rhythm.      Heart sounds: Normal heart sounds. No murmur heard.    No friction rub. No gallop.      Comments: Dual paced  Pulmonary:      Effort: Pulmonary effort is normal.      Breath sounds: Normal breath sounds.   Abdominal:      General: Bowel sounds are normal. There is no distension.      Palpations: Abdomen is soft.   Skin:     General: Skin is warm and dry.   Neurological:      Mental Status: He is alert and oriented to person, place, and time.         Tele: Paced    Results Review (reviewed the patient's recent labs in the electronic medical record):     [employer] dual-chamber pacemaker interrogated.  Patient had ventricular tachycardia which did not respond to 3 rounds of ATP.  2 shocks required to convert.  No other recent arrhythmia.  Device reprogrammed to only 1 round of ATP    EKG (9/11/2022): Dual paced    CXR (9/10/2022): Minimal bibasilar atelectasis.  No evidence of active chest disease    ECHO (9/10/2022): LVEF 14%.  (Previously 30% in 2019) LV wall segments hypokinetic    Results from last 7 days   Lab Units 09/11/22  0202 09/10/22  1633 09/10/22  0921   SODIUM mmol/L 143  --  140   POTASSIUM mmol/L 4.3  --  3.6   CHLORIDE mmol/L 110*  --  105   BUN mg/dL 15  --  20   CREATININE mg/dL 0.68*  --  0.63*   MAGNESIUM mg/dL 2.1 2.0 1.9     Results from last 7 days   Lab Units 09/10/22  0921   TROPONIN T ng/mL <0.010     Results from last 7 days   Lab Units 09/11/22  0202 09/10/22  1633 09/10/22  0921    WBC 10*3/mm3 9.15 9.71 8.62   HEMOGLOBIN g/dL 12.3* 12.3* 12.5*   HEMATOCRIT % 38.3 37.6 38.7   PLATELETS 10*3/mm3 206 205 220       Lab Results   Component Value Date    HGBA1C 5.90 (H) 09/10/2022       Lab Results   Component Value Date    CHOL 116 09/10/2022    CHLPL 116 01/30/2014    TRIG 111 09/10/2022    HDL 43 09/10/2022    LDL 53 09/10/2022                 Ventricular tachycardia status post ICD shocks  · First episode of VT since VT ablation 2015  · ATP ineffective and required 2 shocks on ICD  · ICD reprogrammed with less ATP attempts  · Discussed with Dr. Alba.  Does not feel ischemic evaluation needed in the absence of CHF/angina  · IV amiodarone     Chronic systolic heart failure  · Stage C HFrEF with stable NYHA class II symptoms  · Coreg 12.5 mg twice daily  · Inspra 12.5 mg daily  · Jardiance 10 mg daily  · Entresto 49/51 mg 1 tablet twice daily  · Torsemide 5 mg daily  · Echo shows worsening LVEF from previously 30% in 2019 to currently 14%     Coronary artery disease  · No anginal symptoms  · Defer Plavix due to subdural hematoma  · Aspirin 81 mg daily       Subdural hematoma  · Discussed with Dr. Bansal.  He is concerned of potential progression of SDH on antiplatelet and NOAC  · Discontinue NOAC  · Defer Plavix but continue aspirin 81 mg daily  · Repeat CT head shows stable small left hematoma     Previous VTE  · Hold rivaroxaban due to SDH               · Continue IV amiodarone  · Dr. Alba to assess in the a.m.  · Continue holding Xarelto and Plavix due to subdural hematoma    Electronically signed by CHENTE Rodriguez, 09/11/22, 7:37 AM EDT.

## 2022-09-11 NOTE — CONSULTS
Consults    Referring Provider: Panchito FLORIAN    Patient Care Team:  Brian Lewis MD as PCP - General (Internal Medicine)  Ferdinand Alba MD as Consulting Physician (Cardiac Electrophysiology)    Chief Complaint: Fall/status post ICD shock    Subjective .     History of present illness:       Patient is a nice 77-year-old gentleman has a reduced ejection fraction history of MI hypertension and ischemic heart disease with left bundle branch block with a history of V. tach presented to the ED on 9/10/2020 after syncopal episode.  Patient was shocked with ICD and unfortunately hit his head.  Patient had a tiny subdural hematoma in the midline of the falx.    Secondary to that neurosurgery was consulted.    Repeat CT scan was done today patient is not really complaining of any issues headache is gone.    Repeat CT shows some decrease in size in the parafalcine subdural hematoma.    Dr. Bansal is recommended resumption of anticoagulants but with holding Plavix for another 72 hours as long as cardiac risk is amenable.    Patient is being transferred to floor    Review of Systems   Constitutional: Negative for activity change, appetite change, chills, fatigue and fever.   HENT: Negative for congestion, dental problem, ear pain, hearing loss, sinus pressure and tinnitus.    Eyes: Negative for pain and redness.   Respiratory: Negative for apnea, cough, shortness of breath and wheezing.    Cardiovascular: Negative for chest pain, palpitations and leg swelling.   Gastrointestinal: Negative for abdominal distention, abdominal pain, blood in stool, constipation, diarrhea, nausea and vomiting.   Endocrine: Negative for cold intolerance, heat intolerance and polyuria.   Genitourinary: Negative for enuresis, frequency and urgency.   Skin: Negative for color change and rash.   Neurological: Positive for headaches. Negative for dizziness, tremors, seizures, syncope, speech difficulty, weakness, light-headedness and  Routing refill request to provider for review/approval because:  Labs out of range:  LDL  Labs not current:  LDL    RUTH ManzanaresN, RN  [facility]       numbness.   Psychiatric/Behavioral: Negative for behavioral problems and confusion. The patient is not nervous/anxious.        History  Past Medical History:   Diagnosis Date   • Arthritis    • BPH (benign prostatic hypertrophy) 12/28/2016   • CAD (coronary artery disease)    • Disease of thyroid gland    • Enlarged prostate    • HFrEF (heart failure with reduced ejection fraction) (HCC)    • History of heart attack     X3 ABOUT    • History of transfusion     NO REACTION RECALLED    • Hypertension    • IHD (ischemic heart disease) 12/28/2016   • LBBB (left bundle branch block) 12/28/2016   • Lung nodule    • Macular degeneration    • Polio     as a child   • Pulmonary embolism (HCC)    • Pulmonary emphysema (HCC) 12/28/2016   • Sepsis (HCC)     A. Secondary to [place] trauma with hematuria and urosepsis requiring hospitalization May 2015   • Sleep apnea with use of continuous positive airway pressure (CPAP)     CPAP- SETTING 4    • Ventricular tachycardia (HCC) 12/28/2016   • Wears glasses     READERS   ,   Past Surgical History:   Procedure Laterality Date   • BIVENTRICULLAR IMPLANTABLE CARDIOVERTER DEFIBRILLATOR PLACEMENT     • CARDIAC CATHETERIZATION N/A 1/16/2017    Procedure: Left Heart Cath;  Surgeon: Diego Ayala MD;  Location:  [facility];  Service:    • CARDIAC DEFIBRILLATOR PLACEMENT       A. ICD generator change out with upgrade to BiV pacemaker implantable cardioverter    defibrillator, 12/17/2007. Ventricular fibrillation s/p pacesetter [place] ICD in [place]. [place] 09/2000   • CARDIAC ELECTROPHYSIOLOGY PROCEDURE N/A 10/2/2017    Procedure: BIV ICD  generator change [facility];  Surgeon: Ferdinand Alba MD;  Location:  [facility];  Service:    • CARDIAC ELECTROPHYSIOLOGY PROCEDURE N/A 7/6/2021    Procedure: BIV ICD generator change with [employer]. This will need to be done in late June or early July. Hold Xarelto one day prior.;  Surgeon: Ferdinand Alba MD;  Location:  [facility]  EP INVASIVE LOCATION;  Service: Cardiology;  Laterality: N/A;   • CATARACT EXTRACTION      Bilateral    • COLONOSCOPY     • CORONARY ANGIOPLASTY WITH STENT PLACEMENT      X7 STENTS TOTAL    • CYSTOSCOPY URETERAL TUMOR FULGURATION  2015     A. Status post cystoscopy with clot evacuation and fulguration of the prostate secondary to hematuria, 2015.   • FOOT SURGERY      RIGHT - POLIO RELATED    • PROSTATE SURGERY      A. Status post laser vaporization, 2009.   • ROOT CANAL     • TOOTH EXTRACTION     ,   Family History   Problem Relation Age of Onset   • Heart failure Father    • Stroke Father    ,   Social History     Socioeconomic History   • Marital status:    Tobacco Use   • Smoking status: Former Smoker     Packs/day: 2.00     Years: 30.00     Pack years: 60.00     Types: Cigarettes     Quit date: 1995     Years since quittin.4   • Smokeless tobacco: Never Used   Vaping Use   • Vaping Use: Never used   Substance and Sexual Activity   • Alcohol use: No   • Drug use: No   • Sexual activity: Defer     E-cigarette/Vaping   • E-cigarette/Vaping Use Never User      E-cigarette/Vaping Substances     E-cigarette/Vaping Devices       ,   Medications Prior to Admission   Medication Sig Dispense Refill Last Dose   • acetaminophen (TYLENOL) 500 MG tablet Take 1,000 mg by mouth Every 6 (Six) Hours As Needed for Mild Pain .      • carvedilol (COREG) 12.5 MG tablet Take 12.5 mg by mouth 2 (Two) Times a Day With Meals.      • cholecalciferol (VITAMIN D3) 25 MCG (1000 UT) tablet Take 4,000 Units by mouth Daily.      • clopidogrel (PLAVIX) 75 MG tablet Take 75 mg by mouth Every Morning.      • eplerenone (INSPRA) 25 MG tablet Take 12.5 mg by mouth Every Morning.      • Farxiga 10 MG tablet TAKE 1 TABLET DAILY 90 tablet 3    • finasteride (PROSCAR) 5 MG tablet Take 5 mg by mouth Every Morning.      • L-TRYPTOPHAN PO Take  by mouth Every Night. 3 TABLETS NIGHTLY-     1500 MG EACH      • levothyroxine  (SYNTHROID, LEVOTHROID) 25 MCG tablet Take 25 mcg by mouth Every Morning.      • melatonin 5 MG tablet tablet Take 5 mg by mouth At Night As Needed. 2 tabs at night      • metFORMIN (GLUCOPHAGE) 1000 MG tablet Take 1,000 mg by mouth 2 (two) times a day.      • Multiple Vitamins-Minerals (ICAPS AREDS 2 PO) Take 1 tablet by mouth 2 (two) times a day.      • Multiple Vitamins-Minerals (MULTIVITAMIN ADULTS 50+) tablet Take 1 tablet by mouth Daily.      • nitroglycerin (NITROSTAT) 0.4 MG SL tablet Place 1 tablet under the tongue Every 5 (Five) Minutes As Needed for Chest Pain. Take no more than 3 doses in 15 minutes. 30 tablet 11    • pantoprazole (PROTONIX) 40 MG EC tablet Take 40 mg by mouth Every Morning.      • polyethylene glycol (MIRALAX) packet Take 17 g by mouth Every Other Day.      • rosuvastatin (CRESTOR) 40 MG tablet Take 40 mg by mouth Daily.      • sacubitril-valsartan (ENTRESTO) 49-51 MG tablet Take 1 tablet by mouth 2 (Two) Times a Day. 180 tablet 3    • torsemide (DEMADEX) 5 MG tablet TAKE 1 TABLET DAILY (Patient taking differently: Take 5 mg by mouth Daily.) 90 tablet 1    • vitamin B-12 (CYANOCOBALAMIN) 1000 MCG tablet Take 1,000 mcg by mouth Daily.      • Xarelto 10 MG tablet TAKE 1 TABLET DAILY 90 tablet 3    , Scheduled Meds:  amiodarone, 200 mg, Oral, Once   Followed by  amiodarone, 200 mg, Oral, Q8H   Followed by  [START ON 9/18/2022] amiodarone, 200 mg, Oral, Q12H   Followed by  [START ON 10/3/2022] amiodarone, 200 mg, Oral, Daily  aspirin, 81 mg, Oral, Daily  carvedilol, 12.5 mg, Oral, BID  empagliflozin, 10 mg, Oral, Daily  eplerenone, 12.5 mg, Oral, Daily  finasteride, 5 mg, Oral, Daily  insulin lispro, 0-7 Units, Subcutaneous, TID With Meals  levothyroxine, 25 mcg, Oral, Q AM  pantoprazole, 40 mg, Oral, QAM AC  polyethylene glycol, 17 g, Oral, Every Other Day  rosuvastatin, 40 mg, Oral, Nightly  sacubitril-valsartan, 1 tablet, Oral, BID  torsemide, 5 mg, Oral, Daily    , Continuous Infusions:   amiodarone, 0.5 mg/min, Last Rate: 0.5 mg/min (09/11/22 1053)    , PRN Meds:  •  acetaminophen  •  dextrose  •  dextrose  •  glucagon (human recombinant)  •  magnesium sulfate **OR** magnesium sulfate **OR** magnesium sulfate  •  melatonin  •  nitroglycerin  •  potassium chloride  •  potassium chloride  •  sodium chloride  •  traMADol and Allergies:  Patient has no known allergies.   SMOKING STATUS: Non-smoker  Objective     Vital Signs   Temp:  [96.5 °F (35.8 °C)-97.9 °F (36.6 °C)] 96.5 °F (35.8 °C)  Heart Rate:  [] 70  Resp:  [12-18] 14  BP: ()/(58-92) 135/76  Body mass index is 24.84 kg/m².    Physical Exam:     Body mass index is 24.84 kg/m².    GENERAL:           The patient is in no acute distress, and is able to answer all questions appropriately.    Neck:          Supple without lymphadenopathy    Cardiovascular:       Peripheral pulses 2+ at dorsalis pedis and posterior tibialis    Lungs:         Breathing unlabored    Musculoskeletal:            strength is 5 out of 5 bilaterally.        Shoulder abduction is 5 out of 5.         Dorsiflexion is 5/5 Bilaterally       Plantarflexion is 5/5 bilaterally       Hip Flexion 5/5 bilaterally.      Neurologic:          The patient is alert and oriented by 3.          Pupils are equal and reactive to light.         Visual fields are full.         Extraocular movements are intact without nystagmus.         There is no evidence of central motor drift. No facial droop.  No difficulty with rapid alternating movements.         Sensation is equal bilaterally with no deficit.           Reflexes:  2+ through out    CRANIAL NERVES:         Cranial nerve II: Visual fields are full to confrontation.       Cranial nerves III, IV and VI: PERRLA DC.  Extraocular movements are intact.  Nystagmus is not present.       Cranial nerve V: Facial sensation is intact to light touch.       Cranial nerve VII: Muscles of facial expression revealed no asymmetry.       Cranial  nerve VIII: Hearing is intact to finger rub bilaterally.       Cranial nerve IX and X: Palate elevates symmetrically.        Cranial nerve XI: Shoulder shrug is intact.       Cranial nerve XII: Tongue is midline without evidence of Atrophy or fasciculation.      Results Review:   I reviewed the patient's new imaging results and agree with the interpretation.  Initial CT was compared with repeat decrease in size of the parafalcine subdural hematoma gives us reassurance that bleed has ceased.    Assessment & Plan       Ventricular fibrillation (HCC)    Coronary artery disease involving native coronary artery of native heart with angina pectoris (HCC)    Chronic systolic congestive heart failure (HCC)    Subdural hematoma, post-traumatic (HCC)      From neurosurgical perspective patient is okay to restart his anticoagulant now.  We would prefer Plavix to be held for another 72 hours if okay from cardiology standpoint.    From neurosurgical perspective patient can follow-up in outpatient setting in 2 weeks with a repeat CT for further follow-up on the subdural.     I discussed the patients findings and my recommendations with patient, family and consulting provider    Liam Shaw PA-C  09/11/22  11:22 EDT    Time: 60 minutes

## 2022-09-12 LAB
ALBUMIN SERPL-MCNC: 4.2 G/DL (ref 3.5–5.2)
ALBUMIN/GLOB SERPL: 2.2 G/DL
ALP SERPL-CCNC: 63 U/L (ref 39–117)
ALT SERPL W P-5'-P-CCNC: 19 U/L (ref 1–41)
ANION GAP SERPL CALCULATED.3IONS-SCNC: 12 MMOL/L (ref 5–15)
AST SERPL-CCNC: 19 U/L (ref 1–40)
BILIRUB SERPL-MCNC: 0.7 MG/DL (ref 0–1.2)
BUN SERPL-MCNC: 13 MG/DL (ref 8–23)
BUN/CREAT SERPL: 18.1 (ref 7–25)
CALCIUM SPEC-SCNC: 9.1 MG/DL (ref 8.6–10.5)
CHLORIDE SERPL-SCNC: 104 MMOL/L (ref 98–107)
CO2 SERPL-SCNC: 26 MMOL/L (ref 22–29)
CREAT SERPL-MCNC: 0.72 MG/DL (ref 0.76–1.27)
DEPRECATED RDW RBC AUTO: 46.5 FL (ref 37–54)
EGFRCR SERPLBLD CKD-EPI 2021: 93.5 ML/MIN/1.73
ERYTHROCYTE [DISTWIDTH] IN BLOOD BY AUTOMATED COUNT: 14.6 % (ref 12.3–15.4)
GLOBULIN UR ELPH-MCNC: 1.9 GM/DL
GLUCOSE BLDC GLUCOMTR-MCNC: 120 MG/DL (ref 70–130)
GLUCOSE BLDC GLUCOMTR-MCNC: 123 MG/DL (ref 70–130)
GLUCOSE BLDC GLUCOMTR-MCNC: 96 MG/DL (ref 70–130)
GLUCOSE BLDC GLUCOMTR-MCNC: 97 MG/DL (ref 70–130)
GLUCOSE SERPL-MCNC: 89 MG/DL (ref 65–99)
HCT VFR BLD AUTO: 41.6 % (ref 37.5–51)
HGB BLD-MCNC: 13.9 G/DL (ref 13–17.7)
MAGNESIUM SERPL-MCNC: 2.2 MG/DL (ref 1.6–2.4)
MCH RBC QN AUTO: 28.9 PG (ref 26.6–33)
MCHC RBC AUTO-ENTMCNC: 33.4 G/DL (ref 31.5–35.7)
MCV RBC AUTO: 86.5 FL (ref 79–97)
PLATELET # BLD AUTO: 231 10*3/MM3 (ref 140–450)
PMV BLD AUTO: 10.6 FL (ref 6–12)
POTASSIUM SERPL-SCNC: 4.1 MMOL/L (ref 3.5–5.2)
PROT SERPL-MCNC: 6.1 G/DL (ref 6–8.5)
QT INTERVAL: 536 MS
QTC INTERVAL: 578 MS
RBC # BLD AUTO: 4.81 10*6/MM3 (ref 4.14–5.8)
SODIUM SERPL-SCNC: 142 MMOL/L (ref 136–145)
WBC NRBC COR # BLD: 9.09 10*3/MM3 (ref 3.4–10.8)

## 2022-09-12 PROCEDURE — 93010 ELECTROCARDIOGRAM REPORT: CPT | Performed by: INTERNAL MEDICINE

## 2022-09-12 PROCEDURE — 93005 ELECTROCARDIOGRAM TRACING: CPT | Performed by: NURSE PRACTITIONER

## 2022-09-12 PROCEDURE — 99232 SBSQ HOSP IP/OBS MODERATE 35: CPT | Performed by: INTERNAL MEDICINE

## 2022-09-12 PROCEDURE — 80053 COMPREHEN METABOLIC PANEL: CPT | Performed by: INTERNAL MEDICINE

## 2022-09-12 PROCEDURE — 83735 ASSAY OF MAGNESIUM: CPT | Performed by: INTERNAL MEDICINE

## 2022-09-12 PROCEDURE — 82962 GLUCOSE BLOOD TEST: CPT

## 2022-09-12 PROCEDURE — 93005 ELECTROCARDIOGRAM TRACING: CPT | Performed by: INTERNAL MEDICINE

## 2022-09-12 PROCEDURE — 85027 COMPLETE CBC AUTOMATED: CPT | Performed by: INTERNAL MEDICINE

## 2022-09-12 RX ADMIN — Medication 10 ML: at 20:25

## 2022-09-12 RX ADMIN — SACUBITRIL AND VALSARTAN 1 TABLET: 49; 51 TABLET, FILM COATED ORAL at 20:25

## 2022-09-12 RX ADMIN — CARVEDILOL 12.5 MG: 12.5 TABLET, FILM COATED ORAL at 20:25

## 2022-09-12 RX ADMIN — AMIODARONE HYDROCHLORIDE 200 MG: 200 TABLET ORAL at 15:05

## 2022-09-12 RX ADMIN — ASPIRIN 81 MG: 81 TABLET, COATED ORAL at 09:01

## 2022-09-12 RX ADMIN — FINASTERIDE 5 MG: 5 TABLET, FILM COATED ORAL at 09:01

## 2022-09-12 RX ADMIN — Medication 10 ML: at 09:00

## 2022-09-12 RX ADMIN — CARVEDILOL 12.5 MG: 12.5 TABLET, FILM COATED ORAL at 09:01

## 2022-09-12 RX ADMIN — ROSUVASTATIN CALCIUM 40 MG: 20 TABLET, FILM COATED ORAL at 20:25

## 2022-09-12 RX ADMIN — AMIODARONE HYDROCHLORIDE 200 MG: 200 TABLET ORAL at 00:21

## 2022-09-12 RX ADMIN — SACUBITRIL AND VALSARTAN 1 TABLET: 49; 51 TABLET, FILM COATED ORAL at 09:01

## 2022-09-12 RX ADMIN — PANTOPRAZOLE SODIUM 40 MG: 40 TABLET, DELAYED RELEASE ORAL at 09:01

## 2022-09-12 RX ADMIN — LEVOTHYROXINE SODIUM 25 MCG: 25 TABLET ORAL at 05:26

## 2022-09-12 RX ADMIN — TRAMADOL HYDROCHLORIDE 50 MG: 50 TABLET ORAL at 00:24

## 2022-09-12 RX ADMIN — EMPAGLIFLOZIN 10 MG: 10 TABLET, FILM COATED ORAL at 09:05

## 2022-09-12 RX ADMIN — EPLERENONE 12.5 MG: 25 TABLET, FILM COATED ORAL at 09:05

## 2022-09-12 RX ADMIN — POLYETHYLENE GLYCOL 3350 17 G: 17 POWDER, FOR SOLUTION ORAL at 09:00

## 2022-09-12 RX ADMIN — TORSEMIDE 5 MG: 10 TABLET ORAL at 09:00

## 2022-09-12 NOTE — CASE MANAGEMENT/SOCIAL WORK
Discharge Planning Assessment  Psychiatric     Patient Name: Jonnie Roth  MRN: [id number]  Today's Date: 9/12/2022    Admit Date: 9/10/2022     Discharge Needs Assessment     Row Name 09/12/22 1102       Living Environment    People in Home spouse    Current Living Arrangements home    Primary Care Provided by self    Able to Return to Prior Arrangements yes       Resource/Environmental Concerns    Resource/Environmental Concerns none       Transition Planning    Patient/Family Anticipates Transition to home    Patient/Family Anticipated Services at Transition none       Discharge Needs Assessment    Readmission Within the Last 30 Days no previous admission in last 30 days    Current Outpatient/Agency/Support Group other (see comments)  cardiac rehab    Equipment Currently Used at Home none    Concerns to be Addressed no discharge needs identified    Equipment Needed After Discharge none               Discharge Plan     Row Name 09/12/22 1101       Plan    Plan home    Patient/Family in Agreement with Plan yes    Plan Comments I met with Mr. Roth at the bedside. He lives in [place] with his wife. He ambulates and performs activities of daily living independently. Patient anticiapates returning home and returning to Cardiac Rehab twice weekly as before admission. He denies having any discharge needs. Case management will continue to follow his plan of care and assist with discharge planning as needed.              Continued Care and Services - Admitted Since 9/10/2022    Coordination has not been started for this encounter.       Expected Discharge Date and Time     Expected Discharge Date Expected Discharge Time    Sep 12, 2022          Demographic Summary     Row Name 09/12/22 1101       General Information    General Information Comments I confirmed that Mr. Roth's PCP is Brian Lewis and he has Medicare insurance with [facility] as a secondary.               Functional Status     Row Name  09/12/22 1102       Functional Status, IADL    Medications independent    Meal Preparation independent    Housekeeping independent    Laundry independent    Shopping independent               Psychosocial    No documentation.                Abuse/Neglect    No documentation.                Legal    No documentation.                Substance Abuse    No documentation.                Patient Forms    No documentation.                   Peg Brady RN

## 2022-09-12 NOTE — PLAN OF CARE
Goal Outcome Evaluation:  Plan of Care Reviewed With: patient, family  Progress: improving  Outcome Evaluation: VSS. AOx4. No reports of pain. Up to chair some today. Ambulating with family assistance to bathroom. AV paced on the monitor. QTc monitored. Morning dose of amiodarone held per cardiology due to prolonged QTc; afternoon dose administered with recheck of normalized QTc. Patient hopeful to discharge soon.

## 2022-09-12 NOTE — PROGRESS NOTES
Jonnie Roth  [id number]  1945   LOS: 2 days   Patient Care Team:  Brian Lewis MD as PCP - General (Internal Medicine)  Ferdinand Alba MD as Consulting Physician (Cardiac Electrophysiology)    Chief Complaint: Follow-up on VT status post ICD shocks    Subjective  The patient denies any chest pain, edema, palpitations, or presyncope.  He has some mild shortness of breath on exertion.  He is interested in cardiopulmonary rehab after discharge.  Overall doing well.  Wants to go home.    Objective     Vital Sign Min/Max for last 24 hours  Temp  Min: 96.5 °F (35.8 °C)  Max: 98.2 °F (36.8 °C)   BP  Min: 120/71  Max: 149/79   Pulse  Min: 62  Max: 72   Resp  Min: 14  Max: 18   SpO2  Min: 93 %  Max: 95 %   No data recorded   No data recorded         09/10/22  0917 09/10/22  1725   Weight: 85.3 kg (188 lb) 85 kg (187 lb 6.3 oz)         Intake/Output Summary (Last 24 hours) at 9/12/2022 0757  Last data filed at 9/12/2022 0345  Gross per 24 hour   Intake 576.3 ml   Output 1300 ml   Net -723.7 ml       Physical Exam:     General Appearance:    Alert, cooperative, in no acute distress   Lungs:    Lear to auscultation bilaterally.  Respiratory effort normal    Heart:   Regular rate and rhythm normal S1-S2.  There is an S3 and S4.  No change in murmur.   Abdomen:  Extremities:   Soft, non-tender, bowel sounds audible x4    No edema, normal range of motion   Pulses:   Pulses palpable and equal bilaterally      Results Review:   Results from last 7 days   Lab Units 09/11/22  0202 09/10/22  0921   SODIUM mmol/L 143 140   POTASSIUM mmol/L 4.3 3.6   CHLORIDE mmol/L 110* 105   CO2 mmol/L 24.0 22.0   BUN mg/dL 15 20   CREATININE mg/dL 0.68* 0.63*   GLUCOSE mg/dL 99 116*   CALCIUM mg/dL 9.0 9.1     Results from last 7 days   Lab Units 09/11/22  0202 09/10/22  1633 09/10/22  0921   WBC 10*3/mm3 9.15 9.71 8.62   HEMOGLOBIN g/dL 12.3* 12.3* 12.5*   HEMATOCRIT % 38.3 37.6 38.7   PLATELETS 10*3/mm3 206 205 220      Results from last 7 days   Lab Units 09/10/22  0921   CHOLESTEROL mg/dL 116   TRIGLYCERIDES mg/dL 111   HDL CHOL mg/dL 43   LDL CHOL mg/dL 53     Results from last 7 days   Lab Units 09/10/22  0921   HEMOGLOBIN A1C % 5.90*     Results from last 7 days   Lab Units 09/10/22  0921   TROPONIN T ng/mL <0.010   [employer] dual-chamber pacemaker interrogated.  Patient had ventricular tachycardia which did not respond to 3 rounds of ATP.  2 shocks required to convert.  No other recent arrhythmia.  Device reprogrammed to only 1 round of ATP    Echocardiogram 9/10/2022:  · The left ventricular cavity is severely dilated. Severe global hypokinesis.  · Biplane EF 14%, The following left ventricular wall segments are hypokinetic: basal anterolateral, mid anterolateral, apical lateral, basal inferolateral, mid inferolateral, apical inferior, mid inferior, basal inferoseptal, basal anterior, basal inferior and basal inferoseptal. The following left ventricular wall segments are dyskinetic: apex. The following left ventricular wall segments are akinetic: mid anterior, apical anterior, apical septal, mid inferoseptal, basal anteroseptal and mid anteroseptal.  · Mildly reduced right ventricular systolic function noted.  (Patient's EF was 30% in 2019)    Chest x-ray 9/10/2022:  Minimal bibasilar atelectasis. No other evidence of active chest  disease    CT head 9/11/2022:  1. Slight decrease in the size of a small parafalcine subdural hematoma.  No new intracranial hemorrhage. No mass effect or midline shift.  2. Mild chronic small vessel ischemic change.  3. Chronic left maxillary sinusitis    ECG 9/12/2022:  AV dual-paced rhythm with frequent premature ventricular complexes  Biventricular pacemaker detected  Abnormal ECG  When compared with ECG of 11-SEP-2022 05:55, (Unconfirmed)  premature ventricular complexes are now present  Vent. rate has increased BY  40 BPM    Medication Review: Reviewed    Assessment & Plan   Patient  with ventricular tachycardia status post BiVICD shocks and ICD was reprogrammed with less ATP attempts.  EF now 14% and patient is already on GDMT; very limited options.  Patient's NOAC and Plavix were discontinued due to subdural hematoma.  Neurosurgery felt it was okay to resume anticoagulation if scan is stable but hold antiplatelets for another 72 hours.  Patient has been transitioned to p.o. amiodarone but QTC over 600 so we will have to hold his amiodarone for now and get a repeat ECG later this afternoon before next dose of scheduled amiodarone.  The patient needs to have cardiopulmonary rehab after discharge.  He will also need a repeat echocardiogram in 1-2 months to reassess EF.      Ventricular fibrillation (HCC)    Coronary artery disease involving native coronary artery of native heart with angina pectoris (HCC)    Chronic systolic congestive heart failure (HCC)    Subdural hematoma, post-traumatic (HCC)    Electronically signed by CHENTE Rhodes, 09/12/22, 9:33 AM EDT.    09/12/22  07:57 EDT     QTC calculated during a paced rhythm.  We will continue amiodarone therapy for now.  Hopefully home in a.m. tomorrow.  Long discussion with family today.  They understand the risk of recurrent ventricular arrhythmias and pump failure due to severe LV dysfunction.  Will follow neurosurgery recommendations resume anticoagulation as an outpatient with the exception of Plavix.    I, Ferdinand Alba MD, personally performed the services face to face as described and documented by the above named individual. I have made any necessary edits and it is both accurate and complete 9/12/2022  20:00 EDT

## 2022-09-13 ENCOUNTER — TELEPHONE (OUTPATIENT)
Dept: NEUROSURGERY | Facility: CLINIC | Age: 77
End: 2022-09-13

## 2022-09-13 ENCOUNTER — READMISSION MANAGEMENT (OUTPATIENT)
Dept: CALL CENTER | Facility: HOSPITAL | Age: 77
End: 2022-09-13

## 2022-09-13 VITALS
TEMPERATURE: 97.5 F | DIASTOLIC BLOOD PRESSURE: 81 MMHG | BODY MASS INDEX: 24.84 KG/M2 | RESPIRATION RATE: 18 BRPM | WEIGHT: 187.39 LBS | HEIGHT: 73 IN | SYSTOLIC BLOOD PRESSURE: 138 MMHG | OXYGEN SATURATION: 91 % | HEART RATE: 69 BPM

## 2022-09-13 DIAGNOSIS — S06.5X0D POST-TRAUMATIC SUBDURAL HEMATOMA, WITHOUT LOSS OF CONSCIOUSNESS, SUBSEQUENT ENCOUNTER: Primary | ICD-10-CM

## 2022-09-13 LAB
ALBUMIN SERPL-MCNC: 3.9 G/DL (ref 3.5–5.2)
ALBUMIN/GLOB SERPL: 1.5 G/DL
ALP SERPL-CCNC: 67 U/L (ref 39–117)
ALT SERPL W P-5'-P-CCNC: 15 U/L (ref 1–41)
ANION GAP SERPL CALCULATED.3IONS-SCNC: 12 MMOL/L (ref 5–15)
AST SERPL-CCNC: 15 U/L (ref 1–40)
BILIRUB SERPL-MCNC: 0.6 MG/DL (ref 0–1.2)
BUN SERPL-MCNC: 14 MG/DL (ref 8–23)
BUN/CREAT SERPL: 22.2 (ref 7–25)
CALCIUM SPEC-SCNC: 8.9 MG/DL (ref 8.6–10.5)
CHLORIDE SERPL-SCNC: 105 MMOL/L (ref 98–107)
CO2 SERPL-SCNC: 21 MMOL/L (ref 22–29)
CREAT SERPL-MCNC: 0.63 MG/DL (ref 0.76–1.27)
DEPRECATED RDW RBC AUTO: 46.3 FL (ref 37–54)
EGFRCR SERPLBLD CKD-EPI 2021: 97.4 ML/MIN/1.73
ERYTHROCYTE [DISTWIDTH] IN BLOOD BY AUTOMATED COUNT: 14.3 % (ref 12.3–15.4)
GLOBULIN UR ELPH-MCNC: 2.6 GM/DL
GLUCOSE BLDC GLUCOMTR-MCNC: 105 MG/DL (ref 70–130)
GLUCOSE SERPL-MCNC: 94 MG/DL (ref 65–99)
HCT VFR BLD AUTO: 42.9 % (ref 37.5–51)
HGB BLD-MCNC: 13.9 G/DL (ref 13–17.7)
MAGNESIUM SERPL-MCNC: 2.3 MG/DL (ref 1.6–2.4)
MCH RBC QN AUTO: 28.6 PG (ref 26.6–33)
MCHC RBC AUTO-ENTMCNC: 32.4 G/DL (ref 31.5–35.7)
MCV RBC AUTO: 88.3 FL (ref 79–97)
PLATELET # BLD AUTO: 228 10*3/MM3 (ref 140–450)
PMV BLD AUTO: 10.5 FL (ref 6–12)
POTASSIUM SERPL-SCNC: 4 MMOL/L (ref 3.5–5.2)
PROT SERPL-MCNC: 6.5 G/DL (ref 6–8.5)
QT INTERVAL: 502 MS
QTC INTERVAL: 553 MS
RBC # BLD AUTO: 4.86 10*6/MM3 (ref 4.14–5.8)
SODIUM SERPL-SCNC: 138 MMOL/L (ref 136–145)
WBC NRBC COR # BLD: 9.85 10*3/MM3 (ref 3.4–10.8)

## 2022-09-13 PROCEDURE — 93010 ELECTROCARDIOGRAM REPORT: CPT | Performed by: INTERNAL MEDICINE

## 2022-09-13 PROCEDURE — 82962 GLUCOSE BLOOD TEST: CPT

## 2022-09-13 PROCEDURE — 85027 COMPLETE CBC AUTOMATED: CPT | Performed by: INTERNAL MEDICINE

## 2022-09-13 PROCEDURE — 80053 COMPREHEN METABOLIC PANEL: CPT | Performed by: INTERNAL MEDICINE

## 2022-09-13 PROCEDURE — 99232 SBSQ HOSP IP/OBS MODERATE 35: CPT | Performed by: PHYSICIAN ASSISTANT

## 2022-09-13 PROCEDURE — 83735 ASSAY OF MAGNESIUM: CPT | Performed by: INTERNAL MEDICINE

## 2022-09-13 PROCEDURE — 93005 ELECTROCARDIOGRAM TRACING: CPT | Performed by: INTERNAL MEDICINE

## 2022-09-13 RX ORDER — CLOPIDOGREL BISULFATE 75 MG/1
75 TABLET ORAL EVERY MORNING
Qty: 30 TABLET
Start: 2022-09-20 | End: 2023-03-07 | Stop reason: SDUPTHER

## 2022-09-13 RX ORDER — AMIODARONE HYDROCHLORIDE 200 MG/1
200 TABLET ORAL 3 TIMES DAILY
Qty: 60 TABLET | Refills: 6 | Status: SHIPPED | OUTPATIENT
Start: 2022-09-13 | End: 2022-09-29 | Stop reason: SDUPTHER

## 2022-09-13 RX ADMIN — FINASTERIDE 5 MG: 5 TABLET, FILM COATED ORAL at 08:07

## 2022-09-13 RX ADMIN — SACUBITRIL AND VALSARTAN 1 TABLET: 49; 51 TABLET, FILM COATED ORAL at 08:08

## 2022-09-13 RX ADMIN — AMIODARONE HYDROCHLORIDE 200 MG: 200 TABLET ORAL at 08:07

## 2022-09-13 RX ADMIN — ASPIRIN 81 MG: 81 TABLET, COATED ORAL at 08:07

## 2022-09-13 RX ADMIN — PANTOPRAZOLE SODIUM 40 MG: 40 TABLET, DELAYED RELEASE ORAL at 08:08

## 2022-09-13 RX ADMIN — TORSEMIDE 5 MG: 10 TABLET ORAL at 08:07

## 2022-09-13 RX ADMIN — EPLERENONE 12.5 MG: 25 TABLET, FILM COATED ORAL at 08:07

## 2022-09-13 RX ADMIN — LEVOTHYROXINE SODIUM 25 MCG: 25 TABLET ORAL at 05:13

## 2022-09-13 RX ADMIN — CARVEDILOL 12.5 MG: 12.5 TABLET, FILM COATED ORAL at 08:07

## 2022-09-13 RX ADMIN — EMPAGLIFLOZIN 10 MG: 10 TABLET, FILM COATED ORAL at 08:07

## 2022-09-13 NOTE — PROGRESS NOTES
Jonnie Roth  [id number]  1945   LOS: 3 days   Patient Care Team:  Brian Lewis MD as PCP - General (Internal Medicine)  Ferdinand Alba MD as Consulting Physician (Cardiac Electrophysiology)    Chief Complaint: Follow-up on VT status post ICD shocks    Subjective      Doing well this AM. No complaints of CP or SOB. No arrhythmias overnight.  Did well overnight.  No ICD shocks.  Ambulate in hallway without difficulty.    Objective     Vital Sign Min/Max for last 24 hours  Temp  Min: 97.9 °F (36.6 °C)  Max: 98.8 °F (37.1 °C)   BP  Min: 125/75  Max: 151/87   Pulse  Min: 69  Max: 73   Resp  Min: 16  Max: 18   SpO2  Min: 93 %  Max: 95 %   No data recorded   No data recorded         09/10/22  0917 09/10/22  1725   Weight: 85.3 kg (188 lb) 85 kg (187 lb 6.3 oz)         Intake/Output Summary (Last 24 hours) at 9/13/2022 0756  Last data filed at 9/13/2022 0500  Gross per 24 hour   Intake 480 ml   Output 2450 ml   Net -1970 ml       Physical Exam:     General Appearance:    Alert, cooperative, in no acute distress   Lungs:    Clear to auscultation respiratory effort is normal.    Heart:   Regular rate rhythm S1-S2.  S3 and S4 is present.   Abdomen:  Extremities:   Soft, non-tender, bowel sounds audible x4    No edema, normal range of motion   Pulses:   Pulses palpable and equal bilaterally      Results Review:   Results from last 7 days   Lab Units 09/13/22  0500 09/12/22  0739 09/11/22  0202   SODIUM mmol/L 138 142 143   POTASSIUM mmol/L 4.0 4.1 4.3   CHLORIDE mmol/L 105 104 110*   CO2 mmol/L 21.0* 26.0 24.0   BUN mg/dL 14 13 15   CREATININE mg/dL 0.63* 0.72* 0.68*   GLUCOSE mg/dL 94 89 99   CALCIUM mg/dL 8.9 9.1 9.0     Results from last 7 days   Lab Units 09/13/22  0500 09/12/22  0739 09/11/22  0202   WBC 10*3/mm3 9.85 9.09 9.15   HEMOGLOBIN g/dL 13.9 13.9 12.3*   HEMATOCRIT % 42.9 41.6 38.3   PLATELETS 10*3/mm3 228 231 206     Results from last 7 days   Lab Units 09/10/22  0921   CHOLESTEROL mg/dL  116   TRIGLYCERIDES mg/dL 111   HDL CHOL mg/dL 43   LDL CHOL mg/dL 53     Results from last 7 days   Lab Units 09/10/22  0921   HEMOGLOBIN A1C % 5.90*     Results from last 7 days   Lab Units 09/10/22  0921   TROPONIN T ng/mL <0.010   [employer] dual-chamber pacemaker interrogated.  Patient had ventricular tachycardia which did not respond to 3 rounds of ATP.  2 shocks required to convert.  No other recent arrhythmia.  Device reprogrammed to only 1 round of ATP    Echocardiogram 9/10/2022:  · The left ventricular cavity is severely dilated. Severe global hypokinesis.  · Biplane EF 14%, The following left ventricular wall segments are hypokinetic: basal anterolateral, mid anterolateral, apical lateral, basal inferolateral, mid inferolateral, apical inferior, mid inferior, basal inferoseptal, basal anterior, basal inferior and basal inferoseptal. The following left ventricular wall segments are dyskinetic: apex. The following left ventricular wall segments are akinetic: mid anterior, apical anterior, apical septal, mid inferoseptal, basal anteroseptal and mid anteroseptal.  · Mildly reduced right ventricular systolic function noted.  (Patient's EF was 30% in 2019)    Chest x-ray 9/10/2022:  Minimal bibasilar atelectasis. No other evidence of active chest  disease    CT head 9/11/2022:  1. Slight decrease in the size of a small parafalcine subdural hematoma.  No new intracranial hemorrhage. No mass effect or midline shift.  2. Mild chronic small vessel ischemic change.  3. Chronic left maxillary sinusitis    ECG 9/12/2022:  AV dual-paced rhythm with frequent premature ventricular complexes  Biventricular pacemaker detected  Abnormal ECG  When compared with ECG of 11-SEP-2022 05:55, (Unconfirmed)  premature ventricular complexes are now present  Vent. rate has increased BY  40 BPM    Medication Review: Reviewed    Assessment & Plan   Patient with ventricular tachycardia/fibrillation status post BiVICD shocks and ICD was  reprogrammed with less ATP attempts.  EF now 14% and patient is already on GDMT; very limited options.  Added Jardiance 10 mg daily this admission. Patient's NOAC and Plavix were discontinued due to subdural hematoma.  Neurosurgery felt it was okay to resume anticoagulation if scan is stable but hold antiplatelets for another 72 hours.  -  Patient has been transitioned to p.o. amiodarone, per Dr. Alba will d/c home on Amiodarone 200 mg TID x 7 days, then 400 mg daily. The patient needs to have cardiopulmonary rehab after discharge, and follow up with Dr. Alba in one month.  He will also need a repeat echocardiogram in 2 months to reassess EF.  - Hold Plavix x 7 days, restart Xarelto 10 mg (on for previous DVT) in 3 days   - Long discussion with family today.  They understand the risk of recurrent ventricular arrhythmias and pump failure due to severe LV dysfunction.     Home today with close follow-up.      Ventricular fibrillation (HCC)    Coronary artery disease involving native coronary artery of native heart with angina pectoris (HCC)    Chronic systolic congestive heart failure (HCC)    Subdural hematoma, post-traumatic (HCC)    Electronically signed by PAULA Arrington, 09/13/22, 7:56 AM EDT.      I, Ferdinand Alba MD, personally performed the services face to face as described and documented by the above named individual. I have made any necessary edits and it is both accurate and complete 9/13/2022  10:29 EDT

## 2022-09-13 NOTE — TELEPHONE ENCOUNTER
Per Liam's note:    From neurosurgical perspective patient can follow-up in outpatient setting in 2 weeks with a repeat CT for further follow-up on the subdural.     Please enter orders for scheduling. Thanks!

## 2022-09-14 ENCOUNTER — TRANSCRIBE ORDERS (OUTPATIENT)
Dept: CARDIAC REHAB | Facility: HOSPITAL | Age: 77
End: 2022-09-14

## 2022-09-14 ENCOUNTER — DOCUMENTATION (OUTPATIENT)
Dept: CARDIAC REHAB | Facility: HOSPITAL | Age: 77
End: 2022-09-14

## 2022-09-14 ENCOUNTER — TELEPHONE (OUTPATIENT)
Dept: CARDIOLOGY | Facility: CLINIC | Age: 77
End: 2022-09-14

## 2022-09-14 DIAGNOSIS — I50.23 ACUTE ON CHRONIC SYSTOLIC CHF (CONGESTIVE HEART FAILURE): Primary | ICD-10-CM

## 2022-09-14 NOTE — OUTREACH NOTE
Prep Survey    Flowsheet Row Responses   [facility] patient discharged from? [facility]   Is LACE score < 7 ? No   Emergency Room discharge w/ pulse ox? No   Eligibility Readm Mgmt   Discharge diagnosis Ventricular fibrillation    Does the patient have one of the following disease processes/diagnoses(primary or secondary)? Other   Does the patient have Home health ordered? No   Is there a DME ordered? No   Prep survey completed? Yes          MIKE IBANEZ - Registered Nurse

## 2022-09-14 NOTE — PROGRESS NOTES
Pt. Referred for Phase II Cardiac Rehab. Staff discussed benefits of exercise, program protocol, and educational material provided. Teach back verified.  Permission granted from patient for staff to fax referral information to outlying program at this time.  Staff faxed referral info to October 27th @ 9:00am.

## 2022-09-16 ENCOUNTER — READMISSION MANAGEMENT (OUTPATIENT)
Dept: CALL CENTER | Facility: HOSPITAL | Age: 77
End: 2022-09-16

## 2022-09-16 LAB
QT INTERVAL: 520 MS
QT INTERVAL: 534 MS
QT INTERVAL: 540 MS
QT INTERVAL: 618 MS
QTC INTERVAL: 561 MS
QTC INTERVAL: 576 MS
QTC INTERVAL: 667 MS
QTC INTERVAL: 730 MS

## 2022-09-16 NOTE — OUTREACH NOTE
Medical Week 1 Survey    Flowsheet Row Responses   [facility] patient discharged from? [facility]   Does the patient have one of the following disease processes/diagnoses(primary or secondary)? Other   Week 1 attempt successful? Yes   Call start time 1507   Call end time 1514   General alerts for this patient Pt's son is a PA.   List who call center can speak with pt   Meds reviewed with patient/caregiver? Yes   Is the patient having any side effects they believe may be caused by any medication additions or changes? No   Does the patient have all medications ordered at discharge? Yes   Prescription comments Pt does feel light headed.   Is the patient taking all medications as directed (includes completed medication regime)? Yes   Comments regarding appointments Cardiology follow up scheduled as well as pcp appointment.   Does the patient have a primary care provider?  Yes   Does the patient have an appointment with their PCP within 7 days of discharge? Yes   Has the patient kept scheduled appointments due by today? N/A   Has home health visited the patient within 72 hours of discharge? N/A   Psychosocial issues? No   Did the patient receive a copy of their discharge instructions? Yes   Nursing interventions Reviewed instructions with patient   What is the patient's perception of their health status since discharge? Improving   Is the patient/caregiver able to teach back signs and symptoms related to disease process for when to call PCP? Yes   Is the patient/caregiver able to teach back signs and symptoms related to disease process for when to call 911? Yes   Is the patient/caregiver able to teach back the hierarchy of who to call/visit for symptoms/problems? PCP, Specialist, Home health nurse, Urgent Care, ED, 911 Yes   Week 1 call completed? Yes   Wrap up additional comments Pt reports improving. Pt is using a walker at present. Pt is taking all medications. Pt feeling light headed at times. Pt's son is PA. Pt  very thankful to be alive.          KERRIE MONTE - Registered Nurse

## 2022-09-27 NOTE — DISCHARGE SUMMARY
[facility] Cardiology Services  DISCHARGE SUMMARY    Date of Discharge:  9/13/2022    Discharge Diagnosis: VT     Presenting Problem/History of Present Illness  Ventricular fibrillation (HCC) [I49.01]    Problem List:      1. Sudden cardiac death with primary ventricular fibrillation status post Pacesetter [place] ICD, [place], [place] in September 2000:   a. ICD generator change out with upgrade to a biventricular pacemaker ICD on 12/17/2007, [employer] device.  b. ICD discharge, 08/09/2012, secondary to ventricular flutter with initiation of amiodarone therapy.   c. Hospitalization, 02/01/2014, secondary to ICD discharge for ventricular tachycardia with subsequent discontinuation of Coreg and initiation of sotalol therapy.   d. Hospitalization, February 2014, secondary to ICD discharge for VT with subsequent discontinuation of Coreg and initiation of sotalol.  e. Echocardiogram, 04/07/2015: EF less than 20%.  f. Hospitalization secondary to VF and ICD shocks, 04/18/2015.  g. NIPS procedure with defibrillation threshold testing for VF and noninvasive program stimulation, 04/18/2015.   h. Initiation of mexiletine for recurrent ventricular tachycardia with ICD shocks on 05/05/2015 with amiodarone load, recurrent VT and ICD shocks with hospitalization 05/16/2015-05/18/2015.   i. EP study with RFA of large anterior left ventricular scar extending from mid-left ventricular wall down apex by Dr. Ferdinand Alba, 05/21/2015.                      j.   ICD generator change 10/2017                     K.   Echo 11/19 LVEF 30%, mild TR                     L.  ICD generatot change 07/2021          M.  VT/VF with ICD shocks 9/2022, ICD reprogrammed with less ATP attempts, Amiodarone    N. Echocardiogram 9/10/2022: EF 14%. The following left ventricular wall segments are hypokinetic: basal anterolateral, mid anterolateral, apical lateral, basal inferolateral, mid inferolateral, apical inferior, mid inferior, basal  inferoseptal, basal anterior, basal inferior and basal inferoseptal. The following left ventricular wall segments are dyskinetic: apex. The following left ventricular wall segments are akinetic: mid anterior, apical anterior, apical septal, mid inferoseptal, basal anteroseptal and mid anteroseptal      2. Ischemic heart disease:  a. Remote progressive angina pectoris/acute extensive anterolateral myocardial infarction/delayed presentation with thrombolysis/severe 3-vessel coronary atherosclerosis with severe single vessel involvement/PTCA with intracoronary stent deployment proximal-mid segment LAD/moderate-severe compensated left ventricular dysfunction. LVEF (0.35)/abnormal positive signal averaged EKG/oral anticoagulation, April 1995.  b. Remote NYHA class I-II angina pectoris/class III CHF/abnormal quantitative SPECT gated Cardiolite GXT, July 1998.  c. Stable persistent MUGA, LVEF (0.33, January 1999 and January 2000).   d. Residual NYHA class I angina pectoris/CHF with reduced MUGA scan: LVEF (0.25), April 2002.  e. Left heart catheterization with distal circumflex disease: EF 20%, September 2002.   f. MUGA in May 2003: EF 32%.   g. Sestamibi GXT on 04/08/2005: No ischemia. EF 29%.   h. Residual NYHA class I angina pectoris/CHF with reduced acceptable MUGA scan: EF (0.32) and acceptable Pacesetter PCD interrogation/reprogramming, May 2003 with interrogation, September 2004.  i. Echocardiogram, September 2009 with EF 30%  j. Left heart catheterization by Dr. Bhupinder Gonzalez, August 2010, with LVEF of 35%, with 3-vessel native coronary artery disease, 95% mid-LAD status post PTCA and stenting with two 3 mm stents by Dr. Llanes.  k. Echocardiogram, 06/07/2012: Left ventricular ejection fraction of 30%.  l. Hospitalization, 02/01/2014, for non-ST elevation MI, left heart catheterization by Dr. Ayala that demonstrated 60% mid stenosis of the right coronary artery that remains unchanged compared to previous, widely  patent stents of the LAD with severe LV dysfunction of approximately 25%.   m. Left heart catheterization status post drug-eluting stent to the distal LAD and mid-RCA with an EF of 20% to 25% by Dr. Diego Ayala, 04/20/2015.   n. Left heart catheterization 1/16/17; nonobstructive CAD with patent previously placed stents, dilated cardiomyopathy with severe LV systolic dysfunction, EF less than 20%, normal hemodynamics, recommendations for continued medical therapy and risk factor, evaluation for noncardiac etiology of symptoms.  o. Residual CCS Class I/II angina pectoris/NYHA Class II exertional dyspnea and fatigue syndrome, summer 2017  p. Residual CCS 0 angina pectoris/NYHA class I-II exertional dyspnea and fatigue, February 2018, September 2018, March 2019.  3. Dyslipidemia.  4. Status post remote operations.  5. Remote chronic tobacco use/abnormal chest x-ray with stable abnormal thoracic  CT scan without contrast demonstrating bilateral diffuse perimeter scarring and fibrosis with scattered subcentimeter noncalcified nodules (unchanged from February 2016), March 2017.  6. Left bundle branch block.  7. Burks trauma with hematuria with urosepsis requiring hospitalization, May 2015.  8. Pulmonary embolism, spring 2015.   9. Remote apparent hypothyroidism/replacement therapy - data deficit, January 2016.  10. Remote diagnosis of obstructive sleep apnea with CPAP use, 2017.  11. Bilateral cataract extraction November 2018, February 2019      Hospital Course:  77-year-old gentleman with the above history who was brought to the ER via EMS due to syncopal episode resulting in a fall.  The patient has longstanding history of ischemic heart disease following anterior MI in 1995 with chronic systolic heart failure and history of ventricular tachycardia status post ICD.  [place], the patient had issues with recurrent VT and has been treated historically with amiodarone and mexiletine.  In 2015, the patient underwent VT  ablation with Dr. Alba and has been essentially free of ventricular arrhythmias since that time.  He was taken off amiodarone and mexiletine sometime following ablation.     The patient was in his normal state of health--active without angina or heart failure symptoms--until this morning when he developed acute palpitation symptoms and lost consciousness.  He fell on his left side and hit his left thorax, shoulder, and left forehead.  There is no prodrome or chest pain, nausea, lightheadedness.     In the emergency room, he was found to have a subdural hematoma.  His ICD was interrogated and showed the patient had experienced ventricular tachycardia which did not respond to 3 rounds of ATP and evolved into ventricular fibrillation requiring 2 shocks before converting.     The patient presently feels well with the exception of pleuritic left-sided chest pain.     The patient has been maintained on clopidogrel since receiving PCI in 2017.  He has been on rivaroxaban for history of DVT/PE.    Patient with ventricular tachycardia/fibrillation status post BiVICD shocks and ICD was reprogrammed with less ATP attempts.  EF now 14% and patient is already on GDMT; very limited options.  Added Jardiance 10 mg daily this admission. Patient's NOAC and Plavix were discontinued due to subdural hematoma.  Neurosurgery felt it was okay to resume anticoagulation if scan is stable but hold antiplatelets for another 72 hours.Patient has been transitioned to p.o. amiodarone, per Dr. Alba will d/c home on Amiodarone 200 mg TID x 7 days, then 400 mg daily. The patient needs to have cardiopulmonary rehab after discharge, and follow up with Dr. Alba in one month.  He will also need a repeat echocardiogram in 2 months to reassess EF.Hold Plavix x 7 days, restart Xarelto 10 mg (on for previous DVT) in 3 days. Long discussion with family today.  They understand the risk of recurrent ventricular arrhythmias and pump failure due  to severe LV dysfunction.    Procedures Performed         Consults:   Consults     Date and Time Order Name Status Description    9/10/2022  2:05 PM Inpatient Neurosurgery Consult Completed           Pertinent Test Results:   Lab Results   Component Value Date    GLUCOSE 94 09/13/2022    CALCIUM 8.9 09/13/2022     09/13/2022    K 4.0 09/13/2022    CO2 21.0 (L) 09/13/2022     09/13/2022    BUN 14 09/13/2022    CREATININE 0.63 (L) 09/13/2022    EGFRIFNONA 83 07/01/2021    BCR 22.2 09/13/2022    ANIONGAP 12.0 09/13/2022         Condition on Discharge:  Stable      Discharge Disposition: HOME    Discharge Diet: Cardiac heart healthy diet    Activity at Discharge: As tolerated    Follow-up Appointments  Future Appointments   Date Time Provider Department Center   10/3/2022 10:30 AM [facility]   10/3/2022 12:30 PM Liam Shaw PA-C [facility]   10/11/2022 10:30 AM Viet Manzano PA [facility]   10/27/2022  9:00 AM ORIENTATION - [facility]   4/5/2023 10:30 AM Ferdinand Alba MD [facility]     Additional Instructions for the Follow-ups that You Need to Schedule     Cardiac Rehab Phase II   Sep 18, 2022      Diagnosis: CHF (congestive heart failure) ([facility]) [[id number]]    Date of Onset (of Diagnosis): 9/13/2022    Release to patient: Immediate         Discharge Follow-up with Specialty: Dr. Alba; 1 Month   As directed      Specialty: Dr. Alba    Follow Up: 1 Month    Follow Up Details: s/p VT with ICD shocks               Discharge Medications     Discharge Medications      New Medications      Instructions Start Date   amiodarone 200 MG tablet  Commonly known as: PACERONE   200 mg, Oral, 3 Times Daily, 200 mg TID x 7 days then 400 mg daily thereafter      empagliflozin 10 MG tablet tablet  Commonly known as: JARDIANCE  Replaces: Farxiga 10 MG tablet   10 mg, Oral, Daily         Changes to Medications      Instructions Start Date    rivaroxaban 10 MG tablet  Commonly known as: Xarelto  What changed: how much to take   10 mg, Oral, Daily         Continue These Medications      Instructions Start Date   acetaminophen 500 MG tablet  Commonly known as: TYLENOL   1,000 mg, Oral, Every 6 Hours PRN      carvedilol 12.5 MG tablet  Commonly known as: COREG   12.5 mg, Oral, 2 Times Daily With Meals      cholecalciferol 25 MCG (1000 UT) tablet  Commonly known as: VITAMIN D3   4,000 Units, Oral, Daily      clopidogrel 75 MG tablet  Commonly known as: PLAVIX   75 mg, Oral, Every Morning      eplerenone 25 MG tablet  Commonly known as: INSPRA   12.5 mg, Oral, Every Morning      finasteride 5 MG tablet  Commonly known as: PROSCAR   5 mg, Oral, Every Morning      L-TRYPTOPHAN PO   Oral, Nightly, 3 TABLETS NIGHTLY-     1500 MG EACH      levothyroxine 25 MCG tablet  Commonly known as: SYNTHROID, LEVOTHROID   25 mcg, Oral, Every Morning      melatonin 5 MG tablet tablet   5 mg, Oral, Nightly PRN, 2 tabs at night       metFORMIN 1000 MG tablet  Commonly known as: GLUCOPHAGE   1,000 mg, Oral, 2 times daily      Multivitamin Adults 50+ tablet tablet  Generic drug: multivitamin with minerals   1 tablet, Oral, Daily      ICAPS AREDS 2 PO   1 tablet, Oral, 2 times daily      nitroglycerin 0.4 MG SL tablet  Commonly known as: NITROSTAT   0.4 mg, Sublingual, Every 5 Minutes PRN, Take no more than 3 doses in 15 minutes.       pantoprazole 40 MG EC tablet  Commonly known as: PROTONIX   40 mg, Oral, Every Morning      polyethylene glycol packet  Commonly known as: MIRALAX   17 g, Oral, Every Other Day      rosuvastatin 40 MG tablet  Commonly known as: CRESTOR   40 mg, Oral, Daily      sacubitril-valsartan 49-51 MG tablet  Commonly known as: Entresto   1 tablet, Oral, 2 Times Daily      torsemide 5 MG tablet  Commonly known as: DEMADEX   TAKE 1 TABLET DAILY      vitamin B-12 1000 MCG tablet  Commonly known as: CYANOCOBALAMIN   1,000 mcg, Oral, Daily         Stop These  Medications    Farxiga 10 MG tablet  Generic drug: dapagliflozin Propanediol  Replaced by: empagliflozin 10 MG tablet tablet          Electronically signed by PAULA Arrington, 09/27/22, 4:44 PM EDT.

## 2022-09-29 RX ORDER — AMIODARONE HYDROCHLORIDE 200 MG/1
400 TABLET ORAL DAILY
Qty: 180 TABLET | Refills: 2 | Status: SHIPPED | OUTPATIENT
Start: 2022-09-29 | End: 2022-12-13 | Stop reason: HOSPADM

## 2022-10-03 ENCOUNTER — HOSPITAL ENCOUNTER (OUTPATIENT)
Dept: CT IMAGING | Facility: HOSPITAL | Age: 77
Discharge: HOME | End: 2022-10-03
Admitting: PHYSICIAN ASSISTANT

## 2022-10-03 ENCOUNTER — APPOINTMENT (OUTPATIENT)
Dept: CT IMAGING | Facility: HOSPITAL | Age: 77
End: 2022-10-03

## 2022-10-03 DIAGNOSIS — S06.5X0D POST-TRAUMATIC SUBDURAL HEMATOMA, WITHOUT LOSS OF CONSCIOUSNESS, SUBSEQUENT ENCOUNTER: ICD-10-CM

## 2022-10-03 PROCEDURE — 70450 CT HEAD/BRAIN W/O DYE: CPT

## 2022-10-11 ENCOUNTER — OFFICE VISIT (OUTPATIENT)
Dept: CARDIOLOGY | Facility: CLINIC | Age: 77
End: 2022-10-11

## 2022-10-11 VITALS
HEART RATE: 70 BPM | WEIGHT: 185 LBS | HEIGHT: 73 IN | DIASTOLIC BLOOD PRESSURE: 60 MMHG | BODY MASS INDEX: 24.52 KG/M2 | OXYGEN SATURATION: 97 % | SYSTOLIC BLOOD PRESSURE: 82 MMHG

## 2022-10-11 DIAGNOSIS — I50.22 CHRONIC SYSTOLIC CONGESTIVE HEART FAILURE: Primary | ICD-10-CM

## 2022-10-11 DIAGNOSIS — I44.7 LBBB (LEFT BUNDLE BRANCH BLOCK): ICD-10-CM

## 2022-10-11 DIAGNOSIS — I49.01 VENTRICULAR FIBRILLATION: ICD-10-CM

## 2022-10-11 DIAGNOSIS — I25.119 CORONARY ARTERY DISEASE INVOLVING NATIVE CORONARY ARTERY OF NATIVE HEART WITH ANGINA PECTORIS: ICD-10-CM

## 2022-10-11 PROCEDURE — 99214 OFFICE O/P EST MOD 30 MIN: CPT | Performed by: PHYSICIAN ASSISTANT

## 2022-10-11 PROCEDURE — 93000 ELECTROCARDIOGRAM COMPLETE: CPT | Performed by: PHYSICIAN ASSISTANT

## 2022-10-11 PROCEDURE — 93284 PRGRMG EVAL IMPLANTABLE DFB: CPT | Performed by: PHYSICIAN ASSISTANT

## 2022-10-11 RX ORDER — NITROGLYCERIN 0.4 MG/1
TABLET SUBLINGUAL
COMMUNITY
End: 2023-03-07

## 2022-10-11 RX ORDER — LANOLIN ALCOHOL/MO/W.PET/CERES
CREAM (GRAM) TOPICAL
COMMUNITY
End: 2022-12-13 | Stop reason: HOSPADM

## 2022-10-11 RX ORDER — TEMAZEPAM 15 MG/1
15 CAPSULE ORAL NIGHTLY PRN
COMMUNITY

## 2022-10-11 RX ORDER — ACETAMINOPHEN 500 MG
500 TABLET ORAL EVERY 6 HOURS
COMMUNITY

## 2022-10-11 RX ORDER — AMIODARONE HYDROCHLORIDE 200 MG/1
TABLET ORAL EVERY 12 HOURS SCHEDULED
COMMUNITY
End: 2022-12-13 | Stop reason: HOSPADM

## 2022-10-11 NOTE — PROGRESS NOTES
Jonnie Roth  1945  [phone]        [facility]ARD[facility]     Borders, Brian Ferraro MD  [address] [place] [address]    Chief Complaint   Patient presents with   • Follow-up       Problem List:      1. Sudden cardiac death with primary ventricular fibrillation status post Pacesetter [place] ICD, [place], [place] in September 2000:   a. ICD generator change out with upgrade to a biventricular pacemaker ICD on 12/17/2007, [employer] device.  b. ICD discharge, 08/09/2012, secondary to ventricular flutter with initiation of amiodarone therapy.   c. Hospitalization, 02/01/2014, secondary to ICD discharge for ventricular tachycardia with subsequent discontinuation of Coreg and initiation of sotalol therapy.   d. Hospitalization, February 2014, secondary to ICD discharge for VT with subsequent discontinuation of Coreg and initiation of sotalol.  e. Echocardiogram, 04/07/2015: EF less than 20%.  f. Hospitalization secondary to VF and ICD shocks, 04/18/2015.  g. NIPS procedure with defibrillation threshold testing for VF and noninvasive program stimulation, 04/18/2015.   h. Initiation of mexiletine for recurrent ventricular tachycardia with ICD shocks on 05/05/2015 with amiodarone load, recurrent VT and ICD shocks with hospitalization 05/16/2015-05/18/2015.   i. EP study with RFA of large anterior left ventricular scar extending from mid-left ventricular wall down apex by Dr. Ferdinand Alba, 05/21/2015.                      j.   ICD generator change 10/2017                     K.   Echo 11/19 LVEF 30%, mild TR          L.  ICD generatot change 07/2021  2. Ischemic heart disease:  a. Remote progressive angina pectoris/acute extensive anterolateral myocardial infarction/delayed presentation with thrombolysis/severe 3-vessel coronary atherosclerosis with severe single vessel involvement/PTCA with intracoronary stent deployment proximal-mid segment  LAD/moderate-severe compensated left ventricular dysfunction. LVEF (0.35)/abnormal positive signal averaged EKG/oral anticoagulation, April 1995.  b. Remote NYHA class I-II angina pectoris/class III CHF/abnormal quantitative SPECT gated Cardiolite GXT, July 1998.  c. Stable persistent MUGA, LVEF (0.33, January 1999 and January 2000).   d. Residual NYHA class I angina pectoris/CHF with reduced MUGA scan: LVEF (0.25), April 2002.  e. Left heart catheterization with distal circumflex disease: EF 20%, September 2002.   f. MUGA in May 2003: EF 32%.   g. Sestamibi GXT on 04/08/2005: No ischemia. EF 29%.   h. Residual NYHA class I angina pectoris/CHF with reduced acceptable MUGA scan: EF (0.32) and acceptable Pacesetter PCD interrogation/reprogramming, May 2003 with interrogation, September 2004.  i. Echocardiogram, September 2009 with EF 30%  j. Left heart catheterization by Dr. Bhupinder Gonzalez, August 2010, with LVEF of 35%, with 3-vessel native coronary artery disease, 95% mid-LAD status post PTCA and stenting with two 3 mm stents by Dr. Llanes.  k. Echocardiogram, 06/07/2012: Left ventricular ejection fraction of 30%.  l. Hospitalization, 02/01/2014, for non-ST elevation MI, left heart catheterization by Dr. Ayala that demonstrated 60% mid stenosis of the right coronary artery that remains unchanged compared to previous, widely patent stents of the LAD with severe LV dysfunction of approximately 25%.   m. Left heart catheterization status post drug-eluting stent to the distal LAD and mid-RCA with an EF of 20% to 25% by Dr. Diego Ayala, 04/20/2015.   n. Left heart catheterization 1/16/17; nonobstructive CAD with patent previously placed stents, dilated cardiomyopathy with severe LV systolic dysfunction, EF less than 20%, normal hemodynamics, recommendations for continued medical therapy and risk factor, evaluation for noncardiac etiology of symptoms.  o. Residual CCS Class I/II angina pectoris/NYHA Class II exertional  dyspnea and fatigue syndrome, summer 2017  p. Residual CCS 0 angina pectoris/NYHA class I-II exertional dyspnea and fatigue, February 2018, September 2018, March 2019.  3. Dyslipidemia.  4. Status post remote operations.  5. Remote chronic tobacco use/abnormal chest x-ray with stable abnormal thoracic  CT scan without contrast demonstrating bilateral diffuse perimeter scarring and fibrosis with scattered subcentimeter noncalcified nodules (unchanged from February 2016), March 2017.  6. Left bundle branch block.  7. Burks trauma with hematuria with urosepsis requiring hospitalization, May 2015.  8. Pulmonary embolism, spring 2015.   9. Remote apparent hypothyroidism/replacement therapy - data deficit, January 2016.  10. Remote diagnosis of obstructive sleep apnea with CPAP use, 2017.  11. Bilateral cataract extraction November 2018, February 2019     Allergies  No Known Allergies    Current Medications    Current Outpatient Medications:   •  acetaminophen (TYLENOL) 500 MG tablet, Take 1,000 mg by mouth Every 6 (Six) Hours As Needed for Mild Pain ., Disp: , Rfl:   •  amiodarone (PACERONE) 200 MG tablet, Take 2 tablets by mouth Daily. Take 400mg daily. Maintaince dose., Disp: 180 tablet, Rfl: 2  •  carvedilol (COREG) 12.5 MG tablet, Take 12.5 mg by mouth 2 (Two) Times a Day With Meals., Disp: , Rfl:   •  cholecalciferol (VITAMIN D3) 25 MCG (1000 UT) tablet, Take 4,000 Units by mouth Daily., Disp: , Rfl:   •  clopidogrel (PLAVIX) 75 MG tablet, Take 1 tablet by mouth Every Morning., Disp: 30 tablet, Rfl:   •  empagliflozin (JARDIANCE) 10 MG tablet tablet, Take 1 tablet by mouth Daily., Disp: 90 tablet, Rfl: 2  •  eplerenone (INSPRA) 25 MG tablet, Take 12.5 mg by mouth Every Morning., Disp: , Rfl:   •  finasteride (PROSCAR) 5 MG tablet, Take 5 mg by mouth Every Morning., Disp: , Rfl:   •  L-TRYPTOPHAN PO, Take  by mouth Every Night. 3 TABLETS NIGHTLY-     1500 MG EACH, Disp: , Rfl:   •  levothyroxine (SYNTHROID,  LEVOTHROID) 25 MCG tablet, Take 25 mcg by mouth Every Morning., Disp: , Rfl:   •  melatonin 5 MG tablet tablet, Take 5 mg by mouth At Night As Needed. 2 tabs at night, Disp: , Rfl:   •  metFORMIN (GLUCOPHAGE) 1000 MG tablet, Take 1,000 mg by mouth 2 (two) times a day., Disp: , Rfl:   •  Multiple Vitamins-Minerals (ICAPS AREDS 2 PO), Take 1 tablet by mouth 2 (two) times a day., Disp: , Rfl:   •  Multiple Vitamins-Minerals (MULTIVITAMIN ADULTS 50+) tablet, Take 1 tablet by mouth Daily., Disp: , Rfl:   •  nitroglycerin (NITROSTAT) 0.4 MG SL tablet, Place 1 tablet under the tongue Every 5 (Five) Minutes As Needed for Chest Pain. Take no more than 3 doses in 15 minutes., Disp: 30 tablet, Rfl: 11  •  pantoprazole (PROTONIX) 40 MG EC tablet, Take 40 mg by mouth Every Morning., Disp: , Rfl:   •  polyethylene glycol (MIRALAX) packet, Take 17 g by mouth Every Other Day., Disp: , Rfl:   •  rivaroxaban (Xarelto) 10 MG tablet, Take 1 tablet by mouth Daily., Disp: 90 tablet, Rfl: 3  •  rosuvastatin (CRESTOR) 40 MG tablet, Take 40 mg by mouth Daily., Disp: , Rfl:   •  sacubitril-valsartan (ENTRESTO) 49-51 MG tablet, Take 1 tablet by mouth 2 (Two) Times a Day., Disp: 180 tablet, Rfl: 3  •  temazepam (RESTORIL) 15 MG capsule, Take 1 capsule by mouth At Night As Needed for Sleep., Disp: , Rfl:   •  torsemide (DEMADEX) 5 MG tablet, TAKE 1 TABLET DAILY (Patient taking differently: Take 1 tablet by mouth Daily.), Disp: 90 tablet, Rfl: 1  •  vitamin B-12 (CYANOCOBALAMIN) 1000 MCG tablet, Take 1,000 mcg by mouth Daily., Disp: , Rfl:   •  acetaminophen (TYLENOL) 500 MG tablet, Every 6 (Six) Hours., Disp: , Rfl:   •  amiodarone (PACERONE) 200 MG tablet, Every 12 (Twelve) Hours., Disp: , Rfl:   •  econazole nitrate (SPECTAZOLE) 1 % cream, econazole 1 % topical cream, Disp: , Rfl:   •  melatonin 3 MG tablet, melatonin 3 mg tablet  Take by oral route., Disp: , Rfl:   •  nitroglycerin (NITROSTAT) 0.4 MG SL tablet, nitroglycerin 0.4 mg  "sublingual tablet  Place 1 tablet by sublingual route., Disp: , Rfl:     History of Present Illness     Pt presents for follow up of VT/CAD/CHF.  Patient presents today for follow-up after recent admission for VF arrest.  He states he feels pretty poorly.  His repeat echocardiogram revealed his EF is only 13 to 14%.  He is trying to maintain his daily weight managing diuretics and heart failure symptoms.  He is taking his medication he states most the time his blood pressure is okay although today it is low.  He states sometimes he gets low if he exercises or tries to do too many activities.  He has not had any recurrent palpitations syncope events or ICD shocks.        Vitals:    10/11/22 1035   BP: (!) 82/60   BP Location: Right arm   Patient Position: Sitting   Pulse: 70   SpO2: 97%   Weight: 83.9 kg (185 lb)   Height: 185 cm (72.84\")       PE:  General: NAD  Neck: no JVD, no carotid bruits, no TM  Heart RRR, NL S1, S2, S4 present, no rubs, murmurs  Lungs: CTA, no wheezes, rhonchi, or rales  Abd: soft, non-tender, NL BS  Ext: No musculoskeletal deformities, no edema, cyanosis, or clubbing  Psych: normal mood and affect    Diagnostic Data:    ECG 12 Lead    Date/Time: 10/11/2022 4:05 PM  Performed by: Viet Manzano PA  Authorized by: Viet Manzano PA   Rhythm: sinus rhythm and paced  Conduction: conduction normal  Other: no other findings    Clinical impression: non-specific ECG        .    1. Chronic systolic congestive heart failure (HCC)    2. Coronary artery disease involving native coronary artery of native heart with angina pectoris ([facility])    3. LBBB (left bundle branch block)    4. Ventricular fibrillation (HCC)        ICD interrogation: [employer] BIVICD DDDR 70bpm. A paced 97%. BIV paced 97%. P wave 1.9mv and R wave 12mv Normal thresholds and impedances. Battery voltage 2.4 years. No afib or Recurrent VT/VF. No ICD shocks.   Plan:    1. VT:  -Admission to [facility] for VT/Fib with failed ATP and " successfully converted with ICD Shock. Device reprogrammed with less ATP attempts. Amiodarone reloaded 200mg TID for 7 days and continued on Amiodarone 400mg daily. .      2. End-stage Cardiomyopathy: LVEF 14% by Echocardiogram 9/27/2022. Jardiance added to GDMT.   Class III SHF symptoms. Repeat Echo one month .   -  3. CAD:  -No angina symptoms.      4. History of PE:  - on Xarelto 10mg daily     5. Subdural Hematoma after syncope : NOAC and plavix Held and resumed after 7 days per Neurosurgery.  CT scan one week ago with Dr. Rolf torres.     · Patient is going to start cardiac rehab.  He will continue current medical therapy.  His blood pressure remains low as of today he will decrease his Entresto to one half tab twice daily.  He will monitor this closely.  · F/up in 6 months  · Electronically signed by PAULA Alcantar, 10/11/22, 4:04 PM EDT.

## 2022-10-14 ENCOUNTER — HOSPITAL ENCOUNTER (OUTPATIENT)
Dept: CARDIOLOGY | Facility: HOSPITAL | Age: 77
Discharge: HOME | End: 2022-10-14
Admitting: PHYSICIAN ASSISTANT

## 2022-10-14 VITALS — HEIGHT: 73 IN | WEIGHT: 184.97 LBS | BODY MASS INDEX: 24.51 KG/M2

## 2022-10-14 DIAGNOSIS — I50.22 CHRONIC SYSTOLIC CONGESTIVE HEART FAILURE: ICD-10-CM

## 2022-10-14 DIAGNOSIS — I49.01 VENTRICULAR FIBRILLATION: ICD-10-CM

## 2022-10-14 DIAGNOSIS — I44.7 LBBB (LEFT BUNDLE BRANCH BLOCK): ICD-10-CM

## 2022-10-14 PROCEDURE — 25010000002 SULFUR HEXAFLUORIDE MICROSPH 60.7-25 MG RECONSTITUTED SUSPENSION: Performed by: PHYSICIAN ASSISTANT

## 2022-10-14 PROCEDURE — 93306 TTE W/DOPPLER COMPLETE: CPT | Performed by: INTERNAL MEDICINE

## 2022-10-14 PROCEDURE — 93306 TTE W/DOPPLER COMPLETE: CPT

## 2022-10-14 RX ADMIN — SULFUR HEXAFLUORIDE 3 ML: KIT at 13:58

## 2022-10-16 LAB
BH CV ECHO MEAS - AO MAX PG: 2.35 MMHG
BH CV ECHO MEAS - AO MEAN PG: 1.37 MMHG
BH CV ECHO MEAS - AO ROOT DIAM: 3.6 CM
BH CV ECHO MEAS - AO V2 MAX: 76.7 CM/SEC
BH CV ECHO MEAS - AO V2 VTI: 14.9 CM
BH CV ECHO MEAS - AVA(I,D): 4.5 CM2
BH CV ECHO MEAS - EDV(CUBED): 438.5 ML
BH CV ECHO MEAS - EDV(MOD-SP2): 169 ML
BH CV ECHO MEAS - EDV(MOD-SP4): 331 ML
BH CV ECHO MEAS - EF(MOD-BP): 21 %
BH CV ECHO MEAS - EF(MOD-SP2): 14.2 %
BH CV ECHO MEAS - EF(MOD-SP4): 19.6 %
BH CV ECHO MEAS - ESV(CUBED): 322.3 ML
BH CV ECHO MEAS - ESV(MOD-SP2): 145 ML
BH CV ECHO MEAS - ESV(MOD-SP4): 266 ML
BH CV ECHO MEAS - FS: 9.8 %
BH CV ECHO MEAS - IVS/LVPW: 1 CM
BH CV ECHO MEAS - IVSD: 0.84 CM
BH CV ECHO MEAS - LA DIMENSION: 4.8 CM
BH CV ECHO MEAS - LAT PEAK E' VEL: 6 CM/SEC
BH CV ECHO MEAS - LV DIASTOLIC VOL/BSA (35-75): 160.6 CM2
BH CV ECHO MEAS - LV MASS(C)D: 298.6 GRAMS
BH CV ECHO MEAS - LV MAX PG: 3.4 MMHG
BH CV ECHO MEAS - LV MEAN PG: 1.96 MMHG
BH CV ECHO MEAS - LV SYSTOLIC VOL/BSA (12-30): 129.1 CM2
BH CV ECHO MEAS - LV V1 MAX: 92.6 CM/SEC
BH CV ECHO MEAS - LV V1 VTI: 17.3 CM
BH CV ECHO MEAS - LVIDD: 7.6 CM
BH CV ECHO MEAS - LVIDS: 6.9 CM
BH CV ECHO MEAS - LVOT AREA: 3.9 CM2
BH CV ECHO MEAS - LVOT DIAM: 2.22 CM
BH CV ECHO MEAS - LVPWD: 0.84 CM
BH CV ECHO MEAS - MED PEAK E' VEL: 5.4 CM/SEC
BH CV ECHO MEAS - MV A MAX VEL: 78.1 CM/SEC
BH CV ECHO MEAS - MV DEC SLOPE: 158.4 CM/SEC2
BH CV ECHO MEAS - MV DEC TIME: 0.21 MSEC
BH CV ECHO MEAS - MV E MAX VEL: 33.5 CM/SEC
BH CV ECHO MEAS - MV E/A: 0.43
BH CV ECHO MEAS - PA ACC TIME: 0.09 SEC
BH CV ECHO MEAS - PA PR(ACCEL): 39.4 MMHG
BH CV ECHO MEAS - PI END-D VEL: 114 CM/SEC
BH CV ECHO MEAS - SI(MOD-SP2): 11.6 ML/M2
BH CV ECHO MEAS - SI(MOD-SP4): 31.5 ML/M2
BH CV ECHO MEAS - SV(LVOT): 66.7 ML
BH CV ECHO MEAS - SV(MOD-SP2): 24 ML
BH CV ECHO MEAS - SV(MOD-SP4): 65 ML
BH CV ECHO MEAS - TAPSE (>1.6): 1.6 CM
BH CV ECHO MEASUREMENTS AVERAGE E/E' RATIO: 5.88
BH CV VAS BP LEFT ARM: NORMAL MMHG
BH CV XLRA - RV BASE: 3.3 CM
BH CV XLRA - RV LENGTH: 7.6 CM
BH CV XLRA - RV MID: 3.3 CM
BH CV XLRA - TDI S': 9.1 CM/SEC
LEFT ATRIUM VOLUME INDEX: 34.2 ML/M2
LV EF 2D ECHO EST: 18 %
MAXIMAL PREDICTED HEART RATE: 142 BPM
STRESS TARGET HR: 121 BPM

## 2022-10-27 ENCOUNTER — TREATMENT (OUTPATIENT)
Dept: CARDIAC REHAB | Facility: HOSPITAL | Age: 77
End: 2022-10-27

## 2022-10-27 DIAGNOSIS — I50.23 ACUTE ON CHRONIC SYSTOLIC CHF (CONGESTIVE HEART FAILURE): ICD-10-CM

## 2022-10-27 PROCEDURE — 93798 PHYS/QHP OP CAR RHAB W/ECG: CPT

## 2022-10-27 NOTE — PROGRESS NOTES
Attended Phase II Cardiac Rehab Orientation. See [facility] for details.

## 2022-10-28 ENCOUNTER — TREATMENT (OUTPATIENT)
Dept: CARDIAC REHAB | Facility: HOSPITAL | Age: 77
End: 2022-10-28

## 2022-10-28 DIAGNOSIS — I50.23 ACUTE ON CHRONIC SYSTOLIC CHF (CONGESTIVE HEART FAILURE): Primary | ICD-10-CM

## 2022-10-28 PROCEDURE — 93798 PHYS/QHP OP CAR RHAB W/ECG: CPT

## 2022-10-31 ENCOUNTER — TREATMENT (OUTPATIENT)
Dept: CARDIAC REHAB | Facility: HOSPITAL | Age: 77
End: 2022-10-31

## 2022-10-31 DIAGNOSIS — I50.23 ACUTE ON CHRONIC SYSTOLIC CHF (CONGESTIVE HEART FAILURE): Primary | ICD-10-CM

## 2022-10-31 PROCEDURE — 93798 PHYS/QHP OP CAR RHAB W/ECG: CPT

## 2022-11-02 ENCOUNTER — TREATMENT (OUTPATIENT)
Dept: CARDIAC REHAB | Facility: HOSPITAL | Age: 77
End: 2022-11-02

## 2022-11-02 DIAGNOSIS — I50.23 ACUTE ON CHRONIC SYSTOLIC CHF (CONGESTIVE HEART FAILURE): Primary | ICD-10-CM

## 2022-11-02 PROCEDURE — 93798 PHYS/QHP OP CAR RHAB W/ECG: CPT

## 2022-11-02 NOTE — PROGRESS NOTES
Brief discussion w/Pt during cardiac rehab this am. Pt reports happy with recent weight loss of ~10+ pounds - reveals that he had dental work that affected his intake/he was unable to eat as well. RD commiserated with him/encouraged to continue with heart healthy lifestyle changes.

## 2022-11-04 ENCOUNTER — TREATMENT (OUTPATIENT)
Dept: CARDIAC REHAB | Facility: HOSPITAL | Age: 77
End: 2022-11-04

## 2022-11-04 DIAGNOSIS — I50.23 ACUTE ON CHRONIC SYSTOLIC CHF (CONGESTIVE HEART FAILURE): Primary | ICD-10-CM

## 2022-11-04 PROCEDURE — 93798 PHYS/QHP OP CAR RHAB W/ECG: CPT

## 2022-11-07 ENCOUNTER — TREATMENT (OUTPATIENT)
Dept: CARDIAC REHAB | Facility: HOSPITAL | Age: 77
End: 2022-11-07

## 2022-11-07 ENCOUNTER — TRANSCRIBE ORDERS (OUTPATIENT)
Dept: CARDIAC REHAB | Facility: HOSPITAL | Age: 77
End: 2022-11-07

## 2022-11-07 DIAGNOSIS — I50.23 ACUTE ON CHRONIC SYSTOLIC CHF (CONGESTIVE HEART FAILURE): Primary | ICD-10-CM

## 2022-11-07 PROCEDURE — 93798 PHYS/QHP OP CAR RHAB W/ECG: CPT

## 2022-11-11 ENCOUNTER — TREATMENT (OUTPATIENT)
Dept: CARDIAC REHAB | Facility: HOSPITAL | Age: 77
End: 2022-11-11

## 2022-11-11 DIAGNOSIS — I50.23 ACUTE ON CHRONIC SYSTOLIC CHF (CONGESTIVE HEART FAILURE): Primary | ICD-10-CM

## 2022-11-11 PROCEDURE — 93798 PHYS/QHP OP CAR RHAB W/ECG: CPT

## 2022-11-14 ENCOUNTER — TREATMENT (OUTPATIENT)
Dept: CARDIAC REHAB | Facility: HOSPITAL | Age: 77
End: 2022-11-14

## 2022-11-14 DIAGNOSIS — I50.23 ACUTE ON CHRONIC SYSTOLIC CHF (CONGESTIVE HEART FAILURE): Primary | ICD-10-CM

## 2022-11-14 PROCEDURE — 93798 PHYS/QHP OP CAR RHAB W/ECG: CPT

## 2022-11-18 ENCOUNTER — TELEPHONE (OUTPATIENT)
Dept: CARDIAC REHAB | Facility: HOSPITAL | Age: 77
End: 2022-11-18

## 2022-11-18 PROCEDURE — 93296 REM INTERROG EVL PM/IDS: CPT | Performed by: INTERNAL MEDICINE

## 2022-11-18 PROCEDURE — 93295 DEV INTERROG REMOTE 1/2/MLT: CPT | Performed by: INTERNAL MEDICINE

## 2022-11-21 ENCOUNTER — TREATMENT (OUTPATIENT)
Dept: CARDIAC REHAB | Facility: HOSPITAL | Age: 77
End: 2022-11-21

## 2022-11-21 DIAGNOSIS — I50.23 ACUTE ON CHRONIC SYSTOLIC CHF (CONGESTIVE HEART FAILURE): Primary | ICD-10-CM

## 2022-11-21 PROCEDURE — 93798 PHYS/QHP OP CAR RHAB W/ECG: CPT

## 2022-11-28 ENCOUNTER — TREATMENT (OUTPATIENT)
Dept: CARDIAC REHAB | Facility: HOSPITAL | Age: 77
End: 2022-11-28

## 2022-11-28 DIAGNOSIS — I50.23 ACUTE ON CHRONIC SYSTOLIC CHF (CONGESTIVE HEART FAILURE): Primary | ICD-10-CM

## 2022-11-28 PROCEDURE — 93798 PHYS/QHP OP CAR RHAB W/ECG: CPT

## 2022-11-30 ENCOUNTER — TREATMENT (OUTPATIENT)
Dept: CARDIAC REHAB | Facility: HOSPITAL | Age: 77
End: 2022-11-30

## 2022-11-30 DIAGNOSIS — I50.23 ACUTE ON CHRONIC SYSTOLIC CHF (CONGESTIVE HEART FAILURE): Primary | ICD-10-CM

## 2022-11-30 PROCEDURE — 93798 PHYS/QHP OP CAR RHAB W/ECG: CPT

## 2022-12-02 ENCOUNTER — TREATMENT (OUTPATIENT)
Dept: CARDIAC REHAB | Facility: HOSPITAL | Age: 77
End: 2022-12-02
Payer: MEDICARE

## 2022-12-02 DIAGNOSIS — I50.23 ACUTE ON CHRONIC SYSTOLIC CHF (CONGESTIVE HEART FAILURE): Primary | ICD-10-CM

## 2022-12-02 PROCEDURE — 93798 PHYS/QHP OP CAR RHAB W/ECG: CPT

## 2022-12-05 ENCOUNTER — TREATMENT (OUTPATIENT)
Dept: CARDIAC REHAB | Facility: HOSPITAL | Age: 77
End: 2022-12-05
Payer: MEDICARE

## 2022-12-05 DIAGNOSIS — I50.23 ACUTE ON CHRONIC SYSTOLIC CHF (CONGESTIVE HEART FAILURE): Primary | ICD-10-CM

## 2022-12-05 PROCEDURE — 93798 PHYS/QHP OP CAR RHAB W/ECG: CPT

## 2022-12-07 ENCOUNTER — TELEPHONE (OUTPATIENT)
Dept: CARDIAC REHAB | Facility: HOSPITAL | Age: 77
End: 2022-12-07

## 2022-12-08 ENCOUNTER — APPOINTMENT (OUTPATIENT)
Dept: CT IMAGING | Facility: HOSPITAL | Age: 77
End: 2022-12-08

## 2022-12-08 ENCOUNTER — HOSPITAL ENCOUNTER (INPATIENT)
Facility: HOSPITAL | Age: 77
LOS: 5 days | Discharge: HOME HEALTH | End: 2022-12-13
Attending: EMERGENCY MEDICINE | Admitting: INTERNAL MEDICINE

## 2022-12-08 ENCOUNTER — APPOINTMENT (OUTPATIENT)
Dept: GENERAL RADIOLOGY | Facility: HOSPITAL | Age: 77
End: 2022-12-08

## 2022-12-08 DIAGNOSIS — N39.0 ACUTE URINARY TRACT INFECTION: Primary | ICD-10-CM

## 2022-12-08 DIAGNOSIS — R09.02 HYPOXIA: ICD-10-CM

## 2022-12-08 DIAGNOSIS — J10.1 INFLUENZA A: ICD-10-CM

## 2022-12-08 DIAGNOSIS — I47.20 VENTRICULAR TACHYCARDIA: ICD-10-CM

## 2022-12-08 DIAGNOSIS — J96.01 ACUTE RESPIRATORY FAILURE WITH HYPOXIA: ICD-10-CM

## 2022-12-08 DIAGNOSIS — I25.5 ISCHEMIC CARDIOMYOPATHY: ICD-10-CM

## 2022-12-08 DIAGNOSIS — G93.40 SEPSIS WITH ENCEPHALOPATHY AND SEPTIC SHOCK, DUE TO UNSPECIFIED ORGANISM: ICD-10-CM

## 2022-12-08 DIAGNOSIS — R65.21 SEPSIS WITH ENCEPHALOPATHY AND SEPTIC SHOCK, DUE TO UNSPECIFIED ORGANISM: ICD-10-CM

## 2022-12-08 DIAGNOSIS — J44.9 CHRONIC OBSTRUCTIVE PULMONARY DISEASE, UNSPECIFIED COPD TYPE: ICD-10-CM

## 2022-12-08 DIAGNOSIS — R55 SYNCOPE, UNSPECIFIED SYNCOPE TYPE: ICD-10-CM

## 2022-12-08 DIAGNOSIS — A41.9 SEPSIS WITH ENCEPHALOPATHY AND SEPTIC SHOCK, DUE TO UNSPECIFIED ORGANISM: ICD-10-CM

## 2022-12-08 DIAGNOSIS — I50.22 CHRONIC SYSTOLIC CONGESTIVE HEART FAILURE: ICD-10-CM

## 2022-12-08 PROBLEM — N28.1 BILATERAL RENAL CYSTS: Status: ACTIVE | Noted: 2022-12-08

## 2022-12-08 PROBLEM — G47.33 OSA (OBSTRUCTIVE SLEEP APNEA): Status: ACTIVE | Noted: 2022-12-08

## 2022-12-08 PROBLEM — R74.01 TRANSAMINITIS: Status: ACTIVE | Noted: 2022-12-08

## 2022-12-08 PROBLEM — E03.9 HYPOTHYROIDISM (ACQUIRED): Status: ACTIVE | Noted: 2022-12-08

## 2022-12-08 PROBLEM — K21.9 GERD WITHOUT ESOPHAGITIS: Status: ACTIVE | Noted: 2022-12-08

## 2022-12-08 PROBLEM — J18.9 PNEUMONIA: Status: ACTIVE | Noted: 2022-12-08

## 2022-12-08 PROBLEM — K86.2 PANCREATIC CYST: Status: ACTIVE | Noted: 2022-12-08

## 2022-12-08 PROBLEM — Z95.0 PRESENCE OF CARDIAC PACEMAKER: Status: ACTIVE | Noted: 2022-12-08

## 2022-12-08 PROBLEM — E11.9 T2DM (TYPE 2 DIABETES MELLITUS): Status: ACTIVE | Noted: 2022-12-08

## 2022-12-08 PROBLEM — R91.1 PULMONARY NODULE: Status: ACTIVE | Noted: 2022-12-08

## 2022-12-08 LAB
ALBUMIN SERPL-MCNC: 3.9 G/DL (ref 3.5–5.2)
ALBUMIN/GLOB SERPL: 1.3 G/DL
ALP SERPL-CCNC: 82 U/L (ref 39–117)
ALT SERPL W P-5'-P-CCNC: 2155 U/L (ref 1–41)
AMORPH URATE CRY URNS QL MICRO: ABNORMAL /HPF
ANION GAP SERPL CALCULATED.3IONS-SCNC: 15 MMOL/L (ref 5–15)
APAP SERPL-MCNC: <5 MCG/ML (ref 0–30)
AST SERPL-CCNC: 3798 U/L (ref 1–40)
BACTERIA UR QL AUTO: ABNORMAL /HPF
BACTERIA UR QL AUTO: ABNORMAL /HPF
BASOPHILS # BLD AUTO: 0.04 10*3/MM3 (ref 0–0.2)
BASOPHILS NFR BLD AUTO: 0.4 % (ref 0–1.5)
BILIRUB SERPL-MCNC: 0.7 MG/DL (ref 0–1.2)
BILIRUB UR QL STRIP: NEGATIVE
BILIRUB UR QL STRIP: NEGATIVE
BUN SERPL-MCNC: 20 MG/DL (ref 8–23)
BUN/CREAT SERPL: 26 (ref 7–25)
CALCIUM SPEC-SCNC: 8.6 MG/DL (ref 8.6–10.5)
CHLORIDE SERPL-SCNC: 102 MMOL/L (ref 98–107)
CK SERPL-CCNC: 53 U/L (ref 20–200)
CLARITY UR: ABNORMAL
CLARITY UR: ABNORMAL
CO2 SERPL-SCNC: 18 MMOL/L (ref 22–29)
COARSE GRAN CASTS URNS QL MICRO: ABNORMAL /LPF
COLOR UR: YELLOW
COLOR UR: YELLOW
CREAT SERPL-MCNC: 0.77 MG/DL (ref 0.76–1.27)
D-LACTATE SERPL-SCNC: 2 MMOL/L (ref 0.5–2)
DEPRECATED RDW RBC AUTO: 50.7 FL (ref 37–54)
EGFRCR SERPLBLD CKD-EPI 2021: 91.6 ML/MIN/1.73
EOSINOPHIL # BLD AUTO: 0.04 10*3/MM3 (ref 0–0.4)
EOSINOPHIL NFR BLD AUTO: 0.4 % (ref 0.3–6.2)
ERYTHROCYTE [DISTWIDTH] IN BLOOD BY AUTOMATED COUNT: 15.3 % (ref 12.3–15.4)
ETHANOL BLD-MCNC: <10 MG/DL (ref 0–10)
FINE GRAN CASTS URNS QL MICRO: ABNORMAL /LPF
FLUAV RNA RESP QL NAA+PROBE: DETECTED
FLUBV RNA RESP QL NAA+PROBE: NOT DETECTED
GLOBULIN UR ELPH-MCNC: 3.1 GM/DL
GLUCOSE SERPL-MCNC: 155 MG/DL (ref 65–99)
GLUCOSE UR STRIP-MCNC: ABNORMAL MG/DL
GLUCOSE UR STRIP-MCNC: ABNORMAL MG/DL
GRAN CASTS URNS QL MICRO: ABNORMAL /LPF
HAV IGM SERPL QL IA: NORMAL
HBV CORE IGM SERPL QL IA: NORMAL
HBV SURFACE AG SERPL QL IA: NORMAL
HCT VFR BLD AUTO: 40.4 % (ref 37.5–51)
HCV AB SER DONR QL: NORMAL
HGB BLD-MCNC: 12.8 G/DL (ref 13–17.7)
HGB UR QL STRIP.AUTO: ABNORMAL
HGB UR QL STRIP.AUTO: NEGATIVE
HOLD SPECIMEN: NORMAL
HYALINE CASTS UR QL AUTO: ABNORMAL /LPF
HYALINE CASTS UR QL AUTO: ABNORMAL /LPF
IMM GRANULOCYTES # BLD AUTO: 0.04 10*3/MM3 (ref 0–0.05)
IMM GRANULOCYTES NFR BLD AUTO: 0.4 % (ref 0–0.5)
INR PPP: 1.42 (ref 0.84–1.13)
KETONES UR QL STRIP: ABNORMAL
KETONES UR QL STRIP: ABNORMAL
LEUKOCYTE ESTERASE UR QL STRIP.AUTO: NEGATIVE
LEUKOCYTE ESTERASE UR QL STRIP.AUTO: NEGATIVE
LYMPHOCYTES # BLD AUTO: 0.9 10*3/MM3 (ref 0.7–3.1)
LYMPHOCYTES NFR BLD AUTO: 9.1 % (ref 19.6–45.3)
MAGNESIUM SERPL-MCNC: 2.1 MG/DL (ref 1.6–2.4)
MCH RBC QN AUTO: 28.9 PG (ref 26.6–33)
MCHC RBC AUTO-ENTMCNC: 31.7 G/DL (ref 31.5–35.7)
MCV RBC AUTO: 91.2 FL (ref 79–97)
MONOCYTES # BLD AUTO: 0.43 10*3/MM3 (ref 0.1–0.9)
MONOCYTES NFR BLD AUTO: 4.3 % (ref 5–12)
MUCOUS THREADS URNS QL MICRO: ABNORMAL /HPF
NEUTROPHILS NFR BLD AUTO: 8.48 10*3/MM3 (ref 1.7–7)
NEUTROPHILS NFR BLD AUTO: 85.4 % (ref 42.7–76)
NITRITE UR QL STRIP: NEGATIVE
NITRITE UR QL STRIP: NEGATIVE
NRBC BLD AUTO-RTO: 0 /100 WBC (ref 0–0.2)
PH UR STRIP.AUTO: <=5 [PH] (ref 5–8)
PH UR STRIP.AUTO: <=5 [PH] (ref 5–8)
PLATELET # BLD AUTO: 194 10*3/MM3 (ref 140–450)
PMV BLD AUTO: 10 FL (ref 6–12)
POTASSIUM SERPL-SCNC: 3.6 MMOL/L (ref 3.5–5.2)
PROCALCITONIN SERPL-MCNC: 0.37 NG/ML (ref 0–0.25)
PROT SERPL-MCNC: 7 G/DL (ref 6–8.5)
PROT UR QL STRIP: ABNORMAL
PROT UR QL STRIP: ABNORMAL
PROTHROMBIN TIME: 17.3 SECONDS (ref 11.4–14.4)
RBC # BLD AUTO: 4.43 10*6/MM3 (ref 4.14–5.8)
RBC # UR STRIP: ABNORMAL /HPF
RBC # UR STRIP: ABNORMAL /HPF
REF LAB TEST METHOD: ABNORMAL
REF LAB TEST METHOD: ABNORMAL
RSV RNA NPH QL NAA+NON-PROBE: NOT DETECTED
SARS-COV-2 RNA RESP QL NAA+PROBE: NOT DETECTED
SODIUM SERPL-SCNC: 135 MMOL/L (ref 136–145)
SP GR UR STRIP: 1.02 (ref 1–1.03)
SP GR UR STRIP: 1.02 (ref 1–1.03)
SQUAMOUS #/AREA URNS HPF: ABNORMAL /HPF
SQUAMOUS #/AREA URNS HPF: ABNORMAL /HPF
TRANS CELLS #/AREA URNS HPF: ABNORMAL /HPF
TROPONIN T SERPL-MCNC: <0.01 NG/ML (ref 0–0.03)
TSH SERPL DL<=0.05 MIU/L-ACNC: 3.04 UIU/ML (ref 0.27–4.2)
UROBILINOGEN UR QL STRIP: ABNORMAL
UROBILINOGEN UR QL STRIP: ABNORMAL
WBC # UR STRIP: ABNORMAL /HPF
WBC # UR STRIP: ABNORMAL /HPF
WBC NRBC COR # BLD: 9.93 10*3/MM3 (ref 3.4–10.8)
WHOLE BLOOD HOLD COAG: NORMAL
WHOLE BLOOD HOLD SPECIMEN: NORMAL

## 2022-12-08 PROCEDURE — 84484 ASSAY OF TROPONIN QUANT: CPT

## 2022-12-08 PROCEDURE — 93005 ELECTROCARDIOGRAM TRACING: CPT

## 2022-12-08 PROCEDURE — 92960 CARDIOVERSION ELECTRIC EXT: CPT

## 2022-12-08 PROCEDURE — 99223 1ST HOSP IP/OBS HIGH 75: CPT | Performed by: INTERNAL MEDICINE

## 2022-12-08 PROCEDURE — 82962 GLUCOSE BLOOD TEST: CPT

## 2022-12-08 PROCEDURE — 81001 URINALYSIS AUTO W/SCOPE: CPT

## 2022-12-08 PROCEDURE — 93005 ELECTROCARDIOGRAM TRACING: CPT | Performed by: EMERGENCY MEDICINE

## 2022-12-08 PROCEDURE — 87637 SARSCOV2&INF A&B&RSV AMP PRB: CPT | Performed by: STUDENT IN AN ORGANIZED HEALTH CARE EDUCATION/TRAINING PROGRAM

## 2022-12-08 PROCEDURE — 84443 ASSAY THYROID STIM HORMONE: CPT | Performed by: INTERNAL MEDICINE

## 2022-12-08 PROCEDURE — 82550 ASSAY OF CK (CPK): CPT | Performed by: INTERNAL MEDICINE

## 2022-12-08 PROCEDURE — 85025 COMPLETE CBC W/AUTO DIFF WBC: CPT

## 2022-12-08 PROCEDURE — 70450 CT HEAD/BRAIN W/O DYE: CPT

## 2022-12-08 PROCEDURE — 80143 DRUG ASSAY ACETAMINOPHEN: CPT | Performed by: EMERGENCY MEDICINE

## 2022-12-08 PROCEDURE — 80053 COMPREHEN METABOLIC PANEL: CPT

## 2022-12-08 PROCEDURE — 87449 NOS EACH ORGANISM AG IA: CPT | Performed by: NURSE PRACTITIONER

## 2022-12-08 PROCEDURE — 74176 CT ABD & PELVIS W/O CONTRAST: CPT

## 2022-12-08 PROCEDURE — 82077 ASSAY SPEC XCP UR&BREATH IA: CPT | Performed by: EMERGENCY MEDICINE

## 2022-12-08 PROCEDURE — 83605 ASSAY OF LACTIC ACID: CPT | Performed by: EMERGENCY MEDICINE

## 2022-12-08 PROCEDURE — 99285 EMERGENCY DEPT VISIT HI MDM: CPT

## 2022-12-08 PROCEDURE — 84145 PROCALCITONIN (PCT): CPT | Performed by: NURSE PRACTITIONER

## 2022-12-08 PROCEDURE — 80074 ACUTE HEPATITIS PANEL: CPT | Performed by: EMERGENCY MEDICINE

## 2022-12-08 PROCEDURE — 85610 PROTHROMBIN TIME: CPT | Performed by: INTERNAL MEDICINE

## 2022-12-08 PROCEDURE — 71045 X-RAY EXAM CHEST 1 VIEW: CPT

## 2022-12-08 PROCEDURE — 36415 COLL VENOUS BLD VENIPUNCTURE: CPT

## 2022-12-08 PROCEDURE — 83735 ASSAY OF MAGNESIUM: CPT

## 2022-12-08 PROCEDURE — 99152 MOD SED SAME PHYS/QHP 5/>YRS: CPT

## 2022-12-08 PROCEDURE — 25010000002 VANCOMYCIN 10 G RECONSTITUTED SOLUTION: Performed by: EMERGENCY MEDICINE

## 2022-12-08 PROCEDURE — 81001 URINALYSIS AUTO W/SCOPE: CPT | Performed by: NURSE PRACTITIONER

## 2022-12-08 PROCEDURE — 87899 AGENT NOS ASSAY W/OPTIC: CPT | Performed by: NURSE PRACTITIONER

## 2022-12-08 PROCEDURE — 87040 BLOOD CULTURE FOR BACTERIA: CPT | Performed by: EMERGENCY MEDICINE

## 2022-12-08 PROCEDURE — 25010000002 PIPERACILLIN SOD-TAZOBACTAM PER 1 G: Performed by: EMERGENCY MEDICINE

## 2022-12-08 RX ORDER — SODIUM CHLORIDE 0.9 % (FLUSH) 0.9 %
10 SYRINGE (ML) INJECTION AS NEEDED
Status: DISCONTINUED | OUTPATIENT
Start: 2022-12-08 | End: 2022-12-09

## 2022-12-08 RX ORDER — SODIUM CHLORIDE 9 MG/ML
40 INJECTION, SOLUTION INTRAVENOUS AS NEEDED
Status: DISCONTINUED | OUTPATIENT
Start: 2022-12-08 | End: 2022-12-09

## 2022-12-08 RX ORDER — CLOPIDOGREL BISULFATE 75 MG/1
75 TABLET ORAL DAILY
Status: DISCONTINUED | OUTPATIENT
Start: 2022-12-09 | End: 2022-12-13 | Stop reason: HOSPADM

## 2022-12-08 RX ORDER — POTASSIUM CHLORIDE 750 MG/1
20 CAPSULE, EXTENDED RELEASE ORAL ONCE
Status: COMPLETED | OUTPATIENT
Start: 2022-12-08 | End: 2022-12-09

## 2022-12-08 RX ORDER — SODIUM CHLORIDE 9 MG/ML
50 INJECTION, SOLUTION INTRAVENOUS CONTINUOUS
Status: ACTIVE | OUTPATIENT
Start: 2022-12-08 | End: 2022-12-09

## 2022-12-08 RX ORDER — ACETAMINOPHEN 500 MG
1000 TABLET ORAL ONCE
Status: COMPLETED | OUTPATIENT
Start: 2022-12-08 | End: 2022-12-08

## 2022-12-08 RX ORDER — SODIUM CHLORIDE 0.9 % (FLUSH) 0.9 %
10 SYRINGE (ML) INJECTION EVERY 12 HOURS SCHEDULED
Status: DISCONTINUED | OUTPATIENT
Start: 2022-12-08 | End: 2022-12-09

## 2022-12-08 RX ORDER — CHOLECALCIFEROL (VITAMIN D3) 125 MCG
10 CAPSULE ORAL NIGHTLY PRN
Status: DISCONTINUED | OUTPATIENT
Start: 2022-12-08 | End: 2022-12-13 | Stop reason: HOSPADM

## 2022-12-08 RX ORDER — LEVOTHYROXINE SODIUM 0.03 MG/1
25 TABLET ORAL
Status: DISCONTINUED | OUTPATIENT
Start: 2022-12-09 | End: 2022-12-13 | Stop reason: HOSPADM

## 2022-12-08 RX ORDER — DEXTROSE MONOHYDRATE 25 G/50ML
25 INJECTION, SOLUTION INTRAVENOUS
Status: DISCONTINUED | OUTPATIENT
Start: 2022-12-08 | End: 2022-12-13 | Stop reason: HOSPADM

## 2022-12-08 RX ORDER — FINASTERIDE 5 MG/1
5 TABLET, FILM COATED ORAL DAILY
Status: DISCONTINUED | OUTPATIENT
Start: 2022-12-09 | End: 2022-12-13 | Stop reason: HOSPADM

## 2022-12-08 RX ORDER — PANTOPRAZOLE SODIUM 40 MG/1
40 TABLET, DELAYED RELEASE ORAL DAILY
Status: DISCONTINUED | OUTPATIENT
Start: 2022-12-09 | End: 2022-12-09

## 2022-12-08 RX ORDER — IPRATROPIUM BROMIDE AND ALBUTEROL SULFATE 2.5; .5 MG/3ML; MG/3ML
3 SOLUTION RESPIRATORY (INHALATION)
Status: DISCONTINUED | OUTPATIENT
Start: 2022-12-09 | End: 2022-12-10

## 2022-12-08 RX ORDER — IPRATROPIUM BROMIDE AND ALBUTEROL SULFATE 2.5; .5 MG/3ML; MG/3ML
3 SOLUTION RESPIRATORY (INHALATION) EVERY 4 HOURS PRN
Status: DISCONTINUED | OUTPATIENT
Start: 2022-12-08 | End: 2022-12-10

## 2022-12-08 RX ORDER — NICOTINE POLACRILEX 4 MG
15 LOZENGE BUCCAL
Status: DISCONTINUED | OUTPATIENT
Start: 2022-12-08 | End: 2022-12-13 | Stop reason: HOSPADM

## 2022-12-08 RX ADMIN — SODIUM CHLORIDE 2000 ML: 9 INJECTION, SOLUTION INTRAVENOUS at 18:05

## 2022-12-08 RX ADMIN — ACETAMINOPHEN 1000 MG: 500 TABLET ORAL at 18:27

## 2022-12-08 RX ADMIN — Medication 10 ML: at 23:26

## 2022-12-08 RX ADMIN — VANCOMYCIN HYDROCHLORIDE 1750 MG: 10 INJECTION, POWDER, LYOPHILIZED, FOR SOLUTION INTRAVENOUS at 20:55

## 2022-12-08 RX ADMIN — TAZOBACTAM SODIUM AND PIPERACILLIN SODIUM 3.38 G: 375; 3 INJECTION, SOLUTION INTRAVENOUS at 20:31

## 2022-12-08 RX ADMIN — DOXYCYCLINE 100 MG: 100 INJECTION, POWDER, LYOPHILIZED, FOR SOLUTION INTRAVENOUS at 23:12

## 2022-12-08 RX ADMIN — SODIUM BICARBONATE 25 MEQ: 84 INJECTION INTRAVENOUS at 23:58

## 2022-12-08 NOTE — ED PROVIDER NOTES
Subjective   History of Present Illness  77-year-old male who presents for evaluation of syncopal/near syncopal episode.  The patient reportedly was trying to ambulate from his room to the bathroom when he became weak and fell to the ground.  The family does not feel like he truly lost consciousness but he did have incontinence of stool.  He reportedly began to have a lot of diarrhea the day.  He has had fatigue going on for the last 3 months.  He reports current cough and shortness of breath that has been present for at least the last 2 weeks.  No definitive sick contacts.  The cough has been productive.  No fever has been recorded but the patient feels warm on my evaluation and he had a oral temperature of 101.1 on my evaluation.  The patient does report urinary frequency and some dysuria.  He does not define exactly how long this been present but it may been present for several months.  Upon arrival here the ER the patient's blood pressure is low.  Oxygen saturations were also identified to be borderline at roughly 90%.  No previous history of oxygen dependence or underlying lung disease.        Review of Systems   Constitutional: Positive for activity change, appetite change, chills, fatigue and fever.   HENT: Positive for congestion and rhinorrhea. Negative for ear pain, postnasal drip, sinus pressure and sore throat.    Eyes: Negative for pain, redness and visual disturbance.   Respiratory: Positive for cough and shortness of breath. Negative for chest tightness.    Cardiovascular: Negative for chest pain, palpitations and leg swelling.   Gastrointestinal: Negative for abdominal pain, anal bleeding, blood in stool, diarrhea, nausea and vomiting.   Endocrine: Negative for polydipsia and polyuria.   Genitourinary: Positive for dysuria. Negative for difficulty urinating, frequency and urgency.   Musculoskeletal: Negative for arthralgias, back pain and neck pain.   Skin: Negative for pallor and rash.    Allergic/Immunologic: Negative for environmental allergies and immunocompromised state.   Neurological: Negative for dizziness, weakness and headaches.   Hematological: Negative for adenopathy.   Psychiatric/Behavioral: Negative for confusion, self-injury and suicidal ideas. The patient is not nervous/anxious.    All other systems reviewed and are negative.      Past Medical History:   Diagnosis Date   • Arthritis    • BPH (benign prostatic hypertrophy) 12/28/2016   • CAD (coronary artery disease)    • Disease of thyroid gland    • Enlarged prostate    • HFrEF (heart failure with reduced ejection fraction) ([facility])    • History of heart attack     X3 ABOUT    • History of transfusion     NO REACTION RECALLED    • Hypertension    • IHD (ischemic heart disease) 12/28/2016   • LBBB (left bundle branch block) 12/28/2016   • Lung nodule    • Macular degeneration    • Pancreatic cyst 12/8/2022   • Polio     as a child   • Pulmonary embolism (HCC)    • Pulmonary emphysema (HCC) 12/28/2016   • Sepsis (HCC)     A. Secondary to [place] trauma with hematuria and urosepsis requiring hospitalization May 2015   • Sleep apnea with use of continuous positive airway pressure (CPAP)     CPAP- SETTING 4    • Ventricular tachycardia 12/28/2016   • Wears glasses     READERS       No Known Allergies    Past Surgical History:   Procedure Laterality Date   • BIVENTRICULLAR IMPLANTABLE CARDIOVERTER DEFIBRILLATOR PLACEMENT     • CARDIAC CATHETERIZATION N/A 1/16/2017    Procedure: Left Heart Cath;  Surgeon: Diego Ayala MD;  Location:  [facility];  Service:    • CARDIAC DEFIBRILLATOR PLACEMENT       A. ICD generator change out with upgrade to BiV pacemaker implantable cardioverter    defibrillator, 12/17/2007. Ventricular fibrillation s/p pacesetter [employer] ICD in [place]. [place] 09/2000   • CARDIAC ELECTROPHYSIOLOGY PROCEDURE N/A 10/2/2017    Procedure: BIV ICD  generator change [facility];  Surgeon: Ferdinand Alba MD;  Location:   [facility];  Service:    • CARDIAC ELECTROPHYSIOLOGY PROCEDURE N/A 2021    Procedure: BIV ICD generator change with [employer]. This will need to be done in late  or early July. Hold Xarelto one day prior.;  Surgeon: Ferdinand Alba MD;  Location: Replaced by [facility];  Service: Cardiology;  Laterality: N/A;   • CATARACT EXTRACTION      Bilateral    • COLONOSCOPY     • CORONARY ANGIOPLASTY WITH STENT PLACEMENT      X7 STENTS TOTAL    • CYSTOSCOPY URETERAL TUMOR FULGURATION  2015     A. Status post cystoscopy with clot evacuation and fulguration of the prostate secondary to hematuria, 2015.   • FOOT SURGERY      RIGHT - POLIO RELATED    • PROSTATE SURGERY      A. Status post laser vaporization, 2009.   • ROOT CANAL     • TOOTH EXTRACTION         Family History   Problem Relation Age of Onset   • Heart failure Father    • Stroke Father        Social History     Socioeconomic History   • Marital status:    Tobacco Use   • Smoking status: Former     Packs/day: 2.00     Years: 30.00     Pack years: 60.00     Types: Cigarettes     Quit date: 1995     Years since quittin.7   • Smokeless tobacco: Never   Vaping Use   • Vaping Use: Never used   Substance and Sexual Activity   • Alcohol use: No   • Drug use: No   • Sexual activity: Defer           Objective   Physical Exam  Vitals and nursing note reviewed.   Constitutional:       General: He is in acute distress.      Appearance: He is well-developed. He is ill-appearing. He is not toxic-appearing or diaphoretic.      Comments: Warm to touch   HENT:      Head: Normocephalic and atraumatic.      Right Ear: External ear normal.      Left Ear: External ear normal.      Nose: Nose normal.   Eyes:      General: Lids are normal.      Pupils: Pupils are equal, round, and reactive to light.   Neck:      Trachea: No tracheal deviation.   Cardiovascular:      Rate and Rhythm: Normal rate and regular rhythm.      Pulses: No  decreased pulses.      Heart sounds: Normal heart sounds. No murmur heard.    No friction rub. No gallop.   Pulmonary:      Effort: Pulmonary effort is normal. No respiratory distress.      Breath sounds: Examination of the right-lower field reveals rales. Examination of the left-lower field reveals rales. Rales present. No decreased breath sounds, wheezing or rhonchi.   Abdominal:      General: Bowel sounds are normal.      Palpations: Abdomen is soft.      Tenderness: There is no abdominal tenderness. There is no guarding or rebound.   Musculoskeletal:         General: No deformity. Normal range of motion.      Cervical back: Normal range of motion and neck supple.   Lymphadenopathy:      Cervical: No cervical adenopathy.   Skin:     General: Skin is warm and dry.      Findings: No rash.   Neurological:      Mental Status: He is oriented to person, place, and time. He is lethargic.      GCS: GCS eye subscore is 4. GCS verbal subscore is 4. GCS motor subscore is 6.      Cranial Nerves: No cranial nerve deficit.      Sensory: No sensory deficit.   Psychiatric:         Speech: Speech normal.         Behavior: Behavior normal.         Thought Content: Thought content normal.         Judgment: Judgment normal.         Critical Care  Performed by: Alex Reyes MD  Authorized by: Alex Reyes MD     Critical care provider statement:     Critical care time (minutes):  60    Critical care time was exclusive of:  Separately billable procedures and treating other patients    Critical care was necessary to treat or prevent imminent or life-threatening deterioration of the following conditions:  Sepsis, shock and dehydration    Critical care was time spent personally by me on the following activities:  Development of treatment plan with patient or surrogate, discussions with consultants, evaluation of patient's response to treatment, obtaining history from patient or surrogate, examination of patient,  ordering and performing treatments and interventions, ordering and review of laboratory studies, ordering and review of radiographic studies, pulse oximetry, re-evaluation of patient's condition and review of old charts    Procedural Sedation    Date/Time: 2022 12:15 AM  Performed by: Alex Reyes MD  Authorized by: Alex Reyes MD     Consent:     Consent obtained:  Verbal and written    Consent given by:  Patient and spouse    Risks, benefits, and alternatives were discussed: yes      Risks discussed:  Dysrhythmia, inadequate sedation, nausea, vomiting, respiratory compromise necessitating ventilatory assistance and intubation and prolonged hypoxia resulting in organ damage    Alternatives discussed:  Analgesia without sedation  Universal protocol:     Procedure explained and questions answered to patient or proxy's satisfaction: yes      Relevant documents present and verified: yes      Test results available: yes      Imaging studies available: yes      Required blood products, implants, devices, and special equipment available: yes      Site/side marked: yes      Immediately prior to procedure, a time out was called: yes      Patient identity confirmed:  Verbally with patient and arm band  Indications:     Procedure performed:  Cardioversion    Procedure necessitating sedation performed by:  Physician performing sedation    Intended level of sedation:  Moderate  Pre-sedation assessment:     Time since last food or drink:  Greater than 6 hours    ASA classification: class 4 - patient with severe systemic disease that is a constant threat to life      Mouth openin finger widths    Thyromental distance:  3 finger widths    Mallampati score:  II - soft palate, uvula, fauces visible    Neck mobility: normal      Pre-sedation assessments completed and reviewed: anesthesia/sedation history, cardiovascular function, mental status, nausea/vomiting, pain level and respiratory function       History of difficult intubation: no    Immediate pre-procedure details:     Reassessment: Patient reassessed immediately prior to procedure      Verified: bag valve mask available, emergency equipment available, intubation equipment available, IV patency confirmed, oxygen available and suction available    Procedure details (see MAR for exact dosages):     Sedation start time:  12/9/2022 12:34 AM    Preoxygenation:  Nonrebreather mask    Sedation:  Propofol    Analgesia:  None    Intra-procedure monitoring:  Blood pressure monitoring, cardiac monitor, continuous capnometry, continuous pulse oximetry, frequent LOC assessments and frequent vital sign checks    Intra-procedure events: none      Sedation end time:  12/9/2022 4:08 PM    Total sedation time (minutes):  15  Post-procedure details:     Attendance: Constant attendance by certified staff until patient recovered      Recovery: Patient returned to pre-procedure baseline      Estimated blood loss (see I/O flowsheets): no      Complications:  None    Post-sedation assessments completed and reviewed: airway patency, cardiovascular function, hydration status, mental status, nausea/vomiting and respiratory function      Specimens recovered:  None    Patient is stable for discharge or admission: yes      Procedure completion:  Tolerated well, no immediate complications  Electrical Cardioversion    Date/Time: 12/9/2022 12:31 AM  Performed by: Alex Reyes MD  Authorized by: Alex Reyes MD     Consent:     Consent obtained:  Verbal    Consent given by:  Patient and spouse    Risks, benefits, and alternatives were discussed: yes      Risks discussed:  Cutaneous burn, death, induced arrhythmia and pain    Alternatives discussed:  No treatment, rate-control medication, alternative treatment, delayed treatment and observation  Universal protocol:     Procedure explained and questions answered to patient or proxy's satisfaction: yes      Relevant  documents present and verified: yes      Test results available: yes      Imaging studies available: yes      Required blood products, implants, devices, and special equipment available: yes      Site/side marked: yes      Immediately prior to procedure, a time out was called: yes      Patient identity confirmed:  Verbally with patient and arm band  Pre-procedure details:     Cardioversion basis:  Emergent    Rhythm:  Ventricular tachycardia    Electrode placement:  Anterior-posterior  Attempt one:     Cardioversion mode:  Synchronous    Waveform:  Biphasic    Shock (Joules):  200    Shock outcome:  Conversion to other rhythm (conversion to v-paced rhythm)  Post-procedure details:     Patient status:  Awake    Procedure completion:  Tolerated well, no immediate complications               ED Course                                           MDM  Number of Diagnoses or Management Options  Acute urinary tract infection: new and requires workup  Hypoxia: new and requires workup  Influenza A: new and requires workup  Sepsis with encephalopathy and septic shock, due to unspecified organism (HCC): new and requires workup  Syncope, unspecified syncope type: new and requires workup  Ventricular tachycardia: new and requires workup  Diagnosis management comments: The patient's labs show a significant elevated liver function test into the thousands.  He denies a history of hepatitis, IV drug use, or alcohol abuse.  He denies any surgeries to his abdomen.  He still has his gallbladder.  He denies any right upper quadrant pain.  If anything he has more pain throughout the suprapubic and left side of the abdomen.  He does admit that secondary to arthritis he takes 2 Tylenol arthritis pills once daily and has been on this for several years.    Labs also show the patient be positive for influenza which I feel is accounting for the patient's current respiratory symptoms and hypoxia.  Urine shows bacteria which could be contributing  to current sepsis.    I discussed the patient with the hospitalist.  The hospitalist consulted on the patient to determine status of admission.    After the patient was admitted to the hospital service he began to have runs of V. tach and the patient's internal defibrillator began to fire.  Eventually patient settled into ventricular tachycardia with a heart rate in the 120s which was below the threshold for his defibrillator.  He was noted to be hypotensive with systolics between 75-85.  He was somnolent but mentation remained intact.    Further discussion was had with Dr. Sanchez of the cardiology service who recommended lidocaine bolus and lidocaine drip.  This was initiated but the patient continued to remain in V. tach with a heart rate in the 120s with systolics around 80s.    I discussed risk and benefits of sedation and synchronized cardioversion with the patient.  The patient and family both agreed.  The patient was sedated with propofol which was accomplished successfully and synchronized cardioversion was performed at 200 J and the patient converted to a ventricular paced rhythm with a heart rate between 70-80.  His subsequent systolic blood pressures improved to roughly 110.  His overall color appearance and mentation likewise improved.    Given the patient's current unstable state and felt ICU admission was more appropriate.  I discussed the patient with the intensivist, Dr. Cain.  The intensivist service will admit for further care.       Amount and/or Complexity of Data Reviewed  Clinical lab tests: ordered and reviewed  Tests in the radiology section of CPT®: ordered and reviewed  Decide to obtain previous medical records or to obtain history from someone other than the patient: yes  Obtain history from someone other than the patient: yes  Review and summarize past medical records: yes  Discuss the patient with other providers: yes  Independent visualization of images, tracings, or specimens:  yes        Final diagnoses:   Acute urinary tract infection   Sepsis with encephalopathy and septic shock, due to unspecified organism ([facility])   Influenza A   Hypoxia   Syncope, unspecified syncope type   Ventricular tachycardia       ED Disposition  ED Disposition     ED Disposition   Decision to Admit    Condition   --    Comment   Level of Care: Critical Care [6]   Diagnosis: CHF (congestive heart failure) ([facility]) [[id number]]   Admitting Physician: MARCOS CANTU [[id number]]   Attending Physician: MARCOS CANTU [[id number]]   Certification: I Certify That Inpatient Hospital Services Are Medically Necessary For Greater Than 2 Midnights               No follow-up provider specified.       Medication List      No changes were made to your prescriptions during this visit.          Alex Reyes MD  12/10/22 0045

## 2022-12-09 ENCOUNTER — APPOINTMENT (OUTPATIENT)
Dept: ULTRASOUND IMAGING | Facility: HOSPITAL | Age: 77
End: 2022-12-09

## 2022-12-09 PROBLEM — Z95.810 ICD (IMPLANTABLE CARDIOVERTER-DEFIBRILLATOR) IN PLACE: Status: ACTIVE | Noted: 2022-12-08

## 2022-12-09 PROBLEM — J96.01 ACUTE RESPIRATORY FAILURE WITH HYPOXIA: Status: ACTIVE | Noted: 2022-12-09

## 2022-12-09 PROBLEM — R79.89 ELEVATED LFTS: Status: ACTIVE | Noted: 2022-12-09

## 2022-12-09 PROBLEM — I47.20 VENTRICULAR TACHYCARDIA: Status: ACTIVE | Noted: 2022-09-10

## 2022-12-09 PROBLEM — I50.9 CHF (CONGESTIVE HEART FAILURE): Status: ACTIVE | Noted: 2022-12-09

## 2022-12-09 PROBLEM — K86.2 PANCREATIC CYST: Status: RESOLVED | Noted: 2022-12-08 | Resolved: 2022-12-09

## 2022-12-09 PROBLEM — I25.5 ISCHEMIC CARDIOMYOPATHY: Status: ACTIVE | Noted: 2022-12-09

## 2022-12-09 PROBLEM — I50.9 CHF (CONGESTIVE HEART FAILURE): Status: RESOLVED | Noted: 2022-12-09 | Resolved: 2022-12-09

## 2022-12-09 PROBLEM — N39.0 ACUTE URINARY TRACT INFECTION: Status: RESOLVED | Noted: 2022-12-08 | Resolved: 2022-12-09

## 2022-12-09 LAB
ALBUMIN SERPL-MCNC: 3.4 G/DL (ref 3.5–5.2)
ALBUMIN/GLOB SERPL: 1.3 G/DL
ALP SERPL-CCNC: 77 U/L (ref 39–117)
ALT SERPL W P-5'-P-CCNC: 3192 U/L (ref 1–41)
ANION GAP SERPL CALCULATED.3IONS-SCNC: 13 MMOL/L (ref 5–15)
ARTERIAL PATENCY WRIST A: ABNORMAL
AST SERPL-CCNC: 6996 U/L (ref 1–40)
ATMOSPHERIC PRESS: ABNORMAL MM[HG]
BASE EXCESS BLDA CALC-SCNC: -5.7 MMOL/L (ref 0–2)
BASOPHILS # BLD AUTO: 0.03 10*3/MM3 (ref 0–0.2)
BASOPHILS NFR BLD AUTO: 0.4 % (ref 0–1.5)
BDY SITE: ABNORMAL
BILIRUB SERPL-MCNC: 0.6 MG/DL (ref 0–1.2)
BODY TEMPERATURE: 37 C
BUN SERPL-MCNC: 18 MG/DL (ref 8–23)
BUN/CREAT SERPL: 29 (ref 7–25)
CALCIUM SPEC-SCNC: 7.9 MG/DL (ref 8.6–10.5)
CHLORIDE SERPL-SCNC: 109 MMOL/L (ref 98–107)
CO2 BLDA-SCNC: 19.7 MMOL/L (ref 22–33)
CO2 SERPL-SCNC: 19 MMOL/L (ref 22–29)
COHGB MFR BLD: 0.6 % (ref 0–2)
CREAT SERPL-MCNC: 0.62 MG/DL (ref 0.76–1.27)
DEPRECATED RDW RBC AUTO: 51.6 FL (ref 37–54)
EGFRCR SERPLBLD CKD-EPI 2021: 97.8 ML/MIN/1.73
EOSINOPHIL # BLD AUTO: 0.04 10*3/MM3 (ref 0–0.4)
EOSINOPHIL NFR BLD AUTO: 0.6 % (ref 0.3–6.2)
EPAP: 0
ERYTHROCYTE [DISTWIDTH] IN BLOOD BY AUTOMATED COUNT: 15.1 % (ref 12.3–15.4)
GLOBULIN UR ELPH-MCNC: 2.6 GM/DL
GLUCOSE BLDC GLUCOMTR-MCNC: 83 MG/DL (ref 70–130)
GLUCOSE SERPL-MCNC: 85 MG/DL (ref 65–99)
HBA1C MFR BLD: 5.6 % (ref 4.8–5.6)
HCO3 BLDA-SCNC: 18.7 MMOL/L (ref 20–26)
HCT VFR BLD AUTO: 37.6 % (ref 37.5–51)
HCT VFR BLD CALC: 37 % (ref 38–51)
HGB BLD-MCNC: 11.8 G/DL (ref 13–17.7)
HGB BLDA-MCNC: 12.1 G/DL (ref 13.5–17.5)
IMM GRANULOCYTES # BLD AUTO: 0.03 10*3/MM3 (ref 0–0.05)
IMM GRANULOCYTES NFR BLD AUTO: 0.4 % (ref 0–0.5)
INHALED O2 CONCENTRATION: 28 %
IPAP: 0
L PNEUMO1 AG UR QL IA: NEGATIVE
LYMPHOCYTES # BLD AUTO: 1.26 10*3/MM3 (ref 0.7–3.1)
LYMPHOCYTES NFR BLD AUTO: 17.6 % (ref 19.6–45.3)
MAGNESIUM SERPL-MCNC: 2.4 MG/DL (ref 1.6–2.4)
MCH RBC QN AUTO: 29 PG (ref 26.6–33)
MCHC RBC AUTO-ENTMCNC: 31.4 G/DL (ref 31.5–35.7)
MCV RBC AUTO: 92.4 FL (ref 79–97)
METHGB BLD QL: 0.4 % (ref 0–1.5)
MODALITY: ABNORMAL
MONOCYTES # BLD AUTO: 0.28 10*3/MM3 (ref 0.1–0.9)
MONOCYTES NFR BLD AUTO: 3.9 % (ref 5–12)
MRSA DNA SPEC QL NAA+PROBE: NEGATIVE
NEUTROPHILS NFR BLD AUTO: 5.5 10*3/MM3 (ref 1.7–7)
NEUTROPHILS NFR BLD AUTO: 77.1 % (ref 42.7–76)
NOTE: ABNORMAL
NRBC BLD AUTO-RTO: 0 /100 WBC (ref 0–0.2)
OXYHGB MFR BLDV: 93.5 % (ref 94–99)
PAW @ PEAK INSP FLOW SETTING VENT: 0 CMH2O
PCO2 BLDA: 32.5 MM HG (ref 35–45)
PCO2 TEMP ADJ BLD: 32.5 MM HG (ref 35–48)
PH BLDA: 7.37 PH UNITS (ref 7.35–7.45)
PH, TEMP CORRECTED: 7.37 PH UNITS
PHOSPHATE SERPL-MCNC: 4.6 MG/DL (ref 2.5–4.5)
PLATELET # BLD AUTO: 158 10*3/MM3 (ref 140–450)
PMV BLD AUTO: 9.7 FL (ref 6–12)
PO2 BLDA: 75.2 MM HG (ref 83–108)
PO2 TEMP ADJ BLD: 75.2 MM HG (ref 83–108)
POTASSIUM SERPL-SCNC: 3.3 MMOL/L (ref 3.5–5.2)
POTASSIUM SERPL-SCNC: 4.2 MMOL/L (ref 3.5–5.2)
PROT SERPL-MCNC: 6 G/DL (ref 6–8.5)
QT INTERVAL: 210 MS
QTC INTERVAL: 324 MS
RBC # BLD AUTO: 4.07 10*6/MM3 (ref 4.14–5.8)
S PNEUM AG SPEC QL LA: NEGATIVE
SODIUM SERPL-SCNC: 141 MMOL/L (ref 136–145)
TOTAL RATE: 0 BREATHS/MINUTE
WBC NRBC COR # BLD: 7.14 10*3/MM3 (ref 3.4–10.8)

## 2022-12-09 PROCEDURE — 25010000002 MAGNESIUM SULFATE IN D5W 1G/100ML (PREMIX) 1-5 GM/100ML-% SOLUTION: Performed by: INTERNAL MEDICINE

## 2022-12-09 PROCEDURE — 83036 HEMOGLOBIN GLYCOSYLATED A1C: CPT | Performed by: NURSE PRACTITIONER

## 2022-12-09 PROCEDURE — 76700 US EXAM ABDOM COMPLETE: CPT

## 2022-12-09 PROCEDURE — 83735 ASSAY OF MAGNESIUM: CPT | Performed by: NURSE PRACTITIONER

## 2022-12-09 PROCEDURE — 25010000002 PROPOFOL 1000 MG/100ML EMULSION

## 2022-12-09 PROCEDURE — 93005 ELECTROCARDIOGRAM TRACING: CPT | Performed by: NURSE PRACTITIONER

## 2022-12-09 PROCEDURE — 25010000002 LIDOCAINE PER 10 MG: Performed by: INTERNAL MEDICINE

## 2022-12-09 PROCEDURE — 25010000002 PIPERACILLIN SOD-TAZOBACTAM PER 1 G: Performed by: INTERNAL MEDICINE

## 2022-12-09 PROCEDURE — 80053 COMPREHEN METABOLIC PANEL: CPT | Performed by: NURSE PRACTITIONER

## 2022-12-09 PROCEDURE — 94640 AIRWAY INHALATION TREATMENT: CPT

## 2022-12-09 PROCEDURE — 99291 CRITICAL CARE FIRST HOUR: CPT | Performed by: INTERNAL MEDICINE

## 2022-12-09 PROCEDURE — 84100 ASSAY OF PHOSPHORUS: CPT

## 2022-12-09 PROCEDURE — 82805 BLOOD GASES W/O2 SATURATION: CPT

## 2022-12-09 PROCEDURE — 36600 WITHDRAWAL OF ARTERIAL BLOOD: CPT

## 2022-12-09 PROCEDURE — 83050 HGB METHEMOGLOBIN QUAN: CPT

## 2022-12-09 PROCEDURE — 94799 UNLISTED PULMONARY SVC/PX: CPT

## 2022-12-09 PROCEDURE — 93005 ELECTROCARDIOGRAM TRACING: CPT | Performed by: EMERGENCY MEDICINE

## 2022-12-09 PROCEDURE — 85025 COMPLETE CBC W/AUTO DIFF WBC: CPT | Performed by: NURSE PRACTITIONER

## 2022-12-09 PROCEDURE — 87641 MR-STAPH DNA AMP PROBE: CPT | Performed by: INTERNAL MEDICINE

## 2022-12-09 PROCEDURE — 93010 ELECTROCARDIOGRAM REPORT: CPT | Performed by: INTERNAL MEDICINE

## 2022-12-09 PROCEDURE — 82375 ASSAY CARBOXYHB QUANT: CPT

## 2022-12-09 PROCEDURE — 84132 ASSAY OF SERUM POTASSIUM: CPT | Performed by: INTERNAL MEDICINE

## 2022-12-09 RX ORDER — PROPOFOL 10 MG/ML
200 VIAL (ML) INTRAVENOUS ONCE
Status: COMPLETED | OUTPATIENT
Start: 2022-12-09 | End: 2022-12-09

## 2022-12-09 RX ORDER — SODIUM CHLORIDE 9 MG/ML
40 INJECTION, SOLUTION INTRAVENOUS AS NEEDED
Status: DISCONTINUED | OUTPATIENT
Start: 2022-12-09 | End: 2022-12-13 | Stop reason: HOSPADM

## 2022-12-09 RX ORDER — PANTOPRAZOLE SODIUM 40 MG/10ML
40 INJECTION, POWDER, LYOPHILIZED, FOR SOLUTION INTRAVENOUS
Status: DISCONTINUED | OUTPATIENT
Start: 2022-12-09 | End: 2022-12-10

## 2022-12-09 RX ORDER — PROPOFOL 10 MG/ML
INJECTION, EMULSION INTRAVENOUS
Status: COMPLETED
Start: 2022-12-09 | End: 2022-12-09

## 2022-12-09 RX ORDER — MAGNESIUM SULFATE 1 G/100ML
1 INJECTION INTRAVENOUS ONCE
Status: COMPLETED | OUTPATIENT
Start: 2022-12-09 | End: 2022-12-09

## 2022-12-09 RX ORDER — POTASSIUM CHLORIDE 1.5 G/1.77G
40 POWDER, FOR SOLUTION ORAL AS NEEDED
Status: DISCONTINUED | OUTPATIENT
Start: 2022-12-09 | End: 2022-12-13 | Stop reason: HOSPADM

## 2022-12-09 RX ORDER — LIDOCAINE HYDROCHLORIDE ANHYDROUS AND DEXTROSE MONOHYDRATE 5; 400 G/100ML; MG/100ML
1 INJECTION, SOLUTION INTRAVENOUS CONTINUOUS
Status: DISCONTINUED | OUTPATIENT
Start: 2022-12-09 | End: 2022-12-10

## 2022-12-09 RX ORDER — POTASSIUM CHLORIDE 7.45 MG/ML
10 INJECTION INTRAVENOUS
Status: DISCONTINUED | OUTPATIENT
Start: 2022-12-09 | End: 2022-12-13 | Stop reason: HOSPADM

## 2022-12-09 RX ORDER — SODIUM CHLORIDE 0.9 % (FLUSH) 0.9 %
10 SYRINGE (ML) INJECTION AS NEEDED
Status: DISCONTINUED | OUTPATIENT
Start: 2022-12-09 | End: 2022-12-13 | Stop reason: HOSPADM

## 2022-12-09 RX ORDER — SODIUM CHLORIDE 0.9 % (FLUSH) 0.9 %
10 SYRINGE (ML) INJECTION EVERY 12 HOURS SCHEDULED
Status: DISCONTINUED | OUTPATIENT
Start: 2022-12-09 | End: 2022-12-13 | Stop reason: HOSPADM

## 2022-12-09 RX ORDER — POTASSIUM CHLORIDE 750 MG/1
40 CAPSULE, EXTENDED RELEASE ORAL AS NEEDED
Status: DISCONTINUED | OUTPATIENT
Start: 2022-12-09 | End: 2022-12-13 | Stop reason: HOSPADM

## 2022-12-09 RX ADMIN — PROPOFOL 50 MG: 10 INJECTION, EMULSION INTRAVENOUS at 00:46

## 2022-12-09 RX ADMIN — TAZOBACTAM SODIUM AND PIPERACILLIN SODIUM 3.38 G: 375; 3 INJECTION, SOLUTION INTRAVENOUS at 12:30

## 2022-12-09 RX ADMIN — Medication 10 ML: at 03:09

## 2022-12-09 RX ADMIN — SACUBITRIL AND VALSARTAN 1 TABLET: 49; 51 TABLET, FILM COATED ORAL at 20:15

## 2022-12-09 RX ADMIN — DOXYCYCLINE 100 MG: 100 INJECTION, POWDER, LYOPHILIZED, FOR SOLUTION INTRAVENOUS at 11:11

## 2022-12-09 RX ADMIN — TAZOBACTAM SODIUM AND PIPERACILLIN SODIUM 3.38 G: 375; 3 INJECTION, SOLUTION INTRAVENOUS at 18:18

## 2022-12-09 RX ADMIN — EMPAGLIFLOZIN 10 MG: 10 TABLET, FILM COATED ORAL at 11:08

## 2022-12-09 RX ADMIN — CLOPIDOGREL BISULFATE 75 MG: 75 TABLET ORAL at 11:08

## 2022-12-09 RX ADMIN — RIVAROXABAN 10 MG: 10 TABLET, FILM COATED ORAL at 11:08

## 2022-12-09 RX ADMIN — POTASSIUM CHLORIDE 40 MEQ: 750 CAPSULE, EXTENDED RELEASE ORAL at 06:52

## 2022-12-09 RX ADMIN — LEVOTHYROXINE SODIUM 25 MCG: 25 TABLET ORAL at 05:41

## 2022-12-09 RX ADMIN — SACUBITRIL AND VALSARTAN 1 TABLET: 49; 51 TABLET, FILM COATED ORAL at 11:07

## 2022-12-09 RX ADMIN — POTASSIUM CHLORIDE 40 MEQ: 750 CAPSULE, EXTENDED RELEASE ORAL at 11:06

## 2022-12-09 RX ADMIN — DOXYCYCLINE 100 MG: 100 INJECTION, POWDER, LYOPHILIZED, FOR SOLUTION INTRAVENOUS at 20:15

## 2022-12-09 RX ADMIN — TAZOBACTAM SODIUM AND PIPERACILLIN SODIUM 3.38 G: 375; 3 INJECTION, SOLUTION INTRAVENOUS at 03:04

## 2022-12-09 RX ADMIN — Medication 50 MG: at 00:46

## 2022-12-09 RX ADMIN — Medication 10 ML: at 10:00

## 2022-12-09 RX ADMIN — PANTOPRAZOLE SODIUM 40 MG: 40 INJECTION, POWDER, FOR SOLUTION INTRAVENOUS at 05:41

## 2022-12-09 RX ADMIN — FINASTERIDE 5 MG: 5 TABLET, FILM COATED ORAL at 11:25

## 2022-12-09 RX ADMIN — SODIUM CHLORIDE 50 ML/HR: 9 INJECTION, SOLUTION INTRAVENOUS at 02:01

## 2022-12-09 RX ADMIN — POTASSIUM CHLORIDE 20 MEQ: 750 CAPSULE, EXTENDED RELEASE ORAL at 02:02

## 2022-12-09 RX ADMIN — LIDOCAINE HYDROCHLORIDE 2 MG/MIN: 4 INJECTION, SOLUTION INTRAVENOUS at 00:13

## 2022-12-09 RX ADMIN — IPRATROPIUM BROMIDE AND ALBUTEROL SULFATE 3 ML: .5; 3 SOLUTION RESPIRATORY (INHALATION) at 19:48

## 2022-12-09 RX ADMIN — MAGNESIUM SULFATE HEPTAHYDRATE 1 G: 1 INJECTION, SOLUTION INTRAVENOUS at 02:02

## 2022-12-09 RX ADMIN — Medication 10 ML: at 20:15

## 2022-12-09 NOTE — CASE MANAGEMENT/SOCIAL WORK
Discharge Planning Assessment  [facility]     Patient Name: Jonnie Roth  MRN: [id number]  Today's Date: 12/9/2022    Admit Date: 12/8/2022    Plan: Ongoing   Discharge Needs Assessment     Row Name 12/09/22 1333       Living Environment    People in Home spouse    Current Living Arrangements home    Primary Care Provided by self    Provides Primary Care For no one    Family Caregiver if Needed spouse    Quality of Family Relationships supportive    Able to Return to Prior Arrangements yes       Resource/Environmental Concerns    Resource/Environmental Concerns none    Transportation Concerns none       Transition Planning    Patient/Family Anticipates Transition to home with family    Patient/Family Anticipated Services at Transition none    Transportation Anticipated family or friend will provide       Discharge Needs Assessment    Readmission Within the Last 30 Days no previous admission in last 30 days    Equipment Currently Used at Home cpap;cane, straight;walker, standard    Concerns to be Addressed discharge planning    Anticipated Changes Related to Illness none    Equipment Needed After Discharge none               Discharge Plan     Row Name 12/09/22 8509       Plan    Plan Ongoing    Patient/Family in Agreement with Plan yes    Plan Comments Spoke with patient and wife at bedside.  Patient resides in [facility] and lives with his wife.  Prior to admission patient states he was independent with ADL's and mobility.  Patient has a CPAP, cane and walker at home.  Prior to admission patient was not using his cane or walker.  Patient is not current with home health.  Patient confirms his PCP is Brian Lewis and that he has Medicare A&B with [employer] secondary.  Patient has stated he feels unsteady at times.  PT/ OT consults are pending.  At this time patient is planning on returning home upon discharge.  Discharge plan is ongoing pending PT/OT recommendations.  CM will continue to follow.               Continued Care and Services - Admitted Since 12/8/2022    Coordination has not been started for this encounter.       Expected Discharge Date and Time     Expected Discharge Date Expected Discharge Time    Dec 16, 2022          Demographic Summary     Row Name 12/09/22 1333       General Information    Admission Type inpatient    Arrived From home    Referral Source admission list    Reason for Consult discharge planning    Preferred Language English       Contact Information    Permission Granted to Share Info With                Functional Status    No documentation.                Psychosocial    No documentation.                Abuse/Neglect    No documentation.                Legal    No documentation.                Substance Abuse    No documentation.                Patient Forms    No documentation.                   Stephanie Ferrell RN

## 2022-12-09 NOTE — H&P
[facility] Medicine Services  HISTORY AND PHYSICAL    Patient Name: Jonnie Roth  : 1945  MRN: [id number]  Primary Care Physician: Brian Lewis MD  Date of admission: 2022    Subjective   Subjective     Chief Complaint:  Generalized weakness    HPI:  Jonnie Roth is a 77 y.o. male w/ a hx of CAD (s/p stents), hx of V-fib on amiodarone, systolic CHF (EF <20%), s/p ICD, HTN, HLD, GEO w/ CPAP, hypothyroidism, T2DM, GERD, COPD, remote tobacco use, BPH who presented to the ED w/ c/o generalized weakness.   Pt reports that he has not been feeling well for ~2-3 days. He is currently attending cardiac rehab on . He reports that he attended rehab on Monday w/ no issue but then started to feel poorly on Tuesday. Pt c/o profound generalized weakness. He was unable to get off the couch today 2/2 weakness. He notes that he did have a fall today, no LOC or head trauma. Pt also c/o feeling chilled and feverish, feeling dehydrated, decreased urine output, dizziness and today had two episodes of diarrhea. Pt c/o a non-productive cough but denies worsening shortness of breath from his baseline. Pt also c/o intermittent dysuria x 2-3 months now.   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   Pt denies known fever, chest pain, worsening dyspnea, N/V, abdominal pain, edema, syncope.   Pt evaluated in the ED. CT abd/pel, CXR concerning for PNA. UA concerning for UTI. LFTs significantly elevated. Acute hepatitis panel negative. Influenza A positive. Pt admitted to the hospital medicine service for further evaluation.     Review of Systems   Constitutional: Positive for chills and fatigue. Negative for diaphoresis and unexpected weight change.   HENT: Negative.  Negative for congestion, postnasal drip, rhinorrhea, sinus pressure, sinus pain, sneezing, sore throat and trouble swallowing.    Eyes: Negative.  Negative for  visual disturbance.   Respiratory: Positive for cough. Negative for chest tightness, shortness of breath and wheezing.         Chronic shortness of breath; no worse than baseline.    Cardiovascular: Negative.  Negative for chest pain, palpitations and leg swelling.   Gastrointestinal: Positive for diarrhea. Negative for abdominal distention, abdominal pain, blood in stool, constipation, nausea and vomiting.        Diarrhea x 2 episodes today.    Genitourinary: Positive for decreased urine volume and dysuria. Negative for difficulty urinating, flank pain, frequency, hematuria and urgency.   Musculoskeletal: Negative for arthralgias, myalgias, neck pain and neck stiffness.        Generalized weakness.    Skin: Negative.  Negative for wound.   Allergic/Immunologic: Negative.  Negative for immunocompromised state.   Neurological: Positive for dizziness. Negative for tremors, seizures, syncope, facial asymmetry, speech difficulty, weakness, light-headedness, numbness and headaches.   Hematological: Negative.  Does not bruise/bleed easily.   Psychiatric/Behavioral: Negative.  Negative for confusion.   All other systems reviewed and are negative.     Personal History     Past Medical History:   Diagnosis Date   • Arthritis    • BPH (benign prostatic hypertrophy) 12/28/2016   • CAD (coronary artery disease)    • Disease of thyroid gland    • Enlarged prostate    • HFrEF (heart failure with reduced ejection fraction) ([facility])    • History of heart attack     X3 ABOUT    • History of transfusion     NO REACTION RECALLED    • Hypertension    • IHD (ischemic heart disease) 12/28/2016   • LBBB (left bundle branch block) 12/28/2016   • Lung nodule    • Macular degeneration    • Polio     as a child   • Pulmonary embolism (HCC)    • Pulmonary emphysema (HCC) 12/28/2016   • Sepsis (HCC)     A. Secondary to [place] trauma with hematuria and urosepsis requiring hospitalization May 2015   • Sleep apnea with use of continuous positive  airway pressure (CPAP)     CPAP- SETTING 4    • Ventricular tachycardia 12/28/2016   • Wears glasses     READERS     Past Surgical History:   Procedure Laterality Date   • BIVENTRICULLAR IMPLANTABLE CARDIOVERTER DEFIBRILLATOR PLACEMENT     • CARDIAC CATHETERIZATION N/A 1/16/2017    Procedure: Left Heart Cath;  Surgeon: Diego Ayala MD;  Location:  [facility];  Service:    • CARDIAC DEFIBRILLATOR PLACEMENT       A. ICD generator change out with upgrade to BiV pacemaker implantable cardioverter    defibrillator, 12/17/2007. Ventricular fibrillation s/p pacesetter [place] ICD in [place]. [place] 09/2000   • CARDIAC ELECTROPHYSIOLOGY PROCEDURE N/A 10/2/2017    Procedure: BIV ICD  generator change [place];  Surgeon: Ferdinand Alba MD;  Location:  [facility];  Service:    • CARDIAC ELECTROPHYSIOLOGY PROCEDURE N/A 7/6/2021    Procedure: BIV ICD generator change with [employer]. This will need to be done in late June or early July. Hold Xarelto one day prior.;  Surgeon: Ferdinand Alba MD;  Location:  [facility];  Service: Cardiology;  Laterality: N/A;   • CATARACT EXTRACTION      Bilateral    • COLONOSCOPY     • CORONARY ANGIOPLASTY WITH STENT PLACEMENT      X7 STENTS TOTAL    • CYSTOSCOPY URETERAL TUMOR FULGURATION  05/29/2015     A. Status post cystoscopy with clot evacuation and fulguration of the prostate secondary to hematuria, 5/29/2015.   • FOOT SURGERY      RIGHT - POLIO RELATED    • PROSTATE SURGERY      A. Status post laser vaporization, 08/19/2009.   • ROOT CANAL     • TOOTH EXTRACTION       Family History:  family history includes Heart failure in his father; Stroke in his father. Otherwise pertinent FHx was reviewed and unremarkable.     Social History:  reports that he quit smoking about 27 years ago. His smoking use included cigarettes. He has a 60.00 pack-year smoking history. He has never used smokeless tobacco. He reports that he does not drink alcohol and does  not use drugs.  Social History     Social History Narrative   • Not on file     Medications:  Multiple Vitamins-Minerals, Tryptophan, acetaminophen, amiodarone, carvedilol, cholecalciferol, clopidogrel, econazole nitrate, empagliflozin, eplerenone, finasteride, levothyroxine, melatonin, metFORMIN, multivitamin with minerals, nitroglycerin, pantoprazole, polyethylene glycol, rivaroxaban, rosuvastatin, sacubitril-valsartan, temazepam, torsemide, and vitamin B-12    No Known Allergies    Objective   Objective     Vital Signs:   Temp:  [97.8 °F (36.6 °C)] 97.8 °F (36.6 °C)  Heart Rate:  [69-70] 70  Resp:  [16-18] 16  BP: (101-108)/(54-57) 101/54    Physical Exam     Constitutional: Awake, alert; ill appearing   Eyes: PERRLA, sclerae anicteric, no conjunctival injection  HENT: NCAT, mucous membranes dry   Neck: Supple, no thyromegaly, no lymphadenopathy, trachea midline  Respiratory: mildly diminished bilateral lower lobes, nonlabored respirations   Cardiovascular: RRR, no murmurs, rubs, or gallops, no peripheral edema   Gastrointestinal: Positive bowel sounds, soft, nontender, nondistended  Musculoskeletal: Normal ROM bilaterally; pt w/ generalized weakness   Psychiatric: Appropriate affect, cooperative  Neurologic: Oriented x 3, strength symmetric in all extremities, Cranial Nerves grossly intact to confrontation, speech clear  Skin: No rashes, lesions or wounds     Result Review:  I have personally reviewed the results from the time of this admission to 12/8/2022 22:04 EST and agree with these findings:  [x]  Laboratory list / accordion  []  Microbiology  [x]  Radiology  [x]  EKG/Telemetry   []  Cardiology/Vascular   []  Pathology  [x]  Old records    LAB RESULTS:      Lab 12/08/22 2013 12/08/22  1538   WBC  --  9.93   HEMOGLOBIN  --  12.8*   HEMATOCRIT  --  40.4   PLATELETS  --  194   NEUTROS ABS  --  8.48*   IMMATURE GRANS (ABS)  --  0.04   LYMPHS ABS  --  0.90   MONOS ABS  --  0.43   EOS ABS  --  0.04   MCV  --   91.2   PROCALCITONIN 0.37*  --    LACTATE  --  2.0   PROTIME  --  17.3*         Lab 12/08/22 2013 12/08/22  1538   SODIUM  --  135*   POTASSIUM  --  3.6   CHLORIDE  --  102   CO2  --  18.0*   ANION GAP  --  15.0   BUN  --  20   CREATININE  --  0.77   EGFR  --  91.6   GLUCOSE  --  155*   CALCIUM  --  8.6   MAGNESIUM  --  2.1   TSH 3.040  --          Lab 12/08/22  1538   TOTAL PROTEIN 7.0   ALBUMIN 3.90   GLOBULIN 3.1   ALT (SGPT) 2,155*   AST (SGOT) 3,798*   BILIRUBIN 0.7   ALK PHOS 82         Lab 12/08/22  1538   TROPONIN T <0.010   PROTIME 17.3*   INR 1.42*                 Brief Urine Lab Results  (Last result in the past 365 days)      Color   Clarity   Blood   Leuk Est   Nitrite   Protein   CREAT   Urine HCG        12/08/22 1715 Yellow   Cloudy   Trace   Negative   Negative   30 mg/dL (1+)               Microbiology Results (last 10 days)     Procedure Component Value - Date/Time    COVID PRE-OP / PRE-PROCEDURE SCREENING ORDER (NO ISOLATION) - Swab, Nasopharynx [[id number]]  (Abnormal) Collected: 12/08/22 1713    Lab Status: Final result Specimen: Swab from Nasopharynx Updated: 12/08/22 1848    Narrative:      The following orders were created for panel order COVID PRE-OP / PRE-PROCEDURE SCREENING ORDER (NO ISOLATION) - Swab, Nasopharynx.  Procedure                               Abnormality         Status                     ---------                               -----------         ------                     COVID-19, FLU A/B, RSV P...[[id number]]  Abnormal            Final result                 Please view results for these tests on the individual orders.    COVID-19, FLU A/B, RSV PCR - Swab, Nasopharynx [[id number]]  (Abnormal) Collected: 12/08/22 1713    Lab Status: Final result Specimen: Swab from Nasopharynx Updated: 12/08/22 1848     COVID19 Not Detected     Influenza A PCR Detected     Influenza B PCR Not Detected     RSV, PCR Not Detected    Narrative:      Fact sheet for providers:  https://www.fda.gov/media/302261/download    Fact sheet for patients: https://www.fda.gov/media/145961/download    Test performed by PCR.        CT Abdomen Pelvis Without Contrast    Result Date: 12/8/2022   DATE OF EXAM: 12/8/2022 7:29 PM  PROCEDURE: CT ABDOMEN PELVIS WO CONTRAST-  INDICATIONS: elevated LFTS; N39.0-Urinary tract infection, site not specified; A41.9-Sepsis, unspecified organism; R65.21-Severe sepsis with septic shock; G93.40-Encephalopathy, unspecified; J10.1-Influenza due to other identified influenza virus with other respiratory manifestations; R09.02-Hypoxemia; R55-Syncope and collapse  COMPARISON: April 11, 2016  TECHNIQUE: Routine transaxial slices were obtained through the abdomen and pelvis without the administration of intravenous contrast. Reconstructed coronal and sagittal images were also obtained. Automated exposure control and iterative construction methods were used.   FINDINGS: Upper slices through lower chest reveal some bibasilar areas of density that could relate to atelectasis or some basilar infiltrates. There is a noncalcified pulmonary nodule in the left lower lobe measuring 9.1 mm this is suggested on prior CT from 2016 and may relate to granulomatous disease.  There is no definite abnormality of the liver, or spleen. There is a hypodense area within the proximal body of the pancreas measuring 1.48 cm x 1.65 cm. This previously measured about 1.9 cm x 1.8 cm. This may relate to pancreatic pseudocyst.  There is perinephric stranding. There are complex and simple suggested renal cysts. There may be a parapelvic right renal cyst measuring 4.4 x 3.2 cm previously measuring 2.3 x 1.6 cm. There is distention the bladder. An acute bowel abnormality is not definitely identified. There is vascular calcification noted.  There is facet arthropathy within the lumbar spine.      Impression: 1.  Bibasilar areas of density could relate to atelectasis or possibly underlying pneumonia. There  is unchanged pulmonary nodule in the left lower lobe. 2.  Complex and simple bilateral renal cysts. There some perinephric stranding which may relate to patient's renal function. 3.  Hypodense area within the proximal body the pancreas which has been noted and may relate to pancreatic cyst. This seems slightly smaller in size as compared to the prior study. 4.  Distended bladder. 5.  Atherosclerotic change. 6.  Facet arthropathy within the lumbar spine.  This report was finalized on 12/8/2022 7:54 PM by Jos Paz MD.      CT Head Without Contrast    Result Date: 12/8/2022   DATE OF EXAM: 12/8/2022 6:09 PM  PROCEDURE: CT HEAD WO CONTRAST-  INDICATIONS: altered mental status/syncope  COMPARISON: October 3, 2022  TECHNIQUE: Routine transaxial cuts were obtained through the head without the administration of contrast. Automated exposure control and iterative reconstruction methods were used.  FINDINGS: There are some white matter changes involving both cerebral hemispheres which could reflect more chronic small vessel ischemic change. There is no underlying hemorrhage. There is some cerebral atrophy. There is near complete opacification left maxillary sinus. There some mucosal thickening within the ethmoid sinuses as well.      Impression: 1.  Evidence for some underlying cerebral atrophy not unexpected for the patient's age. 2.  There are some white matter changes which could reflect more chronic small vessel ischemic change. 3.  Left maxillary sinus disease which has developed since the prior study. There also is some mucosal thickening within the ethmoid sinuses.  This report was finalized on 12/8/2022 6:23 PM by Jos Paz MD.      XR Chest 1 View    Result Date: 12/8/2022  DATE OF EXAM: 12/8/2022 5:44 PM  PROCEDURE: XR CHEST 1 VW-  INDICATIONS: cough/dyspnea  COMPARISON: September 10, 2022  TECHNIQUE: Single radiographic AP view of the chest was obtained.  FINDINGS: A cardiac ICD device is present. The heart  is not definitely enlarged. There are slightly prominent markings in the lower lungs that could relate to some atelectasis and has been suggested. There is no liseth pulmonary consolidation or obvious pleural effusions. There are degenerative changes involving both shoulders. There is a loose body in the area of the left shoulder.      Impression: 1.  Atelectatic changes suggested within the lower lungs.  This report was finalized on 12/8/2022 6:06 PM by Jos Paz MD.      Results for orders placed during the hospital encounter of 10/14/22    Adult Transthoracic Echo Complete W/ Cont if Necessary Per Protocol    Interpretation Summary  •  Left ventricular systolic function is severely decreased. Left ventricular ejection fraction appears to be less than 20%.  •  The following left ventricular wall segments are hypokinetic: mid anterior, basal anterolateral, mid anterolateral, basal inferolateral, mid inferolateral, apical inferior, mid inferior, basal inferoseptal, mid inferoseptal, mid anteroseptal, basal anterior, basal inferior and basal inferoseptal. The following left ventricular wall segments are dyskinetic: apical anterior, apical lateral, apical septal and apex.  •  The left ventricular cavity is severely dilated.  •  Mild mitral regurgitation is present.    Assessment & Plan   Assessment & Plan       Pneumonia    Coronary artery disease involving native coronary artery of native heart with angina pectoris (HCC)    Chronic systolic congestive heart failure (HCC)    BPH (benign prostatic hypertrophy)    Hyperlipidemia LDL goal <70    Ventricular fibrillation (HCC)    Acute urinary tract infection    Influenza A    Transaminitis    COPD (chronic obstructive pulmonary disease) (HCC)    Pulmonary nodule    Pancreatic cyst    Bilateral renal cysts    GEO (obstructive sleep apnea)    Presence of cardiac pacemaker    Hypothyroidism (acquired)    GERD without esophagitis    T2DM (type 2 diabetes mellitus)  (HCC)    Jonnie Roth is a 77 y.o. male w/ a hx of CAD (s/p stents), hx of V-fib on amiodarone, systolic CHF (EF <20%), s/p ICD, HTN, HLD, GEO w/ CPAP, hypothyroidism, T2DM, GERD, COPD, remote tobacco use, BPH who presented to the ED w/ c/o generalized weakness.     **Pneumonia   **Influenza A  **Acute UTI   -currently afebrile, WBC and lactic acid both WNL; procal 0.37  -COVID and flu b negative   -CT abd/pel, CXR and CT Head obtained; results above   -blood cultures pending   -sputum cx pending   -UA w/ 2+ bacteria, 3-6 epi cells; urine cx pending (also noted to have perinephritic stranding on CT and distended bladder)  -urine legionella, strep pneumo, MRSA PCR pending   -continue Vanc, Doxy and Zosyn for now   -holding on Tamiflu 2/2 current LFTs   -prn and schedule nesbs   -s/p 2 liters NS bolus given in ED  -continue NS @ 50ml/hour x 12 hours  -monitor closely for volume overload 2/2 EF 20%  -symptom mgt    **Transaminitis   -AST 3,798  -ALT 2,155  -both WNL in Sept 2022  -unclear etiology, possibly 2/2 amiodarone toxicity (previously stopped taking 2/2 pulmonary nodule but amio restarted this fall after pt w/ V-fib)  -holding amiodarone; Cardiology consulted to see in am  -holding statin   -acute hepatitis panel pending   -consider liver US   -GI consult in am     **Chronic systolic CHF with EF < 20; currently stable   **CAD (s/p stents)  **HTN  **HLD   **Hx of V-fib   **S/p ICD placement   -pt BP ~101/54; pt reports baseline BP ~110/70's  -ECHO 10/22 w/ EF 20%  -pt restarted on amio this fall after episode of V-Fib (has previously been stopped after pt developed pulmonary nodule)   -holding amiodarone for now 2/2 new significant elevated LFTs- unclear etiology   -consult Cardiology to see tomorrow to discuss continuation vs discontinuation of amiodarone; known to Dr. Flanagan   -holding statin   -holding coreg, Inspra and Torsemide for now. Restart when volume status is improved  -continue  Entresto  -continue Jardiance  -repeat EKG in am   -daily weights; strict I/Os   -monitor closely for overload   -    **Hx of pulmonary emboli   **Hx of DVT  -continue routine Xarelto     **Pulmonary nodule  **Pancreatic cyst  **Bilateral renal cysts   -per CT abd/pel  -pulmonary nodule is unchanged since last scan  -pancreatic cyst is slightly smaller in size compared to last scan   -simple bilateral renal cysts w/ some perinephric stranding     **Hypothyroidism   -tsh pending   -continue synthroid     **T2DM  -last hem a1c 5.9% on 9/10/22; repeat in am   -continue Jardiance  -holding metformin  -FSBG TID    **GERD  -continue Protonix     **GEO   -CPAP     DVT prophylaxis:  Xarelto     CODE STATUS:  Full code, does not want prolonged intubation  Code Status (Patient has no pulse and is not breathing): CPR (Attempt to Resuscitate)  Medical Interventions (Patient has pulse or is breathing): Full Support    This note has been completed as part of a split-shared workflow.     Signature: Electronically signed by CHENTE Berrios, 12/08/22, 10:04 PM EST.            Attending   Admission Attestation       I have performed an independent face-to-face diagnostic evaluation including performing an independent physical examination as documented here.  The documented plan of care above was reviewed and developed with the advanced practice clinician (APC).      Brief Summary Statement:   Jonnie Roth is a 77 y.o. male with extensive past medical history including GEO, type 2 diabetes, essential hypertension, hypothyroidism, type 2 diabetes, GERD, COPD, BPH who presents the ER with complaints of general malaise over the past 2 to 3 days with increased weakness.  Patient reports that he is currently in cardiac rehab.  He started feeling poorly 2 days ago and started having generalized weakness.  He was unable to get off the couch today and had a fall with no injury.  He has had fever and chills, burning with urination,  decreased urine output neck feels as if he is severely dehydrated.  He reports 2 episodes of diarrhea.  Work-up in the ER concerning for pneumonia and influenza A positivity.  Patient also positive for UTI.  Admitted for IV antibiotics and IV fluids    Remainder of detailed HPI is as noted by APC and has been reviewed and/or edited by me for completeness.    Attending Physical Exam:  Temp:  [97.8 °F (36.6 °C)] 97.8 °F (36.6 °C)  Heart Rate:  [57-70] 70  Resp:  [15-18] 15  BP: (101-113)/(54-63) 113/63    Constitutional: Awake, alert, appears acutely ill, fatigued, slightly pale  Eyes: PERRLA, sclerae anicteric, no conjunctival injection  HENT: NCAT, mucous membranes moist  Neck: Supple, no thyromegaly, no lymphadenopathy, trachea midline  Respiratory: Breath sounds diminished in lower lobes bilaterally, nonlabored respirations   Cardiovascular: RRR, no murmurs, rubs, or gallops, palpable pedal pulses bilaterally  Gastrointestinal: Positive bowel sounds, soft, nontender, nondistended  Musculoskeletal: No bilateral ankle edema, no clubbing or cyanosis to extremities  Psychiatric: Appropriate affect, cooperative  Neurologic: Oriented x 3, strength symmetric in all extremities, Cranial Nerves grossly intact to confrontation, speech clear  Skin: No rashes      Brief Assessment/Plan :  See detailed assessment and plan developed with APC which I have reviewed and/or edited for completeness.        Admission Status: I believe that this patient meets INPATIENT status due to multiple medical problems including EF less than 20%, pneumonia, and urinary tract.  I feel patient’s risk for adverse outcomes and need for care warrant INPATIENT evaluation and I predict the patient’s care encounter to likely last beyond 2 midnights.        Matt Travis,   12/08/22

## 2022-12-09 NOTE — CONSULTS
"Pharmacy Consult-Vancomycin Dosing  Jonnie Roth is a  77 y.o. male receiving vancomycin therapy.     Indication: PNA  Consulting Provider: Intensivist  ID Consult:     Goal AUC: 400 - 600 mg/L*hr    Current Antimicrobial Therapy  Anti-Infectives (From admission, onward)      Ordered     Dose/Rate Route Frequency Start Stop    12/08/22 2020  piperacillin-tazobactam (ZOSYN) 3.375 g in iso-osmotic dextrose 50 ml (premix)        Ordering Provider: Matt Travis DO    3.375 g  over 4 Hours Intravenous Every 8 Hours 12/09/22 0300 12/14/22 0259 12/08/22 2324  Pharmacy to dose vancomycin        Ordering Provider: Korin Cannon APRN     Does not apply Continuous PRN 12/08/22 2324 12/13/22 2323 12/08/22 2020  doxycycline (VIBRAMYCIN) 100 mg in sodium chloride 0.9 % 250 mL IVPB-VTB        Ordering Provider: Matt Travis DO    100 mg Intravenous Every 12 Hours 12/08/22 2100 12/13/22 2059 12/08/22 1722  piperacillin-tazobactam (ZOSYN) 3.375 g in iso-osmotic dextrose 50 ml (premix)        Ordering Provider: Alex Reyes MD    3.375 g  over 30 Minutes Intravenous Once 12/08/22 1724 12/08/22 2053 12/08/22 1722  vancomycin 1750 mg/500 mL 0.9% NS IVPB ([facility])        Ordering Provider: Alex Reyes MD    20 mg/kg × 81.6 kg Intravenous Once 12/08/22 1724 12/08/22 2248            Allergies  Allergies as of 12/08/2022    (No Known Allergies)       Labs    Results from last 7 days   Lab Units 12/09/22  0313 12/08/22  1538   BUN mg/dL 18 20   CREATININE mg/dL 0.62* 0.77       Results from last 7 days   Lab Units 12/09/22  0313 12/08/22  1538   WBC 10*3/mm3 7.14 9.93       Evaluation of Dosing     Last Dose Received in the ED/Outside Facility: 1750 mg @ 2055  Is Patient on Dialysis or Renal Replacement: N    Ht - 185.4 cm (73\")  Wt - 81.7 kg (180 lb 1.9 oz)    Estimated Creatinine Clearance: 113.5 mL/min (A) (by C-G formula based on SCr of 0.62 mg/dL (L)).    Intake & Output (last 3 days)         " 12/06 0701 12/07 0700 12/07 0701 12/08 0700 12/08 0701 12/09 0700    P.O.   20    I.V. (mL/kg)   542.6 (6.6)    Total Intake(mL/kg)   562.6 (6.9)    Urine (mL/kg/hr)   500    Total Output   500    Net   +62.6                   Microbiology and Radiology  Microbiology Results (last 10 days)       Procedure Component Value - Date/Time    COVID PRE-OP / PRE-PROCEDURE SCREENING ORDER (NO ISOLATION) - Swab, Nasopharynx [[id number]]  (Abnormal) Collected: 12/08/22 1713    Lab Status: Final result Specimen: Swab from Nasopharynx Updated: 12/08/22 1848    Narrative:      The following orders were created for panel order COVID PRE-OP / PRE-PROCEDURE SCREENING ORDER (NO ISOLATION) - Swab, Nasopharynx.  Procedure                               Abnormality         Status                     ---------                               -----------         ------                     COVID-19, FLU A/B, RSV P...[[id number]]  Abnormal            Final result                 Please view results for these tests on the individual orders.    COVID-19, FLU A/B, RSV PCR - Swab, Nasopharynx [[id number]]  (Abnormal) Collected: 12/08/22 1713    Lab Status: Final result Specimen: Swab from Nasopharynx Updated: 12/08/22 1848     COVID19 Not Detected     Influenza A PCR Detected     Influenza B PCR Not Detected     RSV, PCR Not Detected    Narrative:      Fact sheet for providers: https://www.fda.gov/media/641015/download    Fact sheet for patients: https://www.fda.gov/media/518695/download    Test performed by PCR.            Reported Vancomycin Levels                         [facility] AUC Calculation:    Current AUC:    mg/L*hr    Predicted Steady State AUC on Current Dose:  mg/L*hr  _________________________________    Predicted Steady State AUC on New Dose:   400-600 mg/L*hr    Assessment/Plan:    Advanced age male with acute ventricular tachycardia status post multiple internal defibrillations and now s/p ECV. Patient exhibited  hypotension/hypoperfusion during events.  Received 1750 mg IV vancomycin last evening.  Given age and above, check MRSA PCR and random vancomycin level.  Defer further to rounding pharmacist.    Thank you for this consult.  Viet Raya IV, PharmD, BCPS  12/9/2022  06:02 EST

## 2022-12-09 NOTE — PROGRESS NOTES
Clinical Nutrition     Reason for Visit: MDR, Identified at risk by screening criteria    Patient Name: Jonnie Roth  YOB: 1945  MRN: [id number]  Date of Encounter: 12/09/22 12:08 EST  Admission date: 12/8/2022    Nutrition Assessment       Problem List:    Wide-complex tachycardia (S/P Cardioversion, AICD discharges)    Coronary artery disease involving native coronary artery of native heart with angina pectoris (HCC)    Chronic systolic congestive heart failure (HCC)    Ventricular dysrhythmia, s/p ICD in 2000 and RFA May 2015, on amiodarone    R/O Pneumonia    Influenza A    COPD (chronic obstructive pulmonary disease) (HCC)    Pulmonary nodule (Stable)    GEO (obstructive sleep apnea)    Presence of cardiac pacemaker    T2DM (type 2 diabetes mellitus) (HCC)    Elevated LFTs (R/O shock liver)    Acute respiratory failure with hypoxia (HCC)    Ischemic cardiomyopathy (EF < 20%)        PMH:   He  has a past medical history of Arthritis, BPH (benign prostatic hypertrophy) (12/28/2016), CAD (coronary artery disease), Disease of thyroid gland, Enlarged prostate, HFrEF (heart failure with reduced ejection fraction) (HCC), History of heart attack, History of transfusion, Hypertension, IHD (ischemic heart disease) (12/28/2016), LBBB (left bundle branch block) (12/28/2016), Lung nodule, Macular degeneration, Pancreatic cyst (12/8/2022), Polio, Pulmonary embolism (HCC), Pulmonary emphysema (HCC) (12/28/2016), Sepsis (HCC), Sleep apnea with use of continuous positive airway pressure (CPAP), Ventricular tachycardia (12/28/2016), and Wears glasses.    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    Substance history: tobacco remote    Reported/Observed/Food/Nutrition Related History:     Pt resting in bed, on nasal cannula, reports he has intentionally lost weight because he has bad knees, has been watching his portions, trying to be more active, drinks premier protein for breakfast    Anthropometrics   Height: 73in  Weight: 180lb  BMI: 23.7  BMI classification: Normal: 18.5-24.9kg/m2     23lb wt loss over past 14 months,  intentional         Last 15 Recorded Weights  View Complete Flowsheet  Weight Weight (kg) Weight (lbs) Weight Method VISIT REPORT   12/9/2022 81.7 kg 180 lb 1.9 oz Bed scale -   12/8/2022 81.647 kg 180 lb Stated -   10/14/2022 83.9 kg 184 lb 15.5 oz - -   10/11/2022 83.915 kg 185 lb - Report   9/10/2022 85 kg 187 lb 6.3 oz - -   9/10/2022 85.276 kg 188 lb Stated -   3/23/2022 88.542 kg 195 lb 3.2 oz - Report   10/12/2021 92.443 kg 203 lb 12.8 oz - Report   7/6/2021 92.08 kg 203 lb Standing scale -   6/2/2021 94.348 kg 208 lb - Report   3/17/2021 95.981 kg 211 lb 9.6 oz - Report       Labs reviewed     Results from last 7 days   Lab Units 12/09/22  0313   SODIUM mmol/L 141   POTASSIUM mmol/L 3.3*   CHLORIDE mmol/L 109*   CO2 mmol/L 19.0*   BUN mg/dL 18   CREATININE mg/dL 0.62*   CALCIUM mg/dL 7.9*   BILIRUBIN mg/dL 0.6   ALK PHOS U/L 77   ALT (SGPT) U/L 3,192*   AST (SGOT) U/L 6,996*   GLUCOSE mg/dL 85       Results from last 7 days   Lab Units 12/08/22  2352   GLUCOSE mg/dL 83     Lab Results   Lab Value Date/Time    HGBA1C 5.60 12/09/2022 0313    HGBA1C 5.90 (H) 09/10/2022 0921       Medications reviewed   Pertinent:abx, protonix, xarelto, NS@50ml, lidocaine    Intake/Ouptut 24 hrs (7:00AM - 6:59 AM)     Intake & Output (last day)       12/08 0701  12/09 0700 12/09 0701  12/10 0700    P.O. 20     I.V. (mL/kg) 542.6 (6.6)     Total Intake(mL/kg)  562.6 (6.9)     Urine (mL/kg/hr) 500     Total Output 500     Net +62.6                      GI: round, nondistended    SKIN: wnl    Current Nutrition Prescription     PO: Diet: Cardiac Diets; Healthy Heart (2-3 Na+); Texture: Regular Texture (IDDSI 7); Fluid Consistency: Thin (IDDSI 0)    Intake: no data, diet just ordered    Nutrition Diagnosis     Problem No nutrition diagnosis at this time   Etiology    Signs/Symptoms      Goal:   General: Nutrition support treatment  PO: Establish PO    Nutrition Intervention   1.  Follow treatment progress, Advise alternate selection, Advised available snacks, Interview for preferences, Supplement provided    Premier Protein daily    Monitoring/Evaluation:   Per protocol, I&O, PO intake, Supplement intake, Pertinent labs, Weight, Skin status, GI status, Symptoms      Monse Kimble RD  Time Spent: 30min

## 2022-12-09 NOTE — H&P
CRITICAL CARE ADMISSION NOTE    Chief Complaint     Pneumonia    History of Present Illness     Jonnie Roth is a 77 y.o. male with PMH significant for V-fib on amiodarone s/p ICD, HFrEF (<20%), HTN, HLD, GEO w/CPAP, hypothyroidism, T2DM, GERD, COPD, remote tobacco abuse, and BPH who presents to [facility] ED on 12/08/2022 for evaluation of a near-syncopal episode at home. Patient reports ongoing fatigue for the past 3 months, in cardiac rehab MW, but had profound weakness beginning Tuesday. Family reports he did have a fall today, no LOC or head trauma, and feeling chilled and feverish with temperature of 101.     Patient tested positive for Flu A on arrival with significant labs ALT/AST 2,155/3,798 with negative hepatitis workup, CO2 18, PCT 0.37, and UA suggestive of potential UTI. Blood cultures, legionella, and strep pneumo pending.     While in the ED, wide-complex tachycardia was noted (as high as 248) and patient received 3 shocks from AICD per family report. Lidocaine bolus and drip initiated. Dr. Sanchez with EP consulted. Patient remained alert and oriented with BP in the 110s; however, rhythm persisted with associated BP drop and ED physician performed synchronized cardioversion at the bedside. Post-cardioversion EKG revealed SVT with frequent AV paced complexes.    He is transferred to the ICU for higher level of care.    Time spent: 10 minutes  Electronically signed by CHENTE Mccarthy, 12/09/22, 2:04 AM EST.     On arrival to the ICU the patient is awake and alert.  On a lidocaine drip.  Not requiring vasopressor support      Problem List, Surgical History, Family, Social History, and ROS     Patient Active Problem List   Diagnosis   • Coronary artery disease involving native coronary artery of native heart with angina pectoris (HCC)   • Chronic systolic congestive heart failure (HCC)   • Pulmonary embolus (HCC)   • BPH (benign prostatic hypertrophy)   • LBBB (left bundle branch block)   •  Pulmonary emphysema (HCC)   • Hyperlipidemia LDL goal <70   • Ventricular fibrillation (HCC)   • Post-traumatic subdural hematoma   • Elevated AST (SGOT)   • Acute urinary tract infection   • Pneumonia   • Influenza A   • Transaminitis   • COPD (chronic obstructive pulmonary disease) (HCC)   • Pulmonary nodule   • Pancreatic cyst   • Bilateral renal cysts   • GEO (obstructive sleep apnea)   • Presence of cardiac pacemaker   • Hypothyroidism (acquired)   • GERD without esophagitis   • T2DM (type 2 diabetes mellitus) (HCC)     Past Surgical History:   Procedure Laterality Date   • BIVENTRICULLAR IMPLANTABLE CARDIOVERTER DEFIBRILLATOR PLACEMENT     • CARDIAC CATHETERIZATION N/A 1/16/2017    Procedure: Left Heart Cath;  Surgeon: Diego Ayala MD;  Location:  [facility];  Service:    • CARDIAC DEFIBRILLATOR PLACEMENT       A. ICD generator change out with upgrade to BiV pacemaker implantable cardioverter    defibrillator, 12/17/2007. Ventricular fibrillation s/p pacesetter [place] ICD in [place]. [place] 09/2000   • CARDIAC ELECTROPHYSIOLOGY PROCEDURE N/A 10/2/2017    Procedure: BIV ICD  generator change [facility];  Surgeon: Ferdinand Alba MD;  Location:  [facility];  Service:    • CARDIAC ELECTROPHYSIOLOGY PROCEDURE N/A 7/6/2021    Procedure: BIV ICD generator change with [employer]. This will need to be done in late June or early July. Hold Xarelto one day prior.;  Surgeon: Ferdinand Alba MD;  Location:  [facility];  Service: Cardiology;  Laterality: N/A;   • CATARACT EXTRACTION      Bilateral    • COLONOSCOPY     • CORONARY ANGIOPLASTY WITH STENT PLACEMENT      X7 STENTS TOTAL    • CYSTOSCOPY URETERAL TUMOR FULGURATION  05/29/2015     A. Status post cystoscopy with clot evacuation and fulguration of the prostate secondary to hematuria, 5/29/2015.   • FOOT SURGERY      RIGHT - POLIO RELATED    • PROSTATE SURGERY      A. Status post laser vaporization, 08/19/2009.   • ROOT CANAL      • TOOTH EXTRACTION         No Known Allergies  No current facility-administered medications on file prior to encounter.     Current Outpatient Medications on File Prior to Encounter   Medication Sig   • acetaminophen (TYLENOL) 500 MG tablet Take 1,000 mg by mouth Every 6 (Six) Hours As Needed for Mild Pain .   • acetaminophen (TYLENOL) 500 MG tablet Every 6 (Six) Hours.   • amiodarone (PACERONE) 200 MG tablet Take 2 tablets by mouth Daily. Take 400mg daily. Maintaince dose.   • amiodarone (PACERONE) 200 MG tablet Every 12 (Twelve) Hours.   • carvedilol (COREG) 12.5 MG tablet Take 12.5 mg by mouth 2 (Two) Times a Day With Meals.   • cholecalciferol (VITAMIN D3) 25 MCG (1000 UT) tablet Take 4,000 Units by mouth Daily.   • clopidogrel (PLAVIX) 75 MG tablet Take 1 tablet by mouth Every Morning.   • econazole nitrate (SPECTAZOLE) 1 % cream econazole 1 % topical cream   • empagliflozin (JARDIANCE) 10 MG tablet tablet Take 1 tablet by mouth Daily.   • eplerenone (INSPRA) 25 MG tablet Take 12.5 mg by mouth Every Morning.   • finasteride (PROSCAR) 5 MG tablet Take 5 mg by mouth Every Morning.   • L-TRYPTOPHAN PO Take  by mouth Every Night. 3 TABLETS NIGHTLY-     1500 MG EACH   • levothyroxine (SYNTHROID, LEVOTHROID) 25 MCG tablet Take 25 mcg by mouth Every Morning.   • melatonin 3 MG tablet melatonin 3 mg tablet   Take by oral route.   • melatonin 5 MG tablet tablet Take 5 mg by mouth At Night As Needed. 2 tabs at night   • metFORMIN (GLUCOPHAGE) 1000 MG tablet Take 1,000 mg by mouth 2 (two) times a day.   • Multiple Vitamins-Minerals (ICAPS AREDS 2 PO) Take 1 tablet by mouth 2 (two) times a day.   • Multiple Vitamins-Minerals (MULTIVITAMIN ADULTS 50+) tablet Take 1 tablet by mouth Daily.   • nitroglycerin (NITROSTAT) 0.4 MG SL tablet Place 1 tablet under the tongue Every 5 (Five) Minutes As Needed for Chest Pain. Take no more than 3 doses in 15 minutes.   • nitroglycerin (NITROSTAT) 0.4 MG SL tablet nitroglycerin 0.4 mg  "sublingual tablet   Place 1 tablet by sublingual route.   • pantoprazole (PROTONIX) 40 MG EC tablet Take 40 mg by mouth Every Morning.   • polyethylene glycol (MIRALAX) packet Take 17 g by mouth Every Other Day.   • rivaroxaban (Xarelto) 10 MG tablet Take 1 tablet by mouth Daily.   • rosuvastatin (CRESTOR) 40 MG tablet Take 40 mg by mouth Daily.   • sacubitril-valsartan (ENTRESTO) 49-51 MG tablet Take 1 tablet by mouth 2 (Two) Times a Day.   • temazepam (RESTORIL) 15 MG capsule Take 1 capsule by mouth At Night As Needed for Sleep.   • torsemide (DEMADEX) 5 MG tablet TAKE 1 TABLET DAILY (Patient taking differently: Take 1 tablet by mouth Daily.)   • vitamin B-12 (CYANOCOBALAMIN) 1000 MCG tablet Take 1,000 mcg by mouth Daily.     MEDICATION LIST AND ALLERGIES REVIEWED.    Family History   Problem Relation Age of Onset   • Heart failure Father    • Stroke Father      Social History     Tobacco Use   • Smoking status: Former     Packs/day: 2.00     Years: 30.00     Pack years: 60.00     Types: Cigarettes     Quit date: 1995     Years since quittin.7   • Smokeless tobacco: Never   Vaping Use   • Vaping Use: Never used   Substance Use Topics   • Alcohol use: No   • Drug use: No     Social History     Social History Narrative   • Not on file     FAMILY AND SOCIAL HISTORY REVIEWED.    Review of Systems  AS ABOVE OR ALL OTHER SYSTEMS REVIEWED AND ARE NEGATIVE.    Physical Exam and Clinical Information   /58   Pulse 70   Temp 97.8 °F (36.6 °C) (Oral)   Resp 15   Ht 185.4 cm (73\")   Wt 81.6 kg (180 lb)   SpO2 93%   BMI 23.75 kg/m²   Physical Exam:   GENERAL: Awake, no distress   HEENT: No adenopathy or thyromegaly   LUNGS: No wheezes or rhonchi   HEART: Regular rate and rhythm, no murmurs   GI: Soft, nontender   EXTREMITIES: Trace lower extremity edema without cyanosis or clubbing   NEURO/PSYCH: Awake, alert, appropriate    Results from last 7 days   Lab Units 22  1538   WBC 10*3/mm3 9.93 "   HEMOGLOBIN g/dL 12.8*   PLATELETS 10*3/mm3 194     Results from last 7 days   Lab Units 12/08/22  1538   SODIUM mmol/L 135*   POTASSIUM mmol/L 3.6   CO2 mmol/L 18.0*   BUN mg/dL 20   CREATININE mg/dL 0.77   MAGNESIUM mg/dL 2.1   GLUCOSE mg/dL 155*     Estimated Creatinine Clearance: 91.3 mL/min (by C-G formula based on SCr of 0.77 mg/dL).      Results from last 7 days   Lab Units 12/09/22  0021   PH, ARTERIAL pH units 7.368   PCO2, ARTERIAL mm Hg 32.5*   PO2 ART mm Hg 75.2*     Lab Results   Component Value Date    LACTATE 2.0 12/08/2022        IMAGES: Cardiomegaly with basilar atelectasis and no consolidation or effusions    I reviewed the patient's results and images.     Assesment     Active Hospital Problems    Diagnosis    • **Pneumonia    • Acute urinary tract infection    • Influenza A    • Transaminitis    • COPD (chronic obstructive pulmonary disease) ([facility])    • Pulmonary nodule    • Pancreatic cyst    • Bilateral renal cysts    • GEO (obstructive sleep apnea)    • Presence of cardiac pacemaker    • Hypothyroidism (acquired)    • GERD without esophagitis    • T2DM (type 2 diabetes mellitus) ([facility])    • Ventricular fibrillation ([facility])    • Hyperlipidemia LDL goal <70    • BPH (benign prostatic hypertrophy)    • Coronary artery disease involving native coronary artery of native heart with angina pectoris ([facility])    • Chronic systolic congestive heart failure ([facility])      Plan/Recommendations     Admit to the intensive care unit  Continue lidocaine drip  Continue most home cardiac medications  Empiric antibiotics  Follow-up cultures.  Consider discontinuing antibiotics if cultures remain negative  Trend LFTs  Trend troponin  Follow-up renal function electrolytes    I discussed resuscitation status with the patient.  He initially indicated that he did not desire extensive resuscitative measures in the event of a cardiopulmonary arrest but his family wanted him to undergo these interventions.  He indicated for now  that he wants full support.  Given the severity his underlying cardiac disease a palliative care consultation may be beneficial.    Critical Care time spent in direct patient care: 35 minutes (excluding procedure time, if applicable) including high complexity decision making to assess, manipulate, and support vital organ system failure in this individual who has impairment of one or more vital organ systems such that there is a high probability of imminent or life threatening deterioration in the patient’s condition.    LIZETTE Cain MD  Pulmonary and Critical Care Medicine     CC: Brian Lewis MD

## 2022-12-09 NOTE — CONSULTS
Cardiology Consult     Jonnie Roth  1945  [phone]  There is no work phone number on file.    12/09/22    DATE OF ADMISSION: 12/8/2022  [facility] 2A ICU    Borders, Brian Ferraro MD  [address] [facility] [address] / [facility] [address]  Referring Provider: No ref. provider found     Chief Complaint   Patient presents with   • Dizziness   • Weakness - Generalized     Reason for consult: wide complex tachycardia, AICD firing     Problem List:      1. Sudden cardiac death with primary ventricular fibrillation status post Pacesetter [place] ICD, [place], [place] in September 2000:   a. ICD generator change out with upgrade to a biventricular pacemaker ICD on 12/17/2007, [employer] device.  b. ICD discharge, 08/09/2012, secondary to ventricular flutter with initiation of amiodarone therapy.   c. Hospitalization, 02/01/2014, secondary to ICD discharge for ventricular tachycardia with subsequent discontinuation of Coreg and initiation of sotalol therapy.   d. Hospitalization, February 2014, secondary to ICD discharge for VT with subsequent discontinuation of Coreg and initiation of sotalol.  e. Echocardiogram, 04/07/2015: EF less than 20%.  f. Hospitalization secondary to VF and ICD shocks, 04/18/2015.  g. NIPS procedure with defibrillation threshold testing for VF and noninvasive program stimulation, 04/18/2015.   h. Initiation of mexiletine for recurrent ventricular tachycardia with ICD shocks on 05/05/2015 with amiodarone load, recurrent VT and ICD shocks with hospitalization 05/16/2015-05/18/2015.   i. EP study with RFA of large anterior left ventricular scar extending from mid-left ventricular wall down apex by Dr. Ferdinand Alba, 05/21/2015.                      j.   ICD generator change 10/2017                     K.   Echo 11/19 LVEF 30%, mild TR                     L.  ICD generatot change 07/2021                     M.  VT/VF with ICD shocks 9/2022, ICD reprogrammed with less ATP  attempts, Amiodarone                          N. Echocardiogram 9/10/2022: EF 14%. The following left ventricular wall segments are hypokinetic: basal anterolateral, mid anterolateral, apical lateral, basal inferolateral, mid inferolateral, apical inferior, mid inferior, basal inferoseptal, basal anterior, basal inferior and basal inferoseptal. The following left ventricular wall segments are dyskinetic: apex. The following left ventricular wall segments are akinetic: mid anterior, apical anterior, apical septal, mid inferoseptal, basal anteroseptal and mid anteroseptal    O.echo 10/16/2022: EF less than 20%, LV wall segments hypokinetic, mid anterior, basal anterolateral, mid anterolateral, basal inferior lateral, mid inferior lateral, apical inferior, mid inferior, basal inferior septal, mid inferior septalmid anteroseptal, basal anterior, basal inferior and basal inferoseptal. The following left ventricular wall segments are dyskinetic: apical anterior, apical lateral, apical septal and apex.The left ventricular cavity is severely dilated. Mild mitral regurgitation is present.     2. Ischemic heart disease:  a. Remote progressive angina pectoris/acute extensive anterolateral myocardial infarction/delayed presentation with thrombolysis/severe 3-vessel coronary atherosclerosis with severe single vessel involvement/PTCA with intracoronary stent deployment proximal-mid segment LAD/moderate-severe compensated left ventricular dysfunction. LVEF (0.35)/abnormal positive signal averaged EKG/oral anticoagulation, April 1995.  b. Remote NYHA class I-II angina pectoris/class III CHF/abnormal quantitative SPECT gated Cardiolite GXT, July 1998.  c. Stable persistent MUGA, LVEF (0.33, January 1999 and January 2000).   d. Residual NYHA class I angina pectoris/CHF with reduced MUGA scan: LVEF (0.25), April 2002.  e. Left heart catheterization with distal circumflex disease: EF 20%, September 2002.   f. MUGA in May 2003: EF 32%.    g. Sestamibi GXT on 04/08/2005: No ischemia. EF 29%.   h. Residual NYHA class I angina pectoris/CHF with reduced acceptable MUGA scan: EF (0.32) and acceptable Pacesetter PCD interrogation/reprogramming, May 2003 with interrogation, September 2004.  i. Echocardiogram, September 2009 with EF 30%  j. Left heart catheterization by Dr. Bhupinder Gonzalez, August 2010, with LVEF of 35%, with 3-vessel native coronary artery disease, 95% mid-LAD status post PTCA and stenting with two 3 mm stents by Dr. Llanes.  k. Echocardiogram, 06/07/2012: Left ventricular ejection fraction of 30%.  l. Hospitalization, 02/01/2014, for non-ST elevation MI, left heart catheterization by Dr. Ayala that demonstrated 60% mid stenosis of the right coronary artery that remains unchanged compared to previous, widely patent stents of the LAD with severe LV dysfunction of approximately 25%.   m. Left heart catheterization status post drug-eluting stent to the distal LAD and mid-RCA with an EF of 20% to 25% by Dr. Diego Ayala, 04/20/2015.   n. Left heart catheterization 1/16/17; nonobstructive CAD with patent previously placed stents, dilated cardiomyopathy with severe LV systolic dysfunction, EF less than 20%, normal hemodynamics, recommendations for continued medical therapy and risk factor, evaluation for noncardiac etiology of symptoms.  o. Residual CCS Class I/II angina pectoris/NYHA Class II exertional dyspnea and fatigue syndrome, summer 2017  p. Residual CCS 0 angina pectoris/NYHA class I-II exertional dyspnea and fatigue, February 2018, September 2018, March 2019.  3. Dyslipidemia.  4. Status post remote operations.  5. Remote chronic tobacco use/abnormal chest x-ray with stable abnormal thoracic  CT scan without contrast demonstrating bilateral diffuse perimeter scarring and fibrosis with scattered subcentimeter noncalcified nodules (unchanged from February 2016), March 2017.  6. Left bundle branch block.  7. Burks trauma with hematuria with  urosepsis requiring hospitalization, May 2015.  8. Pulmonary embolism, spring 2015.   9. Remote apparent hypothyroidism/replacement therapy - data deficit, January 2016.  10. Remote diagnosis of obstructive sleep apnea with CPAP use, 2017.  11. Bilateral cataract extraction November 2018, February 2019    History of Present Illness:   Dougie oRth is a 77-year-old male with the above past medical history presenting to the ER 12/8/2022 due to almost passing out at home.  He had been feeling normal on Monday and had a good work out at cardiac rehab, but on Tuesday he began feeling fatigued and he became more and more weak throughout the week. He says he has not been eating or drinking much the past 3 days due to illness.  Upon arrival to the ER, he was found to have a fever, hypotension, mild hypokalemia and tested positive for flu A. Liver enzymes are also elevated, so his Amiodarone has been held. He does not remember episode but while in the ER, patient went into VT that progressed to VF twice and was shocked by his ICD 4 times total. He was placed on a lidocaine drip in the ER and IV fluids. This morning, patient reports feeling better today than he has all week.  He denies palpitations, chest pain, dizziness, lightheadedness.  He reports his shortness of breath is at baseline.    No Known Allergies    Prior to Admission Medications     Prescriptions Last Dose Informant Patient Reported? Taking?    acetaminophen (TYLENOL) 500 MG tablet  Medication Bottle Yes No    Take 1,000 mg by mouth Every 6 (Six) Hours As Needed for Mild Pain .    acetaminophen (TYLENOL) 500 MG tablet   Yes No    Every 6 (Six) Hours.    amiodarone (PACERONE) 200 MG tablet   No No    Take 2 tablets by mouth Daily. Take 400mg daily. Maintaince dose.    amiodarone (PACERONE) 200 MG tablet   Yes No    Every 12 (Twelve) Hours.    carvedilol (COREG) 12.5 MG tablet  Medication Bottle Yes No    Take 12.5 mg by mouth 2 (Two) Times a Day With Meals.     cholecalciferol (VITAMIN D3) 25 MCG (1000 UT) tablet  Medication Bottle Yes No    Take 4,000 Units by mouth Daily.    clopidogrel (PLAVIX) 75 MG tablet   No No    Take 1 tablet by mouth Every Morning.    econazole nitrate (SPECTAZOLE) 1 % cream   Yes No    econazole 1 % topical cream    empagliflozin (JARDIANCE) 10 MG tablet tablet   No No    Take 1 tablet by mouth Daily.    eplerenone (INSPRA) 25 MG tablet  Medication Bottle Yes No    Take 12.5 mg by mouth Every Morning.    finasteride (PROSCAR) 5 MG tablet More than a month Medication Bottle Yes No    Take 5 mg by mouth Every Morning.    L-TRYPTOPHAN PO  Medication Bottle Yes No    Take  by mouth Every Night. 3 TABLETS NIGHTLY-     1500 MG EACH    levothyroxine (SYNTHROID, LEVOTHROID) 25 MCG tablet  Medication Bottle Yes No    Take 25 mcg by mouth Every Morning.    melatonin 3 MG tablet   Yes No    melatonin 3 mg tablet   Take by oral route.    melatonin 5 MG tablet tablet   Yes No    Take 5 mg by mouth At Night As Needed. 2 tabs at night    metFORMIN (GLUCOPHAGE) 1000 MG tablet  Medication Bottle Yes No    Take 1,000 mg by mouth 2 (two) times a day.    Multiple Vitamins-Minerals (ICAPS AREDS 2 PO)   Yes No    Take 1 tablet by mouth 2 (two) times a day.    Multiple Vitamins-Minerals (MULTIVITAMIN ADULTS 50+) tablet  Medication Bottle Yes No    Take 1 tablet by mouth Daily.    nitroglycerin (NITROSTAT) 0.4 MG SL tablet  Medication Bottle No No    Place 1 tablet under the tongue Every 5 (Five) Minutes As Needed for Chest Pain. Take no more than 3 doses in 15 minutes.    nitroglycerin (NITROSTAT) 0.4 MG SL tablet   Yes No    nitroglycerin 0.4 mg sublingual tablet   Place 1 tablet by sublingual route.    pantoprazole (PROTONIX) 40 MG EC tablet  Medication Bottle Yes No    Take 40 mg by mouth Every Morning.    polyethylene glycol (MIRALAX) packet  Medication Bottle Yes No    Take 17 g by mouth Every Other Day.    rivaroxaban (Xarelto) 10 MG tablet   No No    Take 1  tablet by mouth Daily.    rosuvastatin (CRESTOR) 40 MG tablet  Medication Bottle Yes No    Take 40 mg by mouth Daily.    sacubitril-valsartan (ENTRESTO) 49-51 MG tablet  Medication Bottle No No    Take 1 tablet by mouth 2 (Two) Times a Day.    temazepam (RESTORIL) 15 MG capsule  Self Yes No    Take 1 capsule by mouth At Night As Needed for Sleep.    torsemide (DEMADEX) 5 MG tablet  Medication Bottle No No    TAKE 1 TABLET DAILY    Patient taking differently:  Take 1 tablet by mouth Daily.    vitamin B-12 (CYANOCOBALAMIN) 1000 MCG tablet  Medication Bottle Yes No    Take 1,000 mcg by mouth Daily.            Current Facility-Administered Medications:   •  clopidogrel (PLAVIX) tablet 75 mg, 75 mg, Oral, Daily, Korin Cannon APRN  •  dextrose (D50W) (25 g/50 mL) IV injection 25 g, 25 g, Intravenous, Q15 Min PRN, Korin Cannon APRN  •  dextrose (GLUTOSE) oral gel 15 g, 15 g, Oral, Q15 Min PRN, Korin Cannon APRN  •  doxycycline (VIBRAMYCIN) 100 mg in sodium chloride 0.9 % 250 mL IVPB-VTB, 100 mg, Intravenous, Q12H, Angy, Matt, DO, 100 mg at 12/08/22 2312  •  empagliflozin (JARDIANCE) tablet 10 mg, 10 mg, Oral, Daily, Korin Cannon APRN  •  finasteride (PROSCAR) tablet 5 mg, 5 mg, Oral, Daily, Korin Cannon APRN  •  glucagon (human recombinant) (GLUCAGEN DIAGNOSTIC) injection 1 mg, 1 mg, Intramuscular, Q15 Min PRN, Korin Cannon APRN  •  ipratropium-albuterol (DUO-NEB) nebulizer solution 3 mL, 3 mL, Nebulization, Q4H PRN, Korin Cannon APRN  •  ipratropium-albuterol (DUO-NEB) nebulizer solution 3 mL, 3 mL, Nebulization, 4x Daily - RT, Korin Cannon APRN  •  levothyroxine (SYNTHROID, LEVOTHROID) tablet 25 mcg, 25 mcg, Oral, Q AM, Korin Cannon APRN, 25 mcg at 12/09/22 0541  •  lidocaine 4 mg/mL in dextrose 5% infusion, 2 mg/min, Intravenous, Continuous, Matt Travis, , Last Rate: 30 mL/hr at 12/09/22 0013, 2 mg/min at 12/09/22 0013  •  melatonin tablet 10 mg, 10 mg, Oral, Nightly PRN, Davis,  CHENTE Langley  •  pantoprazole (PROTONIX) injection 40 mg, 40 mg, Intravenous, Q AM, Deloris Sahni APRN, 40 mg at 22 0541  •  piperacillin-tazobactam (ZOSYN) 3.375 g in iso-osmotic dextrose 50 ml (premix), 3.375 g, Intravenous, Q8H, Matt Travis, DO, 3.375 g at 22 0304  •  potassium chloride (MICRO-K) CR capsule 40 mEq, 40 mEq, Oral, PRN, 40 mEq at 22 0652 **OR** potassium chloride (KLOR-CON) packet 40 mEq, 40 mEq, Oral, PRN **OR** potassium chloride 10 mEq in 100 mL IVPB, 10 mEq, Intravenous, Q1H PRN, Deloris Sahni APRN  •  rivaroxaban (XARELTO) tablet 10 mg, 10 mg, Oral, Daily, Korin Cannon APRN  •  sacubitril-valsartan (ENTRESTO) 49-51 MG tablet 1 tablet, 1 tablet, Oral, BID, Korin Cannon APRN  •  sodium chloride 0.9 % flush 10 mL, 10 mL, Intravenous, Q12H, Deloris Sahni APRN, 10 mL at 22 0309  •  sodium chloride 0.9 % flush 10 mL, 10 mL, Intravenous, PRN, Deloris Sahni APRN  •  sodium chloride 0.9 % infusion 40 mL, 40 mL, Intravenous, PRN, Deloris Sahni APRN  •  sodium chloride 0.9 % infusion, 50 mL/hr, Intravenous, Continuous, Korin Cannon APRN, Last Rate: 50 mL/hr at 22 0201, 50 mL/hr at 22 0201    Social History     Socioeconomic History   • Marital status:    Tobacco Use   • Smoking status: Former     Packs/day: 2.00     Years: 30.00     Pack years: 60.00     Types: Cigarettes     Quit date: 1995     Years since quittin.7   • Smokeless tobacco: Never   Vaping Use   • Vaping Use: Never used   Substance and Sexual Activity   • Alcohol use: No   • Drug use: No   • Sexual activity: Defer       Family History   Problem Relation Age of Onset   • Heart failure Father    • Stroke Father        REVIEW OF SYSTEMS:   CONSTITUTIONAL:         No weight loss, fever, chills, weakness or fatigue.   HEENT:                            No visual loss, blurred vision, double vision, yellow sclerae.                                             No  hearing loss, congestion, sore throat.   SKIN:                                No rashes, urticaria, ulcers, sores.     RESPIRATORY:               No shortness of breath, hemoptysis, cough, sputum.   GI:                                     No anorexia, nausea, vomiting, diarrhea. No abdominal pain, melena.   :                                   No burning on urination, hematuria or increased frequency.  ENDOCRINE:                   No diaphoresis, cold or heat intolerance. No polyuria or polydipsia.   NEURO:                            No headache, dizziness, syncope, paralysis, ataxia, or parasthesias.                                            No change in bowel or bladder control. No history of CVA/TIA  MUSCULOSKELETAL:    No muscle, back pain, joint pain or stiffness.   HEMATOLOGY:               No anemia, bleeding, bruising. No history of DVT/PE.  PSYCH:                            No history of depression, anxiety    Vitals:    12/09/22 0545 12/09/22 0600 12/09/22 0615 12/09/22 0630   BP: 125/74 122/66 126/66 119/63   Pulse: 69 69 72 72   Resp:       Temp:       TempSrc:       SpO2: 96% 97% 95% 94%   Weight:       Height:             Vital Sign Min/Max for last 24 hours  Temp  Min: 97.8 °F (36.6 °C)  Max: 98 °F (36.7 °C)   BP  Min: 77/60  Max: 130/70   Pulse  Min: 57  Max: 248   Resp  Min: 15  Max: 20   SpO2  Min: 89 %  Max: 99 %   Flow (L/min)  Min: 2  Max: 2      Intake/Output Summary (Last 24 hours) at 12/9/2022 1023  Last data filed at 12/9/2022 0500  Gross per 24 hour   Intake 562.61 ml   Output 500 ml   Net 62.61 ml             Physical Exam:  GEN: Well nourished, Well- developed  No acute distress  HEENT: Normocephalic, Atraumatic, PERRLA, moist mucous membranes  NECK: supple, NO JVD, no thyromegaly, no lymphadenopathy  CARDIAC: S1S2  RRR no murmur, gallop, rub  LUNGS: Clear to ausculation, normal respiratory effort  ABDOMEN: Soft, nontender, normal bowel sounds  EXTREMITIES:No gross deformities,  No  clubbing, cyanosis, or edema  SKIN: Warm, dry  NEURO: No focal deficits  PSYCHIATRIC: Normal affect and mood      I personally viewed and interpreted the patient's EKG/Telemetry/lab data    Data:   Results from last 7 days   Lab Units 12/09/22 0313 12/08/22  1538   WBC 10*3/mm3 7.14 9.93   HEMOGLOBIN g/dL 11.8* 12.8*   HEMATOCRIT % 37.6 40.4   PLATELETS 10*3/mm3 158 194     Results from last 7 days   Lab Units 12/09/22 0313 12/08/22  1538   SODIUM mmol/L 141 135*   POTASSIUM mmol/L 3.3* 3.6   CHLORIDE mmol/L 109* 102   CO2 mmol/L 19.0* 18.0*   BUN mg/dL 18 20   CREATININE mg/dL 0.62* 0.77   GLUCOSE mg/dL 85 155*      Results from last 7 days   Lab Units 12/09/22 0313   HEMOGLOBIN A1C % 5.60     Results from last 7 days   Lab Units 12/08/22 2013   TSH uIU/mL 3.040         Results from last 7 days   Lab Units 12/08/22  1538   PROTIME Seconds 17.3*   INR  1.42*     Results from last 7 days   Lab Units 12/08/22 2013 12/08/22  1538   CK TOTAL U/L 53  --    TROPONIN T ng/mL  --  <0.010               Intake/Output Summary (Last 24 hours) at 12/9/2022 1023  Last data filed at 12/9/2022 0500  Gross per 24 hour   Intake 562.61 ml   Output 500 ml   Net 62.61 ml       Chest X-Ray:  Imaging Results (Last 24 Hours)     Procedure Component Value Units Date/Time    CT Abdomen Pelvis Without Contrast [[id number]] Collected: 12/08/22 1948     Updated: 12/08/22 1957    Narrative:         DATE OF EXAM:  12/8/2022 7:29 PM     PROCEDURE:  CT ABDOMEN PELVIS WO CONTRAST-     INDICATIONS:  elevated LFTS; N39.0-Urinary tract infection, site not specified;  A41.9-Sepsis, unspecified organism; R65.21-Severe sepsis with septic  shock; G93.40-Encephalopathy, unspecified; J10.1-Influenza due to other  identified influenza virus with other respiratory manifestations;  R09.02-Hypoxemia; R55-Syncope and collapse     COMPARISON:  April 11, 2016     TECHNIQUE:  Routine transaxial slices were obtained through the abdomen and pelvis  without the  administration of intravenous contrast. Reconstructed  coronal and sagittal images were also obtained. Automated exposure  control and iterative construction methods were used.       FINDINGS:  Upper slices through lower chest reveal some bibasilar areas of density  that could relate to atelectasis or some basilar infiltrates. There is a  noncalcified pulmonary nodule in the left lower lobe measuring 9.1 mm  this is suggested on prior CT from 2016 and may relate to granulomatous  disease.     There is no definite abnormality of the liver, or spleen. There is a  hypodense area within the proximal body of the pancreas measuring 1.48  cm x 1.65 cm. This previously measured about 1.9 cm x 1.8 cm. This may  relate to pancreatic pseudocyst.     There is perinephric stranding. There are complex and simple suggested  renal cysts. There may be a parapelvic right renal cyst measuring 4.4 x  3.2 cm previously measuring 2.3 x 1.6 cm. There is distention the  bladder. An acute bowel abnormality is not definitely identified. There  is vascular calcification noted.     There is facet arthropathy within the lumbar spine.       Impression:      1.  Bibasilar areas of density could relate to atelectasis or possibly  underlying pneumonia. There is unchanged pulmonary nodule in the left  lower lobe.  2.  Complex and simple bilateral renal cysts. There some perinephric  stranding which may relate to patient's renal function.  3.  Hypodense area within the proximal body the pancreas which has been  noted and may relate to pancreatic cyst. This seems slightly smaller in  size as compared to the prior study.  4.  Distended bladder.  5.  Atherosclerotic change.  6.  Facet arthropathy within the lumbar spine.     This report was finalized on 12/8/2022 7:54 PM by Jos Paz MD.       CT Head Without Contrast [[id number]] Collected: 12/08/22 1821     Updated: 12/08/22 1826    Narrative:         DATE OF EXAM:  12/8/2022 6:09 PM     PROCEDURE:    CT HEAD WO CONTRAST-     INDICATIONS:   altered mental status/syncope     COMPARISON:  October 3, 2022     TECHNIQUE:   Routine transaxial cuts were obtained through the head without the  administration of contrast. Automated exposure control and iterative  reconstruction methods were used.      FINDINGS:  There are some white matter changes involving both cerebral hemispheres  which could reflect more chronic small vessel ischemic change. There is  no underlying hemorrhage. There is some cerebral atrophy. There is near  complete opacification left maxillary sinus. There some mucosal  thickening within the ethmoid sinuses as well.        Impression:      1.  Evidence for some underlying cerebral atrophy not unexpected for the  patient's age.  2.  There are some white matter changes which could reflect more chronic  small vessel ischemic change.  3.  Left maxillary sinus disease which has developed since the prior  study. There also is some mucosal thickening within the ethmoid sinuses.     This report was finalized on 12/8/2022 6:23 PM by Jos Paz MD.       XR Chest 1 View [[id number]] Collected: 12/08/22 1805     Updated: 12/08/22 1809    Narrative:      DATE OF EXAM: 12/8/2022 5:44 PM     PROCEDURE: XR CHEST 1 VW-     INDICATIONS: cough/dyspnea     COMPARISON: September 10, 2022     TECHNIQUE: Single radiographic AP view of the chest was obtained.     FINDINGS:  A cardiac ICD device is present. The heart is not definitely enlarged.  There are slightly prominent markings in the lower lungs that could  relate to some atelectasis and has been suggested. There is no liseth  pulmonary consolidation or obvious pleural effusions. There are  degenerative changes involving both shoulders. There is a loose body in  the area of the left shoulder.        Impression:      1.  Atelectatic changes suggested within the lower lungs.     This report was finalized on 12/8/2022 6:06 PM by Jos Paz MD.             Telemetry:  AV paced, HR 69-91 bpm  EK22 5:45 a.m.: wide QRS tachycardia,  bpm         Assessment and Plan:   1) VT/VF with ICD shocks  -VT/VF in the setting of Flu A/dehydration, pt has been feeling bad for 3 days, not eating or drinking.  Presented to ER yesterday for near syncopal episode onto the hypotensive, febrile and tested positive for flu a. He went into VT, ICD attempted ATP without success, he was shocked twice before returning to normal rhythm. He went into VT again 7 minutes later at 12:37 a.m. and again attempted ATP and was shocked twice. After second shock rhythm persisted and BP dropped and ER physician performed synchronized cardioversion at bedside. Lidocaine bolus given and drip started.   -Hospital admission 2022 for V. tach with BiV ICD shocks and ICD was reprogrammed with less ATP attempts.  Patient was discharged on amiodarone and had been taking amiodarone 400 mg daily for 2 months.   -Continue Lidocaine drip at 1mg/min. Hold amiodarone until liver enzymes normalize.     2) Cardiomyopathy, severe LV dysfunction  -Most recent echo 10/16/2022: EF less than 20%, LV wall segments hypokinetic, mid anterior, basal anterolateral, mid anterolateral, basal inferior lateral, mid inferior lateral, apical inferior, mid inferior, basal inferior septal, mid inferior septalmid anteroseptal, basal anterior, basal inferior and basal inferoseptal. The following left ventricular wall segments are dyskinetic: apical anterior, apical lateral, apical septal and apex.The left ventricular cavity is severely dilated. Mild mitral regurgitation is present.    3)Transminitis  - Liver enzymes elevated upon admission 22. Further increased today.   -Intensivist managing, Hep panel negative, U/S abdomen ordered  -Amiodarone on hold     4) Flu A, Dehydration, Hypokalemia  -Intensivist managing, IVF, potassium replacement    Electronically signed by CHENTE Umanzor, 22, 11:32 AM Ferdinand MIGUEL  MD Anjali, personally performed the services face to face as described and documented by the above named individual. I have made any necessary edits and it is both accurate and complete 12/9/2022  14:20 EST

## 2022-12-10 LAB
ANION GAP SERPL CALCULATED.3IONS-SCNC: 12 MMOL/L (ref 5–15)
BASOPHILS # BLD AUTO: 0.04 10*3/MM3 (ref 0–0.2)
BASOPHILS NFR BLD AUTO: 0.6 % (ref 0–1.5)
BUN SERPL-MCNC: 13 MG/DL (ref 8–23)
BUN/CREAT SERPL: 22.4 (ref 7–25)
CALCIUM SPEC-SCNC: 8.1 MG/DL (ref 8.6–10.5)
CHLORIDE SERPL-SCNC: 109 MMOL/L (ref 98–107)
CO2 SERPL-SCNC: 18 MMOL/L (ref 22–29)
CREAT SERPL-MCNC: 0.58 MG/DL (ref 0.76–1.27)
DEPRECATED RDW RBC AUTO: 50.7 FL (ref 37–54)
EGFRCR SERPLBLD CKD-EPI 2021: 99.8 ML/MIN/1.73
EOSINOPHIL # BLD AUTO: 0.63 10*3/MM3 (ref 0–0.4)
EOSINOPHIL NFR BLD AUTO: 9.7 % (ref 0.3–6.2)
ERYTHROCYTE [DISTWIDTH] IN BLOOD BY AUTOMATED COUNT: 15.2 % (ref 12.3–15.4)
GLUCOSE BLDC GLUCOMTR-MCNC: 114 MG/DL (ref 70–130)
GLUCOSE BLDC GLUCOMTR-MCNC: 114 MG/DL (ref 70–130)
GLUCOSE BLDC GLUCOMTR-MCNC: 117 MG/DL (ref 70–130)
GLUCOSE SERPL-MCNC: 84 MG/DL (ref 65–99)
HCT VFR BLD AUTO: 38.7 % (ref 37.5–51)
HGB BLD-MCNC: 12.4 G/DL (ref 13–17.7)
IMM GRANULOCYTES # BLD AUTO: 0.03 10*3/MM3 (ref 0–0.05)
IMM GRANULOCYTES NFR BLD AUTO: 0.5 % (ref 0–0.5)
LYMPHOCYTES # BLD AUTO: 1.35 10*3/MM3 (ref 0.7–3.1)
LYMPHOCYTES NFR BLD AUTO: 20.8 % (ref 19.6–45.3)
MCH RBC QN AUTO: 29.1 PG (ref 26.6–33)
MCHC RBC AUTO-ENTMCNC: 32 G/DL (ref 31.5–35.7)
MCV RBC AUTO: 90.8 FL (ref 79–97)
MONOCYTES # BLD AUTO: 0.28 10*3/MM3 (ref 0.1–0.9)
MONOCYTES NFR BLD AUTO: 4.3 % (ref 5–12)
NEUTROPHILS NFR BLD AUTO: 4.15 10*3/MM3 (ref 1.7–7)
NEUTROPHILS NFR BLD AUTO: 64.1 % (ref 42.7–76)
NRBC BLD AUTO-RTO: 0 /100 WBC (ref 0–0.2)
PLATELET # BLD AUTO: 158 10*3/MM3 (ref 140–450)
PMV BLD AUTO: 10.3 FL (ref 6–12)
POTASSIUM SERPL-SCNC: 3.8 MMOL/L (ref 3.5–5.2)
RBC # BLD AUTO: 4.26 10*6/MM3 (ref 4.14–5.8)
SODIUM SERPL-SCNC: 139 MMOL/L (ref 136–145)
WBC NRBC COR # BLD: 6.48 10*3/MM3 (ref 3.4–10.8)

## 2022-12-10 PROCEDURE — 25010000002 LIDOCAINE PER 10 MG

## 2022-12-10 PROCEDURE — 25010000002 PIPERACILLIN SOD-TAZOBACTAM PER 1 G: Performed by: INTERNAL MEDICINE

## 2022-12-10 PROCEDURE — 97535 SELF CARE MNGMENT TRAINING: CPT

## 2022-12-10 PROCEDURE — 97530 THERAPEUTIC ACTIVITIES: CPT

## 2022-12-10 PROCEDURE — 82962 GLUCOSE BLOOD TEST: CPT

## 2022-12-10 PROCEDURE — 85025 COMPLETE CBC W/AUTO DIFF WBC: CPT | Performed by: INTERNAL MEDICINE

## 2022-12-10 PROCEDURE — 97165 OT EVAL LOW COMPLEX 30 MIN: CPT

## 2022-12-10 PROCEDURE — 99232 SBSQ HOSP IP/OBS MODERATE 35: CPT | Performed by: INTERNAL MEDICINE

## 2022-12-10 PROCEDURE — 87070 CULTURE OTHR SPECIMN AEROBIC: CPT | Performed by: NURSE PRACTITIONER

## 2022-12-10 PROCEDURE — 97162 PT EVAL MOD COMPLEX 30 MIN: CPT

## 2022-12-10 PROCEDURE — 87205 SMEAR GRAM STAIN: CPT | Performed by: NURSE PRACTITIONER

## 2022-12-10 PROCEDURE — 80048 BASIC METABOLIC PNL TOTAL CA: CPT | Performed by: INTERNAL MEDICINE

## 2022-12-10 RX ORDER — CARVEDILOL 12.5 MG/1
12.5 TABLET ORAL 2 TIMES DAILY WITH MEALS
Status: DISCONTINUED | OUTPATIENT
Start: 2022-12-10 | End: 2022-12-13 | Stop reason: HOSPADM

## 2022-12-10 RX ORDER — IPRATROPIUM BROMIDE AND ALBUTEROL SULFATE 2.5; .5 MG/3ML; MG/3ML
3 SOLUTION RESPIRATORY (INHALATION) EVERY 4 HOURS PRN
Status: DISCONTINUED | OUTPATIENT
Start: 2022-12-10 | End: 2022-12-13 | Stop reason: HOSPADM

## 2022-12-10 RX ORDER — PANTOPRAZOLE SODIUM 40 MG/1
40 TABLET, DELAYED RELEASE ORAL
Status: DISCONTINUED | OUTPATIENT
Start: 2022-12-11 | End: 2022-12-13 | Stop reason: HOSPADM

## 2022-12-10 RX ADMIN — Medication 10 ML: at 20:25

## 2022-12-10 RX ADMIN — LEVOTHYROXINE SODIUM 25 MCG: 25 TABLET ORAL at 06:26

## 2022-12-10 RX ADMIN — CARVEDILOL 12.5 MG: 12.5 TABLET, FILM COATED ORAL at 18:52

## 2022-12-10 RX ADMIN — TAZOBACTAM SODIUM AND PIPERACILLIN SODIUM 3.38 G: 375; 3 INJECTION, SOLUTION INTRAVENOUS at 03:59

## 2022-12-10 RX ADMIN — SACUBITRIL AND VALSARTAN 1 TABLET: 49; 51 TABLET, FILM COATED ORAL at 20:15

## 2022-12-10 RX ADMIN — DOXYCYCLINE 100 MG: 100 INJECTION, POWDER, LYOPHILIZED, FOR SOLUTION INTRAVENOUS at 08:37

## 2022-12-10 RX ADMIN — SACUBITRIL AND VALSARTAN 1 TABLET: 49; 51 TABLET, FILM COATED ORAL at 08:39

## 2022-12-10 RX ADMIN — CARVEDILOL 12.5 MG: 12.5 TABLET, FILM COATED ORAL at 10:22

## 2022-12-10 RX ADMIN — EMPAGLIFLOZIN 10 MG: 10 TABLET, FILM COATED ORAL at 08:39

## 2022-12-10 RX ADMIN — Medication 10 ML: at 08:39

## 2022-12-10 RX ADMIN — TAZOBACTAM SODIUM AND PIPERACILLIN SODIUM 3.38 G: 375; 3 INJECTION, SOLUTION INTRAVENOUS at 10:21

## 2022-12-10 RX ADMIN — LIDOCAINE HYDROCHLORIDE 1 MG/MIN: 4 INJECTION, SOLUTION INTRAVENOUS at 00:33

## 2022-12-10 RX ADMIN — CLOPIDOGREL BISULFATE 75 MG: 75 TABLET ORAL at 08:38

## 2022-12-10 RX ADMIN — FINASTERIDE 5 MG: 5 TABLET, FILM COATED ORAL at 08:39

## 2022-12-10 RX ADMIN — PANTOPRAZOLE SODIUM 40 MG: 40 INJECTION, POWDER, FOR SOLUTION INTRAVENOUS at 06:26

## 2022-12-10 RX ADMIN — RIVAROXABAN 10 MG: 10 TABLET, FILM COATED ORAL at 08:38

## 2022-12-10 NOTE — THERAPY EVALUATION
Patient Name: Jonnie Roth  : 1945    MRN: [id number]                              Today's Date: 12/10/2022       Admit Date: 2022    Visit Dx:     ICD-10-CM ICD-9-CM   1. Acute urinary tract infection  N39.0 599.0   2. Sepsis with encephalopathy and septic shock, due to unspecified organism ([facility])  A41.9 038.9    R65.21 995.92    G93.40 348.30     785.52   3. Influenza A  J10.1 487.1   4. Hypoxia  R09.02 799.02   5. Syncope, unspecified syncope type  R55 780.2     Patient Active Problem List   Diagnosis   • Coronary artery disease involving native coronary artery of native heart with angina pectoris (HCC)   • Chronic systolic congestive heart failure (HCC)   • BPH (benign prostatic hypertrophy)   • LBBB (left bundle branch block)   • Pulmonary emphysema (HCC)   • Hyperlipidemia LDL goal <70   • Ventricular tachycardia   • Post-traumatic subdural hematoma   • Elevated AST (SGOT)   • R/O Pneumonia   • Influenza A   • COPD (chronic obstructive pulmonary disease) (HCC)   • Pulmonary nodule (Stable)   • Bilateral renal cysts   • GEO (obstructive sleep apnea)   • ICD (implantable cardioverter-defibrillator) in place   • Hypothyroidism (acquired)   • GERD without esophagitis   • T2DM (type 2 diabetes mellitus) (HCC)   • Elevated LFTs (R/O shock liver)   • Acute respiratory failure with hypoxia (HCC)   • Ischemic cardiomyopathy (EF < 20%)     Past Medical History:   Diagnosis Date   • Arthritis    • BPH (benign prostatic hypertrophy) 2016   • CAD (coronary artery disease)    • Disease of thyroid gland    • Enlarged prostate    • HFrEF (heart failure with reduced ejection fraction) ([facility])    • History of heart attack     X3 ABOUT    • History of transfusion     NO REACTION RECALLED    • Hypertension    • IHD (ischemic heart disease) 2016   • LBBB (left bundle branch block) 2016   • Lung nodule    • Macular degeneration    • Pancreatic cyst 2022   • Polio     as a child   • Pulmonary  embolism (HCC)    • Pulmonary emphysema (HCC) 12/28/2016   • Sepsis (HCC)     A. Secondary to [place] trauma with hematuria and urosepsis requiring hospitalization May 2015   • Sleep apnea with use of continuous positive airway pressure (CPAP)     CPAP- SETTING 4    • Ventricular tachycardia 12/28/2016   • Wears glasses     READERS     Past Surgical History:   Procedure Laterality Date   • BIVENTRICULLAR IMPLANTABLE CARDIOVERTER DEFIBRILLATOR PLACEMENT     • CARDIAC CATHETERIZATION N/A 1/16/2017    Procedure: Left Heart Cath;  Surgeon: Diego Ayala MD;  Location:  [facility];  Service:    • CARDIAC DEFIBRILLATOR PLACEMENT       A. ICD generator change out with upgrade to BiV pacemaker implantable cardioverter    defibrillator, 12/17/2007. Ventricular fibrillation s/p pacesetter [place] ICD in [place]. [place] 09/2000   • CARDIAC ELECTROPHYSIOLOGY PROCEDURE N/A 10/2/2017    Procedure: BIV ICD  generator change [facility];  Surgeon: Ferdinand Alba MD;  Location:  [facility];  Service:    • CARDIAC ELECTROPHYSIOLOGY PROCEDURE N/A 7/6/2021    Procedure: BIV ICD generator change with [employer]. This will need to be done in late June or early July. Hold Xarelto one day prior.;  Surgeon: Ferdinand Alba MD;  Location:  [facility];  Service: Cardiology;  Laterality: N/A;   • CATARACT EXTRACTION      Bilateral    • COLONOSCOPY     • CORONARY ANGIOPLASTY WITH STENT PLACEMENT      X7 STENTS TOTAL    • CYSTOSCOPY URETERAL TUMOR FULGURATION  05/29/2015     A. Status post cystoscopy with clot evacuation and fulguration of the prostate secondary to hematuria, 5/29/2015.   • FOOT SURGERY      RIGHT - POLIO RELATED    • PROSTATE SURGERY      A. Status post laser vaporization, 08/19/2009.   • ROOT CANAL     • TOOTH EXTRACTION        General Information     Row Name 12/10/22 1447          Physical Therapy Time and Intention    Document Type evaluation  -LS     Mode of Treatment physical therapy   -     Row Name 12/10/22 1447          General Information    Patient Profile Reviewed yes  -LS     Prior Level of Function independent:;all household mobility;ADL's  -LS     Existing Precautions/Restrictions fall;cardiac;other (see comments)  hx polio with RLE > LLE  -LS     Barriers to Rehab medically complex  -LS     Row Name 12/10/22 1447          Living Environment    People in Home spouse  -LS     Row Name 12/10/22 1447          Home Main Entrance    Number of Stairs, Main Entrance three  14 from garage with bilat HR  -LS     Row Name 12/10/22 1447          Stairs Within Home, Primary    Stairs, Within Home, Primary 16  -LS     Row Name 12/10/22 1447          Safety Issues, Functional Mobility    Impairments Affecting Function (Mobility) balance;endurance/activity tolerance;coordination;strength;motor control;shortness of breath  -           User Key  (r) = Recorded By, (t) = Taken By, (c) = Cosigned By    Initials Name Provider Type    Korin Singh, PT Physical Therapist               Mobility     Row Name 12/10/22 1448          Bed Mobility    Bed Mobility sit-supine  -LS     Sit-Supine [place] (Bed Mobility) moderate assist (50% patient effort);2 person assist;verbal cues  -     Row Name 12/10/22 1448          Transfers    Comment, (Transfers) STS x2: CGA from recliner. Required min A from low hallway chair (s/p being rolled back into room with dizziness).  -     Row Name 12/10/22 1448          Sit-Stand Transfer    Sit-Stand [place] (Transfers) verbal cues;minimum assist (75% patient effort)  -     Assistive Device (Sit-Stand Transfers) walker, front-wheeled  -LS     Row Name 12/10/22 1448          Gait/Stairs (Locomotion)    [place] Level (Gait) minimum assist (75% patient effort)  -LS     Assistive Device (Gait) walker, front-wheeled  -LS     Distance in Feet (Gait) 60  -LS     Deviations/Abnormal Patterns (Gait) bilateral deviations;delgado decreased;weight shifting  decreased  -LS     Bilateral Gait Deviations forward flexed posture  -LS     Comment, (Gait/Stairs) Distance limited by sudden onset of dizziness and diaphoresis; chair brought behind pt and rolled back to room. RN notified.  -LS           User Key  (r) = Recorded By, (t) = Taken By, (c) = Cosigned By    Initials Name Provider Type    LS Korin Purdy, PT Physical Therapist               Obj/Interventions     Row Name 12/10/22 1453          Range of Motion Comprehensive    General Range of Motion bilateral lower extremity ROM WFL  -LS     Row Name 12/10/22 1453          Strength Comprehensive (MMT)    General Manual Muscle Testing (MMT) Assessment lower extremity strength deficits identified  -LS     Comment, General Manual Muscle Testing (MMT) Assessment LLE 4/5; RLE 4-/5 grossly  -LS     Row Name 12/10/22 1453          Balance    Static Sitting Balance contact guard  -LS     Position, Sitting Balance sitting edge of bed  -LS     Static Standing Balance contact guard  -LS     Position/Device Used, Standing Balance walker, front-wheeled  -LS     Row Name 12/10/22 1453          Sensory Assessment (Somatosensory)    Sensory Assessment (Somatosensory) LE sensation intact  -LS           User Key  (r) = Recorded By, (t) = Taken By, (c) = Cosigned By    Initials Name Provider Type    LS Korin Purdy, PT Physical Therapist               Goals/Plan     Row Name 12/10/22 1456          Bed Mobility Goal 1 (PT)    Activity/Assistive Device (Bed Mobility Goal 1, PT) sit to supine/supine to sit  -LS     [place] Level/Cues Needed (Bed Mobility Goal 1, PT) independent  -LS     Time Frame (Bed Mobility Goal 1, PT) 2 weeks  -LS     Progress/Outcomes (Bed Mobility Goal 1, PT) goal ongoing  -LS     Row Name 12/10/22 1455          Transfer Goal 1 (PT)    Activity/Assistive Device (Transfer Goal 1, PT) sit-to-stand/stand-to-sit;walker, rolling  -LS     [place] Level/Cues Needed (Transfer Goal 1, PT) modified independence   -LS     Time Frame (Transfer Goal 1, PT) 2 weeks  -LS     Progress/Outcome (Transfer Goal 1, PT) goal ongoing  -LS     Row Name 12/10/22 1455          Gait Training Goal 1 (PT)    Activity/Assistive Device (Gait Training Goal 1, PT) gait (walking locomotion);walker, rolling  -LS     [place] Level (Gait Training Goal 1, PT) supervision required  -LS     Distance (Gait Training Goal 1, PT) 300  -LS     Time Frame (Gait Training Goal 1, PT) 2 weeks  -LS     Progress/Outcome (Gait Training Goal 1, PT) goal ongoing  -LS     Row Name 12/10/22 145          Stairs Goal 1 (PT)    Activity/Assistive Device (Stairs Goal 1, PT) ascending stairs;descending stairs  -LS     [place] Level/Cues Needed (Stairs Goal 1, PT) contact guard required  -LS     Number of Stairs (Stairs Goal 1, PT) 14  -LS     Time Frame (Stairs Goal 1, PT) 2 weeks  -LS     Progress/Outcome (Stairs Goal 1, PT) goal ongoing  -LS     Row Name 12/10/22 3550          Therapy Assessment/Plan (PT)    Planned Therapy Interventions (PT) balance training;bed mobility training;gait training;home exercise program;strengthening;transfer training;patient/family education  -LS           User Key  (r) = Recorded By, (t) = Taken By, (c) = Cosigned By    Initials Name Provider Type    LS Korin Purdy, PT Physical Therapist               Clinical Impression     Row Name 12/10/22 8184          Pain    Pretreatment Pain Rating 0/10 - no pain  -LS     Posttreatment Pain Rating 0/10 - no pain  -LS     Row Name 12/10/22 1452          Plan of Care Review    Plan of Care Reviewed With patient;spouse;son  -LS     Progress no change  -LS     Outcome Evaluation PT initial evaluation completed. Pt demonstrates generalized weakness and decreased indep/ functional endurance re: mobility, warranting further skilled PT services to promote PLOF. Limited today by sudden onset of dizziness/ diaphoresis during ambulation (RN aware), but was able to ambulate 60 ft with min A, RW  prior to symptom onset. Recommend d/c home with assist and HHPT based upon current level of function (TBA further pending progress).  -     Row Name 12/10/22 1450          Therapy Assessment/Plan (PT)    Patient/Family Therapy Goals Statement (PT) return to PLOF  -LS     Rehab Potential (PT) good, to achieve stated therapy goals  -LS     Criteria for Skilled Interventions Met (PT) yes;skilled treatment is necessary  -LS     Therapy Frequency (PT) daily  -LS     Row Name 12/10/22 1457          Vital Signs    Pre Systolic BP Rehab 104  -LS     Pre Treatment Diastolic BP 68  -LS     Post Systolic BP Rehab 94  -LS     Post Treatment Diastolic BP 67  -LS     Pretreatment Heart Rate (beats/min) 70  -LS     Posttreatment Heart Rate (beats/min) 71  -LS     Pre SpO2 (%) 94  -LS     O2 Delivery Pre Treatment room air  -LS     O2 Delivery Intra Treatment room air  -LS     Post SpO2 (%) 93  -LS     O2 Delivery Post Treatment room air  -LS     Pre Patient Position Sitting  -LS     Intra Patient Position Standing  -LS     Post Patient Position Supine  -LS     Row Name 12/10/22 1459          Positioning and Restraints    Pre-Treatment Position sitting in chair/recliner  -LS     Post Treatment Position bed  -LS     In Bed notified nsg;supine;with family/caregiver;exit alarm on;call light within reach;legs elevated;with nsg;encouraged to call for assist  on bedpan  -LS           User Key  (r) = Recorded By, (t) = Taken By, (c) = Cosigned By    Initials Name Provider Type    Korin Singh, PT Physical Therapist               Outcome Measures     Row Name 12/10/22 7026          How much help from another person do you currently need...    Turning from your back to your side while in flat bed without using bedrails? 3  -LS     Moving from lying on back to sitting on the side of a flat bed without bedrails? 3  -LS     Moving to and from a bed to a chair (including a wheelchair)? 3  -LS     Standing up from a chair using your arms  (e.g., wheelchair, bedside chair)? 3  -LS     Climbing 3-5 steps with a railing? 2  -LS     To walk in hospital room? 3  -LS     AM-PAC 6 Clicks Score (PT) 17  -LS     Highest level of mobility 5 --> Static standing  -     Row Name 12/10/22 1459 12/10/22 1134       Functional Assessment    Outcome Measure Options AM-PAC 6 Clicks Basic Mobility (PT)  -LS AM-PAC 6 Clicks Daily Activity (OT)  -CS          User Key  (r) = Recorded By, (t) = Taken By, (c) = Cosigned By    Initials Name Provider Type    LS Korin Purdy, PT Physical Therapist    CS Sabine Gurrola, OT Occupational Therapist                             Physical Therapy Education     Title: PT OT SLP Therapies (In Progress)     Topic: Physical Therapy (In Progress)     Point: Mobility training (Done)     Learning Progress Summary           Patient Acceptance, E,D, VU,NR by  at 12/10/2022 1459   Family Acceptance, E,D, VU,NR by  at 12/10/2022 1459                   Point: Home exercise program (Not Started)     Learner Progress:  Not documented in this visit.          Point: Body mechanics (Done)     Learning Progress Summary           Patient Acceptance, E,D, VU,NR by  at 12/10/2022 1459   Family Acceptance, E,D, VU,NR by  at 12/10/2022 1459                   Point: Precautions (Done)     Learning Progress Summary           Patient Acceptance, E,D, VU,NR by  at 12/10/2022 1459   Family Acceptance, E,D, VU,NR by  at 12/10/2022 1459                               User Key     Initials Effective Dates Name Provider Type Discipline     06/16/21 -  Korin Purdy, PT Physical Therapist PT              PT Recommendation and Plan  Planned Therapy Interventions (PT): balance training, bed mobility training, gait training, home exercise program, strengthening, transfer training, patient/family education  Plan of Care Reviewed With: patient, spouse, son  Progress: no change  Outcome Evaluation: PT initial evaluation completed. Pt demonstrates  generalized weakness and decreased indep/ functional endurance re: mobility, warranting further skilled PT services to promote PLOF. Limited today by sudden onset of dizziness/ diaphoresis during ambulation (RN aware), but was able to ambulate 60 ft with min A RW prior to symptom onset. Recommend d/c home with assist and HHPT based upon current level of function (TBA further pending progress).     Time Calculation:    PT Charges     Row Name 12/10/22 1500             Time Calculation    Start Time 1230  -LS      PT Received On 12/10/22  -LS      PT Goal Re-Cert Due Date 12/20/22  -LS         Timed Charges    19026 - PT Therapeutic Activity Minutes 9  -LS         Untimed Charges    PT Eval/Re-eval Minutes 48  -LS         Total Minutes    Timed Charges Total Minutes 9  -LS      Untimed Charges Total Minutes 48  -LS       Total Minutes 57  -LS            User Key  (r) = Recorded By, (t) = Taken By, (c) = Cosigned By    Initials Name Provider Type     Korin Purdy, PT Physical Therapist              Therapy Charges for Today     Code Description Service Date Service Provider Modifiers Qty    81536708213 HC PT EVAL MOD COMPLEXITY 4 12/10/2022 Korin Purdy, PT GP 1    34132356913 HC PT THERAPEUTIC ACT EA 15 MIN 12/10/2022 Korin Purdy, PT GP 1          PT G-Codes  Outcome Measure Options: AM-PAC 6 Clicks Basic Mobility (PT)  AM-PAC 6 Clicks Score (PT): 17  AM-PAC 6 Clicks Score (OT): 16  PT Discharge Summary  Anticipated Discharge Disposition (PT): home with assist, home with home health    Korin Purdy, PT  12/10/2022

## 2022-12-10 NOTE — PLAN OF CARE
Goal Outcome Evaluation:           Progress: improving     IV Lidocaine d/'c'd. Home dose of coreg restarted. VSS. Pt up in chair x 3 hours. Pt became symptomatic with afternoon ambulation

## 2022-12-10 NOTE — PROGRESS NOTES
Intensive Care Follow-up     Hospital:  LOS: 2 days   Mr. Jonnie Roth, 77 y.o. male is followed for:   Ventricular tachycardia            History of present illness:   Jonnie Roth is a 77 y.o. male with PMH significant for V-fib on amiodarone s/p ICD, HFrEF (<20%), HTN, HLD, GEO w/CPAP, hypothyroidism, T2DM, GERD, COPD, remote tobacco abuse, and BPH who presents to [facility] ED on 12/08/2022 for evaluation of a near-syncopal episode at home. Patient reports ongoing fatigue for the past 3 months, in cardiac rehab MW, but had profound weakness beginning Tuesday. Family reports he did have a fall today, no LOC or head trauma, and feeling chilled and feverish with temperature of 101.      Patient tested positive for Flu A on arrival with significant labs ALT/AST 2,155/3,798 with negative hepatitis workup, CO2 18, PCT 0.37, and UA suggestive of potential UTI. Blood cultures, legionella, and strep pneumo pending.      While in the ED, wide-complex tachycardia was noted (as high as 248) and patient received 3 shocks from AICD per family report. Lidocaine bolus and drip initiated. Dr. Sanchez with EP consulted. Patient remained alert and oriented with BP in the 110s; however, rhythm persisted with associated BP drop and ED physician performed synchronized cardioversion at the bedside. Post-cardioversion EKG revealed SVT with frequent AV paced complexes.     He is transferred to the ICU for higher level of care.      Subjective   Interval History:  Patient doing well this morning.  Converted out of ventricular tachycardia.  Cardiology stopped the lidocaine drip.  Denies any chest pain, nausea, fever, or chills.  No other acute complaints.             The patient's past medical, surgical and social history were reviewed and updated in Epic as appropriate.       Objective     Infusions:     Medications:  carvedilol, 12.5 mg, Oral, BID With Meals  clopidogrel, 75 mg, Oral, Daily  doxycycline, 100 mg, Intravenous,  Q12H  empagliflozin, 10 mg, Oral, Daily  finasteride, 5 mg, Oral, Daily  levothyroxine, 25 mcg, Oral, Q AM  [START ON 12/11/2022] pantoprazole, 40 mg, Oral, Q AM  piperacillin-tazobactam, 3.375 g, Intravenous, Q8H  rivaroxaban, 10 mg, Oral, Daily  sacubitril-valsartan, 1 tablet, Oral, BID  sodium chloride, 10 mL, Intravenous, Q12H      I reviewed the patient's medications.    Vital Sign Min/Max for last 24 hours  Temp  Min: 97.6 °F (36.4 °C)  Max: 98.3 °F (36.8 °C)   BP  Min: 106/58  Max: 140/86   Pulse  Min: 69  Max: 72   Resp  Min: 16  Max: 20   SpO2  Min: 92 %  Max: 96 %   Flow (L/min)  Min: 2  Max: 2       Input/Output for last 24 hour shift  12/09 0701 - 12/10 0700  In: 3350.9 [P.O.:1370; I.V.:1430.9]  Out: 2600 [Urine:2600]      GENERAL : NAD, conversant  RESPIRATORY/THORAX : normal respiratory effort and no intercostal retractions, CTAB  CARDIOVASCULAR : Normal S1/S2, RRR. no lower ext edema.  GASTROINTESTINAL : Soft, NT/ND. BS x 4 normoactive. No hepatosplenomegaly.  MUSCULOSKELETAL : No cyanosis, clubbing, or ischemia  NEUROLOGICAL: alert and oriented to person, place and time  PSYCHOLOGICAL : Appropriate affect      Results from last 7 days   Lab Units 12/10/22  0410 12/09/22  0313 12/08/22  1538   WBC 10*3/mm3 6.48 7.14 9.93   HEMOGLOBIN g/dL 12.4* 11.8* 12.8*   PLATELETS 10*3/mm3 158 158 194     Results from last 7 days   Lab Units 12/10/22  0410 12/09/22  1809 12/09/22  0313 12/08/22  1538   SODIUM mmol/L 139  --  141 135*   POTASSIUM mmol/L 3.8 4.2 3.3* 3.6   CO2 mmol/L 18.0*  --  19.0* 18.0*   BUN mg/dL 13  --  18 20   CREATININE mg/dL 0.58*  --  0.62* 0.77   MAGNESIUM mg/dL  --   --  2.4 2.1   PHOSPHORUS mg/dL  --   --  4.6*  --    GLUCOSE mg/dL 84  --  85 155*     Estimated Creatinine Clearance: 121.3 mL/min (A) (by C-G formula based on SCr of 0.58 mg/dL (L)).    Results from last 7 days   Lab Units 12/09/22  0021   PH, ARTERIAL pH units 7.368   PCO2, ARTERIAL mm Hg 32.5*   PO2 ART mm Hg 75.2*        I reviewed the patient's new clinical results.  I reviewed the patient's new imaging results/reports including actual images and agree with reports.       Imaging Results (Last 24 Hours)     Procedure Component Value Units Date/Time    US Abdomen Complete [[id number]] Collected: 12/09/22 1604     Updated: 12/09/22 1613    Narrative:      US ABDOMEN COMPLETE-     Date of Exam: 12/9/2022 1:50 PM     Indication: Transaminitis; N39.0-Urinary tract infection, site not  specified; A41.9-Sepsis, unspecified organism; R65.21-Severe sepsis with  septic shock; G93.40-Encephalopathy, unspecified; J10.1-Influenza due to  other identified influenza virus with other respiratory manifestations;  R09.02-Hypoxemia; R55-Syncope and collapse.       Comparison: CT abdomen and pelvis dated 12/08/2022, 04/11/2015     Technique: Transverse and sagittal ultrasound images of the abdomen were  obtained. Doppler evaluation was also conducted.     FINDINGS:  The aorta is not evaluated due to overlying bowel gas obscuring  visualization. The IVC is patent.     There is a anechoic unilocular cyst within the head of the pancreas  measuring 2.1 cm, similar to prior exams. There is no pancreatic ductal  dilatation.     The liver is normal in size and contour. There is normal echogenicity  and echotexture of the liver parenchyma. There is normal hepatopetal  flow within the main portal vein. There is no focal liver lesion.     The gallbladder is present without wall thickening. There are no  gallstones or sludge. The common bile duct is normal in caliber  measuring 3 mm.     The right kidney measures 11.7 x 6.7 x 6.8 cm. There is a right-sided  parapelvic cyst measuring 3.7 cm. There is no hydronephrosis. The left  kidney measures 12.3 x 6.0 x 4.3 cm. There is a 1.7 cm simple cyst  arising from the left kidney. There is no hydronephrosis.     The spleen is normal in size measuring 11.8 cm. There is no ascites.       Impression:      1. Normal  sonographic appearance of the liver. No biliary ductal  dilatation.  2. Similar-appearing unilocular cyst within the head of the pancreas  measuring 2.1 cm.     This report was finalized on 12/9/2022 4:10 PM by Nomi Ray MD.             Assessment & Plan   Impression        Ventricular tachycardia    Coronary artery disease involving native coronary artery of native heart with angina pectoris (HCC)    Chronic systolic congestive heart failure (HCC)    R/O Pneumonia    Influenza A    COPD (chronic obstructive pulmonary disease) (HCC)    Pulmonary nodule (Stable)    GEO (obstructive sleep apnea)    ICD (implantable cardioverter-defibrillator) in place    T2DM (type 2 diabetes mellitus) (HCC)    Elevated LFTs (R/O shock liver)    Acute respiratory failure with hypoxia (HCC)    Ischemic cardiomyopathy (EF < 20%)       Plan        77-year-old male with a past medical history significant for ventricular fibrillation on amiodarone status post ICD, heart failure reduced ejection fraction EF of less than 20%, hypertension, hyperlipidemia, short sleep apnea with CPAP, hypothyroidism, diabetes mellitus type 2, GERD, COPD, and BPH.  Who presented [facility] ICU on 12/9/2002 with ventricular tachycardia.  Eventually was placed on lidocaine and amiodarone.  Patient converted out of ventricular tachycardia on 12/9/2022.  Patient did pass positive fluid on 12/8/2022 with symptoms beginning on 12/6/2022 patient appeared asymptomatic therefore did not get Tamiflu.    -V. tach management per cardiology  -Continue Coreg  -Continue Entresto for hypertension  -Heart failure management per cardiology  -Levothyroxine for hypothyroidism  -No signs of infection at this point we will go ahead and discontinue antibiotics  -Patient positive for influenza A we will continue supportive care.  -P.o. diet  -AM labs    Plan of care and goals reviewed with multidisciplinary/antibiotic stewardship team during rounds.   I  discussed the patient's findings and my recommendations with patient and nursing staff       Farzana Dickens,   Pulmonary, Critical care and Sleep Medicine

## 2022-12-10 NOTE — THERAPY EVALUATION
Patient Name: Jonnie Roth  : 1945    MRN: [id number]                              Today's Date: 12/10/2022       Admit Date: 2022    Visit Dx:     ICD-10-CM ICD-9-CM   1. Acute urinary tract infection  N39.0 599.0   2. Sepsis with encephalopathy and septic shock, due to unspecified organism ([facility])  A41.9 038.9    R65.21 995.92    G93.40 348.30     785.52   3. Influenza A  J10.1 487.1   4. Hypoxia  R09.02 799.02   5. Syncope, unspecified syncope type  R55 780.2     Patient Active Problem List   Diagnosis   • Coronary artery disease involving native coronary artery of native heart with angina pectoris (HCC)   • Chronic systolic congestive heart failure (HCC)   • BPH (benign prostatic hypertrophy)   • LBBB (left bundle branch block)   • Pulmonary emphysema (HCC)   • Hyperlipidemia LDL goal <70   • Ventricular tachycardia   • Post-traumatic subdural hematoma   • Elevated AST (SGOT)   • R/O Pneumonia   • Influenza A   • COPD (chronic obstructive pulmonary disease) (HCC)   • Pulmonary nodule (Stable)   • Bilateral renal cysts   • GEO (obstructive sleep apnea)   • ICD (implantable cardioverter-defibrillator) in place   • Hypothyroidism (acquired)   • GERD without esophagitis   • T2DM (type 2 diabetes mellitus) (HCC)   • Elevated LFTs (R/O shock liver)   • Acute respiratory failure with hypoxia (HCC)   • Ischemic cardiomyopathy (EF < 20%)     Past Medical History:   Diagnosis Date   • Arthritis    • BPH (benign prostatic hypertrophy) 2016   • CAD (coronary artery disease)    • Disease of thyroid gland    • Enlarged prostate    • HFrEF (heart failure with reduced ejection fraction) ([facility])    • History of heart attack     X3 ABOUT    • History of transfusion     NO REACTION RECALLED    • Hypertension    • IHD (ischemic heart disease) 2016   • LBBB (left bundle branch block) 2016   • Lung nodule    • Macular degeneration    • Pancreatic cyst 2022   • Polio     as a child   • Pulmonary  embolism (HCC)    • Pulmonary emphysema (HCC) 12/28/2016   • Sepsis (HCC)     A. Secondary to [place] trauma with hematuria and urosepsis requiring hospitalization May 2015   • Sleep apnea with use of continuous positive airway pressure (CPAP)     CPAP- SETTING 4    • Ventricular tachycardia 12/28/2016   • Wears glasses     READERS     Past Surgical History:   Procedure Laterality Date   • BIVENTRICULLAR IMPLANTABLE CARDIOVERTER DEFIBRILLATOR PLACEMENT     • CARDIAC CATHETERIZATION N/A 1/16/2017    Procedure: Left Heart Cath;  Surgeon: Diego Ayala MD;  Location:  [facility];  Service:    • CARDIAC DEFIBRILLATOR PLACEMENT       A. ICD generator change out with upgrade to BiV pacemaker implantable cardioverter    defibrillator, 12/17/2007. Ventricular fibrillation s/p pacesetter [place] ICD in [place]. [place] 09/2000   • CARDIAC ELECTROPHYSIOLOGY PROCEDURE N/A 10/2/2017    Procedure: BIV ICD  generator change [facility];  Surgeon: Ferdinand Alba MD;  Location:  [facility];  Service:    • CARDIAC ELECTROPHYSIOLOGY PROCEDURE N/A 7/6/2021    Procedure: BIV ICD generator change with [employer]. This will need to be done in late June or early July. Hold Xarelto one day prior.;  Surgeon: Ferdinand Alba MD;  Location:  [facility];  Service: Cardiology;  Laterality: N/A;   • CATARACT EXTRACTION      Bilateral    • COLONOSCOPY     • CORONARY ANGIOPLASTY WITH STENT PLACEMENT      X7 STENTS TOTAL    • CYSTOSCOPY URETERAL TUMOR FULGURATION  05/29/2015     A. Status post cystoscopy with clot evacuation and fulguration of the prostate secondary to hematuria, 5/29/2015.   • FOOT SURGERY      RIGHT - POLIO RELATED    • PROSTATE SURGERY      A. Status post laser vaporization, 08/19/2009.   • ROOT CANAL     • TOOTH EXTRACTION        General Information     Row Name 12/10/22 1131          OT Time and Intention    Document Type evaluation  -CS     Mode of Treatment occupational therapy  -CS      Row Name 12/10/22 1131          General Information    Patient Profile Reviewed yes  -CS     Prior Level of Function independent:;all household mobility;ADL's  -CS     Existing Precautions/Restrictions fall;cardiac;other (see comments)  h/o polio w/ RLE weakness vs L  -CS     Barriers to Rehab medically complex  -CS     Row Name 12/10/22 1131          Living Environment    People in Home spouse  -CS     Row Name 12/10/22 1131          Home Main Entrance    Number of Stairs, Main Entrance three  14 from garage w/ HR B  -CS     Stair Railings, Main Entrance railings on both sides of stairs  -CS     Row Name 12/10/22 1131          Stairs Within Home, Primary    Stairs, Within Home, Primary 16  -CS     Stair Railings, Within Home, Primary railing on left side (ascending)  -CS     Row Name 12/10/22 1131          Cognition    Orientation Status (Cognition) oriented x 4  -CS     Row Name 12/10/22 1131          Safety Issues, Functional Mobility    Impairments Affecting Function (Mobility) balance;endurance/activity tolerance;strength  -CS           User Key  (r) = Recorded By, (t) = Taken By, (c) = Cosigned By    Initials Name Provider Type    CS Sabine Gurrola, OT Occupational Therapist                 Mobility/ADL's     Row Name 12/10/22 1131          Bed Mobility    Bed Mobility supine-sit  -CS     Supine-Sit [place] (Bed Mobility) contact guard;verbal cues  -CS     Assistive Device (Bed Mobility) bed rails;head of bed elevated  -CS     Row Name 12/10/22 1131          Transfers    Transfers sit-stand transfer;stand-sit transfer;bed-chair transfer  -CS     Row Name 12/10/22 1131          Bed-Chair Transfer    Bed-Chair [place] (Transfers) contact guard;verbal cues  -CS     Row Name 12/10/22 1131          Sit-Stand Transfer    Sit-Stand [place] (Transfers) contact guard;verbal cues  -CS     Row Name 12/10/22 1131          Stand-Sit Transfer    Stand-Sit [place] (Transfers) contact guard;verbal cues   -     Row Name 12/10/22 1131          Functional Mobility    Functional Mobility- Ind. Level contact guard assist;verbal cues required;1 person + 1 person to manage equipment  -     Functional Mobility-Distance (Feet) --  to/from doorway  -     Row Name 12/10/22 1131          Activities of Daily Living    BADL Assessment/Intervention bathing;upper body dressing;lower body dressing;grooming  -     Row Name 12/10/22 1131          Bathing Assessment/Intervention    [place] Level (Bathing) lower body;distal lower extremities/feet;dependent (less than 25% patient effort);upper body;upper extremities;minimum assist (75% patient effort)  -CS     Position (Bathing) sitting up in bed  -CS     Row Name 12/10/22 1131          Upper Body Dressing Assessment/Training    [place] Level (Upper Body Dressing) don;doff;minimum assist (75% patient effort)  -CS     Position (Upper Body Dressing) sitting up in bed  -CS     Comment, (Upper Body Dressing) hosp gown  -     Row Name 12/10/22 1131          Lower Body Dressing Assessment/Training    [place] Level (Lower Body Dressing) don;socks;dependent (less than 25% patient effort)  -CS     Position (Lower Body Dressing) supine  -     Row Name 12/10/22 1131          Grooming Assessment/Training    [place] Level (Grooming) wash face, hands;set up  -CS     Position (Grooming) sitting up in bed  -           User Key  (r) = Recorded By, (t) = Taken By, (c) = Cosigned By    Initials Name Provider Type    Sabine Brandon OT Occupational Therapist               Obj/Interventions     Row Name 12/10/22 1132          Sensory Assessment (Somatosensory)    Sensory Assessment (Somatosensory) UE sensation intact  -     Row Name 12/10/22 1132          Vision Assessment/Intervention    Visual Impairment/Limitations WFL  -     Row Name 12/10/22 1132          Range of Motion Comprehensive    General Range of Motion bilateral upper extremity ROM WFL  -     Row Name  12/10/22 1132          Strength Comprehensive (MMT)    Comment, General Manual Muscle Testing (MMT) Assessment BUE grossly WFL  -CS     Row Name 12/10/22 1132          Balance    Balance Assessment sitting static balance;sitting dynamic balance;standing static balance;standing dynamic balance  -CS     Static Sitting Balance supervision  -CS     Dynamic Sitting Balance supervision  -CS     Position, Sitting Balance sitting edge of bed  -CS     Static Standing Balance contact guard  -CS     Dynamic Standing Balance contact guard  -CS     Position/Device Used, Standing Balance unsupported  -CS     Balance Interventions sitting;standing;static;dynamic;dynamic reaching;occupation based/functional task;weight shifting activity  -CS           User Key  (r) = Recorded By, (t) = Taken By, (c) = Cosigned By    Initials Name Provider Type    CS Sabine Gurrola, OT Occupational Therapist               Goals/Plan     Row Name 12/10/22 1134          Transfer Goal 1 (OT)    Activity/Assistive Device (Transfer Goal 1, OT) sit-to-stand/stand-to-sit;toilet  -CS     [place] Level/Cues Needed (Transfer Goal 1, OT) supervision required  -CS     Time Frame (Transfer Goal 1, OT) long term goal (LTG);10 days  -CS     Progress/Outcome (Transfer Goal 1, OT) goal ongoing  -CS     Row Name 12/10/22 1134          Dressing Goal 1 (OT)    Activity/Device (Dressing Goal 1, OT) lower body dressing  -CS     [place]/Cues Needed (Dressing Goal 1, OT) supervision required  -CS     Time Frame (Dressing Goal 1, OT) long term goal (LTG);10 days  -CS     Strategies/Barriers (Dressing Goal 1, OT) don/doff socks w/ AAD  -CS     Progress/Outcome (Dressing Goal 1, OT) goal ongoing  -CS     Row Name 12/10/22 1134          Toileting Goal 1 (OT)    Activity/Device (Toileting Goal 1, OT) adjust/manage clothing;perform perineal hygiene  -CS     [place] Level/Cues Needed (Toileting Goal 1, OT) supervision required  -CS     Time Frame (Toileting Goal 1,  OT) long term goal (LTG);10 days  -CS     Progress/Outcome (Toileting Goal 1, OT) goal ongoing  -CS     Row Name 12/10/22 1134          Therapy Assessment/Plan (OT)    Planned Therapy Interventions (OT) activity tolerance training;adaptive equipment training;BADL retraining;occupation/activity based interventions;functional balance retraining;ROM/therapeutic exercise;strengthening exercise;transfer/mobility retraining  -CS           User Key  (r) = Recorded By, (t) = Taken By, (c) = Cosigned By    Initials Name Provider Type    CS Sabine Gurrola, CHELSEY Occupational Therapist               Clinical Impression     Row Name 12/10/22 1133          Pain Assessment    Pretreatment Pain Rating 0/10 - no pain  -CS     Posttreatment Pain Rating 0/10 - no pain  -CS     Row Name 12/10/22 1133          Plan of Care Review    Plan of Care Reviewed With patient  -CS     Progress improving  -CS     Outcome Evaluation OT eval complete. Pt is CGA for t/fs and functional mobility, Min A for UB ADLs. Pt limited d/t c/o fatigue and decreased balance from baseline. Recommend cont skilled IPOT POC. Recommend pt DC home w/ assist and HH OT/PT services.  -CS     Row Name 12/10/22 1133          Therapy Assessment/Plan (OT)    Patient/Family Therapy Goal Statement (OT) Return to PLOF  -CS     Rehab Potential (OT) good, to achieve stated therapy goals  -CS     Criteria for Skilled Therapeutic Interventions Met (OT) yes;skilled treatment is necessary  -CS     Therapy Frequency (OT) daily  -CS     Row Name 12/10/22 1133          Therapy Plan Review/Discharge Plan (OT)    Anticipated Discharge Disposition (OT) home with assist;home with home health  -CS     Row Name 12/10/22 1133          Vital Signs    Pre Systolic BP Rehab --  VSS  -CS     O2 Delivery Pre Treatment room air  -CS     O2 Delivery Intra Treatment room air  -CS     O2 Delivery Post Treatment room air  -CS     Pre Patient Position Supine  -CS     Intra Patient Position Standing  -CS      Post Patient Position Sitting  -CS     Row Name 12/10/22 1133          Positioning and Restraints    Pre-Treatment Position in bed  -CS     Post Treatment Position chair  -CS     In Chair notified nsg;reclined;call light within reach;encouraged to call for assist;exit alarm on;waffle cushion;legs elevated  -CS           User Key  (r) = Recorded By, (t) = Taken By, (c) = Cosigned By    Initials Name Provider Type    Sabine Brandon OT Occupational Therapist               Outcome Measures     Row Name 12/10/22 1134          How much help from another is currently needed...    Putting on and taking off regular lower body clothing? 2  -CS     Bathing (including washing, rinsing, and drying) 2  -CS     Toileting (which includes using toilet bed pan or urinal) 2  -CS     Putting on and taking off regular upper body clothing 3  -CS     Taking care of personal grooming (such as brushing teeth) 3  -CS     Eating meals 4  -CS     AM-PAC 6 Clicks Score (OT) 16  -CS     Row Name 12/10/22 1134          Functional Assessment    Outcome Measure Options AM-PAC 6 Clicks Daily Activity (OT)  -           User Key  (r) = Recorded By, (t) = Taken By, (c) = Cosigned By    Initials Name Provider Type    Sabine Brandon OT Occupational Therapist                Occupational Therapy Education     Title: PT OT SLP Therapies (In Progress)     Topic: Occupational Therapy (In Progress)     Point: ADL training (In Progress)     Description:   Instruct learner(s) on proper safety adaptation and remediation techniques during self care or transfers.   Instruct in proper use of assistive devices.              Learning Progress Summary           Patient Acceptance, E, NR by CS at 12/10/2022 1135                   Point: Home exercise program (Not Started)     Description:   Instruct learner(s) on appropriate technique for monitoring, assisting and/or progressing therapeutic exercises/activities.              Learner Progress:  Not  documented in this visit.          Point: Precautions (In Progress)     Description:   Instruct learner(s) on prescribed precautions during self-care and functional transfers.              Learning Progress Summary           Patient Acceptance, E, NR by  at 12/10/2022 1135                   Point: Body mechanics (In Progress)     Description:   Instruct learner(s) on proper positioning and spine alignment during self-care, functional mobility activities and/or exercises.              Learning Progress Summary           Patient Acceptance, E, NR by  at 12/10/2022 1135                               User Key     Initials Effective Dates Name Provider Type Discipline     09/02/21 -  Sabine Gurrola, OT Occupational Therapist OT              OT Recommendation and Plan  Planned Therapy Interventions (OT): activity tolerance training, adaptive equipment training, BADL retraining, occupation/activity based interventions, functional balance retraining, ROM/therapeutic exercise, strengthening exercise, transfer/mobility retraining  Therapy Frequency (OT): daily  Plan of Care Review  Plan of Care Reviewed With: patient  Progress: improving  Outcome Evaluation: OT eval complete. Pt is CGA for t/fs and functional mobility, Min A for UB ADLs. Pt limited d/t c/o fatigue and decreased balance from baseline. Recommend cont skilled IPOT POC. Recommend pt DC home w/ assist and  OT/PT services.     Time Calculation:    Time Calculation- OT     Row Name 12/10/22 1135             Time Calculation- OT    OT Start Time 0855  -CS      OT Received On 12/10/22  -CS      OT Goal Re-Cert Due Date 12/20/22  -CS         Timed Charges    50889 - OT Self Care/Mgmt Minutes 10  -CS         Untimed Charges    OT Eval/Re-eval Minutes 46  -CS         Total Minutes    Timed Charges Total Minutes 10  -CS      Untimed Charges Total Minutes 46  -CS       Total Minutes 56  -CS            User Key  (r) = Recorded By, (t) = Taken By, (c) = Cosigned By     Initials Name Provider Type     Sabine Gurrola OT Occupational Therapist              Therapy Charges for Today     Code Description Service Date Service Provider Modifiers Qty    14008065932 HC OT SELF CARE/MGMT/TRAIN EA 15 MIN 12/10/2022 Sabine Gurrola OT GO 1    97740527467 HC OT EVAL LOW COMPLEXITY 4 12/10/2022 Sabine Gurrola OT GO 1    95387273100 HC OT THER SUPP EA 15 MIN 12/10/2022 Sabine Gurrola OT GO 3               Sabine Gurrola OT  12/10/2022

## 2022-12-10 NOTE — PLAN OF CARE
Goal Outcome Evaluation:  Plan of Care Reviewed With: patient        Progress: improving  Outcome Evaluation: OT eval complete. Pt is CGA for t/fs and functional mobility, Min A for UB ADLs. Pt limited d/t c/o fatigue and decreased balance from baseline. Recommend cont skilled IPOT POC. Recommend pt DC home w/ assist and HH OT/PT services.

## 2022-12-10 NOTE — PROGRESS NOTES
"                                 [facility] Progress Note      LOS: 2 days   Patient Care Team:  Brian Lewis MD as PCP - General (Internal Medicine)  Ferdinand Alba MD as Consulting Physician (Cardiac Electrophysiology)  Viet Manzano PA as Physician Assistant (Cardiology)    Chief Complaint:    Chief Complaint   Patient presents with   • Dizziness   • Weakness - Generalized       Subjective     Interval History: Quiet night    Patient Complaints: Feels considerably better.  Cough but no angina or significant dyspnea      Review of Systems:   A 14 point review of systems was negative except as was stated in the HPI      Objective     Vital Sign Min/Max for last 24 hours  Temp  Min: 98 °F (36.7 °C)  Max: 98.3 °F (36.8 °C)   BP  Min: 106/58  Max: 142/81   Pulse  Min: 67  Max: 77   Resp  Min: 16  Max: 20   SpO2  Min: 91 %  Max: 96 %   Flow (L/min)  Min: 2  Max: 2   No data recorded     Flowsheet Rows    Flowsheet Row First Filed Value   Admission Height 185.4 cm (73\") Documented at 12/08/2022 1501   Admission Weight 81.6 kg (180 lb) Documented at 12/08/2022 1501          Physical Exam:  General Appearance: Alert, appears stated age and cooperative  Lungs: Scattered rhonchi  Heart:: RRR   No Murmurs, Rubs or Gallops  Abdomen: Soft and nontender with adequate bowel sounds.  No organomegaly  Extremities: No cyanosis, clubbing or edema  Pulses: Pulses palpable and equal bilaterally  Skin: Warm and dry with no rash  Psych: Normal     Results Review:     I reviewed the patient's new clinical results.  Results from last 7 days   Lab Units 12/10/22  0410 12/09/22  1809 12/09/22  0313 12/08/22  1538   SODIUM mmol/L 139  --  141 135*   POTASSIUM mmol/L 3.8 4.2 3.3* 3.6   CHLORIDE mmol/L 109*  --  109* 102   CO2 mmol/L 18.0*  --  19.0* 18.0*   BUN mg/dL 13  --  18 20   CREATININE mg/dL 0.58*  --  0.62* 0.77   GLUCOSE mg/dL 84  --  85 155*   CALCIUM mg/dL 8.1*  --  7.9* 8.6     Results from last " 7 days   Lab Units 12/10/22  0410 12/09/22  0313 12/08/22  1538   WBC 10*3/mm3 6.48 7.14 9.93   HEMOGLOBIN g/dL 12.4* 11.8* 12.8*   HEMATOCRIT % 38.7 37.6 40.4   PLATELETS 10*3/mm3 158 158 194     Lab Results   Lab Value Date/Time    TROPONINT <0.010 12/08/2022 1538    TROPONINT <0.010 09/10/2022 0921         Results from last 7 days   Lab Units 12/08/22  1538   INR  1.42*     Results from last 7 days   Lab Units 12/09/22  0021   PH, ARTERIAL pH units 7.368   PO2 ART mm Hg 75.2*   PCO2, ARTERIAL mm Hg 32.5*   HCO3 ART mmol/L 18.7*           Medication Review: yes  Current Facility-Administered Medications   Medication Dose Route Frequency Provider Last Rate Last Admin   • clopidogrel (PLAVIX) tablet 75 mg  75 mg Oral Daily Korin Cannon APRN   75 mg at 12/09/22 1108   • dextrose (D50W) (25 g/50 mL) IV injection 25 g  25 g Intravenous Q15 Min PRN Korin Cannon APRN       • dextrose (GLUTOSE) oral gel 15 g  15 g Oral Q15 Min PRN Korin Cannon APRN       • doxycycline (VIBRAMYCIN) 100 mg in sodium chloride 0.9 % 250 mL IVPB-VTB  100 mg Intravenous Q12H Matt Travis DO   100 mg at 12/09/22 2015   • empagliflozin (JARDIANCE) tablet 10 mg  10 mg Oral Daily Korin Cannon APRN   10 mg at 12/09/22 1108   • finasteride (PROSCAR) tablet 5 mg  5 mg Oral Daily Korin Cannon APRN   5 mg at 12/09/22 1125   • glucagon (human recombinant) (GLUCAGEN DIAGNOSTIC) injection 1 mg  1 mg Intramuscular Q15 Min PRN Korin Cannon APRN       • ipratropium-albuterol (DUO-NEB) nebulizer solution 3 mL  3 mL Nebulization Q4H PRN Korin Cannon APRN       • ipratropium-albuterol (DUO-NEB) nebulizer solution 3 mL  3 mL Nebulization 4x Daily - RT Korin Cannon APRN   3 mL at 12/09/22 1948   • levothyroxine (SYNTHROID, LEVOTHROID) tablet 25 mcg  25 mcg Oral Q AM Korin Cannon APRN   25 mcg at 12/10/22 0626   • lidocaine 4 mg/mL in dextrose 5% infusion  1 mg/min Intravenous Continuous Mary Hills APRN 15 mL/hr at  12/10/22 0033 1 mg/min at 12/10/22 0033   • melatonin tablet 10 mg  10 mg Oral Nightly PRN Korin Cannon APRN       • pantoprazole (PROTONIX) injection 40 mg  40 mg Intravenous Q AM Deloris Sahni APRN   40 mg at 12/10/22 0626   • piperacillin-tazobactam (ZOSYN) 3.375 g in iso-osmotic dextrose 50 ml (premix)  3.375 g Intravenous Q8H Matt Travis,    3.375 g at 12/10/22 0359   • potassium chloride (MICRO-K) CR capsule 40 mEq  40 mEq Oral PRN Deloris Sahni APRN   40 mEq at 12/09/22 1106    Or   • potassium chloride (KLOR-CON) packet 40 mEq  40 mEq Oral PRN Deloris Sahni APRN        Or   • potassium chloride 10 mEq in 100 mL IVPB  10 mEq Intravenous Q1H PRN Deloris Sahni APRN       • rivaroxaban (XARELTO) tablet 10 mg  10 mg Oral Daily Korin Cannon APRN   10 mg at 12/09/22 1108   • sacubitril-valsartan (ENTRESTO) 49-51 MG tablet 1 tablet  1 tablet Oral BID Korin Cannon APRN   1 tablet at 12/09/22 2015   • sodium chloride 0.9 % flush 10 mL  10 mL Intravenous Q12H Deloris Sahni APRN   10 mL at 12/09/22 2015   • sodium chloride 0.9 % flush 10 mL  10 mL Intravenous PRN Deloris Sahni APRN       • sodium chloride 0.9 % infusion 40 mL  40 mL Intravenous PRN Deloris Sahni APRN             Ventricular tachycardia    Coronary artery disease involving native coronary artery of native heart with angina pectoris (HCC)    Chronic systolic congestive heart failure (HCC)    R/O Pneumonia    Influenza A    COPD (chronic obstructive pulmonary disease) (HCC)    Pulmonary nodule (Stable)    GEO (obstructive sleep apnea)    ICD (implantable cardioverter-defibrillator) in place    T2DM (type 2 diabetes mellitus) (HCC)    Elevated LFTs (R/O shock liver)    Acute respiratory failure with hypoxia (HCC)    Ischemic cardiomyopathy (EF < 20%)        Impression      Volume depletion/flu A  Recurrent VT/VF--no recurrent arrhythmias on lidocaine drip  Shock liver improving  Cardiomyopathy/severe LV  dysfunction  BiV ICD      Plan     Discontinue lidocaine  Repeat liver function tests in a.m.  We will resume oral amiodarone at that time provided transaminases are declining appropriately  Continue supportive care per intensivist      Tong Wellington MD   12/10/22  07:40 EST

## 2022-12-10 NOTE — PLAN OF CARE
Problem: Adult Inpatient Plan of Care  Goal: Plan of Care Review  Outcome: Ongoing, Progressing  Flowsheets (Taken 12/10/2022 0521)  Progress: improving  Plan of Care Reviewed With: patient  Outcome Evaluation: Patient with no acute events overnight. VSS on room air/ 2LNC while asleep. Paced rhythm on the monitor. Lidocaine remains infusing @1. UOP adequate.

## 2022-12-10 NOTE — PLAN OF CARE
Goal Outcome Evaluation:  Plan of Care Reviewed With: patient, spouse, son        Progress: no change  Outcome Evaluation: PT initial evaluation completed. Pt demonstrates generalized weakness and decreased indep/ functional endurance re: mobility, warranting further skilled PT services to promote PLOF. Limited today by sudden onset of dizziness/ diaphoresis during ambulation (RN aware), but was able to ambulate 60 ft with min A, RW prior to symptom onset. Recommend d/c home with assist and HHPT based upon current level of function (TBA further pending progress).

## 2022-12-11 LAB
ALBUMIN SERPL-MCNC: 3.3 G/DL (ref 3.5–5.2)
ALBUMIN/GLOB SERPL: 1.2 G/DL
ALP SERPL-CCNC: 95 U/L (ref 39–117)
ALT SERPL W P-5'-P-CCNC: 1661 U/L (ref 1–41)
ANION GAP SERPL CALCULATED.3IONS-SCNC: 11 MMOL/L (ref 5–15)
AST SERPL-CCNC: 1050 U/L (ref 1–40)
BILIRUB SERPL-MCNC: 0.7 MG/DL (ref 0–1.2)
BUN SERPL-MCNC: 10 MG/DL (ref 8–23)
BUN/CREAT SERPL: 18.2 (ref 7–25)
CALCIUM SPEC-SCNC: 8.1 MG/DL (ref 8.6–10.5)
CHLORIDE SERPL-SCNC: 108 MMOL/L (ref 98–107)
CO2 SERPL-SCNC: 20 MMOL/L (ref 22–29)
CREAT SERPL-MCNC: 0.55 MG/DL (ref 0.76–1.27)
EGFRCR SERPLBLD CKD-EPI 2021: 101.4 ML/MIN/1.73
GLOBULIN UR ELPH-MCNC: 2.7 GM/DL
GLUCOSE BLDC GLUCOMTR-MCNC: 102 MG/DL (ref 70–130)
GLUCOSE BLDC GLUCOMTR-MCNC: 105 MG/DL (ref 70–130)
GLUCOSE BLDC GLUCOMTR-MCNC: 92 MG/DL (ref 70–130)
GLUCOSE SERPL-MCNC: 96 MG/DL (ref 65–99)
POTASSIUM SERPL-SCNC: 3.5 MMOL/L (ref 3.5–5.2)
POTASSIUM SERPL-SCNC: 4.1 MMOL/L (ref 3.5–5.2)
PROT SERPL-MCNC: 6 G/DL (ref 6–8.5)
SODIUM SERPL-SCNC: 139 MMOL/L (ref 136–145)

## 2022-12-11 PROCEDURE — 99232 SBSQ HOSP IP/OBS MODERATE 35: CPT | Performed by: INTERNAL MEDICINE

## 2022-12-11 PROCEDURE — 80053 COMPREHEN METABOLIC PANEL: CPT | Performed by: INTERNAL MEDICINE

## 2022-12-11 PROCEDURE — 82962 GLUCOSE BLOOD TEST: CPT

## 2022-12-11 PROCEDURE — 84132 ASSAY OF SERUM POTASSIUM: CPT | Performed by: INTERNAL MEDICINE

## 2022-12-11 RX ORDER — EPLERENONE 25 MG/1
12.5 TABLET, FILM COATED ORAL
Status: DISCONTINUED | OUTPATIENT
Start: 2022-12-11 | End: 2022-12-13 | Stop reason: HOSPADM

## 2022-12-11 RX ORDER — TORSEMIDE 10 MG/1
5 TABLET ORAL ONCE
Status: COMPLETED | OUTPATIENT
Start: 2022-12-11 | End: 2022-12-11

## 2022-12-11 RX ADMIN — FINASTERIDE 5 MG: 5 TABLET, FILM COATED ORAL at 09:06

## 2022-12-11 RX ADMIN — POTASSIUM CHLORIDE 40 MEQ: 750 CAPSULE, EXTENDED RELEASE ORAL at 06:16

## 2022-12-11 RX ADMIN — SACUBITRIL AND VALSARTAN 1 TABLET: 49; 51 TABLET, FILM COATED ORAL at 20:43

## 2022-12-11 RX ADMIN — RIVAROXABAN 10 MG: 10 TABLET, FILM COATED ORAL at 09:06

## 2022-12-11 RX ADMIN — CLOPIDOGREL BISULFATE 75 MG: 75 TABLET ORAL at 09:06

## 2022-12-11 RX ADMIN — EMPAGLIFLOZIN 10 MG: 10 TABLET, FILM COATED ORAL at 09:06

## 2022-12-11 RX ADMIN — CARVEDILOL 12.5 MG: 12.5 TABLET, FILM COATED ORAL at 09:06

## 2022-12-11 RX ADMIN — Medication 10 ML: at 09:08

## 2022-12-11 RX ADMIN — TORSEMIDE 5 MG: 10 TABLET ORAL at 11:31

## 2022-12-11 RX ADMIN — POTASSIUM CHLORIDE 40 MEQ: 750 CAPSULE, EXTENDED RELEASE ORAL at 11:31

## 2022-12-11 RX ADMIN — EPLERENONE 12.5 MG: 25 TABLET, FILM COATED ORAL at 13:15

## 2022-12-11 RX ADMIN — SACUBITRIL AND VALSARTAN 1 TABLET: 49; 51 TABLET, FILM COATED ORAL at 09:06

## 2022-12-11 RX ADMIN — LEVOTHYROXINE SODIUM 25 MCG: 25 TABLET ORAL at 06:17

## 2022-12-11 RX ADMIN — CARVEDILOL 12.5 MG: 12.5 TABLET, FILM COATED ORAL at 18:29

## 2022-12-11 RX ADMIN — PANTOPRAZOLE SODIUM 40 MG: 40 TABLET, DELAYED RELEASE ORAL at 06:17

## 2022-12-11 RX ADMIN — Medication 10 ML: at 20:46

## 2022-12-11 NOTE — PROGRESS NOTES
"                                 [facility] Progress Note      LOS: 3 days   Patient Care Team:  Brian Lewis MD as PCP - General (Internal Medicine)  Ferdinand Alba MD as Consulting Physician (Cardiac Electrophysiology)  Viet Manzano PA as Physician Assistant (Cardiology)    Chief Complaint:    Chief Complaint   Patient presents with   • Dizziness   • Weakness - Generalized       Subjective     Interval History: Quiet night    Patient Complaints: Feels considerably better.  Slept better.  Cough is diminishing      Review of Systems:   A 14 point review of systems was negative except as was stated in the HPI      Objective     Vital Sign Min/Max for last 24 hours  Temp  Min: 97.5 °F (36.4 °C)  Max: 98.3 °F (36.8 °C)   BP  Min: 97/59  Max: 130/74   Pulse  Min: 68  Max: 73   Resp  Min: 16  Max: 20   SpO2  Min: 92 %  Max: 96 %   Flow (L/min)  Min: 2  Max: 2   No data recorded     Flowsheet Rows    Flowsheet Row First Filed Value   Admission Height 185.4 cm (73\") Documented at 12/08/2022 1501   Admission Weight 81.6 kg (180 lb) Documented at 12/08/2022 1501          Physical Exam:  General Appearance: Alert, appears stated age and cooperative  Lungs: Scattered rhonchi  Heart:: RRR   No Murmurs, Rubs or Gallops  Abdomen: Soft and nontender with adequate bowel sounds.  No organomegaly  Extremities: No cyanosis, clubbing or edema  Pulses: Pulses palpable and equal bilaterally  Skin: Warm and dry with no rash  Psych: Normal     Results Review:     I reviewed the patient's new clinical results.  Results from last 7 days   Lab Units 12/11/22  0415 12/10/22  0410 12/09/22  1809 12/09/22  0313   SODIUM mmol/L 139 139  --  141   POTASSIUM mmol/L 3.5 3.8 4.2 3.3*   CHLORIDE mmol/L 108* 109*  --  109*   CO2 mmol/L 20.0* 18.0*  --  19.0*   BUN mg/dL 10 13  --  18   CREATININE mg/dL 0.55* 0.58*  --  0.62*   GLUCOSE mg/dL 96 84  --  85   CALCIUM mg/dL 8.1* 8.1*  --  7.9*     Results from last 7 " days   Lab Units 12/10/22  0410 12/09/22  0313 12/08/22  1538   WBC 10*3/mm3 6.48 7.14 9.93   HEMOGLOBIN g/dL 12.4* 11.8* 12.8*   HEMATOCRIT % 38.7 37.6 40.4   PLATELETS 10*3/mm3 158 158 194     Lab Results   Lab Value Date/Time    TROPONINT <0.010 12/08/2022 1538    TROPONINT <0.010 09/10/2022 0921         Results from last 7 days   Lab Units 12/08/22  1538   INR  1.42*     Results from last 7 days   Lab Units 12/09/22  0021   PH, ARTERIAL pH units 7.368   PO2 ART mm Hg 75.2*   PCO2, ARTERIAL mm Hg 32.5*   HCO3 ART mmol/L 18.7*           Medication Review: yes  Current Facility-Administered Medications   Medication Dose Route Frequency Provider Last Rate Last Admin   • carvedilol (COREG) tablet 12.5 mg  12.5 mg Oral BID With Meals Adelia Ames PA   12.5 mg at 12/10/22 1852   • clopidogrel (PLAVIX) tablet 75 mg  75 mg Oral Daily Korin Cannon APRN   75 mg at 12/10/22 0838   • dextrose (D50W) (25 g/50 mL) IV injection 25 g  25 g Intravenous Q15 Min PRN Korin Cannon APRN       • dextrose (GLUTOSE) oral gel 15 g  15 g Oral Q15 Min PRN Korin Cannon APRN       • empagliflozin (JARDIANCE) tablet 10 mg  10 mg Oral Daily Korin Cannon APRN   10 mg at 12/10/22 0839   • finasteride (PROSCAR) tablet 5 mg  5 mg Oral Daily Korin Cannon APRN   5 mg at 12/10/22 0839   • glucagon (human recombinant) (GLUCAGEN DIAGNOSTIC) injection 1 mg  1 mg Intramuscular Q15 Min PRN Korin Cannon APRN       • ipratropium-albuterol (DUO-NEB) nebulizer solution 3 mL  3 mL Nebulization Q4H PRN Brian Cain MD       • levothyroxine (SYNTHROID, LEVOTHROID) tablet 25 mcg  25 mcg Oral Q AM Korin Cannon APRN   25 mcg at 12/11/22 0617   • melatonin tablet 10 mg  10 mg Oral Nightly PRN Korin Cannon APRN       • pantoprazole (PROTONIX) EC tablet 40 mg  40 mg Oral Q AM Dc Wilcox MD   40 mg at 12/11/22 0617   • potassium chloride (MICRO-K) CR capsule 40 mEq  40 mEq Oral PRN Deloris Sahni APRN   40 mEq at 12/11/22  0616    Or   • potassium chloride (KLOR-CON) packet 40 mEq  40 mEq Oral PRN Deloris Sahni APRN        Or   • potassium chloride 10 mEq in 100 mL IVPB  10 mEq Intravenous Q1H PRN Deloris Sahni APRN       • rivaroxaban (XARELTO) tablet 10 mg  10 mg Oral Daily Korin Cannon APRN   10 mg at 12/10/22 0838   • sacubitril-valsartan (ENTRESTO) 49-51 MG tablet 1 tablet  1 tablet Oral BID Korin Cannon APRN   1 tablet at 12/10/22 2015   • sodium chloride 0.9 % flush 10 mL  10 mL Intravenous Q12H Deloris Sahni APRN   10 mL at 12/10/22 2025   • sodium chloride 0.9 % flush 10 mL  10 mL Intravenous PRN Deloris Sahni APRN       • sodium chloride 0.9 % infusion 40 mL  40 mL Intravenous PRN Deloris Sahni APRN             Ventricular tachycardia    Coronary artery disease involving native coronary artery of native heart with angina pectoris (HCC)    Chronic systolic congestive heart failure (HCC)    R/O Pneumonia    Influenza A    COPD (chronic obstructive pulmonary disease) (HCC)    Pulmonary nodule (Stable)    GEO (obstructive sleep apnea)    ICD (implantable cardioverter-defibrillator) in place    T2DM (type 2 diabetes mellitus) (HCC)    Elevated LFTs (R/O shock liver)    Acute respiratory failure with hypoxia (HCC)    Ischemic cardiomyopathy (EF < 20%)        Impression      Volume depletion/flu A  Recurrent VT/VF--no recurrent arrhythmias-off lidocaine x24 hours  Shock liver improving-transaminases trending down  Cardiomyopathy/severe LV dysfunction  BiV ICD      Plan     Continue ICU observation.  Suspect will be able to resume oral amiodarone shortly  Continue supportive care per intensivist      Tong Wellington MD   12/11/22  07:52 EST

## 2022-12-11 NOTE — PLAN OF CARE
Goal Outcome Evaluation:     VSS on RA. 1L when sleeping. Home CPAP at night.   AV paced on the monitor.   Restarted home meds- inspra and torsemide.   Ambulated 160 ft.     1Lg BM.     Gary Zimmerman at bedside most of the day updated

## 2022-12-11 NOTE — PLAN OF CARE
Problem: Adult Inpatient Plan of Care  Goal: Plan of Care Review  Outcome: Ongoing, Progressing  Flowsheets (Taken 12/11/2022 0510)  Progress: improving  Outcome Evaluation: Patient with no acute events overnight. VSS on room air/ 2LNC while asleep. Paced rhythm on the monitor. Patient states that he is feeling much better. UOP adequate. ECG complete- . Potassium replacing per protocol.

## 2022-12-11 NOTE — PROGRESS NOTES
Intensive Care Follow-up     Hospital:  LOS: 3 days   Mr. Jonnie Roth, 77 y.o. male is followed for:   Ventricular tachycardia            History of present illness:   Jonnie Roth is a 77 y.o. male with PMH significant for V-fib on amiodarone s/p ICD, HFrEF (<20%), HTN, HLD, GEO w/CPAP, hypothyroidism, T2DM, GERD, COPD, remote tobacco abuse, and BPH who presents to [facility] ED on 12/08/2022 for evaluation of a near-syncopal episode at home. Patient reports ongoing fatigue for the past 3 months, in cardiac rehab MW, but had profound weakness beginning Tuesday. Family reports he did have a fall today, no LOC or head trauma, and feeling chilled and feverish with temperature of 101.      Patient tested positive for Flu A on arrival with significant labs ALT/AST 2,155/3,798 with negative hepatitis workup, CO2 18, PCT 0.37, and UA suggestive of potential UTI. Blood cultures, legionella, and strep pneumo pending.      While in the ED, wide-complex tachycardia was noted (as high as 248) and patient received 3 shocks from AICD per family report. Lidocaine bolus and drip initiated. Dr. Sanchez with EP consulted. Patient remained alert and oriented with BP in the 110s; however, rhythm persisted with associated BP drop and ED physician performed synchronized cardioversion at the bedside. Post-cardioversion EKG revealed SVT with frequent AV paced complexes.     He is transferred to the ICU for higher level of care.      Subjective   Interval History:  Patient doing well this morning.  No further signs of ventricular tachycardia.  Restarted on his Entresto yesterday.  Denies chest pain, nausea, fever, or chills             The patient's past medical, surgical and social history were reviewed and updated in Epic as appropriate.       Objective     Infusions:     Medications:  carvedilol, 12.5 mg, Oral, BID With Meals  clopidogrel, 75 mg, Oral, Daily  empagliflozin, 10 mg, Oral, Daily  finasteride, 5 mg, Oral,  Daily  levothyroxine, 25 mcg, Oral, Q AM  pantoprazole, 40 mg, Oral, Q AM  rivaroxaban, 10 mg, Oral, Daily  sacubitril-valsartan, 1 tablet, Oral, BID  sodium chloride, 10 mL, Intravenous, Q12H      I reviewed the patient's medications.    Vital Sign Min/Max for last 24 hours  Temp  Min: 97.5 °F (36.4 °C)  Max: 97.9 °F (36.6 °C)   BP  Min: 97/59  Max: 126/76   Pulse  Min: 68  Max: 73   Resp  Min: 16  Max: 20   SpO2  Min: 92 %  Max: 96 %   Flow (L/min)  Min: 2  Max: 2       Input/Output for last 24 hour shift  12/10 0701 - 12/11 0700  In: 1347 [P.O.:960; I.V.:387]  Out: 1925 [Urine:1925]      GENERAL : NAD, conversant  RESPIRATORY/THORAX : normal respiratory effort and no intercostal retractions, CTAB  CARDIOVASCULAR : Normal S1/S2, RRR. no lower ext edema.  GASTROINTESTINAL : Soft, NT/ND. BS x 4 normoactive. No hepatosplenomegaly.  MUSCULOSKELETAL : No cyanosis, clubbing, or ischemia  NEUROLOGICAL: alert and oriented to person, place and time  PSYCHOLOGICAL : Appropriate affect    Results from last 7 days   Lab Units 12/10/22  0410 12/09/22  0313 12/08/22  1538   WBC 10*3/mm3 6.48 7.14 9.93   HEMOGLOBIN g/dL 12.4* 11.8* 12.8*   PLATELETS 10*3/mm3 158 158 194     Results from last 7 days   Lab Units 12/11/22  0415 12/10/22  0410 12/09/22  1809 12/09/22  0313 12/08/22  1538   SODIUM mmol/L 139 139  --  141 135*   POTASSIUM mmol/L 3.5 3.8 4.2 3.3* 3.6   CO2 mmol/L 20.0* 18.0*  --  19.0* 18.0*   BUN mg/dL 10 13  --  18 20   CREATININE mg/dL 0.55* 0.58*  --  0.62* 0.77   MAGNESIUM mg/dL  --   --   --  2.4 2.1   PHOSPHORUS mg/dL  --   --   --  4.6*  --    GLUCOSE mg/dL 96 84  --  85 155*     Estimated Creatinine Clearance: 127.9 mL/min (A) (by C-G formula based on SCr of 0.55 mg/dL (L)).    Results from last 7 days   Lab Units 12/09/22  0021   PH, ARTERIAL pH units 7.368   PCO2, ARTERIAL mm Hg 32.5*   PO2 ART mm Hg 75.2*       I reviewed the patient's new clinical results.  I reviewed the patient's new imaging  results/reports including actual images and agree with reports.         Assessment & Plan   Impression        Ventricular tachycardia    Coronary artery disease involving native coronary artery of native heart with angina pectoris (HCC)    Chronic systolic congestive heart failure (HCC)    R/O Pneumonia    Influenza A    COPD (chronic obstructive pulmonary disease) (HCC)    Pulmonary nodule (Stable)    GEO (obstructive sleep apnea)    ICD (implantable cardioverter-defibrillator) in place    T2DM (type 2 diabetes mellitus) (HCC)    Elevated LFTs (R/O shock liver)    Acute respiratory failure with hypoxia (HCC)    Ischemic cardiomyopathy (EF < 20%)       Plan        77-year-old male with a past medical history significant for ventricular fibrillation on amiodarone status post ICD, heart failure reduced ejection fraction EF of less than 20%, hypertension, hyperlipidemia, short sleep apnea with CPAP, hypothyroidism, diabetes mellitus type 2, GERD, COPD, and BPH.  Who presented to [facility] ICU on 12/9/2002 with ventricular tachycardia.  Eventually was placed on lidocaine and amiodarone.  Patient converted out of ventricular tachycardia on 12/9/2022.  Patient did pass positive fluid on 12/8/2022 with symptoms beginning on 12/6/2022 patient appeared asymptomatic therefore did not get Tamiflu.     -V. tach management per cardiology  -Continue Coreg and Entresto for hypertension  -Restart patient's home torsemide and eplerenone  -Heart failure management per cardiology  -Levothyroxine for hypothyroidism  -Patient positive for influenza A we will continue supportive care.  -P.o. diet  -AM labs    Plan of care and goals reviewed with multidisciplinary/antibiotic stewardship team during rounds.   I discussed the patient's findings and my recommendations with patient and nursing staff       Farzana Dickens DO  Pulmonary, Critical care and Sleep Medicine

## 2022-12-12 PROBLEM — Z86.711 PERSONAL HISTORY OF PE (PULMONARY EMBOLISM): Status: ACTIVE | Noted: 2022-12-12

## 2022-12-12 LAB
ANION GAP SERPL CALCULATED.3IONS-SCNC: 11 MMOL/L (ref 5–15)
BACTERIA SPEC RESP CULT: NORMAL
BASOPHILS # BLD AUTO: 0.01 10*3/MM3 (ref 0–0.2)
BASOPHILS NFR BLD AUTO: 0.2 % (ref 0–1.5)
BUN SERPL-MCNC: 11 MG/DL (ref 8–23)
BUN/CREAT SERPL: 20.4 (ref 7–25)
CALCIUM SPEC-SCNC: 8.2 MG/DL (ref 8.6–10.5)
CHLORIDE SERPL-SCNC: 108 MMOL/L (ref 98–107)
CO2 SERPL-SCNC: 21 MMOL/L (ref 22–29)
CREAT SERPL-MCNC: 0.54 MG/DL (ref 0.76–1.27)
DEPRECATED RDW RBC AUTO: 50.8 FL (ref 37–54)
EGFRCR SERPLBLD CKD-EPI 2021: 102 ML/MIN/1.73
EOSINOPHIL # BLD AUTO: 0.51 10*3/MM3 (ref 0–0.4)
EOSINOPHIL NFR BLD AUTO: 8.9 % (ref 0.3–6.2)
ERYTHROCYTE [DISTWIDTH] IN BLOOD BY AUTOMATED COUNT: 15.3 % (ref 12.3–15.4)
GLUCOSE BLDC GLUCOMTR-MCNC: 117 MG/DL (ref 70–130)
GLUCOSE BLDC GLUCOMTR-MCNC: 127 MG/DL (ref 70–130)
GLUCOSE BLDC GLUCOMTR-MCNC: 96 MG/DL (ref 70–130)
GLUCOSE SERPL-MCNC: 106 MG/DL (ref 65–99)
GRAM STN SPEC: NORMAL
HCT VFR BLD AUTO: 41.7 % (ref 37.5–51)
HGB BLD-MCNC: 13.4 G/DL (ref 13–17.7)
IMM GRANULOCYTES # BLD AUTO: 0.02 10*3/MM3 (ref 0–0.05)
IMM GRANULOCYTES NFR BLD AUTO: 0.3 % (ref 0–0.5)
LYMPHOCYTES # BLD AUTO: 1.66 10*3/MM3 (ref 0.7–3.1)
LYMPHOCYTES NFR BLD AUTO: 29 % (ref 19.6–45.3)
MAGNESIUM SERPL-MCNC: 2.3 MG/DL (ref 1.6–2.4)
MCH RBC QN AUTO: 28.9 PG (ref 26.6–33)
MCHC RBC AUTO-ENTMCNC: 32.1 G/DL (ref 31.5–35.7)
MCV RBC AUTO: 90.1 FL (ref 79–97)
MONOCYTES # BLD AUTO: 0.67 10*3/MM3 (ref 0.1–0.9)
MONOCYTES NFR BLD AUTO: 11.7 % (ref 5–12)
NEUTROPHILS NFR BLD AUTO: 2.85 10*3/MM3 (ref 1.7–7)
NEUTROPHILS NFR BLD AUTO: 49.9 % (ref 42.7–76)
NRBC BLD AUTO-RTO: 0 /100 WBC (ref 0–0.2)
PHOSPHATE SERPL-MCNC: 3.4 MG/DL (ref 2.5–4.5)
PLATELET # BLD AUTO: 154 10*3/MM3 (ref 140–450)
PMV BLD AUTO: 10.2 FL (ref 6–12)
POTASSIUM SERPL-SCNC: 3.8 MMOL/L (ref 3.5–5.2)
RBC # BLD AUTO: 4.63 10*6/MM3 (ref 4.14–5.8)
SODIUM SERPL-SCNC: 140 MMOL/L (ref 136–145)
WBC NRBC COR # BLD: 5.72 10*3/MM3 (ref 3.4–10.8)

## 2022-12-12 PROCEDURE — 83735 ASSAY OF MAGNESIUM: CPT | Performed by: INTERNAL MEDICINE

## 2022-12-12 PROCEDURE — 97530 THERAPEUTIC ACTIVITIES: CPT

## 2022-12-12 PROCEDURE — 99232 SBSQ HOSP IP/OBS MODERATE 35: CPT | Performed by: NURSE PRACTITIONER

## 2022-12-12 PROCEDURE — 82962 GLUCOSE BLOOD TEST: CPT

## 2022-12-12 PROCEDURE — 94799 UNLISTED PULMONARY SVC/PX: CPT

## 2022-12-12 PROCEDURE — 85025 COMPLETE CBC W/AUTO DIFF WBC: CPT | Performed by: INTERNAL MEDICINE

## 2022-12-12 PROCEDURE — 99232 SBSQ HOSP IP/OBS MODERATE 35: CPT | Performed by: INTERNAL MEDICINE

## 2022-12-12 PROCEDURE — 84100 ASSAY OF PHOSPHORUS: CPT | Performed by: INTERNAL MEDICINE

## 2022-12-12 PROCEDURE — 97535 SELF CARE MNGMENT TRAINING: CPT

## 2022-12-12 PROCEDURE — 80048 BASIC METABOLIC PNL TOTAL CA: CPT | Performed by: INTERNAL MEDICINE

## 2022-12-12 PROCEDURE — 97116 GAIT TRAINING THERAPY: CPT

## 2022-12-12 RX ORDER — BENZONATATE 100 MG/1
200 CAPSULE ORAL 3 TIMES DAILY PRN
Status: DISCONTINUED | OUTPATIENT
Start: 2022-12-12 | End: 2022-12-13 | Stop reason: HOSPADM

## 2022-12-12 RX ORDER — IPRATROPIUM BROMIDE AND ALBUTEROL SULFATE 2.5; .5 MG/3ML; MG/3ML
3 SOLUTION RESPIRATORY (INHALATION)
Status: DISCONTINUED | OUTPATIENT
Start: 2022-12-12 | End: 2022-12-13 | Stop reason: HOSPADM

## 2022-12-12 RX ORDER — AMIODARONE HYDROCHLORIDE 200 MG/1
200 TABLET ORAL
Status: DISCONTINUED | OUTPATIENT
Start: 2022-12-12 | End: 2022-12-13 | Stop reason: HOSPADM

## 2022-12-12 RX ORDER — TORSEMIDE 10 MG/1
5 TABLET ORAL DAILY
Status: DISCONTINUED | OUTPATIENT
Start: 2022-12-13 | End: 2022-12-13 | Stop reason: HOSPADM

## 2022-12-12 RX ADMIN — BENZONATATE 200 MG: 100 CAPSULE ORAL at 11:30

## 2022-12-12 RX ADMIN — EPLERENONE 12.5 MG: 25 TABLET, FILM COATED ORAL at 09:22

## 2022-12-12 RX ADMIN — BENZONATATE 200 MG: 100 CAPSULE ORAL at 20:51

## 2022-12-12 RX ADMIN — CLOPIDOGREL BISULFATE 75 MG: 75 TABLET ORAL at 09:22

## 2022-12-12 RX ADMIN — CARVEDILOL 12.5 MG: 12.5 TABLET, FILM COATED ORAL at 17:33

## 2022-12-12 RX ADMIN — Medication 10 ML: at 20:51

## 2022-12-12 RX ADMIN — EMPAGLIFLOZIN 10 MG: 10 TABLET, FILM COATED ORAL at 09:22

## 2022-12-12 RX ADMIN — AMIODARONE HYDROCHLORIDE 200 MG: 200 TABLET ORAL at 11:30

## 2022-12-12 RX ADMIN — LEVOTHYROXINE SODIUM 25 MCG: 25 TABLET ORAL at 05:09

## 2022-12-12 RX ADMIN — SACUBITRIL AND VALSARTAN 1 TABLET: 49; 51 TABLET, FILM COATED ORAL at 20:51

## 2022-12-12 RX ADMIN — CARVEDILOL 12.5 MG: 12.5 TABLET, FILM COATED ORAL at 09:22

## 2022-12-12 RX ADMIN — PANTOPRAZOLE SODIUM 40 MG: 40 TABLET, DELAYED RELEASE ORAL at 05:09

## 2022-12-12 RX ADMIN — IPRATROPIUM BROMIDE AND ALBUTEROL SULFATE 3 ML: .5; 3 SOLUTION RESPIRATORY (INHALATION) at 19:44

## 2022-12-12 RX ADMIN — RIVAROXABAN 10 MG: 10 TABLET, FILM COATED ORAL at 09:22

## 2022-12-12 RX ADMIN — SACUBITRIL AND VALSARTAN 1 TABLET: 49; 51 TABLET, FILM COATED ORAL at 09:22

## 2022-12-12 RX ADMIN — Medication 10 ML: at 09:22

## 2022-12-12 RX ADMIN — FINASTERIDE 5 MG: 5 TABLET, FILM COATED ORAL at 09:22

## 2022-12-12 NOTE — PLAN OF CARE
Goal Outcome Evaluation:  Plan of Care Reviewed With: patient, spouse        Progress: improving  Outcome Evaluation: Pt demo'd improved tolerance to dynamic activity today, increasing gait distance to 175' with CGA and FWW. No c/o dizziness, VSS throughout. Attempt stair training next session. PT rec home with assist and HHPT at d/c.

## 2022-12-12 NOTE — PROGRESS NOTES
Intensive Care Follow-up     Hospital:  LOS: 4 days   Mr. Jonnie Roth, 77 y.o. male is followed for:   Ventricular tachycardia      Subjective   Interval History:  The chart has been reviewed. No acute events overnight. AM labs grossly unchanged from yesterday. No further arrhythmia episodes noted.     Patient sitting up in the chair this AM in no acute distress with spouse present at bedside. He remains afebrile, hemodynamically stable, and is oxygenating appropriately on RA. He is tolerating a PO diet and working with PT who is recommending HHPT services upon discharge.     He denies current shortness of breath, chest pain, palpitations, nausea, vomiting, diarrhea, or abdominal discomfort. He notes continued persistent cough which is making it a bit difficult for him to sleep.      The patient's past medical, surgical and social history were reviewed and updated in Epic as appropriate.     Objective     Infusions:     Medications:  amiodarone, 200 mg, Oral, Q24H  carvedilol, 12.5 mg, Oral, BID With Meals  clopidogrel, 75 mg, Oral, Daily  empagliflozin, 10 mg, Oral, Daily  eplerenone, 12.5 mg, Oral, Q24H  finasteride, 5 mg, Oral, Daily  levothyroxine, 25 mcg, Oral, Q AM  pantoprazole, 40 mg, Oral, Q AM  rivaroxaban, 10 mg, Oral, Daily  sacubitril-valsartan, 1 tablet, Oral, BID  sodium chloride, 10 mL, Intravenous, Q12H  [START ON 12/13/2022] torsemide, 5 mg, Oral, Daily    I reviewed the patient's medications.    Vital Sign Min/Max for last 24 hours  Temp  Min: 97.5 °F (36.4 °C)  Max: 98.5 °F (36.9 °C)   BP  Min: 93/59  Max: 131/73   Pulse  Min: 67  Max: 73   Resp  Min: 16  Max: 16   SpO2  Min: 92 %  Max: 97 %   Flow (L/min)  Min: 2  Max: 2       Input/Output for last 24 hour shift  12/11 0701 - 12/12 0700  In: 960 [P.O.:960]  Out: 1600 [Urine:1600]      Physical Exam:  GENERAL: Well-appearing elderly white male sitting up in the chair and conversant. No acute distress.   HEENT: Normocephalic and atraumatic.  Trachea midline. PER. EOM WNL. Glasses present.    LUNGS: Chest rise of normal depth and symmetric. Lungs clear to auscultation bilaterally. No wheezes, rhonchi, or rales.   HEART: S1,S2 detected. Regular rate and paced rhythm. No rub, murmur, or gallop.   ABDOMEN: Soft, round, nondistended, and nontender. Bowel sounds present.   EXTREMITIES: No clubbing, edema, or cyanosis. Peripheral pulses present. Skin warm and dry. Scattered bruising noted.    NEURO/PSYCH: Alert and oriented. Follows commands. Moves all extremities. No focal deficits.      Results from last 7 days   Lab Units 12/12/22  0448 12/10/22  0410 12/09/22  0313   WBC 10*3/mm3 5.72 6.48 7.14   HEMOGLOBIN g/dL 13.4 12.4* 11.8*   PLATELETS 10*3/mm3 154 158 158     Results from last 7 days   Lab Units 12/12/22  0448 12/11/22  1829 12/11/22 0415 12/10/22  0410 12/09/22  1809 12/09/22  0313 12/08/22  1538   SODIUM mmol/L 140  --  139 139  --  141 135*   POTASSIUM mmol/L 3.8 4.1 3.5 3.8   < > 3.3* 3.6   CO2 mmol/L 21.0*  --  20.0* 18.0*  --  19.0* 18.0*   BUN mg/dL 11  --  10 13  --  18 20   CREATININE mg/dL 0.54*  --  0.55* 0.58*  --  0.62* 0.77   MAGNESIUM mg/dL 2.3  --   --   --   --  2.4 2.1   PHOSPHORUS mg/dL 3.4  --   --   --   --  4.6*  --    GLUCOSE mg/dL 106*  --  96 84  --  85 155*    < > = values in this interval not displayed.     Estimated Creatinine Clearance: 130.3 mL/min (A) (by C-G formula based on SCr of 0.54 mg/dL (L)).    Results from last 7 days   Lab Units 12/09/22  0021   PH, ARTERIAL pH units 7.368   PCO2, ARTERIAL mm Hg 32.5*   PO2 ART mm Hg 75.2*     I reviewed the patient's new clinical results.  I reviewed the patient's new imaging results/reports including actual images and agree with reports.     Assessment & Plan   Impression      Ventricular tachycardia    H/O PE (2015)    Coronary artery disease involving native coronary artery of native heart with angina pectoris (HCC)    Chronic systolic congestive heart failure (HCC)     R/O Pneumonia    Influenza A    COPD (chronic obstructive pulmonary disease) (HCC)    Pulmonary nodule (Stable)    GEO (obstructive sleep apnea)    ICD (implantable cardioverter-defibrillator) in place    T2DM (type 2 diabetes mellitus) (HCC)    Elevated LFTs (R/O shock liver)    Acute respiratory failure with hypoxia (HCC)    Ischemic cardiomyopathy (EF < 20%)    77 yoM with PMH significant for VF s/p ICD on Amiodarone, HFrEF (EF <20%), prior PE (2015) on Xarelto, HTN, T2DM, dyslipidemia, COPD, GEO on CPAP, hypothyroidism, GERD, and BPH who was admitted to [facility] ICU on 12/9 with persistent VT despite ICD discharge x3 at home. ECV was performed in the ED and Cardiology consulted with initiation of lidocaine infusion (Amiodarone held 2* increased LFTs) and eventually converted out of VT on 12/9.    Of note, patient was also noted to be positive for Flu A on admission but did not receive Tamiflu as he was asymptomatic.    Amiodarone was restarted on 12/12/22.      Plan        1. For VT: Cardiology following; no recurrent arrhythmias. Restart PO Amiodarone today. Continue to trend LFTs (improved on lab work yesterday).   2. For HTN, CAD s/p stents, HFrEF / ICM s/p ICD: Continue Plavix, BB, Entresto, torsemide, eplenerone.  3. COPD, GEO on CPAP, Flu A: Continue CPAP at night. Currently oxygenating appropriately on RA. Add tessalon perles PRN for cough.   4. For T2DM: On Metformin at home (currently on hold). Not requiring any SSI correction at this time. BG <180 mg/dL.   5. For history of DVT in 2015: Continue Xarelto.   6. For hypothyroidism: Continue levothyroxine.   7. For GERD: Continue PPI.  8. For BPH: Continue finasteride.   9. AM labs.     DVT Prophylaxis: Xarelto  GI Prophylaxis: PPI  Dispo: Monitor in ICU for one additional day / likely downgrade to telemetry tomorrow.     Time spent: 35 minutes   Plan of care and goals reviewed with multidisciplinary/antibiotic stewardship team during rounds.   I discussed the  patient's findings and my recommendations with patient, family, nursing staff and primary care team     Renay Rae, OSMAN, APRN, AGACNP-BC  Pulmonary and Critical Care Medicine

## 2022-12-12 NOTE — THERAPY TREATMENT NOTE
Patient Name: Jonnie Roth  : 1945    MRN: [id number]                              Today's Date: 2022       Admit Date: 2022    Visit Dx:     ICD-10-CM ICD-9-CM   1. Acute urinary tract infection  N39.0 599.0   2. Sepsis with encephalopathy and septic shock, due to unspecified organism ([facility])  A41.9 038.9    R65.21 995.92    G93.40 348.30     785.52   3. Influenza A  J10.1 487.1   4. Hypoxia  R09.02 799.02   5. Syncope, unspecified syncope type  R55 780.2   6. Ventricular tachycardia  I47.20 427.1     Patient Active Problem List   Diagnosis   • Coronary artery disease involving native coronary artery of native heart with angina pectoris (HCC)   • Chronic systolic congestive heart failure (HCC)   • BPH (benign prostatic hypertrophy)   • LBBB (left bundle branch block)   • Pulmonary emphysema (HCC)   • Hyperlipidemia LDL goal <70   • Ventricular tachycardia   • Post-traumatic subdural hematoma   • Elevated AST (SGOT)   • R/O Pneumonia   • Influenza A   • COPD (chronic obstructive pulmonary disease) (HCC)   • Pulmonary nodule (Stable)   • Bilateral renal cysts   • GEO (obstructive sleep apnea)   • ICD (implantable cardioverter-defibrillator) in place   • Hypothyroidism (acquired)   • GERD without esophagitis   • T2DM (type 2 diabetes mellitus) (HCC)   • Elevated LFTs (R/O shock liver)   • Acute respiratory failure with hypoxia (HCC)   • Ischemic cardiomyopathy (EF < 20%)     Past Medical History:   Diagnosis Date   • Arthritis    • BPH (benign prostatic hypertrophy) 2016   • CAD (coronary artery disease)    • Disease of thyroid gland    • Enlarged prostate    • HFrEF (heart failure with reduced ejection fraction) (HCC)    • History of heart attack     X3 ABOUT    • History of transfusion     NO REACTION RECALLED    • Hypertension    • IHD (ischemic heart disease) 2016   • LBBB (left bundle branch block) 2016   • Lung nodule    • Macular degeneration    • Pancreatic cyst  12/8/2022   • Polio     as a child   • Pulmonary embolism (HCC)    • Pulmonary emphysema (HCC) 12/28/2016   • Sepsis (HCC)     A. Secondary to [place] trauma with hematuria and urosepsis requiring hospitalization May 2015   • Sleep apnea with use of continuous positive airway pressure (CPAP)     CPAP- SETTING 4    • Ventricular tachycardia 12/28/2016   • Wears glasses     READERS     Past Surgical History:   Procedure Laterality Date   • BIVENTRICULLAR IMPLANTABLE CARDIOVERTER DEFIBRILLATOR PLACEMENT     • CARDIAC CATHETERIZATION N/A 1/16/2017    Procedure: Left Heart Cath;  Surgeon: Diego Ayala MD;  Location:  [facility];  Service:    • CARDIAC DEFIBRILLATOR PLACEMENT       A. ICD generator change out with upgrade to BiV pacemaker implantable cardioverter    defibrillator, 12/17/2007. Ventricular fibrillation s/p pacesetter [employer] ICD in [place]. [place] 09/2000   • CARDIAC ELECTROPHYSIOLOGY PROCEDURE N/A 10/2/2017    Procedure: BIV ICD  generator change [facility];  Surgeon: Ferdinand Alba MD;  Location:  [facility];  Service:    • CARDIAC ELECTROPHYSIOLOGY PROCEDURE N/A 7/6/2021    Procedure: BIV ICD generator change with [employer]. This will need to be done in late June or early July. Hold Xarelto one day prior.;  Surgeon: Ferdinand Alba MD;  Location:  [facility];  Service: Cardiology;  Laterality: N/A;   • CATARACT EXTRACTION      Bilateral    • COLONOSCOPY     • CORONARY ANGIOPLASTY WITH STENT PLACEMENT      X7 STENTS TOTAL    • CYSTOSCOPY URETERAL TUMOR FULGURATION  05/29/2015     A. Status post cystoscopy with clot evacuation and fulguration of the prostate secondary to hematuria, 5/29/2015.   • FOOT SURGERY      RIGHT - POLIO RELATED    • PROSTATE SURGERY      A. Status post laser vaporization, 08/19/2009.   • ROOT CANAL     • TOOTH EXTRACTION        General Information     Row Name 12/12/22 1023          Physical Therapy Time and Intention    Document Type  therapy note (daily note)  -ES     Mode of Treatment physical therapy  -ES     Row Name 12/12/22 1023          General Information    Patient Profile Reviewed yes  -ES     Existing Precautions/Restrictions fall;cardiac;other (see comments)  hx polio with RLE > LLE  -ES     Barriers to Rehab medically complex  -ES     Row Name 12/12/22 1023          Cognition    Orientation Status (Cognition) oriented x 4  -ES     Row Name 12/12/22 1023          Safety Issues, Functional Mobility    Safety Issues Affecting Function (Mobility) safety precaution awareness;safety precautions follow-through/compliance;sequencing abilities  -ES     Impairments Affecting Function (Mobility) balance;endurance/activity tolerance;coordination;strength;motor control;shortness of breath  -ES     Comment, Safety Issues/Impairments (Mobility) No c/o dizziness/lightheadedness with mobility  -ES           User Key  (r) = Recorded By, (t) = Taken By, (c) = Cosigned By    Initials Name Provider Type    ES Irma Lara, PT Physical Therapist               Mobility     Row Name 12/12/22 1025          Bed Mobility    Comment, (Bed Mobility) Pt received UIC  -ES     Row Name 12/12/22 1025          Sit-Stand Transfer    Sit-Stand Evangeline (Transfers) verbal cues;contact guard  -ES     Assistive Device (Sit-Stand Transfers) walker, front-wheeled  -ES     Comment, (Sit-Stand Transfer) v/c for hand placement  -ES     Row Name 12/12/22 1025          Gait/Stairs (Locomotion)    Evangeline Level (Gait) contact guard;verbal cues  -ES     Assistive Device (Gait) walker, front-wheeled  -ES     Distance in Feet (Gait) 175  -ES     Deviations/Abnormal Patterns (Gait) bilateral deviations;delgado decreased;weight shifting decreased  -ES     Bilateral Gait Deviations forward flexed posture  -ES     Comment, (Gait/Stairs) Pt amb 175' with FWW and CGA. Pt demo'd forward flexed posture and decreased gait speed. No c/o dizziness with activity. VSS  -ES            User Key  (r) = Recorded By, (t) = Taken By, (c) = Cosigned By    Initials Name Provider Type    ES Irma Lara, KONG Physical Therapist               Obj/Interventions     Row Name 12/12/22 1027          Balance    Balance Assessment sitting static balance;sitting dynamic balance;sit to stand dynamic balance;standing dynamic balance  -ES     Static Sitting Balance supervision  -ES     Dynamic Sitting Balance supervision  -ES     Position, Sitting Balance supported;sitting in chair  -ES     Sit to Stand Dynamic Balance contact guard;verbal cues  -ES     Dynamic Standing Balance contact guard;verbal cues  -ES     Position/Device Used, Standing Balance supported;walker, front-wheeled  -ES     Balance Interventions sitting;standing;sit to stand;supported;static;dynamic;occupation based/functional task  -ES     Comment, Balance Pt demo'd good sequencing throughout mobility. No LOB.  -ES           User Key  (r) = Recorded By, (t) = Taken By, (c) = Cosigned By    Initials Name Provider Type    ES Irma Lara, KONG Physical Therapist               Goals/Plan    No documentation.                Clinical Impression     Row Name 12/12/22 1027          Pain    Pretreatment Pain Rating 0/10 - no pain  -ES     Posttreatment Pain Rating 0/10 - no pain  -ES     Pre/Posttreatment Pain Comment denied pain throughout session  -ES     Pain Intervention(s) Repositioned;Ambulation/increased activity  -ES     Row Name 12/12/22 1027          Plan of Care Review    Plan of Care Reviewed With patient;spouse  -ES     Progress improving  -ES     Outcome Evaluation Pt demo'd improved tolerance to dynamic activity today, increasing gait distance to 175' with CGA and FWW. No c/o dizziness, VSS throughout. Attempt stair training next session. PT rec home with assist and HHPT at d/c.  -ES     Row Name 12/12/22 1027          Therapy Assessment/Plan (PT)    Rehab Potential (PT) good, to achieve stated therapy goals  -ES     Criteria for  Skilled Interventions Met (PT) yes;skilled treatment is necessary  -ES     Therapy Frequency (PT) daily  -ES     Row Name 12/12/22 1027          Vital Signs    Pre Systolic BP Rehab 111  -ES     Pre Treatment Diastolic BP 71  -ES     Post Systolic BP Rehab 115  -ES     Post Treatment Diastolic BP 66  -ES     Pretreatment Heart Rate (beats/min) 70  -ES     Posttreatment Heart Rate (beats/min) 70  -ES     Pre SpO2 (%) 92  -ES     O2 Delivery Pre Treatment room air  -ES     Post SpO2 (%) 94  -ES     O2 Delivery Post Treatment room air  -ES     Pre Patient Position Sitting  -ES     Intra Patient Position Standing  -ES     Post Patient Position Sitting  -ES     Row Name 12/12/22 1027          Positioning and Restraints    Pre-Treatment Position sitting in chair/recliner  -ES     Post Treatment Position chair  -ES     In Chair notified nsg;reclined;sitting;call light within reach;encouraged to call for assist;exit alarm on;with family/caregiver;legs elevated;waffle cushion  -ES           User Key  (r) = Recorded By, (t) = Taken By, (c) = Cosigned By    Initials Name Provider Type    Irma Larios, PT Physical Therapist               Outcome Measures     Row Name 12/12/22 1029 12/12/22 0800       How much help from another person do you currently need...    Turning from your back to your side while in flat bed without using bedrails? 3  -ES 3  -ST    Moving from lying on back to sitting on the side of a flat bed without bedrails? 3  -ES 3  -ST    Moving to and from a bed to a chair (including a wheelchair)? 3  -ES 3  -ST    Standing up from a chair using your arms (e.g., wheelchair, bedside chair)? 3  -ES 3  -ST    Climbing 3-5 steps with a railing? 2  -ES 2  -ST    To walk in hospital room? 3  -ES 3  -ST    AM-PAC 6 Clicks Score (PT) 17  -ES 17  -ST    Highest level of mobility 5 --> Static standing  -ES 5 --> Static standing  -ST    Row Name 12/12/22 1029          Functional Assessment    Outcome Measure Options  AM-PAC 6 Clicks Basic Mobility (PT)  -ES           User Key  (r) = Recorded By, (t) = Taken By, (c) = Cosigned By    Initials Name Provider Type    Cate Lau RN Registered Nurse    Irma Larios PT Physical Therapist                             Physical Therapy Education     Title: PT OT SLP Therapies (In Progress)     Topic: Physical Therapy (In Progress)     Point: Mobility training (Done)     Learning Progress Summary           Patient Acceptance, E,D, VU,NR by  at 12/10/2022 1459   Family Acceptance, E,D, VU,NR by  at 12/10/2022 1459                   Point: Home exercise program (Not Started)     Learner Progress:  Not documented in this visit.          Point: Body mechanics (Done)     Learning Progress Summary           Patient Acceptance, E,D, VU,NR by  at 12/10/2022 1459   Family Acceptance, E,D, VU,NR by  at 12/10/2022 1459                   Point: Precautions (Done)     Learning Progress Summary           Patient Acceptance, E,D, VU,NR by  at 12/10/2022 1459   Family Acceptance, E,D, VU,NR by  at 12/10/2022 1459                               User Key     Initials Effective Dates Name Provider Type Discipline     06/16/21 -  Korin Purdy PT Physical Therapist PT              PT Recommendation and Plan     Plan of Care Reviewed With: patient, spouse  Progress: improving  Outcome Evaluation: Pt demo'd improved tolerance to dynamic activity today, increasing gait distance to 175' with CGA and FWW. No c/o dizziness, VSS throughout. Attempt stair training next session. PT rec home with assist and HHPT at d/c.     Time Calculation:    PT Charges     Row Name 12/12/22 1030             Time Calculation    Start Time 0943  -ES      PT Received On 12/12/22  -ES      PT Goal Re-Cert Due Date 12/20/22  -ES         Time Calculation- PT    Total Timed Code Minutes- PT 25 minute(s)  -ES         Timed Charges    17687 - Gait Training Minutes  17  -ES      02375 - PT Therapeutic Activity  Minutes 8  -ES         Total Minutes    Timed Charges Total Minutes 25  -ES       Total Minutes 25  -ES            User Key  (r) = Recorded By, (t) = Taken By, (c) = Cosigned By    Initials Name Provider Type    Irma Larios PT Physical Therapist              Therapy Charges for Today     Code Description Service Date Service Provider Modifiers Qty    [id number]  GAIT TRAINING EA 15 MIN 12/12/2022 Irma Lara, PT GP 1    [id number]  PT THERAPEUTIC ACT EA 15 MIN 12/12/2022 Irma Lara, PT GP 1          PT G-Codes  Outcome Measure Options: AM-PAC 6 Clicks Basic Mobility (PT)  AM-PAC 6 Clicks Score (PT): 17  AM-PAC 6 Clicks Score (OT): 16  PT Discharge Summary  Anticipated Discharge Disposition (PT): home with assist, home with home health    Irma Lara PT  12/12/2022

## 2022-12-12 NOTE — PROGRESS NOTES
[facility] at [facility]  Progress Note       LOS: 4 days   Patient Care Team:  Brian Lewis MD as PCP - General (Internal Medicine)  Ferdinand Alba MD as Consulting Physician (Cardiac Electrophysiology)  Viet Manzano PA as Physician Assistant (Cardiology)    Chief Complaint:  VT with ICD shock     Subjective     Interval History:   Doing well today. Denies CP, tachypalps, dizziness, LH. SOB is at baseline with exertion. His only complaint is a lingering cough.  Overall slow improvement.  No recurrent ventricular arrhythmias      Review of Systems:   Pertinent positives in HPI, all others reviewed and negative.      Objective       Current Facility-Administered Medications:   •  carvedilol (COREG) tablet 12.5 mg, 12.5 mg, Oral, BID With Meals, Adelia Ames PA, 12.5 mg at 12/11/22 1829  •  clopidogrel (PLAVIX) tablet 75 mg, 75 mg, Oral, Daily, Korin Cannon APRN, 75 mg at 12/11/22 0906  •  dextrose (D50W) (25 g/50 mL) IV injection 25 g, 25 g, Intravenous, Q15 Min PRN, Korin Cannon APRN  •  dextrose (GLUTOSE) oral gel 15 g, 15 g, Oral, Q15 Min PRN, Korin Cannon APRN  •  empagliflozin (JARDIANCE) tablet 10 mg, 10 mg, Oral, Daily, Korin Cannon APRN, 10 mg at 12/11/22 0906  •  eplerenone (INSPRA) tablet 12.5 mg, 12.5 mg, Oral, Q24H, Nigel Dickens, DO, 12.5 mg at 12/11/22 1315  •  finasteride (PROSCAR) tablet 5 mg, 5 mg, Oral, Daily, Korin Cannon APRN, 5 mg at 12/11/22 0906  •  glucagon (human recombinant) (GLUCAGEN DIAGNOSTIC) injection 1 mg, 1 mg, Intramuscular, Q15 Min PRN, Korin Cannon APRN  •  ipratropium-albuterol (DUO-NEB) nebulizer solution 3 mL, 3 mL, Nebulization, Q4H PRN, Brian Cain MD  •  levothyroxine (SYNTHROID, LEVOTHROID) tablet 25 mcg, 25 mcg, Oral, Q AM, Korin Cannon APRN, 25 mcg at 12/12/22 0509  •  melatonin tablet 10 mg, 10 mg, Oral, Nightly PRN, Korin Cannon APRN  •  pantoprazole (PROTONIX) EC tablet 40  "mg, 40 mg, Oral, Q AM, Dc Wilcox MD, 40 mg at 12/12/22 0509  •  potassium chloride (MICRO-K) CR capsule 40 mEq, 40 mEq, Oral, PRN, 40 mEq at 12/11/22 1131 **OR** potassium chloride (KLOR-CON) packet 40 mEq, 40 mEq, Oral, PRN **OR** potassium chloride 10 mEq in 100 mL IVPB, 10 mEq, Intravenous, Q1H PRN, Deloris Sahni APRN  •  rivaroxaban (XARELTO) tablet 10 mg, 10 mg, Oral, Daily, Korin Cannon APRN, 10 mg at 12/11/22 0906  •  sacubitril-valsartan (ENTRESTO) 49-51 MG tablet 1 tablet, 1 tablet, Oral, BID, Korin Cannon APRN, 1 tablet at 12/11/22 2043  •  sodium chloride 0.9 % flush 10 mL, 10 mL, Intravenous, Q12H, Deloris Sahni APRN, 10 mL at 12/11/22 2046  •  sodium chloride 0.9 % flush 10 mL, 10 mL, Intravenous, PRN, Deloris Sahni APRDESTINI  •  sodium chloride 0.9 % infusion 40 mL, 40 mL, Intravenous, PRN, Deloris Sahni APRDESTINI    Vital Sign Min/Max for last 24 hours  Temp  Min: 97.5 °F (36.4 °C)  Max: 97.8 °F (36.6 °C)   BP  Min: 93/59  Max: 126/84   Pulse  Min: 67  Max: 73   Resp  Min: 16  Max: 18   SpO2  Min: 91 %  Max: 97 %   Flow (L/min)  Min: 2  Max: 2   No data recorded     Flowsheet Rows    Flowsheet Row First Filed Value   Admission Height 185.4 cm (73\") Documented at 12/08/2022 1501   Admission Weight 81.6 kg (180 lb) Documented at 12/08/2022 1501            Intake/Output Summary (Last 24 hours) at 12/12/2022 0659  Last data filed at 12/11/2022 2000  Gross per 24 hour   Intake 960 ml   Output 850 ml   Net 110 ml       Physical Exam:     General Appearance:    Alert, cooperative, in no acute distress   Lungs:    Diminished breath sounds bilaterally otherwise clear.    Heart:   Regular rhythm normal S1-S2.  There is an S3 and S4.   Chest Wall:    No abnormalities observed   Abdomen:     Normal bowel sounds, benign examination.   Extremities:   Moves all extremities well, no edema, no cyanosis, no             redness   Pulses:   Pulses palpable and equal bilaterally   Skin:   No bleeding, " bruising or rash        Results Review:   Results from last 7 days   Lab Units 12/12/22 0448 12/10/22  0410 12/09/22 0313   WBC 10*3/mm3 5.72 6.48 7.14   HEMOGLOBIN g/dL 13.4 12.4* 11.8*   HEMATOCRIT % 41.7 38.7 37.6   PLATELETS 10*3/mm3 154 158 158     Results from last 7 days   Lab Units 12/12/22 0448 12/11/22  1829 12/11/22  0415 12/10/22  0410   SODIUM mmol/L 140  --  139 139   POTASSIUM mmol/L 3.8 4.1 3.5 3.8   CHLORIDE mmol/L 108*  --  108* 109*   CO2 mmol/L 21.0*  --  20.0* 18.0*   BUN mg/dL 11  --  10 13   CREATININE mg/dL 0.54*  --  0.55* 0.58*   GLUCOSE mg/dL 106*  --  96 84      Results from last 7 days   Lab Units 12/09/22 0313   HEMOGLOBIN A1C % 5.60         Results from last 7 days   Lab Units 12/08/22 2013   TSH uIU/mL 3.040         Results from last 7 days   Lab Units 12/08/22  1538   PROTIME Seconds 17.3*   INR  1.42*     Results from last 7 days   Lab Units 12/08/22 2013 12/08/22  1538   CK TOTAL U/L 53  --    TROPONIN T ng/mL  --  <0.010       Intake/Output Summary (Last 24 hours) at 12/12/2022 0659  Last data filed at 12/11/2022 2000  Gross per 24 hour   Intake 960 ml   Output 850 ml   Net 110 ml       I personally viewed and interpreted the patient's EKG/Telemetry data    Telemetry: AV paced, HR 67-73 bpm, no recurrent VT    Ejection Fraction  No results found for: EF    Echo EF Estimated  Lab Results   Component Value Date    ECHOEFEST 18 10/14/2022         Present on Admission:  • (Resolved) Acute urinary tract infection  • R/O Pneumonia  • Influenza A  • Coronary artery disease involving native coronary artery of native heart with angina pectoris (HCC)  • Chronic systolic congestive heart failure (HCC)  • COPD (chronic obstructive pulmonary disease) (HCC)  • Pulmonary nodule (Stable)  • GEO (obstructive sleep apnea)  • ICD (implantable cardioverter-defibrillator) in place  • T2DM (type 2 diabetes mellitus) (HCC)  • Elevated LFTs (R/O shock liver)  • Acute respiratory failure with  hypoxia (HCC)  • (Resolved) Wide-complex tachycardia (S/P Cardioversion, AICD discharges)  • (Resolved) Ventricular tachycardia  • Ischemic cardiomyopathy (EF < 20%)  • Ventricular tachycardia    Assessment & Plan   1) VT/VF with ICD shocks  -VT/VF in the setting of Flu A/dehydration, pt has been feeling bad for 3 days, not eating or drinking.  Presented to ER yesterday for near syncopal episode onto the hypotensive, febrile and tested positive for flu a. He went into VT, ICD attempted ATP without success, he was shocked twice before returning to normal rhythm. He went into VT again 7 minutes later at 12:37 a.m. and again attempted ATP and was shocked twice. After second shock rhythm persisted and BP dropped and ER physician performed synchronized cardioversion at bedside. Lidocaine bolus given and drip started.   -Hospital admission 9/2022 for V. tach with BiV ICD shocks and ICD was reprogrammed with less ATP attempts.  Patient was discharged on amiodarone and had been taking amiodarone 400 mg daily for 2 months.   -weaned off Lidocaine drip over weekend. No recurrent arrhythmias - off lidocaine x48 hours.   -Will resume amiodarone 200mg daily today.  Follow liver test as indicated.      2) Cardiomyopathy, severe LV dysfunction.  Class III congestive heart failure status post biventricular pacemaker ICD.  -Most recent echo 10/16/2022: EF less than 20%, LV wall segments hypokinetic, mid anterior, basal anterolateral, mid anterolateral, basal inferior lateral, mid inferior lateral, apical inferior, mid inferior, basal inferior septal, mid inferior septalmid anteroseptal, basal anterior, basal inferior and basal inferoseptal. The following left ventricular wall segments are dyskinetic: apical anterior, apical lateral, apical septal and apex.The left ventricular cavity is severely dilated. Mild mitral regurgitation is present.     3)Transminitis  - Liver enzymes elevated upon admission 12/8/22.  Most likely due to  hypotension/dehydration/fluid.  -Intensivist managing, Hep panel negative, U/S abdomen -Normal sonographic appearance of the liver. No biliary ductal dilatation.  -Liver enzymes trending down     4) Flu A, Dehydration, Hypokalemia  -Intensivist managing, IVF, potassium replacement    Plan for disposition: ok to move to telemetry unit; hopefully home in a.m. tomorrow.    Electronically signed by CHENTE Umanzor, 12/12/22, 7:02 AM FERCHO.      Ferdinand MAN MD, personally performed the services face to face as described and documented by the above named individual. I have made any necessary edits and it is both accurate and complete 12/12/2022  14:43 EST

## 2022-12-12 NOTE — THERAPY TREATMENT NOTE
Patient Name: Jonnie Roth  : 1945    MRN: [id number]                              Today's Date: 2022       Admit Date: 2022    Visit Dx:     ICD-10-CM ICD-9-CM   1. Acute urinary tract infection  N39.0 599.0   2. Sepsis with encephalopathy and septic shock, due to unspecified organism ([facility])  A41.9 038.9    R65.21 995.92    G93.40 348.30     785.52   3. Influenza A  J10.1 487.1   4. Hypoxia  R09.02 799.02   5. Syncope, unspecified syncope type  R55 780.2   6. Ventricular tachycardia  I47.20 427.1     Patient Active Problem List   Diagnosis   • Coronary artery disease involving native coronary artery of native heart with angina pectoris (HCC)   • Chronic systolic congestive heart failure (HCC)   • BPH (benign prostatic hypertrophy)   • LBBB (left bundle branch block)   • Pulmonary emphysema (HCC)   • Hyperlipidemia LDL goal <70   • Ventricular tachycardia   • Post-traumatic subdural hematoma   • Elevated AST (SGOT)   • R/O Pneumonia   • Influenza A   • COPD (chronic obstructive pulmonary disease) (HCC)   • Pulmonary nodule (Stable)   • Bilateral renal cysts   • GEO (obstructive sleep apnea)   • ICD (implantable cardioverter-defibrillator) in place   • Hypothyroidism (acquired)   • GERD without esophagitis   • T2DM (type 2 diabetes mellitus) (HCC)   • Elevated LFTs (R/O shock liver)   • Acute respiratory failure with hypoxia (HCC)   • Ischemic cardiomyopathy (EF < 20%)   • H/O PE ()     Past Medical History:   Diagnosis Date   • Arthritis    • BPH (benign prostatic hypertrophy) 2016   • CAD (coronary artery disease)    • Disease of thyroid gland    • Enlarged prostate    • HFrEF (heart failure with reduced ejection fraction) ([facility])    • History of heart attack     X3 ABOUT    • History of transfusion     NO REACTION RECALLED    • Hypertension    • IHD (ischemic heart disease) 2016   • LBBB (left bundle branch block) 2016   • Lung nodule    • Macular degeneration    •  Pancreatic cyst 12/8/2022   • Polio     as a child   • Pulmonary embolism (HCC)    • Pulmonary emphysema (HCC) 12/28/2016   • Sepsis (HCC)     A. Secondary to [place] trauma with hematuria and urosepsis requiring hospitalization May 2015   • Sleep apnea with use of continuous positive airway pressure (CPAP)     CPAP- SETTING 4    • Ventricular tachycardia 12/28/2016   • Wears glasses     READERS     Past Surgical History:   Procedure Laterality Date   • BIVENTRICULLAR IMPLANTABLE CARDIOVERTER DEFIBRILLATOR PLACEMENT     • CARDIAC CATHETERIZATION N/A 1/16/2017    Procedure: Left Heart Cath;  Surgeon: Diego Ayala MD;  Location:  [facility];  Service:    • CARDIAC DEFIBRILLATOR PLACEMENT       A. ICD generator change out with upgrade to BiV pacemaker implantable cardioverter    defibrillator, 12/17/2007. Ventricular fibrillation s/p pacesetter [employer] ICD in [place]. [place] 09/2000   • CARDIAC ELECTROPHYSIOLOGY PROCEDURE N/A 10/2/2017    Procedure: BIV ICD  generator change [facility];  Surgeon: Ferdinand Alba MD;  Location:  [facility];  Service:    • CARDIAC ELECTROPHYSIOLOGY PROCEDURE N/A 7/6/2021    Procedure: BIV ICD generator change with [employer]. This will need to be done in late June or early July. Hold Xarelto one day prior.;  Surgeon: Ferdinand Alba MD;  Location:  [facility];  Service: Cardiology;  Laterality: N/A;   • CATARACT EXTRACTION      Bilateral    • COLONOSCOPY     • CORONARY ANGIOPLASTY WITH STENT PLACEMENT      X7 STENTS TOTAL    • CYSTOSCOPY URETERAL TUMOR FULGURATION  05/29/2015     A. Status post cystoscopy with clot evacuation and fulguration of the prostate secondary to hematuria, 5/29/2015.   • FOOT SURGERY      RIGHT - POLIO RELATED    • PROSTATE SURGERY      A. Status post laser vaporization, 08/19/2009.   • ROOT CANAL     • TOOTH EXTRACTION        General Information     Row Name 12/12/22 6495          OT Time and Intention    Document Type  therapy note (daily note)  -AN     Mode of Treatment occupational therapy  -AN     Row Name 12/12/22 1724          General Information    Patient Profile Reviewed yes  -AN     Existing Precautions/Restrictions fall;cardiac  -AN     Barriers to Rehab medically complex  -AN     Row Name 12/12/22 1724          Cognition    Orientation Status (Cognition) oriented x 4  -AN     Row Name 12/12/22 1724          Safety Issues, Functional Mobility    Safety Issues Affecting Function (Mobility) safety precautions follow-through/compliance;safety precaution awareness  -AN     Impairments Affecting Function (Mobility) balance;endurance/activity tolerance;coordination;strength;shortness of breath  -AN           User Key  (r) = Recorded By, (t) = Taken By, (c) = Cosigned By    Initials Name Provider Type    Suzy Torres OT Occupational Therapist                 Mobility/ADL's     Row Name 12/12/22 1724          Bed Mobility    Bed Mobility supine-sit-supine  -AN     Supine-Sit-Supine Howe (Bed Mobility) supervision  -AN     Assistive Device (Bed Mobility) bed rails;head of bed elevated  -AN     Row Name 12/12/22 1724          Transfers    Transfers sit-stand transfer;stand-sit transfer;toilet transfer  -AN     Comment, (Transfers) cues for RW positioning prior to sitting/standing for safety and fall prevention  -AN     Row Name 12/12/22 1724          Sit-Stand Transfer    Sit-Stand Howe (Transfers) verbal cues;contact guard  -AN     Assistive Device (Sit-Stand Transfers) walker, front-wheeled  -AN     [facility] Name 12/12/22 1724          Stand-Sit Transfer    Stand-Sit Howe (Transfers) verbal cues;contact guard  -AN     Assistive Device (Stand-Sit Transfers) walker, front-wheeled  -AN     [facility] Name 12/12/22 1724          Toilet Transfer    Type (Toilet Transfer) sit-stand;stand-sit  -AN     Howe Level (Toilet Transfer) standby assist  -AN     Comment, (Toilet Transfer) pt reporting difficulty  with standing from toilet; educated on use of BSC placed over the toilet in order to increase height and provide HR for improved performance  -AN     Row Name 12/12/22 1724          Functional Mobility    Functional Mobility- Ind. Level standby assist  -AN     Functional Mobility- Device walker, front-wheeled  -AN     Functional Mobility-Distance (Feet) 350  -AN     Functional Mobility- Safety Issues sequencing ability decreased;step length decreased  -AN     Functional Mobility- Comment cues for upright posture and RW positioning throughout  -AN     Row Name 12/12/22 1724          Activities of Daily Living    BADL Assessment/Intervention lower body dressing  -AN     [facility] Name 12/12/22 1724          Lower Body Dressing Assessment/Training    Margaret Level (Lower Body Dressing) don;socks;minimum assist (75% patient effort)  -AN     Position (Lower Body Dressing) edge of bed sitting  -AN           User Key  (r) = Recorded By, (t) = Taken By, (c) = Cosigned By    Initials Name Provider Type    Suzy Torres OT Occupational Therapist               Obj/Interventions     Row Name 12/12/22 1730          Balance    Balance Assessment sitting static balance;sitting dynamic balance;sit to stand dynamic balance;standing static balance;standing dynamic balance  -AN     Static Sitting Balance supervision  -AN     Dynamic Sitting Balance supervision  -AN     Position, Sitting Balance sitting edge of bed  -AN     Sit to Stand Dynamic Balance contact guard;verbal cues  -AN     Static Standing Balance standby assist  -AN     Dynamic Standing Balance contact guard  -AN     Position/Device Used, Standing Balance walker, rolling  -AN           User Key  (r) = Recorded By, (t) = Taken By, (c) = Cosigned By    Initials Name Provider Type    Suzy Torres OT Occupational Therapist               Goals/Plan    No documentation.                Clinical Impression     Row Name 12/12/22 1730          Pain Assessment     Pretreatment Pain Rating 0/10 - no pain  -AN     Posttreatment Pain Rating 0/10 - no pain  -AN     Pre/Posttreatment Pain Comment asymptomatic  -AN     Pain Intervention(s) Repositioned;Ambulation/increased activity  -AN     Row Name 12/12/22 1730          Plan of Care Review    Plan of Care Reviewed With patient;son  -AN     Progress improving  -AN     Outcome Evaluation Pt continues to improved with all transfers, ambulation, and ADLs. Pt ambulated 350 ft using RW with CGA and cues for RW safety throughout. Pt demo'd toilet transfer with BSC placed over due to need for arm rests and increased height. Cont POC.  -AN     Row Name 12/12/22 1730          Therapy Plan Review/Discharge Plan (OT)    Anticipated Discharge Disposition (OT) home with assist;home with home health  -AN     Row Name 12/12/22 1730          Vital Signs    Pre Systolic BP Rehab --  VSS  -AN     O2 Delivery Pre Treatment room air  -AN     O2 Delivery Intra Treatment room air  -AN     O2 Delivery Post Treatment room air  -AN     Pre Patient Position Supine  -AN     Intra Patient Position Standing  -AN     Post Patient Position Supine  -AN     Row Name 12/12/22 1730          Positioning and Restraints    Pre-Treatment Position in bed  -AN     Post Treatment Position bed  -AN     In Bed notified nsg;supine;exit alarm on;encouraged to call for assist;call light within reach;with family/caregiver;side rails up x2  -AN           User Key  (r) = Recorded By, (t) = Taken By, (c) = Cosigned By    Initials Name Provider Type    Suzy Torres OT Occupational Therapist               Outcome Measures     Row Name 12/12/22 4387          How much help from another is currently needed...    Putting on and taking off regular lower body clothing? 3  -AN     Bathing (including washing, rinsing, and drying) 3  -AN     Toileting (which includes using toilet bed pan or urinal) 3  -AN     Putting on and taking off regular upper body clothing 3  -AN     Taking  care of personal grooming (such as brushing teeth) 3  -AN     Eating meals 4  -AN     AM-PAC 6 Clicks Score (OT) 19  -AN     Row Name 12/12/22 1029 12/12/22 0800       How much help from another person do you currently need...    Turning from your back to your side while in flat bed without using bedrails? 3  -ES 3  -ST    Moving from lying on back to sitting on the side of a flat bed without bedrails? 3  -ES 3  -ST    Moving to and from a bed to a chair (including a wheelchair)? 3  -ES 3  -ST    Standing up from a chair using your arms (e.g., wheelchair, bedside chair)? 3  -ES 3  -ST    Climbing 3-5 steps with a railing? 2  -ES 2  -ST    To walk in hospital room? 3  -ES 3  -ST    AM-PAC 6 Clicks Score (PT) 17  -ES 17  -ST    Highest level of mobility 5 --> Static standing  -ES 5 --> Static standing  -ST    Row Name 12/12/22 1733 12/12/22 1029       Functional Assessment    Outcome Measure Options AM-PAC 6 Clicks Daily Activity (OT)  -AN AM-PAC 6 Clicks Basic Mobility (PT)  -ES          User Key  (r) = Recorded By, (t) = Taken By, (c) = Cosigned By    Initials Name Provider Type    Cate Lau RN Registered Nurse    Suzy Torres OT Occupational Therapist    Irma Larios PT Physical Therapist                Occupational Therapy Education     Title: PT OT SLP Therapies (In Progress)     Topic: Occupational Therapy (In Progress)     Point: ADL training (Done)     Description:   Instruct learner(s) on proper safety adaptation and remediation techniques during self care or transfers.   Instruct in proper use of assistive devices.              Learning Progress Summary           Patient Acceptance, E, VU by AN at 12/12/2022 1733    Acceptance, E, NR by CS at 12/10/2022 1135   Family Acceptance, E, VU by AN at 12/12/2022 1733                   Point: Home exercise program (Not Started)     Description:   Instruct learner(s) on appropriate technique for monitoring, assisting and/or progressing  therapeutic exercises/activities.              Learner Progress:  Not documented in this visit.          Point: Precautions (Done)     Description:   Instruct learner(s) on prescribed precautions during self-care and functional transfers.              Learning Progress Summary           Patient Acceptance, E, VU by AN at 12/12/2022 1733    Acceptance, E, NR by CS at 12/10/2022 1135   Family Acceptance, E, VU by AN at 12/12/2022 1733                   Point: Body mechanics (Done)     Description:   Instruct learner(s) on proper positioning and spine alignment during self-care, functional mobility activities and/or exercises.              Learning Progress Summary           Patient Acceptance, E, VU by AN at 12/12/2022 1733    Acceptance, E, NR by CS at 12/10/2022 1135   Family Acceptance, E, VU by AN at 12/12/2022 1733                               User Key     Initials Effective Dates Name Provider Type Discipline     09/02/21 -  Sabine Gurrola, OT Occupational Therapist OT    LEENA 09/21/21 -  Suzy Adames, OT Occupational Therapist OT              OT Recommendation and Plan     Plan of Care Review  Plan of Care Reviewed With: patient, son  Progress: improving  Outcome Evaluation: Pt continues to improved with all transfers, ambulation, and ADLs. Pt ambulated 350 ft using RW with CGA and cues for RW safety throughout. Pt demo'd toilet transfer with BSC placed over due to need for arm rests and increased height. Cont POC.     Time Calculation:    Time Calculation- OT     Row Name 12/12/22 1734 12/12/22 1030          Time Calculation- OT    OT Start Time 1500  -AN --     OT Received On 12/12/22  -AN --        Timed Charges    19628 - Gait Training Minutes  -- 17  -ES     68301 - OT Therapeutic Activity Minutes 10  -AN --     80243 - OT Self Care/Mgmt Minutes 13  -AN --        Total Minutes    Timed Charges Total Minutes 23  -AN 17  -ES      Total Minutes 23  -AN 17  -ES           User Key  (r) = Recorded By, (t)  = Taken By, (c) = Cosigned By    Initials Name Provider Type    AN Suzy Adames OT Occupational Therapist    Irma Larios PT Physical Therapist              Therapy Charges for Today     Code Description Service Date Service Provider Modifiers Qty    88879090053  OT THERAPEUTIC ACT EA 15 MIN 12/12/2022 Suzy Adames OT GO 1    77765131271 HC OT SELF CARE/MGMT/TRAIN EA 15 MIN 12/12/2022 Suzy Adames OT GO 1               Suzy Adames OT  12/12/2022

## 2022-12-12 NOTE — CASE MANAGEMENT/SOCIAL WORK
Continued Stay Note  [facility]     Patient Name: Jonnie Roth  MRN: [id number]  Today's Date: 12/12/2022    Admit Date: 12/8/2022    Plan: Home   Discharge Plan     Row Name 12/12/22 1157       Plan    Plan Home    Patient/Family in Agreement with Plan yes    Plan Comments Spoke with patient and wife at bedside.  Patient son had requested a caregiver list.  This was provided to patients wife.  PT/OT are recommending home with home health.  Patient does not feel like he needs home health.  Patient has agreed to continue to work with PT and to get up with nurses when asked.  Patients only concern was getting up and down off the toilet, wife states they have a bedside commode at home and that can be placed over the toilet and height adjusted for patients comfort to assist him with getting on and off.  Patient has agreed to think about home health and to let CM know if he changes his mind.  Patient denies any further needs.  Discharge plan is home.  CM will continue to follow.               Discharge Codes    No documentation.               Expected Discharge Date and Time     Expected Discharge Date Expected Discharge Time    Dec 16, 2022             Stephanie Ferrell RN

## 2022-12-13 ENCOUNTER — READMISSION MANAGEMENT (OUTPATIENT)
Dept: CALL CENTER | Facility: HOSPITAL | Age: 77
End: 2022-12-13

## 2022-12-13 VITALS
RESPIRATION RATE: 18 BRPM | DIASTOLIC BLOOD PRESSURE: 69 MMHG | HEIGHT: 73 IN | BODY MASS INDEX: 23.87 KG/M2 | WEIGHT: 180.12 LBS | SYSTOLIC BLOOD PRESSURE: 97 MMHG | HEART RATE: 68 BPM | OXYGEN SATURATION: 92 % | TEMPERATURE: 97 F

## 2022-12-13 PROBLEM — J18.9 PNEUMONIA: Status: RESOLVED | Noted: 2022-12-08 | Resolved: 2022-12-13

## 2022-12-13 PROBLEM — J96.01 ACUTE RESPIRATORY FAILURE WITH HYPOXIA (HCC): Status: RESOLVED | Noted: 2022-12-09 | Resolved: 2022-12-13

## 2022-12-13 PROBLEM — I47.20 VENTRICULAR TACHYCARDIA: Status: RESOLVED | Noted: 2022-09-10 | Resolved: 2022-12-13

## 2022-12-13 LAB
ALBUMIN SERPL-MCNC: 3.4 G/DL (ref 3.5–5.2)
ALBUMIN SERPL-MCNC: 3.4 G/DL (ref 3.5–5.2)
ALBUMIN/GLOB SERPL: 1.2 G/DL
ALP SERPL-CCNC: 106 U/L (ref 39–117)
ALP SERPL-CCNC: 111 U/L (ref 39–117)
ALT SERPL W P-5'-P-CCNC: 705 U/L (ref 1–41)
ALT SERPL W P-5'-P-CCNC: 748 U/L (ref 1–41)
ANION GAP SERPL CALCULATED.3IONS-SCNC: 10 MMOL/L (ref 5–15)
AST SERPL-CCNC: 184 U/L (ref 1–40)
AST SERPL-CCNC: 202 U/L (ref 1–40)
BACTERIA SPEC AEROBE CULT: NORMAL
BACTERIA SPEC AEROBE CULT: NORMAL
BASOPHILS # BLD AUTO: 0.01 10*3/MM3 (ref 0–0.2)
BASOPHILS NFR BLD AUTO: 0.2 % (ref 0–1.5)
BILIRUB CONJ SERPL-MCNC: 0.2 MG/DL (ref 0–0.3)
BILIRUB INDIRECT SERPL-MCNC: 0.3 MG/DL
BILIRUB SERPL-MCNC: 0.5 MG/DL (ref 0–1.2)
BILIRUB SERPL-MCNC: 0.6 MG/DL (ref 0–1.2)
BUN SERPL-MCNC: 12 MG/DL (ref 8–23)
BUN/CREAT SERPL: 24 (ref 7–25)
CALCIUM SPEC-SCNC: 8.4 MG/DL (ref 8.6–10.5)
CHLORIDE SERPL-SCNC: 106 MMOL/L (ref 98–107)
CO2 SERPL-SCNC: 22 MMOL/L (ref 22–29)
CREAT SERPL-MCNC: 0.5 MG/DL (ref 0.76–1.27)
DEPRECATED RDW RBC AUTO: 50 FL (ref 37–54)
EGFRCR SERPLBLD CKD-EPI 2021: 104.4 ML/MIN/1.73
EOSINOPHIL # BLD AUTO: 0.63 10*3/MM3 (ref 0–0.4)
EOSINOPHIL NFR BLD AUTO: 10.6 % (ref 0.3–6.2)
ERYTHROCYTE [DISTWIDTH] IN BLOOD BY AUTOMATED COUNT: 15.1 % (ref 12.3–15.4)
GLOBULIN UR ELPH-MCNC: 2.8 GM/DL
GLUCOSE SERPL-MCNC: 92 MG/DL (ref 65–99)
HCT VFR BLD AUTO: 40.6 % (ref 37.5–51)
HGB BLD-MCNC: 13 G/DL (ref 13–17.7)
IMM GRANULOCYTES # BLD AUTO: 0.03 10*3/MM3 (ref 0–0.05)
IMM GRANULOCYTES NFR BLD AUTO: 0.5 % (ref 0–0.5)
LYMPHOCYTES # BLD AUTO: 1.71 10*3/MM3 (ref 0.7–3.1)
LYMPHOCYTES NFR BLD AUTO: 28.7 % (ref 19.6–45.3)
MCH RBC QN AUTO: 28.7 PG (ref 26.6–33)
MCHC RBC AUTO-ENTMCNC: 32 G/DL (ref 31.5–35.7)
MCV RBC AUTO: 89.6 FL (ref 79–97)
MONOCYTES # BLD AUTO: 0.66 10*3/MM3 (ref 0.1–0.9)
MONOCYTES NFR BLD AUTO: 11.1 % (ref 5–12)
NEUTROPHILS NFR BLD AUTO: 2.92 10*3/MM3 (ref 1.7–7)
NEUTROPHILS NFR BLD AUTO: 48.9 % (ref 42.7–76)
NRBC BLD AUTO-RTO: 0 /100 WBC (ref 0–0.2)
PLATELET # BLD AUTO: 153 10*3/MM3 (ref 140–450)
PMV BLD AUTO: 10.4 FL (ref 6–12)
POTASSIUM SERPL-SCNC: 3.6 MMOL/L (ref 3.5–5.2)
PROT SERPL-MCNC: 6.2 G/DL (ref 6–8.5)
PROT SERPL-MCNC: 6.2 G/DL (ref 6–8.5)
RBC # BLD AUTO: 4.53 10*6/MM3 (ref 4.14–5.8)
SODIUM SERPL-SCNC: 138 MMOL/L (ref 136–145)
WBC NRBC COR # BLD: 5.96 10*3/MM3 (ref 3.4–10.8)

## 2022-12-13 PROCEDURE — 99232 SBSQ HOSP IP/OBS MODERATE 35: CPT | Performed by: PHYSICIAN ASSISTANT

## 2022-12-13 PROCEDURE — 82248 BILIRUBIN DIRECT: CPT | Performed by: NURSE PRACTITIONER

## 2022-12-13 PROCEDURE — 99239 HOSP IP/OBS DSCHRG MGMT >30: CPT | Performed by: NURSE PRACTITIONER

## 2022-12-13 PROCEDURE — 85025 COMPLETE CBC W/AUTO DIFF WBC: CPT | Performed by: NURSE PRACTITIONER

## 2022-12-13 PROCEDURE — 80053 COMPREHEN METABOLIC PANEL: CPT | Performed by: NURSE PRACTITIONER

## 2022-12-13 RX ORDER — BENZONATATE 200 MG/1
200 CAPSULE ORAL 3 TIMES DAILY PRN
Qty: 90 CAPSULE | Refills: 0 | Status: SHIPPED | OUTPATIENT
Start: 2022-12-13 | End: 2023-03-07

## 2022-12-13 RX ORDER — AMIODARONE HYDROCHLORIDE 200 MG/1
200 TABLET ORAL
Qty: 90 TABLET | Refills: 0 | Status: SHIPPED | OUTPATIENT
Start: 2022-12-14 | End: 2023-03-07

## 2022-12-13 RX ORDER — EPLERENONE 25 MG/1
12.5 TABLET, FILM COATED ORAL
Qty: 90 TABLET | Refills: 0 | Status: SHIPPED | OUTPATIENT
Start: 2022-12-14 | End: 2023-03-07 | Stop reason: SDUPTHER

## 2022-12-13 RX ADMIN — LEVOTHYROXINE SODIUM 25 MCG: 25 TABLET ORAL at 06:01

## 2022-12-13 RX ADMIN — CLOPIDOGREL BISULFATE 75 MG: 75 TABLET ORAL at 08:28

## 2022-12-13 RX ADMIN — POTASSIUM CHLORIDE 40 MEQ: 750 CAPSULE, EXTENDED RELEASE ORAL at 09:57

## 2022-12-13 RX ADMIN — FINASTERIDE 5 MG: 5 TABLET, FILM COATED ORAL at 08:27

## 2022-12-13 RX ADMIN — RIVAROXABAN 10 MG: 10 TABLET, FILM COATED ORAL at 08:27

## 2022-12-13 RX ADMIN — Medication 10 ML: at 08:29

## 2022-12-13 RX ADMIN — CARVEDILOL 12.5 MG: 12.5 TABLET, FILM COATED ORAL at 08:27

## 2022-12-13 RX ADMIN — EPLERENONE 12.5 MG: 25 TABLET, FILM COATED ORAL at 08:25

## 2022-12-13 RX ADMIN — TORSEMIDE 5 MG: 10 TABLET ORAL at 08:28

## 2022-12-13 RX ADMIN — EMPAGLIFLOZIN 10 MG: 10 TABLET, FILM COATED ORAL at 08:27

## 2022-12-13 RX ADMIN — AMIODARONE HYDROCHLORIDE 200 MG: 200 TABLET ORAL at 09:57

## 2022-12-13 RX ADMIN — SACUBITRIL AND VALSARTAN 1 TABLET: 49; 51 TABLET, FILM COATED ORAL at 08:27

## 2022-12-13 RX ADMIN — PANTOPRAZOLE SODIUM 40 MG: 40 TABLET, DELAYED RELEASE ORAL at 06:01

## 2022-12-13 NOTE — PROGRESS NOTES
[facility] at   Progress Note       LOS: 5 days   Patient Care Team:  Brian Lewis MD as PCP - General (Internal Medicine)  Ferdinand Alba MD as Consulting Physician (Cardiac Electrophysiology)  Viet Manzaon PA as Physician Assistant (Cardiology)    Chief Complaint:  VT with ICD shock     Subjective     Patient states he is feeling some better today. No events or VT overnight. No CP or SOB. Still with cough.       Review of Systems:   Pertinent positives in HPI, all others reviewed and negative.      Objective       Current Facility-Administered Medications:   •  amiodarone (PACERONE) tablet 200 mg, 200 mg, Oral, Q24H, Mary Hills, APRN, 200 mg at 12/12/22 1130  •  benzonatate (TESSALON) capsule 200 mg, 200 mg, Oral, TID PRN, Renay Rae DNP, APRN, 200 mg at 12/12/22 2051  •  carvedilol (COREG) tablet 12.5 mg, 12.5 mg, Oral, BID With Meals, Adelia Ames PA, 12.5 mg at 12/12/22 1733  •  clopidogrel (PLAVIX) tablet 75 mg, 75 mg, Oral, Daily, Korin Cannon APRN, 75 mg at 12/12/22 0922  •  dextrose (D50W) (25 g/50 mL) IV injection 25 g, 25 g, Intravenous, Q15 Min PRN, Korin Cannon APRN  •  dextrose (GLUTOSE) oral gel 15 g, 15 g, Oral, Q15 Min PRN, Korin Cannon APRN  •  empagliflozin (JARDIANCE) tablet 10 mg, 10 mg, Oral, Daily, Korin Cannon, APRN, 10 mg at 12/12/22 0922  •  eplerenone (INSPRA) tablet 12.5 mg, 12.5 mg, Oral, Q24H, Nigel Dickens DO, 12.5 mg at 12/12/22 0922  •  finasteride (PROSCAR) tablet 5 mg, 5 mg, Oral, Daily, Korin Cannon, APRN, 5 mg at 12/12/22 0922  •  glucagon (human recombinant) (GLUCAGEN DIAGNOSTIC) injection 1 mg, 1 mg, Intramuscular, Q15 Min PRN, Korin Cannon APRN  •  ipratropium-albuterol (DUO-NEB) nebulizer solution 3 mL, 3 mL, Nebulization, Q4H PRN, Brian Cain MD  •  ipratropium-albuterol (DUO-NEB) nebulizer solution 3 mL, 3 mL, Nebulization, 4x Daily - RT, Ferdinand Alba,  "MD, 3 mL at 12/1945  •  levothyroxine (SYNTHROID, LEVOTHROID) tablet 25 mcg, 25 mcg, Oral, Q AM, Korin Cannon APRN, 25 mcg at 12/13/22 0601  •  melatonin tablet 10 mg, 10 mg, Oral, Nightly PRN, Korin Cannon APRN  •  pantoprazole (PROTONIX) EC tablet 40 mg, 40 mg, Oral, Q AM, Dc Wilcox MD, 40 mg at 12/13/22 0601  •  potassium chloride (MICRO-K) CR capsule 40 mEq, 40 mEq, Oral, PRN, 40 mEq at 12/11/22 1131 **OR** potassium chloride (KLOR-CON) packet 40 mEq, 40 mEq, Oral, PRN **OR** potassium chloride 10 mEq in 100 mL IVPB, 10 mEq, Intravenous, Q1H PRN, Deloris Sahni APRN  •  rivaroxaban (XARELTO) tablet 10 mg, 10 mg, Oral, Daily, Korin Cannon APRN, 10 mg at 12/12/22 0922  •  sacubitril-valsartan (ENTRESTO) 49-51 MG tablet 1 tablet, 1 tablet, Oral, BID, Korin Cannon APRN, 1 tablet at 12/12/22 2051  •  sodium chloride 0.9 % flush 10 mL, 10 mL, Intravenous, Q12H, Deloris Sahni APRN, 10 mL at 12/12/22 2051  •  sodium chloride 0.9 % flush 10 mL, 10 mL, Intravenous, PRNBora Carolyn E, APRN  •  sodium chloride 0.9 % infusion 40 mL, 40 mL, Intravenous, PRN, Deloris Sahni APRN  •  torsemide (DEMADEX) tablet 5 mg, 5 mg, Oral, Daily, Renay Rae, OSMAN, APRN    Vital Sign Min/Max for last 24 hours  Temp  Min: 97.5 °F (36.4 °C)  Max: 98.5 °F (36.9 °C)   BP  Min: 91/53  Max: 127/70   Pulse  Min: 70  Max: 82   Resp  Min: 16  Max: 16   SpO2  Min: 90 %  Max: 95 %   Flow (L/min)  Min: 2  Max: 2   No data recorded     Flowsheet Rows    Flowsheet Row First Filed Value   Admission Height 185.4 cm (73\") Documented at 12/08/2022 1501   Admission Weight 81.6 kg (180 lb) Documented at 12/08/2022 1501            Intake/Output Summary (Last 24 hours) at 12/13/2022 0742  Last data filed at 12/13/2022 0400  Gross per 24 hour   Intake 480 ml   Output 1900 ml   Net -1420 ml       Physical Exam:     General Appearance:    Alert, cooperative, in no acute distress   Lungs:    Diminished breath sounds " bilaterally otherwise clear.    Heart:   Regular rhythm normal S1-S2.  There is an S3 and S4.   Chest Wall:    No abnormalities observed   Abdomen:     Normal bowel sounds, benign examination.   Extremities:   Moves all extremities well, no edema, no cyanosis, no             redness   Pulses:   Pulses palpable and equal bilaterally   Skin:   No bleeding, bruising or rash        Results Review:   Results from last 7 days   Lab Units 12/13/22  0558 12/12/22 0448 12/10/22  0410   WBC 10*3/mm3 5.96 5.72 6.48   HEMOGLOBIN g/dL 13.0 13.4 12.4*   HEMATOCRIT % 40.6 41.7 38.7   PLATELETS 10*3/mm3 153 154 158     Results from last 7 days   Lab Units 12/13/22  0558 12/12/22 0448 12/11/22 1829 12/11/22 0415   SODIUM mmol/L 138 140  --  139   POTASSIUM mmol/L 3.6 3.8 4.1 3.5   CHLORIDE mmol/L 106 108*  --  108*   CO2 mmol/L 22.0 21.0*  --  20.0*   BUN mg/dL 12 11  --  10   CREATININE mg/dL 0.50* 0.54*  --  0.55*   GLUCOSE mg/dL 92 106*  --  96      Results from last 7 days   Lab Units 12/09/22  0313   HEMOGLOBIN A1C % 5.60         Results from last 7 days   Lab Units 12/08/22 2013   TSH uIU/mL 3.040         Results from last 7 days   Lab Units 12/08/22  1538   PROTIME Seconds 17.3*   INR  1.42*     Results from last 7 days   Lab Units 12/08/22 2013 12/08/22  1538   CK TOTAL U/L 53  --    TROPONIN T ng/mL  --  <0.010       Intake/Output Summary (Last 24 hours) at 12/13/2022 0742  Last data filed at 12/13/2022 0400  Gross per 24 hour   Intake 480 ml   Output 1900 ml   Net -1420 ml       I personally viewed and interpreted the patient's EKG/Telemetry data    Telemetry: AV paced, HR 67-73 bpm, no recurrent VT    Ejection Fraction  No results found for: EF    Echo EF Estimated  Lab Results   Component Value Date    ECHOEFEST 18 10/14/2022         Present on Admission:  • (Resolved) Acute urinary tract infection  • R/O Pneumonia  • Influenza A  • Coronary artery disease involving native coronary artery of native heart with  angina pectoris (HCC)  • Chronic systolic congestive heart failure (HCC)  • COPD (chronic obstructive pulmonary disease) (HCC)  • Pulmonary nodule (Stable)  • GEO (obstructive sleep apnea)  • ICD (implantable cardioverter-defibrillator) in place  • T2DM (type 2 diabetes mellitus) (HCC)  • Elevated LFTs (R/O shock liver)  • Acute respiratory failure with hypoxia (HCC)  • (Resolved) Wide-complex tachycardia (S/P Cardioversion, AICD discharges)  • (Resolved) Ventricular tachycardia  • Ischemic cardiomyopathy (EF < 20%)  • Ventricular tachycardia  • H/O PE (2015)    Assessment & Plan   1) VT/VF with ICD shocks  -VT/VF in the setting of Flu A/dehydration. Presented to ER 12/8/2022 for near syncopal episode onto the hypotensive, febrile and tested positive for flu a. He went into VT, ICD attempted ATP without success, he was shocked twice before returning to normal rhythm. He went into VT again 7 minutes later at 12:37 a.m. and again attempted ATP and was shocked twice. After second shock rhythm persisted and BP dropped and ER physician performed synchronized cardioversion at bedside. Lidocaine bolus given and drip started.   -Hospital admission 9/2022 for V. tach with BiV ICD shocks and ICD was reprogrammed with less ATP attempts.  Patient was discharged on amiodarone and had been taking amiodarone 400 mg daily for 2 months.   -weaned off Lidocaine drip over weekend. No recurrent arrhythmias  - Resumed Amiodarone 200mg daily on 12/12/2022. Follow liver test as indicated- trending down.      2) Cardiomyopathy, severe LV dysfunction.  Class III congestive heart failure status post biventricular pacemaker ICD.  -Most recent echo 10/16/2022: EF less than 20%, LV wall segments hypokinetic, mid anterior, basal anterolateral, mid anterolateral, basal inferior lateral, mid inferior lateral, apical inferior, mid inferior, basal inferior septal, mid inferior septalmid anteroseptal, basal anterior, basal inferior and basal  inferoseptal. The following left ventricular wall segments are dyskinetic: apical anterior, apical lateral, apical septal and apex.The left ventricular cavity is severely dilated. Mild mitral regurgitation is present.     3)Transminitis  - Liver enzymes elevated upon admission 12/8/22.  Most likely due to hypotension/dehydration/fluid.  -Intensivist managing, Hep panel negative, U/S abdomen -Normal sonographic appearance of the liver. No biliary ductal dilatation.  -Liver enzymes trending down     4) Flu A, Dehydration, Hypokalemia  -Intensivist managing, IVF, potassium replacement    Plan for disposition: ok home from cardiology standpoint. Continue Amiodarone 200 mg daily. Needs LFTs in 5 days as outpatient. Follow up with Dr. Alba in 2 months with ICD check.     Electronically signed by PAULA Arrington, 12/13/22, 7:38 AM EST.

## 2022-12-13 NOTE — PLAN OF CARE
Goal Outcome Evaluation:  Plan of Care Reviewed With: patient, spouse, son        Progress: improving       Patient being discharged home.

## 2022-12-13 NOTE — PLAN OF CARE
Goal Outcome Evaluation:  VSS on RA. 2L when sleeping. Home CPAP at night.   AV paced on the monitor.   Dry cough remains, tessalon pearls added with mild relief.     Ambulated in hallway x 3.     Large BM x 1    Son- Erasmo at bedside. Updated.      Possible DC to home tm.

## 2022-12-13 NOTE — CASE MANAGEMENT/SOCIAL WORK
Continued Stay Note  [facility]     Patient Name: Jonnie Roth  MRN: [id number]  Today's Date: 12/13/2022    Admit Date: 12/8/2022    Plan: Home with home health   Discharge Plan     Row Name 12/13/22 1044       Plan    Plan Home with home health    Patient/Family in Agreement with Plan yes    Plan Comments Spoke with patient and wife at bedside. Received consult from Dr. Alba for home health and oxygen. Patient has decided he wants home health.  .Orders in [facility] for home health and oxygen.  Called home health referral to Jacqueline with [facility], awaiting call back for determination. Spoke with Aniyah with [facility] and gave referral for home oxygen.  Aniyah states they will deliver portable to patients room.  CM will continue to follow.    1105  Received call back from Jacqueline with [facility] and they cannot accept patient stating their capacity is full.  Referral called to Letty with [facility].  Waiting for call back for determination.    1135 Received call back from Letty with [facility] and they have accepted patient.    Final Discharge Disposition Code 06 - home with home health care               Discharge Codes    No documentation.               Expected Discharge Date and Time     Expected Discharge Date Expected Discharge Time    Dec 13, 2022             Stephanie Ferrell RN

## 2022-12-13 NOTE — DISCHARGE SUMMARY
DISCHARGE SUMMARY     Admit date: 12/8/2022  Date of Discharge:  12/13/2022    Discharge Diagnoses  Active Hospital Problems    Diagnosis  POA   • H/O PE (2015) [Z86.711]  Yes   • Elevated LFTs (R/O shock liver) [R79.89]  Yes   • Ischemic cardiomyopathy (EF < 20%) [I25.5]  Yes   • Influenza A [J10.1]  Yes   • COPD (chronic obstructive pulmonary disease) (HCC) [J44.9]  Yes   • Pulmonary nodule (Stable) [R91.1]  Yes   • GEO (obstructive sleep apnea) [G47.33]  Yes   • ICD (implantable cardioverter-defibrillator) in place [Z95.810]  Yes   • T2DM (type 2 diabetes mellitus) ([facility]) [E11.9]  Yes   • Coronary artery disease involving native coronary artery of native heart with angina pectoris (HCC) [I25.119]  Yes   • Chronic systolic congestive heart failure (HCC) [I50.22]  Yes      Resolved Hospital Problems    Diagnosis Date Resolved POA   • **Ventricular tachycardia [I47.20] 12/13/2022 Yes   • Acute respiratory failure with hypoxia ([facility]) [J96.01] 12/13/2022 Yes   • Acute urinary tract infection [N39.0] 12/09/2022 Yes   • R/O Pneumonia [J18.9] 12/13/2022 Yes   • Ventricular tachycardia [I47.20] 12/09/2022 Yes   • Wide-complex tachycardia (S/P Cardioversion, AICD discharges) [R00.0] 12/09/2022 Yes       Past Surgical History:   Procedure Laterality Date   • BIVENTRICULLAR IMPLANTABLE CARDIOVERTER DEFIBRILLATOR PLACEMENT     • CARDIAC CATHETERIZATION N/A 1/16/2017    Procedure: Left Heart Cath;  Surgeon: Diego Ayala MD;  [facility]  [facility];  Service:    • CARDIAC DEFIBRILLATOR PLACEMENT       A. ICD generator change out with upgrade to BiV pacemaker implantable cardioverter    defibrillator, 12/17/2007. Ventricular fibrillation s/p pacesetter [place] ICD in [place]. [place] 09/2000   • CARDIAC ELECTROPHYSIOLOGY PROCEDURE N/A 10/2/2017    Procedure: BIV ICD  generator change S[place];  Surgeon: Ferdinand Alba MD;  Location:  [facility];  Service:    • CARDIAC ELECTROPHYSIOLOGY PROCEDURE N/A  7/6/2021    Procedure: BIV ICD generator change with [employer]. This will need to be done in late June or early July. Hold Xarelto one day prior.;  Surgeon: Ferdinand Alba MD;  Location: [facility] INV[facility] LOC[facility];  Service: Cardiology;  Laterality: N/A;   • CATARACT EXTRACTION      Bilateral    • COLONOSCOPY     • CORONARY ANGIOPLASTY WITH STENT PLACEMENT      X7 STENTS TOTAL    • CYSTOSCOPY URETERAL TUMOR FULGURATION  05/29/2015     A. Status post cystoscopy with clot evacuation and fulguration of the prostate secondary to hematuria, 5/29/2015.   • FOOT SURGERY      RIGHT - POLIO RELATED    • PROSTATE SURGERY      A. Status post laser vaporization, 08/19/2009.   • ROOT CANAL     • TOOTH EXTRACTION         History of Present Illness    Jonnie Roth is a 77 y.o. male with PMH significant for V-fib on amiodarone s/p ICD, HFrEF (<20%), HTN, HLD, GEO w/CPAP, hypothyroidism, T2DM, GERD, COPD, remote tobacco abuse, and BPH who presents to [facility] ED on 12/08/2022 for evaluation of a near-syncopal episode at home. Patient reports ongoing fatigue for the past 3 months, in cardiac rehab [facility], but had profound weakness beginning Tuesday. Family reports he did have a fall today, no LOC or head trauma, and feeling chilled and feverish with temperature of 101.      Patient tested positive for Flu A on arrival with significant labs ALT/AST 2,155/3,798 with negative hepatitis workup, CO2 18, PCT 0.37, and UA suggestive of potential UTI. Blood cultures, legionella, and strep pneumo pending.      While in the ED, wide-complex tachycardia was noted (as high as 248) and patient received 3 shocks from AICD per family report. Lidocaine bolus and drip initiated. Dr. Sanchez with EP consulted. Patient remained alert and oriented with BP in the 110s; however, rhythm persisted with associated BP drop and ED physician performed synchronized cardioversion at the bedside. Post-cardioversion EKG revealed SVT with frequent AV paced  complexes.     He is transferred to the ICU for higher level of care.    Hospital Course    Admitted for reasons stated above in HPI.  Cardiology was consulted and initiated a Lidocaine infusion (Amiodarone held due to increased LFTs). He eventually converted out of VT on 12/9. PO Amiodarone restarted 12/12, once LFTs improved. LFTs continued to improve and should be followed outpatient in 3-5 days (per PCP). He was continued on his plavix, betablocker, entresto, torsemide and eplenerone. At night he received CPAP and during the day he required intermittent NC to maintain SpO2 (On discharge he is being sent home with home O2). His metformin was held on admission and he required no ssi correction throughout hospitalization with BS < 180. His levothyroxine and PPI were continued. As of 12/13 he has met maximum benefit from hospitalization and is ready for discharged. On discharge he has had no recurrent arrhythmias. Physical therapy is recommending home with assist and HHPT.    - PCP appt in 3-5 days; follow up LFTs  - Follow up with Dr. Alba in 2 months    Physical Exam:  Constitutional:  Older male resting in bed with family at bedside. Appears well-developed and well-nourished. No distress.   HEENT:  Normocephalic and atraumatic. PERRL  Neck: Normal range of motion. Neck supple. No JVD present.   Cardiovascular: Normal rate, regular rhythm, normal heart sounds and intact distal pulses.  No gallop and no friction rub.  No murmur heard.  Pulmonary/Chest: Effort normal and breath sounds normal. No respiratory distress. No wheezes, rhonchi or rales.   Abdominal: Soft. No distension and no mass. There is no tenderness.   Musculoskeletal: Normal range of motion.   Neurological: Alert and oriented to person, place, and time.  No focal deficits  Skin: Skin is warm and dry. No rash noted.   Extremities:  No clubbing, edema or cyanosis  Psychiatric: Normal mood and affect. Behavior is normal.       Procedures Performed:      None    Consults:    - Cardiology: Dr. Alba    Pertinent Test Results:   Results from last 7 days   Lab Units 12/13/22  0558   WBC 10*3/mm3 5.96   HEMOGLOBIN g/dL 13.0   HEMATOCRIT % 40.6   PLATELETS 10*3/mm3 153     Results from last 7 days   Lab Units 12/13/22  0558 12/12/22  0448 12/11/22  1829 12/11/22  0415 12/09/22  1809 12/09/22  0313   SODIUM mmol/L 138 140  --  139   < > 141   POTASSIUM mmol/L 3.6 3.8 4.1 3.5   < > 3.3*   CHLORIDE mmol/L 106 108*  --  108*   < > 109*   CO2 mmol/L 22.0 21.0*  --  20.0*   < > 19.0*   BUN mg/dL 12 11  --  10   < > 18   CREATININE mg/dL 0.50* 0.54*  --  0.55*   < > 0.62*   EGFR mL/min/1.73 104.4 102.0  --  101.4   < > 97.8   GLUCOSE mg/dL 92 106*  --  96   < > 85   CALCIUM mg/dL 8.4* 8.2*  --  8.1*   < > 7.9*   PHOSPHORUS mg/dL  --  3.4  --   --   --  4.6*    < > = values in this interval not displayed.     Results from last 7 days   Lab Units 12/09/22  0313   HEMOGLOBIN A1C % 5.60       Condition on Discharge:  Stable    Discharge Disposition: Home with assist    Discharge Medications:      Discharge Medications      New Medications      Instructions Start Date   benzonatate 200 MG capsule  Commonly known as: TESSALON   200 mg, Oral, 3 Times Daily PRN         Changes to Medications      Instructions Start Date   amiodarone 200 MG tablet  Commonly known as: PACERONE  What changed:   · how much to take  · when to take this  · additional instructions  · Another medication with the same name was removed. Continue taking this medication, and follow the directions you see here.   200 mg, Oral, Every 24 Hours Scheduled   Start Date: December 14, 2022     eplerenone 25 MG tablet  Commonly known as: INSPRA  What changed: when to take this   12.5 mg, Oral, Every 24 Hours Scheduled   Start Date: December 14, 2022     melatonin 5 MG tablet tablet  What changed: Another medication with the same name was removed. Continue taking this medication, and follow the directions you see  here.   5 mg, Oral, Nightly PRN, 2 tabs at night          Continue These Medications      Instructions Start Date   acetaminophen 500 MG tablet  Commonly known as: TYLENOL   1,000 mg, Oral, Every 6 Hours PRN      acetaminophen 500 MG tablet  Commonly known as: TYLENOL   Every 6 Hours      carvedilol 12.5 MG tablet  Commonly known as: COREG   12.5 mg, Oral, 2 Times Daily With Meals      cholecalciferol 25 MCG (1000 UT) tablet  Commonly known as: VITAMIN D3   4,000 Units, Oral, Daily      clopidogrel 75 MG tablet  Commonly known as: PLAVIX   75 mg, Oral, Every Morning      econazole nitrate 1 % cream  Commonly known as: SPECTAZOLE   econazole 1 % topical cream      empagliflozin 10 MG tablet tablet  Commonly known as: JARDIANCE   10 mg, Oral, Daily      finasteride 5 MG tablet  Commonly known as: PROSCAR   5 mg, Oral, Every Morning      L-TRYPTOPHAN PO   Oral, Nightly, 3 TABLETS NIGHTLY-     1500 MG EACH      levothyroxine 25 MCG tablet  Commonly known as: SYNTHROID, LEVOTHROID   25 mcg, Oral, Every Morning      metFORMIN 1000 MG tablet  Commonly known as: GLUCOPHAGE   1,000 mg, Oral, 2 times daily      Multivitamin Adults 50+ tablet tablet  Generic drug: multivitamin with minerals   1 tablet, Oral, Daily      ICAPS AREDS 2 PO   1 tablet, Oral, 2 times daily      nitroglycerin 0.4 MG SL tablet  Commonly known as: NITROSTAT   nitroglycerin 0.4 mg sublingual tablet   Place 1 tablet by sublingual route.      nitroglycerin 0.4 MG SL tablet  Commonly known as: NITROSTAT   0.4 mg, Sublingual, Every 5 Minutes PRN, Take no more than 3 doses in 15 minutes.       pantoprazole 40 MG EC tablet  Commonly known as: PROTONIX   40 mg, Oral, Every Morning      polyethylene glycol packet  Commonly known as: MIRALAX   17 g, Oral, Every Other Day      rivaroxaban 10 MG tablet  Commonly known as: Xarelto   10 mg, Oral, Daily      rosuvastatin 40 MG tablet  Commonly known as: CRESTOR   40 mg, Oral, Daily      sacubitril-valsartan 49-51 MG  tablet  Commonly known as: Entresto   1 tablet, Oral, 2 Times Daily      temazepam 15 MG capsule  Commonly known as: RESTORIL   15 mg, Oral, Nightly PRN      torsemide 5 MG tablet  Commonly known as: DEMADEX   TAKE 1 TABLET DAILY      vitamin B-12 1000 MCG tablet  Commonly known as: CYANOCOBALAMIN   1,000 mcg, Oral, Daily             Discharge Diet: Diet: Cardiac Diets; Healthy Heart (2-3 Na+); Texture: Regular Texture (IDDSI 7); Fluid Consistency: Thin (IDDSI 0)    Activity at Discharge: as tolerated with assist    Follow-up Appointments  Future Appointments   Date Time Provider Department Center   12/14/2022  8:00 AM [facility]   12/16/2022  8:00 AM [facility]   12/19/2022  8:00 AM [facility]   12/21/2022  8:00 AM [facility]   12/23/2022  8:00 AM [facility]   12/28/2022  8:00 AM [facility]   12/30/2022  8:00 AM [facility]   1/4/2023  8:00 AM [facility]   1/6/2023  8:00 AM [facility]   1/9/2023  8:00 AM [facility]   1/11/2023  8:00 AM [facility]   1/13/2023  8:00 AM [facility]   1/16/2023  8:00 AM [facility]   1/18/2023  8:00 AM [facility]   1/20/2023  8:00 AM [facility]   1/23/2023  8:00 AM [facility]   1/25/2023  8:00 AM [facility]   1/27/2023  8:00 AM [facility]   1/30/2023  8:00 AM [facility]   2/1/2023   8:00 AM [facility] II - [facility]   2/3/2023  8:00 AM [facility] II - [facility]   2/6/2023  8:00 AM [facility] II - [facility]   2/8/2023  8:00 AM [facility] II - [facility]   2/20/2023  2:15 PM Viet Manzano PA [facility]   3/7/2023  3:30 PM Michele Flanagan IV, MD [facility]   4/12/2023  2:00 PM Ferdinand Alba MD [facility]     Additional Instructions for the Follow-ups that You Need to Schedule     Ambulatory Referral to Home Health   As directed      Face to Face Visit Date: 12/13/2022    Follow-up provider for Plan of Care?: I treated the patient in an acute care facility and will not continue treatment after discharge.    Follow-up provider: MARCOS LEWIS [[id number]]    Reason/Clinical Findings: Chronic systolic congestive heart failure (HCC) ICD-10-CM: I50.22    Describe mobility limitations that make leaving home difficult: Chronic systolic congestive heart failure (HCC) ICD-10-CM: I50.22    Nursing/Therapeutic Services Requested: Skilled Nursing Physical Therapy Occupational Therapy    Skilled nursing orders: O2 instruction Medication education Cardiopulmonary assessments    PT orders: Therapeutic exercise (evaluate and treat) Gait Training Transfer training Strengthening Home safety assessment    Weight Bearing Status: As Tolerated    Occupational orders: Activities of daily living (evaluate and treat) Energy conservation Strengthening Fine motor Home safety assessment    Frequency: 1 Week 1         Discharge Follow-up with PCP   As directed       Currently Documented PCP:    Marcos Lewis MD    PCP Phone Number:    [phone]     Follow Up Details: 3-5 days (repeat LFTs)         Discharge Follow-up with Specialty: Dr. Alba; 2 Months   As directed      Specialty: Dr. Alba    Follow Up: 2 Months    Follow Up Details: Admission for VT/ICD shocks. with [employer] ICD  check         Discharge Follow-up with Specified Provider: Dr. Alba; 2 Months   As directed      To: Dr. Alba    Follow Up: 2 Months               Test Results Pending at Discharge  Pending Labs     Order Current Status    Blood Culture - Blood, Arm, Left Preliminary result    Blood Culture - Blood, Arm, Right Preliminary result           Code Status and Medical Interventions:   Ordered at: 12/08/22 2132     Code Status (Patient has no pulse and is not breathing):    CPR (Attempt to Resuscitate)     Medical Interventions (Patient has pulse or is breathing):    Full Support       CHENTE Berry  12/13/22  10:42 EST    Discharge Time Spent:     I spent  35  minutes on this discharge activity which included: face-to-face encounter with the patient, reviewing the data in the system, coordination of the care with the nursing staff as well as consultants, documentation, and entering orders.        Electronically signed by CHENTE Berry, 12/13/22, 10:26 AM EST.

## 2022-12-14 ENCOUNTER — TELEPHONE (OUTPATIENT)
Dept: CARDIAC REHAB | Facility: HOSPITAL | Age: 77
End: 2022-12-14

## 2022-12-14 NOTE — OUTREACH NOTE
Prep Survey    Flowsheet Row Responses   [facility] facility patient discharged from? [facility]   Is LACE score < 7 ? No   Eligibility Readm Mgmt   Discharge diagnosis Ventricular tachycardia   Does the patient have one of the following disease processes/diagnoses(primary or secondary)? Other   Does the patient have Home health ordered? Yes   What is the Home health agency?  [facility]   Is there a DME ordered? Yes   What DME was ordered? Oxygen   Prep survey completed? Yes          RAJI FRY - Registered Nurse

## 2022-12-14 NOTE — TELEPHONE ENCOUNTER
Staff called to check on patient. Staff talked to Trena, patient's wife. She stated that Ilda was admitted for pneumonia, flu, and UTI. He was also in V-Tach and was cardioverted. Ilda has been discharged on O2 and will also be receiving home health. Staff informed them that he can return to [facility] after home health is completed. Teach back verified.

## 2022-12-15 ENCOUNTER — HOME HEALTH ADMISSION (OUTPATIENT)
Dept: HOME HEALTH SERVICES | Facility: HOME HEALTHCARE | Age: 77
End: 2022-12-15

## 2022-12-20 LAB
QT INTERVAL: 218 MS
QT INTERVAL: 526 MS
QT INTERVAL: 656 MS
QTC INTERVAL: 343 MS
QTC INTERVAL: 568 MS
QTC INTERVAL: 708 MS

## 2022-12-21 ENCOUNTER — APPOINTMENT (OUTPATIENT)
Dept: CARDIAC REHAB | Facility: HOSPITAL | Age: 77
End: 2022-12-21
Payer: MEDICARE

## 2022-12-22 ENCOUNTER — READMISSION MANAGEMENT (OUTPATIENT)
Dept: CALL CENTER | Facility: HOSPITAL | Age: 77
End: 2022-12-22

## 2022-12-22 NOTE — OUTREACH NOTE
Medical Week 2 Survey    Flowsheet Row Responses   [facility] patient discharged from? [place]   Does the patient have one of the following disease processes/diagnoses(primary or secondary)? Other   Week 2 attempt successful? Yes   Call start time 1624   Discharge diagnosis Influenza A   Call end time 1640   Is patient permission given to speak with other caregiver? Yes   List who call center can speak with Ronnie Rothkie Spouse    Person spoke with today (if not patient) and relationship Trena Roth Spouse    Meds reviewed with patient/caregiver? Yes   Does the patient have all medications ordered at discharge? Yes   Is the patient taking all medications as directed (includes completed medication regime)? Yes   Does the patient have a primary care provider?  Yes   Has the patient kept scheduled appointments due by today? Yes   What is the Home health agency?  [facility] comments Has a home health nurse and PT visiting.    DME comments Patient wearing O22L at night and prn during the day. Wife monitors patients O2 sat with pulse ox and patient wears O2 if sats drop below 90%   Psychosocial issues? No   What is the patient's perception of their health status since discharge? Improving   Is the patient/caregiver able to teach back the hierarchy of who to call/visit for symptoms/problems? PCP, Specialist, Home health nurse, Urgent Care, ED, 911 Yes   If the patient is a current smoker, are they able to teach back resources for cessation? Not a smoker   Week 2 Call Completed? Yes   Wrap up additional comments Wife complimentary of patient care received in the hospital.           TITUS KRISHNAN - Registered Nurse

## 2022-12-28 RX ORDER — RIVAROXABAN 10 MG/1
TABLET, FILM COATED ORAL
Qty: 90 TABLET | Refills: 0 | Status: SHIPPED | OUTPATIENT
Start: 2022-12-28 | End: 2023-03-07 | Stop reason: SDUPTHER

## 2022-12-28 NOTE — TELEPHONE ENCOUNTER
Lab Results   Component Value Date    GLUCOSE 92 12/13/2022    BUN 12 12/13/2022    CREATININE 0.50 (L) 12/13/2022    EGFRIFNONA 83 07/01/2021    BCR 24.0 12/13/2022    K 3.6 12/13/2022    CO2 22.0 12/13/2022    CALCIUM 8.4 (L) 12/13/2022    ALBUMIN 3.40 (L) 12/13/2022    ALBUMIN 3.40 (L) 12/13/2022     (H) 12/13/2022     (H) 12/13/2022     (H) 12/13/2022     (H) 12/13/2022

## 2022-12-29 ENCOUNTER — TELEPHONE (OUTPATIENT)
Dept: CARDIAC REHAB | Facility: HOSPITAL | Age: 77
End: 2022-12-29

## 2022-12-29 NOTE — TELEPHONE ENCOUNTER
Staff called pt to check on him and see how home health is doing and making sure he plans on returning. Pt states he thinks it will take him about 2 to 3 weeks to finish up physical therapy. Staff asked pt to just keep us informed. Teach back verified.

## 2023-01-01 ENCOUNTER — APPOINTMENT (OUTPATIENT)
Dept: GENERAL RADIOLOGY | Facility: HOSPITAL | Age: 78
DRG: 308 | End: 2023-01-01
Payer: MEDICARE

## 2023-01-01 ENCOUNTER — APPOINTMENT (OUTPATIENT)
Dept: NEUROLOGY | Facility: HOSPITAL | Age: 78
DRG: 308 | End: 2023-01-01
Payer: MEDICARE

## 2023-01-01 ENCOUNTER — APPOINTMENT (OUTPATIENT)
Dept: CT IMAGING | Facility: HOSPITAL | Age: 78
DRG: 308 | End: 2023-01-01
Payer: MEDICARE

## 2023-01-01 ENCOUNTER — HOSPITAL ENCOUNTER (INPATIENT)
Facility: HOSPITAL | Age: 78
LOS: 2 days | DRG: 308 | End: 2023-11-18
Attending: EMERGENCY MEDICINE | Admitting: INTERNAL MEDICINE
Payer: MEDICARE

## 2023-01-01 VITALS
TEMPERATURE: 99.1 F | OXYGEN SATURATION: 92 % | WEIGHT: 167.77 LBS | DIASTOLIC BLOOD PRESSURE: 34 MMHG | HEIGHT: 73 IN | HEART RATE: 75 BPM | SYSTOLIC BLOOD PRESSURE: 45 MMHG | RESPIRATION RATE: 15 BRPM | BODY MASS INDEX: 22.24 KG/M2

## 2023-01-01 DIAGNOSIS — Z86.79 HISTORY OF CORONARY ARTERY DISEASE: ICD-10-CM

## 2023-01-01 DIAGNOSIS — I47.20 VENTRICULAR TACHYCARDIA: ICD-10-CM

## 2023-01-01 DIAGNOSIS — Z86.79 HISTORY OF HYPERTENSION: ICD-10-CM

## 2023-01-01 DIAGNOSIS — Z86.79 HISTORY OF CHF (CONGESTIVE HEART FAILURE): ICD-10-CM

## 2023-01-01 DIAGNOSIS — I46.9 CARDIAC ARREST: Primary | ICD-10-CM

## 2023-01-01 LAB
ALBUMIN SERPL-MCNC: 3 G/DL (ref 3.5–5.2)
ALBUMIN SERPL-MCNC: 3.3 G/DL (ref 3.5–5.2)
ALBUMIN SERPL-MCNC: 3.5 G/DL (ref 3.5–5.2)
ALBUMIN SERPL-MCNC: 3.7 G/DL (ref 3.5–5.2)
ALBUMIN/GLOB SERPL: 1.2 G/DL
ALBUMIN/GLOB SERPL: 1.3 G/DL
ALP SERPL-CCNC: 180 U/L (ref 39–117)
ALP SERPL-CCNC: 194 U/L (ref 39–117)
ALP SERPL-CCNC: 200 U/L (ref 39–117)
ALP SERPL-CCNC: 223 U/L (ref 39–117)
ALT SERPL W P-5'-P-CCNC: 665 U/L (ref 1–41)
ALT SERPL W P-5'-P-CCNC: 702 U/L (ref 1–41)
ALT SERPL W P-5'-P-CCNC: 806 U/L (ref 1–41)
ALT SERPL W P-5'-P-CCNC: 806 U/L (ref 1–41)
AMORPH URATE CRY URNS QL MICRO: ABNORMAL /HPF
ANION GAP SERPL CALCULATED.3IONS-SCNC: 12 MMOL/L (ref 5–15)
ANION GAP SERPL CALCULATED.3IONS-SCNC: 13 MMOL/L (ref 5–15)
ANION GAP SERPL CALCULATED.3IONS-SCNC: 15 MMOL/L (ref 5–15)
ANION GAP SERPL CALCULATED.3IONS-SCNC: 16 MMOL/L (ref 5–15)
ANION GAP SERPL CALCULATED.3IONS-SCNC: 16 MMOL/L (ref 5–15)
ANION GAP SERPL CALCULATED.3IONS-SCNC: 17 MMOL/L (ref 5–15)
ANION GAP SERPL CALCULATED.3IONS-SCNC: 18 MMOL/L (ref 5–15)
APTT PPP: 28 SECONDS (ref 60–90)
APTT PPP: 29.5 SECONDS (ref 60–90)
APTT PPP: 29.5 SECONDS (ref 60–90)
APTT PPP: 32.5 SECONDS (ref 60–90)
APTT PPP: 40.6 SECONDS (ref 60–90)
APTT PPP: 43.6 SECONDS (ref 60–90)
APTT PPP: 44.6 SECONDS (ref 60–90)
APTT PPP: 52.3 SECONDS (ref 60–90)
ARTERIAL PATENCY WRIST A: ABNORMAL
AST SERPL-CCNC: 500 U/L (ref 1–40)
AST SERPL-CCNC: 690 U/L (ref 1–40)
AST SERPL-CCNC: 844 U/L (ref 1–40)
AST SERPL-CCNC: 972 U/L (ref 1–40)
ATMOSPHERIC PRESS: ABNORMAL MM[HG]
B PARAPERT DNA SPEC QL NAA+PROBE: NOT DETECTED
B PERT DNA SPEC QL NAA+PROBE: NOT DETECTED
BACTERIA BLD CULT: ABNORMAL
BACTERIA SPEC AEROBE CULT: ABNORMAL
BACTERIA SPEC AEROBE CULT: NO GROWTH
BACTERIA UR QL AUTO: ABNORMAL /HPF
BASE EXCESS BLDA CALC-SCNC: -1.8 MMOL/L (ref 0–2)
BASE EXCESS BLDA CALC-SCNC: -10.9 MMOL/L (ref 0–2)
BASE EXCESS BLDA CALC-SCNC: -7.3 MMOL/L (ref 0–2)
BASE EXCESS BLDA CALC-SCNC: -8 MMOL/L (ref 0–2)
BASOPHILS # BLD AUTO: 0.02 10*3/MM3 (ref 0–0.2)
BASOPHILS # BLD AUTO: 0.02 10*3/MM3 (ref 0–0.2)
BASOPHILS # BLD AUTO: 0.04 10*3/MM3 (ref 0–0.2)
BASOPHILS # BLD AUTO: 0.05 10*3/MM3 (ref 0–0.2)
BASOPHILS # BLD MANUAL: 0 10*3/MM3 (ref 0–0.2)
BASOPHILS # BLD MANUAL: 0 10*3/MM3 (ref 0–0.2)
BASOPHILS NFR BLD AUTO: 0.1 % (ref 0–1.5)
BASOPHILS NFR BLD AUTO: 0.1 % (ref 0–1.5)
BASOPHILS NFR BLD AUTO: 0.2 % (ref 0–1.5)
BASOPHILS NFR BLD AUTO: 0.3 % (ref 0–1.5)
BASOPHILS NFR BLD MANUAL: 0 % (ref 0–1.5)
BASOPHILS NFR BLD MANUAL: 0 % (ref 0–1.5)
BDY SITE: ABNORMAL
BILIRUB CONJ SERPL-MCNC: 0.5 MG/DL (ref 0–0.3)
BILIRUB CONJ SERPL-MCNC: 0.6 MG/DL (ref 0–0.3)
BILIRUB INDIRECT SERPL-MCNC: 0.2 MG/DL
BILIRUB INDIRECT SERPL-MCNC: 0.3 MG/DL
BILIRUB SERPL-MCNC: 0.5 MG/DL (ref 0–1.2)
BILIRUB SERPL-MCNC: 0.8 MG/DL (ref 0–1.2)
BILIRUB SERPL-MCNC: 0.8 MG/DL (ref 0–1.2)
BILIRUB SERPL-MCNC: 0.9 MG/DL (ref 0–1.2)
BILIRUB UR QL STRIP: NEGATIVE
BODY TEMPERATURE: 37
BOTTLE TYPE: ABNORMAL
BUN SERPL-MCNC: 25 MG/DL (ref 8–23)
BUN SERPL-MCNC: 26 MG/DL (ref 8–23)
BUN SERPL-MCNC: 27 MG/DL (ref 8–23)
BUN SERPL-MCNC: 28 MG/DL (ref 8–23)
BUN SERPL-MCNC: 28 MG/DL (ref 8–23)
BUN SERPL-MCNC: 29 MG/DL (ref 8–23)
BUN SERPL-MCNC: 30 MG/DL (ref 8–23)
BUN SERPL-MCNC: 30 MG/DL (ref 8–23)
BUN SERPL-MCNC: 31 MG/DL (ref 8–23)
BUN SERPL-MCNC: 31 MG/DL (ref 8–23)
BUN SERPL-MCNC: 32 MG/DL (ref 8–23)
BUN/CREAT SERPL: 15.9 (ref 7–25)
BUN/CREAT SERPL: 16 (ref 7–25)
BUN/CREAT SERPL: 19 (ref 7–25)
BUN/CREAT SERPL: 20 (ref 7–25)
BUN/CREAT SERPL: 20.7 (ref 7–25)
BUN/CREAT SERPL: 21.4 (ref 7–25)
BUN/CREAT SERPL: 22.5 (ref 7–25)
BUN/CREAT SERPL: 22.8 (ref 7–25)
BUN/CREAT SERPL: 23.4 (ref 7–25)
BUN/CREAT SERPL: 23.7 (ref 7–25)
BUN/CREAT SERPL: 24 (ref 7–25)
BURR CELLS BLD QL SMEAR: ABNORMAL
BURR CELLS BLD QL SMEAR: ABNORMAL
C PNEUM DNA NPH QL NAA+NON-PROBE: NOT DETECTED
CA-I SERPL ISE-MCNC: 1.08 MMOL/L (ref 1.12–1.32)
CA-I SERPL ISE-MCNC: 1.1 MMOL/L (ref 1.12–1.32)
CA-I SERPL ISE-MCNC: 1.1 MMOL/L (ref 1.12–1.32)
CA-I SERPL ISE-MCNC: 1.12 MMOL/L (ref 1.12–1.32)
CA-I SERPL ISE-MCNC: 1.13 MMOL/L (ref 1.12–1.32)
CA-I SERPL ISE-MCNC: 1.15 MMOL/L (ref 1.12–1.32)
CA-I SERPL ISE-MCNC: 1.2 MMOL/L (ref 1.12–1.32)
CALCIUM SPEC-SCNC: 7.9 MG/DL (ref 8.6–10.5)
CALCIUM SPEC-SCNC: 8 MG/DL (ref 8.6–10.5)
CALCIUM SPEC-SCNC: 8.1 MG/DL (ref 8.6–10.5)
CALCIUM SPEC-SCNC: 8.2 MG/DL (ref 8.6–10.5)
CALCIUM SPEC-SCNC: 8.2 MG/DL (ref 8.6–10.5)
CALCIUM SPEC-SCNC: 8.3 MG/DL (ref 8.6–10.5)
CALCIUM SPEC-SCNC: 8.5 MG/DL (ref 8.6–10.5)
CHLORIDE SERPL-SCNC: 100 MMOL/L (ref 98–107)
CHLORIDE SERPL-SCNC: 100 MMOL/L (ref 98–107)
CHLORIDE SERPL-SCNC: 105 MMOL/L (ref 98–107)
CHLORIDE SERPL-SCNC: 105 MMOL/L (ref 98–107)
CHLORIDE SERPL-SCNC: 106 MMOL/L (ref 98–107)
CHLORIDE SERPL-SCNC: 106 MMOL/L (ref 98–107)
CHLORIDE SERPL-SCNC: 107 MMOL/L (ref 98–107)
CHLORIDE SERPL-SCNC: 97 MMOL/L (ref 98–107)
CHLORIDE SERPL-SCNC: 99 MMOL/L (ref 98–107)
CK MB SERPL-CCNC: 6.07 NG/ML
CK SERPL-CCNC: 89 U/L (ref 20–200)
CLARITY UR: ABNORMAL
CO2 BLDA-SCNC: 16 MMOL/L (ref 22–33)
CO2 BLDA-SCNC: 19.3 MMOL/L (ref 22–33)
CO2 BLDA-SCNC: 19.9 MMOL/L (ref 22–33)
CO2 BLDA-SCNC: 23.7 MMOL/L (ref 22–33)
CO2 SERPL-SCNC: 18 MMOL/L (ref 22–29)
CO2 SERPL-SCNC: 19 MMOL/L (ref 22–29)
CO2 SERPL-SCNC: 19 MMOL/L (ref 22–29)
CO2 SERPL-SCNC: 20 MMOL/L (ref 22–29)
CO2 SERPL-SCNC: 21 MMOL/L (ref 22–29)
COHGB MFR BLD: ABNORMAL %
COLOR UR: YELLOW
CORTIS SERPL-MCNC: 21.85 MCG/DL
CREAT SERPL-MCNC: 1.14 MG/DL (ref 0.76–1.27)
CREAT SERPL-MCNC: 1.14 MG/DL (ref 0.76–1.27)
CREAT SERPL-MCNC: 1.21 MG/DL (ref 0.76–1.27)
CREAT SERPL-MCNC: 1.25 MG/DL (ref 0.76–1.27)
CREAT SERPL-MCNC: 1.28 MG/DL (ref 0.76–1.27)
CREAT SERPL-MCNC: 1.42 MG/DL (ref 0.76–1.27)
CREAT SERPL-MCNC: 1.45 MG/DL (ref 0.76–1.27)
CREAT SERPL-MCNC: 1.45 MG/DL (ref 0.76–1.27)
CREAT SERPL-MCNC: 1.47 MG/DL (ref 0.76–1.27)
CREAT SERPL-MCNC: 1.76 MG/DL (ref 0.76–1.27)
CREAT SERPL-MCNC: 1.94 MG/DL (ref 0.76–1.27)
D-LACTATE SERPL-SCNC: 1 MMOL/L (ref 0.5–2)
D-LACTATE SERPL-SCNC: 1.3 MMOL/L (ref 0.5–2)
D-LACTATE SERPL-SCNC: 1.9 MMOL/L (ref 0.5–2)
D-LACTATE SERPL-SCNC: 2.3 MMOL/L (ref 0.5–2)
D-LACTATE SERPL-SCNC: 2.4 MMOL/L (ref 0.5–2)
D-LACTATE SERPL-SCNC: 2.7 MMOL/L (ref 0.5–2)
D-LACTATE SERPL-SCNC: 3.8 MMOL/L (ref 0.5–2)
DEPRECATED RDW RBC AUTO: 45.3 FL (ref 37–54)
DEPRECATED RDW RBC AUTO: 45.7 FL (ref 37–54)
DEPRECATED RDW RBC AUTO: 46.2 FL (ref 37–54)
DEPRECATED RDW RBC AUTO: 46.5 FL (ref 37–54)
DEPRECATED RDW RBC AUTO: 48 FL (ref 37–54)
DEPRECATED RDW RBC AUTO: 48.4 FL (ref 37–54)
DEPRECATED RDW RBC AUTO: 48.4 FL (ref 37–54)
DEPRECATED RDW RBC AUTO: 50.2 FL (ref 37–54)
EGFRCR SERPLBLD CKD-EPI 2021: 34.6 ML/MIN/1.73
EGFRCR SERPLBLD CKD-EPI 2021: 38.8 ML/MIN/1.73
EGFRCR SERPLBLD CKD-EPI 2021: 48.2 ML/MIN/1.73
EGFRCR SERPLBLD CKD-EPI 2021: 49 ML/MIN/1.73
EGFRCR SERPLBLD CKD-EPI 2021: 49 ML/MIN/1.73
EGFRCR SERPLBLD CKD-EPI 2021: 50.3 ML/MIN/1.73
EGFRCR SERPLBLD CKD-EPI 2021: 56.9 ML/MIN/1.73
EGFRCR SERPLBLD CKD-EPI 2021: 58.6 ML/MIN/1.73
EGFRCR SERPLBLD CKD-EPI 2021: 60.9 ML/MIN/1.73
EGFRCR SERPLBLD CKD-EPI 2021: 65.4 ML/MIN/1.73
EGFRCR SERPLBLD CKD-EPI 2021: 65.4 ML/MIN/1.73
EOSINOPHIL # BLD AUTO: 0 10*3/MM3 (ref 0–0.4)
EOSINOPHIL # BLD AUTO: 0.01 10*3/MM3 (ref 0–0.4)
EOSINOPHIL # BLD AUTO: 0.01 10*3/MM3 (ref 0–0.4)
EOSINOPHIL # BLD AUTO: 0.02 10*3/MM3 (ref 0–0.4)
EOSINOPHIL # BLD AUTO: 0.02 10*3/MM3 (ref 0–0.4)
EOSINOPHIL # BLD AUTO: 0.03 10*3/MM3 (ref 0–0.4)
EOSINOPHIL # BLD MANUAL: 0 10*3/MM3 (ref 0–0.4)
EOSINOPHIL # BLD MANUAL: 0 10*3/MM3 (ref 0–0.4)
EOSINOPHIL NFR BLD AUTO: 0 % (ref 0.3–6.2)
EOSINOPHIL NFR BLD AUTO: 0 % (ref 0.3–6.2)
EOSINOPHIL NFR BLD AUTO: 0.1 % (ref 0.3–6.2)
EOSINOPHIL NFR BLD MANUAL: 0 % (ref 0.3–6.2)
EOSINOPHIL NFR BLD MANUAL: 0 % (ref 0.3–6.2)
EPAP: 0
ERYTHROCYTE [DISTWIDTH] IN BLOOD BY AUTOMATED COUNT: 15.5 % (ref 12.3–15.4)
ERYTHROCYTE [DISTWIDTH] IN BLOOD BY AUTOMATED COUNT: 15.8 % (ref 12.3–15.4)
ERYTHROCYTE [DISTWIDTH] IN BLOOD BY AUTOMATED COUNT: 16 % (ref 12.3–15.4)
ERYTHROCYTE [DISTWIDTH] IN BLOOD BY AUTOMATED COUNT: 16.1 % (ref 12.3–15.4)
ERYTHROCYTE [DISTWIDTH] IN BLOOD BY AUTOMATED COUNT: 16.3 % (ref 12.3–15.4)
ERYTHROCYTE [DISTWIDTH] IN BLOOD BY AUTOMATED COUNT: 16.5 % (ref 12.3–15.4)
FINE GRAN CASTS URNS QL MICRO: ABNORMAL /LPF
FLUAV SUBTYP SPEC NAA+PROBE: NOT DETECTED
FLUBV RNA ISLT QL NAA+PROBE: NOT DETECTED
GEN 5 2HR TROPONIN T REFLEX: 1074 NG/L
GLOBULIN UR ELPH-MCNC: 2.6 GM/DL
GLOBULIN UR ELPH-MCNC: 2.7 GM/DL
GLUCOSE BLDC GLUCOMTR-MCNC: 104 MG/DL (ref 70–130)
GLUCOSE BLDC GLUCOMTR-MCNC: 105 MG/DL (ref 70–130)
GLUCOSE BLDC GLUCOMTR-MCNC: 111 MG/DL (ref 70–130)
GLUCOSE BLDC GLUCOMTR-MCNC: 113 MG/DL (ref 70–130)
GLUCOSE BLDC GLUCOMTR-MCNC: 115 MG/DL (ref 70–130)
GLUCOSE BLDC GLUCOMTR-MCNC: 116 MG/DL (ref 70–130)
GLUCOSE BLDC GLUCOMTR-MCNC: 117 MG/DL (ref 70–130)
GLUCOSE BLDC GLUCOMTR-MCNC: 117 MG/DL (ref 70–130)
GLUCOSE BLDC GLUCOMTR-MCNC: 118 MG/DL (ref 70–130)
GLUCOSE BLDC GLUCOMTR-MCNC: 119 MG/DL (ref 70–130)
GLUCOSE BLDC GLUCOMTR-MCNC: 124 MG/DL (ref 70–130)
GLUCOSE BLDC GLUCOMTR-MCNC: 127 MG/DL (ref 70–130)
GLUCOSE BLDC GLUCOMTR-MCNC: 128 MG/DL (ref 70–130)
GLUCOSE BLDC GLUCOMTR-MCNC: 129 MG/DL (ref 70–130)
GLUCOSE BLDC GLUCOMTR-MCNC: 130 MG/DL (ref 70–130)
GLUCOSE BLDC GLUCOMTR-MCNC: 134 MG/DL (ref 70–130)
GLUCOSE BLDC GLUCOMTR-MCNC: 143 MG/DL (ref 70–130)
GLUCOSE BLDC GLUCOMTR-MCNC: 145 MG/DL (ref 70–130)
GLUCOSE BLDC GLUCOMTR-MCNC: 166 MG/DL (ref 70–130)
GLUCOSE BLDC GLUCOMTR-MCNC: 279 MG/DL (ref 70–130)
GLUCOSE SERPL-MCNC: 108 MG/DL (ref 65–99)
GLUCOSE SERPL-MCNC: 120 MG/DL (ref 65–99)
GLUCOSE SERPL-MCNC: 121 MG/DL (ref 65–99)
GLUCOSE SERPL-MCNC: 123 MG/DL (ref 65–99)
GLUCOSE SERPL-MCNC: 127 MG/DL (ref 65–99)
GLUCOSE SERPL-MCNC: 132 MG/DL (ref 65–99)
GLUCOSE SERPL-MCNC: 133 MG/DL (ref 65–99)
GLUCOSE SERPL-MCNC: 188 MG/DL (ref 65–99)
GLUCOSE SERPL-MCNC: 192 MG/DL (ref 65–99)
GLUCOSE SERPL-MCNC: 197 MG/DL (ref 65–99)
GLUCOSE SERPL-MCNC: 260 MG/DL (ref 65–99)
GLUCOSE UR STRIP-MCNC: ABNORMAL MG/DL
GRAM STN SPEC: ABNORMAL
HADV DNA SPEC NAA+PROBE: NOT DETECTED
HCO3 BLDA-SCNC: 15 MMOL/L (ref 20–26)
HCO3 BLDA-SCNC: 18.1 MMOL/L (ref 20–26)
HCO3 BLDA-SCNC: 18.7 MMOL/L (ref 20–26)
HCO3 BLDA-SCNC: 22.6 MMOL/L (ref 20–26)
HCOV 229E RNA SPEC QL NAA+PROBE: NOT DETECTED
HCOV HKU1 RNA SPEC QL NAA+PROBE: NOT DETECTED
HCOV NL63 RNA SPEC QL NAA+PROBE: NOT DETECTED
HCOV OC43 RNA SPEC QL NAA+PROBE: NOT DETECTED
HCT VFR BLD AUTO: 37.1 % (ref 37.5–51)
HCT VFR BLD AUTO: 37.6 % (ref 37.5–51)
HCT VFR BLD AUTO: 37.8 % (ref 37.5–51)
HCT VFR BLD AUTO: 38 % (ref 37.5–51)
HCT VFR BLD AUTO: 39.9 % (ref 37.5–51)
HCT VFR BLD AUTO: 40.3 % (ref 37.5–51)
HCT VFR BLD AUTO: 41.2 % (ref 37.5–51)
HCT VFR BLD AUTO: 41.7 % (ref 37.5–51)
HCT VFR BLD CALC: 36.8 % (ref 38–51)
HCT VFR BLD CALC: 37.8 % (ref 38–51)
HCT VFR BLD CALC: 38.2 % (ref 38–51)
HCT VFR BLD CALC: 39 % (ref 38–51)
HGB BLD-MCNC: 11.7 G/DL (ref 13–17.7)
HGB BLD-MCNC: 11.9 G/DL (ref 13–17.7)
HGB BLD-MCNC: 12.1 G/DL (ref 13–17.7)
HGB BLD-MCNC: 12.2 G/DL (ref 13–17.7)
HGB BLD-MCNC: 12.6 G/DL (ref 13–17.7)
HGB BLD-MCNC: 12.8 G/DL (ref 13–17.7)
HGB BLD-MCNC: 12.8 G/DL (ref 13–17.7)
HGB BLD-MCNC: 13 G/DL (ref 13–17.7)
HGB BLDA-MCNC: 12 G/DL (ref 13.5–17.5)
HGB BLDA-MCNC: 12.3 G/DL (ref 13.5–17.5)
HGB BLDA-MCNC: 12.5 G/DL (ref 13.5–17.5)
HGB BLDA-MCNC: 12.7 G/DL (ref 13.5–17.5)
HGB UR QL STRIP.AUTO: ABNORMAL
HMPV RNA NPH QL NAA+NON-PROBE: NOT DETECTED
HOLD SPECIMEN: NORMAL
HPIV1 RNA ISLT QL NAA+PROBE: NOT DETECTED
HPIV2 RNA SPEC QL NAA+PROBE: NOT DETECTED
HPIV3 RNA NPH QL NAA+PROBE: NOT DETECTED
HPIV4 P GENE NPH QL NAA+PROBE: NOT DETECTED
HYALINE CASTS UR QL AUTO: ABNORMAL /LPF
IMM GRANULOCYTES # BLD AUTO: 0.09 10*3/MM3 (ref 0–0.05)
IMM GRANULOCYTES # BLD AUTO: 0.09 10*3/MM3 (ref 0–0.05)
IMM GRANULOCYTES # BLD AUTO: 0.11 10*3/MM3 (ref 0–0.05)
IMM GRANULOCYTES # BLD AUTO: 0.13 10*3/MM3 (ref 0–0.05)
IMM GRANULOCYTES # BLD AUTO: 0.17 10*3/MM3 (ref 0–0.05)
IMM GRANULOCYTES # BLD AUTO: 0.23 10*3/MM3 (ref 0–0.05)
IMM GRANULOCYTES NFR BLD AUTO: 0.4 % (ref 0–0.5)
IMM GRANULOCYTES NFR BLD AUTO: 0.6 % (ref 0–0.5)
IMM GRANULOCYTES NFR BLD AUTO: 0.6 % (ref 0–0.5)
IMM GRANULOCYTES NFR BLD AUTO: 0.7 % (ref 0–0.5)
IMM GRANULOCYTES NFR BLD AUTO: 0.8 % (ref 0–0.5)
IMM GRANULOCYTES NFR BLD AUTO: 1.5 % (ref 0–0.5)
INHALED O2 CONCENTRATION: 100 %
INHALED O2 CONCENTRATION: 100 %
INHALED O2 CONCENTRATION: 30 %
INHALED O2 CONCENTRATION: 35 %
INR PPP: 1.84 (ref 0.89–1.12)
INR PPP: 1.88 (ref 0.89–1.12)
IPAP: 0
ISOLATED FROM: ABNORMAL
KETONES UR QL STRIP: NEGATIVE
LEUKOCYTE ESTERASE UR QL STRIP.AUTO: NEGATIVE
LIPASE SERPL-CCNC: 134 U/L (ref 13–60)
LYMPHOCYTES # BLD AUTO: 1.01 10*3/MM3 (ref 0.7–3.1)
LYMPHOCYTES # BLD AUTO: 1.23 10*3/MM3 (ref 0.7–3.1)
LYMPHOCYTES # BLD AUTO: 1.85 10*3/MM3 (ref 0.7–3.1)
LYMPHOCYTES # BLD AUTO: 2.46 10*3/MM3 (ref 0.7–3.1)
LYMPHOCYTES # BLD AUTO: 2.5 10*3/MM3 (ref 0.7–3.1)
LYMPHOCYTES # BLD AUTO: 3.06 10*3/MM3 (ref 0.7–3.1)
LYMPHOCYTES # BLD MANUAL: 1.72 10*3/MM3 (ref 0.7–3.1)
LYMPHOCYTES # BLD MANUAL: 4.81 10*3/MM3 (ref 0.7–3.1)
LYMPHOCYTES NFR BLD AUTO: 11.3 % (ref 19.6–45.3)
LYMPHOCYTES NFR BLD AUTO: 14.9 % (ref 19.6–45.3)
LYMPHOCYTES NFR BLD AUTO: 18.8 % (ref 19.6–45.3)
LYMPHOCYTES NFR BLD AUTO: 4.9 % (ref 19.6–45.3)
LYMPHOCYTES NFR BLD AUTO: 8.1 % (ref 19.6–45.3)
LYMPHOCYTES NFR BLD AUTO: 9.2 % (ref 19.6–45.3)
LYMPHOCYTES NFR BLD MANUAL: 2 % (ref 5–12)
LYMPHOCYTES NFR BLD MANUAL: 5 % (ref 5–12)
M PNEUMO IGG SER IA-ACNC: NOT DETECTED
MAGNESIUM SERPL-MCNC: 1.8 MG/DL (ref 1.6–2.4)
MAGNESIUM SERPL-MCNC: 1.9 MG/DL (ref 1.6–2.4)
MAGNESIUM SERPL-MCNC: 2 MG/DL (ref 1.6–2.4)
MAGNESIUM SERPL-MCNC: 2 MG/DL (ref 1.6–2.4)
MAGNESIUM SERPL-MCNC: 2.1 MG/DL (ref 1.6–2.4)
MCH RBC QN AUTO: 25.2 PG (ref 26.6–33)
MCH RBC QN AUTO: 25.5 PG (ref 26.6–33)
MCH RBC QN AUTO: 25.7 PG (ref 26.6–33)
MCH RBC QN AUTO: 25.8 PG (ref 26.6–33)
MCH RBC QN AUTO: 25.8 PG (ref 26.6–33)
MCH RBC QN AUTO: 25.9 PG (ref 26.6–33)
MCH RBC QN AUTO: 26.2 PG (ref 26.6–33)
MCH RBC QN AUTO: 26.2 PG (ref 26.6–33)
MCHC RBC AUTO-ENTMCNC: 30.7 G/DL (ref 31.5–35.7)
MCHC RBC AUTO-ENTMCNC: 31.5 G/DL (ref 31.5–35.7)
MCHC RBC AUTO-ENTMCNC: 31.6 G/DL (ref 31.5–35.7)
MCHC RBC AUTO-ENTMCNC: 31.8 G/DL (ref 31.5–35.7)
MCHC RBC AUTO-ENTMCNC: 31.8 G/DL (ref 31.5–35.7)
MCHC RBC AUTO-ENTMCNC: 32.3 G/DL (ref 31.5–35.7)
MCV RBC AUTO: 80 FL (ref 79–97)
MCV RBC AUTO: 80.4 FL (ref 79–97)
MCV RBC AUTO: 80.9 FL (ref 79–97)
MCV RBC AUTO: 81.3 FL (ref 79–97)
MCV RBC AUTO: 81.8 FL (ref 79–97)
MCV RBC AUTO: 82.1 FL (ref 79–97)
MCV RBC AUTO: 82.8 FL (ref 79–97)
MCV RBC AUTO: 84.1 FL (ref 79–97)
METAMYELOCYTES NFR BLD MANUAL: 1 % (ref 0–0)
METHGB BLD QL: 0.3 % (ref 0–1.5)
METHGB BLD QL: 0.5 % (ref 0–1.5)
MODALITY: ABNORMAL
MONOCYTES # BLD AUTO: 0.93 10*3/MM3 (ref 0.1–0.9)
MONOCYTES # BLD AUTO: 0.98 10*3/MM3 (ref 0.1–0.9)
MONOCYTES # BLD AUTO: 1.13 10*3/MM3 (ref 0.1–0.9)
MONOCYTES # BLD AUTO: 1.14 10*3/MM3 (ref 0.1–0.9)
MONOCYTES # BLD AUTO: 1.24 10*3/MM3 (ref 0.1–0.9)
MONOCYTES # BLD AUTO: 1.39 10*3/MM3 (ref 0.1–0.9)
MONOCYTES # BLD: 0.56 10*3/MM3 (ref 0.1–0.9)
MONOCYTES # BLD: 0.71 10*3/MM3 (ref 0.1–0.9)
MONOCYTES NFR BLD AUTO: 4.4 % (ref 5–12)
MONOCYTES NFR BLD AUTO: 4.6 % (ref 5–12)
MONOCYTES NFR BLD AUTO: 6.7 % (ref 5–12)
MONOCYTES NFR BLD AUTO: 6.8 % (ref 5–12)
MONOCYTES NFR BLD AUTO: 7.5 % (ref 5–12)
MONOCYTES NFR BLD AUTO: 7.6 % (ref 5–12)
MRSA DNA SPEC QL NAA+PROBE: NEGATIVE
NEUTROPHILS # BLD AUTO: 25.93 10*3/MM3 (ref 1.7–7)
NEUTROPHILS # BLD AUTO: 8.48 10*3/MM3 (ref 1.7–7)
NEUTROPHILS NFR BLD AUTO: 11.84 10*3/MM3 (ref 1.7–7)
NEUTROPHILS NFR BLD AUTO: 12.6 10*3/MM3 (ref 1.7–7)
NEUTROPHILS NFR BLD AUTO: 12.77 10*3/MM3 (ref 1.7–7)
NEUTROPHILS NFR BLD AUTO: 17.08 10*3/MM3 (ref 1.7–7)
NEUTROPHILS NFR BLD AUTO: 18.09 10*3/MM3 (ref 1.7–7)
NEUTROPHILS NFR BLD AUTO: 18.37 10*3/MM3 (ref 1.7–7)
NEUTROPHILS NFR BLD AUTO: 72.6 % (ref 42.7–76)
NEUTROPHILS NFR BLD AUTO: 77.4 % (ref 42.7–76)
NEUTROPHILS NFR BLD AUTO: 82.7 % (ref 42.7–76)
NEUTROPHILS NFR BLD AUTO: 83.4 % (ref 42.7–76)
NEUTROPHILS NFR BLD AUTO: 85.5 % (ref 42.7–76)
NEUTROPHILS NFR BLD AUTO: 87.5 % (ref 42.7–76)
NEUTROPHILS NFR BLD MANUAL: 55 % (ref 42.7–76)
NEUTROPHILS NFR BLD MANUAL: 77.8 % (ref 42.7–76)
NEUTS BAND NFR BLD MANUAL: 14.1 % (ref 0–5)
NEUTS BAND NFR BLD MANUAL: 5 % (ref 0–5)
NITRITE UR QL STRIP: NEGATIVE
NRBC BLD AUTO-RTO: 0 /100 WBC (ref 0–0.2)
NT-PROBNP SERPL-MCNC: 3356 PG/ML (ref 0–1800)
OVALOCYTES BLD QL SMEAR: ABNORMAL
OXYHGB MFR BLDV: 91.1 % (ref 94–99)
OXYHGB MFR BLDV: 91.2 % (ref 94–99)
OXYHGB MFR BLDV: 96.3 % (ref 94–99)
OXYHGB MFR BLDV: 96.4 % (ref 94–99)
PAW @ PEAK INSP FLOW SETTING VENT: 0 CMH2O
PCO2 BLDA: 33.1 MM HG (ref 35–45)
PCO2 BLDA: 36.1 MM HG (ref 35–45)
PCO2 BLDA: 38.4 MM HG (ref 35–45)
PCO2 BLDA: 38.7 MM HG (ref 35–45)
PCO2 TEMP ADJ BLD: 33.1 MM HG (ref 35–48)
PCO2 TEMP ADJ BLD: 36.1 MM HG (ref 35–48)
PCO2 TEMP ADJ BLD: 38.4 MM HG (ref 35–48)
PCO2 TEMP ADJ BLD: 38.7 MM HG (ref 35–48)
PEEP RESPIRATORY: 5 CM[H2O]
PH BLDA: 7.26 PH UNITS (ref 7.35–7.45)
PH BLDA: 7.28 PH UNITS (ref 7.35–7.45)
PH BLDA: 7.29 PH UNITS (ref 7.35–7.45)
PH BLDA: 7.4 PH UNITS (ref 7.35–7.45)
PH UR STRIP.AUTO: <=5 [PH] (ref 5–8)
PH, TEMP CORRECTED: 7.26 PH UNITS
PH, TEMP CORRECTED: 7.28 PH UNITS
PH, TEMP CORRECTED: 7.29 PH UNITS
PH, TEMP CORRECTED: 7.4 PH UNITS
PHOSPHATE SERPL-MCNC: 3.4 MG/DL (ref 2.5–4.5)
PHOSPHATE SERPL-MCNC: 3.4 MG/DL (ref 2.5–4.5)
PHOSPHATE SERPL-MCNC: 3.8 MG/DL (ref 2.5–4.5)
PHOSPHATE SERPL-MCNC: 4.1 MG/DL (ref 2.5–4.5)
PHOSPHATE SERPL-MCNC: 4.8 MG/DL (ref 2.5–4.5)
PHOSPHATE SERPL-MCNC: 4.9 MG/DL (ref 2.5–4.5)
PHOSPHATE SERPL-MCNC: 5.4 MG/DL (ref 2.5–4.5)
PLAT MORPH BLD: NORMAL
PLAT MORPH BLD: NORMAL
PLATELET # BLD AUTO: 269 10*3/MM3 (ref 140–450)
PLATELET # BLD AUTO: 271 10*3/MM3 (ref 140–450)
PLATELET # BLD AUTO: 274 10*3/MM3 (ref 140–450)
PLATELET # BLD AUTO: 282 10*3/MM3 (ref 140–450)
PLATELET # BLD AUTO: 288 10*3/MM3 (ref 140–450)
PLATELET # BLD AUTO: 299 10*3/MM3 (ref 140–450)
PLATELET # BLD AUTO: 300 10*3/MM3 (ref 140–450)
PLATELET # BLD AUTO: 316 10*3/MM3 (ref 140–450)
PMV BLD AUTO: 10 FL (ref 6–12)
PMV BLD AUTO: 10 FL (ref 6–12)
PMV BLD AUTO: 10.1 FL (ref 6–12)
PMV BLD AUTO: 10.2 FL (ref 6–12)
PMV BLD AUTO: 10.2 FL (ref 6–12)
PMV BLD AUTO: 10.3 FL (ref 6–12)
PMV BLD AUTO: 10.4 FL (ref 6–12)
PMV BLD AUTO: 10.5 FL (ref 6–12)
PO2 BLDA: 116 MM HG (ref 83–108)
PO2 BLDA: 131 MM HG (ref 83–108)
PO2 BLDA: 72.7 MM HG (ref 83–108)
PO2 BLDA: 77 MM HG (ref 83–108)
PO2 TEMP ADJ BLD: 116 MM HG (ref 83–108)
PO2 TEMP ADJ BLD: 131 MM HG (ref 83–108)
PO2 TEMP ADJ BLD: 72.7 MM HG (ref 83–108)
PO2 TEMP ADJ BLD: 77 MM HG (ref 83–108)
POTASSIUM SERPL-SCNC: 3.2 MMOL/L (ref 3.5–5.2)
POTASSIUM SERPL-SCNC: 3.4 MMOL/L (ref 3.5–5.2)
POTASSIUM SERPL-SCNC: 3.5 MMOL/L (ref 3.5–5.2)
POTASSIUM SERPL-SCNC: 3.8 MMOL/L (ref 3.5–5.2)
POTASSIUM SERPL-SCNC: 3.9 MMOL/L (ref 3.5–5.2)
POTASSIUM SERPL-SCNC: 3.9 MMOL/L (ref 3.5–5.2)
POTASSIUM SERPL-SCNC: 4 MMOL/L (ref 3.5–5.2)
POTASSIUM SERPL-SCNC: 4.1 MMOL/L (ref 3.5–5.2)
POTASSIUM SERPL-SCNC: 4.2 MMOL/L (ref 3.5–5.2)
PROCALCITONIN SERPL-MCNC: 0.54 NG/ML (ref 0–0.25)
PROT SERPL-MCNC: 5.7 G/DL (ref 6–8.5)
PROT SERPL-MCNC: 6 G/DL (ref 6–8.5)
PROT SERPL-MCNC: 6.1 G/DL (ref 6–8.5)
PROT SERPL-MCNC: 6.1 G/DL (ref 6–8.5)
PROT UR QL STRIP: ABNORMAL
PROTHROMBIN TIME: 21.5 SECONDS (ref 12.2–14.5)
PROTHROMBIN TIME: 21.7 SECONDS (ref 12.2–14.5)
QT INTERVAL: 114 MS
QT INTERVAL: 524 MS
QT INTERVAL: 620 MS
QT INTERVAL: 632 MS
QTC INTERVAL: 147 MS
QTC INTERVAL: 577 MS
QTC INTERVAL: 669 MS
QTC INTERVAL: 692 MS
RBC # BLD AUTO: 4.54 10*6/MM3 (ref 4.14–5.8)
RBC # BLD AUTO: 4.64 10*6/MM3 (ref 4.14–5.8)
RBC # BLD AUTO: 4.65 10*6/MM3 (ref 4.14–5.8)
RBC # BLD AUTO: 4.7 10*6/MM3 (ref 4.14–5.8)
RBC # BLD AUTO: 4.88 10*6/MM3 (ref 4.14–5.8)
RBC # BLD AUTO: 4.96 10*6/MM3 (ref 4.14–5.8)
RBC # BLD AUTO: 5.01 10*6/MM3 (ref 4.14–5.8)
RBC # BLD AUTO: 5.02 10*6/MM3 (ref 4.14–5.8)
RBC # UR STRIP: ABNORMAL /HPF
REF LAB TEST METHOD: ABNORMAL
RHINOVIRUS RNA SPEC NAA+PROBE: NOT DETECTED
RSV RNA NPH QL NAA+NON-PROBE: NOT DETECTED
SARS-COV-2 AG RESP QL IA.RAPID: DETECTED
SARS-COV-2 RNA NPH QL NAA+NON-PROBE: DETECTED
SCAN SLIDE: NORMAL
SODIUM SERPL-SCNC: 135 MMOL/L (ref 136–145)
SODIUM SERPL-SCNC: 136 MMOL/L (ref 136–145)
SODIUM SERPL-SCNC: 139 MMOL/L (ref 136–145)
SODIUM SERPL-SCNC: 139 MMOL/L (ref 136–145)
SODIUM SERPL-SCNC: 140 MMOL/L (ref 136–145)
SODIUM SERPL-SCNC: 141 MMOL/L (ref 136–145)
SODIUM SERPL-SCNC: 141 MMOL/L (ref 136–145)
SP GR UR STRIP: 1.02 (ref 1–1.03)
SQUAMOUS #/AREA URNS HPF: ABNORMAL /HPF
TOTAL RATE: 0 BREATHS/MINUTE
TOTAL RATE: 14 BREATHS/MINUTE
TOTAL RATE: 23 BREATHS/MINUTE
TOTAL RATE: 26 BREATHS/MINUTE
TROPONIN T DELTA: 875 NG/L
TROPONIN T SERPL HS-MCNC: 199 NG/L
TSH SERPL DL<=0.05 MIU/L-ACNC: 2.12 UIU/ML (ref 0.27–4.2)
UFH PPP CHRO-ACNC: 0.34 IU/ML (ref 0.3–0.7)
UFH PPP CHRO-ACNC: 0.4 IU/ML (ref 0.3–0.7)
UFH PPP CHRO-ACNC: 0.59 IU/ML (ref 0.3–0.7)
UFH PPP CHRO-ACNC: 0.84 IU/ML (ref 0.3–0.7)
UFH PPP CHRO-ACNC: >1.1 IU/ML (ref 0.3–0.7)
UROBILINOGEN UR QL STRIP: ABNORMAL
VANCOMYCIN TROUGH SERPL-MCNC: 8.9 MCG/ML (ref 5–20)
VARIANT LYMPHS NFR BLD MANUAL: 17 % (ref 0–5)
VARIANT LYMPHS NFR BLD MANUAL: 17 % (ref 19.6–45.3)
VARIANT LYMPHS NFR BLD MANUAL: 6.1 % (ref 19.6–45.3)
VENTILATOR MODE: ABNORMAL
VT ON VENT VENT: 0.45 ML
VT ON VENT VENT: 0.45 ML
VT ON VENT VENT: 0.56 ML
VT ON VENT VENT: 0.56 ML
WBC # UR STRIP: ABNORMAL /HPF
WBC MORPH BLD: NORMAL
WBC MORPH BLD: NORMAL
WBC NRBC COR # BLD AUTO: 14.14 10*3/MM3 (ref 3.4–10.8)
WBC NRBC COR # BLD AUTO: 15.23 10*3/MM3 (ref 3.4–10.8)
WBC NRBC COR # BLD AUTO: 16.3 10*3/MM3 (ref 3.4–10.8)
WBC NRBC COR # BLD AUTO: 16.51 10*3/MM3 (ref 3.4–10.8)
WBC NRBC COR # BLD AUTO: 20.01 10*3/MM3 (ref 3.4–10.8)
WBC NRBC COR # BLD AUTO: 20.69 10*3/MM3 (ref 3.4–10.8)
WBC NRBC COR # BLD AUTO: 22.05 10*3/MM3 (ref 3.4–10.8)
WBC NRBC COR # BLD AUTO: 28.21 10*3/MM3 (ref 3.4–10.8)
WHOLE BLOOD HOLD COAG: NORMAL
WHOLE BLOOD HOLD SPECIMEN: NORMAL

## 2023-01-01 PROCEDURE — 25010000002 VANCOMYCIN 10 G RECONSTITUTED SOLUTION

## 2023-01-01 PROCEDURE — 85520 HEPARIN ASSAY: CPT

## 2023-01-01 PROCEDURE — 25010000002 PROPOFOL 1000 MG/100ML EMULSION

## 2023-01-01 PROCEDURE — 99291 CRITICAL CARE FIRST HOUR: CPT | Performed by: INTERNAL MEDICINE

## 2023-01-01 PROCEDURE — 83605 ASSAY OF LACTIC ACID: CPT | Performed by: INTERNAL MEDICINE

## 2023-01-01 PROCEDURE — 84443 ASSAY THYROID STIM HORMONE: CPT

## 2023-01-01 PROCEDURE — 25010000002 AMIODARONE PER 30 MG: Performed by: EMERGENCY MEDICINE

## 2023-01-01 PROCEDURE — 83050 HGB METHEMOGLOBIN QUAN: CPT

## 2023-01-01 PROCEDURE — 94799 UNLISTED PULMONARY SVC/PX: CPT

## 2023-01-01 PROCEDURE — 25010000002 FENTANYL 10 MCG/1 ML NS: Performed by: INTERNAL MEDICINE

## 2023-01-01 PROCEDURE — 80048 BASIC METABOLIC PNL TOTAL CA: CPT

## 2023-01-01 PROCEDURE — 85730 THROMBOPLASTIN TIME PARTIAL: CPT | Performed by: INTERNAL MEDICINE

## 2023-01-01 PROCEDURE — 82330 ASSAY OF CALCIUM: CPT | Performed by: INTERNAL MEDICINE

## 2023-01-01 PROCEDURE — 74018 RADEX ABDOMEN 1 VIEW: CPT

## 2023-01-01 PROCEDURE — 82553 CREATINE MB FRACTION: CPT | Performed by: INTERNAL MEDICINE

## 2023-01-01 PROCEDURE — 83880 ASSAY OF NATRIURETIC PEPTIDE: CPT | Performed by: EMERGENCY MEDICINE

## 2023-01-01 PROCEDURE — 71045 X-RAY EXAM CHEST 1 VIEW: CPT

## 2023-01-01 PROCEDURE — 25010000002 HYDROMORPHONE 1 MG/ML SOLUTION: Performed by: EMERGENCY MEDICINE

## 2023-01-01 PROCEDURE — 25810000003 SODIUM CHLORIDE 0.9 % SOLUTION

## 2023-01-01 PROCEDURE — 84484 ASSAY OF TROPONIN QUANT: CPT | Performed by: EMERGENCY MEDICINE

## 2023-01-01 PROCEDURE — 84100 ASSAY OF PHOSPHORUS: CPT | Performed by: INTERNAL MEDICINE

## 2023-01-01 PROCEDURE — 25010000002 LORAZEPAM PER 2 MG: Performed by: INTERNAL MEDICINE

## 2023-01-01 PROCEDURE — 87086 URINE CULTURE/COLONY COUNT: CPT

## 2023-01-01 PROCEDURE — 25010000002 VANCOMYCIN PER 500 MG

## 2023-01-01 PROCEDURE — 99222 1ST HOSP IP/OBS MODERATE 55: CPT | Performed by: INTERNAL MEDICINE

## 2023-01-01 PROCEDURE — 82805 BLOOD GASES W/O2 SATURATION: CPT

## 2023-01-01 PROCEDURE — 82375 ASSAY CARBOXYHB QUANT: CPT

## 2023-01-01 PROCEDURE — 85610 PROTHROMBIN TIME: CPT | Performed by: EMERGENCY MEDICINE

## 2023-01-01 PROCEDURE — 99233 SBSQ HOSP IP/OBS HIGH 50: CPT | Performed by: INTERNAL MEDICINE

## 2023-01-01 PROCEDURE — 85025 COMPLETE CBC W/AUTO DIFF WBC: CPT | Performed by: INTERNAL MEDICINE

## 2023-01-01 PROCEDURE — 25010000002 PROPOFOL 10 MG/ML EMULSION: Performed by: EMERGENCY MEDICINE

## 2023-01-01 PROCEDURE — 25010000002 EPINEPHRINE 1 MG/ML SOLUTION 30 ML VIAL: Performed by: EMERGENCY MEDICINE

## 2023-01-01 PROCEDURE — 85007 BL SMEAR W/DIFF WBC COUNT: CPT | Performed by: EMERGENCY MEDICINE

## 2023-01-01 PROCEDURE — 83735 ASSAY OF MAGNESIUM: CPT | Performed by: INTERNAL MEDICINE

## 2023-01-01 PROCEDURE — 93005 ELECTROCARDIOGRAM TRACING: CPT | Performed by: INTERNAL MEDICINE

## 2023-01-01 PROCEDURE — 82550 ASSAY OF CK (CPK): CPT | Performed by: INTERNAL MEDICINE

## 2023-01-01 PROCEDURE — 82948 REAGENT STRIP/BLOOD GLUCOSE: CPT

## 2023-01-01 PROCEDURE — 94003 VENT MGMT INPAT SUBQ DAY: CPT

## 2023-01-01 PROCEDURE — 25810000003 SODIUM CHLORIDE 0.9 % SOLUTION 250 ML FLEX CONT: Performed by: EMERGENCY MEDICINE

## 2023-01-01 PROCEDURE — 84145 PROCALCITONIN (PCT): CPT

## 2023-01-01 PROCEDURE — 92950 HEART/LUNG RESUSCITATION CPR: CPT

## 2023-01-01 PROCEDURE — 81001 URINALYSIS AUTO W/SCOPE: CPT

## 2023-01-01 PROCEDURE — 93010 ELECTROCARDIOGRAM REPORT: CPT | Performed by: INTERNAL MEDICINE

## 2023-01-01 PROCEDURE — 5A12012 PERFORMANCE OF CARDIAC OUTPUT, SINGLE, MANUAL: ICD-10-PCS | Performed by: EMERGENCY MEDICINE

## 2023-01-01 PROCEDURE — 25010000002 AMIODARONE IN DEXTROSE 5% 360-4.14 MG/200ML-% SOLUTION: Performed by: EMERGENCY MEDICINE

## 2023-01-01 PROCEDURE — 95819 EEG AWAKE AND ASLEEP: CPT

## 2023-01-01 PROCEDURE — 99285 EMERGENCY DEPT VISIT HI MDM: CPT

## 2023-01-01 PROCEDURE — 36556 INSERT NON-TUNNEL CV CATH: CPT

## 2023-01-01 PROCEDURE — 25010000002 PIPERACILLIN SOD-TAZOBACTAM PER 1 G

## 2023-01-01 PROCEDURE — 76937 US GUIDE VASCULAR ACCESS: CPT

## 2023-01-01 PROCEDURE — 87040 BLOOD CULTURE FOR BACTERIA: CPT

## 2023-01-01 PROCEDURE — 25010000002 PHENYLEPHRINE 10 MG/ML SOLUTION 5 ML VIAL: Performed by: NURSE PRACTITIONER

## 2023-01-01 PROCEDURE — 82248 BILIRUBIN DIRECT: CPT

## 2023-01-01 PROCEDURE — 70450 CT HEAD/BRAIN W/O DYE: CPT

## 2023-01-01 PROCEDURE — 25810000003 LACTATED RINGERS SOLUTION

## 2023-01-01 PROCEDURE — 36620 INSERTION CATHETER ARTERY: CPT

## 2023-01-01 PROCEDURE — 99232 SBSQ HOSP IP/OBS MODERATE 35: CPT | Performed by: NURSE PRACTITIONER

## 2023-01-01 PROCEDURE — 94002 VENT MGMT INPAT INIT DAY: CPT

## 2023-01-01 PROCEDURE — 03HY32Z INSERTION OF MONITORING DEVICE INTO UPPER ARTERY, PERCUTANEOUS APPROACH: ICD-10-PCS

## 2023-01-01 PROCEDURE — 93005 ELECTROCARDIOGRAM TRACING: CPT

## 2023-01-01 PROCEDURE — 85610 PROTHROMBIN TIME: CPT | Performed by: INTERNAL MEDICINE

## 2023-01-01 PROCEDURE — 25810000003 SODIUM CHLORIDE 0.9 % SOLUTION 250 ML FLEX CONT: Performed by: NURSE PRACTITIONER

## 2023-01-01 PROCEDURE — 25010000002 HEPARIN (PORCINE) PER 1000 UNITS: Performed by: EMERGENCY MEDICINE

## 2023-01-01 PROCEDURE — 80076 HEPATIC FUNCTION PANEL: CPT | Performed by: INTERNAL MEDICINE

## 2023-01-01 PROCEDURE — 80053 COMPREHEN METABOLIC PANEL: CPT | Performed by: EMERGENCY MEDICINE

## 2023-01-01 PROCEDURE — 80048 BASIC METABOLIC PNL TOTAL CA: CPT | Performed by: INTERNAL MEDICINE

## 2023-01-01 PROCEDURE — 85730 THROMBOPLASTIN TIME PARTIAL: CPT | Performed by: EMERGENCY MEDICINE

## 2023-01-01 PROCEDURE — 25810000003 SODIUM CHLORIDE 0.9 % SOLUTION: Performed by: EMERGENCY MEDICINE

## 2023-01-01 PROCEDURE — 25010000002 EPINEPHRINE 1 MG/ML SOLUTION: Performed by: EMERGENCY MEDICINE

## 2023-01-01 PROCEDURE — 85007 BL SMEAR W/DIFF WBC COUNT: CPT | Performed by: INTERNAL MEDICINE

## 2023-01-01 PROCEDURE — 80053 COMPREHEN METABOLIC PANEL: CPT

## 2023-01-01 PROCEDURE — 36415 COLL VENOUS BLD VENIPUNCTURE: CPT

## 2023-01-01 PROCEDURE — 87426 SARSCOV CORONAVIRUS AG IA: CPT | Performed by: INTERNAL MEDICINE

## 2023-01-01 PROCEDURE — 95819 EEG AWAKE AND ASLEEP: CPT | Performed by: PSYCHIATRY & NEUROLOGY

## 2023-01-01 PROCEDURE — 93005 ELECTROCARDIOGRAM TRACING: CPT | Performed by: EMERGENCY MEDICINE

## 2023-01-01 PROCEDURE — 02HV33Z INSERTION OF INFUSION DEVICE INTO SUPERIOR VENA CAVA, PERCUTANEOUS APPROACH: ICD-10-PCS

## 2023-01-01 PROCEDURE — 25010000002 HEPARIN (PORCINE) 25000-0.45 UT/250ML-% SOLUTION

## 2023-01-01 PROCEDURE — 83690 ASSAY OF LIPASE: CPT | Performed by: EMERGENCY MEDICINE

## 2023-01-01 PROCEDURE — 82533 TOTAL CORTISOL: CPT

## 2023-01-01 PROCEDURE — 85025 COMPLETE CBC W/AUTO DIFF WBC: CPT | Performed by: EMERGENCY MEDICINE

## 2023-01-01 PROCEDURE — 36600 WITHDRAWAL OF ARTERIAL BLOOD: CPT

## 2023-01-01 PROCEDURE — 0202U NFCT DS 22 TRGT SARS-COV-2: CPT | Performed by: INTERNAL MEDICINE

## 2023-01-01 PROCEDURE — 99232 SBSQ HOSP IP/OBS MODERATE 35: CPT | Performed by: INTERNAL MEDICINE

## 2023-01-01 PROCEDURE — 99222 1ST HOSP IP/OBS MODERATE 55: CPT | Performed by: PSYCHIATRY & NEUROLOGY

## 2023-01-01 PROCEDURE — 25010000002 EPINEPHRINE 1 MG/10ML SOLUTION PREFILLED SYRINGE: Performed by: EMERGENCY MEDICINE

## 2023-01-01 PROCEDURE — 87150 DNA/RNA AMPLIFIED PROBE: CPT

## 2023-01-01 PROCEDURE — 85520 HEPARIN ASSAY: CPT | Performed by: EMERGENCY MEDICINE

## 2023-01-01 PROCEDURE — 80202 ASSAY OF VANCOMYCIN: CPT

## 2023-01-01 PROCEDURE — 87641 MR-STAPH DNA AMP PROBE: CPT

## 2023-01-01 RX ORDER — DEXTROSE MONOHYDRATE 25 G/50ML
10-50 INJECTION, SOLUTION INTRAVENOUS
Status: DISCONTINUED | OUTPATIENT
Start: 2023-01-01 | End: 2023-01-01 | Stop reason: HOSPADM

## 2023-01-01 RX ORDER — VECURONIUM BROMIDE 1 MG/ML
10 INJECTION, POWDER, LYOPHILIZED, FOR SOLUTION INTRAVENOUS ONCE
Status: COMPLETED | OUTPATIENT
Start: 2023-01-01 | End: 2023-01-01

## 2023-01-01 RX ORDER — DEXMEDETOMIDINE HYDROCHLORIDE 4 UG/ML
.2-1.5 INJECTION, SOLUTION INTRAVENOUS CONTINUOUS PRN
Status: DISCONTINUED | OUTPATIENT
Start: 2023-01-01 | End: 2023-01-01

## 2023-01-01 RX ORDER — HEPARIN SODIUM 10000 [USP'U]/100ML
8 INJECTION, SOLUTION INTRAVENOUS
Status: DISCONTINUED | OUTPATIENT
Start: 2023-01-01 | End: 2023-01-01

## 2023-01-01 RX ORDER — PROPOFOL 10 MG/ML
INJECTION, EMULSION INTRAVENOUS
Status: COMPLETED
Start: 2023-01-01 | End: 2023-01-01

## 2023-01-01 RX ORDER — VANCOMYCIN HYDROCHLORIDE 1 G/200ML
1000 INJECTION, SOLUTION INTRAVENOUS ONCE
Status: COMPLETED | OUTPATIENT
Start: 2023-01-01 | End: 2023-01-01

## 2023-01-01 RX ORDER — POTASSIUM CHLORIDE 750 MG/1
10 CAPSULE, EXTENDED RELEASE ORAL DAILY
COMMUNITY

## 2023-01-01 RX ORDER — PROPOFOL 10 MG/ML
VIAL (ML) INTRAVENOUS
Status: COMPLETED | OUTPATIENT
Start: 2023-01-01 | End: 2023-01-01

## 2023-01-01 RX ORDER — ASPIRIN 81 MG/1
324 TABLET, CHEWABLE ORAL ONCE
Status: COMPLETED | OUTPATIENT
Start: 2023-01-01 | End: 2023-01-01

## 2023-01-01 RX ORDER — POTASSIUM CHLORIDE 1.5 G/1.58G
40 POWDER, FOR SOLUTION ORAL ONCE
Status: DISCONTINUED | OUTPATIENT
Start: 2023-01-01 | End: 2023-01-01

## 2023-01-01 RX ORDER — CLOPIDOGREL BISULFATE 75 MG/1
75 TABLET ORAL EVERY MORNING
Status: DISCONTINUED | OUTPATIENT
Start: 2023-01-01 | End: 2023-01-01

## 2023-01-01 RX ORDER — AMIODARONE HYDROCHLORIDE 50 MG/ML
300 INJECTION, SOLUTION INTRAVENOUS ONCE
Status: DISCONTINUED | OUTPATIENT
Start: 2023-01-01 | End: 2023-01-01

## 2023-01-01 RX ORDER — NOREPINEPHRINE BITARTRATE 0.03 MG/ML
.02-.3 INJECTION, SOLUTION INTRAVENOUS
Status: DISPENSED | OUTPATIENT
Start: 2023-01-01 | End: 2023-01-01

## 2023-01-01 RX ORDER — PANTOPRAZOLE SODIUM 40 MG/10ML
40 INJECTION, POWDER, LYOPHILIZED, FOR SOLUTION INTRAVENOUS
Status: DISCONTINUED | OUTPATIENT
Start: 2023-01-01 | End: 2023-01-01

## 2023-01-01 RX ORDER — HEPARIN SODIUM 1000 [USP'U]/ML
4000 INJECTION, SOLUTION INTRAVENOUS; SUBCUTANEOUS ONCE
Status: COMPLETED | OUTPATIENT
Start: 2023-01-01 | End: 2023-01-01

## 2023-01-01 RX ORDER — HEPARIN SODIUM 1000 [USP'U]/ML
25 INJECTION, SOLUTION INTRAVENOUS; SUBCUTANEOUS AS NEEDED
Status: DISCONTINUED | OUTPATIENT
Start: 2023-01-01 | End: 2023-01-01

## 2023-01-01 RX ORDER — AMIODARONE HYDROCHLORIDE 50 MG/ML
INJECTION, SOLUTION INTRAVENOUS
Status: COMPLETED | OUTPATIENT
Start: 2023-01-01 | End: 2023-01-01

## 2023-01-01 RX ORDER — SODIUM CHLORIDE 0.9 % (FLUSH) 0.9 %
10 SYRINGE (ML) INJECTION AS NEEDED
Status: DISCONTINUED | OUTPATIENT
Start: 2023-01-01 | End: 2023-01-01 | Stop reason: HOSPADM

## 2023-01-01 RX ORDER — POTASSIUM CHLORIDE 1.5 G/1.58G
40 POWDER, FOR SOLUTION ORAL ONCE
Status: COMPLETED | OUTPATIENT
Start: 2023-01-01 | End: 2023-01-01

## 2023-01-01 RX ORDER — NOREPINEPHRINE BITARTRATE 0.03 MG/ML
INJECTION, SOLUTION INTRAVENOUS
Status: COMPLETED
Start: 2023-01-01 | End: 2023-01-01

## 2023-01-01 RX ORDER — LEVOTHYROXINE SODIUM 0.05 MG/1
50 TABLET ORAL DAILY
Status: DISCONTINUED | OUTPATIENT
Start: 2023-01-01 | End: 2023-01-01

## 2023-01-01 RX ORDER — ASPIRIN 81 MG/1
324 TABLET, CHEWABLE ORAL ONCE
Status: DISCONTINUED | OUTPATIENT
Start: 2023-01-01 | End: 2023-01-01

## 2023-01-01 RX ORDER — CHLORHEXIDINE GLUCONATE ORAL RINSE 1.2 MG/ML
15 SOLUTION DENTAL EVERY 12 HOURS SCHEDULED
Status: DISCONTINUED | OUTPATIENT
Start: 2023-01-01 | End: 2023-01-01

## 2023-01-01 RX ORDER — HEPARIN SODIUM 1000 [USP'U]/ML
50 INJECTION, SOLUTION INTRAVENOUS; SUBCUTANEOUS AS NEEDED
Status: DISCONTINUED | OUTPATIENT
Start: 2023-01-01 | End: 2023-01-01

## 2023-01-01 RX ORDER — NICOTINE POLACRILEX 4 MG
15 LOZENGE BUCCAL
Status: DISCONTINUED | OUTPATIENT
Start: 2023-01-01 | End: 2023-01-01 | Stop reason: ALTCHOICE

## 2023-01-01 RX ORDER — BUPIVACAINE HCL/0.9 % NACL/PF 0.25 %
.02-.3 PLASTIC BAG, INJECTION (ML) EPIDURAL CONTINUOUS PRN
Status: CANCELLED | OUTPATIENT
Start: 2023-01-01

## 2023-01-01 RX ORDER — SODIUM CHLORIDE 9 MG/ML
250 INJECTION, SOLUTION INTRAVENOUS ONCE
Status: COMPLETED | OUTPATIENT
Start: 2023-01-01 | End: 2023-01-01

## 2023-01-01 RX ORDER — HEPARIN SODIUM 10000 [USP'U]/100ML
12 INJECTION, SOLUTION INTRAVENOUS
Status: DISCONTINUED | OUTPATIENT
Start: 2023-01-01 | End: 2023-01-01

## 2023-01-01 RX ORDER — LORAZEPAM 2 MG/ML
2 INJECTION INTRAMUSCULAR ONCE AS NEEDED
Status: COMPLETED | OUTPATIENT
Start: 2023-01-01 | End: 2023-01-01

## 2023-01-01 RX ORDER — VANCOMYCIN/0.9 % SOD CHLORIDE 1.5G/250ML
20 PLASTIC BAG, INJECTION (ML) INTRAVENOUS ONCE
Status: COMPLETED | OUTPATIENT
Start: 2023-01-01 | End: 2023-01-01

## 2023-01-01 RX ORDER — IBUPROFEN 600 MG/1
1 TABLET ORAL
Status: DISCONTINUED | OUTPATIENT
Start: 2023-01-01 | End: 2023-01-01

## 2023-01-01 RX ORDER — VECURONIUM BROMIDE FOR INJECTION 1 MG/ML
0.1 INJECTION, POWDER, LYOPHILIZED, FOR SOLUTION INTRAVENOUS
Status: CANCELLED | OUTPATIENT
Start: 2023-01-01 | End: 2023-01-01

## 2023-01-01 RX ADMIN — MINERAL OIL AND PETROLATUM: 150; 830 OINTMENT OPHTHALMIC at 05:28

## 2023-01-01 RX ADMIN — MINERAL OIL AND PETROLATUM: 150; 830 OINTMENT OPHTHALMIC at 22:16

## 2023-01-01 RX ADMIN — PIPERACILLIN SODIUM AND TAZOBACTAM SODIUM 3.38 G: 3; .375 INJECTION, SOLUTION INTRAVENOUS at 15:56

## 2023-01-01 RX ADMIN — NOREPINEPHRINE BITARTRATE 0.3 MCG/KG/MIN: 0.03 INJECTION, SOLUTION INTRAVENOUS at 23:26

## 2023-01-01 RX ADMIN — MINERAL OIL AND PETROLATUM: 150; 830 OINTMENT OPHTHALMIC at 08:58

## 2023-01-01 RX ADMIN — PANTOPRAZOLE SODIUM 40 MG: 40 INJECTION, POWDER, LYOPHILIZED, FOR SOLUTION INTRAVENOUS at 22:16

## 2023-01-01 RX ADMIN — MINERAL OIL AND PETROLATUM: 150; 830 OINTMENT OPHTHALMIC at 11:11

## 2023-01-01 RX ADMIN — MINERAL OIL AND PETROLATUM: 150; 830 OINTMENT OPHTHALMIC at 23:38

## 2023-01-01 RX ADMIN — VANCOMYCIN HYDROCHLORIDE 1000 MG: 1 INJECTION, SOLUTION INTRAVENOUS at 09:31

## 2023-01-01 RX ADMIN — PANTOPRAZOLE SODIUM 40 MG: 40 INJECTION, POWDER, LYOPHILIZED, FOR SOLUTION INTRAVENOUS at 08:59

## 2023-01-01 RX ADMIN — MINERAL OIL AND PETROLATUM: 150; 830 OINTMENT OPHTHALMIC at 16:38

## 2023-01-01 RX ADMIN — EPINEPHRINE 0.5 MG: 0.1 INJECTION INTRACARDIAC; INTRAVENOUS at 18:38

## 2023-01-01 RX ADMIN — PROPOFOL 25 MCG/KG/MIN: 10 INJECTION, EMULSION INTRAVENOUS at 11:46

## 2023-01-01 RX ADMIN — MINERAL OIL AND PETROLATUM: 150; 830 OINTMENT OPHTHALMIC at 22:00

## 2023-01-01 RX ADMIN — SODIUM CHLORIDE 250 ML: 9 INJECTION, SOLUTION INTRAVENOUS at 19:00

## 2023-01-01 RX ADMIN — EPINEPHRINE 1 MG: 0.1 INJECTION INTRACARDIAC; INTRAVENOUS at 18:24

## 2023-01-01 RX ADMIN — MINERAL OIL AND PETROLATUM: 150; 830 OINTMENT OPHTHALMIC at 14:04

## 2023-01-01 RX ADMIN — PHENYLEPHRINE HYDROCHLORIDE 0.5 MCG/KG/MIN: 10 INJECTION INTRAVENOUS at 13:52

## 2023-01-01 RX ADMIN — PIPERACILLIN SODIUM AND TAZOBACTAM SODIUM 3.38 G: 3; .375 INJECTION, SOLUTION INTRAVENOUS at 23:38

## 2023-01-01 RX ADMIN — MINERAL OIL AND PETROLATUM: 150; 830 OINTMENT OPHTHALMIC at 03:03

## 2023-01-01 RX ADMIN — CLOPIDOGREL BISULFATE 75 MG: 75 TABLET ORAL at 08:14

## 2023-01-01 RX ADMIN — MINERAL OIL AND PETROLATUM: 150; 830 OINTMENT OPHTHALMIC at 17:58

## 2023-01-01 RX ADMIN — INSULIN HUMAN 2.1 UNITS/HR: 1 INJECTION, SOLUTION INTRAVENOUS at 01:10

## 2023-01-01 RX ADMIN — AMIODARONE HYDROCHLORIDE 1 MG/MIN: 1.8 INJECTION, SOLUTION INTRAVENOUS at 19:15

## 2023-01-01 RX ADMIN — CHLORHEXIDINE GLUCONATE 15 ML: 1.2 SOLUTION ORAL at 08:58

## 2023-01-01 RX ADMIN — PHENYLEPHRINE HYDROCHLORIDE 3 MCG/KG/MIN: 10 INJECTION INTRAVENOUS at 02:35

## 2023-01-01 RX ADMIN — CLOPIDOGREL BISULFATE 75 MG: 75 TABLET ORAL at 08:59

## 2023-01-01 RX ADMIN — HEPARIN SODIUM 4000 UNITS: 1000 INJECTION INTRAVENOUS; SUBCUTANEOUS at 18:44

## 2023-01-01 RX ADMIN — MINERAL OIL AND PETROLATUM: 150; 830 OINTMENT OPHTHALMIC at 08:15

## 2023-01-01 RX ADMIN — PROPOFOL 35 MCG/KG/MIN: 10 INJECTION, EMULSION INTRAVENOUS at 06:18

## 2023-01-01 RX ADMIN — MINERAL OIL AND PETROLATUM: 150; 830 OINTMENT OPHTHALMIC at 23:39

## 2023-01-01 RX ADMIN — HYDROMORPHONE HYDROCHLORIDE 1 MG: 1 INJECTION, SOLUTION INTRAMUSCULAR; INTRAVENOUS; SUBCUTANEOUS at 19:41

## 2023-01-01 RX ADMIN — NOREPINEPHRINE BITARTRATE 0.3 MCG/KG/MIN: 1 INJECTION, SOLUTION, CONCENTRATE INTRAVENOUS at 05:29

## 2023-01-01 RX ADMIN — PROPOFOL 50 MCG/KG/MIN: 10 INJECTION, EMULSION INTRAVENOUS at 00:18

## 2023-01-01 RX ADMIN — PROPOFOL 15 MCG/KG/MIN: 10 INJECTION, EMULSION INTRAVENOUS at 18:34

## 2023-01-01 RX ADMIN — AMIODARONE HYDROCHLORIDE 300 MG: 50 INJECTION, SOLUTION INTRAVENOUS at 18:31

## 2023-01-01 RX ADMIN — PHENYLEPHRINE HYDROCHLORIDE 3 MCG/KG/MIN: 10 INJECTION INTRAVENOUS at 09:36

## 2023-01-01 RX ADMIN — PROPOFOL 45 MCG/KG/MIN: 10 INJECTION, EMULSION INTRAVENOUS at 04:33

## 2023-01-01 RX ADMIN — MINERAL OIL AND PETROLATUM: 150; 830 OINTMENT OPHTHALMIC at 01:37

## 2023-01-01 RX ADMIN — PROPOFOL 35 MCG/KG/MIN: 10 INJECTION, EMULSION INTRAVENOUS at 01:38

## 2023-01-01 RX ADMIN — EPINEPHRINE 0.15 MCG/KG/MIN: 1 INJECTION INTRAMUSCULAR; INTRAVENOUS; SUBCUTANEOUS at 18:30

## 2023-01-01 RX ADMIN — POTASSIUM CHLORIDE 40 MEQ: 1.5 FOR SOLUTION ORAL at 03:09

## 2023-01-01 RX ADMIN — NOREPINEPHRINE BITARTRATE 0.2 MCG/KG/MIN: 0.03 INJECTION, SOLUTION INTRAVENOUS at 17:08

## 2023-01-01 RX ADMIN — MINERAL OIL AND PETROLATUM: 150; 830 OINTMENT OPHTHALMIC at 04:48

## 2023-01-01 RX ADMIN — PIPERACILLIN SODIUM AND TAZOBACTAM SODIUM 3.38 G: 3; .375 INJECTION, SOLUTION INTRAVENOUS at 08:14

## 2023-01-01 RX ADMIN — NOREPINEPHRINE BITARTRATE 0.1 MCG/KG/MIN: 0.03 INJECTION, SOLUTION INTRAVENOUS at 19:15

## 2023-01-01 RX ADMIN — MINERAL OIL AND PETROLATUM: 150; 830 OINTMENT OPHTHALMIC at 06:43

## 2023-01-01 RX ADMIN — VECURONIUM BROMIDE 10 MG: 10 INJECTION, POWDER, LYOPHILIZED, FOR SOLUTION INTRAVENOUS at 14:18

## 2023-01-01 RX ADMIN — LORAZEPAM 2 MG: 2 INJECTION, SOLUTION INTRAMUSCULAR; INTRAVENOUS at 22:17

## 2023-01-01 RX ADMIN — SODIUM CHLORIDE, POTASSIUM CHLORIDE, SODIUM LACTATE AND CALCIUM CHLORIDE 500 ML: 600; 310; 30; 20 INJECTION, SOLUTION INTRAVENOUS at 05:14

## 2023-01-01 RX ADMIN — EPINEPHRINE 0.02 MCG/KG/MIN: 1 INJECTION INTRAMUSCULAR; INTRAVENOUS; SUBCUTANEOUS at 09:19

## 2023-01-01 RX ADMIN — CHLORHEXIDINE GLUCONATE 15 ML: 1.2 SOLUTION ORAL at 08:14

## 2023-01-01 RX ADMIN — CHLORHEXIDINE GLUCONATE 15 ML: 1.2 SOLUTION ORAL at 20:25

## 2023-01-01 RX ADMIN — MINERAL OIL AND PETROLATUM: 150; 830 OINTMENT OPHTHALMIC at 10:32

## 2023-01-01 RX ADMIN — LEVOTHYROXINE SODIUM 50 MCG: 0.05 TABLET ORAL at 06:18

## 2023-01-01 RX ADMIN — NOREPINEPHRINE BITARTRATE 0.04 MCG/KG/MIN: 0.03 INJECTION, SOLUTION INTRAVENOUS at 22:54

## 2023-01-01 RX ADMIN — HEPARIN SODIUM 9 UNITS/KG/HR: 10000 INJECTION, SOLUTION INTRAVENOUS at 14:42

## 2023-01-01 RX ADMIN — PIPERACILLIN SODIUM AND TAZOBACTAM SODIUM 3.38 G: 3; .375 INJECTION, SOLUTION INTRAVENOUS at 01:40

## 2023-01-01 RX ADMIN — LEVOTHYROXINE SODIUM 50 MCG: 0.05 TABLET ORAL at 05:17

## 2023-01-01 RX ADMIN — MINERAL OIL AND PETROLATUM: 150; 830 OINTMENT OPHTHALMIC at 20:26

## 2023-01-01 RX ADMIN — Medication 150 MCG/HR: at 02:30

## 2023-01-01 RX ADMIN — VANCOMYCIN HYDROCHLORIDE 1500 MG: 10 INJECTION, POWDER, LYOPHILIZED, FOR SOLUTION INTRAVENOUS at 02:25

## 2023-01-01 RX ADMIN — POTASSIUM CHLORIDE 40 MEQ: 1.5 FOR SOLUTION ORAL at 23:38

## 2023-01-01 RX ADMIN — MINERAL OIL AND PETROLATUM: 150; 830 OINTMENT OPHTHALMIC at 09:39

## 2023-01-01 RX ADMIN — PROPOFOL 45 MCG/KG/MIN: 10 INJECTION, EMULSION INTRAVENOUS at 20:25

## 2023-01-01 RX ADMIN — PIPERACILLIN SODIUM AND TAZOBACTAM SODIUM 3.38 G: 3; .375 INJECTION, SOLUTION INTRAVENOUS at 08:57

## 2023-01-01 RX ADMIN — MINERAL OIL AND PETROLATUM: 150; 830 OINTMENT OPHTHALMIC at 12:41

## 2023-01-01 RX ADMIN — PROPOFOL 25 MCG/KG/MIN: 10 INJECTION, EMULSION INTRAVENOUS at 09:36

## 2023-01-01 RX ADMIN — ASPIRIN 324 MG: 81 TABLET, CHEWABLE ORAL at 23:26

## 2023-01-01 RX ADMIN — PROPOFOL 45 MCG/KG/MIN: 10 INJECTION, EMULSION INTRAVENOUS at 15:56

## 2023-01-01 RX ADMIN — MINERAL OIL AND PETROLATUM: 150; 830 OINTMENT OPHTHALMIC at 02:38

## 2023-01-01 RX ADMIN — Medication 50 MCG/HR: at 22:28

## 2023-01-01 RX ADMIN — CHLORHEXIDINE GLUCONATE 15 ML: 1.2 SOLUTION ORAL at 22:16

## 2023-01-01 RX ADMIN — SODIUM CHLORIDE, POTASSIUM CHLORIDE, SODIUM LACTATE AND CALCIUM CHLORIDE 500 ML: 600; 310; 30; 20 INJECTION, SOLUTION INTRAVENOUS at 02:39

## 2023-01-01 RX ADMIN — NOREPINEPHRINE BITARTRATE 0.18 MCG/KG/MIN: 0.03 INJECTION, SOLUTION INTRAVENOUS at 09:22

## 2023-01-16 ENCOUNTER — TELEPHONE (OUTPATIENT)
Dept: CARDIOLOGY | Facility: CLINIC | Age: 78
End: 2023-01-16
Payer: MEDICARE

## 2023-01-16 NOTE — TELEPHONE ENCOUNTER
Patient was in the hospital from 12/8/22 through 12/13/22. He had the flu, pneumonia, VT, and a UTI. He would like to know if he is released to start cardiac rehab again since he is feeling better now?

## 2023-01-17 ENCOUNTER — TELEPHONE (OUTPATIENT)
Dept: CARDIAC REHAB | Facility: HOSPITAL | Age: 78
End: 2023-01-17
Payer: MEDICARE

## 2023-01-17 NOTE — TELEPHONE ENCOUNTER
Staff was contacted by Patient in regards to returning to Phase II Cardiac Rehab. Patient states that his PCP has cleared him to return to Phase II Cardiac Rehab but wanted to clear it with Dr. Flanagan first. Staff spoke with Dr. Flanagan and patient is cleared to return to Phase II Cardiac Rehab. Patient plans to return tomorrow, 1/18/23.

## 2023-01-18 ENCOUNTER — TREATMENT (OUTPATIENT)
Dept: CARDIAC REHAB | Facility: HOSPITAL | Age: 78
End: 2023-01-18
Payer: MEDICARE

## 2023-01-18 DIAGNOSIS — I50.23 ACUTE ON CHRONIC SYSTOLIC CHF (CONGESTIVE HEART FAILURE): Primary | ICD-10-CM

## 2023-01-18 PROCEDURE — 93798 PHYS/QHP OP CAR RHAB W/ECG: CPT

## 2023-01-18 NOTE — TELEPHONE ENCOUNTER
I called and spoke with the patient. He is going to send through a remote transmission to the device clinic and have them make sure that he has not had any more episodes of VT.

## 2023-01-20 ENCOUNTER — TREATMENT (OUTPATIENT)
Dept: CARDIAC REHAB | Facility: HOSPITAL | Age: 78
End: 2023-01-20
Payer: MEDICARE

## 2023-01-20 DIAGNOSIS — I50.23 ACUTE ON CHRONIC SYSTOLIC CHF (CONGESTIVE HEART FAILURE): Primary | ICD-10-CM

## 2023-01-20 PROCEDURE — 93798 PHYS/QHP OP CAR RHAB W/ECG: CPT

## 2023-01-23 ENCOUNTER — TREATMENT (OUTPATIENT)
Dept: CARDIAC REHAB | Facility: HOSPITAL | Age: 78
End: 2023-01-23
Payer: MEDICARE

## 2023-01-23 DIAGNOSIS — I50.23 ACUTE ON CHRONIC SYSTOLIC CHF (CONGESTIVE HEART FAILURE): Primary | ICD-10-CM

## 2023-01-23 PROCEDURE — 93798 PHYS/QHP OP CAR RHAB W/ECG: CPT

## 2023-01-25 ENCOUNTER — TREATMENT (OUTPATIENT)
Dept: CARDIAC REHAB | Facility: HOSPITAL | Age: 78
End: 2023-01-25
Payer: MEDICARE

## 2023-01-25 DIAGNOSIS — I50.23 ACUTE ON CHRONIC SYSTOLIC CHF (CONGESTIVE HEART FAILURE): Primary | ICD-10-CM

## 2023-01-25 PROCEDURE — 93798 PHYS/QHP OP CAR RHAB W/ECG: CPT

## 2023-01-27 ENCOUNTER — TREATMENT (OUTPATIENT)
Dept: CARDIAC REHAB | Facility: HOSPITAL | Age: 78
End: 2023-01-27
Payer: MEDICARE

## 2023-01-27 DIAGNOSIS — I50.23 ACUTE ON CHRONIC SYSTOLIC CHF (CONGESTIVE HEART FAILURE): Primary | ICD-10-CM

## 2023-01-27 PROCEDURE — 93798 PHYS/QHP OP CAR RHAB W/ECG: CPT

## 2023-01-30 ENCOUNTER — TREATMENT (OUTPATIENT)
Dept: CARDIAC REHAB | Facility: HOSPITAL | Age: 78
End: 2023-01-30
Payer: MEDICARE

## 2023-01-30 DIAGNOSIS — I50.23 ACUTE ON CHRONIC SYSTOLIC CHF (CONGESTIVE HEART FAILURE): Primary | ICD-10-CM

## 2023-01-30 PROCEDURE — 93798 PHYS/QHP OP CAR RHAB W/ECG: CPT

## 2023-02-01 ENCOUNTER — TREATMENT (OUTPATIENT)
Dept: CARDIAC REHAB | Facility: HOSPITAL | Age: 78
End: 2023-02-01
Payer: MEDICARE

## 2023-02-01 DIAGNOSIS — I50.23 ACUTE ON CHRONIC SYSTOLIC CHF (CONGESTIVE HEART FAILURE): Primary | ICD-10-CM

## 2023-02-01 PROCEDURE — 93798 PHYS/QHP OP CAR RHAB W/ECG: CPT

## 2023-02-01 PROCEDURE — 93797 PHYS/QHP OP CAR RHAB WO ECG: CPT

## 2023-02-01 NOTE — PROGRESS NOTES
"    NUTRITION ASSESSMENT & EDUCATION  CARDIAC/PULMONARY REHAB      Appointment Date and Time: 02/01/23, 08:54 EST  Patient Name: Jonnie Roth   Age: 78 y.o.     Sex:  male        Employment/Activity Level:   [employer] education level: HS/college  Occupation:  Retired since 1998 disability  Job Activity Level: N/A    Routine Exercise: mild    Weight Assessment:   Height:   Ht Readings from Last 1 Encounters:   12/08/22 185.4 cm (73\")     Weight:   Wt Readings from Last 1 Encounters:   12/09/22 81.7 kg (180 lb 1.9 oz)         BMI:    BMI Readings from Last 1 Encounters:   12/09/22 23.76 kg/m²       -------------------------------------------------------------------------------------------------  IBW: 190 lb  -------------------------------------------------------------------------------------------------  Weight Assessment: Normal  Weight Change last six months: decrease 5-10 lb       Usual Weight: 175-180 lb       Desired Weight: 175-180 lb    Cardiac Risk Factors:         Atherosclerotic heart disease       Hypercholesterolemia       Diabetes mellitus       Hypothyroidism       CHF, LBBB, ICD in place, ischemic cardiomyopathy    Pertinent Lab Values:     Total Cholesterol   Date Value Ref Range Status   09/10/2022 116 0 - 200 mg/dL Final     HDL Cholesterol   Date Value Ref Range Status   09/10/2022 43 40 - 60 mg/dL Final     LDL Cholesterol    Date Value Ref Range Status   09/10/2022 53 0 - 100 mg/dL Final     LDL/HDL Ratio   Date Value Ref Range Status   09/10/2022 1.18  Final     Triglycerides   Date Value Ref Range Status   09/10/2022 111 0 - 150 mg/dL Final     Hemoglobin A1C   Date Value Ref Range Status   12/09/2022 5.60 4.80 - 5.60 % Final     TSH   Date Value Ref Range Status   12/08/2022 3.040 0.270 - 4.200 uIU/mL Final     Glucose   Date Value Ref Range Status   12/13/2022 92 65 - 99 mg/dL Final     Labs reviewed with Ilda and wife, Lidia today    Pertinent Medications:   Nutritional Supplements: " melatonin, vit D, vit B12, vit D  Pertinent Nutrition-Related Medications: Reviewed - no changes recommended at this time.                  Includes Jardiance, metformin, demadex, synthroid 25 mcg  Self Identified Problems or Concerns:   Used to weigh 217 lb, after 5 heart events had lost to 172 lb; now trying to regain to between 175-185 lb.  Past hospitalization w/dehydration so trying to drink 2 quarts water/day but having urinary urgency at night.  Try to eat healthy.    Nutrition Influences:   Appetite: good   Taste/smell changes:  No  Factors limiting PO intake: none  Food records reviewed?: Yes, completed review of 'Rate Your Plate' score and tallies.  Extensive review and discussion of home diet today.    Jonnie lives with: wife  Jonnie receives lifestyle support from others at home?: Yes  Spouse/significant other present for diet instruction today?: Yes, Lidia was present and very supportive    Diet Assessment:   Diet Survey Score: 59 of possible 72 = 82 % compliant with AHA guidelines    Meal intake pattern:  3 meals/day generally, hearty breakfast most days, light lunch/early dinner, no evening snacking  Dining out:  Yes, [employer], [employer], etc  Fast food:  Yes, chick natalie    24 hour recall:   B daily Premier Protein oral nutrition supplement w/30 g protein, either larry/eggs twice monthly, or oatmeal/fruit, or cereal/milk/fruit2   L PB and apple, or PB and banana, or deli ham slice/cheese slice melted on tortilla and rolled up, or skinny popcorn   D loves to have murray beans/cornbread, slaw; or other light dinner meal    Who does the cooking at home:  Wife primarily/Ilda does some food prep  Who does the shopping at home:  Both/Lidia    Home diet review:  Generally Heart Healthy Fat intake, Moderate Sodium intake and Strives for a Heart Healthy Diet  No intake of sausage/bologna/HDogs/ribs/poultry skin. Occasional larry, some cheese- not excessive.  He likes fish but doesn't have often as she does not  like. They will order fish out at restaurants and are willing to try more often at home.  Excellent intake of fruit/good intake vegetables. Using canola/olive oils. Sugar free beverages.   Likes peanut butter and low salt nuts. Eat lots of salads. Skim milk, low fat sour cream, eggs 1x wk    Motivation level toward diet compliance:  Moderate  Both are motivated to continue improving diet for heart health. Already healthier eating than majority of the population.    Assessment / Recommendations:   Prior diet education before to coming to Cardiac Rehab?:  Yes - have been coming to cardiac rehab several years, met w/RD  Instructed provided on:  Cardiac diet, Lipid management, Label reading for heart health and Heart Failure diet  Written materials provided to patient: Yes    Goals/Plan:   Patient defined goals:   1) Wt goal of up to 185 lb, no lower than 175 lb  2) Intake of more milk, sf gatorade, juices. Water w/meals. These promote decreased fluid loss/dehydration  3) Trial more fish entrees, trial avocados/hummas, rinse canned beans to remove ~40% sodium  4) recipes provided for slow cooker shredded chicken and avocado/black bean salsa    Plan:  Encouraged to complete goals and continue setting new nutrition/exercise goals             Encouraged to follow up with dietitian during cardiac rehab sessions; may request additional RD counseling.    Susanna Jiang RD, 08:54 EST - 2/1/2023

## 2023-02-03 ENCOUNTER — TREATMENT (OUTPATIENT)
Dept: CARDIAC REHAB | Facility: HOSPITAL | Age: 78
End: 2023-02-03
Payer: MEDICARE

## 2023-02-03 DIAGNOSIS — I50.23 ACUTE ON CHRONIC SYSTOLIC CHF (CONGESTIVE HEART FAILURE): Primary | ICD-10-CM

## 2023-02-03 PROCEDURE — 93798 PHYS/QHP OP CAR RHAB W/ECG: CPT

## 2023-02-06 ENCOUNTER — TREATMENT (OUTPATIENT)
Dept: CARDIAC REHAB | Facility: HOSPITAL | Age: 78
End: 2023-02-06
Payer: MEDICARE

## 2023-02-06 ENCOUNTER — TELEPHONE (OUTPATIENT)
Dept: CARDIOLOGY | Facility: CLINIC | Age: 78
End: 2023-02-06
Payer: MEDICARE

## 2023-02-06 DIAGNOSIS — I50.23 ACUTE ON CHRONIC SYSTOLIC CHF (CONGESTIVE HEART FAILURE): Primary | ICD-10-CM

## 2023-02-06 PROCEDURE — 93798 PHYS/QHP OP CAR RHAB W/ECG: CPT

## 2023-02-06 NOTE — TELEPHONE ENCOUNTER
Patient's wife called and states that patient does not need O2 anymore that was ordered at his d/c from hospital. She states that the company needs an order to d/c. Is this ok to send in?    (f) [phone] [facility]

## 2023-02-08 ENCOUNTER — TREATMENT (OUTPATIENT)
Dept: CARDIAC REHAB | Facility: HOSPITAL | Age: 78
End: 2023-02-08
Payer: MEDICARE

## 2023-02-08 DIAGNOSIS — I50.23 ACUTE ON CHRONIC SYSTOLIC CHF (CONGESTIVE HEART FAILURE): Primary | ICD-10-CM

## 2023-02-08 PROCEDURE — 93798 PHYS/QHP OP CAR RHAB W/ECG: CPT

## 2023-02-10 ENCOUNTER — TREATMENT (OUTPATIENT)
Dept: CARDIAC REHAB | Facility: HOSPITAL | Age: 78
End: 2023-02-10
Payer: MEDICARE

## 2023-02-10 DIAGNOSIS — I50.23 ACUTE ON CHRONIC SYSTOLIC CHF (CONGESTIVE HEART FAILURE): Primary | ICD-10-CM

## 2023-02-10 PROCEDURE — 93798 PHYS/QHP OP CAR RHAB W/ECG: CPT

## 2023-02-13 ENCOUNTER — TREATMENT (OUTPATIENT)
Dept: CARDIAC REHAB | Facility: HOSPITAL | Age: 78
End: 2023-02-13
Payer: MEDICARE

## 2023-02-13 DIAGNOSIS — I50.23 ACUTE ON CHRONIC SYSTOLIC CHF (CONGESTIVE HEART FAILURE): Primary | ICD-10-CM

## 2023-02-13 PROCEDURE — 93798 PHYS/QHP OP CAR RHAB W/ECG: CPT

## 2023-02-15 ENCOUNTER — TELEPHONE (OUTPATIENT)
Dept: CARDIAC REHAB | Facility: HOSPITAL | Age: 78
End: 2023-02-15
Payer: MEDICARE

## 2023-02-15 NOTE — TELEPHONE ENCOUNTER
Patient calls in and cancels his cardiac rehab session for today but plans to return on Friday 02/20/23.

## 2023-02-17 ENCOUNTER — TREATMENT (OUTPATIENT)
Dept: CARDIAC REHAB | Facility: HOSPITAL | Age: 78
End: 2023-02-17
Payer: MEDICARE

## 2023-02-17 DIAGNOSIS — I50.23 ACUTE ON CHRONIC SYSTOLIC CHF (CONGESTIVE HEART FAILURE): Primary | ICD-10-CM

## 2023-02-17 PROCEDURE — 93798 PHYS/QHP OP CAR RHAB W/ECG: CPT

## 2023-02-17 PROCEDURE — 93295 DEV INTERROG REMOTE 1/2/MLT: CPT | Performed by: INTERNAL MEDICINE

## 2023-02-17 PROCEDURE — 93296 REM INTERROG EVL PM/IDS: CPT | Performed by: INTERNAL MEDICINE

## 2023-02-20 ENCOUNTER — TREATMENT (OUTPATIENT)
Dept: CARDIAC REHAB | Facility: HOSPITAL | Age: 78
End: 2023-02-20
Payer: MEDICARE

## 2023-02-20 ENCOUNTER — OFFICE VISIT (OUTPATIENT)
Dept: CARDIOLOGY | Facility: CLINIC | Age: 78
End: 2023-02-20
Payer: MEDICARE

## 2023-02-20 ENCOUNTER — LAB (OUTPATIENT)
Dept: LAB | Facility: HOSPITAL | Age: 78
End: 2023-02-20
Payer: MEDICARE

## 2023-02-20 VITALS
WEIGHT: 180 LBS | DIASTOLIC BLOOD PRESSURE: 52 MMHG | HEART RATE: 69 BPM | BODY MASS INDEX: 23.86 KG/M2 | OXYGEN SATURATION: 98 % | HEIGHT: 73 IN | SYSTOLIC BLOOD PRESSURE: 100 MMHG

## 2023-02-20 DIAGNOSIS — Z95.810 ICD (IMPLANTABLE CARDIOVERTER-DEFIBRILLATOR) IN PLACE: ICD-10-CM

## 2023-02-20 DIAGNOSIS — I50.22 CHRONIC SYSTOLIC CONGESTIVE HEART FAILURE: ICD-10-CM

## 2023-02-20 DIAGNOSIS — R00.0 WIDE-COMPLEX TACHYCARDIA: ICD-10-CM

## 2023-02-20 DIAGNOSIS — I50.22 CHRONIC SYSTOLIC CONGESTIVE HEART FAILURE: Primary | ICD-10-CM

## 2023-02-20 DIAGNOSIS — I50.23 ACUTE ON CHRONIC SYSTOLIC CHF (CONGESTIVE HEART FAILURE): Primary | ICD-10-CM

## 2023-02-20 LAB
ALBUMIN SERPL-MCNC: 4.2 G/DL (ref 3.5–5.2)
ALBUMIN/GLOB SERPL: 1.6 G/DL
ALP SERPL-CCNC: 66 U/L (ref 39–117)
ALT SERPL W P-5'-P-CCNC: 30 U/L (ref 1–41)
ANION GAP SERPL CALCULATED.3IONS-SCNC: 10 MMOL/L (ref 5–15)
AST SERPL-CCNC: 29 U/L (ref 1–40)
BILIRUB SERPL-MCNC: 0.4 MG/DL (ref 0–1.2)
BUN SERPL-MCNC: 31 MG/DL (ref 8–23)
BUN/CREAT SERPL: 36.5 (ref 7–25)
CALCIUM SPEC-SCNC: 9.3 MG/DL (ref 8.6–10.5)
CHLORIDE SERPL-SCNC: 105 MMOL/L (ref 98–107)
CO2 SERPL-SCNC: 22 MMOL/L (ref 22–29)
CREAT SERPL-MCNC: 0.85 MG/DL (ref 0.76–1.27)
EGFRCR SERPLBLD CKD-EPI 2021: 88.4 ML/MIN/1.73
GLOBULIN UR ELPH-MCNC: 2.6 GM/DL
GLUCOSE SERPL-MCNC: 96 MG/DL (ref 65–99)
POTASSIUM SERPL-SCNC: 4.4 MMOL/L (ref 3.5–5.2)
PROT SERPL-MCNC: 6.8 G/DL (ref 6–8.5)
SODIUM SERPL-SCNC: 137 MMOL/L (ref 136–145)

## 2023-02-20 PROCEDURE — 93798 PHYS/QHP OP CAR RHAB W/ECG: CPT

## 2023-02-20 PROCEDURE — 36415 COLL VENOUS BLD VENIPUNCTURE: CPT

## 2023-02-20 PROCEDURE — 99214 OFFICE O/P EST MOD 30 MIN: CPT | Performed by: PHYSICIAN ASSISTANT

## 2023-02-20 PROCEDURE — 93283 PRGRMG EVAL IMPLANTABLE DFB: CPT | Performed by: PHYSICIAN ASSISTANT

## 2023-02-20 PROCEDURE — 93000 ELECTROCARDIOGRAM COMPLETE: CPT | Performed by: PHYSICIAN ASSISTANT

## 2023-02-20 PROCEDURE — 80053 COMPREHEN METABOLIC PANEL: CPT

## 2023-02-20 RX ORDER — LEVOTHYROXINE SODIUM 50 MCG
50 TABLET ORAL DAILY
COMMUNITY
Start: 2023-01-21 | End: 2023-03-07

## 2023-02-20 RX ORDER — AMIODARONE HYDROCHLORIDE 200 MG/1
100 TABLET ORAL DAILY
COMMUNITY

## 2023-02-20 RX ORDER — NIACIN 1000 MG/1
1000 TABLET, EXTENDED RELEASE ORAL NIGHTLY
COMMUNITY

## 2023-02-20 NOTE — PROGRESS NOTES
Jonnie Roth  1945  [phone]        [facility]     Borders, Brian Ferraro MD  [address] [place] [address]    Chief Complaint   Patient presents with   • Congestive Heart Failure   • Coronary Artery Disease   • Hyperlipidemia   • Cardiomyopathy   • lbbb       Problem List:      1. Sudden cardiac death with primary ventricular fibrillation status post Pacesetter [place] ICD, [place], [place] in September 2000:   a. ICD generator change out with upgrade to a biventricular pacemaker ICD on 12/17/2007, [employer] device.  b. ICD discharge, 08/09/2012, secondary to ventricular flutter with initiation of amiodarone therapy.   c. Hospitalization, 02/01/2014, secondary to ICD discharge for ventricular tachycardia with subsequent discontinuation of Coreg and initiation of sotalol therapy.   d. Hospitalization, February 2014, secondary to ICD discharge for VT with subsequent discontinuation of Coreg and initiation of sotalol.  e. Echocardiogram, 04/07/2015: EF less than 20%.  f. Hospitalization secondary to VF and ICD shocks, 04/18/2015.  g. NIPS procedure with defibrillation threshold testing for VF and noninvasive program stimulation, 04/18/2015.   h. Initiation of mexiletine for recurrent ventricular tachycardia with ICD shocks on 05/05/2015 with amiodarone load, recurrent VT and ICD shocks with hospitalization 05/16/2015-05/18/2015.   i. EP study with RFA of large anterior left ventricular scar extending from mid-left ventricular wall down apex by Dr. Ferdinand Alba, 05/21/2015.                      j.   ICD generator change 10/2017                     K.   Echo 11/19 LVEF 30%, mild TR          L.  ICD generatot change 07/2021  2. Ischemic heart disease:  a. Remote progressive angina pectoris/acute extensive anterolateral myocardial infarction/delayed presentation with thrombolysis/severe 3-vessel coronary atherosclerosis with severe single vessel  involvement/PTCA with intracoronary stent deployment proximal-mid segment LAD/moderate-severe compensated left ventricular dysfunction. LVEF (0.35)/abnormal positive signal averaged EKG/oral anticoagulation, April 1995.  b. Remote NYHA class I-II angina pectoris/class III CHF/abnormal quantitative SPECT gated Cardiolite GXT, July 1998.  c. Stable persistent MUGA, LVEF (0.33, January 1999 and January 2000).   d. Residual NYHA class I angina pectoris/CHF with reduced MUGA scan: LVEF (0.25), April 2002.  e. Left heart catheterization with distal circumflex disease: EF 20%, September 2002.   f. MUGA in May 2003: EF 32%.   g. Sestamibi GXT on 04/08/2005: No ischemia. EF 29%.   h. Residual NYHA class I angina pectoris/CHF with reduced acceptable MUGA scan: EF (0.32) and acceptable Pacesetter PCD interrogation/reprogramming, May 2003 with interrogation, September 2004.  i. Echocardiogram, September 2009 with EF 30%  j. Left heart catheterization by Dr. Bhupinder Gonzalez, August 2010, with LVEF of 35%, with 3-vessel native coronary artery disease, 95% mid-LAD status post PTCA and stenting with two 3 mm stents by Dr. Llanes.  k. Echocardiogram, 06/07/2012: Left ventricular ejection fraction of 30%.  l. Hospitalization, 02/01/2014, for non-ST elevation MI, left heart catheterization by Dr. Ayala that demonstrated 60% mid stenosis of the right coronary artery that remains unchanged compared to previous, widely patent stents of the LAD with severe LV dysfunction of approximately 25%.   m. Left heart catheterization status post drug-eluting stent to the distal LAD and mid-RCA with an EF of 20% to 25% by Dr. Diego Ayala, 04/20/2015.   n. Left heart catheterization 1/16/17; nonobstructive CAD with patent previously placed stents, dilated cardiomyopathy with severe LV systolic dysfunction, EF less than 20%, normal hemodynamics, recommendations for continued medical therapy and risk factor, evaluation for noncardiac etiology of  symptoms.  o. Residual CCS Class I/II angina pectoris/NYHA Class II exertional dyspnea and fatigue syndrome, summer 2017  p. Residual CCS 0 angina pectoris/NYHA class I-II exertional dyspnea and fatigue, February 2018, September 2018, March 2019.  3. Dyslipidemia.  4. Status post remote operations.  5. Remote chronic tobacco use/abnormal chest x-ray with stable abnormal thoracic  CT scan without contrast demonstrating bilateral diffuse perimeter scarring and fibrosis with scattered subcentimeter noncalcified nodules (unchanged from February 2016), March 2017.  6. Left bundle branch block.  7. Burks trauma with hematuria with urosepsis requiring hospitalization, May 2015.  8. Pulmonary embolism, spring 2015.   9. Remote apparent hypothyroidism/replacement therapy - data deficit, January 2016.  10. Remote diagnosis of obstructive sleep apnea with CPAP use, 2017.  11. Bilateral cataract extraction November 2018, February 2019     Allergies  No Known Allergies    Current Medications    Current Outpatient Medications:   •  acetaminophen (TYLENOL) 500 MG tablet, Every 6 (Six) Hours., Disp: , Rfl:   •  amiodarone (PACERONE) 100 MG half tablet, Take 100 mg by mouth Daily., Disp: , Rfl:   •  carvedilol (COREG) 12.5 MG tablet, Take 12.5 mg by mouth 2 (Two) Times a Day With Meals., Disp: , Rfl:   •  cholecalciferol (VITAMIN D3) 25 MCG (1000 UT) tablet, Take 4,000 Units by mouth Daily., Disp: , Rfl:   •  clopidogrel (PLAVIX) 75 MG tablet, Take 1 tablet by mouth Every Morning., Disp: 30 tablet, Rfl:   •  econazole nitrate (SPECTAZOLE) 1 % cream, econazole 1 % topical cream, Disp: , Rfl:   •  empagliflozin (JARDIANCE) 10 MG tablet tablet, Take 1 tablet by mouth Daily., Disp: 90 tablet, Rfl: 2  •  eplerenone (INSPRA) 25 MG tablet, Take 0.5 tablets by mouth Daily., Disp: 90 tablet, Rfl: 0  •  finasteride (PROSCAR) 5 MG tablet, Take 5 mg by mouth Every Morning., Disp: , Rfl:   •  L-TRYPTOPHAN PO, Take  by mouth Every Night. 3  TABLETS NIGHTLY-     1500 MG EACH, Disp: , Rfl:   •  levothyroxine (SYNTHROID, LEVOTHROID) 25 MCG tablet, Take 50 mcg by mouth Every Morning., Disp: , Rfl:   •  melatonin 5 MG tablet tablet, Take 5 mg by mouth At Night As Needed. 2 tabs at night, Disp: , Rfl:   •  metFORMIN (GLUCOPHAGE) 1000 MG tablet, Take 1,000 mg by mouth 2 (two) times a day., Disp: , Rfl:   •  Multiple Vitamins-Minerals (ICAPS AREDS 2 PO), Take 1 tablet by mouth 2 (two) times a day., Disp: , Rfl:   •  Multiple Vitamins-Minerals (MULTIVITAMIN ADULTS 50+) tablet, Take 1 tablet by mouth Daily., Disp: , Rfl:   •  niacin (NIASPAN) 1000 MG CR tablet, Take 1,000 mg by mouth Every Night., Disp: , Rfl:   •  nitroglycerin (NITROSTAT) 0.4 MG SL tablet, Place 1 tablet under the tongue Every 5 (Five) Minutes As Needed for Chest Pain. Take no more than 3 doses in 15 minutes., Disp: 30 tablet, Rfl: 11  •  pantoprazole (PROTONIX) 40 MG EC tablet, Take 40 mg by mouth Every Morning., Disp: , Rfl:   •  polyethylene glycol (MIRALAX) packet, Take 17 g by mouth Every Other Day., Disp: , Rfl:   •  rosuvastatin (CRESTOR) 40 MG tablet, Take 40 mg by mouth Daily., Disp: , Rfl:   •  sacubitril-valsartan (ENTRESTO) 49-51 MG tablet, Take 1 tablet by mouth 2 (Two) Times a Day., Disp: 180 tablet, Rfl: 3  •  Synthroid 50 MCG tablet, Take 50 mcg by mouth Daily., Disp: , Rfl:   •  temazepam (RESTORIL) 15 MG capsule, Take 1 capsule by mouth At Night As Needed for Sleep., Disp: , Rfl:   •  torsemide (DEMADEX) 5 MG tablet, TAKE 1 TABLET DAILY (Patient taking differently: Take 5 mg by mouth Daily.), Disp: 90 tablet, Rfl: 1  •  vitamin B-12 (CYANOCOBALAMIN) 1000 MCG tablet, Take 1,000 mcg by mouth Daily., Disp: , Rfl:   •  Xarelto 10 MG tablet, TAKE 1 TABLET DAILY, Disp: 90 tablet, Rfl: 0  •  amiodarone (PACERONE) 200 MG tablet, Take 1 tablet by mouth Daily., Disp: 90 tablet, Rfl: 0  •  benzonatate (TESSALON) 200 MG capsule, Take 1 capsule by mouth 3 (Three) Times a Day As Needed for  "Cough., Disp: 90 capsule, Rfl: 0  •  nitroglycerin (NITROSTAT) 0.4 MG SL tablet, nitroglycerin 0.4 mg sublingual tablet  Place 1 tablet by sublingual route., Disp: , Rfl:     History of Present Illness     Pt presents for follow up of VT/CAD/CHF.  Mr. Roth once again returns after multiple bouts of VT, VF arrest requiring ICD shocks.  He was admitted in December 2 [facility] with hypotension, positive for influenza and severe dehydration.  He again had a VT with attempted ATP and subsequent ICD shocks.  He was on IV lidocaine drip over the weekend continue on amiodarone.  Follow-up echocardiogram back in October revealed EF 20%.  In addition above he had elevated LFTs.  Over his course in the hospital he became better with hydration fluids resolution of his influenza.  Since then the patient tells me he is back to full on cardiac rehab.  He has had no recurrent ICD shocks.  He is tolerating his medications well.    Vitals:    02/20/23 1421   BP: 100/52   BP Location: Left arm   Patient Position: Sitting   Pulse: 69   SpO2: 98%   Weight: 81.6 kg (180 lb)   Height: 185.4 cm (72.99\")       PE:  General: NAD  Neck: no JVD, no carotid bruits, no TM  Heart RRR, NL S1, S2, S4 present, no rubs, murmurs  Lungs: CTA, no wheezes, rhonchi, or rales  Abd: soft, non-tender, NL BS  Ext: No musculoskeletal deformities, trace to 1+ edema  Psych: normal mood and affect    Diagnostic Data:    ECG 12 Lead    Date/Time: 2/20/2023 4:31 PM  Performed by: Viet Manzano PA  Authorized by: Viet Manzano PA   Comparison: compared with previous ECG from 12/20/2022  Similar to previous ECG  Rhythm: sinus rhythm and paced  Rate: normal  Conduction: conduction normal  T Waves: T waves normal  QRS axis: normal    Clinical impression: non-specific ECG        .    1. Chronic systolic congestive heart failure (HCC)    2. ICD (implantable cardioverter-defibrillator) in place    3. Wide-complex tachycardia (S/P " Cardioversion, AICD discharges)        ICD interrogation: [employer] BiV ICD.  DDDR at 70.  A paced 99%.  BiV paced 99%.  Normal P wave R wave thresholds and impedances.  Battery voltage is 1.6 years remaining.  No VT or any other significant arrhythmias.  VT zone settings: VT 1 zone 141 ATP x3, VT 2 zone 181 ATP x3, VF zone 222 ATP x1.    1. VT: Previous remote ablation, continue amiodarone therapy.  No recurrent events since admission December 2022.  -  2. End-stage Cardiomyopathy: Echocardiogram October 2022 EF 20% Jardiance added to GDMT.   Class III SHF symptoms.  He appears to be euvolemic today.  -  3. CAD:  -No angina symptoms.      4. History of PE:  - on Xarelto 10mg daily     Continue guideline directed medical therapy.  Continue cardiac rehab.  Consider titrating carvedilol as tolerated.  He will schedule follow-up with Dr. Flanagan at in March and with Dr. Alba in 6 months or sooner as needed.  Electronically signed by PAULA Alcantar, 02/20/23, 4:30 PM EST.

## 2023-02-22 ENCOUNTER — TREATMENT (OUTPATIENT)
Dept: CARDIAC REHAB | Facility: HOSPITAL | Age: 78
End: 2023-02-22
Payer: MEDICARE

## 2023-02-22 DIAGNOSIS — I50.23 ACUTE ON CHRONIC SYSTOLIC CHF (CONGESTIVE HEART FAILURE): Primary | ICD-10-CM

## 2023-02-22 PROCEDURE — 93798 PHYS/QHP OP CAR RHAB W/ECG: CPT

## 2023-02-24 ENCOUNTER — TREATMENT (OUTPATIENT)
Dept: CARDIAC REHAB | Facility: HOSPITAL | Age: 78
End: 2023-02-24
Payer: MEDICARE

## 2023-02-24 DIAGNOSIS — I50.23 ACUTE ON CHRONIC SYSTOLIC CHF (CONGESTIVE HEART FAILURE): Primary | ICD-10-CM

## 2023-02-24 PROCEDURE — 93798 PHYS/QHP OP CAR RHAB W/ECG: CPT

## 2023-02-27 ENCOUNTER — TREATMENT (OUTPATIENT)
Dept: CARDIAC REHAB | Facility: HOSPITAL | Age: 78
End: 2023-02-27
Payer: MEDICARE

## 2023-02-27 DIAGNOSIS — I50.23 ACUTE ON CHRONIC SYSTOLIC CHF (CONGESTIVE HEART FAILURE): Primary | ICD-10-CM

## 2023-02-27 PROCEDURE — 93798 PHYS/QHP OP CAR RHAB W/ECG: CPT

## 2023-02-27 NOTE — PROGRESS NOTES
Attended Phase II Cardiac Rehab. No medication or health history changes reported. See [facility] ED for details.  
no known allergies

## 2023-03-01 ENCOUNTER — TREATMENT (OUTPATIENT)
Dept: CARDIAC REHAB | Facility: HOSPITAL | Age: 78
End: 2023-03-01

## 2023-03-01 DIAGNOSIS — I50.23 ACUTE ON CHRONIC SYSTOLIC CHF (CONGESTIVE HEART FAILURE): Primary | ICD-10-CM

## 2023-03-01 PROCEDURE — 93798 PHYS/QHP OP CAR RHAB W/ECG: CPT

## 2023-03-03 ENCOUNTER — TELEPHONE (OUTPATIENT)
Dept: CARDIAC REHAB | Facility: HOSPITAL | Age: 78
End: 2023-03-03
Payer: MEDICARE

## 2023-03-03 NOTE — TELEPHONE ENCOUNTER
Patient called to cancel his cardiac rehab session. He has a GI issue. He plans to  Return on 3/6/23.

## 2023-03-06 ENCOUNTER — TREATMENT (OUTPATIENT)
Dept: CARDIAC REHAB | Facility: HOSPITAL | Age: 78
End: 2023-03-06

## 2023-03-06 DIAGNOSIS — I50.23 ACUTE ON CHRONIC SYSTOLIC CHF (CONGESTIVE HEART FAILURE): Primary | ICD-10-CM

## 2023-03-06 PROCEDURE — 93798 PHYS/QHP OP CAR RHAB W/ECG: CPT

## 2023-03-07 ENCOUNTER — OFFICE VISIT (OUTPATIENT)
Dept: CARDIOLOGY | Facility: CLINIC | Age: 78
End: 2023-03-07
Payer: MEDICARE

## 2023-03-07 VITALS
DIASTOLIC BLOOD PRESSURE: 56 MMHG | BODY MASS INDEX: 23.86 KG/M2 | SYSTOLIC BLOOD PRESSURE: 92 MMHG | WEIGHT: 180 LBS | HEIGHT: 73 IN | OXYGEN SATURATION: 97 % | HEART RATE: 70 BPM

## 2023-03-07 DIAGNOSIS — E78.5 HYPERLIPIDEMIA LDL GOAL <70: ICD-10-CM

## 2023-03-07 DIAGNOSIS — Z86.711 PERSONAL HISTORY OF PE (PULMONARY EMBOLISM): ICD-10-CM

## 2023-03-07 DIAGNOSIS — I50.22 CHRONIC SYSTOLIC CONGESTIVE HEART FAILURE: Primary | ICD-10-CM

## 2023-03-07 DIAGNOSIS — I25.10 CORONARY ARTERY DISEASE INVOLVING NATIVE CORONARY ARTERY OF NATIVE HEART WITHOUT ANGINA PECTORIS: ICD-10-CM

## 2023-03-07 DIAGNOSIS — I47.20 VENTRICULAR TACHYCARDIA: ICD-10-CM

## 2023-03-07 PROBLEM — R74.01 TRANSAMINITIS: Status: RESOLVED | Noted: 2022-12-08 | Resolved: 2023-03-07

## 2023-03-07 PROBLEM — R74.01 ELEVATED AST (SGOT): Status: RESOLVED | Noted: 2022-09-10 | Resolved: 2023-03-07

## 2023-03-07 PROBLEM — R79.89 ELEVATED LFTS: Status: RESOLVED | Noted: 2022-12-09 | Resolved: 2023-03-07

## 2023-03-07 PROBLEM — J10.1 INFLUENZA A: Status: RESOLVED | Noted: 2022-12-08 | Resolved: 2023-03-07

## 2023-03-07 PROCEDURE — 99214 OFFICE O/P EST MOD 30 MIN: CPT | Performed by: INTERNAL MEDICINE

## 2023-03-07 RX ORDER — NITROGLYCERIN 0.4 MG/1
0.4 TABLET SUBLINGUAL
Qty: 25 TABLET | Refills: 5 | Status: SHIPPED | OUTPATIENT
Start: 2023-03-07

## 2023-03-07 RX ORDER — CARVEDILOL 12.5 MG/1
12.5 TABLET ORAL 2 TIMES DAILY WITH MEALS
Qty: 180 TABLET | Refills: 3 | Status: SHIPPED | OUTPATIENT
Start: 2023-03-07

## 2023-03-07 RX ORDER — SACUBITRIL AND VALSARTAN 49; 51 MG/1; MG/1
1 TABLET, FILM COATED ORAL 2 TIMES DAILY
Qty: 180 TABLET | Refills: 3 | Status: SHIPPED | OUTPATIENT
Start: 2023-03-07

## 2023-03-07 RX ORDER — ROSUVASTATIN CALCIUM 40 MG/1
40 TABLET, COATED ORAL DAILY
Qty: 90 TABLET | Refills: 3 | Status: SHIPPED | OUTPATIENT
Start: 2023-03-07

## 2023-03-07 RX ORDER — CLOPIDOGREL BISULFATE 75 MG/1
75 TABLET ORAL EVERY MORNING
Qty: 90 TABLET | Refills: 3 | Status: SHIPPED | OUTPATIENT
Start: 2023-03-07

## 2023-03-07 RX ORDER — TORSEMIDE 5 MG/1
5 TABLET ORAL DAILY
Qty: 90 TABLET | Refills: 3 | Status: SHIPPED | OUTPATIENT
Start: 2023-03-07

## 2023-03-07 RX ORDER — EPLERENONE 25 MG/1
12.5 TABLET, FILM COATED ORAL
Qty: 90 TABLET | Refills: 0 | Status: SHIPPED | OUTPATIENT
Start: 2023-03-07

## 2023-03-07 NOTE — PROGRESS NOTES
Cardiology Outpatient Visit      Identification: Jonnie Roth is a 78 y.o. male who resides in [place], [place].    Reason for visit:  · HFrEF  · CAD  · CV risk factors    Cris      Ilda returns to the office today.  He has been doing well since his ER visit in December where he was noted to have ventricular tachycardia in the setting of influenza.    He has expected shortness of breath but overall this has not been bothersome to him.  He continues to participate in cardiac rehab and enjoys the exercise.  He denies angina.  No TIA or stroke symptoms    Review of Systems   Cardiovascular: Negative.    Respiratory: Negative.        No Known Allergies      Current Outpatient Medications   Medication Instructions   • acetaminophen (TYLENOL) 500 mg, Oral, Every 6 Hours   • amiodarone (PACERONE) 100 mg, Oral, Daily   • carvedilol (COREG) 12.5 mg, Oral, 2 Times Daily With Meals   • cholecalciferol (VITAMIN D3) 4,000 Units, Oral, Daily   • clopidogrel (PLAVIX) 75 mg, Oral, Every Morning   • econazole nitrate (SPECTAZOLE) 1 % cream econazole 1 % topical cream   • empagliflozin (JARDIANCE) 10 mg, Oral, Daily   • eplerenone (INSPRA) 12.5 mg, Oral, Every 24 Hours Scheduled   • finasteride (PROSCAR) 5 mg, Oral, Every Morning   • L-TRYPTOPHAN PO Oral, Nightly, 3 TABLETS NIGHTLY-     1500 MG EACH   • levothyroxine (SYNTHROID, LEVOTHROID) 50 mcg, Oral, Every Morning   • melatonin 5 mg, Oral, Nightly PRN, 2 tabs at night    • metFORMIN (GLUCOPHAGE) 1,000 mg, Oral, 2 times daily   • Multiple Vitamins-Minerals (ICAPS AREDS 2 PO) 1 tablet, Oral, 2 times daily   • Multiple Vitamins-Minerals (MULTIVITAMIN ADULTS 50+) tablet 1 tablet, Oral, Daily   • niacin (NIASPAN) 1,000 mg, Oral, Nightly   • nitroglycerin (NITROSTAT) 0.4 mg, Sublingual, Every 5 Minutes PRN, Take no more than 3 doses in 15 minutes.   • pantoprazole (PROTONIX) 40 mg, Oral, Every Morning   • polyethylene glycol (MIRALAX) packet 17 g, Oral, Every Other Day   •  "rivaroxaban (XARELTO) 10 mg, Oral, Daily   • rosuvastatin (CRESTOR) 40 mg, Oral, Daily   • sacubitril-valsartan (Entresto) 49-51 MG tablet 1 tablet, Oral, 2 Times Daily   • temazepam (RESTORIL) 15 mg, Oral, Nightly PRN   • torsemide (DEMADEX) 5 mg, Oral, Daily   • vitamin B-12 (CYANOCOBALAMIN) 1,000 mcg, Oral, Daily         Objective     BP 92/56 (BP Location: Left arm, Patient Position: Sitting, Cuff Size: Adult)   Pulse 70   Ht 185.4 cm (73\")   Wt 81.6 kg (180 lb)   SpO2 97%   BMI 23.75 kg/m²       Constitutional:       Appearance: Healthy appearance.   Eyes:      General: No scleral icterus.  Neck:      Thyroid: No thyroid mass.      Vascular: No carotid bruit or JVD. JVD normal.   Pulmonary:      Effort: Pulmonary effort is normal.      Breath sounds: Normal breath sounds.   Cardiovascular:      Normal rate. Regular rhythm.      Murmurs: There is no murmur.      No gallop.   Abdominal:      Palpations: There is no pulsatile midline mass.   Musculoskeletal:      Extremities: No clubbing present.Skin:     General: Skin is warm. There is no cyanosis.   Neurological:      General: No focal deficit present.      Mental Status: Alert.   Psychiatric:         Attention and Perception: Attention normal.         Result Review  (reviewed with patient):            Lab Results   Component Value Date    GLUCOSE 96 02/20/2023    BUN 31 (H) 02/20/2023    CREATININE 0.85 02/20/2023    EGFR 88.4 02/20/2023    BCR 36.5 (H) 02/20/2023    K 4.4 02/20/2023    CO2 22.0 02/20/2023    CALCIUM 9.3 02/20/2023    ALBUMIN 4.2 02/20/2023    AST 29 02/20/2023    ALT 30 02/20/2023     Lab Results   Component Value Date    WBC 5.96 12/13/2022    HGB 13.0 12/13/2022    HCT 40.6 12/13/2022    MCV 89.6 12/13/2022     12/13/2022     Lab Results   Component Value Date    CHOL 116 09/10/2022    TRIG 111 09/10/2022    HDL 43 09/10/2022    LDL 53 09/10/2022     Lab Results   Component Value Date    HGBA1C 5.60 12/09/2022 "           Assessment     Diagnoses and all orders for this visit:    1. Chronic systolic congestive heart failure (HCC) (Primary)  Overview:  · Large anterior MI with LVEF 35%, 1995  · Echo (11/8/2019): LVEF 30%.  Akinesis/dyskinesis of the mid to distal anterior wall and apex.  · Echo (9/10/2022): LVEF < 20%.  Dilated left ventricle with diffuse hypokinesis and a kinesis of the mid to distal anterior segments and apex    Assessment & Plan:  · Stage C HFrEF due to ischemic etiology  · Stable NYHA class II symptoms  · Continue carvedilol, Entresto, empagliflozin, and eplerenone as well as torsemide    Orders:  -     carvedilol (COREG) 12.5 MG tablet; Take 1 tablet by mouth 2 (Two) Times a Day With Meals.  Dispense: 180 tablet; Refill: 3  -     empagliflozin (JARDIANCE) 10 MG tablet tablet; Take 1 tablet by mouth Daily.  Dispense: 90 tablet; Refill: 3  -     eplerenone (INSPRA) 25 MG tablet; Take 0.5 tablets by mouth Daily.  Dispense: 90 tablet; Refill: 0  -     torsemide (DEMADEX) 5 MG tablet; Take 1 tablet by mouth Daily.  Dispense: 90 tablet; Refill: 3  -     sacubitril-valsartan (Entresto) 49-51 MG tablet; Take 1 tablet by mouth 2 (Two) Times a Day.  Dispense: 180 tablet; Refill: 3    2. Coronary artery disease involving native coronary artery of native heart without angina pectoris  Overview:  · Cardiac catheterization for anterior MI (4/1995):  PCI of the proximal/mid LAD, 4/1995.  LVEF 35%  · Cardiac catheterization for NSTEMI (2/1/2014): Moderate RCA stenosis.  Widely patent LAD stents.  LVEF 25%.  · Cardiac catheterization by Dr. Ayala (4/20/2015): PCI of distal LAD.  · Cardiac catheterization (1/16/2017): Nonobstructive CAD.  LVEF <20%.  Normal hemodynamics.    Assessment & Plan:  · No angina  · Continue beta-blocker and statin     Orders:  -     carvedilol (COREG) 12.5 MG tablet; Take 1 tablet by mouth 2 (Two) Times a Day With Meals.  Dispense: 180 tablet; Refill: 3  -     nitroglycerin (NITROSTAT) 0.4 MG  SL tablet; Place 1 tablet under the tongue Every 5 (Five) Minutes As Needed for Chest Pain. Take no more than 3 doses in 15 minutes.  Dispense: 25 tablet; Refill: 5  -     clopidogrel (PLAVIX) 75 MG tablet; Take 1 tablet by mouth Every Morning.  Dispense: 90 tablet; Refill: 3    3. Hyperlipidemia LDL goal <70  Overview:  • High intensity statin therapy indicated given the presence of CAD    Assessment & Plan:  · Well-controlled  · Continue rosuvastatin  · Niacin per Dr. Lewis    Orders:  -     rosuvastatin (CRESTOR) 40 MG tablet; Take 1 tablet by mouth Daily.  Dispense: 90 tablet; Refill: 3    4. Ventricular tachycardia  Overview:  · Sudden cardiac death with primary VF status post ICD, September 2020  · Multiple ICD discharges for VF/flutter/tachycardia  · Amiodarone therapy initiated 2012 and 2015  · EP study with radiofrequency ablation of large LV scar, 5/21/2015  · [facility] admission for recurrent VT/VF status post shock after ineffective ATP, 9/10/2022  · [facility] admission for recurrent ventricular arrhythmia on amiodarone in the setting of influenza, 12/8/2022    Assessment & Plan:  · No symptomatic recurrence of ventricular tachycardia since 12/2022  · Continue amiodarone 100 mg daily      5. H/O PE (2015)  Assessment & Plan:  · Presently on rivaroxaban for VTE prophylaxis    Orders:  -     rivaroxaban (Xarelto) 10 MG tablet; Take 1 tablet by mouth Daily.  Dispense: 90 tablet; Refill: 3        Plan   • Continue present medical therapy  • Follow-up in EP clinic in approximately 6 months  • Follow-up with me 6 months following that visit      Follow-up   · 6 months after EP office visit           Bro Flanagan MD, FACC, [facility]  3/7/2023

## 2023-03-07 NOTE — ASSESSMENT & PLAN NOTE
· Stage C HFrEF due to ischemic etiology  · Stable NYHA class II symptoms  · Continue carvedilol, Entresto, empagliflozin, and eplerenone as well as torsemide

## 2023-03-07 NOTE — ASSESSMENT & PLAN NOTE
· No symptomatic recurrence of ventricular tachycardia since 12/2022  · Continue amiodarone 100 mg daily

## 2023-03-08 ENCOUNTER — TREATMENT (OUTPATIENT)
Dept: CARDIAC REHAB | Facility: HOSPITAL | Age: 78
End: 2023-03-08

## 2023-03-08 DIAGNOSIS — I50.23 ACUTE ON CHRONIC SYSTOLIC CHF (CONGESTIVE HEART FAILURE): Primary | ICD-10-CM

## 2023-03-08 PROCEDURE — 93798 PHYS/QHP OP CAR RHAB W/ECG: CPT

## 2023-03-10 ENCOUNTER — TREATMENT (OUTPATIENT)
Dept: CARDIAC REHAB | Facility: HOSPITAL | Age: 78
End: 2023-03-10

## 2023-03-10 DIAGNOSIS — I50.23 ACUTE ON CHRONIC SYSTOLIC CHF (CONGESTIVE HEART FAILURE): Primary | ICD-10-CM

## 2023-03-10 PROCEDURE — 93798 PHYS/QHP OP CAR RHAB W/ECG: CPT

## 2023-03-13 ENCOUNTER — TRANSCRIBE ORDERS (OUTPATIENT)
Dept: CARDIAC REHAB | Facility: HOSPITAL | Age: 78
End: 2023-03-13
Payer: MEDICARE

## 2023-03-13 ENCOUNTER — TREATMENT (OUTPATIENT)
Dept: CARDIAC REHAB | Facility: HOSPITAL | Age: 78
End: 2023-03-13

## 2023-03-13 DIAGNOSIS — Z00.00 PREVENTATIVE HEALTH CARE: Primary | ICD-10-CM

## 2023-03-13 DIAGNOSIS — I50.23 ACUTE ON CHRONIC SYSTOLIC CHF (CONGESTIVE HEART FAILURE): Primary | ICD-10-CM

## 2023-03-13 PROCEDURE — 93798 PHYS/QHP OP CAR RHAB W/ECG: CPT

## 2023-03-14 ENCOUNTER — TREATMENT (OUTPATIENT)
Dept: CARDIAC REHAB | Facility: HOSPITAL | Age: 78
End: 2023-03-14

## 2023-03-14 DIAGNOSIS — Z00.00 PREVENTATIVE HEALTH CARE: ICD-10-CM

## 2023-03-15 ENCOUNTER — APPOINTMENT (OUTPATIENT)
Dept: CARDIAC REHAB | Facility: HOSPITAL | Age: 78
End: 2023-03-15

## 2023-03-16 ENCOUNTER — TREATMENT (OUTPATIENT)
Dept: CARDIAC REHAB | Facility: HOSPITAL | Age: 78
End: 2023-03-16

## 2023-03-16 DIAGNOSIS — Z00.00 PREVENTATIVE HEALTH CARE: Primary | ICD-10-CM

## 2023-03-17 ENCOUNTER — APPOINTMENT (OUTPATIENT)
Dept: CARDIAC REHAB | Facility: HOSPITAL | Age: 78
End: 2023-03-17

## 2023-03-20 ENCOUNTER — APPOINTMENT (OUTPATIENT)
Dept: CARDIAC REHAB | Facility: HOSPITAL | Age: 78
End: 2023-03-20

## 2023-03-21 ENCOUNTER — TREATMENT (OUTPATIENT)
Dept: CARDIAC REHAB | Facility: HOSPITAL | Age: 78
End: 2023-03-21

## 2023-03-21 DIAGNOSIS — Z00.00 PREVENTATIVE HEALTH CARE: Primary | ICD-10-CM

## 2023-03-22 ENCOUNTER — APPOINTMENT (OUTPATIENT)
Dept: CARDIAC REHAB | Facility: HOSPITAL | Age: 78
End: 2023-03-22

## 2023-03-24 ENCOUNTER — APPOINTMENT (OUTPATIENT)
Dept: CARDIAC REHAB | Facility: HOSPITAL | Age: 78
End: 2023-03-24

## 2023-03-27 ENCOUNTER — APPOINTMENT (OUTPATIENT)
Dept: CARDIAC REHAB | Facility: HOSPITAL | Age: 78
End: 2023-03-27

## 2023-03-28 ENCOUNTER — TREATMENT (OUTPATIENT)
Dept: CARDIAC REHAB | Facility: HOSPITAL | Age: 78
End: 2023-03-28

## 2023-03-28 DIAGNOSIS — Z00.00 PREVENTATIVE HEALTH CARE: Primary | ICD-10-CM

## 2023-03-30 ENCOUNTER — TREATMENT (OUTPATIENT)
Dept: CARDIAC REHAB | Facility: HOSPITAL | Age: 78
End: 2023-03-30

## 2023-03-30 DIAGNOSIS — Z00.00 PREVENTATIVE HEALTH CARE: Primary | ICD-10-CM

## 2023-04-03 ENCOUNTER — TREATMENT (OUTPATIENT)
Dept: CARDIAC REHAB | Facility: HOSPITAL | Age: 78
End: 2023-04-03

## 2023-04-03 DIAGNOSIS — Z00.00 PREVENTATIVE HEALTH CARE: Primary | ICD-10-CM

## 2023-04-04 ENCOUNTER — TREATMENT (OUTPATIENT)
Dept: CARDIAC REHAB | Facility: HOSPITAL | Age: 78
End: 2023-04-04

## 2023-04-04 DIAGNOSIS — Z00.00 PREVENTATIVE HEALTH CARE: Primary | ICD-10-CM

## 2023-04-05 ENCOUNTER — TREATMENT (OUTPATIENT)
Dept: CARDIAC REHAB | Facility: HOSPITAL | Age: 78
End: 2023-04-05

## 2023-04-05 DIAGNOSIS — Z00.00 PREVENTATIVE HEALTH CARE: Primary | ICD-10-CM

## 2023-04-06 ENCOUNTER — TREATMENT (OUTPATIENT)
Dept: CARDIAC REHAB | Facility: HOSPITAL | Age: 78
End: 2023-04-06

## 2023-04-06 DIAGNOSIS — Z00.00 PREVENTATIVE HEALTH CARE: Primary | ICD-10-CM

## 2023-04-07 ENCOUNTER — TREATMENT (OUTPATIENT)
Dept: CARDIAC REHAB | Facility: HOSPITAL | Age: 78
End: 2023-04-07

## 2023-04-07 DIAGNOSIS — Z00.00 PREVENTATIVE HEALTH CARE: Primary | ICD-10-CM

## 2023-04-10 ENCOUNTER — TREATMENT (OUTPATIENT)
Dept: CARDIAC REHAB | Facility: HOSPITAL | Age: 78
End: 2023-04-10

## 2023-04-10 DIAGNOSIS — Z00.00 PREVENTATIVE HEALTH CARE: Primary | ICD-10-CM

## 2023-04-11 ENCOUNTER — TREATMENT (OUTPATIENT)
Dept: CARDIAC REHAB | Facility: HOSPITAL | Age: 78
End: 2023-04-11

## 2023-04-11 DIAGNOSIS — Z00.00 PREVENTATIVE HEALTH CARE: Primary | ICD-10-CM

## 2023-04-12 ENCOUNTER — TREATMENT (OUTPATIENT)
Dept: CARDIAC REHAB | Facility: HOSPITAL | Age: 78
End: 2023-04-12

## 2023-04-12 DIAGNOSIS — Z00.00 PREVENTATIVE HEALTH CARE: Primary | ICD-10-CM

## 2023-04-13 ENCOUNTER — TREATMENT (OUTPATIENT)
Dept: CARDIAC REHAB | Facility: HOSPITAL | Age: 78
End: 2023-04-13

## 2023-04-13 DIAGNOSIS — Z00.00 PREVENTATIVE HEALTH CARE: Primary | ICD-10-CM

## 2023-04-14 ENCOUNTER — TREATMENT (OUTPATIENT)
Dept: CARDIAC REHAB | Facility: HOSPITAL | Age: 78
End: 2023-04-14

## 2023-04-14 DIAGNOSIS — Z00.00 PREVENTATIVE HEALTH CARE: Primary | ICD-10-CM

## 2023-04-17 ENCOUNTER — TREATMENT (OUTPATIENT)
Dept: CARDIAC REHAB | Facility: HOSPITAL | Age: 78
End: 2023-04-17

## 2023-04-17 DIAGNOSIS — Z00.00 PREVENTATIVE HEALTH CARE: Primary | ICD-10-CM

## 2023-04-18 ENCOUNTER — TREATMENT (OUTPATIENT)
Dept: CARDIAC REHAB | Facility: HOSPITAL | Age: 78
End: 2023-04-18

## 2023-04-18 DIAGNOSIS — Z00.00 PREVENTATIVE HEALTH CARE: Primary | ICD-10-CM

## 2023-04-19 ENCOUNTER — TREATMENT (OUTPATIENT)
Dept: CARDIAC REHAB | Facility: HOSPITAL | Age: 78
End: 2023-04-19

## 2023-04-19 DIAGNOSIS — Z00.00 PREVENTATIVE HEALTH CARE: Primary | ICD-10-CM

## 2023-04-20 ENCOUNTER — TREATMENT (OUTPATIENT)
Dept: CARDIAC REHAB | Facility: HOSPITAL | Age: 78
End: 2023-04-20

## 2023-04-20 DIAGNOSIS — Z00.00 PREVENTATIVE HEALTH CARE: Primary | ICD-10-CM

## 2023-04-21 ENCOUNTER — TREATMENT (OUTPATIENT)
Dept: CARDIAC REHAB | Facility: HOSPITAL | Age: 78
End: 2023-04-21

## 2023-04-21 DIAGNOSIS — Z00.00 PREVENTATIVE HEALTH CARE: Primary | ICD-10-CM

## 2023-04-21 NOTE — PROGRESS NOTES
Pt. Attended Phase III CardiacRehab. See flow sheet for details.    TRANSFER - IN REPORT: 
 
Verbal report received from DIAN Escobedo (name) on Gely Query  being received from ED (unit) for routine progression of care Report consisted of patients Situation, Background, Assessment and  
Recommendations(SBAR). Information from the following report(s) SBAR was reviewed with the receiving nurse. Opportunity for questions and clarification was provided. Assessment completed upon patients arrival to unit and care assumed. Per ED RN, patient not currently experiencing chest pain. ED RN will document new MEWS score and check BG before transfer.

## 2023-04-24 ENCOUNTER — TREATMENT (OUTPATIENT)
Dept: CARDIAC REHAB | Facility: HOSPITAL | Age: 78
End: 2023-04-24

## 2023-04-24 DIAGNOSIS — Z00.00 PREVENTATIVE HEALTH CARE: Primary | ICD-10-CM

## 2023-04-25 ENCOUNTER — TREATMENT (OUTPATIENT)
Dept: CARDIAC REHAB | Facility: HOSPITAL | Age: 78
End: 2023-04-25

## 2023-04-25 DIAGNOSIS — Z00.00 PREVENTATIVE HEALTH CARE: Primary | ICD-10-CM

## 2023-04-26 ENCOUNTER — TELEPHONE (OUTPATIENT)
Dept: CARDIOLOGY | Facility: CLINIC | Age: 78
End: 2023-04-26
Payer: MEDICARE

## 2023-04-26 ENCOUNTER — APPOINTMENT (OUTPATIENT)
Dept: GENERAL RADIOLOGY | Facility: HOSPITAL | Age: 78
DRG: 309 | End: 2023-04-26
Payer: MEDICARE

## 2023-04-26 ENCOUNTER — HOSPITAL ENCOUNTER (INPATIENT)
Facility: HOSPITAL | Age: 78
LOS: 1 days | Discharge: HOME | DRG: 309 | End: 2023-04-27
Attending: EMERGENCY MEDICINE | Admitting: INTERNAL MEDICINE
Payer: MEDICARE

## 2023-04-26 DIAGNOSIS — R00.2 PALPITATIONS: ICD-10-CM

## 2023-04-26 DIAGNOSIS — Z45.02 DEFIBRILLATOR DISCHARGE: Primary | ICD-10-CM

## 2023-04-26 LAB
ALBUMIN SERPL-MCNC: 4.2 G/DL (ref 3.5–5.2)
ALBUMIN/GLOB SERPL: 1.6 G/DL
ALP SERPL-CCNC: 63 U/L (ref 39–117)
ALT SERPL W P-5'-P-CCNC: 14 U/L (ref 1–41)
ANION GAP SERPL CALCULATED.3IONS-SCNC: 12 MMOL/L (ref 5–15)
AST SERPL-CCNC: 20 U/L (ref 1–40)
BASOPHILS # BLD AUTO: 0.04 10*3/MM3 (ref 0–0.2)
BASOPHILS NFR BLD AUTO: 0.5 % (ref 0–1.5)
BILIRUB SERPL-MCNC: 0.5 MG/DL (ref 0–1.2)
BUN SERPL-MCNC: 23 MG/DL (ref 8–23)
BUN/CREAT SERPL: 31.1 (ref 7–25)
CALCIUM SPEC-SCNC: 9.3 MG/DL (ref 8.6–10.5)
CHLORIDE SERPL-SCNC: 104 MMOL/L (ref 98–107)
CO2 SERPL-SCNC: 21 MMOL/L (ref 22–29)
CREAT SERPL-MCNC: 0.74 MG/DL (ref 0.76–1.27)
DEPRECATED RDW RBC AUTO: 50.7 FL (ref 37–54)
EGFRCR SERPLBLD CKD-EPI 2021: 92.2 ML/MIN/1.73
EOSINOPHIL # BLD AUTO: 0.2 10*3/MM3 (ref 0–0.4)
EOSINOPHIL NFR BLD AUTO: 2.3 % (ref 0.3–6.2)
ERYTHROCYTE [DISTWIDTH] IN BLOOD BY AUTOMATED COUNT: 14.8 % (ref 12.3–15.4)
GLOBULIN UR ELPH-MCNC: 2.6 GM/DL
GLUCOSE BLDC GLUCOMTR-MCNC: 106 MG/DL (ref 70–130)
GLUCOSE SERPL-MCNC: 120 MG/DL (ref 65–99)
HCT VFR BLD AUTO: 43 % (ref 37.5–51)
HGB BLD-MCNC: 13.2 G/DL (ref 13–17.7)
HOLD SPECIMEN: NORMAL
IMM GRANULOCYTES # BLD AUTO: 0.02 10*3/MM3 (ref 0–0.05)
IMM GRANULOCYTES NFR BLD AUTO: 0.2 % (ref 0–0.5)
LYMPHOCYTES # BLD AUTO: 1.52 10*3/MM3 (ref 0.7–3.1)
LYMPHOCYTES NFR BLD AUTO: 17.8 % (ref 19.6–45.3)
MAGNESIUM SERPL-MCNC: 2.2 MG/DL (ref 1.6–2.4)
MCH RBC QN AUTO: 28.5 PG (ref 26.6–33)
MCHC RBC AUTO-ENTMCNC: 30.7 G/DL (ref 31.5–35.7)
MCV RBC AUTO: 92.9 FL (ref 79–97)
MONOCYTES # BLD AUTO: 0.57 10*3/MM3 (ref 0.1–0.9)
MONOCYTES NFR BLD AUTO: 6.7 % (ref 5–12)
NEUTROPHILS NFR BLD AUTO: 6.2 10*3/MM3 (ref 1.7–7)
NEUTROPHILS NFR BLD AUTO: 72.5 % (ref 42.7–76)
NRBC BLD AUTO-RTO: 0 /100 WBC (ref 0–0.2)
NT-PROBNP SERPL-MCNC: 671.4 PG/ML (ref 0–1800)
PLATELET # BLD AUTO: 219 10*3/MM3 (ref 140–450)
PMV BLD AUTO: 10.1 FL (ref 6–12)
POTASSIUM SERPL-SCNC: 4.6 MMOL/L (ref 3.5–5.2)
PROT SERPL-MCNC: 6.8 G/DL (ref 6–8.5)
RBC # BLD AUTO: 4.63 10*6/MM3 (ref 4.14–5.8)
SODIUM SERPL-SCNC: 137 MMOL/L (ref 136–145)
T4 FREE SERPL-MCNC: 1.71 NG/DL (ref 0.93–1.7)
TROPONIN T SERPL HS-MCNC: 20 NG/L
TSH SERPL DL<=0.05 MIU/L-ACNC: 5.18 UIU/ML (ref 0.27–4.2)
WBC NRBC COR # BLD: 8.55 10*3/MM3 (ref 3.4–10.8)
WHOLE BLOOD HOLD COAG: NORMAL
WHOLE BLOOD HOLD SPECIMEN: NORMAL

## 2023-04-26 PROCEDURE — 80053 COMPREHEN METABOLIC PANEL: CPT | Performed by: EMERGENCY MEDICINE

## 2023-04-26 PROCEDURE — 83880 ASSAY OF NATRIURETIC PEPTIDE: CPT | Performed by: EMERGENCY MEDICINE

## 2023-04-26 PROCEDURE — 99284 EMERGENCY DEPT VISIT MOD MDM: CPT

## 2023-04-26 PROCEDURE — 71045 X-RAY EXAM CHEST 1 VIEW: CPT

## 2023-04-26 PROCEDURE — 93005 ELECTROCARDIOGRAM TRACING: CPT | Performed by: EMERGENCY MEDICINE

## 2023-04-26 PROCEDURE — 84439 ASSAY OF FREE THYROXINE: CPT | Performed by: PHYSICIAN ASSISTANT

## 2023-04-26 PROCEDURE — 99222 1ST HOSP IP/OBS MODERATE 55: CPT | Performed by: INTERNAL MEDICINE

## 2023-04-26 PROCEDURE — 82962 GLUCOSE BLOOD TEST: CPT

## 2023-04-26 PROCEDURE — 85025 COMPLETE CBC W/AUTO DIFF WBC: CPT | Performed by: EMERGENCY MEDICINE

## 2023-04-26 PROCEDURE — 83735 ASSAY OF MAGNESIUM: CPT | Performed by: EMERGENCY MEDICINE

## 2023-04-26 PROCEDURE — 25010000002 AMIODARONE IN DEXTROSE 5% 360-4.14 MG/200ML-% SOLUTION: Performed by: PHYSICIAN ASSISTANT

## 2023-04-26 PROCEDURE — 25010000002 AMIODARONE IN DEXTROSE 5% 150-4.21 MG/100ML-% SOLUTION: Performed by: PHYSICIAN ASSISTANT

## 2023-04-26 PROCEDURE — 36415 COLL VENOUS BLD VENIPUNCTURE: CPT

## 2023-04-26 PROCEDURE — 84484 ASSAY OF TROPONIN QUANT: CPT | Performed by: EMERGENCY MEDICINE

## 2023-04-26 PROCEDURE — 93005 ELECTROCARDIOGRAM TRACING: CPT

## 2023-04-26 PROCEDURE — 84443 ASSAY THYROID STIM HORMONE: CPT | Performed by: EMERGENCY MEDICINE

## 2023-04-26 RX ORDER — BISACODYL 10 MG
10 SUPPOSITORY, RECTAL RECTAL DAILY PRN
Status: DISCONTINUED | OUTPATIENT
Start: 2023-04-26 | End: 2023-04-27 | Stop reason: HOSPADM

## 2023-04-26 RX ORDER — SODIUM CHLORIDE 0.9 % (FLUSH) 0.9 %
10 SYRINGE (ML) INJECTION AS NEEDED
Status: DISCONTINUED | OUTPATIENT
Start: 2023-04-26 | End: 2023-04-27 | Stop reason: HOSPADM

## 2023-04-26 RX ORDER — HYDROCODONE BITARTRATE AND ACETAMINOPHEN 5; 325 MG/1; MG/1
1 TABLET ORAL EVERY 6 HOURS PRN
Status: DISCONTINUED | OUTPATIENT
Start: 2023-04-26 | End: 2023-04-27 | Stop reason: HOSPADM

## 2023-04-26 RX ORDER — AMOXICILLIN 250 MG
2 CAPSULE ORAL 2 TIMES DAILY
Status: DISCONTINUED | OUTPATIENT
Start: 2023-04-26 | End: 2023-04-27 | Stop reason: HOSPADM

## 2023-04-26 RX ORDER — MORPHINE SULFATE 2 MG/ML
1 INJECTION, SOLUTION INTRAMUSCULAR; INTRAVENOUS EVERY 4 HOURS PRN
Status: DISCONTINUED | OUTPATIENT
Start: 2023-04-26 | End: 2023-04-27 | Stop reason: HOSPADM

## 2023-04-26 RX ORDER — CARVEDILOL 6.25 MG/1
12.5 TABLET ORAL 2 TIMES DAILY WITH MEALS
Status: DISCONTINUED | OUTPATIENT
Start: 2023-04-26 | End: 2023-04-27 | Stop reason: HOSPADM

## 2023-04-26 RX ORDER — EPLERENONE 25 MG/1
12.5 TABLET, FILM COATED ORAL
Status: DISCONTINUED | OUTPATIENT
Start: 2023-04-27 | End: 2023-04-27 | Stop reason: HOSPADM

## 2023-04-26 RX ORDER — FINASTERIDE 5 MG/1
5 TABLET, FILM COATED ORAL DAILY
Status: DISCONTINUED | OUTPATIENT
Start: 2023-04-27 | End: 2023-04-27 | Stop reason: HOSPADM

## 2023-04-26 RX ORDER — LEVOTHYROXINE SODIUM 0.05 MG/1
50 TABLET ORAL
Status: DISCONTINUED | OUTPATIENT
Start: 2023-04-27 | End: 2023-04-27 | Stop reason: HOSPADM

## 2023-04-26 RX ORDER — PANTOPRAZOLE SODIUM 40 MG/1
40 TABLET, DELAYED RELEASE ORAL
Status: DISCONTINUED | OUTPATIENT
Start: 2023-04-27 | End: 2023-04-27 | Stop reason: HOSPADM

## 2023-04-26 RX ORDER — INSULIN LISPRO 100 [IU]/ML
0-7 INJECTION, SOLUTION INTRAVENOUS; SUBCUTANEOUS
Status: DISCONTINUED | OUTPATIENT
Start: 2023-04-26 | End: 2023-04-27 | Stop reason: HOSPADM

## 2023-04-26 RX ORDER — ONDANSETRON 4 MG/1
4 TABLET, FILM COATED ORAL EVERY 6 HOURS PRN
Status: DISCONTINUED | OUTPATIENT
Start: 2023-04-26 | End: 2023-04-27 | Stop reason: HOSPADM

## 2023-04-26 RX ORDER — NALOXONE HCL 0.4 MG/ML
0.4 VIAL (ML) INJECTION
Status: DISCONTINUED | OUTPATIENT
Start: 2023-04-26 | End: 2023-04-27 | Stop reason: HOSPADM

## 2023-04-26 RX ORDER — BISACODYL 5 MG/1
5 TABLET, DELAYED RELEASE ORAL DAILY PRN
Status: DISCONTINUED | OUTPATIENT
Start: 2023-04-26 | End: 2023-04-27 | Stop reason: HOSPADM

## 2023-04-26 RX ORDER — CLOPIDOGREL BISULFATE 75 MG/1
75 TABLET ORAL EVERY MORNING
Status: DISCONTINUED | OUTPATIENT
Start: 2023-04-27 | End: 2023-04-27 | Stop reason: HOSPADM

## 2023-04-26 RX ORDER — ACETAMINOPHEN 325 MG/1
650 TABLET ORAL EVERY 4 HOURS PRN
Status: DISCONTINUED | OUTPATIENT
Start: 2023-04-26 | End: 2023-04-27 | Stop reason: HOSPADM

## 2023-04-26 RX ORDER — CHOLECALCIFEROL (VITAMIN D3) 125 MCG
10 CAPSULE ORAL NIGHTLY PRN
Status: DISCONTINUED | OUTPATIENT
Start: 2023-04-26 | End: 2023-04-27 | Stop reason: HOSPADM

## 2023-04-26 RX ORDER — ONDANSETRON 2 MG/ML
4 INJECTION INTRAMUSCULAR; INTRAVENOUS EVERY 6 HOURS PRN
Status: DISCONTINUED | OUTPATIENT
Start: 2023-04-26 | End: 2023-04-27 | Stop reason: HOSPADM

## 2023-04-26 RX ORDER — POLYETHYLENE GLYCOL 3350 17 G/17G
17 POWDER, FOR SOLUTION ORAL DAILY PRN
Status: DISCONTINUED | OUTPATIENT
Start: 2023-04-26 | End: 2023-04-27 | Stop reason: HOSPADM

## 2023-04-26 RX ORDER — DEXTROSE MONOHYDRATE 25 G/50ML
25 INJECTION, SOLUTION INTRAVENOUS
Status: DISCONTINUED | OUTPATIENT
Start: 2023-04-26 | End: 2023-04-27 | Stop reason: HOSPADM

## 2023-04-26 RX ORDER — IBUPROFEN 600 MG/1
1 TABLET ORAL
Status: DISCONTINUED | OUTPATIENT
Start: 2023-04-26 | End: 2023-04-27 | Stop reason: HOSPADM

## 2023-04-26 RX ORDER — NICOTINE POLACRILEX 4 MG
15 LOZENGE BUCCAL
Status: DISCONTINUED | OUTPATIENT
Start: 2023-04-26 | End: 2023-04-27 | Stop reason: HOSPADM

## 2023-04-26 RX ORDER — ROSUVASTATIN CALCIUM 20 MG/1
40 TABLET, COATED ORAL NIGHTLY
Status: DISCONTINUED | OUTPATIENT
Start: 2023-04-26 | End: 2023-04-27 | Stop reason: HOSPADM

## 2023-04-26 RX ORDER — TORSEMIDE 10 MG/1
5 TABLET ORAL DAILY
Status: DISCONTINUED | OUTPATIENT
Start: 2023-04-27 | End: 2023-04-27 | Stop reason: HOSPADM

## 2023-04-26 RX ADMIN — AMIODARONE HYDROCHLORIDE 1 MG/MIN: 1.8 INJECTION, SOLUTION INTRAVENOUS at 14:04

## 2023-04-26 RX ADMIN — Medication 10 MG: at 20:14

## 2023-04-26 RX ADMIN — METFORMIN HYDROCHLORIDE 1000 MG: 500 TABLET, FILM COATED ORAL at 17:03

## 2023-04-26 RX ADMIN — SACUBITRIL AND VALSARTAN 1 TABLET: 49; 51 TABLET, FILM COATED ORAL at 20:14

## 2023-04-26 RX ADMIN — AMIODARONE HYDROCHLORIDE 0.5 MG/MIN: 1.8 INJECTION, SOLUTION INTRAVENOUS at 20:13

## 2023-04-26 RX ADMIN — ROSUVASTATIN 40 MG: 20 TABLET, FILM COATED ORAL at 20:14

## 2023-04-26 RX ADMIN — CARVEDILOL 12.5 MG: 6.25 TABLET, FILM COATED ORAL at 17:03

## 2023-04-26 RX ADMIN — RIVAROXABAN 10 MG: 10 TABLET, FILM COATED ORAL at 17:03

## 2023-04-26 RX ADMIN — AMIODARONE HYDROCHLORIDE 150 MG: 1.5 INJECTION, SOLUTION INTRAVENOUS at 13:45

## 2023-04-26 NOTE — CASE MANAGEMENT/SOCIAL WORK
Discharge Planning Assessment  [facility]     Patient Name: Jonnie Roth  MRN: [id number]  Today's Date: 4/26/2023    Admit Date: 4/26/2023    Plan: IDP   Discharge Needs Assessment     Row Name 04/26/23 1425       Living Environment    People in Home spouse    Name(s) of People in Home Trena Roth    Current Living Arrangements home    Potentially Unsafe Housing Conditions unable to assess    Primary Care Provided by self    Provides Primary Care For no one    Family Caregiver if Needed spouse    Family Caregiver Names Trena Roth    Quality of Family Relationships involved;helpful;supportive       Resource/Environmental Concerns    Transportation Concerns none       Transition Planning    Patient/Family Anticipates Transition to home with family    Transportation Anticipated family or friend will provide       Discharge Needs Assessment    Readmission Within the Last 30 Days no previous admission in last 30 days    Equipment Currently Used at Home none    Concerns to be Addressed no discharge needs identified    Equipment Needed After Discharge other (see comments)  tbd    Discharge Facility/Level of Care Needs other (see comments)  tbd               Discharge Plan     Row Name 04/26/23 1426       Plan    Plan IDP    Plan Comments MSW met with pt. and his spouse Trena Roth at bedside. Pt. lives with his spouse in [facility]. Pt.’s PCP is Brian Lewis.  Pt.’s pharmacy is [facility]. Pt.’s insurance is Medicare and [employer]. Pt. reports that he is independent at baseline. Pt. denies DME, O2, or HH services at this time. Pt. reports he has transportation back home when he is medically ready to d/c. Pt. doesn’t have an advanced directive or ACP documentation on file. CM will continue to follow pt. throughout his stay.    Final Discharge Disposition Code 30 - still a patient              Continued Care and Services - Admitted Since 4/26/2023    Coordination has not been started for this  encounter.          Demographic Summary     Row Name 04/26/23 1423       General Information    Admission Type inpatient    Arrived From home    Referral Source admission list;emergency department    Reason for Consult discharge planning    Preferred Language English               Functional Status     Row Name 04/26/23 1424       Functional Status, IADL    Medications independent    Meal Preparation independent    Housekeeping independent    Laundry independent    Shopping independent       Mental Status Summary    Recent Changes in Mental Status/Cognitive Functioning unable to assess       Employment/    Employment Status retired               Psychosocial    No documentation.                Abuse/Neglect    No documentation.                Legal    No documentation.                Substance Abuse    No documentation.                Patient Forms    No documentation.                   YODIT Hsu

## 2023-04-26 NOTE — ED PROVIDER NOTES
Subjective   History of Present Illness  78-year-old male with a known history of congestive heart failure and secondary ventricular tachycardia who takes anticoagulation for Xarelto and has a pacemaker/defibrillator in place.  Previous echocardiogram in October 2022 showed an ejection fraction less than 20%.  The patient reports that at 4AM this morning he began to notice increased heart rate to roughly 120 as he was going back to bed after using the restroom.  His heart rate continues to go up as he was watching on his Fitbit.  He ultimately then got shocked twice and his palpitations have now resolved.  He denies loss of consciousness and change in mentation.  He denies chest pain or shortness of breath.  No recent fever or infectious symptoms.  No abdominal pain.  No reported change in bowel or urinary function.  No other acute complaints.  The patient contacted his cardiologist office and left a message at roughly 5 AM. They ultimately called him back during normal operating hours and he was advised to present here for further work-up.  From review the patient's record the cardiology office reports that the patient was shocked twice after 18 and successful ATP attempts.  The patient was felt to be somnolent over the phone and was advised to present to the ER.        Review of Systems   Constitutional: Positive for fatigue. Negative for chills and fever.   HENT: Negative for congestion, ear pain, postnasal drip, sinus pressure and sore throat.    Eyes: Negative for pain, redness and visual disturbance.   Respiratory: Negative for cough, chest tightness and shortness of breath.    Cardiovascular: Positive for palpitations. Negative for chest pain and leg swelling.   Gastrointestinal: Negative for abdominal pain, anal bleeding, blood in stool, diarrhea, nausea and vomiting.   Endocrine: Negative for polydipsia and polyuria.   Genitourinary: Negative for difficulty urinating, dysuria, frequency and urgency.    Musculoskeletal: Negative for arthralgias, back pain and neck pain.   Skin: Negative for pallor and rash.   Allergic/Immunologic: Negative for environmental allergies and immunocompromised state.   Neurological: Negative for dizziness, weakness and headaches.   Hematological: Negative for adenopathy.   Psychiatric/Behavioral: Negative for confusion, self-injury and suicidal ideas. The patient is not nervous/anxious.    All other systems reviewed and are negative.      Past Medical History:   Diagnosis Date   • Arthritis    • BPH (benign prostatic hypertrophy) 12/28/2016   • CAD (coronary artery disease)    • Disease of thyroid gland    • Enlarged prostate    • HFrEF (heart failure with reduced ejection fraction)    • History of transfusion     NO REACTION RECALLED    • Hypertension    • LBBB (left bundle branch block) 12/28/2016   • Lung nodule    • Macular degeneration    • Pancreatic cyst 12/08/2022   • Polio     as a child   • Pulmonary embolism    • Pulmonary emphysema 12/28/2016   • Sepsis     A. Secondary to [place] trauma with hematuria and urosepsis requiring hospitalization May 2015   • Sleep apnea with use of continuous positive airway pressure (CPAP)     CPAP- SETTING 4    • Ventricular tachycardia 12/28/2016   • Wears glasses     READERS       No Known Allergies    Past Surgical History:   Procedure Laterality Date   • BIVENTRICULLAR IMPLANTABLE CARDIOVERTER DEFIBRILLATOR PLACEMENT     • CARDIAC CATHETERIZATION N/A 1/16/2017    Procedure: Left Heart Cath;  Surgeon: Diego Ayala MD;  Location:  [facility] CATH INVASIVE LOCATION;  Service:    • CARDIAC DEFIBRILLATOR PLACEMENT       A. ICD generator change out with upgrade to BiV pacemaker implantable cardioverter    defibrillator, 12/17/2007. Ventricular fibrillation s/p pacesetter [place] ICD in [place]. [place] 09/2000   • CARDIAC ELECTROPHYSIOLOGY PROCEDURE N/A 10/2/2017    Procedure: BIV ICD  generator change [facility];  Surgeon: Ferdinand Alba MD;  Location:  [facility]  EP INVASIVE LOCATION;  Service:    • CARDIAC ELECTROPHYSIOLOGY PROCEDURE N/A 2021    Procedure: BIV ICD generator change with [employer]. This will need to be done in late  or early July. Hold Xarelto one day prior.;  Surgeon: Ferdinand Alba MD;  Location: [facility] INVASIVE LOCATION;  Service: Cardiology;  Laterality: N/A;   • CATARACT EXTRACTION      Bilateral    • COLONOSCOPY     • CORONARY ANGIOPLASTY WITH STENT PLACEMENT      X7 STENTS TOTAL    • CYSTOSCOPY URETERAL TUMOR FULGURATION  2015     A. Status post cystoscopy with clot evacuation and fulguration of the prostate secondary to hematuria, 2015.   • FOOT SURGERY      RIGHT - POLIO RELATED    • PROSTATE SURGERY      A. Status post laser vaporization, 2009.   • ROOT CANAL     • TOOTH EXTRACTION         Family History   Problem Relation Age of Onset   • Heart failure Father    • Stroke Father        Social History     Socioeconomic History   • Marital status:    Tobacco Use   • Smoking status: Former     Packs/day: 2.00     Years: 30.00     Pack years: 60.00     Types: Cigarettes     Quit date: 1995     Years since quittin.0   • Smokeless tobacco: Never   Vaping Use   • Vaping Use: Never used   Substance and Sexual Activity   • Alcohol use: No   • Drug use: No   • Sexual activity: Defer           Objective   Physical Exam  Vitals and nursing note reviewed.   Constitutional:       General: He is not in acute distress.     Appearance: Normal appearance. He is well-developed. He is not toxic-appearing or diaphoretic.   HENT:      Head: Normocephalic and atraumatic.      Right Ear: External ear normal.      Left Ear: External ear normal.      Nose: Nose normal.   Eyes:      General: Lids are normal.      Pupils: Pupils are equal, round, and reactive to light.   Neck:      Trachea: No tracheal deviation.   Cardiovascular:      Rate and Rhythm: Normal rate and regular rhythm.      Pulses: No decreased pulses.      Heart  sounds: Normal heart sounds. No murmur heard.    No friction rub. No gallop.   Pulmonary:      Effort: Pulmonary effort is normal. No respiratory distress.      Breath sounds: Normal breath sounds. No decreased breath sounds, wheezing, rhonchi or rales.   Abdominal:      General: Bowel sounds are normal.      Palpations: Abdomen is soft.      Tenderness: There is no abdominal tenderness. There is no guarding or rebound.   Musculoskeletal:         General: No deformity. Normal range of motion.      Cervical back: Normal range of motion and neck supple.   Lymphadenopathy:      Cervical: No cervical adenopathy.   Skin:     General: Skin is warm and dry.      Findings: No rash.   Neurological:      Mental Status: He is alert and oriented to person, place, and time.      Cranial Nerves: No cranial nerve deficit.      Sensory: No sensory deficit.   Psychiatric:         Speech: Speech normal.         Behavior: Behavior normal.         Thought Content: Thought content normal.         Judgment: Judgment normal.         Procedures           ED Course                                           Medical Decision Making  Differential diagnosis includes ventricular tachycardia, dysrhythmia, acute coronary syndrome, chest wall pain, inappropriate firing of defibrillator, defibrillator malfunction, other unspecified etiology    The patient's interrogation was reviewed and was appropriate defibrillation for ventricular tachycardia.  The patient is awake and alert with normal mentation appears well at this given time.    No significant vital sign or lab abnormalities.    Chest x-ray is nonrevealing.    I consulted cardiology service to evaluate the patient in the ER and will admit for observation.    Defibrillator discharge: acute illness or injury  Palpitations: acute illness or injury  Amount and/or Complexity of Data Reviewed  Independent Historian: spouse  External Data Reviewed: labs, radiology, ECG and notes.  Labs:  ordered.  Radiology: ordered.  ECG/medicine tests: ordered.      Risk  Prescription drug management.  Decision regarding hospitalization.          Final diagnoses:   Defibrillator discharge   Palpitations       ED Disposition  ED Disposition     ED Disposition   Decision to Admit    Condition   --    Comment   Level of Care: Telemetry [5]   Diagnosis: ICD (implantable cardioverter-defibrillator) discharge [669402]   Admitting Physician: PINA STEVENS [[id number]]   Attending Physician: MARTELL RESENDIZ [[id number]]   Isolate for COVID?: No [0]   Certification: I Certify That Inpatient Hospital Services Are Medically Necessary For Greater Than 2 Midnights               No follow-up provider specified.       Medication List      No changes were made to your prescriptions during this visit.          Alex Reyes MD  04/26/23 [id number]

## 2023-04-27 ENCOUNTER — READMISSION MANAGEMENT (OUTPATIENT)
Dept: CALL CENTER | Facility: HOSPITAL | Age: 78
End: 2023-04-27
Payer: MEDICARE

## 2023-04-27 VITALS
TEMPERATURE: 98.3 F | SYSTOLIC BLOOD PRESSURE: 108 MMHG | WEIGHT: 180 LBS | DIASTOLIC BLOOD PRESSURE: 68 MMHG | BODY MASS INDEX: 23.86 KG/M2 | RESPIRATION RATE: 16 BRPM | HEIGHT: 73 IN | OXYGEN SATURATION: 92 % | HEART RATE: 69 BPM

## 2023-04-27 PROBLEM — I50.22 CHRONIC HFREF (HEART FAILURE WITH REDUCED EJECTION FRACTION): Status: ACTIVE | Noted: 2023-04-27

## 2023-04-27 LAB
GLUCOSE BLDC GLUCOMTR-MCNC: 110 MG/DL (ref 70–130)
GLUCOSE BLDC GLUCOMTR-MCNC: 92 MG/DL (ref 70–130)

## 2023-04-27 PROCEDURE — 82962 GLUCOSE BLOOD TEST: CPT

## 2023-04-27 PROCEDURE — 82948 REAGENT STRIP/BLOOD GLUCOSE: CPT

## 2023-04-27 PROCEDURE — 99238 HOSP IP/OBS DSCHRG MGMT 30/<: CPT | Performed by: PHYSICIAN ASSISTANT

## 2023-04-27 PROCEDURE — 25010000002 AMIODARONE IN DEXTROSE 5% 360-4.14 MG/200ML-% SOLUTION: Performed by: PHYSICIAN ASSISTANT

## 2023-04-27 RX ORDER — MEXILETINE HYDROCHLORIDE 200 MG/1
200 CAPSULE ORAL EVERY 12 HOURS SCHEDULED
Qty: 60 CAPSULE | Refills: 6 | Status: SHIPPED | OUTPATIENT
Start: 2023-04-27 | End: 2023-04-28 | Stop reason: SDUPTHER

## 2023-04-27 RX ORDER — MEXILETINE HYDROCHLORIDE 200 MG/1
200 CAPSULE ORAL EVERY 12 HOURS SCHEDULED
Qty: 60 CAPSULE | Refills: 6 | Status: SHIPPED | OUTPATIENT
Start: 2023-04-27 | End: 2023-04-27 | Stop reason: SDUPTHER

## 2023-04-27 RX ORDER — AMIODARONE HYDROCHLORIDE 200 MG/1
200 TABLET ORAL 3 TIMES DAILY
Qty: 29 TABLET | Refills: 0 | Status: SHIPPED | OUTPATIENT
Start: 2023-04-27 | End: 2023-04-27 | Stop reason: SDUPTHER

## 2023-04-27 RX ORDER — MEXILETINE HYDROCHLORIDE 200 MG/1
200 CAPSULE ORAL EVERY 12 HOURS SCHEDULED
Status: DISCONTINUED | OUTPATIENT
Start: 2023-04-27 | End: 2023-04-27 | Stop reason: HOSPADM

## 2023-04-27 RX ORDER — AMIODARONE HYDROCHLORIDE 200 MG/1
200 TABLET ORAL 3 TIMES DAILY
Qty: 60 TABLET | Refills: 6 | Status: ON HOLD | OUTPATIENT
Start: 2023-04-27

## 2023-04-27 RX ORDER — AMIODARONE HYDROCHLORIDE 200 MG/1
200 TABLET ORAL 3 TIMES DAILY
Status: DISCONTINUED | OUTPATIENT
Start: 2023-04-27 | End: 2023-04-27 | Stop reason: HOSPADM

## 2023-04-27 RX ADMIN — AMIODARONE HYDROCHLORIDE 200 MG: 200 TABLET ORAL at 14:46

## 2023-04-27 RX ADMIN — SACUBITRIL AND VALSARTAN 1 TABLET: 49; 51 TABLET, FILM COATED ORAL at 08:28

## 2023-04-27 RX ADMIN — FINASTERIDE 5 MG: 5 TABLET, FILM COATED ORAL at 08:28

## 2023-04-27 RX ADMIN — MEXILETINE HYDROCHLORIDE 200 MG: 200 CAPSULE ORAL at 14:46

## 2023-04-27 RX ADMIN — TORSEMIDE 5 MG: 10 TABLET ORAL at 08:28

## 2023-04-27 RX ADMIN — PANTOPRAZOLE SODIUM 40 MG: 40 TABLET, DELAYED RELEASE ORAL at 06:35

## 2023-04-27 RX ADMIN — METFORMIN HYDROCHLORIDE 1000 MG: 500 TABLET, FILM COATED ORAL at 08:28

## 2023-04-27 RX ADMIN — AMIODARONE HYDROCHLORIDE 0.5 MG/MIN: 1.8 INJECTION, SOLUTION INTRAVENOUS at 06:35

## 2023-04-27 RX ADMIN — CLOPIDOGREL BISULFATE 75 MG: 75 TABLET ORAL at 06:35

## 2023-04-27 RX ADMIN — CARVEDILOL 12.5 MG: 6.25 TABLET, FILM COATED ORAL at 08:28

## 2023-04-27 RX ADMIN — EMPAGLIFLOZIN 10 MG: 10 TABLET, FILM COATED ORAL at 08:28

## 2023-04-27 RX ADMIN — LEVOTHYROXINE SODIUM 50 MCG: 0.05 TABLET ORAL at 05:38

## 2023-04-27 RX ADMIN — EPLERENONE 12.5 MG: 25 TABLET, FILM COATED ORAL at 08:28

## 2023-04-27 NOTE — PROGRESS NOTES
[facility] at [facility]  Progress Note       LOS: 1 day   Patient Care Team:  Brian Lewis MD as PCP - General (Internal Medicine)  Ferdinand Alba MD as Consulting Physician (Cardiac Electrophysiology)  Viet Manzano PA as Physician Assistant (Cardiology)  Michele Flanagan IV, MD as Consulting Physician (Interventional Cardiology)    Chief Complaint:  VT, ICD shock     Subjective     Patient doing well this morning. No ICD shocks or VT overnight. Remains on Amiodarone gtt. No CP or SOB.         Review of Systems:   Pertinent positives in HPI, all others reviewed and negative.      Objective       Current Facility-Administered Medications:   •  acetaminophen (TYLENOL) tablet 650 mg, 650 mg, Oral, Q4H PRN, Clarence Mendez MD  •  [COMPLETED] amiodarone in dextrose 5% (NEXTERONE) loading dose 150mg/100mL, 150 mg, Intravenous, Once, 150 mg at 23 1345 **FOLLOWED BY** [] amiodarone 360 mg in 200 mL D5W infusion, 1 mg/min, Intravenous, Continuous, Last Rate: 33.3 mL/hr at 23 1404, 1 mg/min at 23 1404 **FOLLOWED BY** amiodarone 360 mg in 200 mL D5W infusion, 0.5 mg/min, Intravenous, Continuous, Lisbet Lopez PA-C, Last Rate: 16.67 mL/hr at 23 0635, 0.5 mg/min at 23 0635  •  sennosides-docusate (PERICOLACE) 8.6-50 MG per tablet 2 tablet, 2 tablet, Oral, BID **AND** polyethylene glycol (MIRALAX) packet 17 g, 17 g, Oral, Daily PRN **AND** bisacodyl (DULCOLAX) EC tablet 5 mg, 5 mg, Oral, Daily PRN **AND** bisacodyl (DULCOLAX) suppository 10 mg, 10 mg, Rectal, Daily PRN, Clarence Mendez MD  •  carvedilol (COREG) tablet 12.5 mg, 12.5 mg, Oral, BID With Meals, Lisbet Lopez PA-C, 12.5 mg at 23 1703  •  clopidogrel (PLAVIX) tablet 75 mg, 75 mg, Oral, QAM, Lisbet Lopez PA-C, 75 mg at 23 0635  •  dextrose (D50W) (25 g/50 mL) IV injection 25 g, 25 g, Intravenous, Q15 Min PRN, Lisbet Lopez PA-C  •  dextrose (GLUTOSE) oral  gel 15 g, 15 g, Oral, Q15 Min PRN, Lisbet Lopez PA-C  •  empagliflozin (JARDIANCE) tablet 10 mg, 10 mg, Oral, Daily, Lisbet Lopez PA-C  •  eplerenone (INSPRA) tablet 12.5 mg, 12.5 mg, Oral, Q24H, Lisbet Lopez PA-C  •  finasteride (PROSCAR) tablet 5 mg, 5 mg, Oral, Daily, Lisbet Lopez PA-C  •  glucagon (GLUCAGEN) injection 1 mg, 1 mg, Intramuscular, Q15 Min PRN, Lisbet Lopez PA-C  •  HYDROcodone-acetaminophen (NORCO) 5-325 MG per tablet 1 tablet, 1 tablet, Oral, Q6H PRN, Clarence Mendez MD  •  Insulin Lispro (humaLOG) injection 0-7 Units, 0-7 Units, Subcutaneous, TID AC, Lisbet Lopez PA-C  •  levothyroxine (SYNTHROID, LEVOTHROID) tablet 50 mcg, 50 mcg, Oral, Q AM, Lisbet Lopez PA-C, 50 mcg at 04/27/23 0538  •  melatonin tablet 10 mg, 10 mg, Oral, Nightly PRN, Lisbet Lopez PA-C, 10 mg at 04/26/23 2014  •  metFORMIN (GLUCOPHAGE) tablet 1,000 mg, 1,000 mg, Oral, BID With Meals, Lisbet Lopez PA-C, 1,000 mg at 04/26/23 1703  •  morphine injection 1 mg, 1 mg, Intravenous, Q4H PRN **AND** naloxone (NARCAN) injection 0.4 mg, 0.4 mg, Intravenous, Q5 Min PRN, Clarence Mendez MD  •  ondansetron (ZOFRAN) tablet 4 mg, 4 mg, Oral, Q6H PRN **OR** ondansetron (ZOFRAN) injection 4 mg, 4 mg, Intravenous, Q6H PRN, Clarence Mendez MD  •  pantoprazole (PROTONIX) EC tablet 40 mg, 40 mg, Oral, QAM AC, Lisbet Lopez PA-C, 40 mg at 04/27/23 0635  •  rivaroxaban (XARELTO) tablet 10 mg, 10 mg, Oral, Daily With Dinner, Lisbet Lopez PA-C, 10 mg at 04/26/23 1703  •  rosuvastatin (CRESTOR) tablet 40 mg, 40 mg, Oral, Nightly, Lisbet Lopez PA-C, 40 mg at 04/26/23 2014  •  sacubitril-valsartan (ENTRESTO) 49-51 MG tablet 1 tablet, 1 tablet, Oral, BID, Lisbet Lopez PA-C, 1 tablet at 04/26/23 2014  •  sodium chloride 0.9 % flush 10 mL, 10 mL, Intravenous, PRN, Alex Reyes MD  •  torsemide (DEMADEX) tablet 5 mg, 5 mg, Oral, Daily, Lisbet Lopez PA-C    Vital Sign Min/Max for last 24 hours  Temp  Min:  "97.7 °F (36.5 °C)  Max: 98.1 °F (36.7 °C)   BP  Min: 92/56  Max: 132/65   Pulse  Min: 68  Max: 71   Resp  Min: 18  Max: 18   SpO2  Min: 90 %  Max: 95 %   No data recorded   Weight  Min: 81.6 kg (180 lb)  Max: 81.6 kg (180 lb)     Flowsheet Rows    Flowsheet Row First Filed Value   Admission Height 185.4 cm (73\") Documented at 04/26/2023 0936   Admission Weight 81.6 kg (180 lb) Documented at 04/26/2023 0936            Intake/Output Summary (Last 24 hours) at 4/27/2023 0815  Last data filed at 4/27/2023 0715  Gross per 24 hour   Intake 250 ml   Output 1250 ml   Net -1000 ml       Physical Exam:     General Appearance:    Alert, cooperative, in no acute distress   Lungs:     Clear to auscultation bilaterally,respirations regular, even and  unlabored    Heart:    Regular rhythm and normal rate, normal S1 and S2,   no        murmur, no gallop, no rub, no click   Chest Wall:    No abnormalities observed   Abdomen:     Normal bowel sounds, no masses, no organomegaly, soft      nontender, nondistended, no guarding, no rebound                tenderness   Extremities:  Moves all extremities well, no edema, no cyanosis, no            redness              Pulses:   Pulses palpable and equal bilaterally   Skin:   No bleeding, bruising or rash        Results Review:   Results from last 7 days   Lab Units 04/26/23 0944   WBC 10*3/mm3 8.55   HEMOGLOBIN g/dL 13.2   HEMATOCRIT % 43.0   PLATELETS 10*3/mm3 219     Results from last 7 days   Lab Units 04/26/23 0944   SODIUM mmol/L 137   POTASSIUM mmol/L 4.6   CHLORIDE mmol/L 104   CO2 mmol/L 21.0*   BUN mg/dL 23   CREATININE mg/dL 0.74*   GLUCOSE mg/dL 120*              Results from last 7 days   Lab Units 04/26/23 0944   TSH uIU/mL 5.180*   FREE T4 ng/dL 1.71*     Results from last 7 days   Lab Units 04/26/23 0944   PROBNP pg/mL 671.4         Results from last 7 days   Lab Units 04/26/23 0944   HSTROP T ng/L 20*       Intake/Output Summary (Last 24 hours) at 4/27/2023 0815  Last " data filed at 4/27/2023 0715  Gross per 24 hour   Intake 250 ml   Output 1250 ml   Net -1000 ml       I personally viewed and interpreted the patient's EKG/Telemetry data    EKG: none for review this morning     Telemetry: AV paced     Ejection Fraction  No results found for: EF    Echo EF Estimated  Lab Results   Component Value Date    ECHOEFEST 18 10/14/2022         Present on Admission:  • Ischemic cardiomyopathy (EF < 20%)  • Coronary artery disease involving native coronary artery of native heart with angina pectoris  • Ventricular tachycardia    Assessment & Plan   1.  Recurrent VT:  -Recurrent VT with ICD shock x2 4/26/2023  -Initiated on amiodarone drip per protocol, remains in AV paced rhythm overnight with no further VT or ICD shocks  -Had been on amiodarone 200 mg daily at home  -Previous hospital admissions 9/20/2022 with V. tach with ICD shocks and 12/20/2022 for VT VF with ICD shocks in the setting of flu A/dehydration  -Amiodarone has been limited secondary to elevated liver enzymes in December 2022..  Liver enzymes normal this admission.  TSH and T4 slightly elevated  -Discussed with Dr. Alba, will DC amiodarone drip and start amiodarone p.o. 200 3 times daily for 10 days then 200 mg twice daily.  We will add mexiletine 200 mg twice daily as well.    2.  Ischemic cardiomyopathy/severe LV dysfunction/HFrEF:  - Most recent echo 10/16/2022: EF less than 20%, LV wall segments hypokinetic, mid anterior, basal anterolateral, mid anterolateral, basal inferior lateral, mid inferior lateral, apical inferior, mid inferior, basal inferior septal, mid inferior septalmid anteroseptal, basal anterior, basal inferior and basal inferoseptal. The following left ventricular wall segments are dyskinetic: apical anterior, apical lateral, apical septal and apex.The left ventricular cavity is severely dilated. Mild mitral regurgitation is present  -Overall compensated from a heart failure standpoint, chest x-ray  with no acute cardiopulmonary process  -On Coreg, Jardiance, Inspra, Entresto      Plan for disposition: Patient is doing well with no further issues overnight.  From a heart failure standpoint, he is well compensated.  As above we will load with amiodarone and add mexiletine.  Patient is stable for discharge to home today and we will plan for a follow-up in 1 month with [facility] ICD check.    Electronically signed by PAULA Arrington, 04/27/23, 8:08 AM EDT.

## 2023-04-28 ENCOUNTER — APPOINTMENT (OUTPATIENT)
Dept: CARDIAC REHAB | Facility: HOSPITAL | Age: 78
End: 2023-04-28

## 2023-04-28 RX ORDER — MEXILETINE HYDROCHLORIDE 200 MG/1
200 CAPSULE ORAL EVERY 12 HOURS SCHEDULED
Qty: 16 CAPSULE | Refills: 0 | Status: SHIPPED | OUTPATIENT
Start: 2023-04-28

## 2023-04-28 NOTE — OUTREACH NOTE
Prep Survey    Flowsheet Row Responses   [facility] facility patient discharged from? [place]   Is LACE score < 7 ? No   Eligibility Readm Mgmt   Discharge diagnosis Recurrent VT with ICD shock x2   Does the patient have one of the following disease processes/diagnoses(primary or secondary)? CHF   Does the patient have Home health ordered? No   Is there a DME ordered? No   Prep survey completed? Yes          Rae STONER - Registered Nurse

## 2023-05-01 ENCOUNTER — READMISSION MANAGEMENT (OUTPATIENT)
Dept: CALL CENTER | Facility: HOSPITAL | Age: 78
End: 2023-05-01
Payer: MEDICARE

## 2023-05-01 LAB
QT INTERVAL: 540 MS
QTC INTERVAL: 583 MS

## 2023-05-01 NOTE — OUTREACH NOTE
CHF Week 1 Survey    Flowsheet Row Responses   [facility] patient discharged from? [place]   Does the patient have one of the following disease processes/diagnoses(primary or secondary)? CHF   CHF Week 1 attempt successful? Yes   Call start time 1609   Call end time 1612   Discharge diagnosis Recurrent VT with ICD shock x2   Person spoke with today (if not patient) and relationship patient   Meds reviewed with patient/caregiver? Yes   Does the patient have a primary care provider?  Yes   Does the patient have an appointment with their PCP within 7 days of discharge? Yes   Comments regarding PCP This wednesday with Dr. Lewis   Has the patient kept scheduled appointments due by today? Yes   Psychosocial issues? No   Did the patient receive a copy of their discharge instructions? Yes   Nursing interventions Reviewed instructions with patient   If the patient is a current smoker, are they able to teach back resources for cessation? Not a smoker   Is the patient/caregiver able to teach back the hierarchy of who to call/visit for symptoms/problems? PCP, Specialist, Home health nurse, Urgent Care, ED, 911 Yes   Is the patient able to teach back Heart Failure Zones? Yes   CHF Zone this Call Green Zone   Green Zone Patient reports doing well, No new or worsening shortness of breath, Weight check stable   Green Zone Interventions Daily weight check, Meds as directed    CHF Week 1 call completed? Yes   Is the patient interested in additional calls from an ambulatory ?  NOTE:  applies to high risk patients requiring additional follow-up. No   Would this patient benefit from a Referral to Amb Social Work? No   Wrap up additional comments Pt is doing well.  he is very pleased with his care.     Call end time 1612          PETRA YANG - Registered Nurse

## 2023-05-02 ENCOUNTER — READMISSION MANAGEMENT (OUTPATIENT)
Dept: CALL CENTER | Facility: HOSPITAL | Age: 78
End: 2023-05-02
Payer: MEDICARE

## 2023-05-02 ENCOUNTER — APPOINTMENT (OUTPATIENT)
Dept: GENERAL RADIOLOGY | Facility: HOSPITAL | Age: 78
End: 2023-05-02
Payer: MEDICARE

## 2023-05-02 ENCOUNTER — HOSPITAL ENCOUNTER (INPATIENT)
Facility: HOSPITAL | Age: 78
LOS: 6 days | Discharge: HOME | End: 2023-05-09
Attending: EMERGENCY MEDICINE | Admitting: INTERNAL MEDICINE
Payer: MEDICARE

## 2023-05-02 ENCOUNTER — APPOINTMENT (OUTPATIENT)
Dept: CARDIOLOGY | Facility: HOSPITAL | Age: 78
End: 2023-05-02
Payer: MEDICARE

## 2023-05-02 ENCOUNTER — TREATMENT (OUTPATIENT)
Dept: CARDIAC REHAB | Facility: HOSPITAL | Age: 78
End: 2023-05-02

## 2023-05-02 ENCOUNTER — TELEPHONE (OUTPATIENT)
Dept: CARDIOLOGY | Facility: CLINIC | Age: 78
End: 2023-05-02
Payer: MEDICARE

## 2023-05-02 DIAGNOSIS — I47.29 VENTRICULAR TACHYCARDIA, MONOMORPHIC: Primary | ICD-10-CM

## 2023-05-02 DIAGNOSIS — J44.9 CHRONIC OBSTRUCTIVE PULMONARY DISEASE, UNSPECIFIED COPD TYPE: ICD-10-CM

## 2023-05-02 DIAGNOSIS — E78.5 HYPERLIPIDEMIA LDL GOAL <70: ICD-10-CM

## 2023-05-02 DIAGNOSIS — I50.22 CHRONIC SYSTOLIC CONGESTIVE HEART FAILURE: ICD-10-CM

## 2023-05-02 DIAGNOSIS — Z00.00 PREVENTATIVE HEALTH CARE: Primary | ICD-10-CM

## 2023-05-02 DIAGNOSIS — Z86.79 HISTORY OF CORONARY ARTERY DISEASE: ICD-10-CM

## 2023-05-02 DIAGNOSIS — Z86.79 HISTORY OF CHF (CONGESTIVE HEART FAILURE): ICD-10-CM

## 2023-05-02 DIAGNOSIS — I25.10 CORONARY ARTERY DISEASE INVOLVING NATIVE CORONARY ARTERY OF NATIVE HEART WITHOUT ANGINA PECTORIS: ICD-10-CM

## 2023-05-02 PROBLEM — E87.6 HYPOKALEMIA: Status: ACTIVE | Noted: 2023-05-02

## 2023-05-02 LAB
ALBUMIN SERPL-MCNC: 3.6 G/DL (ref 3.5–5.2)
ALBUMIN SERPL-MCNC: 4.1 G/DL (ref 3.5–5.2)
ALBUMIN/GLOB SERPL: 1.6 G/DL
ALBUMIN/GLOB SERPL: 1.7 G/DL
ALP SERPL-CCNC: 66 U/L (ref 39–117)
ALP SERPL-CCNC: 73 U/L (ref 39–117)
ALT SERPL W P-5'-P-CCNC: 42 U/L (ref 1–41)
ALT SERPL W P-5'-P-CCNC: 43 U/L (ref 1–41)
ANION GAP SERPL CALCULATED.3IONS-SCNC: 13 MMOL/L (ref 5–15)
ANION GAP SERPL CALCULATED.3IONS-SCNC: 15 MMOL/L (ref 5–15)
ARTERIAL PATENCY WRIST A: ABNORMAL
AST SERPL-CCNC: 43 U/L (ref 1–40)
AST SERPL-CCNC: 50 U/L (ref 1–40)
ATMOSPHERIC PRESS: ABNORMAL MM[HG]
BASE EXCESS BLDA CALC-SCNC: -2.5 MMOL/L (ref 0–2)
BASOPHILS # BLD AUTO: 0.03 10*3/MM3 (ref 0–0.2)
BASOPHILS # BLD AUTO: 0.04 10*3/MM3 (ref 0–0.2)
BASOPHILS NFR BLD AUTO: 0.4 % (ref 0–1.5)
BASOPHILS NFR BLD AUTO: 0.4 % (ref 0–1.5)
BDY SITE: ABNORMAL
BILIRUB SERPL-MCNC: 0.3 MG/DL (ref 0–1.2)
BILIRUB SERPL-MCNC: 0.5 MG/DL (ref 0–1.2)
BODY TEMPERATURE: 37 C
BUN SERPL-MCNC: 19 MG/DL (ref 8–23)
BUN SERPL-MCNC: 21 MG/DL (ref 8–23)
BUN/CREAT SERPL: 24.7 (ref 7–25)
BUN/CREAT SERPL: 25 (ref 7–25)
CA-I SERPL ISE-MCNC: 1.23 MMOL/L (ref 1.12–1.32)
CALCIUM SPEC-SCNC: 8.4 MG/DL (ref 8.6–10.5)
CALCIUM SPEC-SCNC: 9 MG/DL (ref 8.6–10.5)
CHLORIDE SERPL-SCNC: 104 MMOL/L (ref 98–107)
CHLORIDE SERPL-SCNC: 107 MMOL/L (ref 98–107)
CO2 BLDA-SCNC: 20.7 MMOL/L (ref 22–33)
CO2 SERPL-SCNC: 20 MMOL/L (ref 22–29)
CO2 SERPL-SCNC: 20 MMOL/L (ref 22–29)
COHGB MFR BLD: ABNORMAL %
CREAT SERPL-MCNC: 0.77 MG/DL (ref 0.76–1.27)
CREAT SERPL-MCNC: 0.84 MG/DL (ref 0.76–1.27)
D-LACTATE SERPL-SCNC: 1.1 MMOL/L (ref 0.5–2)
D-LACTATE SERPL-SCNC: 3 MMOL/L (ref 0.5–2)
DEPRECATED RDW RBC AUTO: 48.5 FL (ref 37–54)
DEPRECATED RDW RBC AUTO: 48.9 FL (ref 37–54)
EGFRCR SERPLBLD CKD-EPI 2021: 88.7 ML/MIN/1.73
EGFRCR SERPLBLD CKD-EPI 2021: 91.1 ML/MIN/1.73
EOSINOPHIL # BLD AUTO: 0.17 10*3/MM3 (ref 0–0.4)
EOSINOPHIL # BLD AUTO: 0.21 10*3/MM3 (ref 0–0.4)
EOSINOPHIL NFR BLD AUTO: 1.9 % (ref 0.3–6.2)
EOSINOPHIL NFR BLD AUTO: 2.6 % (ref 0.3–6.2)
EPAP: 0
ERYTHROCYTE [DISTWIDTH] IN BLOOD BY AUTOMATED COUNT: 14.3 % (ref 12.3–15.4)
ERYTHROCYTE [DISTWIDTH] IN BLOOD BY AUTOMATED COUNT: 14.4 % (ref 12.3–15.4)
GEN 5 2HR TROPONIN T REFLEX: 24 NG/L
GLOBULIN UR ELPH-MCNC: 2.2 GM/DL
GLOBULIN UR ELPH-MCNC: 2.4 GM/DL
GLUCOSE SERPL-MCNC: 115 MG/DL (ref 65–99)
GLUCOSE SERPL-MCNC: 136 MG/DL (ref 65–99)
HCO3 BLDA-SCNC: 19.9 MMOL/L (ref 20–26)
HCT VFR BLD AUTO: 40.3 % (ref 37.5–51)
HCT VFR BLD AUTO: 41.4 % (ref 37.5–51)
HCT VFR BLD CALC: 41.1 % (ref 38–51)
HGB BLD-MCNC: 12.3 G/DL (ref 13–17.7)
HGB BLD-MCNC: 13.2 G/DL (ref 13–17.7)
HGB BLDA-MCNC: 13.4 G/DL (ref 13.5–17.5)
IMM GRANULOCYTES # BLD AUTO: 0.02 10*3/MM3 (ref 0–0.05)
IMM GRANULOCYTES # BLD AUTO: 0.03 10*3/MM3 (ref 0–0.05)
IMM GRANULOCYTES NFR BLD AUTO: 0.3 % (ref 0–0.5)
IMM GRANULOCYTES NFR BLD AUTO: 0.3 % (ref 0–0.5)
INHALED O2 CONCENTRATION: 80 %
INR PPP: 1.32 (ref 0.89–1.12)
IPAP: 0
LYMPHOCYTES # BLD AUTO: 1.72 10*3/MM3 (ref 0.7–3.1)
LYMPHOCYTES # BLD AUTO: 2.63 10*3/MM3 (ref 0.7–3.1)
LYMPHOCYTES NFR BLD AUTO: 21.7 % (ref 19.6–45.3)
LYMPHOCYTES NFR BLD AUTO: 29.5 % (ref 19.6–45.3)
MAGNESIUM SERPL-MCNC: 1.9 MG/DL (ref 1.6–2.4)
MAGNESIUM SERPL-MCNC: 2 MG/DL (ref 1.6–2.4)
MCH RBC QN AUTO: 28.5 PG (ref 26.6–33)
MCH RBC QN AUTO: 29.3 PG (ref 26.6–33)
MCHC RBC AUTO-ENTMCNC: 30.5 G/DL (ref 31.5–35.7)
MCHC RBC AUTO-ENTMCNC: 31.9 G/DL (ref 31.5–35.7)
MCV RBC AUTO: 91.8 FL (ref 79–97)
MCV RBC AUTO: 93.5 FL (ref 79–97)
METHGB BLD QL: -0.1 % (ref 0–1.5)
MODALITY: ABNORMAL
MONOCYTES # BLD AUTO: 0.63 10*3/MM3 (ref 0.1–0.9)
MONOCYTES # BLD AUTO: 0.71 10*3/MM3 (ref 0.1–0.9)
MONOCYTES NFR BLD AUTO: 7.9 % (ref 5–12)
MONOCYTES NFR BLD AUTO: 8 % (ref 5–12)
NEUTROPHILS NFR BLD AUTO: 5.32 10*3/MM3 (ref 1.7–7)
NEUTROPHILS NFR BLD AUTO: 5.33 10*3/MM3 (ref 1.7–7)
NEUTROPHILS NFR BLD AUTO: 59.9 % (ref 42.7–76)
NEUTROPHILS NFR BLD AUTO: 67.1 % (ref 42.7–76)
NOTE: ABNORMAL
NRBC BLD AUTO-RTO: 0 /100 WBC (ref 0–0.2)
NRBC BLD AUTO-RTO: 0 /100 WBC (ref 0–0.2)
NT-PROBNP SERPL-MCNC: 1065 PG/ML (ref 0–1800)
OXYHGB MFR BLDV: 97.5 % (ref 94–99)
PAW @ PEAK INSP FLOW SETTING VENT: 0 CMH2O
PCO2 BLDA: 27.5 MM HG (ref 35–45)
PCO2 TEMP ADJ BLD: 27.5 MM HG (ref 35–48)
PH BLDA: 7.47 PH UNITS (ref 7.35–7.45)
PH, TEMP CORRECTED: 7.47 PH UNITS
PHOSPHATE SERPL-MCNC: 3.7 MG/DL (ref 2.5–4.5)
PLATELET # BLD AUTO: 214 10*3/MM3 (ref 140–450)
PLATELET # BLD AUTO: 221 10*3/MM3 (ref 140–450)
PMV BLD AUTO: 10 FL (ref 6–12)
PMV BLD AUTO: 10.3 FL (ref 6–12)
PO2 BLDA: 125 MM HG (ref 83–108)
PO2 TEMP ADJ BLD: 125 MM HG (ref 83–108)
POTASSIUM SERPL-SCNC: 3.1 MMOL/L (ref 3.5–5.2)
POTASSIUM SERPL-SCNC: 3.7 MMOL/L (ref 3.5–5.2)
PROT SERPL-MCNC: 5.8 G/DL (ref 6–8.5)
PROT SERPL-MCNC: 6.5 G/DL (ref 6–8.5)
PROTHROMBIN TIME: 16.5 SECONDS (ref 12.2–14.5)
QT INTERVAL: 242 MS
QT INTERVAL: 324 MS
QT INTERVAL: 604 MS
QTC INTERVAL: 372 MS
QTC INTERVAL: 476 MS
QTC INTERVAL: 652 MS
RBC # BLD AUTO: 4.31 10*6/MM3 (ref 4.14–5.8)
RBC # BLD AUTO: 4.51 10*6/MM3 (ref 4.14–5.8)
SODIUM SERPL-SCNC: 139 MMOL/L (ref 136–145)
SODIUM SERPL-SCNC: 140 MMOL/L (ref 136–145)
T4 FREE SERPL-MCNC: 2.11 NG/DL (ref 0.93–1.7)
TOTAL RATE: 0 BREATHS/MINUTE
TROPONIN T DELTA: 8 NG/L
TROPONIN T SERPL HS-MCNC: 16 NG/L
TROPONIN T SERPL HS-MCNC: 28 NG/L
TSH SERPL DL<=0.05 MIU/L-ACNC: 5.21 UIU/ML (ref 0.27–4.2)
VENTILATOR MODE: ABNORMAL
WBC NRBC COR # BLD: 7.93 10*3/MM3 (ref 3.4–10.8)
WBC NRBC COR # BLD: 8.91 10*3/MM3 (ref 3.4–10.8)

## 2023-05-02 PROCEDURE — 25010000002 AMIODARONE IN DEXTROSE 5% 150-4.21 MG/100ML-% SOLUTION: Performed by: NURSE PRACTITIONER

## 2023-05-02 PROCEDURE — 80053 COMPREHEN METABOLIC PANEL: CPT | Performed by: INTERNAL MEDICINE

## 2023-05-02 PROCEDURE — 25010000002 MAGNESIUM SULFATE 2 GM/50ML SOLUTION: Performed by: NURSE PRACTITIONER

## 2023-05-02 PROCEDURE — 84132 ASSAY OF SERUM POTASSIUM: CPT | Performed by: INTERNAL MEDICINE

## 2023-05-02 PROCEDURE — 25010000002 MIDAZOLAM PER 1 MG

## 2023-05-02 PROCEDURE — 99223 1ST HOSP IP/OBS HIGH 75: CPT | Performed by: PHYSICIAN ASSISTANT

## 2023-05-02 PROCEDURE — 36600 WITHDRAWAL OF ARTERIAL BLOOD: CPT

## 2023-05-02 PROCEDURE — 83735 ASSAY OF MAGNESIUM: CPT | Performed by: INTERNAL MEDICINE

## 2023-05-02 PROCEDURE — 71045 X-RAY EXAM CHEST 1 VIEW: CPT

## 2023-05-02 PROCEDURE — 93005 ELECTROCARDIOGRAM TRACING: CPT | Performed by: INTERNAL MEDICINE

## 2023-05-02 PROCEDURE — 83735 ASSAY OF MAGNESIUM: CPT | Performed by: EMERGENCY MEDICINE

## 2023-05-02 PROCEDURE — 83605 ASSAY OF LACTIC ACID: CPT | Performed by: INTERNAL MEDICINE

## 2023-05-02 PROCEDURE — 84439 ASSAY OF FREE THYROXINE: CPT | Performed by: EMERGENCY MEDICINE

## 2023-05-02 PROCEDURE — 80053 COMPREHEN METABOLIC PANEL: CPT | Performed by: EMERGENCY MEDICINE

## 2023-05-02 PROCEDURE — 92960 CARDIOVERSION ELECTRIC EXT: CPT

## 2023-05-02 PROCEDURE — 93306 TTE W/DOPPLER COMPLETE: CPT

## 2023-05-02 PROCEDURE — 82330 ASSAY OF CALCIUM: CPT | Performed by: NURSE PRACTITIONER

## 2023-05-02 PROCEDURE — 83050 HGB METHEMOGLOBIN QUAN: CPT

## 2023-05-02 PROCEDURE — 84484 ASSAY OF TROPONIN QUANT: CPT | Performed by: INTERNAL MEDICINE

## 2023-05-02 PROCEDURE — G0378 HOSPITAL OBSERVATION PER HR: HCPCS

## 2023-05-02 PROCEDURE — 84484 ASSAY OF TROPONIN QUANT: CPT | Performed by: EMERGENCY MEDICINE

## 2023-05-02 PROCEDURE — 82805 BLOOD GASES W/O2 SATURATION: CPT

## 2023-05-02 PROCEDURE — 93005 ELECTROCARDIOGRAM TRACING: CPT | Performed by: EMERGENCY MEDICINE

## 2023-05-02 PROCEDURE — 94799 UNLISTED PULMONARY SVC/PX: CPT

## 2023-05-02 PROCEDURE — 25010000002 SULFUR HEXAFLUORIDE MICROSPH 60.7-25 MG RECONSTITUTED SUSPENSION: Performed by: PHYSICIAN ASSISTANT

## 2023-05-02 PROCEDURE — 25010000002 AMIODARONE IN DEXTROSE 5% 360-4.14 MG/200ML-% SOLUTION: Performed by: PHYSICIAN ASSISTANT

## 2023-05-02 PROCEDURE — 5A2204Z RESTORATION OF CARDIAC RHYTHM, SINGLE: ICD-10-PCS | Performed by: INTERNAL MEDICINE

## 2023-05-02 PROCEDURE — 25010000002 AMIODARONE IN DEXTROSE 5% 150-4.21 MG/100ML-% SOLUTION: Performed by: PHYSICIAN ASSISTANT

## 2023-05-02 PROCEDURE — 83880 ASSAY OF NATRIURETIC PEPTIDE: CPT | Performed by: EMERGENCY MEDICINE

## 2023-05-02 PROCEDURE — 93306 TTE W/DOPPLER COMPLETE: CPT | Performed by: INTERNAL MEDICINE

## 2023-05-02 PROCEDURE — 84443 ASSAY THYROID STIM HORMONE: CPT | Performed by: EMERGENCY MEDICINE

## 2023-05-02 PROCEDURE — 92950 HEART/LUNG RESUSCITATION CPR: CPT

## 2023-05-02 PROCEDURE — 85610 PROTHROMBIN TIME: CPT | Performed by: EMERGENCY MEDICINE

## 2023-05-02 PROCEDURE — 94660 CPAP INITIATION&MGMT: CPT

## 2023-05-02 PROCEDURE — 84484 ASSAY OF TROPONIN QUANT: CPT | Performed by: PHYSICIAN ASSISTANT

## 2023-05-02 PROCEDURE — 85025 COMPLETE CBC W/AUTO DIFF WBC: CPT | Performed by: INTERNAL MEDICINE

## 2023-05-02 PROCEDURE — 85025 COMPLETE CBC W/AUTO DIFF WBC: CPT | Performed by: EMERGENCY MEDICINE

## 2023-05-02 PROCEDURE — 82375 ASSAY CARBOXYHB QUANT: CPT

## 2023-05-02 PROCEDURE — 84100 ASSAY OF PHOSPHORUS: CPT | Performed by: NURSE PRACTITIONER

## 2023-05-02 PROCEDURE — 0 POTASSIUM CHLORIDE 10 MEQ/100ML SOLUTION: Performed by: INTERNAL MEDICINE

## 2023-05-02 PROCEDURE — 99285 EMERGENCY DEPT VISIT HI MDM: CPT

## 2023-05-02 PROCEDURE — 25010000002 AMIODARONE IN DEXTROSE 5% 360-4.14 MG/200ML-% SOLUTION: Performed by: INTERNAL MEDICINE

## 2023-05-02 PROCEDURE — 36415 COLL VENOUS BLD VENIPUNCTURE: CPT

## 2023-05-02 PROCEDURE — 99291 CRITICAL CARE FIRST HOUR: CPT | Performed by: INTERNAL MEDICINE

## 2023-05-02 PROCEDURE — 93010 ELECTROCARDIOGRAM REPORT: CPT | Performed by: INTERNAL MEDICINE

## 2023-05-02 RX ORDER — MIDAZOLAM HYDROCHLORIDE 1 MG/ML
2 INJECTION INTRAMUSCULAR; INTRAVENOUS ONCE
Status: COMPLETED | OUTPATIENT
Start: 2023-05-02 | End: 2023-05-02

## 2023-05-02 RX ORDER — LEVOTHYROXINE SODIUM 0.03 MG/1
25 TABLET ORAL
Status: DISCONTINUED | OUTPATIENT
Start: 2023-05-03 | End: 2023-05-09 | Stop reason: HOSPADM

## 2023-05-02 RX ORDER — POTASSIUM CHLORIDE 7.45 MG/ML
10 INJECTION INTRAVENOUS
Status: COMPLETED | OUTPATIENT
Start: 2023-05-02 | End: 2023-05-03

## 2023-05-02 RX ORDER — PANTOPRAZOLE SODIUM 40 MG/1
40 TABLET, DELAYED RELEASE ORAL EVERY MORNING
Status: DISCONTINUED | OUTPATIENT
Start: 2023-05-03 | End: 2023-05-09 | Stop reason: HOSPADM

## 2023-05-02 RX ORDER — CHOLECALCIFEROL (VITAMIN D3) 125 MCG
10 CAPSULE ORAL NIGHTLY PRN
Status: DISCONTINUED | OUTPATIENT
Start: 2023-05-02 | End: 2023-05-09 | Stop reason: HOSPADM

## 2023-05-02 RX ORDER — BUMETANIDE 0.25 MG/ML
2 INJECTION INTRAMUSCULAR; INTRAVENOUS ONCE
Status: COMPLETED | OUTPATIENT
Start: 2023-05-02 | End: 2023-05-02

## 2023-05-02 RX ORDER — MAGNESIUM SULFATE HEPTAHYDRATE 40 MG/ML
2 INJECTION, SOLUTION INTRAVENOUS ONCE
Status: COMPLETED | OUTPATIENT
Start: 2023-05-02 | End: 2023-05-02

## 2023-05-02 RX ORDER — TORSEMIDE 10 MG/1
5 TABLET ORAL DAILY
Status: DISCONTINUED | OUTPATIENT
Start: 2023-05-02 | End: 2023-05-02

## 2023-05-02 RX ORDER — CLOPIDOGREL BISULFATE 75 MG/1
75 TABLET ORAL DAILY
Status: DISCONTINUED | OUTPATIENT
Start: 2023-05-03 | End: 2023-05-09 | Stop reason: HOSPADM

## 2023-05-02 RX ORDER — AMIODARONE HYDROCHLORIDE 200 MG/1
200 TABLET ORAL 3 TIMES DAILY
Status: DISCONTINUED | OUTPATIENT
Start: 2023-05-02 | End: 2023-05-02

## 2023-05-02 RX ORDER — MEXILETINE HYDROCHLORIDE 200 MG/1
200 CAPSULE ORAL EVERY 8 HOURS SCHEDULED
Status: DISCONTINUED | OUTPATIENT
Start: 2023-05-02 | End: 2023-05-09 | Stop reason: HOSPADM

## 2023-05-02 RX ORDER — EPLERENONE 25 MG/1
12.5 TABLET, FILM COATED ORAL
Status: DISCONTINUED | OUTPATIENT
Start: 2023-05-02 | End: 2023-05-09 | Stop reason: HOSPADM

## 2023-05-02 RX ORDER — FINASTERIDE 5 MG/1
5 TABLET, FILM COATED ORAL DAILY
Status: DISCONTINUED | OUTPATIENT
Start: 2023-05-03 | End: 2023-05-09 | Stop reason: HOSPADM

## 2023-05-02 RX ORDER — MEXILETINE HYDROCHLORIDE 200 MG/1
200 CAPSULE ORAL EVERY 12 HOURS SCHEDULED
Status: DISCONTINUED | OUTPATIENT
Start: 2023-05-02 | End: 2023-05-02

## 2023-05-02 RX ORDER — MIDAZOLAM HYDROCHLORIDE 1 MG/ML
INJECTION INTRAMUSCULAR; INTRAVENOUS
Status: COMPLETED
Start: 2023-05-02 | End: 2023-05-02

## 2023-05-02 RX ORDER — LIDOCAINE HYDROCHLORIDE ANHYDROUS AND DEXTROSE MONOHYDRATE 5; 400 G/100ML; MG/100ML
1 INJECTION, SOLUTION INTRAVENOUS CONTINUOUS
Status: DISCONTINUED | OUTPATIENT
Start: 2023-05-02 | End: 2023-05-02

## 2023-05-02 RX ORDER — POLYETHYLENE GLYCOL 3350 17 G/17G
17 POWDER, FOR SOLUTION ORAL DAILY
Status: DISCONTINUED | OUTPATIENT
Start: 2023-05-02 | End: 2023-05-08

## 2023-05-02 RX ORDER — LEVOTHYROXINE SODIUM 0.05 MG/1
50 TABLET ORAL
Status: DISCONTINUED | OUTPATIENT
Start: 2023-05-03 | End: 2023-05-02

## 2023-05-02 RX ADMIN — POTASSIUM CHLORIDE 10 MEQ: 7.46 INJECTION, SOLUTION INTRAVENOUS at 22:57

## 2023-05-02 RX ADMIN — MIDAZOLAM HYDROCHLORIDE 2 MG: 1 INJECTION INTRAMUSCULAR; INTRAVENOUS at 13:17

## 2023-05-02 RX ADMIN — AMIODARONE HYDROCHLORIDE 1 MG/MIN: 1.8 INJECTION, SOLUTION INTRAVENOUS at 19:59

## 2023-05-02 RX ADMIN — BUMETANIDE 2 MG: 0.25 INJECTION INTRAMUSCULAR; INTRAVENOUS at 20:48

## 2023-05-02 RX ADMIN — AMIODARONE HYDROCHLORIDE 150 MG: 1.5 INJECTION, SOLUTION INTRAVENOUS at 14:19

## 2023-05-02 RX ADMIN — AMIODARONE HYDROCHLORIDE 1 MG/MIN: 1.8 INJECTION, SOLUTION INTRAVENOUS at 19:34

## 2023-05-02 RX ADMIN — AMIODARONE HYDROCHLORIDE 150 MG: 1.5 INJECTION, SOLUTION INTRAVENOUS at 19:46

## 2023-05-02 RX ADMIN — MEXILETINE HYDROCHLORIDE 200 MG: 200 CAPSULE ORAL at 20:21

## 2023-05-02 RX ADMIN — AMIODARONE HYDROCHLORIDE 1 MG/MIN: 1.8 INJECTION, SOLUTION INTRAVENOUS at 21:36

## 2023-05-02 RX ADMIN — SODIUM CHLORIDE 500 ML: 9 INJECTION, SOLUTION INTRAVENOUS at 14:22

## 2023-05-02 RX ADMIN — SODIUM CHLORIDE 1000 ML: 0.9 INJECTION, SOLUTION INTRAVENOUS at 13:23

## 2023-05-02 RX ADMIN — POTASSIUM CHLORIDE 10 MEQ: 7.46 INJECTION, SOLUTION INTRAVENOUS at 19:51

## 2023-05-02 RX ADMIN — MIDAZOLAM HYDROCHLORIDE 2 MG: 1 INJECTION, SOLUTION INTRAMUSCULAR; INTRAVENOUS at 13:17

## 2023-05-02 RX ADMIN — AMIODARONE HYDROCHLORIDE 1 MG/MIN: 1.8 INJECTION, SOLUTION INTRAVENOUS at 14:33

## 2023-05-02 RX ADMIN — SULFUR HEXAFLUORIDE 2 ML: KIT at 17:09

## 2023-05-02 RX ADMIN — POTASSIUM CHLORIDE 10 MEQ: 7.46 INJECTION, SOLUTION INTRAVENOUS at 21:36

## 2023-05-02 RX ADMIN — MAGNESIUM SULFATE HEPTAHYDRATE 2 G: 2 INJECTION, SOLUTION INTRAVENOUS at 19:46

## 2023-05-02 NOTE — H&P
Cardiology H&P     Cristinalennie Roth  1945  [phone]  There is no work phone number on file.    05/02/23    DATE OF ADMISSION: 5/2/2023  [facility] EMERGENCY DEPARTMENT    Borders, Brian Ferraro MD  [address] [facility] [address] / [facility] [address]  Referring Provider: No ref. provider found     Chief Complaint: VT, ICD shock    Problem List:      1. Sudden cardiac death with primary ventricular fibrillation status post Pacesetter [place] ICD, [place], [place] in September 2000:   a. ICD generator change out with upgrade to a biventricular pacemaker ICD on 12/17/2007, [employer] device.  b. ICD discharge, 08/09/2012, secondary to ventricular flutter with initiation of amiodarone therapy.   c. Hospitalization, 02/01/2014, secondary to ICD discharge for ventricular tachycardia with subsequent discontinuation of Coreg and initiation of sotalol therapy.   d. Hospitalization, February 2014, secondary to ICD discharge for VT with subsequent discontinuation of Coreg and initiation of sotalol.  e. Echocardiogram, 04/07/2015: EF less than 20%.  f. Hospitalization secondary to VF and ICD shocks, 04/18/2015.  g. NIPS procedure with defibrillation threshold testing for VF and noninvasive program stimulation, 04/18/2015.   h. Initiation of mexiletine for recurrent ventricular tachycardia with ICD shocks on 05/05/2015 with amiodarone load, recurrent VT and ICD shocks with hospitalization 05/16/2015-05/18/2015.   i. EP study with RFA of large anterior left ventricular scar extending from mid-left ventricular wall down apex by Dr. Ferdinand Alba, 05/21/2015.  j. ICD generator change 10/2017  k. Echo 11/19 LVEF 30%, mild TR  l. ICD generator change 07/2021  m. ICD shocks for VT/VF 9/2022, ICD reprogrammed with less ATP attemps, started on Amiodarone  n. ICD shocks for VT/VF in setting of Flu A/dehydration 12/2022  o. ICD shocks x 2  for VT on 4/26/2023, Amiodarone increased, Mexiletine added  p. ER presentation  for VT with ICD shock x 1 at home, external ECV in ED for VT at 138 bpm with hypotension 5/2/2023     2. Ischemic heart disease:  a. Remote progressive angina pectoris/acute extensive anterolateral myocardial infarction/delayed presentation with thrombolysis/severe 3-vessel coronary atherosclerosis with severe single vessel involvement/PTCA with intracoronary stent deployment proximal-mid segment LAD/moderate-severe compensated left ventricular dysfunction. LVEF (0.35)/abnormal positive signal averaged EKG/oral anticoagulation, April 1995.  b. Remote NYHA class I-II angina pectoris/class III CHF/abnormal quantitative SPECT gated Cardiolite GXT, July 1998.  c. Stable persistent MUGA, LVEF (0.33, January 1999 and January 2000).   d. Residual NYHA class I angina pectoris/CHF with reduced MUGA scan: LVEF (0.25), April 2002.  e. Left heart catheterization with distal circumflex disease: EF 20%, September 2002.   f. MUGA in May 2003: EF 32%.   g. Sestamibi GXT on 04/08/2005: No ischemia. EF 29%.   h. Residual NYHA class I angina pectoris/CHF with reduced acceptable MUGA scan: EF (0.32) and acceptable Pacesetter PCD interrogation/reprogramming, May 2003 with interrogation, September 2004.  i. Echocardiogram, September 2009 with EF 30%  j. Left heart catheterization by Dr. Bhupinder Gonzalez, August 2010, with LVEF of 35%, with 3-vessel native coronary artery disease, 95% mid-LAD status post PTCA and stenting with two 3 mm stents by Dr. Llanes.  k. Echocardiogram, 06/07/2012: Left ventricular ejection fraction of 30%.  l. Hospitalization, 02/01/2014, for non-ST elevation MI, left heart catheterization by Dr. Ayala that demonstrated 60% mid stenosis of the right coronary artery that remains unchanged compared to previous, widely patent stents of the LAD with severe LV dysfunction of approximately 25%.   m. Left heart catheterization status post drug-eluting stent to the distal LAD and mid-RCA with an EF of 20% to 25% by Dr. Cortez  Aslam, 04/20/2015.   n. Left heart catheterization 1/16/17; nonobstructive CAD with patent previously placed stents, dilated cardiomyopathy with severe LV systolic dysfunction, EF less than 20%, normal hemodynamics, recommendations for continued medical therapy and risk factor, evaluation for noncardiac etiology of symptoms.  o. Residual CCS Class I/II angina pectoris/NYHA Class II exertional dyspnea and fatigue syndrome, summer 2017  p. Residual CCS 0 angina pectoris/NYHA class I-II exertional dyspnea and fatigue, February 2018, September 2018, March 2019.  3. Dyslipidemia.  4. Status post remote operations.  5. Remote chronic tobacco use/abnormal chest x-ray with stable abnormal thoracic  CT scan without contrast demonstrating bilateral diffuse perimeter scarring and fibrosis with scattered subcentimeter noncalcified nodules (unchanged from February 2016), March 2017.  6. Left bundle branch block.  7. Burks trauma with hematuria with urosepsis requiring hospitalization, May 2015.  8. Pulmonary embolism, spring 2015.   9. Remote apparent hypothyroidism/replacement therapy - data deficit, January 2016.  10. Remote diagnosis of obstructive sleep apnea with CPAP use, 2017.  11. Bilateral cataract extraction November 2018, February 2019    History of Present Illness:   Jonnie Roth is a pleasant 78-year-old male with above-stated past medical history, well-known to Dr. Alba, who presents to the ED today after sustaining ICD shock at home.  She was recently discharged from [facility] on 4/27/2023 after he was admitted on 4/26/2023 with a recurrent VT and ICD shock x2 at home.  During that admission he was initiated on amiodarone drip per protocol and transition to p.o. amiodarone 200 mg 3 times daily.  Mexiletine 200 mg twice daily was also added.  Since discharge, he has been taking his medications as prescribed, and feeling well for the most part with no symptoms of worsening fatigue, dyspnea,  or any symptoms of chest pain.  He went to cardiac rehab this morning and was taking it easy there but felt well.  When he got home around 12 PM he started feeling a sudden onset of pressure in his chest and dizziness and felt that his ICD was going to shock him, and did feel a shock.  He did feel very poorly but did not fully lose consciousness.  He called our office who told him to present to the ED.  Device interrogation in the ED reveals monomorphic VT around 140 bpm with 36 rounds of ATP and 1 shock with restoration of normal sinus rhythm around 1210 p.m.  In the ED, he had recurrent ventricular tachycardia around 138 bpm and was externally cardioverted to normal sinus rhythm when he became somewhat hypotensive.  Currently the patient is in normal rhythm/V paced rhythm in the 60s.  He denies any current chest pain, shortness of breath, lightheadedness, or palpitations.  He does feel slightly drowsy as he had some Versed prior to the cardioversion.  Of note he is status post VT ablation with RFA of large anterior left ventricular scar extending from mid-left ventricular wall down apex by Dr. Ferdinand Alba on 05/21/2015.  He did very well for 8 years but since September 2022 he has had several admissions for recurrent VT with ICD shocks, this being his fourth admission.      No Known Allergies    Prior to Admission Medications     Prescriptions Last Dose Informant Patient Reported? Taking?    amiodarone (PACERONE) 200 MG tablet   No No    Take 1 tablet by mouth 3 (Three) Times a Day. Take 200 mg three times daily x 10 days, then 200 mg twice daily    carvedilol (COREG) 12.5 MG tablet   No No    Take 1 tablet by mouth 2 (Two) Times a Day With Meals.    cholecalciferol (VITAMIN D3) 25 MCG (1000 UT) tablet  Medication Bottle Yes No    Take 4 tablets by mouth Daily. OTC    clopidogrel (PLAVIX) 75 MG tablet   No No    Take 1 tablet by mouth Every Morning.    empagliflozin (JARDIANCE) 10 MG tablet tablet   No No     Take 1 tablet by mouth Daily.    eplerenone (INSPRA) 25 MG tablet   No No    Take 0.5 tablets by mouth Daily.    finasteride (PROSCAR) 5 MG tablet  Medication Bottle Yes No    Take 1 tablet by mouth Every Morning.    L-TRYPTOPHAN PO  Medication Bottle Yes No    Take  by mouth Every Night. 3 TABLETS NIGHTLY-     1500 MG EACH    levothyroxine (SYNTHROID, LEVOTHROID) 50 MCG tablet  Medication Bottle Yes No    Take 1 tablet by mouth Every Morning.    melatonin 5 MG tablet tablet   Yes No    Take 2 tablets by mouth At Night As Needed. OTC    metFORMIN (GLUCOPHAGE) 1000 MG tablet  Medication Bottle Yes No    Take 1 tablet by mouth 2 (two) times a day.    mexiletine (MEXITIL) 200 MG capsule   No No    Take 1 capsule by mouth Every 12 (Twelve) Hours.    Multiple Vitamins-Minerals (ICAPS AREDS 2 PO)   Yes No    Take 1 tablet by mouth 2 (two) times a day. OTC    Multiple Vitamins-Minerals (MULTIVITAMIN ADULTS 50+) tablet  Medication Bottle Yes No    Take 1 tablet by mouth Daily. OTC    niacin (NIASPAN) 1000 MG CR tablet   Yes No    Take 1 tablet by mouth Every Night.    nitroglycerin (NITROSTAT) 0.4 MG SL tablet   No No    Place 1 tablet under the tongue Every 5 (Five) Minutes As Needed for Chest Pain. Take no more than 3 doses in 15 minutes.    pantoprazole (PROTONIX) 40 MG EC tablet  Medication Bottle Yes No    Take 1 tablet by mouth Every Morning.    polyethylene glycol (MIRALAX) packet  Medication Bottle Yes No    Take 17 g by mouth Every Other Day. OTC    rivaroxaban (Xarelto) 10 MG tablet   No No    Take 1 tablet by mouth Daily.    rosuvastatin (CRESTOR) 40 MG tablet   No No    Take 1 tablet by mouth Daily.    sacubitril-valsartan (Entresto) 49-51 MG tablet   No No    Take 1 tablet by mouth 2 (Two) Times a Day.    temazepam (RESTORIL) 15 MG capsule  Self Yes No    Take 1 capsule by mouth At Night As Needed for Sleep.    torsemide (DEMADEX) 5 MG tablet   No No    Take 1 tablet by mouth Daily.    vitamin B-12  (CYANOCOBALAMIN) 1000 MCG tablet  Medication Bottle Yes No    Take 1 tablet by mouth Daily. OTC            Current Facility-Administered Medications:   •  amiodarone 360 mg in 200 mL D5W infusion, 1 mg/min, Intravenous, Continuous **FOLLOWED BY** amiodarone 360 mg in 200 mL D5W infusion, 0.5 mg/min, Intravenous, Continuous, Shauna Byrne PA  •  amiodarone in dextrose 5% (NEXTERONE) loading dose 150mg/100mL, 150 mg, Intravenous, Once, Shauna Byrne PA  •  sodium chloride 0.9 % bolus 500 mL, 500 mL, Intravenous, Once, Darvin Wagoner MD    Current Outpatient Medications:   •  amiodarone (PACERONE) 200 MG tablet, Take 1 tablet by mouth 3 (Three) Times a Day. Take 200 mg three times daily x 10 days, then 200 mg twice daily, Disp: 60 tablet, Rfl: 6  •  carvedilol (COREG) 12.5 MG tablet, Take 1 tablet by mouth 2 (Two) Times a Day With Meals., Disp: 180 tablet, Rfl: 3  •  cholecalciferol (VITAMIN D3) 25 MCG (1000 UT) tablet, Take 4 tablets by mouth Daily. OTC, Disp: , Rfl:   •  clopidogrel (PLAVIX) 75 MG tablet, Take 1 tablet by mouth Every Morning., Disp: 90 tablet, Rfl: 3  •  empagliflozin (JARDIANCE) 10 MG tablet tablet, Take 1 tablet by mouth Daily., Disp: 90 tablet, Rfl: 3  •  eplerenone (INSPRA) 25 MG tablet, Take 0.5 tablets by mouth Daily., Disp: 90 tablet, Rfl: 0  •  finasteride (PROSCAR) 5 MG tablet, Take 1 tablet by mouth Every Morning., Disp: , Rfl:   •  L-TRYPTOPHAN PO, Take  by mouth Every Night. 3 TABLETS NIGHTLY-     1500 MG EACH, Disp: , Rfl:   •  levothyroxine (SYNTHROID, LEVOTHROID) 50 MCG tablet, Take 1 tablet by mouth Every Morning., Disp: , Rfl:   •  melatonin 5 MG tablet tablet, Take 2 tablets by mouth At Night As Needed. OTC, Disp: , Rfl:   •  metFORMIN (GLUCOPHAGE) 1000 MG tablet, Take 1 tablet by mouth 2 (two) times a day., Disp: , Rfl:   •  mexiletine (MEXITIL) 200 MG capsule, Take 1 capsule by mouth Every 12 (Twelve) Hours., Disp: 16 capsule, Rfl: 0  •  Multiple Vitamins-Minerals  (ICAPS AREDS 2 PO), Take 1 tablet by mouth 2 (two) times a day. OTC, Disp: , Rfl:   •  Multiple Vitamins-Minerals (MULTIVITAMIN ADULTS 50+) tablet, Take 1 tablet by mouth Daily. OTC, Disp: , Rfl:   •  niacin (NIASPAN) 1000 MG CR tablet, Take 1 tablet by mouth Every Night., Disp: , Rfl:   •  nitroglycerin (NITROSTAT) 0.4 MG SL tablet, Place 1 tablet under the tongue Every 5 (Five) Minutes As Needed for Chest Pain. Take no more than 3 doses in 15 minutes., Disp: 25 tablet, Rfl: 5  •  pantoprazole (PROTONIX) 40 MG EC tablet, Take 1 tablet by mouth Every Morning., Disp: , Rfl:   •  polyethylene glycol (MIRALAX) packet, Take 17 g by mouth Every Other Day. OTC, Disp: , Rfl:   •  rivaroxaban (Xarelto) 10 MG tablet, Take 1 tablet by mouth Daily., Disp: 90 tablet, Rfl: 3  •  rosuvastatin (CRESTOR) 40 MG tablet, Take 1 tablet by mouth Daily., Disp: 90 tablet, Rfl: 3  •  sacubitril-valsartan (Entresto) 49-51 MG tablet, Take 1 tablet by mouth 2 (Two) Times a Day., Disp: 180 tablet, Rfl: 3  •  temazepam (RESTORIL) 15 MG capsule, Take 1 capsule by mouth At Night As Needed for Sleep., Disp: , Rfl:   •  torsemide (DEMADEX) 5 MG tablet, Take 1 tablet by mouth Daily., Disp: 90 tablet, Rfl: 3  •  vitamin B-12 (CYANOCOBALAMIN) 1000 MCG tablet, Take 1 tablet by mouth Daily. OTC, Disp: , Rfl:     Social History     Socioeconomic History   • Marital status:    Tobacco Use   • Smoking status: Former     Packs/day: 2.00     Years: 30.00     Pack years: 60.00     Types: Cigarettes     Quit date: 1995     Years since quittin.1   • Smokeless tobacco: Never   Vaping Use   • Vaping Use: Never used   Substance and Sexual Activity   • Alcohol use: No   • Drug use: No   • Sexual activity: Defer       Family History   Problem Relation Age of Onset   • Heart failure Father    • Stroke Father        REVIEW OF SYSTEMS:   CONSTITUTIONAL:         No weight loss, fever, chills, +  weakness + fatigue.   HEENT:                            No  "visual loss, blurred vision, double vision, yellow sclerae.                                             No hearing loss, congestion, sore throat.   SKIN:                                No rashes, urticaria, ulcers, sores.     RESPIRATORY:               No shortness of breath, hemoptysis, cough, sputum.   GI:                                     No anorexia, nausea, vomiting, diarrhea. No abdominal pain, melena.   :                                   No burning on urination, hematuria or increased frequency.  ENDOCRINE:                   No diaphoresis, cold or heat intolerance. No polyuria or polydipsia.   NEURO:                            No headache, dizziness, syncope, paralysis, ataxia, or parasthesias.                                            No change in bowel or bladder control. No history of CVA/TIA  MUSCULOSKELETAL:    No muscle, back pain, joint pain or stiffness.   HEMATOLOGY:               No anemia, bleeding, bruising. No history of DVT/PE.  PSYCH:                            No history of depression, anxiety    Vitals:    05/02/23 1316 05/02/23 1322 05/02/23 1324 05/02/23 1330   BP:  100/57  98/56   Pulse: (!) 172 69  70   Resp:   18    Temp:   98.2 °F (36.8 °C)    TempSrc:   Oral    SpO2: 94% 95% 94% 96%   Weight:   79.4 kg (175 lb)    Height:   185.4 cm (73\")          Vital Sign Min/Max for last 24 hours  Temp  Min: 98.2 °F (36.8 °C)  Max: 98.2 °F (36.8 °C)   BP  Min: 92/70  Max: 112/66   Pulse  Min: 69  Max: 194   Resp  Min: 18  Max: 18   SpO2  Min: 93 %  Max: 96 %   No data recorded      Intake/Output Summary (Last 24 hours) at 5/2/2023 1417  Last data filed at 5/2/2023 1401  Gross per 24 hour   Intake 1000 ml   Output --   Net 1000 ml             Physical Exam:  GEN: Well nourished, Well- developed  No acute distress. Frail appearing.   HEENT: Normocephalic, Atraumatic, PERRLA, moist mucous membranes  NECK: supple, NO JVD, no thyromegaly, no lymphadenopathy  CARDIAC: S1S2  RRR no murmur, gallop, " rub  LUNGS: Clear to ausculation bilaterally, normal respiratory effort  ABDOMEN: Soft, nontender, normal bowel sounds  EXTREMITIES:No gross deformities,  No clubbing, cyanosis, or edema  SKIN: Warm, dry  NEURO: No focal deficits  PSYCHIATRIC: Normal affect and mood      I personally viewed and interpreted the patient's EKG/Telemetry/lab data    Data:   Results from last 7 days   Lab Units 05/02/23  1302 04/26/23  0944   WBC 10*3/mm3 7.93 8.55   HEMOGLOBIN g/dL 13.2 13.2   HEMATOCRIT % 41.4 43.0   PLATELETS 10*3/mm3 221 219     Results from last 7 days   Lab Units 05/02/23  1302 04/26/23  0944   SODIUM mmol/L 139 137   POTASSIUM mmol/L 3.1* 4.6   CHLORIDE mmol/L 104 104   CO2 mmol/L 20.0* 21.0*   BUN mg/dL 21 23   CREATININE mg/dL 0.84 0.74*   GLUCOSE mg/dL 115* 120*          Results from last 7 days   Lab Units 05/02/23  1302   TSH uIU/mL 5.210*   FREE T4 ng/dL 2.11*     Results from last 7 days   Lab Units 05/02/23  1302   PROBNP pg/mL 1,065.0     Results from last 7 days   Lab Units 05/02/23  1302   PROTIME Seconds 16.5*   INR  1.32*     Results from last 7 days   Lab Units 05/02/23  1302 04/26/23  0944   HSTROP T ng/L 16* 20*         Results from last 7 days   Lab Units 05/02/23  1302   PROBNP pg/mL 1,065.0       Intake/Output Summary (Last 24 hours) at 5/2/2023 1417  Last data filed at 5/2/2023 1401  Gross per 24 hour   Intake 1000 ml   Output --   Net 1000 ml       Chest X-Ray:  Imaging Results (Last 24 Hours)     Procedure Component Value Units Date/Time    XR Chest 1 View [[id number]] Collected: 05/02/23 1354     Updated: 05/02/23 1358    Narrative:      XR CHEST 1 VW    Date of Exam: 5/2/2023 1:34 PM EDT    Indication: ICD fired    Comparison: AP portable chest 4/26/2023    Findings:  Stable mild cardiac enlargement. Pacemaker/defibrillator appears similarly positioned. No acute airspace disease, pleural effusion, or pneumothorax is identified. Moderately advanced degenerative changes of both shoulders.  Calcified loose body in the   anterior left shoulder joint space, unchanged.      Impression:      Impression:  No acute chest finding.      Electronically Signed: Zaida Ying    5/2/2023 1:55 PM EDT    Workstation ID: [id number]          Telemetry: Reviewed all telemetry since the patient has been in the ED and appears that he ate with in normal sinus rhythm/V paced and then went into ventricular tachycardia around 125 to 138 bpm with external cardioversion to normal sinus rhythm around 1:16 PM.  EKG: EKG #1 5/2/2023 12:53 PM AV paced rhythm 70 bpm EKG #2 5/2/2023 1311 ventricular tachycardia 130 bpm EKG #3 5/2/2023 1323 a V paced rhythm with wide QRS 70 bpm        Assessment and Plan:   1.  Recurrent VT:  -Recurrent ventricular tachycardia with a rate of 142 bpm with 36 ATP and 1 shock to normal sinus rhythm from his ICD at home earlier today.  With recurrent VT monomorphic appearing at 138 bpm with external cardioversion in the ED with return to normal sinus rhythm.  -Patient has had VT with ICD shocks on 4 separate occasions since September 2022.  He was just discharged from [facility] days ago with VT and started on amiodarone 200 mg 3 times daily, mexiletine 200 mg twice daily.  -Discussed with Dr. Alba, will admit the patient and will load with IV amiodarone bolus with drip at 1 mg/min continuously.  Will consider the possibility of a repeat VT ablation, likely monomorphic VT from a scar on his LV.  - will check liver enzymes/Thyroid studies  - hold Xarelto in case of procedure  - hold statin due to slightly elevated liver enzymes      2.  Ischemic cardiomyopathy/severe LV dysfunction/HFrEF:  - Most recent echo 10/16/2022: EF less than 20%, LV wall segments hypokinetic, mid anterior, basal anterolateral, mid anterolateral, basal inferior lateral, mid inferior lateral, apical inferior, mid inferior, basal inferior septal, mid inferior septalmid anteroseptal, basal anterior, basal inferior  and basal inferoseptal. The following left ventricular wall segments are dyskinetic: apical anterior, apical lateral, apical septal and apex.The left ventricular cavity is severely dilated. Mild mitral regurgitation is present  -Overall compensated from a heart failure standpoint, chest x-ray with no acute cardiopulmonary process  -Repeat echocardiogram  -On Coreg, Jardiance, Inspra, Entresto at home-we will hold secondary to hypotension currently    3. DM:  - sliding scale insulin and accu checks        Electronically signed by PAULA Arrington, 05/02/23, 2:18 PM EDT.      I have read the above note and agree

## 2023-05-02 NOTE — PROGRESS NOTES
INTENSIVIST / PULMONARY INITIAL VISIT (CONSULT / H&P) NOTE     Hospital:  LOS: 0 days   Mr. Jonnie Roth, 78 y.o. male is followed for:   Chief Complaint   Patient presents with   • Pacemaker Problem            History of Present Illness   Mr. Roth is a 77 yo male with PMH cardiac arrest s/p primary ventricular fibrillation s/p pacemaker 2000, ischemic heart disease, HLD, tobacco use with COPD, Left bundle branch block, pulmonary embolism, hypothyroidism, GEO with CPAP use who presents to the ICU as a transfer from the floor with ongoing issues related to recurrent ventricular tachycardia.     Patient was admitted to hospital from 4/26 - 4/27 with recurrent VT and ICD shock x 2 at home. During that admission he was initiated on amidarone drip per protocol and transitioned to PO Amiodarone 200 mg TID, Mexiletine 200 mg BID. Since discharged he has been in good health, feeling well with no symptoms of worsening fatigue, dyspnea or chest pain. He reportedly went to cardiac rehab this morning without issue. Upon arriving home, around 12 pm, he began feeling a sudden onset of pressure in his chest with associated dizziness. At that time he felt his ICD shock him. He noted he felt poorly following the shock but never lost consciousness. He called the cardiology office who instructed him to present to the ED for evaluation.     In the ED, his device interrogation showed monomorphic VT around 140 bpm with 36 rounds of ATP and 1 shock with restoration of normal sinus rhythm. While in the ED he had recurrent ventriclar tachycardia around 138 bpm with additional hypotension. At that time he was externally cardioverted to NSR. He was admitted to the cardiology service and restarted on IV Amiodarone @ 1 mg/min with consideration of possible repeat VT ablation.     Unfortunately this evening he developed pulseless VT for which a code blue was called. It is reported he received approximately 8-10 chest compressions  followed by his ICD firing. Following that he reentered into a wide complex VT with a pulse. With his ongoing cardiac issues it was felt he could benefit from higher level of care and was thus transferred to the ICU for management.     I spent 10 minutes of time on this.  Merary Rojas, MSN, AGACNP-BC, APRN    Electronically signed by CHENTE Berry, 05/02/23, 7:39 PM EDT.      Past Medical History:   Diagnosis Date   • Arthritis    • BPH (benign prostatic hypertrophy) 12/28/2016   • CAD (coronary artery disease)    • Disease of thyroid gland    • Enlarged prostate    • HFrEF (heart failure with reduced ejection fraction)    • History of transfusion     NO REACTION RECALLED    • Hypertension    • LBBB (left bundle branch block) 12/28/2016   • Lung nodule    • Macular degeneration    • Pancreatic cyst 12/08/2022   • Polio     as a child   • Pulmonary embolism    • Pulmonary emphysema 12/28/2016   • Sepsis     A. Secondary to [place] trauma with hematuria and urosepsis requiring hospitalization May 2015   • Sleep apnea with use of continuous positive airway pressure (CPAP)     CPAP- SETTING 4    • Ventricular tachycardia 12/28/2016   • Wears glasses     READERS     Past Surgical History:   Procedure Laterality Date   • BIVENTRICULLAR IMPLANTABLE CARDIOVERTER DEFIBRILLATOR PLACEMENT     • CARDIAC CATHETERIZATION N/A 1/16/2017    Procedure: Left Heart Cath;  Surgeon: Diego Ayala MD;  Location:  [facility];  Service:    • CARDIAC DEFIBRILLATOR PLACEMENT       A. ICD generator change out with upgrade to BiV pacemaker implantable cardioverter    defibrillator, 12/17/2007. Ventricular fibrillation s/p pacesetter [place] ICD in [place]. [place] 09/2000   • CARDIAC ELECTROPHYSIOLOGY PROCEDURE N/A 10/2/2017    Procedure: BIV ICD  generator change [facility];  Surgeon: Ferdinand Alba MD;  Location:  [facility];  Service:    • CARDIAC ELECTROPHYSIOLOGY PROCEDURE N/A 7/6/2021    Procedure: BIV ICD  generator change with [employer]. This will need to be done in late  or early July. Hold Xarelto one day prior.;  Surgeon: Ferdinand Alba MD;  Location: [facility] INVASIVE LOC[facility];  Service: Cardiology;  Laterality: N/A;   • CATARACT EXTRACTION      Bilateral    • COLONOSCOPY     • CORONARY ANGIOPLASTY WITH STENT PLACEMENT      X7 STENTS TOTAL    • CYSTOSCOPY URETERAL TUMOR FULGURATION  2015     A. Status post cystoscopy with clot evacuation and fulguration of the prostate secondary to hematuria, 2015.   • FOOT SURGERY      RIGHT - POLIO RELATED    • PROSTATE SURGERY      A. Status post laser vaporization, 2009.   • ROOT CANAL     • TOOTH EXTRACTION       Family History   Problem Relation Age of Onset   • Heart failure Father    • Stroke Father      Social History     Socioeconomic History   • Marital status:    Tobacco Use   • Smoking status: Former     Packs/day: 2.00     Years: 30.00     Pack years: 60.00     Types: Cigarettes     Quit date: 1995     Years since quittin.1   • Smokeless tobacco: Never   Vaping Use   • Vaping Use: Never used   Substance and Sexual Activity   • Alcohol use: No   • Drug use: No   • Sexual activity: Defer     No Known Allergies  No current facility-administered medications on file prior to encounter.     Current Outpatient Medications on File Prior to Encounter   Medication Sig Dispense Refill   • amiodarone (PACERONE) 200 MG tablet Take 1 tablet by mouth 3 (Three) Times a Day. Take 200 mg three times daily x 10 days, then 200 mg twice daily 60 tablet 6   • carvedilol (COREG) 12.5 MG tablet Take 1 tablet by mouth 2 (Two) Times a Day With Meals. 180 tablet 3   • cholecalciferol (VITAMIN D3) 25 MCG (1000 UT) tablet Take 4 tablets by mouth Daily. OTC     • clopidogrel (PLAVIX) 75 MG tablet Take 1 tablet by mouth Every Morning. 90 tablet 3   • empagliflozin (JARDIANCE) 10 MG tablet tablet Take 1 tablet by mouth Daily. 90 tablet 3   • eplerenone  (INSPRA) 25 MG tablet Take 0.5 tablets by mouth Daily. 90 tablet 0   • finasteride (PROSCAR) 5 MG tablet Take 1 tablet by mouth Every Morning.     • L-TRYPTOPHAN PO Take  by mouth Every Night. 3 TABLETS NIGHTLY-     1500 MG EACH     • levothyroxine (SYNTHROID, LEVOTHROID) 50 MCG tablet Take 1 tablet by mouth Every Morning.     • melatonin 5 MG tablet tablet Take 2 tablets by mouth At Night As Needed. OTC     • metFORMIN (GLUCOPHAGE) 1000 MG tablet Take 1 tablet by mouth 2 (two) times a day.     • mexiletine (MEXITIL) 200 MG capsule Take 1 capsule by mouth Every 12 (Twelve) Hours. 16 capsule 0   • Multiple Vitamins-Minerals (ICAPS AREDS 2 PO) Take 1 tablet by mouth 2 (two) times a day. OTC     • Multiple Vitamins-Minerals (MULTIVITAMIN ADULTS 50+) tablet Take 1 tablet by mouth Daily. OTC     • niacin (NIASPAN) 1000 MG CR tablet Take 1 tablet by mouth Every Night.     • nitroglycerin (NITROSTAT) 0.4 MG SL tablet Place 1 tablet under the tongue Every 5 (Five) Minutes As Needed for Chest Pain. Take no more than 3 doses in 15 minutes. 25 tablet 5   • pantoprazole (PROTONIX) 40 MG EC tablet Take 1 tablet by mouth Every Morning.     • polyethylene glycol (MIRALAX) packet Take 17 g by mouth Every Other Day. OTC     • rivaroxaban (Xarelto) 10 MG tablet Take 1 tablet by mouth Daily. 90 tablet 3   • rosuvastatin (CRESTOR) 40 MG tablet Take 1 tablet by mouth Daily. 90 tablet 3   • sacubitril-valsartan (Entresto) 49-51 MG tablet Take 1 tablet by mouth 2 (Two) Times a Day. 180 tablet 3   • temazepam (RESTORIL) 15 MG capsule Take 1 capsule by mouth At Night As Needed for Sleep.     • torsemide (DEMADEX) 5 MG tablet Take 1 tablet by mouth Daily. 90 tablet 3   • vitamin B-12 (CYANOCOBALAMIN) 1000 MCG tablet Take 1 tablet by mouth Daily. OTC         ROS:  Per HPI, all other systems were reviewed and were negative        OBJECTIVE     Vital Sign Min/Max for last 24 hours  Temp  Min: 97.6 °F (36.4 °C)  Max: 98.2 °F (36.8 °C)   BP  Min:  92/70  Max: 122/67   Pulse  Min: 0  Max: 194   Resp  Min: 18  Max: 24   SpO2  Min: 89 %  Max: 96 %   Flow (L/min)  Min: 15  Max: 15     Telemetry:           General Appearance:  Conversant, in no acute distress  Eyes:  No scleral icterus or pallor, pupils normal  Ears, Nose, Mouth, Throat:  Atraumatic, oropharynx clear  Neck:  Trachea midline, thyroid normal  Respiratory:  crackles to auscultation bilaterally, normal effort, no tenderness to palpation  Cardiovascular:  Regular rate and rhythm, no murmurs, no peripheral edema, no thrill  Gastrointestinal:  Soft, non-tender, non-distended, no hepatosplenomegaly  Skin:  Normal temperature, no rash  Psychiatric:  Alert and oriented x 3, normal judgement and insight  Neuro:  No new focal neurologic deficits observed    SpO2: 94 % SpO2  Min: 89 %  Max: 96 %   Device: nonrebreather mask    Flow Rate: 15 Flow (L/min)  Min: 15  Max: 15     Mechanical Ventilator Settings:                                         Intake/Ouptut 24 hrs (7:00AM - 6:59 AM)  Intake & Output (last 3 days)       04/30 0701  05/01 0700 05/01 0701  05/02 0700 05/02 0701  05/03 0700    I.V. (mL/kg)   100 (1.2)    IV Piggyback   1500    Total Intake(mL/kg)   1600 (19.8)    Urine (mL/kg/hr)   125    Total Output   125    Net   +1475                   Lines, Drains & Airways     Active LDAs     Name Placement date Placement time Site Days    Peripheral IV 05/02/23 1247 Posterior;Right Hand 05/02/23  1247  Hand  less than 1    Peripheral IV 05/02/23 1301 Right Antecubital 05/02/23  1301  Antecubital  less than 1                Hematology:  Results from last 7 days   Lab Units 05/02/23 1932 05/02/23  1302 04/26/23  0944   WBC 10*3/mm3 8.91 7.93 8.55   HEMOGLOBIN g/dL 12.3* 13.2 13.2   HEMATOCRIT % 40.3 41.4 43.0   PLATELETS 10*3/mm3 214 221 219     Electrolytes, Magnesium and Phosphorus:  Results from last 7 days   Lab Units 05/02/23 1932 05/02/23  1302 04/26/23  0944   SODIUM mmol/L 140 139 137   POTASSIUM  mmol/L 3.7 3.1* 4.6   CO2 mmol/L 20.0* 20.0* 21.0*   MAGNESIUM mg/dL 1.9 2.0 2.2   PHOSPHORUS mg/dL 3.7  --   --      Renal:  Results from last 7 days   Lab Units 05/02/23 1932 05/02/23 1302 04/26/23  0944   CREATININE mg/dL 0.77 0.84 0.74*   BUN mg/dL 19 21 23     Estimated Creatinine Clearance: 88.8 mL/min (by C-G formula based on SCr of 0.77 mg/dL).  Estimated Creatinine Clearance: 88.8 mL/min (by C-G formula based on SCr of 0.77 mg/dL).  Hepatic:  Results from last 7 days   Lab Units 05/02/23 1932 05/02/23 1302 04/26/23  0944   ALK PHOS U/L 66 73 63   BILIRUBIN mg/dL 0.3 0.5 0.5   ALT (SGPT) U/L 42* 43* 14   AST (SGOT) U/L 43* 50* 20     Arterial Blood Gases:  Results from last 7 days   Lab Units 05/02/23 1951   PH, ARTERIAL pH units 7.468*   PCO2, ARTERIAL mm Hg 27.5*   PO2 ART mm Hg 125.0*   FIO2 % 80             Lab Results   Component Value Date    LACTATE 3.0 (C) 05/02/2023       XR Chest 1 View    Result Date: 5/2/2023  Narrative: XR CHEST 1 VW Date of Exam: 5/2/2023 7:37 PM EDT Indication: Status post cardiopulmonary resuscitation, heart disease. Comparison: 5/2/2023. Findings: There is a defibrillator paddle obscuring the central chest. The heart is enlarged. The pulmonary vascular markings are increased and indistinct consistent with moderate pulmonary edema. There is no pneumothorax or pleural effusion. There is a left-sided  transvenous pacemaker in place. There are chronic age-related changes involving the bony thorax and thoracic aorta.     Impression: Impression: Cardiomegaly with moderate grade pulmonary edema. Electronically Signed: Aneudy Carbajal  5/2/2023 8:18 PM EDT  Workstation ID: [id number]    XR Chest 1 View    Result Date: 5/2/2023  Narrative: XR CHEST 1 VW Date of Exam: 5/2/2023 1:34 PM EDT Indication: ICD fired Comparison: AP portable chest 4/26/2023 Findings: Stable mild cardiac enlargement. Pacemaker/defibrillator appears similarly positioned. No acute airspace disease, pleural  effusion, or pneumothorax is identified. Moderately advanced degenerative changes of both shoulders. Calcified loose body in the anterior left shoulder joint space, unchanged.     Impression: Impression: No acute chest finding. Electronically Signed: Zaida Bowiemaximo  5/2/2023 1:55 PM EDT  Workstation ID: [id number]    XR Chest 1 View    Result Date: 4/26/2023  Narrative: XR CHEST 1 VW Date of Exam: 4/26/2023 9:52 AM EDT Indication: Dysrhythmia triage protocol Comparison: 12/8/2022 Findings: There is a left subclavian AICD/pacemaker device. There are no airspace consolidations. No pleural fluid. No pneumothorax. The pulmonary vasculature appears within normal limits. The cardiac and mediastinal silhouette appear unremarkable. No acute osseous abnormality identified.     Impression: Impression: No acute cardiopulmonary process. Electronically Signed: Micaela Navarro  4/26/2023 10:01 AM EDT  Workstation ID: [id number]      Relevant imaging studies and labs from 05/02/23 were reviewed and interpreted by me    Medications (drips):  amiodarone, Last Rate: 1 mg/min (05/02/23 1959)        [START ON 5/3/2023] clopidogrel, 75 mg, Oral, Daily  eplerenone, 12.5 mg, Oral, Q24H  [START ON 5/3/2023] finasteride, 5 mg, Oral, Daily  [START ON 5/3/2023] levothyroxine, 50 mcg, Oral, Q AM  mexiletine, 200 mg, Oral, Q8H  [START ON 5/3/2023] pantoprazole, 40 mg, Oral, QAM  polyethylene glycol, 17 g, Oral, Daily  potassium chloride, 10 mEq, Intravenous, Q1H  torsemide, 5 mg, Oral, Daily        Assessment & Plan   IMPRESSION / PLAN     Inpatient Problem List:  78 y.o.male:  Active Hospital Problems    Diagnosis    • **Ventricular tachycardia    • Hypokalemia    • Chronic HFrEF (heart failure with reduced ejection fraction) (HCC)    • Ischemic cardiomyopathy (EF < 20%)    • COPD (chronic obstructive pulmonary disease)    • GEO (obstructive sleep apnea)    • ICD (implantable cardioverter-defibrillator) in place    • Type 2 diabetes mellitus, without  long-term current use of insulin         Impression:  78 y.o.male with relevant PMH of cardiac arrest s/p primary ventricular fibrillation s/p pacemaker 2000, ischemic heart disease, HLD, tobacco use with COPD, Left bundle branch block, pulmonary embolism, hypothyroidism, GEO with CPAP use who presents to the ICU as a transfer from the floor with ongoing issues related to recurrent ventricular tachycardia.     Patient was admitted to hospital from 4/26 - 4/27 with recurrent VT and ICD shock x 2 at home. During that admission he was initiated on amidarone drip per protocol and transitioned to PO Amiodarone 200 mg TID, Mexiletine 200 mg BID.    He was re-admitted 5/2/2023 w/ AICD firing secondary to VT.  Had an episode on the floor and briefly had chest compressions (estimated to be < 10-20 seconds).  Transferred to ICU for further management.    Plan:    Recurrent VT  ICM EF < 20% w/ AoC SHF  AICD  Prolonged Qtc  COPD  GEO  T2DM    Discussed with Dr. Alba.  Give additional IV amio bolus, keep amio at 1 mg / min until re-evaluated by Cardiology tomorrow.  Keep K > 4.0, Mag > 2.0, give empiric mag now.  Increase Mexiletine to 200 mg tid.  If recurrent issues consider procainamide.  EP to discuss options with him tomorrow.    For decompensated SHF - give bumex 2 mg x 1.  Start HFNC, wean as able.      Cut down levothyroxine from 50 to 25, does he even need this? Will defer to PCP to f/u TSH / FT4 in a few weeks.    BiPAP hs and prn, HFNC while awake --> wean as able.    Keep NPO for now except sips with meds.    Resume / continue appropriate home meds    SCDs / PPI    Critical Care time spent in direct patient care: 40 minutes (excluding procedure time, if applicable) including high complexity decision making to assess, manipulate, and support vital organ system failure in this individual who has impairment of one or more vital organ systems such that there is a high probability of imminent or life threatening  deterioration in the patient’s condition.  Greater than 50% of the total Critical Care time was spent independently of the APRN performing assessment at the patients bedside, documenting in chart, reviewing labs / imaging, ordering medications / tests / procedures, and discussing care with patient / family / nursing staff.           Maico Cain MD  Intensive Care Medicine  05/02/23 20:54 EDT     Electronically signed by CHENTE Berry, 05/02/23, 7:39 PM EDT.

## 2023-05-02 NOTE — TELEPHONE ENCOUNTER
Patient's wife called and states that patient had episode where his HR was around 160, he felt that something was wrong before this happened. Patient's wife states that he lost consciousness for a moment. His BP was 102/62. His HR currently is around 70 and he has some chest pressure. Patient still does not feel good and is pale.   I updated them that they should speak to device clinic to transmit reading and to seek ER care since patient lost consciousness and continues to feel poorly.  They voiced understanding.

## 2023-05-03 ENCOUNTER — APPOINTMENT (OUTPATIENT)
Dept: GENERAL RADIOLOGY | Facility: HOSPITAL | Age: 78
End: 2023-05-03
Payer: MEDICARE

## 2023-05-03 PROBLEM — I47.29 VENTRICULAR TACHYCARDIA, MONOMORPHIC: Status: ACTIVE | Noted: 2023-05-03

## 2023-05-03 LAB
ALBUMIN SERPL-MCNC: 3.8 G/DL (ref 3.5–5.2)
ALBUMIN/GLOB SERPL: 1.7 G/DL
ALP SERPL-CCNC: 69 U/L (ref 39–117)
ALT SERPL W P-5'-P-CCNC: 44 U/L (ref 1–41)
ANION GAP SERPL CALCULATED.3IONS-SCNC: 13 MMOL/L (ref 5–15)
AST SERPL-CCNC: 40 U/L (ref 1–40)
BASOPHILS # BLD AUTO: 0.04 10*3/MM3 (ref 0–0.2)
BASOPHILS NFR BLD AUTO: 0.4 % (ref 0–1.5)
BH CV ECHO MEAS - AO MAX PG: 6.3 MMHG
BH CV ECHO MEAS - AO MEAN PG: 3 MMHG
BH CV ECHO MEAS - AO ROOT DIAM: 3.5 CM
BH CV ECHO MEAS - AO V2 MAX: 125 CM/SEC
BH CV ECHO MEAS - AO V2 VTI: 25.8 CM
BH CV ECHO MEAS - AVA(I,D): 1.74 CM2
BH CV ECHO MEAS - EDV(CUBED): 421.9 ML
BH CV ECHO MEAS - EDV(MOD-SP2): 317 ML
BH CV ECHO MEAS - EDV(MOD-SP4): 390 ML
BH CV ECHO MEAS - EF(MOD-BP): 17.7 %
BH CV ECHO MEAS - EF(MOD-SP2): 12.9 %
BH CV ECHO MEAS - EF(MOD-SP4): 16.2 %
BH CV ECHO MEAS - ESV(CUBED): 343 ML
BH CV ECHO MEAS - ESV(MOD-SP2): 276 ML
BH CV ECHO MEAS - ESV(MOD-SP4): 327 ML
BH CV ECHO MEAS - FS: 6.7 %
BH CV ECHO MEAS - IVS/LVPW: 0.9 CM
BH CV ECHO MEAS - IVSD: 0.9 CM
BH CV ECHO MEAS - LA DIMENSION: 4.4 CM
BH CV ECHO MEAS - LAT PEAK E' VEL: 4.9 CM/SEC
BH CV ECHO MEAS - LV MASS(C)D: 340.6 GRAMS
BH CV ECHO MEAS - LV MAX PG: 2.44 MMHG
BH CV ECHO MEAS - LV MEAN PG: 1 MMHG
BH CV ECHO MEAS - LV V1 MAX: 78.1 CM/SEC
BH CV ECHO MEAS - LV V1 VTI: 14.3 CM
BH CV ECHO MEAS - LVIDD: 7.5 CM
BH CV ECHO MEAS - LVIDS: 7 CM
BH CV ECHO MEAS - LVOT AREA: 3.1 CM2
BH CV ECHO MEAS - LVOT DIAM: 2 CM
BH CV ECHO MEAS - LVPWD: 1 CM
BH CV ECHO MEAS - MED PEAK E' VEL: 6.4 CM/SEC
BH CV ECHO MEAS - MR MAX PG: 80.6 MMHG
BH CV ECHO MEAS - MR MAX VEL: 449 CM/SEC
BH CV ECHO MEAS - MR MEAN PG: 49 MMHG
BH CV ECHO MEAS - MR MEAN VEL: 328 CM/SEC
BH CV ECHO MEAS - MR VTI: 185 CM
BH CV ECHO MEAS - MV A MAX VEL: 99.6 CM/SEC
BH CV ECHO MEAS - MV DEC SLOPE: 386 CM/SEC2
BH CV ECHO MEAS - MV DEC TIME: 0.25 MSEC
BH CV ECHO MEAS - MV E MAX VEL: 63.9 CM/SEC
BH CV ECHO MEAS - MV E/A: 0.64
BH CV ECHO MEAS - MV MAX PG: 4.8 MMHG
BH CV ECHO MEAS - MV MEAN PG: 2 MMHG
BH CV ECHO MEAS - MV P1/2T: 42.9 MSEC
BH CV ECHO MEAS - MV V2 VTI: 21.3 CM
BH CV ECHO MEAS - MVA(P1/2T): 5.1 CM2
BH CV ECHO MEAS - MVA(VTI): 2.11 CM2
BH CV ECHO MEAS - PA ACC TIME: 0.12 SEC
BH CV ECHO MEAS - PA PR(ACCEL): 25.4 MMHG
BH CV ECHO MEAS - PA V2 MAX: 128 CM/SEC
BH CV ECHO MEAS - PI END-D VEL: 119 CM/SEC
BH CV ECHO MEAS - RAP SYSTOLE: 8 MMHG
BH CV ECHO MEAS - RVSP: 20 MMHG
BH CV ECHO MEAS - SV(LVOT): 44.9 ML
BH CV ECHO MEAS - SV(MOD-SP2): 41 ML
BH CV ECHO MEAS - SV(MOD-SP4): 63 ML
BH CV ECHO MEAS - TAPSE (>1.6): 1.29 CM
BH CV ECHO MEAS - TR MAX PG: 11.8 MMHG
BH CV ECHO MEAS - TR MAX VEL: 172 CM/SEC
BH CV ECHO MEASUREMENTS AVERAGE E/E' RATIO: 11.31
BH CV XLRA - RV BASE: 3.9 CM
BH CV XLRA - RV LENGTH: 6.5 CM
BH CV XLRA - RV MID: 3.1 CM
BH CV XLRA - TDI S': 11.5 CM/SEC
BILIRUB SERPL-MCNC: 0.4 MG/DL (ref 0–1.2)
BUN SERPL-MCNC: 14 MG/DL (ref 8–23)
BUN/CREAT SERPL: 20.6 (ref 7–25)
CALCIUM SPEC-SCNC: 8.3 MG/DL (ref 8.6–10.5)
CHLORIDE SERPL-SCNC: 105 MMOL/L (ref 98–107)
CO2 SERPL-SCNC: 22 MMOL/L (ref 22–29)
CREAT SERPL-MCNC: 0.68 MG/DL (ref 0.76–1.27)
DEPRECATED RDW RBC AUTO: 47.1 FL (ref 37–54)
EGFRCR SERPLBLD CKD-EPI 2021: 94.6 ML/MIN/1.73
EOSINOPHIL # BLD AUTO: 0.18 10*3/MM3 (ref 0–0.4)
EOSINOPHIL NFR BLD AUTO: 1.9 % (ref 0.3–6.2)
ERYTHROCYTE [DISTWIDTH] IN BLOOD BY AUTOMATED COUNT: 14.3 % (ref 12.3–15.4)
GLOBULIN UR ELPH-MCNC: 2.2 GM/DL
GLUCOSE BLDC GLUCOMTR-MCNC: 101 MG/DL (ref 70–130)
GLUCOSE BLDC GLUCOMTR-MCNC: 116 MG/DL (ref 70–130)
GLUCOSE BLDC GLUCOMTR-MCNC: 149 MG/DL (ref 70–130)
GLUCOSE SERPL-MCNC: 91 MG/DL (ref 65–99)
HCT VFR BLD AUTO: 40.9 % (ref 37.5–51)
HGB BLD-MCNC: 13 G/DL (ref 13–17.7)
IMM GRANULOCYTES # BLD AUTO: 0.04 10*3/MM3 (ref 0–0.05)
IMM GRANULOCYTES NFR BLD AUTO: 0.4 % (ref 0–0.5)
LEFT ATRIUM VOLUME INDEX: 45 ML/M2
LYMPHOCYTES # BLD AUTO: 2.04 10*3/MM3 (ref 0.7–3.1)
LYMPHOCYTES NFR BLD AUTO: 21.6 % (ref 19.6–45.3)
MAGNESIUM SERPL-MCNC: 2.1 MG/DL (ref 1.6–2.4)
MAXIMAL PREDICTED HEART RATE: 141 BPM
MCH RBC QN AUTO: 28.6 PG (ref 26.6–33)
MCHC RBC AUTO-ENTMCNC: 31.8 G/DL (ref 31.5–35.7)
MCV RBC AUTO: 90.1 FL (ref 79–97)
MONOCYTES # BLD AUTO: 0.8 10*3/MM3 (ref 0.1–0.9)
MONOCYTES NFR BLD AUTO: 8.5 % (ref 5–12)
NEUTROPHILS NFR BLD AUTO: 6.34 10*3/MM3 (ref 1.7–7)
NEUTROPHILS NFR BLD AUTO: 67.2 % (ref 42.7–76)
NRBC BLD AUTO-RTO: 0 /100 WBC (ref 0–0.2)
PHOSPHATE SERPL-MCNC: 3.8 MG/DL (ref 2.5–4.5)
PLATELET # BLD AUTO: 220 10*3/MM3 (ref 140–450)
PMV BLD AUTO: 10.2 FL (ref 6–12)
POTASSIUM SERPL-SCNC: 3.1 MMOL/L (ref 3.5–5.2)
POTASSIUM SERPL-SCNC: 3.5 MMOL/L (ref 3.5–5.2)
POTASSIUM SERPL-SCNC: 3.9 MMOL/L (ref 3.5–5.2)
PROT SERPL-MCNC: 6 G/DL (ref 6–8.5)
QT INTERVAL: 578 MS
QTC INTERVAL: 624 MS
RBC # BLD AUTO: 4.54 10*6/MM3 (ref 4.14–5.8)
SODIUM SERPL-SCNC: 140 MMOL/L (ref 136–145)
STRESS TARGET HR: 120 BPM
TROPONIN T SERPL HS-MCNC: 38 NG/L
WBC NRBC COR # BLD: 9.44 10*3/MM3 (ref 3.4–10.8)

## 2023-05-03 PROCEDURE — 71045 X-RAY EXAM CHEST 1 VIEW: CPT

## 2023-05-03 PROCEDURE — 82948 REAGENT STRIP/BLOOD GLUCOSE: CPT

## 2023-05-03 PROCEDURE — 0 POTASSIUM CHLORIDE 10 MEQ/100ML SOLUTION: Performed by: INTERNAL MEDICINE

## 2023-05-03 PROCEDURE — 0 POTASSIUM CHLORIDE 10 MEQ/100ML SOLUTION: Performed by: NURSE PRACTITIONER

## 2023-05-03 PROCEDURE — 83735 ASSAY OF MAGNESIUM: CPT | Performed by: INTERNAL MEDICINE

## 2023-05-03 PROCEDURE — 84132 ASSAY OF SERUM POTASSIUM: CPT | Performed by: INTERNAL MEDICINE

## 2023-05-03 PROCEDURE — 85025 COMPLETE CBC W/AUTO DIFF WBC: CPT | Performed by: INTERNAL MEDICINE

## 2023-05-03 PROCEDURE — 94799 UNLISTED PULMONARY SVC/PX: CPT

## 2023-05-03 PROCEDURE — 25010000002 AMIODARONE IN DEXTROSE 5% 360-4.14 MG/200ML-% SOLUTION: Performed by: STUDENT IN AN ORGANIZED HEALTH CARE EDUCATION/TRAINING PROGRAM

## 2023-05-03 PROCEDURE — 84484 ASSAY OF TROPONIN QUANT: CPT | Performed by: PHYSICIAN ASSISTANT

## 2023-05-03 PROCEDURE — 80053 COMPREHEN METABOLIC PANEL: CPT | Performed by: PHYSICIAN ASSISTANT

## 2023-05-03 PROCEDURE — 99232 SBSQ HOSP IP/OBS MODERATE 35: CPT | Performed by: INTERNAL MEDICINE

## 2023-05-03 PROCEDURE — 25010000002 AMIODARONE IN DEXTROSE 5% 360-4.14 MG/200ML-% SOLUTION: Performed by: INTERNAL MEDICINE

## 2023-05-03 PROCEDURE — 93005 ELECTROCARDIOGRAM TRACING: CPT | Performed by: PHYSICIAN ASSISTANT

## 2023-05-03 PROCEDURE — 84100 ASSAY OF PHOSPHORUS: CPT | Performed by: INTERNAL MEDICINE

## 2023-05-03 PROCEDURE — 94660 CPAP INITIATION&MGMT: CPT

## 2023-05-03 RX ORDER — POTASSIUM CHLORIDE 750 MG/1
40 CAPSULE, EXTENDED RELEASE ORAL ONCE
Status: COMPLETED | OUTPATIENT
Start: 2023-05-03 | End: 2023-05-03

## 2023-05-03 RX ORDER — POTASSIUM CHLORIDE 7.45 MG/ML
10 INJECTION INTRAVENOUS
Status: DISPENSED | OUTPATIENT
Start: 2023-05-03 | End: 2023-05-03

## 2023-05-03 RX ADMIN — MEXILETINE HYDROCHLORIDE 200 MG: 200 CAPSULE ORAL at 22:06

## 2023-05-03 RX ADMIN — POTASSIUM CHLORIDE 10 MEQ: 7.46 INJECTION, SOLUTION INTRAVENOUS at 07:24

## 2023-05-03 RX ADMIN — POTASSIUM CHLORIDE 10 MEQ: 7.46 INJECTION, SOLUTION INTRAVENOUS at 00:04

## 2023-05-03 RX ADMIN — AMIODARONE HYDROCHLORIDE 1 MG/MIN: 1.8 INJECTION, SOLUTION INTRAVENOUS at 01:34

## 2023-05-03 RX ADMIN — LEVOTHYROXINE SODIUM 25 MCG: 25 TABLET ORAL at 06:17

## 2023-05-03 RX ADMIN — CLOPIDOGREL BISULFATE 75 MG: 75 TABLET ORAL at 10:20

## 2023-05-03 RX ADMIN — POTASSIUM CHLORIDE 40 MEQ: 10 CAPSULE, COATED, EXTENDED RELEASE ORAL at 20:46

## 2023-05-03 RX ADMIN — AMIODARONE HYDROCHLORIDE 1 MG/MIN: 1.8 INJECTION, SOLUTION INTRAVENOUS at 07:23

## 2023-05-03 RX ADMIN — POTASSIUM CHLORIDE 10 MEQ: 7.46 INJECTION, SOLUTION INTRAVENOUS at 06:17

## 2023-05-03 RX ADMIN — POTASSIUM CHLORIDE 10 MEQ: 7.46 INJECTION, SOLUTION INTRAVENOUS at 01:19

## 2023-05-03 RX ADMIN — MEXILETINE HYDROCHLORIDE 200 MG: 200 CAPSULE ORAL at 13:17

## 2023-05-03 RX ADMIN — POTASSIUM CHLORIDE 40 MEQ: 10 CAPSULE, COATED, EXTENDED RELEASE ORAL at 10:40

## 2023-05-03 RX ADMIN — PANTOPRAZOLE SODIUM 40 MG: 40 TABLET, DELAYED RELEASE ORAL at 06:17

## 2023-05-03 RX ADMIN — FINASTERIDE 5 MG: 5 TABLET, FILM COATED ORAL at 10:20

## 2023-05-03 RX ADMIN — POTASSIUM CHLORIDE 10 MEQ: 7.46 INJECTION, SOLUTION INTRAVENOUS at 02:42

## 2023-05-03 RX ADMIN — AMIODARONE HYDROCHLORIDE 1 MG/MIN: 1.8 INJECTION, SOLUTION INTRAVENOUS at 20:15

## 2023-05-03 RX ADMIN — MEXILETINE HYDROCHLORIDE 200 MG: 200 CAPSULE ORAL at 06:24

## 2023-05-03 RX ADMIN — AMIODARONE HYDROCHLORIDE 1 MG/MIN: 1.8 INJECTION, SOLUTION INTRAVENOUS at 13:15

## 2023-05-03 RX ADMIN — EPLERENONE 12.5 MG: 25 TABLET, FILM COATED ORAL at 10:20

## 2023-05-03 NOTE — NURSING NOTE
Pt. Referred for Phase II Cardiac Rehab. Staff discussed benefits of exercise, program protocol, and educational material provided. Teach back verified.   Emotional support given to patient and wife and it is stressed Phase II cardiac rehab is available in the future, but no decision in participation is needed at this time.

## 2023-05-03 NOTE — CASE MANAGEMENT/SOCIAL WORK
Discharge Planning Assessment  [facility]     Patient Name: Jonnie Roth  MRN: [id number]  Today's Date: 5/3/2023    Admit Date: 5/2/2023    Plan: home with family   Discharge Needs Assessment     Row Name 05/03/23 1050       Living Environment    People in Home spouse    Current Living Arrangements home    Potentially Unsafe Housing Conditions none    Primary Care Provided by self    Provides Primary Care For no one    Family Caregiver if Needed child(musa), adult;spouse    Quality of Family Relationships involved;helpful    Able to Return to Prior Arrangements yes       Resource/Environmental Concerns    Resource/Environmental Concerns none    Transportation Concerns none       Food Insecurity    Within the past 12 months, you worried that your food would run out before you got the money to buy more. Never true    Within the past 12 months, the food you bought just didn't last and you didn't have money to get more. Never true       Transition Planning    Patient/Family Anticipates Transition to home with family    Patient/Family Anticipated Services at Transition none    Transportation Anticipated family or friend will provide       Discharge Needs Assessment    Readmission Within the Last 30 Days no previous admission in last 30 days    Equipment Currently Used at Home cane, straight    Concerns to be Addressed discharge planning    Anticipated Changes Related to Illness none    Equipment Needed After Discharge none               Discharge Plan     Row Name 05/03/23 1051       Plan    Plan home with family    Patient/Family in Agreement with Plan yes    Plan Comments Pt lives with his wife in [facility]. Per wife he is independent with ADLs and uses a cane as needed. He is followed by his PCP and has drug coverage. At this time his plan for discharge is to return home. CM to follow for any discharge needs.    Final Discharge Disposition Code 01 - home or self-care              Continued Care and  Services - Admitted Since 5/2/2023    Coordination has not been started for this encounter.       Expected Discharge Date and Time     Expected Discharge Date Expected Discharge Time    May 5, 2023          Demographic Summary     Row Name 05/03/23 1050       General Information    Admission Type inpatient    Referral Source physician    Reason for Consult discharge planning    Preferred Language English    General Information Comments PCP Brian Lewis       Contact Information    Permission Granted to Share Info With family/designee    Contact Information Comments Trena Roth [phone]               Functional Status     Row Name 05/03/23 1050       Functional Status, IADL    Medications independent    Meal Preparation independent    Housekeeping independent    Laundry independent    Shopping independent       Mental Status    General Appearance WDL WDL       Mental Status Summary    Recent Changes in Mental Status/Cognitive Functioning no changes               Psychosocial    No documentation.                Abuse/Neglect    No documentation.                Legal    No documentation.                Substance Abuse    No documentation.                Patient Forms    No documentation.                   Tanja Serrano RN

## 2023-05-03 NOTE — PROGRESS NOTES
[facility] at [facility]  Progress Note       LOS: 0 days   Patient Care Team:  Brian Lewis MD as PCP - General (Internal Medicine)  Ferdinand Alba MD as Consulting Physician (Cardiac Electrophysiology)  Viet Manzano PA as Physician Assistant (Cardiology)  Michele Flanagan IV, MD as Consulting Physician (Interventional Cardiology)    Chief Complaint:  Follow up VT    Subjective     Patient had another VT epsidoe last night with brief LOC and CPR briefly (approx 15 compressions) with ICD shock and return to NSR. Amiodarone bolus x 2 given, drip continued at 1 mg/min. No further events since then. Mg and K replaced.   Patient feels ok this AM. No CP or SOB. Very Tired.           Review of Systems:   Pertinent positives in HPI, all others reviewed and negative.      Objective       Current Facility-Administered Medications:   •  amiodarone 360 mg in 200 mL D5W infusion, 1 mg/min, Intravenous, Continuous, Maico Cain MD, Last Rate: 33.3 mL/hr at 05/03/23 0723, 1 mg/min at 05/03/23 0723  •  Calcium Replacement - Follow Nurse / BPA Driven Protocol, , Does not apply, PRN, Merary Rojas APRN  •  clopidogrel (PLAVIX) tablet 75 mg, 75 mg, Oral, Daily, Shauna Byrne PA  •  eplerenone (INSPRA) tablet 12.5 mg, 12.5 mg, Oral, Q24H, Shauna Byrne PA  •  finasteride (PROSCAR) tablet 5 mg, 5 mg, Oral, Daily, Shauna Byrne PA  •  levothyroxine (SYNTHROID, LEVOTHROID) tablet 25 mcg, 25 mcg, Oral, Q AM, Maico Cain MD, 25 mcg at 05/03/23 0617  •  Magnesium Standard Dose Replacement - Follow Nurse / BPA Driven Protocol, , Does not apply, PRDESTINI, Merary Rojas APRN  •  melatonin tablet 10 mg, 10 mg, Oral, Nightly PRN, Shauna Byrne PA  •  mexiletine (MEXITIL) capsule 200 mg, 200 mg, Oral, Q8H, Merary Rojas APRN, 200 mg at 05/03/23 0624  •  pantoprazole (PROTONIX) EC tablet 40 mg, 40 mg, Oral, Chavez HAMILTON Emily M, PA, 40 mg at 05/03/23  "0617  •  Phosphorus Replacement - Follow Nurse / BPA Driven Protocol, , Does not apply, PRN, Merary Rojas, APRN  •  polyethylene glycol (MIRALAX) packet 17 g, 17 g, Oral, Daily, Shauna Byrne PA  •  potassium chloride 10 mEq in 100 mL IVPB, 10 mEq, Intravenous, Q1H, Merary Rojas APRN, Last Rate: 100 mL/hr at 05/03/23 0724, 10 mEq at 05/03/23 0724  •  Potassium Replacement - Follow Nurse / BPA Driven Protocol, , Does not apply, PRN, Merary Rojas APRN    Vital Sign Min/Max for last 24 hours  Temp  Min: 97.6 °F (36.4 °C)  Max: 98.2 °F (36.8 °C)   BP  Min: 92/70  Max: 143/77   Pulse  Min: 0  Max: 194   Resp  Min: 15  Max: 24   SpO2  Min: 89 %  Max: 100 %   Flow (L/min)  Min: 15  Max: 40   Weight  Min: 79.4 kg (175 lb)  Max: 80.7 kg (177 lb 14.6 oz)     Flowsheet Rows    Flowsheet Row First Filed Value   Admission Height 185.4 cm (73\") Documented at 05/02/2023 1324   Admission Weight 79.4 kg (175 lb) Documented at 05/02/2023 1324            Intake/Output Summary (Last 24 hours) at 5/3/2023 0825  Last data filed at 5/3/2023 0617  Gross per 24 hour   Intake 2696.98 ml   Output 2575 ml   Net 121.98 ml       Physical Exam:     General Appearance:    Alert, cooperative, in no acute distress   Lungs:     Clear to auscultation,respirations regular, even and                unlabored    Heart:    Regular rhythm and normal rate, normal S1 and S2,   no        murmur, no gallop, no rub, no click   Chest Wall:    No abnormalities observed   Abdomen:     Normal bowel sounds, no masses, no organomegaly, soft      nontender, nondistended, no guarding, no rebound                tenderness   Extremities:  Moves all extremities well, no edema, no cyanosis, no            redness              Pulses:   Pulses palpable and equal bilaterally   Skin:   No bleeding, bruising or rash        Results Review:   Results from last 7 days   Lab Units 05/03/23  0456 05/02/23  1932 05/02/23  1302   WBC 10*3/mm3 9.44 8.91 7.93   HEMOGLOBIN g/dL " 13.0 12.3* 13.2   HEMATOCRIT % 40.9 40.3 41.4   PLATELETS 10*3/mm3 220 214 221     Results from last 7 days   Lab Units 05/03/23 0456 05/02/23 1932 05/02/23 1642 05/02/23  1302   SODIUM mmol/L 140 140  --  139   POTASSIUM mmol/L 3.5 3.7 3.1* 3.1*   CHLORIDE mmol/L 105 107  --  104   CO2 mmol/L 22.0 20.0*  --  20.0*   BUN mg/dL 14 19  --  21   CREATININE mg/dL 0.68* 0.77  --  0.84   GLUCOSE mg/dL 91 136*  --  115*              Results from last 7 days   Lab Units 05/02/23  1302   TSH uIU/mL 5.210*   FREE T4 ng/dL 2.11*     Results from last 7 days   Lab Units 05/02/23  1302   PROBNP pg/mL 1,065.0     Results from last 7 days   Lab Units 05/02/23  1302   PROTIME Seconds 16.5*   INR  1.32*     Results from last 7 days   Lab Units 05/03/23 0456 05/02/23 1932 05/02/23  1642   HSTROP T ng/L 38* 28* 24*       Intake/Output Summary (Last 24 hours) at 5/3/2023 0825  Last data filed at 5/3/2023 0617  Gross per 24 hour   Intake 2696.98 ml   Output 2575 ml   Net 121.98 ml       I personally viewed and interpreted the patient's EKG/Telemetry data    EKG: V paced,  ms, QTc 468 ms when corrected for wide QRS    Telemetry: AV paced     Ejection Fraction  No results found for: EF    Echo EF Estimated  Lab Results   Component Value Date    ECHOEFEST 18 10/14/2022         Present on Admission:  • Ventricular tachycardia  • COPD (chronic obstructive pulmonary disease)  • GEO (obstructive sleep apnea)  • ICD (implantable cardioverter-defibrillator) in place  • Type 2 diabetes mellitus, without long-term current use of insulin  • Ischemic cardiomyopathy (EF < 20%)  • Chronic HFrEF (heart failure with reduced ejection fraction) ([facility])  • Hypokalemia    Assessment & Plan   1.  Recurrent VT:  -Recurrent ventricular tachycardia with a rate of 142 bpm with 36 ATP and 1 shock to normal sinus rhythm from his ICD at home 5/2/2023.  With recurrent VT monomorphic appearing at 138 bpm with external cardioversion in the ED with return to  normal sinus rhythm. Recurrent VT overnight with ICD shock x 1. Amiodarone re-bolused x 2. Mexiletine increased to 200 mg TID.   -Patient has had VT with ICD shocks on 4 separate occasions since September 2022.  He was just discharged from [facility] last week with VT and started on amiodarone 200 mg 3 times daily, mexiletine 200 mg twice daily.  -Discussed with Dr. Alba, Will consider the possibility of a repeat VT ablation, likely monomorphic VT from a scar on his LV. Dr. Alba to have discussion with patient and family on how aggressive they want to be.   - holding Xarelto in case of procedure  - hold statin due to slightly elevated liver enzymes  - replacing potassium        2.  Ischemic cardiomyopathy/severe LV dysfunction/HFrEF:  - Most recent echo 10/16/2022: EF less than 20%, LV wall segments hypokinetic, mid anterior, basal anterolateral, mid anterolateral, basal inferior lateral, mid inferior lateral, apical inferior, mid inferior, basal inferior septal, mid inferior septalmid anteroseptal, basal anterior, basal inferior and basal inferoseptal. The following left ventricular wall segments are dyskinetic: apical anterior, apical lateral, apical septal and apex.The left ventricular cavity is severely dilated. Mild mitral regurgitation is present  -Overall compensated from a heart failure standpoint, chest x-ray with no acute cardiopulmonary process  -Repeat echocardiogram pending, preliminary EF 17%.  -On Coreg, Jardiance, Inspra, Entresto at home-we will hold secondary to hypotension currently     3. DM:  - sliding scale insulin and accu checks    Plan for disposition: Further discussion to be had with family, patient, Dr. Alba on decision going forward to include possibility of VT ablation, or turning off his ICD.     Electronically signed by PAULA rArington, 05/03/23, 8:25 AM EDT.    I have read the above note and agree

## 2023-05-03 NOTE — ED PROVIDER NOTES
Subjective   History of Present Illness  78-year-old male presents for evaluation after his pacemaker fired this morning.  Of note, the patient has an extensive cardiac history.  He is followed by Dr. Alba and Dr. Flanagan of cardiology.  I did an extensive review of the patient's records.  He was admitted to our facility on April 26 for similar episode after his ICD fired.  This morning, approximately 30 minutes before coming to the emergency department the patient was at home at rest when he began experiencing dizziness, nausea, and chest pressure.  His ICD then fired.  EMS was called and the patient was brought to our facility to be evaluated.  He is unsure as to what may have caused the ICD to fire but notes that this is happened to him multiple times before in the past.  He has suffered cardiac arrest multiple times.  Currently, the patient is complaining of some soreness to his chest wall and notes mild persistent dizziness.        Review of Systems   Respiratory: Positive for chest tightness and shortness of breath.    Cardiovascular: Positive for chest pain.   Gastrointestinal: Positive for nausea.   Neurological: Positive for dizziness.   All other systems reviewed and are negative.      Past Medical History:   Diagnosis Date   • Arthritis    • BPH (benign prostatic hypertrophy) 12/28/2016   • CAD (coronary artery disease)    • Disease of thyroid gland    • Enlarged prostate    • HFrEF (heart failure with reduced ejection fraction)    • History of transfusion     NO REACTION RECALLED    • Hypertension    • LBBB (left bundle branch block) 12/28/2016   • Lung nodule    • Macular degeneration    • Pancreatic cyst 12/08/2022   • Polio     as a child   • Pulmonary embolism    • Pulmonary emphysema 12/28/2016   • Sepsis     A. Secondary to [place] trauma with hematuria and urosepsis requiring hospitalization May 2015   • Sleep apnea with use of continuous positive airway pressure (CPAP)     CPAP- SETTING 4     • Ventricular tachycardia 2016   • Wears glasses     READERS       No Known Allergies    Past Surgical History:   Procedure Laterality Date   • BIVENTRICULLAR IMPLANTABLE CARDIOVERTER DEFIBRILLATOR PLACEMENT     • CARDIAC CATHETERIZATION N/A 2017    Procedure: Left Heart Cath;  Surgeon: Diego Ayala MD;  Location:  [facility];  Service:    • CARDIAC DEFIBRILLATOR PLACEMENT       A. ICD generator change out with upgrade to BiV pacemaker implantable cardioverter    defibrillator, 2007. Ventricular fibrillation s/p pacesetter [place] ICD in [place]. [place] 2000   • CARDIAC ELECTROPHYSIOLOGY PROCEDURE N/A 10/2/2017    Procedure: BIV ICD  generator change SJ;  Surgeon: Ferdinand Alba MD;  Location:  [facility];  Service:    • CARDIAC ELECTROPHYSIOLOGY PROCEDURE N/A 2021    Procedure: BIV ICD generator change with [employer]. This will need to be done in late  or early July. Hold Xarelto one day prior.;  Surgeon: Ferdinand Alba MD;  Location:  [facility];  Service: Cardiology;  Laterality: N/A;   • CATARACT EXTRACTION      Bilateral    • COLONOSCOPY     • CORONARY ANGIOPLASTY WITH STENT PLACEMENT      X7 STENTS TOTAL    • CYSTOSCOPY URETERAL TUMOR FULGURATION  2015     A. Status post cystoscopy with clot evacuation and fulguration of the prostate secondary to hematuria, 2015.   • FOOT SURGERY      RIGHT - POLIO RELATED    • PROSTATE SURGERY      A. Status post laser vaporization, 2009.   • ROOT CANAL     • TOOTH EXTRACTION         Family History   Problem Relation Age of Onset   • Heart failure Father    • Stroke Father        Social History     Socioeconomic History   • Marital status:    Tobacco Use   • Smoking status: Former     Packs/day: 2.00     Years: 30.00     Pack years: 60.00     Types: Cigarettes     Quit date: 1995     Years since quittin.1   • Smokeless tobacco: Never   Vaping Use   • Vaping Use:  Never used   Substance and Sexual Activity   • Alcohol use: No   • Drug use: No   • Sexual activity: Defer           Objective   Physical Exam  Vitals and nursing note reviewed.   Constitutional:       General: He is not in acute distress.     Appearance: He is well-developed. He is not diaphoretic.      Comments: Nontoxic-appearing male   HENT:      Head: Normocephalic and atraumatic.   Eyes:      Pupils: Pupils are equal, round, and reactive to light.   Neck:      Vascular: No JVD.   Cardiovascular:      Rate and Rhythm: Normal rate and regular rhythm.      Heart sounds: Normal heart sounds. No murmur heard.    No friction rub. No gallop.   Pulmonary:      Effort: Pulmonary effort is normal. No respiratory distress.      Breath sounds: Normal breath sounds. No wheezing or rales.   Abdominal:      General: Bowel sounds are normal. There is no distension.      Palpations: Abdomen is soft. There is no mass.      Tenderness: There is no abdominal tenderness. There is no guarding.   Musculoskeletal:         General: Normal range of motion.      Cervical back: Neck supple.      Right lower leg: No edema.      Left lower leg: No edema.   Skin:     General: Skin is warm and dry.      Coloration: Skin is not pale.      Findings: No erythema or rash.   Neurological:      Mental Status: He is alert and oriented to person, place, and time.   Psychiatric:         Mood and Affect: Mood normal.         Thought Content: Thought content normal.         Judgment: Judgment normal.         Critical Care  Performed by: Darvin Wagoner MD  Authorized by: Darvin Wagoner MD     Critical care provider statement:     Critical care time (minutes):  40    Critical care was necessary to treat or prevent imminent or life-threatening deterioration of the following conditions:  Cardiac failure    Critical care was time spent personally by me on the following activities:  Development of treatment plan with patient or surrogate,  discussions with consultants, evaluation of patient's response to treatment, examination of patient, obtaining history from patient or surrogate, ordering and performing treatments and interventions, ordering and review of radiographic studies, ordering and review of laboratory studies, pulse oximetry, re-evaluation of patient's condition and review of old charts  Electrical Cardioversion    Date/Time: 5/2/2023 8:41 PM  Performed by: Darvin Wagoner MD  Authorized by: Darvin Wagoner MD     Consent:     Consent obtained:  Verbal    Consent given by:  Patient    Risks, benefits, and alternatives were discussed: yes      Risks discussed:  Cutaneous burn, death, induced arrhythmia and pain  Universal protocol:     Procedure explained and questions answered to patient or proxy's satisfaction: yes      Relevant documents present and verified: yes      Test results available: yes      Imaging studies available: yes      Required blood products, implants, devices, and special equipment available: yes      Site/side marked: yes      Immediately prior to procedure, a time out was called: yes      Patient identity confirmed:  Arm band and verbally with patient  Pre-procedure details:     Cardioversion basis:  Emergent    Rhythm:  Ventricular tachycardia    Electrode placement:  Anterior-posterior  Attempt one:     Cardioversion mode:  Synchronous    Waveform:  Biphasic    Shock (Joules):  150    Shock outcome:  Conversion to normal sinus rhythm  Post-procedure details:     Patient status:  Awake    Procedure completion:  Tolerated well, no immediate complications               ED Course  ED Course as of 05/02/23 2039   Tue May 02, 2023   1423 78-year-old male presents for evaluation after his pacemaker fired this morning.  Of note, the patient has an extensive cardiac history.  He is followed by Dr. Alba and Dr. Flanagan of cardiology here at our facility.  I did an extensive review of his records.  He was  admitted to our facility on April 26 after his ICD fired.  This morning, approximately 30 minutes before coming to the emergency department he was at home at rest when he began experiencing dizziness, nausea, and chest pressure.  His ICD then fired.  EMS was called and he was brought to our facility to be evaluated.  On arrival, the patient is nontoxic-appearing.  Labs obtained.  EKG obtained.  Chest x-ray ordered.  While awaiting return of work-up, I was notified by the patient's nurse that I needed to come to his room.  On cardiac monitor, the patient had a wide-complex tachycardia consistent with ventricular tachycardia.  He was quite symptomatic and feeling dizzy.  He was hypotensive with a systolic blood pressure right at 90.  The crash cart was brought to the patient's room and pads were placed.  He was given Versed 2 mg IV and was cardioverted at 150 J.  He immediately felt better and repeat EKG revealed a paced rhythm.  I then contacted our cardiology team and spoke with Dr. Ramirez.  They came immediately to the emergency department to evaluate the patient and have started him on an amiodarone drip.  The patient will be admitted under their care for further evaluation and treatment.  He is aware/agreeable with the plan at this time and is hemodynamically stable. [DD]   6673 I personally and independently viewed the patient's x-ray images myself, and I am in agreement with the radiologist's reading for final interpretation.   [DD]      ED Course User Index  [DD] Darvin Wagoner MD                                       Recent Results (from the past 24 hour(s))   ECG 12 Lead Other; DEFIB    Collection Time: 05/02/23 12:53 PM   Result Value Ref Range    QT Interval 604 ms    QTC Interval 652 ms   Comprehensive Metabolic Panel    Collection Time: 05/02/23  1:02 PM    Specimen: Blood   Result Value Ref Range    Glucose 115 (H) 65 - 99 mg/dL    BUN 21 8 - 23 mg/dL    Creatinine 0.84 0.76 - 1.27 mg/dL    Sodium  139 136 - 145 mmol/L    Potassium 3.1 (L) 3.5 - 5.2 mmol/L    Chloride 104 98 - 107 mmol/L    CO2 20.0 (L) 22.0 - 29.0 mmol/L    Calcium 9.0 8.6 - 10.5 mg/dL    Total Protein 6.5 6.0 - 8.5 g/dL    Albumin 4.1 3.5 - 5.2 g/dL    ALT (SGPT) 43 (H) 1 - 41 U/L    AST (SGOT) 50 (H) 1 - 40 U/L    Alkaline Phosphatase 73 39 - 117 U/L    Total Bilirubin 0.5 0.0 - 1.2 mg/dL    Globulin 2.4 gm/dL    A/G Ratio 1.7 g/dL    BUN/Creatinine Ratio 25.0 7.0 - 25.0    Anion Gap 15.0 5.0 - 15.0 mmol/L    eGFR 88.7 >60.0 mL/min/1.73   Protime-INR    Collection Time: 05/02/23  1:02 PM    Specimen: Blood   Result Value Ref Range    Protime 16.5 (H) 12.2 - 14.5 Seconds    INR 1.32 (H) 0.89 - 1.12   Magnesium    Collection Time: 05/02/23  1:02 PM    Specimen: Blood   Result Value Ref Range    Magnesium 2.0 1.6 - 2.4 mg/dL   TSH    Collection Time: 05/02/23  1:02 PM    Specimen: Blood   Result Value Ref Range    TSH 5.210 (H) 0.270 - 4.200 uIU/mL   T4, Free    Collection Time: 05/02/23  1:02 PM    Specimen: Blood   Result Value Ref Range    Free T4 2.11 (H) 0.93 - 1.70 ng/dL   BNP    Collection Time: 05/02/23  1:02 PM    Specimen: Blood   Result Value Ref Range    proBNP 1,065.0 0.0 - 1,800.0 pg/mL   High Sensitivity Troponin T    Collection Time: 05/02/23  1:02 PM    Specimen: Blood   Result Value Ref Range    HS Troponin T 16 (H) <15 ng/L   CBC Auto Differential    Collection Time: 05/02/23  1:02 PM    Specimen: Blood   Result Value Ref Range    WBC 7.93 3.40 - 10.80 10*3/mm3    RBC 4.51 4.14 - 5.80 10*6/mm3    Hemoglobin 13.2 13.0 - 17.7 g/dL    Hematocrit 41.4 37.5 - 51.0 %    MCV 91.8 79.0 - 97.0 fL    MCH 29.3 26.6 - 33.0 pg    MCHC 31.9 31.5 - 35.7 g/dL    RDW 14.4 12.3 - 15.4 %    RDW-SD 48.5 37.0 - 54.0 fl    MPV 10.3 6.0 - 12.0 fL    Platelets 221 140 - 450 10*3/mm3    Neutrophil % 67.1 42.7 - 76.0 %    Lymphocyte % 21.7 19.6 - 45.3 %    Monocyte % 7.9 5.0 - 12.0 %    Eosinophil % 2.6 0.3 - 6.2 %    Basophil % 0.4 0.0 - 1.5 %     Immature Grans % 0.3 0.0 - 0.5 %    Neutrophils, Absolute 5.32 1.70 - 7.00 10*3/mm3    Lymphocytes, Absolute 1.72 0.70 - 3.10 10*3/mm3    Monocytes, Absolute 0.63 0.10 - 0.90 10*3/mm3    Eosinophils, Absolute 0.21 0.00 - 0.40 10*3/mm3    Basophils, Absolute 0.03 0.00 - 0.20 10*3/mm3    Immature Grans, Absolute 0.02 0.00 - 0.05 10*3/mm3    nRBC 0.0 0.0 - 0.2 /100 WBC   ECG 12 Lead Rhythm Change    Collection Time: 05/02/23  1:11 PM   Result Value Ref Range    QT Interval 324 ms    QTC Interval 476 ms   ECG 12 Lead Rhythm Change    Collection Time: 05/02/23  1:23 PM   Result Value Ref Range    QT Interval 242 ms    QTC Interval 372 ms   High Sensitivity Troponin T 2Hr    Collection Time: 05/02/23  4:42 PM    Specimen: Blood   Result Value Ref Range    HS Troponin T 24 (H) <15 ng/L    Troponin T Delta 8 (C) >=-4 - <+4 ng/L   Adult Transthoracic Echo Complete W/ Cont if Necessary Per Protocol    Collection Time: 05/02/23  5:09 PM   Result Value Ref Range    Target HR (85%) 120 bpm    Max. Pred. HR (100%) 141 bpm    EF(MOD-bp) 17.7 %    LVIDd 7.5 cm    LVIDs 7.0 cm    IVSd 0.90 cm    LVPWd 1.00 cm    FS 6.7 %    IVS/LVPW 0.90 cm    ESV(cubed) 343.0 ml    EDV(cubed) 421.9 ml    LVOT area 3.1 cm2    LV mass(C)d 340.6 grams    LVOT diam 2.00 cm    EDV(MOD-sp2) 317.0 ml    EDV(MOD-sp4) 390.0 ml    ESV(MOD-sp2) 276.0 ml    ESV(MOD-sp4) 327.0 ml    SV(MOD-sp2) 41.0 ml    SV(MOD-sp4) 63.0 ml    EF(MOD-sp2) 12.9 %    EF(MOD-sp4) 16.2 %    MV E max noah 63.9 cm/sec    MV A max noah 99.6 cm/sec    MV dec time 0.25 msec    MV E/A 0.64     LA ESV Index (BP) 45.0 ml/m2    Med Peak E' Noah 6.4 cm/sec    Lat Peak E' Noah 4.9 cm/sec    Avg E/e' ratio 11.31     SV(LVOT) 44.9 ml    RV Base 3.9 cm    RV Mid 3.1 cm    RV Length 6.5 cm    TAPSE (>1.6) 1.29 cm    RV S' 11.5 cm/sec    LA dimension (2D)  4.4 cm    LV V1 max 78.1 cm/sec    LV V1 max PG 2.44 mmHg    LV V1 mean PG 1.00 mmHg    LV V1 VTI 14.3 cm    Ao pk noah 125.0 cm/sec    Ao max PG  6.3 mmHg    Ao mean PG 3.0 mmHg    Ao V2 VTI 25.8 cm    MARI(I,D) 1.74 cm2    MV max PG 4.8 mmHg    MV mean PG 2.00 mmHg    MV V2 VTI 21.3 cm    MV P1/2t 42.9 msec    MVA(P1/2t) 5.1 cm2    MVA(VTI) 2.11 cm2    MV dec slope 386.0 cm/sec2    MR max portia 449.0 cm/sec    MR max PG 80.6 mmHg    MR mean portia 328.0 cm/sec    MR mean PG 49.0 mmHg    MR .0 cm    TR max portia 172.0 cm/sec    TR max PG 11.8 mmHg    PA V2 max 128.0 cm/sec    PA acc time 0.12 sec    PA pr(Accel) 25.4 mmHg    PI end-d portia 119.0 cm/sec    Ao root diam 3.5 cm    RAP systole 8 mmHg    RVSP(TR) 20 mmHg   ECG 12 Lead Rhythm Change    Collection Time: 05/02/23  7:19 PM   Result Value Ref Range    QT Interval 714 ms    QTC Interval 771 ms   Lactic Acid, Plasma    Collection Time: 05/02/23  7:32 PM    Specimen: Blood   Result Value Ref Range    Lactate 3.0 (C) 0.5 - 2.0 mmol/L   Comprehensive Metabolic Panel    Collection Time: 05/02/23  7:32 PM    Specimen: Blood   Result Value Ref Range    Glucose 136 (H) 65 - 99 mg/dL    BUN 19 8 - 23 mg/dL    Creatinine 0.77 0.76 - 1.27 mg/dL    Sodium 140 136 - 145 mmol/L    Potassium 3.7 3.5 - 5.2 mmol/L    Chloride 107 98 - 107 mmol/L    CO2 20.0 (L) 22.0 - 29.0 mmol/L    Calcium 8.4 (L) 8.6 - 10.5 mg/dL    Total Protein 5.8 (L) 6.0 - 8.5 g/dL    Albumin 3.6 3.5 - 5.2 g/dL    ALT (SGPT) 42 (H) 1 - 41 U/L    AST (SGOT) 43 (H) 1 - 40 U/L    Alkaline Phosphatase 66 39 - 117 U/L    Total Bilirubin 0.3 0.0 - 1.2 mg/dL    Globulin 2.2 gm/dL    A/G Ratio 1.6 g/dL    BUN/Creatinine Ratio 24.7 7.0 - 25.0    Anion Gap 13.0 5.0 - 15.0 mmol/L    eGFR 91.1 >60.0 mL/min/1.73   Magnesium    Collection Time: 05/02/23  7:32 PM    Specimen: Blood   Result Value Ref Range    Magnesium 1.9 1.6 - 2.4 mg/dL   High Sensitivity Troponin T    Collection Time: 05/02/23  7:32 PM    Specimen: Blood   Result Value Ref Range    HS Troponin T 28 (H) <15 ng/L   CBC Auto Differential    Collection Time: 05/02/23  7:32 PM    Specimen: Blood    Result Value Ref Range    WBC 8.91 3.40 - 10.80 10*3/mm3    RBC 4.31 4.14 - 5.80 10*6/mm3    Hemoglobin 12.3 (L) 13.0 - 17.7 g/dL    Hematocrit 40.3 37.5 - 51.0 %    MCV 93.5 79.0 - 97.0 fL    MCH 28.5 26.6 - 33.0 pg    MCHC 30.5 (L) 31.5 - 35.7 g/dL    RDW 14.3 12.3 - 15.4 %    RDW-SD 48.9 37.0 - 54.0 fl    MPV 10.0 6.0 - 12.0 fL    Platelets 214 140 - 450 10*3/mm3    Neutrophil % 59.9 42.7 - 76.0 %    Lymphocyte % 29.5 19.6 - 45.3 %    Monocyte % 8.0 5.0 - 12.0 %    Eosinophil % 1.9 0.3 - 6.2 %    Basophil % 0.4 0.0 - 1.5 %    Immature Grans % 0.3 0.0 - 0.5 %    Neutrophils, Absolute 5.33 1.70 - 7.00 10*3/mm3    Lymphocytes, Absolute 2.63 0.70 - 3.10 10*3/mm3    Monocytes, Absolute 0.71 0.10 - 0.90 10*3/mm3    Eosinophils, Absolute 0.17 0.00 - 0.40 10*3/mm3    Basophils, Absolute 0.04 0.00 - 0.20 10*3/mm3    Immature Grans, Absolute 0.03 0.00 - 0.05 10*3/mm3    nRBC 0.0 0.0 - 0.2 /100 WBC   Phosphorus    Collection Time: 05/02/23  7:32 PM    Specimen: Blood   Result Value Ref Range    Phosphorus 3.7 2.5 - 4.5 mg/dL   Calcium, Ionized    Collection Time: 05/02/23  7:33 PM    Specimen: Blood   Result Value Ref Range    Ionized Calcium 1.23 1.12 - 1.32 mmol/L   Blood Gas, Arterial With Co-Ox    Collection Time: 05/02/23  7:51 PM    Specimen: Arterial Blood   Result Value Ref Range    Site Right Radial     Osito's Test N/A     pH, Arterial 7.468 (H) 7.350 - 7.450 pH units    pCO2, Arterial 27.5 (L) 35.0 - 45.0 mm Hg    pO2, Arterial 125.0 (H) 83.0 - 108.0 mm Hg    HCO3, Arterial 19.9 (L) 20.0 - 26.0 mmol/L    Base Excess, Arterial -2.5 (L) 0.0 - 2.0 mmol/L    Hemoglobin, Blood Gas 13.4 (L) 13.5 - 17.5 g/dL    Hematocrit, Blood Gas 41.1 38.0 - 51.0 %    Oxyhemoglobin 97.5 94 - 99 %    Methemoglobin -0.10 (L) 0.00 - 1.50 %    Carboxyhemoglobin      CO2 Content 20.7 (L) 22 - 33 mmol/L    Temperature 37.0 C    Barometric Pressure for Blood Gas      Modality NRB     FIO2 80 %    Ventilator Mode      Rate 0 Breaths/minute     PIP 0 cmH2O    IPAP 0     EPAP 0     Note      pH, Temp Corrected 7.468 pH Units    pCO2, Temperature Corrected 27.5 (L) 35 - 48 mm Hg    pO2, Temperature Corrected 125 (H) 83 - 108 mm Hg     Note: In addition to lab results from this visit, the labs listed above may include labs taken at another facility or during a different encounter within the last 24 hours. Please correlate lab times with ED admission and discharge times for further clarification of the services performed during this visit.    XR Chest 1 View   Final Result   Impression:   Cardiomegaly with moderate grade pulmonary edema.         Electronically Signed: Aneudy Carbajal     5/2/2023 8:18 PM EDT     Workstation ID: [id number]      XR Chest 1 View   Final Result   Impression:   No acute chest finding.         Electronically Signed: Zaida Ying     5/2/2023 1:55 PM EDT     Workstation ID: [id number]      XR Chest 1 View    (Results Pending)     Vitals:    05/02/23 1932 05/02/23 1950 05/02/23 2000 05/02/23 2021   BP: 122/67   116/74   BP Location:       Patient Position:       Pulse: 69 64  70   Resp:  24     Temp:  97.6 °F (36.4 °C)     TempSrc:  Axillary     SpO2: 92% 94%     Weight:   80.7 kg (177 lb 14.6 oz)    Height:         Medications   amiodarone 360 mg in 200 mL D5W infusion (1 mg/min Intravenous New Bag 5/2/23 1959)   clopidogrel (PLAVIX) tablet 75 mg (has no administration in time range)   eplerenone (INSPRA) tablet 12.5 mg (12.5 mg Oral Not Given 5/2/23 1814)   finasteride (PROSCAR) tablet 5 mg (has no administration in time range)   levothyroxine (SYNTHROID, LEVOTHROID) tablet 50 mcg (has no administration in time range)   melatonin tablet 10 mg (has no administration in time range)   pantoprazole (PROTONIX) EC tablet 40 mg (has no administration in time range)   polyethylene glycol (MIRALAX) packet 17 g (17 g Oral Not Given 5/2/23 1812)   torsemide (DEMADEX) tablet 5 mg (5 mg Oral Not Given 5/2/23 1812)   mexiletine (MEXITIL) capsule 200  mg (200 mg Oral Given 5/2/23 2021)   amiodarone 360 mg in 200 mL D5W infusion (1 mg/min Intravenous Currently Infusing 5/2/23 1959)   Potassium Replacement - Follow Nurse / BPA Driven Protocol (has no administration in time range)   Magnesium Standard Dose Replacement - Follow Nurse / BPA Driven Protocol (has no administration in time range)   Phosphorus Replacement - Follow Nurse / BPA Driven Protocol (has no administration in time range)   Calcium Replacement - Follow Nurse / BPA Driven Protocol (has no administration in time range)   potassium chloride 10 mEq in 100 mL IVPB (10 mEq Intravenous New Bag 5/2/23 1951)   bumetanide (BUMEX) injection 2 mg (has no administration in time range)   midazolam (VERSED) injection 2 mg (2 mg Intravenous Given 5/2/23 1317)   sodium chloride 0.9 % bolus 1,000 mL (0 mL Intravenous Stopped 5/2/23 1401)   amiodarone in dextrose 5% (NEXTERONE) loading dose 150mg/100mL (0 mg Intravenous Stopped 5/2/23 1432)   sodium chloride 0.9 % bolus 500 mL (0 mL Intravenous Stopped 5/2/23 1437)   Sulfur Hexafluoride Microsph (LUMASON) 60.7-25 MG IV reconstituted suspension reconstituted suspension 2 mL (2 mL Intravenous Given 5/2/23 1709)   amiodarone in dextrose 5% (NEXTERONE) loading dose 150mg/100mL (150 mg Intravenous New Bag 5/2/23 1946)   magnesium sulfate 2g/50 mL (PREMIX) infusion (2 g Intravenous New Bag 5/2/23 1946)     ECG/EMG Results (last 24 hours)     Procedure Component Value Units Date/Time    Adult Transthoracic Echo Complete W/ Cont if Necessary Per Protocol [[id number]] Resulted: 05/02/23 1717     Updated: 05/02/23 1721     Target HR (85%) 120 bpm      Max. Pred. HR (100%) 141 bpm      EF(MOD-bp) 17.7 %      LVIDd 7.5 cm      LVIDs 7.0 cm      IVSd 0.90 cm      LVPWd 1.00 cm      FS 6.7 %      IVS/LVPW 0.90 cm      ESV(cubed) 343.0 ml      EDV(cubed) 421.9 ml      LVOT area 3.1 cm2      LV mass(C)d 340.6 grams      LVOT diam 2.00 cm      EDV(MOD-sp2) 317.0 ml      EDV(MOD-sp4)  390.0 ml      ESV(MOD-sp2) 276.0 ml      ESV(MOD-sp4) 327.0 ml      SV(MOD-sp2) 41.0 ml      SV(MOD-sp4) 63.0 ml      EF(MOD-sp2) 12.9 %      EF(MOD-sp4) 16.2 %      MV E max noah 63.9 cm/sec      MV A max noah 99.6 cm/sec      MV dec time 0.25 msec      MV E/A 0.64     LA ESV Index (BP) 45.0 ml/m2      Med Peak E' Noah 6.4 cm/sec      Lat Peak E' Noah 4.9 cm/sec      Avg E/e' ratio 11.31     SV(LVOT) 44.9 ml      RV Base 3.9 cm      RV Mid 3.1 cm      RV Length 6.5 cm      TAPSE (>1.6) 1.29 cm      RV S' 11.5 cm/sec      LA dimension (2D)  4.4 cm      LV V1 max 78.1 cm/sec      LV V1 max PG 2.44 mmHg      LV V1 mean PG 1.00 mmHg      LV V1 VTI 14.3 cm      Ao pk noah 125.0 cm/sec      Ao max PG 6.3 mmHg      Ao mean PG 3.0 mmHg      Ao V2 VTI 25.8 cm      MARI(I,D) 1.74 cm2      MV max PG 4.8 mmHg      MV mean PG 2.00 mmHg      MV V2 VTI 21.3 cm      MV P1/2t 42.9 msec      MVA(P1/2t) 5.1 cm2      MVA(VTI) 2.11 cm2      MV dec slope 386.0 cm/sec2      MR max noah 449.0 cm/sec      MR max PG 80.6 mmHg      MR mean noah 328.0 cm/sec      MR mean PG 49.0 mmHg      MR .0 cm      TR max noah 172.0 cm/sec      TR max PG 11.8 mmHg      PA V2 max 128.0 cm/sec      PA acc time 0.12 sec      PA pr(Accel) 25.4 mmHg      PI end-d noah 119.0 cm/sec      Ao root diam 3.5 cm      RAP systole 8 mmHg      RVSP(TR) 20 mmHg     ECG 12 Lead Rhythm Change [[id number]] Collected: 05/02/23 1919     Updated: 05/02/23 1920     QT Interval 714 ms      QTC Interval 771 ms     Narrative:      Test Reason : Rhythm Change  Blood Pressure :   */*   mmHG  Vent. Rate :  70 BPM     Atrial Rate :  70 BPM     P-R Int : 174 ms          QRS Dur : 238 ms      QT Int : 714 ms       P-R-T Axes : -29 -35 105 degrees     QTc Int : 771 ms    AV dual-paced rhythm  Biventricular pacemaker detected  Abnormal ECG  When compared with ECG of 02-MAY-2023 13:23, (Unconfirmed)  premature supraventricular complexes are no longer present  Vent. rate has decreased BY  72  BPM    Referred By: bret           Confirmed By:     ECG 12 Lead Other; DEFIB [[id number]] Collected: 05/02/23 1253     Updated: 05/02/23 2004     QT Interval 604 ms      QTC Interval 652 ms     Narrative:      Test Reason : Other~  Blood Pressure :   */*   mmHG  Vent. Rate :  70 BPM     Atrial Rate :  70 BPM     P-R Int : 176 ms          QRS Dur : 194 ms      QT Int : 604 ms       P-R-T Axes :  66  27  86 degrees     QTc Int : 652 ms    AV dual-paced rhythm  Abnormal ECG  When compared with ECG of 26-APR-2023 09:43,  No significant change was found  Confirmed by MD Wagoner Michael ([id number] on 5/2/2023 8:03:37 PM    Referred By: SALO           Confirmed By: Tong Wagoner MD    ECG 12 Lead Rhythm Change [[id number]] Collected: 05/02/23 1311     Updated: 05/02/23 2004     QT Interval 324 ms      QTC Interval 476 ms     Narrative:      Test Reason : Rhythm Change  Blood Pressure :   */*   mmHG  Vent. Rate : 130 BPM     Atrial Rate : 141 BPM     P-R Int : 142 ms          QRS Dur : 162 ms      QT Int : 324 ms       P-R-T Axes : 154 121 -38 degrees     QTc Int : 476 ms      wide complex QRS tachycardia  Nonspecific intraventricular block  Lateral infarct , age undetermined  Inferior infarct , age undetermined  Abnormal ECG  Confirmed by MD Wagoner Michael ([id number]) on 5/2/2023 8:04:05 PM    Referred By: SALO           Confirmed By: Tong Wagoner MD    ECG 12 Lead Rhythm Change [[id number]] Collected: 05/02/23 1323     Updated: 05/02/23 2005     QT Interval 242 ms      QTC Interval 372 ms     Narrative:      Test Reason : CARDIOVERSION  Blood Pressure :   */*   mmHG  Vent. Rate : 142 BPM     Atrial Rate : 142 BPM     P-R Int :   * ms          QRS Dur : 138 ms      QT Int : 242 ms       P-R-T Axes :   * -40 105 degrees     QTc Int : 372 ms    AV paced rhythm  Left axis deviation  Nonspecific intraventricular block  Abnormal ECG  Confirmed by MD Wagoner Michael ([id number]) on 5/2/2023 8:04:48 PM    Referred By: VIOLETTE           Confirmed  By: Tong Wagoner MD        ECG 12 Lead Rhythm Change   Preliminary Result   Test Reason : Rhythm Change   Blood Pressure :   */*   mmHG   Vent. Rate :  70 BPM     Atrial Rate :  70 BPM      P-R Int : 174 ms          QRS Dur : 238 ms       QT Int : 714 ms       P-R-T Axes : -29 -35 105 degrees      QTc Int : 771 ms      AV dual-paced rhythm   Biventricular pacemaker detected   Abnormal ECG   When compared with ECG of 02-MAY-2023 13:23, (Unconfirmed)   premature supraventricular complexes are no longer present   Vent. rate has decreased BY  72 BPM      Referred By: bret           Confirmed By:       ECG 12 Lead Rhythm Change   Final Result   Test Reason : CARDIOVERSION   Blood Pressure :   */*   mmHG   Vent. Rate : 142 BPM     Atrial Rate : 142 BPM      P-R Int :   * ms          QRS Dur : 138 ms       QT Int : 242 ms       P-R-T Axes :   * -40 105 degrees      QTc Int : 372 ms      AV paced rhythm   Left axis deviation   Nonspecific intraventricular block   Abnormal ECG   Confirmed by MD Wagoner Michael ([id number]) on 5/2/2023 8:04:48 PM      Referred By: VIOLETTE           Confirmed By: Tong Wagoner MD      ECG 12 Lead Rhythm Change   Final Result   Test Reason : Rhythm Change   Blood Pressure :   */*   mmHG   Vent. Rate : 130 BPM     Atrial Rate : 141 BPM      P-R Int : 142 ms          QRS Dur : 162 ms       QT Int : 324 ms       P-R-T Axes : 154 121 -38 degrees      QTc Int : 476 ms         wide complex QRS tachycardia   Nonspecific intraventricular block   Lateral infarct , age undetermined   Inferior infarct , age undetermined   Abnormal ECG   Confirmed by MD Wagoner Michael ([id number]) on 5/2/2023 8:04:05 PM      Referred By: SALO           Confirmed By: Tong Wagoner MD      ECG 12 Lead Other; DEFIB   Final Result   Test Reason : Other~   Blood Pressure :   */*   mmHG   Vent. Rate :  70 BPM     Atrial Rate :  70 BPM      P-R Int : 176 ms          QRS Dur : 194 ms       QT Int : 604 ms       P-R-T Axes :  66  27  86  degrees      QTc Int : 652 ms      AV dual-paced rhythm   Abnormal ECG   When compared with ECG of 26-APR-2023 09:43,   No significant change was found   Confirmed by MD Wagoner Michael ([id number]) on 5/2/2023 8:03:37 PM      Referred By: SALO           Confirmed By: Tong Wagoner MD      ECG 12 Lead Rhythm Change    (Results Pending)              MDM    Final diagnoses:   Ventricular tachycardia, monomorphic   History of CHF (congestive heart failure)   History of coronary artery disease       ED Disposition  ED Disposition     ED Disposition   Decision to Admit    Condition   --    Comment   Level of Care: Telemetry [5]   Admitting Physician: MARTELL RESENDIZ [[id number]]   Attending Physician: MARTELL RESENDIZ [[id number]]               No follow-up provider specified.       Medication List      No changes were made to your prescriptions during this visit.          Darvin Wagoner MD  05/02/23 2043

## 2023-05-03 NOTE — OUTREACH NOTE
CHF Week 2 Survey    Flowsheet Row Responses   [facility] facility patient discharged from? [place]   Does the patient have one of the following disease processes/diagnoses(primary or secondary)? CHF   Week 2 attempt successful? No   Unsuccessful attempts Attempt 1   Revoke Readmitted          Peg MONTE - Registered Nurse

## 2023-05-03 NOTE — PLAN OF CARE
Goal Outcome Evaluation:            Patient Transferred to ICU following change in cardiac rhythm.  At 1915 Pulseless V Tach observed on the telemetry monitor.  Oxygen initiated.  Code blue called.  Attending provider notified.  Chest compressions initiated, but discontinued following return to baseline paced rhythm.  Patient placed on Zoll monitor with shock pads in place.  Amiodarone drip continued per provider and transfer to ICU commenced.  Report to ICU nurse given at bedside.

## 2023-05-03 NOTE — PLAN OF CARE
Goal Outcome Evaluation:   Pt admitted to ICU. 150mg Amiodarone bolus given, gtt started @ 1 mg/min. HFNC @ 40L & 50%, nasal CPAP from home worn throughout the night, 55% FiO2. Potassium & mag replaced per protocol. Adequate UOP with Bumex. No BM. Wife & son @ bedside.

## 2023-05-03 NOTE — PROGRESS NOTES
Intensive Care Follow-up     Hospital:  LOS: 0 days   Mr. Jonnie Roth, 78 y.o. male is followed for:   Ventricular tachycardia            History of present illness:   78 y.o.male with relevant PMH of cardiac arrest s/p primary ventricular fibrillation s/p pacemaker 2000, ischemic heart disease, HLD, tobacco use with COPD, Left bundle branch block, pulmonary embolism, hypothyroidism, GEO with CPAP use who presents to the ICU as a transfer from the floor with ongoing issues related to recurrent ventricular tachycardia.     Patient was admitted to hospital in April with recurrent VT and ICD shock x2 at home.  During that admission he was initiated on an amiodarone drip and transition to p.o. amiodarone and mexiletine.  He was readmitted on May 2 with the ICD firing.  He had episode on the floor and briefly had chest compression and was transferred to ICU for further management.  Patient was discussed with electrophysiology team and patient was resumed on amiodarone bolus and drip.      Subjective   Interval History:  Overnight no acute events.  No further arrhythmias noted.  Today morning patient seen sitting in bed and without any chest discomfort or shortness of breath.  No hemodynamic instability.  Started on diet and eating well.  Patient was requiring high flow nasal cannula up until today morning and he has been weaned down to 2 L nasal cannula currently. Was diuresed overnight.                  The patient's past medical, surgical and social history were reviewed and updated in Epic as appropriate.       Objective     Infusions:  amiodarone, 1 mg/min, Last Rate: 1 mg/min (05/03/23 0723)      Medications:  clopidogrel, 75 mg, Oral, Daily  eplerenone, 12.5 mg, Oral, Q24H  finasteride, 5 mg, Oral, Daily  levothyroxine, 25 mcg, Oral, Q AM  mexiletine, 200 mg, Oral, Q8H  pantoprazole, 40 mg, Oral, QAM  polyethylene glycol, 17 g, Oral, Daily        Vital Sign Min/Max for last 24 hours  Temp  Min: 97.6 °F (36.4  °C)  Max: 98.2 °F (36.8 °C)   BP  Min: 92/70  Max: 143/77   Pulse  Min: 0  Max: 194   Resp  Min: 15  Max: 24   SpO2  Min: 89 %  Max: 100 %   Flow (L/min)  Min: 15  Max: 40       Input/Output for last 24 hour shift  05/02 0701 - 05/03 0700  In: 2697 [I.V.:497]  Out: 2575 [Urine:2575]      Objective  General Appearance: Awake, alert, in no acute distress  Head:    Atraumatic, Normocephalic, without obvious abnormality, Pupils reactive & symmetrical B/L.  Lungs:   B/L Breath sounds present with decreased breath sounds on bases, no wheezing heard, no crackles.   Heart: S1 and S2 present, no murmur, paced rhythm  Abdomen: Soft, nontender, no guarding or rigidity, bowel sounds positive.  Extremities:  no cyanosis or clubbing,  no edema, warm to touch.  Neurologic:  Moving all four extremities. Good strength bilaterally.   Psychological: Normal affect, Cooperative    Results from last 7 days   Lab Units 05/03/23 0456 05/02/23 1932 05/02/23  1302   WBC 10*3/mm3 9.44 8.91 7.93   HEMOGLOBIN g/dL 13.0 12.3* 13.2   PLATELETS 10*3/mm3 220 214 221     Results from last 7 days   Lab Units 05/03/23 0456 05/02/23 1932 05/02/23  1642 05/02/23  1302   SODIUM mmol/L 140 140  --  139   POTASSIUM mmol/L 3.5 3.7 3.1* 3.1*   CO2 mmol/L 22.0 20.0*  --  20.0*   BUN mg/dL 14 19  --  21   CREATININE mg/dL 0.68* 0.77  --  0.84   MAGNESIUM mg/dL 2.1 1.9  --  2.0   PHOSPHORUS mg/dL 3.8 3.7  --   --    GLUCOSE mg/dL 91 136*  --  115*     Estimated Creatinine Clearance: 100.5 mL/min (A) (by C-G formula based on SCr of 0.68 mg/dL (L)).    Results from last 7 days   Lab Units 05/02/23 1951   PH, ARTERIAL pH units 7.468*   PCO2, ARTERIAL mm Hg 27.5*   PO2 ART mm Hg 125.0*       Images:   Chest x-ray reviewed and compared with older films.  Improving pulmonary edema pattern chest x-ray from today.    I reviewed the patient's results and images.     Assessment & Plan   Impression        Ventricular tachycardia    COPD (chronic obstructive pulmonary  disease)    GEO (obstructive sleep apnea)    ICD (implantable cardioverter-defibrillator) in place    Type 2 diabetes mellitus, without long-term current use of insulin    Ischemic cardiomyopathy (EF < 20%)    Chronic HFrEF (heart failure with reduced ejection fraction) (HCC)    Hypokalemia    Ventricular tachycardia, monomorphic       Plan        1.  Patient admitted here with recurrent VT.  On amiodarone IV protocol as well as mexiletine.  EP following and will await recommendation and ultimate plan.  Echocardiogram pending.  2.  Patient was in decompensated heart failure required diuresis overnight on high flow nasal cannula.  We are able to wean down to 2 L nasal cannula and is saturating well and appears comfortable.  Will monitor closely and diuresis as needed for now.  3.  Replace potassium.  Magnesium is acceptable.  Keep K above 4 and magnesium above 2.  4.  Continue levothyroxine for history of hypothyroidism.  5.  Continue cardiac medications per cardiology team.  Patient has been on anticoagulation which is currently on hold.  6.  Cardiac diet as tolerated.  7.  History of diabetes and has been on metformin and Jardiance.  Blood sugars have been acceptable for now.  To continue to monitor for now.  8.  GI prophylaxis.  SCDs for DVT prophylaxis.    Continue close monitoring in ICU as high risk of decline    Plan of care and goals reviewed with multidisciplinary/antibiotic stewardship team during rounds.   I discussed the patient's findings and my recommendations with patient, family and nursing staff       Boris Willett MD, [facility]  Pulmonary, Critical care and Sleep Medicine

## 2023-05-04 LAB
ANION GAP SERPL CALCULATED.3IONS-SCNC: 11 MMOL/L (ref 5–15)
BASOPHILS # BLD AUTO: 0.03 10*3/MM3 (ref 0–0.2)
BASOPHILS NFR BLD AUTO: 0.3 % (ref 0–1.5)
BUN SERPL-MCNC: 12 MG/DL (ref 8–23)
BUN/CREAT SERPL: 19 (ref 7–25)
CALCIUM SPEC-SCNC: 8.6 MG/DL (ref 8.6–10.5)
CHLORIDE SERPL-SCNC: 112 MMOL/L (ref 98–107)
CO2 SERPL-SCNC: 22 MMOL/L (ref 22–29)
CREAT SERPL-MCNC: 0.63 MG/DL (ref 0.76–1.27)
DEPRECATED RDW RBC AUTO: 47.7 FL (ref 37–54)
EGFRCR SERPLBLD CKD-EPI 2021: 96.8 ML/MIN/1.73
EOSINOPHIL # BLD AUTO: 0.37 10*3/MM3 (ref 0–0.4)
EOSINOPHIL NFR BLD AUTO: 3.6 % (ref 0.3–6.2)
ERYTHROCYTE [DISTWIDTH] IN BLOOD BY AUTOMATED COUNT: 14.5 % (ref 12.3–15.4)
GLUCOSE SERPL-MCNC: 103 MG/DL (ref 65–99)
HCT VFR BLD AUTO: 40.3 % (ref 37.5–51)
HGB BLD-MCNC: 12.9 G/DL (ref 13–17.7)
IMM GRANULOCYTES # BLD AUTO: 0.03 10*3/MM3 (ref 0–0.05)
IMM GRANULOCYTES NFR BLD AUTO: 0.3 % (ref 0–0.5)
LYMPHOCYTES # BLD AUTO: 2.01 10*3/MM3 (ref 0.7–3.1)
LYMPHOCYTES NFR BLD AUTO: 19.7 % (ref 19.6–45.3)
MAGNESIUM SERPL-MCNC: 2 MG/DL (ref 1.6–2.4)
MCH RBC QN AUTO: 29 PG (ref 26.6–33)
MCHC RBC AUTO-ENTMCNC: 32 G/DL (ref 31.5–35.7)
MCV RBC AUTO: 90.6 FL (ref 79–97)
MONOCYTES # BLD AUTO: 0.87 10*3/MM3 (ref 0.1–0.9)
MONOCYTES NFR BLD AUTO: 8.5 % (ref 5–12)
NEUTROPHILS NFR BLD AUTO: 6.9 10*3/MM3 (ref 1.7–7)
NEUTROPHILS NFR BLD AUTO: 67.6 % (ref 42.7–76)
NRBC BLD AUTO-RTO: 0 /100 WBC (ref 0–0.2)
PHOSPHATE SERPL-MCNC: 3.6 MG/DL (ref 2.5–4.5)
PLATELET # BLD AUTO: 220 10*3/MM3 (ref 140–450)
PMV BLD AUTO: 10.2 FL (ref 6–12)
POTASSIUM SERPL-SCNC: 4.1 MMOL/L (ref 3.5–5.2)
RBC # BLD AUTO: 4.45 10*6/MM3 (ref 4.14–5.8)
SODIUM SERPL-SCNC: 145 MMOL/L (ref 136–145)
WBC NRBC COR # BLD: 10.21 10*3/MM3 (ref 3.4–10.8)

## 2023-05-04 PROCEDURE — 25010000002 AMIODARONE IN DEXTROSE 5% 360-4.14 MG/200ML-% SOLUTION: Performed by: STUDENT IN AN ORGANIZED HEALTH CARE EDUCATION/TRAINING PROGRAM

## 2023-05-04 PROCEDURE — 80048 BASIC METABOLIC PNL TOTAL CA: CPT | Performed by: INTERNAL MEDICINE

## 2023-05-04 PROCEDURE — 94660 CPAP INITIATION&MGMT: CPT

## 2023-05-04 PROCEDURE — 97116 GAIT TRAINING THERAPY: CPT

## 2023-05-04 PROCEDURE — 97530 THERAPEUTIC ACTIVITIES: CPT

## 2023-05-04 PROCEDURE — 97166 OT EVAL MOD COMPLEX 45 MIN: CPT

## 2023-05-04 PROCEDURE — 85025 COMPLETE CBC W/AUTO DIFF WBC: CPT | Performed by: INTERNAL MEDICINE

## 2023-05-04 PROCEDURE — 97161 PT EVAL LOW COMPLEX 20 MIN: CPT

## 2023-05-04 PROCEDURE — 99232 SBSQ HOSP IP/OBS MODERATE 35: CPT | Performed by: INTERNAL MEDICINE

## 2023-05-04 PROCEDURE — 25010000002 AMIODARONE IN DEXTROSE 5% 360-4.14 MG/200ML-% SOLUTION: Performed by: INTERNAL MEDICINE

## 2023-05-04 PROCEDURE — 94799 UNLISTED PULMONARY SVC/PX: CPT

## 2023-05-04 PROCEDURE — 83735 ASSAY OF MAGNESIUM: CPT | Performed by: INTERNAL MEDICINE

## 2023-05-04 PROCEDURE — 84100 ASSAY OF PHOSPHORUS: CPT | Performed by: INTERNAL MEDICINE

## 2023-05-04 RX ORDER — AMIODARONE HYDROCHLORIDE 200 MG/1
200 TABLET ORAL EVERY 12 HOURS SCHEDULED
Status: DISCONTINUED | OUTPATIENT
Start: 2023-05-04 | End: 2023-05-05

## 2023-05-04 RX ORDER — IPRATROPIUM BROMIDE 21 UG/1
2 SPRAY, METERED NASAL EVERY 12 HOURS
Status: DISCONTINUED | OUTPATIENT
Start: 2023-05-04 | End: 2023-05-09 | Stop reason: HOSPADM

## 2023-05-04 RX ADMIN — AMIODARONE HYDROCHLORIDE 0.5 MG/MIN: 1.8 INJECTION, SOLUTION INTRAVENOUS at 15:58

## 2023-05-04 RX ADMIN — AMIODARONE HYDROCHLORIDE 200 MG: 200 TABLET ORAL at 22:04

## 2023-05-04 RX ADMIN — AMIODARONE HYDROCHLORIDE 1 MG/MIN: 1.8 INJECTION, SOLUTION INTRAVENOUS at 01:24

## 2023-05-04 RX ADMIN — PANTOPRAZOLE SODIUM 40 MG: 40 TABLET, DELAYED RELEASE ORAL at 05:39

## 2023-05-04 RX ADMIN — MEXILETINE HYDROCHLORIDE 200 MG: 200 CAPSULE ORAL at 14:09

## 2023-05-04 RX ADMIN — PANTOPRAZOLE SODIUM 40 MG: 40 TABLET, DELAYED RELEASE ORAL at 08:10

## 2023-05-04 RX ADMIN — IPRATROPIUM BROMIDE 2 SPRAY: 21 SPRAY, METERED NASAL at 14:09

## 2023-05-04 RX ADMIN — FINASTERIDE 5 MG: 5 TABLET, FILM COATED ORAL at 08:02

## 2023-05-04 RX ADMIN — LEVOTHYROXINE SODIUM 25 MCG: 25 TABLET ORAL at 05:39

## 2023-05-04 RX ADMIN — AMIODARONE HYDROCHLORIDE 1 MG/MIN: 1.8 INJECTION, SOLUTION INTRAVENOUS at 08:00

## 2023-05-04 RX ADMIN — MEXILETINE HYDROCHLORIDE 200 MG: 200 CAPSULE ORAL at 22:04

## 2023-05-04 RX ADMIN — CLOPIDOGREL BISULFATE 75 MG: 75 TABLET ORAL at 11:50

## 2023-05-04 RX ADMIN — POLYETHYLENE GLYCOL 3350 17 G: 17 POWDER, FOR SOLUTION ORAL at 08:05

## 2023-05-04 RX ADMIN — EPLERENONE 12.5 MG: 25 TABLET, FILM COATED ORAL at 08:03

## 2023-05-04 RX ADMIN — MEXILETINE HYDROCHLORIDE 200 MG: 200 CAPSULE ORAL at 05:39

## 2023-05-04 NOTE — PROGRESS NOTES
Intensive Care Follow-up     Hospital:  LOS: 1 day   Mr. Jonnie Roth, 78 y.o. male is followed for:   Ventricular tachycardia            History of present illness:   78 y.o.male with relevant PMH of cardiac arrest s/p primary ventricular fibrillation s/p pacemaker 2000, ischemic heart disease, HLD, tobacco use with COPD, Left bundle branch block, pulmonary embolism, hypothyroidism, GEO with CPAP use who presents to the ICU as a transfer from the floor with ongoing issues related to recurrent ventricular tachycardia.     Patient was admitted to hospital in April with recurrent VT and ICD shock x2 at home.  During that admission he was initiated on an amiodarone drip and transition to p.o. amiodarone and mexiletine.  He was readmitted on May 2 with the ICD firing.  He had episode on the floor and briefly had chest compression and was transferred to ICU for further management.  Patient was discussed with electrophysiology team and patient was resumed on amiodarone bolus and drip.      Subjective   Interval History:  Overnight no acute events.  Patient had no further arrhythmias noted.  To the morning complaining of that his nose is dripping a lot.  It is clear fluid.  Denies any nosebleeds.  We will try some Atrovent nasal spray to see if that helps.  Denies any shortness of breath or chest pain currently.               The patient's past medical, surgical and social history were reviewed and updated in Epic as appropriate.       Objective     Infusions:  amiodarone, 0.5 mg/min, Last Rate: 1 mg/min (05/04/23 1149)      Medications:  clopidogrel, 75 mg, Oral, Daily  eplerenone, 12.5 mg, Oral, Q24H  finasteride, 5 mg, Oral, Daily  ipratropium, 2 spray, Each Nare, Q12H  levothyroxine, 25 mcg, Oral, Q AM  mexiletine, 200 mg, Oral, Q8H  pantoprazole, 40 mg, Oral, QAM  polyethylene glycol, 17 g, Oral, Daily        Vital Sign Min/Max for last 24 hours  Temp  Min: 97.2 °F (36.2 °C)  Max: 98.4 °F (36.9 °C)   BP  Min:  "88/51  Max: 133/79   Pulse  Min: 69  Max: 73   Resp  Min: 16  Max: 20   SpO2  Min: 92 %  Max: 100 %   Flow (L/min)  Min: 1  Max: 2       Input/Output for last 24 hour shift  05/03 0701 - 05/04 0700  In: 1531.5 [P.O.:600; I.V.:831.5]  Out: 1750 [Urine:1750]      Objective:  Vital signs: (most recent): Blood pressure 127/70, pulse 70, temperature 97.2 °F (36.2 °C), temperature source Axillary, resp. rate 16, height 185.4 cm (72.99\"), weight 80.7 kg (177 lb 14.6 oz), SpO2 97 %.            General Appearance: Awake, alert, in no acute distress  Lungs:   B/L Breath sounds present with decreased breath sounds on bases, no wheezing heard, no crackles.   Heart: S1 and S2 present, no murmur, paced rhythm  Abdomen: Soft, nontender, no guarding or rigidity, bowel sounds positive.  Extremities:  no cyanosis or clubbing,  no edema, warm to touch.  Neurologic:  Moving all four extremities. Good strength bilaterally.   Psychological: Normal affect, Cooperative    Results from last 7 days   Lab Units 05/04/23  0315 05/03/23  0456 05/02/23  1932   WBC 10*3/mm3 10.21 9.44 8.91   HEMOGLOBIN g/dL 12.9* 13.0 12.3*   PLATELETS 10*3/mm3 220 220 214     Results from last 7 days   Lab Units 05/04/23  0315 05/03/23  1801 05/03/23  0456 05/02/23  1932   SODIUM mmol/L 145  --  140 140   POTASSIUM mmol/L 4.1 3.9 3.5 3.7   CO2 mmol/L 22.0  --  22.0 20.0*   BUN mg/dL 12  --  14 19   CREATININE mg/dL 0.63*  --  0.68* 0.77   MAGNESIUM mg/dL 2.0  --  2.1 1.9   PHOSPHORUS mg/dL 3.6  --  3.8 3.7   GLUCOSE mg/dL 103*  --  91 136*     Estimated Creatinine Clearance: 108.5 mL/min (A) (by C-G formula based on SCr of 0.63 mg/dL (L)).    Results from last 7 days   Lab Units 05/02/23 1951   PH, ARTERIAL pH units 7.468*   PCO2, ARTERIAL mm Hg 27.5*   PO2 ART mm Hg 125.0*       Images:   No new chest x-ray    I reviewed the patient's results and images.     Echocardiogram reviewed  Interpretation Summary       •  The left ventricle is dilated.  Left " ventricular systolic function is severely decreased. Left ventricular ejection fraction appears to be less than 20%.  The anterior and anterior lateral segments and apex are akinetic.  •  The left atrial cavity is dilated.  •  Left atrial volume is moderately increased.  •  Mild to moderate much regurgitation is present.  •  Estimated right ventricular systolic pressure from tricuspid regurgitation is normal (<35 mmHg).         Assessment & Plan   Impression        Ventricular tachycardia    COPD (chronic obstructive pulmonary disease)    GEO (obstructive sleep apnea)    ICD (implantable cardioverter-defibrillator) in place    Type 2 diabetes mellitus, without long-term current use of insulin    Ischemic cardiomyopathy (EF < 20%)    Chronic HFrEF (heart failure with reduced ejection fraction) ([facility])    Hypokalemia    Ventricular tachycardia, monomorphic       Plan        1.  Patient admitted here with recurrent VT.  On amiodarone IV protocol as well as mexiletine.  Cardiology team is managing and contributing about repeat ablation versus medication management.  We will await their recommendations.  2.  Patient was in decompensated heart failure required diuresis. initially was requiring high flow nasal cannula oxygen but currently on room air and saturating well.  CPAP as needed at nighttime.  3.  Electrolytes are being monitored and replaced per ICU protocol.  4.  Continue levothyroxine for history of hypothyroidism.  5.  Continue cardiac medications per cardiology team.  Patient has been on anticoagulation which is currently on hold pending any intervention.  6.  Tolerating oral diet well, continue same.  7.  History of diabetes and has been on metformin and Jardiance.  Blood sugars have been acceptable.  8.  GI prophylaxis.  SCDs for DVT prophylaxis.  9.  Having some rhinorrhea.  We will try Atrovent nasal spray to see if that helps.  10.  Out of bed as tolerated.    Continue close monitoring in ICU as high risk  of decline.  Check labs in the morning.    Plan of care and goals reviewed with multidisciplinary/antibiotic stewardship team during rounds.   I discussed the patient's findings and my recommendations with patient, family and nursing staff   Above documentation reviewed and reflect accurate information as of 5/4/2023 including copied elements of the note.       Boris Willett MD, [facility]  Pulmonary, Critical care and Sleep Medicine

## 2023-05-04 NOTE — PLAN OF CARE
Goal Outcome Evaluation:  Plan of Care Reviewed With: patient, spouse        Progress: improving  Outcome Evaluation: PT initial eval completed. Pt limited by decreased functional endurance, generalized weakness, balance deficit, and residual RLE deficits. Pt ambulated 100ft wiht SBA and RWx and 250ft without AD and with CGA. Stair navigation performed with min A. Rec continued skilled PT to increase indep with mobility. d/c rec for home with assist and OP PT. Pt owns necessary equipment. Rec R AFO trial next session.

## 2023-05-04 NOTE — PLAN OF CARE
Goal Outcome Evaluation:         VSS.  AV paced per tele.  Pt rested well overnight on CPAP.  Adequate UOP.  Amiodarone infusing.  Wife @ bedside.   Problem: Fall Injury Risk  Goal: Absence of Fall and Fall-Related Injury  Intervention: Identify and Manage Contributors  Recent Flowsheet Documentation  Taken 5/4/2023 0600 by Shauna Whitlock, RN  Medication Review/Management: medications reviewed  Taken 5/4/2023 0400 by Shauna Whitlock, RN  Medication Review/Management: medications reviewed  Taken 5/4/2023 0200 by Shauna Whitlock, RN  Medication Review/Management: medications reviewed  Taken 5/4/2023 0000 by Shauna Whitlock, RN  Medication Review/Management: medications reviewed  Intervention: Promote Injury-Free Environment  Recent Flowsheet Documentation  Taken 5/4/2023 0600 by Shauna Whitlock, RN  Safety Promotion/Fall Prevention:   activity supervised   clutter free environment maintained   fall prevention program maintained   nonskid shoes/slippers when out of bed   safety round/check completed   room organization consistent  Taken 5/4/2023 0400 by Shauna Whitlock, RN  Safety Promotion/Fall Prevention:   activity supervised   clutter free environment maintained   fall prevention program maintained   nonskid shoes/slippers when out of bed   room organization consistent   safety round/check completed  Taken 5/4/2023 0200 by Shauna Whitlock, RN  Safety Promotion/Fall Prevention:   activity supervised   clutter free environment maintained   fall prevention program maintained   nonskid shoes/slippers when out of bed   room organization consistent   safety round/check completed  Taken 5/4/2023 0000 by Shauna Whitlock, RN  Safety Promotion/Fall Prevention:   activity supervised   clutter free environment maintained   fall prevention program maintained   nonskid shoes/slippers when out of bed   room organization consistent   safety round/check  completed

## 2023-05-04 NOTE — PLAN OF CARE
Goal Outcome Evaluation:      Amio continues per cardiology. Dose decreased. Up to chair. No changes in rhythm this shift. Possible ablation Monday, MD hasnt decided. Hold Xarelto per MD. Worked with PT. Up to chair x4 hours. UOP adequate. No BM. Afebrile.

## 2023-05-04 NOTE — THERAPY EVALUATION
Patient Name: Jonnie Roth  : 1945    MRN: [id number]                              Today's Date: 2023       Admit Date: 2023    Visit Dx:     ICD-10-CM ICD-9-CM   1. Ventricular tachycardia, monomorphic  I47.29 427.1   2. History of CHF (congestive heart failure)  Z86.79 V12.59   3. History of coronary artery disease  Z86.79 V12.59     Patient Active Problem List   Diagnosis   • Coronary artery disease involving native coronary artery of native heart with angina pectoris   • Chronic systolic congestive heart failure   • BPH (benign prostatic hypertrophy)   • LBBB (left bundle branch block)   • Pulmonary emphysema (HCC)   • Hyperlipidemia LDL goal <70   • Ventricular tachycardia   • Post-traumatic subdural hematoma   • COPD (chronic obstructive pulmonary disease)   • Pulmonary nodule (Stable)   • Bilateral renal cysts   • GEO (obstructive sleep apnea)   • ICD (implantable cardioverter-defibrillator) in place   • Hypothyroidism (acquired)   • GERD without esophagitis   • Type 2 diabetes mellitus, without long-term current use of insulin   • Ischemic cardiomyopathy (EF < 20%)   • H/O PE ()   • ICD (implantable cardioverter-defibrillator) discharge   • Chronic HFrEF (heart failure with reduced ejection fraction) (HCC)   • Hypokalemia   • Ventricular tachycardia, monomorphic     Past Medical History:   Diagnosis Date   • Arthritis    • BPH (benign prostatic hypertrophy) 2016   • CAD (coronary artery disease)    • Disease of thyroid gland    • Enlarged prostate    • HFrEF (heart failure with reduced ejection fraction)    • History of transfusion     NO REACTION RECALLED    • Hypertension    • LBBB (left bundle branch block) 2016   • Lung nodule    • Macular degeneration    • Pancreatic cyst 2022   • Polio     as a child   • Pulmonary embolism    • Pulmonary emphysema 2016   • Sepsis     A. Secondary to [place] trauma with hematuria and urosepsis requiring hospitalization May  2015   • Sleep apnea with use of continuous positive airway pressure (CPAP)     CPAP- SETTING 4    • Ventricular tachycardia 12/28/2016   • Wears glasses     READERS     Past Surgical History:   Procedure Laterality Date   • BIVENTRICULLAR IMPLANTABLE CARDIOVERTER DEFIBRILLATOR PLACEMENT     • CARDIAC CATHETERIZATION N/A 1/16/2017    Procedure: Left Heart Cath;  Surgeon: Diego Ayala MD;  Location:  [facility];  Service:    • CARDIAC DEFIBRILLATOR PLACEMENT       A. ICD generator change out with upgrade to BiV pacemaker implantable cardioverter    defibrillator, 12/17/2007. Ventricular fibrillation s/p pacesetter Gilbert ICD in [place]. [place] 09/2000   • CARDIAC ELECTROPHYSIOLOGY PROCEDURE N/A 10/2/2017    Procedure: BIV ICD  generator change [place];  Surgeon: Ferdinand Alba MD;  Location:  [facility];  Service:    • CARDIAC ELECTROPHYSIOLOGY PROCEDURE N/A 7/6/2021    Procedure: BIV ICD generator change with [employer]. This will need to be done in late June or early July. Hold Xarelto one day prior.;  Surgeon: Ferdinand Alba MD;  Location:  [facility];  Service: Cardiology;  Laterality: N/A;   • CATARACT EXTRACTION      Bilateral    • COLONOSCOPY     • CORONARY ANGIOPLASTY WITH STENT PLACEMENT      X7 STENTS TOTAL    • CYSTOSCOPY URETERAL TUMOR FULGURATION  05/29/2015     A. Status post cystoscopy with clot evacuation and fulguration of the prostate secondary to hematuria, 5/29/2015.   • FOOT SURGERY      RIGHT - POLIO RELATED    • PROSTATE SURGERY      A. Status post laser vaporization, 08/19/2009.   • ROOT CANAL     • TOOTH EXTRACTION        General Information     Row Name 05/04/23 0945          OT Time and Intention    Document Type evaluation  -     Mode of Treatment occupational therapy  -     Row Name 05/04/23 0945          General Information    Patient Profile Reviewed yes  -MC     Prior Level of Function independent:;bed mobility;transfer;all household  mobility;community mobility;ADL's  Pt reports he uses a SPC for mobility at baseline  -     Existing Precautions/Restrictions fall  -     Barriers to Rehab medically complex  -     Row Name 05/04/23 0945          Living Environment    People in Home spouse  -     Row Name 05/04/23 0945          Home Main Entrance    Number of Stairs, Main Entrance one  -     Stair Railings, Main Entrance none  -     Row Name 05/04/23 0945          Stairs Within Home, Primary    Stairs, Within Home, Primary Pt reports 16 steps to 2nd level master bedroom & bathroom, pt reports there is a bedroom & bathroom he can stay in on main floor if needed  -     Number of Stairs, Within Home, Primary other (see comments)  -     Row Name 05/04/23 0945          Cognition    Orientation Status (Cognition) oriented x 3  -     Row Name 05/04/23 0945          Safety Issues, Functional Mobility    Safety Issues Affecting Function (Mobility) safety precaution awareness  -     Impairments Affecting Function (Mobility) balance;endurance/activity tolerance;shortness of breath;strength  -           User Key  (r) = Recorded By, (t) = Taken By, (c) = Cosigned By    Initials Name Provider Type     Rosana Rodriguez OT Occupational Therapist                 Mobility/ADL's     Row Name 05/04/23 0947          Bed Mobility    Bed Mobility supine-sit  -     Supine-Sit [place] (Bed Mobility) contact guard;verbal cues  -     Assistive Device (Bed Mobility) bed rails;head of bed elevated  -     Row Name 05/04/23 0947          Transfers    Transfers sit-stand transfer;stand-sit transfer  -     Row Name 05/04/23 0947          Sit-Stand Transfer    Sit-Stand [place] (Transfers) contact guard;verbal cues  -     Assistive Device (Sit-Stand Transfers) walker, front-wheeled  -     Row Name 05/04/23 0947          Stand-Sit Transfer    Stand-Sit [place] (Transfers) contact guard;verbal cues  -     Assistive Device  (Stand-Sit Transfers) walker, front-wheeled  -[facility] Name 05/04/23 0947          Functional Mobility    Functional Mobility- Ind. Level contact guard assist;verbal cues required  -     Functional Mobility- Device walker, front-wheeled  -     Functional Mobility-Distance (Feet) --  household distance in hallway  -     Functional Mobility- Safety Issues balance decreased during turns;step length decreased  -     Functional Mobility- Comment Pt c/o mild dizziness w/ functional mobility, BP checked and stable  -[facility] Name 05/04/23 0947          Activities of Daily Living    BADL Assessment/Intervention lower body dressing;upper body dressing;toileting  -[facility] Name 05/04/23 0947          Lower Body Dressing Assessment/Training    [place] Level (Lower Body Dressing) don;shoes/slippers;set up  -     Position (Lower Body Dressing) edge of bed sitting  -[facility] Name 05/04/23 0947          Upper Body Dressing Assessment/Training    [place] Level (Upper Body Dressing) don;pajama/robe;minimum assist (75% patient effort);verbal cues  -     Position (Upper Body Dressing) edge of bed sitting  -[facility] Name 05/04/23 0947          Toileting Assessment/Training    [place] Level (Toileting) adjust/manage clothing;set up  -     Assistive Devices (Toileting) urinal  -     Position (Toileting) sitting up in bed  -           User Key  (r) = Recorded By, (t) = Taken By, (c) = Cosigned By    Initials Name Provider Type     Rosana Rodriguez OT Occupational Therapist               Obj/Interventions     [facility] Name 05/04/23 0948          Sensory Assessment (Somatosensory)    Sensory Assessment (Somatosensory) UE sensation intact  -MC     Row Name 05/04/23 0948          Vision Assessment/Intervention    Visual Impairment/Limitations WFL  -[facility] Name 05/04/23 0948          Range of Motion Comprehensive    General Range of [place] bilateral upper extremity [place]  [facility] [place]  05/04/23 0948          Strength Comprehensive (MMT)    General Manual Muscle Testing (MMT) Assessment upper extremity strength deficits identified  -     Comment, General Manual Muscle Testing (MMT) Assessment BUE grossly 4/5  -     Row Name 05/04/23 0948          Balance    Balance Assessment sitting static balance;sitting dynamic balance;sit to stand dynamic balance;standing static balance;standing dynamic balance  -     Static Sitting Balance standby assist  -     Dynamic Sitting Balance contact guard  -     Position, Sitting Balance unsupported;sitting edge of bed;sitting in chair  -     Sit to Stand Dynamic Balance contact guard;verbal cues  -     Static Standing Balance contact guard;verbal cues  -     Dynamic Standing Balance contact guard;verbal cues  -     Position/Device Used, Standing Balance supported;walker, front-wheeled  -     Balance Interventions sitting;sit to stand;occupation based/functional task  -           User Key  (r) = Recorded By, (t) = Taken By, (c) = Cosigned By    Initials Name Provider Type     Rosana Rodriguez OT Occupational Therapist               Goals/Plan     Row Name 05/04/23 0950          Transfer Goal 1 (OT)    Activity/Assistive Device (Transfer Goal 1, OT) sit-to-stand/stand-to-sit;bed-to-chair/chair-to-bed;toilet;cane, straight  -     [place] Level/Cues Needed (Transfer Goal 1, OT) standby assist  -     Time Frame (Transfer Goal 1, OT) long term goal (LTG);10 days  -     Progress/Outcome (Transfer Goal 1, OT) goal ongoing  -     Row Name 05/04/23 0950          Toileting Goal 1 (OT)    Activity/Device (Toileting Goal 1, OT) adjust/manage clothing;perform perineal hygiene;commode;grab bar/safety frame  -     [place] Level/Cues Needed (Toileting Goal 1, OT) standby assist  -     Time Frame (Toileting Goal 1, OT) long term goal (LTG);10 days  -     Progress/Outcome (Toileting Goal 1, OT) goal ongoing  -     Row Name 05/04/23  0976          Grooming Goal 1 (OT)    Activity/Device (Grooming Goal 1, OT) hair care;oral care;wash face, hands  standing sinkside  -     [place] (Grooming Goal 1, OT) standby assist  -     Time Frame (Grooming Goal 1, OT) long term goal (LTG);10 days  -     Progress/Outcome (Grooming Goal 1, OT) goal ongoing  -[facility] Name 05/04/23 7207          Therapy Assessment/Plan (OT)    Planned Therapy Interventions (OT) activity tolerance training;BADL retraining;functional balance retraining;IADL retraining;occupation/activity based interventions;ROM/therapeutic exercise;strengthening exercise;transfer/mobility retraining;patient/caregiver education/training  -           User Key  (r) = Recorded By, (t) = Taken By, (c) = Cosigned By    Initials Name Provider Type    Rosana Matamoros, OT Occupational Therapist               Clinical Impression     [facility] Name 05/04/23 0915          Pain Assessment    Pretreatment Pain Rating 0/10 - no pain  -     Posttreatment Pain Rating 0/10 - no pain  -[facility] Name 05/04/23 0956          Plan of Care Review    Plan of Care Reviewed With patient  -     Outcome Evaluation Pt presents w/ generalized weakness, decreased functional endurance, and mild balance deficits limiting his ADL independence. Pt would benefit from continued skilled IPOT services to address current functional deficits and facilitate return to PLOF. Rec home w/ A & OPPT at d/c.  -     Row Name 05/04/23 4366          Therapy Assessment/Plan (OT)    Rehab Potential (OT) good, to achieve stated therapy goals  -     Criteria for Skilled Therapeutic Interventions Met (OT) yes;skilled treatment is necessary  -     Therapy Frequency (OT) daily  -[facility] Name 05/04/23 0900          Therapy Plan Review/Discharge Plan (OT)    Anticipated Discharge Disposition (OT) home with assist;home with outpatient therapy services  -[facility] Name 05/04/23 0958          Vital Signs    Pre Systolic BP Rehab  126  -MC     Pre Treatment Diastolic BP 83  -MC     Post Systolic BP Rehab 131  -MC     Post Treatment Diastolic BP 83  -MC     Pretreatment Heart Rate (beats/min) 70  -MC     Posttreatment Heart Rate (beats/min) 72  -MC     Pre SpO2 (%) 96  -MC     O2 Delivery Pre Treatment room air  -MC     O2 Delivery Intra Treatment room air  -MC     Post SpO2 (%) 96  -MC     O2 Delivery Post Treatment room air  -MC     Pre Patient Position Supine  -     Intra Patient Position Standing  -     Post Patient Position Sitting  -     Row Name 05/04/23 0949          Positioning and Restraints    Pre-Treatment Position in bed  -MC     Post Treatment Position chair  -MC     In Chair notified nsg;reclined;call light within reach;encouraged to call for assist;exit alarm on;with family/caregiver;waffle cushion;legs elevated  -           User Key  (r) = Recorded By, (t) = Taken By, (c) = Cosigned By    Initials Name Provider Type    Rosana Matamoros OT Occupational Therapist               Outcome Measures     Row Name 05/04/23 0951          How much help from another is currently needed...    Putting on and taking off regular lower body clothing? 3  -MC     Bathing (including washing, rinsing, and drying) 3  -MC     Toileting (which includes using toilet bed pan or urinal) 3  -MC     Putting on and taking off regular upper body clothing 3  -MC     Taking care of personal grooming (such as brushing teeth) 3  -MC     Eating meals 4  -MC     AM-PAC 6 Clicks Score (OT) 19  -MC     Row Name 05/04/23 0951          Functional Assessment    Outcome Measure Options AM-PAC 6 Clicks Daily Activity (OT)  -           User Key  (r) = Recorded By, (t) = Taken By, (c) = Cosigned By    Initials Name Provider Type    Rosana Matamoros OT Occupational Therapist                Occupational Therapy Education     Title: PT OT SLP Therapies (In Progress)     Topic: Occupational Therapy (In Progress)     Point: ADL training (Done)      Description:   Instruct learner(s) on proper safety adaptation and remediation techniques during self care or transfers.   Instruct in proper use of assistive devices.              Learning Progress Summary           Patient Acceptance, E, VU by  at 5/4/2023 0951                   Point: Home exercise program (Not Started)     Description:   Instruct learner(s) on appropriate technique for monitoring, assisting and/or progressing therapeutic exercises/activities.              Learner Progress:  Not documented in this visit.          Point: Precautions (Done)     Description:   Instruct learner(s) on prescribed precautions during self-care and functional transfers.              Learning Progress Summary           Patient Acceptance, E, VU by  at 5/4/2023 0951                   Point: Body mechanics (Done)     Description:   Instruct learner(s) on proper positioning and spine alignment during self-care, functional mobility activities and/or exercises.              Learning Progress Summary           Patient Acceptance, E, VU by  at 5/4/2023 0951                               User Key     Initials Effective Dates Name Provider Type Discipline     10/14/22 -  Rosana Rodriguez OT Occupational Therapist OT              OT Recommendation and Plan  Planned Therapy Interventions (OT): activity tolerance training, BADL retraining, functional balance retraining, IADL retraining, occupation/activity based interventions, ROM/therapeutic exercise, strengthening exercise, transfer/mobility retraining, patient/caregiver education/training  Therapy Frequency (OT): daily  Plan of Care Review  Plan of Care Reviewed With: patient  Outcome Evaluation: Pt presents w/ generalized weakness, decreased functional endurance, and mild balance deficits limiting his ADL independence. Pt would benefit from continued skilled IPOT services to address current functional deficits and facilitate return to PLOF. Rec home w/ A & OPPT at  d/c.     Time Calculation:    Time Calculation- OT     Row Name 05/04/23 0952             Time Calculation- OT    OT Start Time 0856  -      OT Received On 05/04/23  -      OT Goal Re-Cert Due Date 05/14/23  -         Timed Charges    64815 - OT Therapeutic Activity Minutes 8  -MC      97757 - OT Self Care/Mgmt Minutes 7  -MC         Untimed Charges    OT Eval/Re-eval Minutes 31  -MC         Total Minutes    Timed Charges Total Minutes 15  -MC      Untimed Charges Total Minutes 31  -MC       Total Minutes 46  -MC            User Key  (r) = Recorded By, (t) = Taken By, (c) = Cosigned By    Initials Name Provider Type     Rosana Rodriguez, OT Occupational Therapist              Therapy Charges for Today     Code Description Service Date Service Provider Modifiers Qty    75638302420  OT THERAPEUTIC ACT EA 15 MIN 5/4/2023 Rosana Rodriguez OT GO 1    95502501285 HC OT EVAL MOD COMPLEXITY 3 5/4/2023 Rosana Rodriguez OT GO 1               Rosana Rodriguez OT  5/4/2023

## 2023-05-04 NOTE — PROGRESS NOTES
Jonnie Roth  [id number]  1945   LOS: 1 day   Patient Care Team:  Brian Lewis MD as PCP - General (Internal Medicine)  Ferdinand Alba MD as Consulting Physician (Cardiac Electrophysiology)  Viet Manzano PA as Physician Assistant (Cardiology)  Michele Flanagan IV, MD as Consulting Physician (Interventional Cardiology)    Chief Complaint:  Follow up on VT, ICD shock    Subjective The patient denies any chest pain, shortness of breath, recurrent VT, dizziness, or syncope.  He is going to work with OT/PT this morning to get up to his chair.    Objective     Vital Sign Min/Max for last 24 hours  Temp  Min: 97.2 °F (36.2 °C)  Max: 98.4 °F (36.9 °C)   BP  Min: 88/51  Max: 133/79   Pulse  Min: 68  Max: 96   Resp  Min: 16  Max: 20   SpO2  Min: 92 %  Max: 100 %   No data recorded   No data recorded         05/02/23  1708 05/02/23 2000   Weight: 79.4 kg (175 lb 0.7 oz) 80.7 kg (177 lb 14.6 oz)         Intake/Output Summary (Last 24 hours) at 5/4/2023 0832  Last data filed at 5/4/2023 0800  Gross per 24 hour   Intake 1507.5 ml   Output 1750 ml   Net -242.5 ml       Physical Exam:     General Appearance:    Alert, cooperative, in no acute distress   Lungs:    Decreased breath sounds to auscultation,respirations regular, even and                Unlabored, 1 L O2 NC    Heart:    Regular and normal rate, normal S1 and S2, no            murmur, no gallop, no rub, no click   Abdomen:  Extremities:   Soft, nontender, bowel sounds audible x4    No edema, normal range of motion   Pulses:   Pulses palpable and equal bilaterally      Results Review:   Results from last 7 days   Lab Units 05/04/23  0315 05/03/23  1801 05/03/23  0456 05/02/23  1932   SODIUM mmol/L 145  --  140 140   POTASSIUM mmol/L 4.1 3.9 3.5 3.7   CHLORIDE mmol/L 112*  --  105 107   CO2 mmol/L 22.0  --  22.0 20.0*   BUN mg/dL 12  --  14 19   CREATININE mg/dL 0.63*  --  0.68* 0.77   GLUCOSE mg/dL 103*  --  91 136*   CALCIUM mg/dL  8.6  --  8.3* 8.4*     Results from last 7 days   Lab Units 05/04/23  0315 05/03/23  0456 05/02/23  1932   WBC 10*3/mm3 10.21 9.44 8.91   HEMOGLOBIN g/dL 12.9* 13.0 12.3*   HEMATOCRIT % 40.3 40.9 40.3   PLATELETS 10*3/mm3 220 220 214             Results from last 7 days   Lab Units 05/03/23  0456 05/02/23  1932 05/02/23  1642   HSTROP T ng/L 38* 28* 24*     Echocardiogram 5/02/2023:  The left ventricle is dilated.  Left ventricular systolic function is severely decreased. Left ventricular ejection fraction appears to be less than 20%.  The anterior and anterior lateral segments and apex are akinetic.  •  The left atrial cavity is dilated.  •  Left atrial volume is moderately increased.  •  Mild to moderate much regurgitation is present.  •  Estimated right ventricular systolic pressure from tricuspid regurgitation is normal (<35 mmHg).     Chest x ray 5/03/2023:  Improving interstitial edema. Stable small left pleural effusion.     ECG 5/03/2023:   Electronic ventricular pacemaker  When compared with ECG of 02-MAY-2023 19:19, (Unconfirmed)  No significant change was found, QTc 624ms    Medication Review: Reviewed, amiodarone @ 1mg/min    Assessment & Plan   Patient with recurrent VT and ICD shock x1 at home with ECV 5/2/2023 in the setting of ICM/decompensated SHF and hypotension.  Had another VT episode  With brief CPR (~15 compressions) with return to NSR. Currently on amiodarone and mexiletine. Would consider the possibility of a repeat VT ablation, likely monomorphic VT from a scar on his LV. Xarelto held. No new ECG yet to review today; will reassess QTc which is trending downward. When blood pressure allows, would reinitiate low dose Coreg. Unfortunately bp too low to add Entresto at this time. Dr Alba to discuss with patient regarding VT ablation.      Ventricular tachycardia    COPD (chronic obstructive pulmonary disease)    GEO (obstructive sleep apnea)    ICD (implantable cardioverter-defibrillator)  in place    Type 2 diabetes mellitus, without long-term current use of insulin    Ischemic cardiomyopathy (EF < 20%)    Chronic HFrEF (heart failure with reduced ejection fraction) (HCC)    Hypokalemia    Ventricular tachycardia, monomorphic    Electronically signed by CHENTE Rhodes, 05/04/23, 9:03 AM EDT.    05/04/23  08:32 EDT     We will monitor over the weekend in ICU on IV amiodarone and oral Mexitil.  We will reinitiate oral amiodarone today.  Consider VT ablation on Monday if VT is recurrent.  Hold Xarelto for now.  Patient does understand that a VT ablation carries with it a significant risk of complications especially in his current situation.      I, Ferdinand Alba MD, personally performed the services face to face as described and documented by the above named individual. I have made any necessary edits and it is both accurate and complete 5/4/2023  17:38 EDT

## 2023-05-04 NOTE — PLAN OF CARE
Goal Outcome Evaluation:  Plan of Care Reviewed With: patient           Outcome Evaluation: Pt presents w/ generalized weakness, decreased functional endurance, and mild balance deficits limiting his ADL independence. Pt would benefit from continued skilled IPOT services to address current functional deficits and facilitate return to PLOF. Rec home w/ A & OPPT at d/c.

## 2023-05-04 NOTE — THERAPY EVALUATION
Patient Name: Jonnie Roth  : 1945    MRN: [id number]                              Today's Date: 2023       Admit Date: 2023    Visit Dx:     ICD-10-CM ICD-9-CM   1. Ventricular tachycardia, monomorphic  I47.29 427.1   2. History of CHF (congestive heart failure)  Z86.79 V12.59   3. History of coronary artery disease  Z86.79 V12.59     Patient Active Problem List   Diagnosis   • Coronary artery disease involving native coronary artery of native heart with angina pectoris   • Chronic systolic congestive heart failure   • BPH (benign prostatic hypertrophy)   • LBBB (left bundle branch block)   • Pulmonary emphysema (HCC)   • Hyperlipidemia LDL goal <70   • Ventricular tachycardia   • Post-traumatic subdural hematoma   • COPD (chronic obstructive pulmonary disease)   • Pulmonary nodule (Stable)   • Bilateral renal cysts   • GEO (obstructive sleep apnea)   • ICD (implantable cardioverter-defibrillator) in place   • Hypothyroidism (acquired)   • GERD without esophagitis   • Type 2 diabetes mellitus, without long-term current use of insulin   • Ischemic cardiomyopathy (EF < 20%)   • H/O PE ()   • ICD (implantable cardioverter-defibrillator) discharge   • Chronic HFrEF (heart failure with reduced ejection fraction) (HCC)   • Hypokalemia   • Ventricular tachycardia, monomorphic     Past Medical History:   Diagnosis Date   • Arthritis    • BPH (benign prostatic hypertrophy) 2016   • CAD (coronary artery disease)    • Disease of thyroid gland    • Enlarged prostate    • HFrEF (heart failure with reduced ejection fraction)    • History of transfusion     NO REACTION RECALLED    • Hypertension    • LBBB (left bundle branch block) 2016   • Lung nodule    • Macular degeneration    • Pancreatic cyst 2022   • Polio     as a child   • Pulmonary embolism    • Pulmonary emphysema 2016   • Sepsis     A. Secondary to [place] trauma with hematuria and urosepsis requiring hospitalization May  2015   • Sleep apnea with use of continuous positive airway pressure (CPAP)     CPAP- SETTING 4    • Ventricular tachycardia 12/28/2016   • Wears glasses     READERS     Past Surgical History:   Procedure Laterality Date   • BIVENTRICULLAR IMPLANTABLE CARDIOVERTER DEFIBRILLATOR PLACEMENT     • CARDIAC CATHETERIZATION N/A 1/16/2017    Procedure: Left Heart Cath;  Surgeon: Diego Ayala MD;  Location:  [facility];  Service:    • CARDIAC DEFIBRILLATOR PLACEMENT       A. ICD generator change out with upgrade to BiV pacemaker implantable cardioverter    defibrillator, 12/17/2007. Ventricular fibrillation s/p pacesetter [place] ICD in [place]. [place] 09/2000   • CARDIAC ELECTROPHYSIOLOGY PROCEDURE N/A 10/2/2017    Procedure: BIV ICD  generator change [place];  Surgeon: Ferdinand Alba MD;  Location:  [facility];  Service:    • CARDIAC ELECTROPHYSIOLOGY PROCEDURE N/A 7/6/2021    Procedure: BIV ICD generator change with [employer] [place]. This will need to be done in late June or early July. Hold Xarelto one day prior.;  Surgeon: Ferdinand Alba MD;  Location:  [facility];  Service: Cardiology;  Laterality: N/A;   • CATARACT EXTRACTION      Bilateral    • COLONOSCOPY     • CORONARY ANGIOPLASTY WITH STENT PLACEMENT      X7 STENTS TOTAL    • CYSTOSCOPY URETERAL TUMOR FULGURATION  05/29/2015     A. Status post cystoscopy with clot evacuation and fulguration of the prostate secondary to hematuria, 5/29/2015.   • FOOT SURGERY      RIGHT - POLIO RELATED    • PROSTATE SURGERY      A. Status post laser vaporization, 08/19/2009.   • ROOT CANAL     • TOOTH EXTRACTION        General Information     Row Name 05/04/23 0680          Physical Therapy Time and Intention    Document Type evaluation  -AY     Mode of Treatment physical therapy  -AY     Row Name 05/04/23 8303          General Information    Patient Profile Reviewed yes  -AY     Prior Level of Function independent:;all household  mobility;community mobility;gait;transfer;bed mobility;using stairs  amb with SPC. Owns rollator and RWx  -AY     Existing Precautions/Restrictions fall  -AY     Barriers to Rehab medically complex  -AY     Row Name 05/04/23 1350          Living Environment    People in Home spouse  -AY     Row Name 05/04/23 1350          Home Main Entrance    Number of Stairs, Main Entrance one  -AY     Stair Railings, Main Entrance none  -AY     Row Name 05/04/23 1350          Stairs Within Home, Primary    Stairs, Within Home, Primary Pt reports 16 steps to 2nd level master bedroom & bathroom, pt reports there is a bedroom & bathroom he can stay in on main floor if needed  -AY     Number of Stairs, Within Home, Primary other (see comments)  -AY     Stair Railings, Within Home, Primary railing on left side (ascending)  -AY     Row Name 05/04/23 1350          Cognition    Orientation Status (Cognition) oriented x 3  -AY     Row Name 05/04/23 1350          Safety Issues, Functional Mobility    Impairments Affecting Function (Mobility) balance;endurance/activity tolerance;shortness of breath;strength  -AY           User Key  (r) = Recorded By, (t) = Taken By, (c) = Cosigned By    Initials Name Provider Type    AY Letty Rojas, PT Physical Therapist               Mobility     Row Name 05/04/23 1352          Sit-Stand Transfer    Sit-Stand [place] (Transfers) contact guard;verbal cues  -AY     Assistive Device (Sit-Stand Transfers) walker, front-wheeled  -AY     Row Name 05/04/23 1352          Gait/Stairs (Locomotion)    [place] Level (Gait) standby assist  -AY     Assistive Device (Gait) walker, front-wheeled  -AY     Distance in Feet (Gait) 350  -AY     Deviations/Abnormal Patterns (Gait) delgado decreased;festinating/shuffling;gait speed decreased;stride length decreased;weight shifting decreased;base of support, narrow  -AY     Bilateral Gait Deviations heel strike decreased;forward flexed posture  -AY     Right Sided  Gait Deviations foot drop/toe drag  -AY     [place] Level (Stairs) contact guard  -AY     Handrail Location (Stairs) left side (ascending);right side (descending)  -AY     Number of Steps (Stairs) 6  -AY     Ascending Technique (Stairs) step-over-step  -AY     Descending Technique (Stairs) step-over-step  -AY     Comment, (Gait/Stairs) pt demonstrated step through gait pattern with decreased delgado and flexed posture. R drop foot noted, with progressive severity with continued ambulation. cueing for posture, increased stride length, and heel strike. Gait distance limited by fatigue. Pt/family educated about safety and correct sequencing with stair navigation, with further mobility deferred d/t IV length. pt educated to continue ambulation with RWx due to balance deficits. Pt ambulated last 200ft without AD with CGA. Pt required SBA when ambulating with RWx. Rec R AFO trial next session d/t drop foot. Pt interested upon education.  -AY           User Key  (r) = Recorded By, (t) = Taken By, (c) = Cosigned By    Initials Name Provider Type    AY Letty Rojas, PT Physical Therapist               Obj/Interventions     Row Name 05/04/23 1411          Range of Motion Comprehensive    General Range of Motion bilateral lower extremity ROM WFL  -AY     Row Name 05/04/23 1411          Strength Comprehensive (MMT)    General Manual Muscle Testing (MMT) Assessment lower extremity strength deficits identified  -AY     Comment, General Manual Muscle Testing (MMT) Assessment RLE grossly 4-/5. LLE grossly 4+/5  -AY     Row Name 05/04/23 1411          Balance    Balance Assessment sitting static balance;sitting dynamic balance;sit to stand dynamic balance;standing static balance;standing dynamic balance  -AY     Static Sitting Balance standby assist  -AY     Dynamic Sitting Balance contact guard  -AY     Position, Sitting Balance unsupported;sitting in chair  -AY     Sit to Stand Dynamic Balance contact guard  -AY     Static  Standing Balance contact guard  -AY     Dynamic Standing Balance contact guard  -AY     Position/Device Used, Standing Balance supported;walker, rolling  -AY     Row Name 05/04/23 1411          Sensory Assessment (Somatosensory)    Sensory Assessment (Somatosensory) LE sensation intact  -AY           User Key  (r) = Recorded By, (t) = Taken By, (c) = Cosigned By    Initials Name Provider Type    AY Letty Rojas, PT Physical Therapist               Goals/Plan     Row Name 05/04/23 1422          Bed Mobility Goal 1 (PT)    Activity/Assistive Device (Bed Mobility Goal 1, PT) sit to supine/supine to sit  -AY     [place] Level/Cues Needed (Bed Mobility Goal 1, PT) independent  -AY     Time Frame (Bed Mobility Goal 1, PT) long term goal (LTG);10 days  -AY     Progress/Outcomes (Bed Mobility Goal 1, PT) goal ongoing  -     Row Name 05/04/23 1422          Transfer Goal 1 (PT)    Activity/Assistive Device (Transfer Goal 1, PT) sit-to-stand/stand-to-sit  -AY     [place] Level/Cues Needed (Transfer Goal 1, PT) independent  -AY     Time Frame (Transfer Goal 1, PT) long term goal (LTG);10 days  -AY     Progress/Outcome (Transfer Goal 1, PT) goal ongoing  -     Row Name 05/04/23 1422          Gait Training Goal 1 (PT)    Activity/Assistive Device (Gait Training Goal 1, PT) gait (walking locomotion);cane, straight  -AY     [place] Level (Gait Training Goal 1, PT) modified independence  -AY     Distance (Gait Training Goal 1, PT) 500  -AY     Time Frame (Gait Training Goal 1, PT) long term goal (LTG);10 days  -AY     Progress/Outcome (Gait Training Goal 1, PT) goal ongoing  -AY     Row Name 05/04/23 1422          Stairs Goal 1 (PT)    Activity/Assistive Device (Stairs Goal 1, PT) ascending stairs;descending stairs;using handrail, left;cane, straight  -AY     [place] Level/Cues Needed (Stairs Goal 1, PT) modified independence  -AY     Number of Stairs (Stairs Goal 1, PT) 16  -AY     Time Frame (Stairs Goal  1, PT) long term goal (LTG);10 days  -AY     Progress/Outcome (Stairs Goal 1, PT) goal ongoing  -AY     Row Name 05/04/23 1422          Therapy Assessment/Plan (PT)    Planned Therapy Interventions (PT) balance training;bed mobility training;gait training;home exercise program;postural re-education;transfer training;patient/family education;strengthening;stair training;neuromuscular re-education  -AY           User Key  (r) = Recorded By, (t) = Taken By, (c) = Cosigned By    Initials Name Provider Type    AY Letty Rojas, PT Physical Therapist               Clinical Impression     Row Name 05/04/23 1412          Pain    Pretreatment Pain Rating 0/10 - no pain  -AY     Posttreatment Pain Rating 0/10 - no pain  -AY     Pain Intervention(s) Ambulation/increased activity;Repositioned  -AY     Additional Documentation Pain Scale: Numbers Pre/Post-Treatment (Group)  -AY     Row Name 05/04/23 1412          Plan of Care Review    Plan of Care Reviewed With patient;spouse  -AY     Progress improving  -AY     Outcome Evaluation PT initial eval completed. Pt limited by decreased functional endurance, generalized weakness, balance deficit, and residual RLE deficits. Pt ambulated 100ft wiht SBA and RWx and 250ft without AD and with CGA. Stair navigation performed with min A. Rec continued skilled PT to increase indep with mobility. d/c rec for home with assist and OP PT. Pt owns necessary equipment. Rec R AFO trial next session.  -AY     Row Name 05/04/23 1412          Therapy Assessment/Plan (PT)    Rehab Potential (PT) fair, will monitor progress closely  -AY     Criteria for Skilled Interventions Met (PT) yes;meets criteria;skilled treatment is necessary  -AY     Therapy Frequency (PT) daily  -AY     Row Name 05/04/23 1412          Vital Signs    Pre Systolic BP Rehab 131  -AY     Pre Treatment Diastolic BP 70  -AY     Post Systolic BP Rehab 122  -AY     Post Treatment Diastolic BP 76  -AY     Pretreatment Heart Rate  (beats/min) 70  -AY     Posttreatment Heart Rate (beats/min) 80  -AY     Pre SpO2 (%) 94  -AY     O2 Delivery Pre Treatment room air  -AY     O2 Delivery Intra Treatment room air  -AY     Post SpO2 (%) 95  -AY     O2 Delivery Post Treatment room air  -AY     Pre Patient Position Sitting  -AY     Intra Patient Position Standing  -AY     Post Patient Position Sitting  -AY     Row Name 05/04/23 1412          Positioning and Restraints    Pre-Treatment Position sitting in chair/recliner  -AY     Post Treatment Position chair  -AY     In Chair notified nsg;reclined;sitting;call light within reach;encouraged to call for assist;exit alarm on;with family/caregiver;waffle cushion;legs elevated  -AY           User Key  (r) = Recorded By, (t) = Taken By, (c) = Cosigned By    Initials Name Provider Type    Letty Kraft PT Physical Therapist               Outcome Measures     Row Name 05/04/23 1422          How much help from another person do you currently need...    Turning from your back to your side while in flat bed without using bedrails? 4  -AY     Moving from lying on back to sitting on the side of a flat bed without bedrails? 3  -AY     Moving to and from a bed to a chair (including a wheelchair)? 3  -AY     Standing up from a chair using your arms (e.g., wheelchair, bedside chair)? 3  -AY     Climbing 3-5 steps with a railing? 3  -AY     To walk in hospital room? 3  -AY     AM-PAC 6 Clicks Score (PT) 19  -AY     Highest level of mobility 6 --> Walked 10 steps or more  -AY     Row Name 05/04/23 1422 05/04/23 0951       Functional Assessment    Outcome Measure Options AM-PAC 6 Clicks Basic Mobility (PT)  -AY AM-PAC 6 Clicks Daily Activity (OT)  -          User Key  (r) = Recorded By, (t) = Taken By, (c) = Cosigned By    Initials Name Provider Type    Letty Kraft PT Physical Therapist    Rosana Matamoros OT Occupational Therapist                             Physical Therapy Education     Title: PT OT  SLP Therapies (In Progress)     Topic: Physical Therapy (In Progress)     Point: Mobility training (Done)     Learning Progress Summary           Patient Acceptance, E,TB, VU,NR by AY at 5/4/2023 1423                   Point: Home exercise program (Not Started)     Learner Progress:  Not documented in this visit.          Point: Body mechanics (Done)     Learning Progress Summary           Patient Acceptance, E,TB, VU,NR by AY at 5/4/2023 1423                   Point: Precautions (Done)     Learning Progress Summary           Patient Acceptance, E,TB, VU,NR by AY at 5/4/2023 1423                               User Key     Initials Effective Dates Name Provider Type Discipline    AY 11/10/20 -  Letty Rojas, PT Physical Therapist PT              PT Recommendation and Plan  Planned Therapy Interventions (PT): balance training, bed mobility training, gait training, home exercise program, postural re-education, transfer training, patient/family education, strengthening, stair training, neuromuscular re-education  Plan of Care Reviewed With: patient, spouse  Progress: improving  Outcome Evaluation: PT initial eval completed. Pt limited by decreased functional endurance, generalized weakness, balance deficit, and residual RLE deficits. Pt ambulated 100ft wiht SBA and RWx and 250ft without AD and with CGA. Stair navigation performed with min A. Rec continued skilled PT to increase indep with mobility. d/c rec for home with assist and OP PT. Pt owns necessary equipment. Rec R AFO trial next session.     Time Calculation:    PT Charges     Row Name 05/04/23 1424             Time Calculation    Start Time 1300  -AY      PT Received On 05/04/23  -AY      PT Goal Re-Cert Due Date 05/14/23  -AY         Timed Charges    81137 - Gait Training Minutes  23  -AY         Untimed Charges    PT Eval/Re-eval Minutes 46  -AY         Total Minutes    Timed Charges Total Minutes 23  -AY      Untimed Charges Total Minutes 46  -AY        Total Minutes 69  -AY            User Key  (r) = Recorded By, (t) = Taken By, (c) = Cosigned By    Initials Name Provider Type    Letty Kraft, PT Physical Therapist              Therapy Charges for Today     Code Description Service Date Service Provider Modifiers Qty    56631508803 HC GAIT TRAINING EA 15 MIN 5/4/2023 Letty Rojas, PT GP 2    28493598015 HC PT EVAL LOW COMPLEXITY 4 5/4/2023 Letty Rojas, PT GP 1    26296169952 HC PT THER SUPP EA 15 MIN 5/4/2023 Letty Rojas, PT GP 3          PT G-Codes  Outcome Measure Options: AM-PAC 6 Clicks Basic Mobility (PT)  AM-PAC 6 Clicks Score (PT): 19  AM-PAC 6 Clicks Score (OT): 19  PT Discharge Summary  Anticipated Discharge Disposition (PT): home with assist, home with outpatient therapy services    Letty Rojas PT  5/4/2023

## 2023-05-05 LAB
ANION GAP SERPL CALCULATED.3IONS-SCNC: 9 MMOL/L (ref 5–15)
BASOPHILS # BLD AUTO: 0.03 10*3/MM3 (ref 0–0.2)
BASOPHILS NFR BLD AUTO: 0.3 % (ref 0–1.5)
BUN SERPL-MCNC: 13 MG/DL (ref 8–23)
BUN/CREAT SERPL: 19.4 (ref 7–25)
CALCIUM SPEC-SCNC: 8.7 MG/DL (ref 8.6–10.5)
CHLORIDE SERPL-SCNC: 106 MMOL/L (ref 98–107)
CO2 SERPL-SCNC: 25 MMOL/L (ref 22–29)
CREAT SERPL-MCNC: 0.67 MG/DL (ref 0.76–1.27)
DEPRECATED RDW RBC AUTO: 49.1 FL (ref 37–54)
EGFRCR SERPLBLD CKD-EPI 2021: 95 ML/MIN/1.73
EOSINOPHIL # BLD AUTO: 0.35 10*3/MM3 (ref 0–0.4)
EOSINOPHIL NFR BLD AUTO: 3.7 % (ref 0.3–6.2)
ERYTHROCYTE [DISTWIDTH] IN BLOOD BY AUTOMATED COUNT: 14.6 % (ref 12.3–15.4)
GLUCOSE SERPL-MCNC: 103 MG/DL (ref 65–99)
HCT VFR BLD AUTO: 39.1 % (ref 37.5–51)
HGB BLD-MCNC: 12.2 G/DL (ref 13–17.7)
IMM GRANULOCYTES # BLD AUTO: 0.02 10*3/MM3 (ref 0–0.05)
IMM GRANULOCYTES NFR BLD AUTO: 0.2 % (ref 0–0.5)
LYMPHOCYTES # BLD AUTO: 1.63 10*3/MM3 (ref 0.7–3.1)
LYMPHOCYTES NFR BLD AUTO: 17.1 % (ref 19.6–45.3)
MAGNESIUM SERPL-MCNC: 1.9 MG/DL (ref 1.6–2.4)
MCH RBC QN AUTO: 28.6 PG (ref 26.6–33)
MCHC RBC AUTO-ENTMCNC: 31.2 G/DL (ref 31.5–35.7)
MCV RBC AUTO: 91.6 FL (ref 79–97)
MONOCYTES # BLD AUTO: 0.92 10*3/MM3 (ref 0.1–0.9)
MONOCYTES NFR BLD AUTO: 9.6 % (ref 5–12)
NEUTROPHILS NFR BLD AUTO: 6.61 10*3/MM3 (ref 1.7–7)
NEUTROPHILS NFR BLD AUTO: 69.1 % (ref 42.7–76)
NRBC BLD AUTO-RTO: 0 /100 WBC (ref 0–0.2)
PHOSPHATE SERPL-MCNC: 3.9 MG/DL (ref 2.5–4.5)
PLATELET # BLD AUTO: 189 10*3/MM3 (ref 140–450)
PMV BLD AUTO: 10.1 FL (ref 6–12)
POTASSIUM SERPL-SCNC: 3.8 MMOL/L (ref 3.5–5.2)
RBC # BLD AUTO: 4.27 10*6/MM3 (ref 4.14–5.8)
SODIUM SERPL-SCNC: 140 MMOL/L (ref 136–145)
WBC NRBC COR # BLD: 9.56 10*3/MM3 (ref 3.4–10.8)

## 2023-05-05 PROCEDURE — 25010000002 AMIODARONE IN DEXTROSE 5% 360-4.14 MG/200ML-% SOLUTION: Performed by: INTERNAL MEDICINE

## 2023-05-05 PROCEDURE — 99232 SBSQ HOSP IP/OBS MODERATE 35: CPT | Performed by: INTERNAL MEDICINE

## 2023-05-05 PROCEDURE — 25010000002 MAGNESIUM SULFATE 2 GM/50ML SOLUTION: Performed by: INTERNAL MEDICINE

## 2023-05-05 PROCEDURE — 84100 ASSAY OF PHOSPHORUS: CPT | Performed by: INTERNAL MEDICINE

## 2023-05-05 PROCEDURE — 85025 COMPLETE CBC W/AUTO DIFF WBC: CPT | Performed by: INTERNAL MEDICINE

## 2023-05-05 PROCEDURE — 80048 BASIC METABOLIC PNL TOTAL CA: CPT | Performed by: INTERNAL MEDICINE

## 2023-05-05 PROCEDURE — 94799 UNLISTED PULMONARY SVC/PX: CPT

## 2023-05-05 PROCEDURE — 93005 ELECTROCARDIOGRAM TRACING: CPT | Performed by: INTERNAL MEDICINE

## 2023-05-05 PROCEDURE — 94660 CPAP INITIATION&MGMT: CPT

## 2023-05-05 PROCEDURE — 83735 ASSAY OF MAGNESIUM: CPT | Performed by: INTERNAL MEDICINE

## 2023-05-05 PROCEDURE — 93010 ELECTROCARDIOGRAM REPORT: CPT | Performed by: INTERNAL MEDICINE

## 2023-05-05 RX ORDER — CARVEDILOL 6.25 MG/1
6.25 TABLET ORAL 2 TIMES DAILY WITH MEALS
Status: DISCONTINUED | OUTPATIENT
Start: 2023-05-05 | End: 2023-05-09 | Stop reason: HOSPADM

## 2023-05-05 RX ORDER — MAGNESIUM SULFATE HEPTAHYDRATE 40 MG/ML
2 INJECTION, SOLUTION INTRAVENOUS ONCE
Status: COMPLETED | OUTPATIENT
Start: 2023-05-05 | End: 2023-05-05

## 2023-05-05 RX ORDER — AMIODARONE HYDROCHLORIDE 200 MG/1
200 TABLET ORAL 3 TIMES DAILY
Status: DISCONTINUED | OUTPATIENT
Start: 2023-05-05 | End: 2023-05-06

## 2023-05-05 RX ADMIN — MEXILETINE HYDROCHLORIDE 200 MG: 200 CAPSULE ORAL at 13:37

## 2023-05-05 RX ADMIN — IPRATROPIUM BROMIDE 2 SPRAY: 21 SPRAY, METERED NASAL at 22:42

## 2023-05-05 RX ADMIN — MEXILETINE HYDROCHLORIDE 200 MG: 200 CAPSULE ORAL at 21:20

## 2023-05-05 RX ADMIN — LEVOTHYROXINE SODIUM 25 MCG: 25 TABLET ORAL at 05:59

## 2023-05-05 RX ADMIN — EPLERENONE 12.5 MG: 25 TABLET, FILM COATED ORAL at 08:11

## 2023-05-05 RX ADMIN — CLOPIDOGREL BISULFATE 75 MG: 75 TABLET ORAL at 08:11

## 2023-05-05 RX ADMIN — Medication 10 MG: at 21:26

## 2023-05-05 RX ADMIN — AMIODARONE HYDROCHLORIDE 200 MG: 200 TABLET ORAL at 08:11

## 2023-05-05 RX ADMIN — CARVEDILOL 6.25 MG: 6.25 TABLET, FILM COATED ORAL at 18:27

## 2023-05-05 RX ADMIN — AMIODARONE HYDROCHLORIDE 0.5 MG/MIN: 1.8 INJECTION, SOLUTION INTRAVENOUS at 02:01

## 2023-05-05 RX ADMIN — MEXILETINE HYDROCHLORIDE 200 MG: 200 CAPSULE ORAL at 05:59

## 2023-05-05 RX ADMIN — MAGNESIUM SULFATE HEPTAHYDRATE 2 G: 2 INJECTION, SOLUTION INTRAVENOUS at 12:18

## 2023-05-05 RX ADMIN — POLYETHYLENE GLYCOL 3350 17 G: 17 POWDER, FOR SOLUTION ORAL at 08:11

## 2023-05-05 RX ADMIN — IPRATROPIUM BROMIDE 2 SPRAY: 21 SPRAY, METERED NASAL at 12:19

## 2023-05-05 RX ADMIN — AMIODARONE HYDROCHLORIDE 200 MG: 200 TABLET ORAL at 21:20

## 2023-05-05 RX ADMIN — PANTOPRAZOLE SODIUM 40 MG: 40 TABLET, DELAYED RELEASE ORAL at 06:00

## 2023-05-05 RX ADMIN — CARVEDILOL 6.25 MG: 6.25 TABLET, FILM COATED ORAL at 12:16

## 2023-05-05 RX ADMIN — FINASTERIDE 5 MG: 5 TABLET, FILM COATED ORAL at 08:11

## 2023-05-05 NOTE — CASE MANAGEMENT/SOCIAL WORK
Continued Stay Note  [facility]     Patient Name: Jonnie Roth  MRN: [id number]  Today's Date: 5/5/2023    Admit Date: 5/2/2023    Plan: HOME   Discharge Plan     Row Name 05/05/23 1137       Plan    Plan HOME    Plan Comments O2 weaned to RA.  Restart low dose Coreg today.  Therapy recs OP PT/OT.  CM Will cont to follow and assist with any DC needs.    Final Discharge Disposition Code 01 - home or self-care               Discharge Codes    No documentation.               Expected Discharge Date and Time     Expected Discharge Date Expected Discharge Time    May 8, 2023             Fartun Cota RN

## 2023-05-05 NOTE — PLAN OF CARE
Goal Outcome Evaluation:      VSS overnight, AV paced per tele. . PO amiodarone initiated overnight, IV amiodarone stopped this morning.  No ectopy overnight.  Pt rested well overnight.   Problem: Fall Injury Risk  Goal: Absence of Fall and Fall-Related Injury  Intervention: Identify and Manage Contributors  Recent Flowsheet Documentation  Taken 5/5/2023 0600 by Shauna Whitlock, RN  Medication Review/Management: medications reviewed  Taken 5/5/2023 0400 by Shauna Whitlock, RN  Medication Review/Management: medications reviewed  Taken 5/5/2023 0200 by Shauna Whitlock, RN  Medication Review/Management: medications reviewed  Taken 5/5/2023 0000 by Shauna Whitlock, RN  Medication Review/Management: medications reviewed  Taken 5/4/2023 2200 by Shauna Whitlock, RN  Medication Review/Management: medications reviewed  Taken 5/4/2023 2000 by Shauna Whitlock, RN  Medication Review/Management: medications reviewed  Intervention: Promote Injury-Free Environment  Recent Flowsheet Documentation  Taken 5/5/2023 0600 by Shauna Whitlock, RN  Safety Promotion/Fall Prevention:   activity supervised   clutter free environment maintained   fall prevention program maintained   nonskid shoes/slippers when out of bed   room organization consistent   safety round/check completed  Taken 5/5/2023 0400 by Shauna Whitlock, RN  Safety Promotion/Fall Prevention:   activity supervised   clutter free environment maintained   fall prevention program maintained   nonskid shoes/slippers when out of bed   room organization consistent   safety round/check completed  Taken 5/5/2023 0200 by Shauna Whitlock, RN  Safety Promotion/Fall Prevention:   activity supervised   clutter free environment maintained   fall prevention program maintained   nonskid shoes/slippers when out of bed   room organization consistent   safety round/check completed  Taken 5/5/2023 0000 by  Shauna Whitlock, RN  Safety Promotion/Fall Prevention:   activity supervised   clutter free environment maintained   fall prevention program maintained   nonskid shoes/slippers when out of bed   room organization consistent   safety round/check completed  Taken 5/4/2023 2200 by Shauna Whitlock, RN  Safety Promotion/Fall Prevention:   activity supervised   clutter free environment maintained   fall prevention program maintained   nonskid shoes/slippers when out of bed   room organization consistent   safety round/check completed  Taken 5/4/2023 2000 by Shauna Whitlock, RN  Safety Promotion/Fall Prevention:   activity supervised   clutter free environment maintained   fall prevention program maintained   nonskid shoes/slippers when out of bed   room organization consistent   safety round/check completed     Problem: Fall Injury Risk  Goal: Absence of Fall and Fall-Related Injury  Outcome: Ongoing, Progressing  Intervention: Identify and Manage Contributors  Recent Flowsheet Documentation  Taken 5/5/2023 0600 by Shauna Whitlock, RN  Medication Review/Management: medications reviewed  Taken 5/5/2023 0400 by Shauna Whitlock, RN  Medication Review/Management: medications reviewed  Taken 5/5/2023 0200 by Shauna Whitlock, RN  Medication Review/Management: medications reviewed  Taken 5/5/2023 0000 by Shauna Whitlock, RN  Medication Review/Management: medications reviewed  Taken 5/4/2023 2200 by Shauna Whitlock, RN  Medication Review/Management: medications reviewed  Taken 5/4/2023 2000 by Shauna Whitlock, RN  Medication Review/Management: medications reviewed  Intervention: Promote Injury-Free Environment  Recent Flowsheet Documentation  Taken 5/5/2023 0600 by Shauna Whitlock, RN  Safety Promotion/Fall Prevention:   activity supervised   clutter free environment maintained   fall prevention program maintained   nonskid shoes/slippers  when out of bed   room organization consistent   safety round/check completed  Taken 5/5/2023 0400 by Shauna Whitlock, RN  Safety Promotion/Fall Prevention:   activity supervised   clutter free environment maintained   fall prevention program maintained   nonskid shoes/slippers when out of bed   room organization consistent   safety round/check completed  Taken 5/5/2023 0200 by Shauna Whitlock, RN  Safety Promotion/Fall Prevention:   activity supervised   clutter free environment maintained   fall prevention program maintained   nonskid shoes/slippers when out of bed   room organization consistent   safety round/check completed  Taken 5/5/2023 0000 by Shauna Whitlock, RN  Safety Promotion/Fall Prevention:   activity supervised   clutter free environment maintained   fall prevention program maintained   nonskid shoes/slippers when out of bed   room organization consistent   safety round/check completed  Taken 5/4/2023 2200 by Shauna Whitlock, RN  Safety Promotion/Fall Prevention:   activity supervised   clutter free environment maintained   fall prevention program maintained   nonskid shoes/slippers when out of bed   room organization consistent   safety round/check completed  Taken 5/4/2023 2000 by Shauna Whitlock, RN  Safety Promotion/Fall Prevention:   activity supervised   clutter free environment maintained   fall prevention program maintained   nonskid shoes/slippers when out of bed   room organization consistent   safety round/check completed     Problem: Skin Injury Risk Increased  Goal: Skin Health and Integrity  Outcome: Ongoing, Progressing  Intervention: Optimize Skin Protection  Recent Flowsheet Documentation  Taken 5/5/2023 0600 by Shauna Whitlock, RN  Pressure Reduction Techniques:   frequent weight shift encouraged   heels elevated off bed   pressure points protected   weight shift assistance provided  Head of Bed (HOB) Positioning: HOB at 45  degrees  Pressure Reduction Devices:   specialty bed utilized   pressure-redistributing mattress utilized   positioning supports utilized   heel offloading device utilized   elbow protectors utilized   alternating pressure pump (ADD)  Skin Protection:   adhesive use limited   incontinence pads utilized   tubing/devices free from skin contact   skin-to-device areas padded  Taken 5/5/2023 0400 by Shauna Whitlock RN  Pressure Reduction Techniques:   frequent weight shift encouraged   heels elevated off bed   pressure points protected   weight shift assistance provided  Head of Bed (HOB) Positioning: HOB at 15 degrees  Pressure Reduction Devices:   alternating pressure pump (ADD)   specialty bed utilized   pressure-redistributing mattress utilized   positioning supports utilized   heel offloading device utilized   elbow protectors utilized  Skin Protection:   adhesive use limited   incontinence pads utilized   tubing/devices free from skin contact   skin-to-device areas padded  Taken 5/5/2023 0200 by Shauna Whitlock RN  Pressure Reduction Techniques:   frequent weight shift encouraged   heels elevated off bed   pressure points protected   weight shift assistance provided  Head of Bed (HOB) Positioning: HOB at 15 degrees  Pressure Reduction Devices:   specialty bed utilized   pressure-redistributing mattress utilized   positioning supports utilized   heel offloading device utilized   elbow protectors utilized   alternating pressure pump (ADD)  Skin Protection:   adhesive use limited   incontinence pads utilized   tubing/devices free from skin contact   skin-to-device areas padded  Taken 5/5/2023 0000 by Shauna Whitlock RN  Pressure Reduction Techniques:   frequent weight shift encouraged   heels elevated off bed   pressure points protected   weight shift assistance provided  Head of Bed (HOB) Positioning: HOB at 30 degrees  Pressure Reduction Devices:   specialty bed utilized    pressure-redistributing mattress utilized   heel offloading device utilized   positioning supports utilized   elbow protectors utilized   alternating pressure pump (ADD)  Skin Protection:   adhesive use limited   incontinence pads utilized   tubing/devices free from skin contact   skin-to-device areas padded  Taken 5/4/2023 2200 by Shauna Whitlock RN  Pressure Reduction Techniques:   frequent weight shift encouraged   heels elevated off bed   pressure points protected   rest period provided between sit times   weight shift assistance provided  Head of Bed (HOB) Positioning: HOB at 30 degrees  Pressure Reduction Devices:   specialty bed utilized   pressure-redistributing mattress utilized   positioning supports utilized   heel offloading device utilized   elbow protectors utilized   alternating pressure pump (ADD)  Skin Protection:   adhesive use limited   incontinence pads utilized   tubing/devices free from skin contact   skin-to-device areas padded  Taken 5/4/2023 2000 by Shauna Whitlock RN  Pressure Reduction Techniques:   frequent weight shift encouraged   heels elevated off bed   pressure points protected   weight shift assistance provided  Head of Bed (HOB) Positioning: HOB at 30-45 degrees  Pressure Reduction Devices:   alternating pressure pump (ADD)   elbow protectors utilized   specialty bed utilized   pressure-redistributing mattress utilized   positioning supports utilized   heel offloading device utilized  Skin Protection:   adhesive use limited   incontinence pads utilized   tubing/devices free from skin contact   skin-to-device areas padded     Problem: Adult Inpatient Plan of Care  Goal: Patient-Specific Goal (Individualized)  Outcome: Ongoing, Progressing  Goal: Absence of Hospital-Acquired Illness or Injury  Outcome: Ongoing, Progressing  Intervention: Identify and Manage Fall Risk  Recent Flowsheet Documentation  Taken 5/5/2023 0600 by Shauna Whitlock, NEIL  Safety  Promotion/Fall Prevention:   activity supervised   clutter free environment maintained   fall prevention program maintained   nonskid shoes/slippers when out of bed   room organization consistent   safety round/check completed  Taken 5/5/2023 0400 by Shauna Whitlock, RN  Safety Promotion/Fall Prevention:   activity supervised   clutter free environment maintained   fall prevention program maintained   nonskid shoes/slippers when out of bed   room organization consistent   safety round/check completed  Taken 5/5/2023 0200 by Sahuna Whitlock, RN  Safety Promotion/Fall Prevention:   activity supervised   clutter free environment maintained   fall prevention program maintained   nonskid shoes/slippers when out of bed   room organization consistent   safety round/check completed  Taken 5/5/2023 0000 by Shauna Whitlock, RN  Safety Promotion/Fall Prevention:   activity supervised   clutter free environment maintained   fall prevention program maintained   nonskid shoes/slippers when out of bed   room organization consistent   safety round/check completed  Taken 5/4/2023 2200 by Shauna Whitlock, RN  Safety Promotion/Fall Prevention:   activity supervised   clutter free environment maintained   fall prevention program maintained   nonskid shoes/slippers when out of bed   room organization consistent   safety round/check completed  Taken 5/4/2023 2000 by Shauna Whitlock, RN  Safety Promotion/Fall Prevention:   activity supervised   clutter free environment maintained   fall prevention program maintained   nonskid shoes/slippers when out of bed   room organization consistent   safety round/check completed  Intervention: Prevent Skin Injury  Recent Flowsheet Documentation  Taken 5/5/2023 0600 by Shauna Whitlock, RN  Body Position: position changed independently  Skin Protection:   adhesive use limited   incontinence pads utilized   tubing/devices free from skin contact    skin-to-device areas padded  Taken 5/5/2023 0400 by Shauna Whitlock RN  Body Position: position changed independently  Skin Protection:   adhesive use limited   incontinence pads utilized   tubing/devices free from skin contact   skin-to-device areas padded  Taken 5/5/2023 0200 by Shauna Whitlock RN  Body Position:   position changed independently   turned   right   lower extremity elevated   upper extremity elevated  Skin Protection:   adhesive use limited   incontinence pads utilized   tubing/devices free from skin contact   skin-to-device areas padded  Taken 5/5/2023 0000 by Shauna Whitlock RN  Body Position: position changed independently  Skin Protection:   adhesive use limited   incontinence pads utilized   tubing/devices free from skin contact   skin-to-device areas padded  Taken 5/4/2023 2200 by Shauna Whitlock RN  Body Position: position changed independently  Skin Protection:   adhesive use limited   incontinence pads utilized   tubing/devices free from skin contact   skin-to-device areas padded  Taken 5/4/2023 2000 by Shauna Whitlock RN  Body Position: position changed independently  Skin Protection:   adhesive use limited   incontinence pads utilized   tubing/devices free from skin contact   skin-to-device areas padded  Intervention: Prevent and Manage VTE (Venous Thromboembolism) Risk  Recent Flowsheet Documentation  Taken 5/5/2023 0600 by Shauna Whitlock RN  Activity Management: bedrest  Range of Motion: active ROM (range of motion) encouraged  Taken 5/5/2023 0400 by Shauna Whitlock RN  Activity Management: bedrest  Range of Motion: active ROM (range of motion) encouraged  Taken 5/5/2023 0200 by Shauna Whitlock RN  Activity Management: bedrest  Taken 5/5/2023 0000 by Shauna Whitlock RN  Range of Motion: active ROM (range of motion) encouraged  Taken 5/4/2023 2200 by Shauna Whitlock RN  Range of Motion:  ROM (range of motion) performed  Taken 5/4/2023 2000 by Shauna Whitlock, RN  Activity Management: bedrest  Range of Motion: ROM (range of motion) performed  Goal: Optimal Comfort and Wellbeing  Outcome: Ongoing, Progressing  Intervention: Provide Person-Centered Care  Recent Flowsheet Documentation  Taken 5/5/2023 0400 by Shauna Whitlock, RN  Trust Relationship/Rapport:   care explained   choices provided   reassurance provided  Taken 5/4/2023 2000 by Shauna Whitlock, RN  Trust Relationship/Rapport:   care explained   choices provided   questions answered  Goal: Readiness for Transition of Care  Outcome: Ongoing, Progressing

## 2023-05-05 NOTE — PLAN OF CARE
Goal Outcome Evaluation:   VSS. No rhythm changes. UOP ~500. Afebrile. No BM. Walked 500 feet. Wife at bedside. Mag replaced.

## 2023-05-05 NOTE — PROGRESS NOTES
Intensive Care Follow-up     Hospital:  LOS: 2 days   Mr. Jonnie Roth, 78 y.o. male is followed for:   Ventricular tachycardia            History of present illness:   78 y.o.male with relevant PMH of cardiac arrest s/p primary ventricular fibrillation s/p pacemaker 2000, ischemic heart disease, HLD, tobacco use with COPD, Left bundle branch block, pulmonary embolism, hypothyroidism, GEO with CPAP use who presents to the ICU as a transfer from the floor with ongoing issues related to recurrent ventricular tachycardia.     Patient was admitted to hospital in April with recurrent VT and ICD shock x2 at home.  During that admission he was initiated on an amiodarone drip and transition to p.o. amiodarone and mexiletine.  He was readmitted on May 2 with the ICD firing.  He had episode on the floor and briefly had chest compression and was transferred to ICU for further management.  Patient was discussed with electrophysiology team and patient was resumed on amiodarone bolus and drip.      Subjective   Interval History:  Overnight no acute events.  No further arrhythmias noted.  Patient has been switched over to amiodarone and mexiletine.  EP team following and want to monitor him through the weekend and if no further recurrence may go home otherwise they will consider ablation.  Rhinorrhea is better with Atrovent nasal spray.             The patient's past medical, surgical and social history were reviewed and updated in Epic as appropriate.       Objective     Infusions:     Medications:  amiodarone, 200 mg, Oral, Q12H  carvedilol, 6.25 mg, Oral, BID With Meals  clopidogrel, 75 mg, Oral, Daily  eplerenone, 12.5 mg, Oral, Q24H  finasteride, 5 mg, Oral, Daily  ipratropium, 2 spray, Each Nare, Q12H  levothyroxine, 25 mcg, Oral, Q AM  magnesium sulfate, 2 g, Intravenous, Once  mexiletine, 200 mg, Oral, Q8H  pantoprazole, 40 mg, Oral, QAM  polyethylene glycol, 17 g, Oral, Daily        Vital Sign Min/Max for last 24  "hours  Temp  Min: 97.2 °F (36.2 °C)  Max: 98.5 °F (36.9 °C)   BP  Min: 103/64  Max: 144/81   Pulse  Min: 68  Max: 101   Resp  Min: 16  Max: 20   SpO2  Min: 88 %  Max: 100 %   No data recorded       Input/Output for last 24 hour shift  05/04 0701 - 05/05 0700  In: 1429.4 [P.O.:960; I.V.:469.4]  Out: 1475 [Urine:1475]      Objective:  Vital signs: (most recent): Blood pressure 126/83, pulse 70, temperature 97.5 °F (36.4 °C), temperature source Oral, resp. rate 16, height 185.4 cm (72.99\"), weight 80.7 kg (177 lb 14.6 oz), SpO2 96 %.            General Appearance: Awake, alert, in no acute distress  Lungs:   B/L Breath sounds present with decreased breath sounds on bases, no wheezing heard, no crackles.   Heart: S1 and S2 present, no murmur, paced rhythm  Abdomen: Soft, nontender, no guarding or rigidity, bowel sounds positive.  Extremities:   no edema, warm to touch.  Neurologic:  Moving all four extremities. Good strength bilaterally.   Psychological: Normal affect, Cooperative    Results from last 7 days   Lab Units 05/05/23 0427 05/04/23 0315 05/03/23  0456   WBC 10*3/mm3 9.56 10.21 9.44   HEMOGLOBIN g/dL 12.2* 12.9* 13.0   PLATELETS 10*3/mm3 189 220 220     Results from last 7 days   Lab Units 05/05/23 0427 05/04/23 0315 05/03/23  1801 05/03/23  0456   SODIUM mmol/L 140 145  --  140   POTASSIUM mmol/L 3.8 4.1 3.9 3.5   CO2 mmol/L 25.0 22.0  --  22.0   BUN mg/dL 13 12  --  14   CREATININE mg/dL 0.67* 0.63*  --  0.68*   MAGNESIUM mg/dL 1.9 2.0  --  2.1   PHOSPHORUS mg/dL 3.9 3.6  --  3.8   GLUCOSE mg/dL 103* 103*  --  91     Estimated Creatinine Clearance: 102 mL/min (A) (by C-G formula based on SCr of 0.67 mg/dL (L)).    Results from last 7 days   Lab Units 05/02/23 1951   PH, ARTERIAL pH units 7.468*   PCO2, ARTERIAL mm Hg 27.5*   PO2 ART mm Hg 125.0*       Images:   No new chest x-ray    I reviewed the patient's results and images.     Echocardiogram reviewed  Interpretation Summary       •  The left " ventricle is dilated.  Left ventricular systolic function is severely decreased. Left ventricular ejection fraction appears to be less than 20%.  The anterior and anterior lateral segments and apex are akinetic.  •  The left atrial cavity is dilated.  •  Left atrial volume is moderately increased.  •  Mild to moderate much regurgitation is present.  •  Estimated right ventricular systolic pressure from tricuspid regurgitation is normal (<35 mmHg).         Assessment & Plan   Impression        Ventricular tachycardia    COPD (chronic obstructive pulmonary disease)    GEO (obstructive sleep apnea)    ICD (implantable cardioverter-defibrillator) in place    Type 2 diabetes mellitus, without long-term current use of insulin    Ischemic cardiomyopathy (EF < 20%)    Chronic HFrEF (heart failure with reduced ejection fraction) (HCC)    Hypokalemia    Ventricular tachycardia, monomorphic       Plan        1.  Patient admitted here with recurrent VT. remains on mexiletine.  Started on p.o. amiodarone per cardiology.  Cardiology team is managing and contemplating about repeat ablation if has recurrent VT. plan is to monitor him in ICU over the weekend and decide further plan on Monday.  Coreg started.  Slowly reinstitute cardiac medications as tolerated for underlying severe cardiomyopathy.  2.  Patient was in decompensated heart failure required diuresis. initially was requiring high flow nasal cannula oxygen but currently on room air and saturating well.  CPAP at nighttime.  3.  Electrolytes are being monitored and replaced per ICU protocol. we will give 2 g mag IV x1   4.  Continue levothyroxine for history of hypothyroidism.  5.  Anticoagulation is on hold pending cardiac intervention.  Will defer initiation of anticoagulation to cardiology team.  6.  Tolerating oral diet well, has not had bowel movement for 2 days.  Bowel regimen.  7.  History of diabetes and has been on metformin and Jardiance.  Blood sugars have been  acceptable.  8.  GI prophylaxis.  SCDs for DVT prophylaxis.  9.  Rhinorrhea improved with Atrovent nasal spray.  10.  Out of bed as tolerated.    Continue close monitoring in ICU as high risk of decline.  Check labs in the morning.    Plan of care and goals reviewed with multidisciplinary/antibiotic stewardship team during rounds.   I discussed the patient's findings and my recommendations with patient, family and nursing staff   Above documentation reviewed and reflect accurate information as of 5/5/2023 including copied elements of the note.       Boris Willett MD, [facility]  Pulmonary, Critical care and Sleep Medicine

## 2023-05-05 NOTE — PROGRESS NOTES
[facility] at [facility]  Progress Note       LOS: 2 days   Patient Care Team:  Brian Lewis MD as PCP - General (Internal Medicine)  Ferdinand Alba MD as Consulting Physician (Cardiac Electrophysiology)  Viet Manzano PA as Physician Assistant (Cardiology)  Michele Flanagan IV, MD as Consulting Physician (Interventional Cardiology)    Chief Complaint:  Follow up VT    Subjective   No further ventricular ectopy overnight. Transitioned from IV amiodarone to oral amiodarone. Denies dizziness, SOB, CP. No complaints.  Doing well all things considered today.        Review of Systems:   Pertinent positives in HPI, all others reviewed and negative.      Objective       Current Facility-Administered Medications:   •  amiodarone (PACERONE) tablet 200 mg, 200 mg, Oral, Q12H, Ferdinand Alba MD, 200 mg at 05/04/23 2204  •  Calcium Replacement - Follow Nurse / BPA Driven Protocol, , Does not apply, PRN, Merary Rojas, APRN  •  clopidogrel (PLAVIX) tablet 75 mg, 75 mg, Oral, Daily, Shauna Byrne PA, 75 mg at 05/04/23 1150  •  eplerenone (INSPRA) tablet 12.5 mg, 12.5 mg, Oral, Q24H, Shauna Byrne PA, 12.5 mg at 05/04/23 0803  •  finasteride (PROSCAR) tablet 5 mg, 5 mg, Oral, Daily, Shauna Byrne PA, 5 mg at 05/04/23 0802  •  ipratropium (ATROVENT) nasal spray 0.03%, 2 spray, Each Nare, Q12H, Boris Willett MD, 2 spray at 05/04/23 1409  •  levothyroxine (SYNTHROID, LEVOTHROID) tablet 25 mcg, 25 mcg, Oral, Q AM, Maico Cain MD, 25 mcg at 05/05/23 0559  •  Magnesium Standard Dose Replacement - Follow Nurse / BPA Driven Protocol, , Does not apply, PRDESTINI, Merary Rojas, APRN  •  melatonin tablet 10 mg, 10 mg, Oral, Nightly PRN, Shauna Byrne PA  •  mexiletine (MEXITIL) capsule 200 mg, 200 mg, Oral, Q8H, Merary Rojas, APRN, 200 mg at 05/05/23 0559  •  pantoprazole (PROTONIX) EC tablet 40 mg, 40 mg, Oral, JOY, Shauna Byrne PA, 40 mg at 05/05/23  "0600  •  Phosphorus Replacement - Follow Nurse / BPA Driven Protocol, , Does not apply, PRN, Merary Rojas, APRN  •  polyethylene glycol (MIRALAX) packet 17 g, 17 g, Oral, Daily, Shauna Byrne PA, 17 g at 05/04/23 0805  •  Potassium Replacement - Follow Nurse / BPA Driven Protocol, , Does not apply, PRN, Ferdinand Alba MD    Vital Sign Min/Max for last 24 hours  Temp  Min: 97.2 °F (36.2 °C)  Max: 98.5 °F (36.9 °C)   BP  Min: 103/64  Max: 141/83   Pulse  Min: 69  Max: 101   Resp  Min: 16  Max: 20   SpO2  Min: 88 %  Max: 100 %   No data recorded   No data recorded     Flowsheet Rows    Flowsheet Row First Filed Value   Admission Height 185.4 cm (73\") Documented at 05/02/2023 1324   Admission Weight 79.4 kg (175 lb) Documented at 05/02/2023 1324            Intake/Output Summary (Last 24 hours) at 5/5/2023 0737  Last data filed at 5/5/2023 0600  Gross per 24 hour   Intake 1429.4 ml   Output 1475 ml   Net -45.6 ml       Physical Exam:     General Appearance:    Alert, cooperative, in no acute distress   Lungs:    Clear to auscultation bilaterally.  Respiratory effort normal    Heart:   Regular rate normal S1-S2 there is an S3 and S4 no change in murmur.   Chest Wall:    No abnormalities observed   Abdomen:     Normal bowel sounds, benign examination.   Extremities:  Moves all extremities well, no edema, no cyanosis, no            redness              Pulses:   Pulses palpable and equal bilaterally   Skin:   No bleeding, bruising or rash        Results Review:   Results from last 7 days   Lab Units 05/05/23  0427 05/04/23  0315 05/03/23  0456   WBC 10*3/mm3 9.56 10.21 9.44   HEMOGLOBIN g/dL 12.2* 12.9* 13.0   HEMATOCRIT % 39.1 40.3 40.9   PLATELETS 10*3/mm3 189 220 220     Results from last 7 days   Lab Units 05/05/23  0427 05/04/23  0315 05/03/23  1801 05/03/23  0456   SODIUM mmol/L 140 145  --  140   POTASSIUM mmol/L 3.8 4.1 3.9 3.5   CHLORIDE mmol/L 106 112*  --  105   CO2 mmol/L 25.0 22.0  --  22.0   BUN mg/dL " 13 12  --  14   CREATININE mg/dL 0.67* 0.63*  --  0.68*   GLUCOSE mg/dL 103* 103*  --  91              Results from last 7 days   Lab Units 05/02/23  1302   TSH uIU/mL 5.210*   FREE T4 ng/dL 2.11*     Results from last 7 days   Lab Units 05/02/23  1302   PROBNP pg/mL 1,065.0     Results from last 7 days   Lab Units 05/02/23  1302   PROTIME Seconds 16.5*   INR  1.32*     Results from last 7 days   Lab Units 05/03/23  0456 05/02/23  1932 05/02/23  1642   HSTROP T ng/L 38* 28* 24*       Intake/Output Summary (Last 24 hours) at 5/5/2023 0737  Last data filed at 5/5/2023 0600  Gross per 24 hour   Intake 1429.4 ml   Output 1475 ml   Net -45.6 ml       I personally viewed and interpreted the patient's EKG/Telemetry data    EKG: V paced, HR 72 bpm,  ms, QTc 476 ms when corrected for wide QRS    Telemetry: V paced, HR  bpm    Ejection Fraction  No results found for: EF    Echo EF Estimated  Lab Results   Component Value Date    ECHOEFEST 18 10/14/2022     Echo 5/2/23:   The left ventricle is dilated.  Left ventricular systolic function is severely decreased. Left ventricular ejection fraction appears to be less than 20%.  The anterior and anterior lateral segments and apex are akinetic.  •  The left atrial cavity is dilated.  •  Left atrial volume is moderately increased.  •  Mild to moderate much regurgitation is present.  •  Estimated right ventricular systolic pressure from tricuspid regurgitation is normal (<35 mmHg).      Present on Admission:  • Ventricular tachycardia  • COPD (chronic obstructive pulmonary disease)  • GEO (obstructive sleep apnea)  • ICD (implantable cardioverter-defibrillator) in place  • Type 2 diabetes mellitus, without long-term current use of insulin  • Ischemic cardiomyopathy (EF < 20%)  • Chronic HFrEF (heart failure with reduced ejection fraction) ([facility])  • Hypokalemia  • Ventricular tachycardia, monomorphic    Assessment & Plan   1.  Recurrent VT:  -Recurrent ventricular  tachycardia with a rate of 142 bpm with 36 ATP and 1 shock to normal sinus rhythm from his ICD at home 5/2/2023.  With recurrent VT monomorphic appearing at 138 bpm with external cardioversion in the ED with return to normal sinus rhythm. Recurrent VT overnight with ICD shock x 1. Amiodarone re-bolused x 2. Mexiletine increased to 200 mg TID.   -Patient has had VT with ICD shocks on 4 separate occasions since September 2022.  He was just discharged from [facility] last week with VT and started on amiodarone 200 mg 3 times daily, mexiletine 200 mg twice daily.  -We will monitor over the weekend in ICU.  Reinitiated oral amiodarone 5/4/23. Continue oral Mexiletine.   Consider VT ablation on Monday if VT is recurrent.  Hold Xarelto for now in case of procedure.  Patient does understand that a VT ablation carries with it a significant risk of complications especially in his current situation.     2.  Ischemic cardiomyopathy/severe LV dysfunction/HFrEF:  - Most recent echo 10/16/2022: EF less than 20%, LV wall segments hypokinetic, mid anterior, basal anterolateral, mid anterolateral, basal inferior lateral, mid inferior lateral, apical inferior, mid inferior, basal inferior septal, mid inferior septalmid anteroseptal, basal anterior, basal inferior and basal inferoseptal. The following left ventricular wall segments are dyskinetic: apical anterior, apical lateral, apical septal and apex.The left ventricular cavity is severely dilated. Mild mitral regurgitation is present  -Overall compensated from a heart failure standpoint, chest x-ray with no acute cardiopulmonary process  -Echo 5/2/2023: LV dilated, LV systolic function severely decreased, LVEF appears less than 20%, anterior and anterior lateral segments and apex are akinetic, LA cavity dilated, LA volume moderately increased, mild to moderate regurgitation present  -On Coreg, Jardiance, Inspra, Entresto at home- have been on hold due to hypotension,  BP is better today. Will reinitiate low dose coreg today.     3. DM:  - sliding scale insulin and accu checks        Electronically signed by CHENTE Umanzor, 05/05/23, 7:45 AM EDT.      I, Ferdinand Alba MD, personally performed the services face to face as described and documented by the above named individual. I have made any necessary edits and it is both accurate and complete 5/5/2023  16:02 EDT

## 2023-05-06 PROBLEM — Z95.0 PRESENCE OF PERMANENT CARDIAC PACEMAKER: Status: ACTIVE | Noted: 2023-05-06

## 2023-05-06 LAB
ANION GAP SERPL CALCULATED.3IONS-SCNC: 8 MMOL/L (ref 5–15)
BASOPHILS # BLD AUTO: 0.04 10*3/MM3 (ref 0–0.2)
BASOPHILS NFR BLD AUTO: 0.5 % (ref 0–1.5)
BUN SERPL-MCNC: 13 MG/DL (ref 8–23)
BUN/CREAT SERPL: 16.7 (ref 7–25)
CALCIUM SPEC-SCNC: 8.7 MG/DL (ref 8.6–10.5)
CHLORIDE SERPL-SCNC: 103 MMOL/L (ref 98–107)
CO2 SERPL-SCNC: 27 MMOL/L (ref 22–29)
CREAT SERPL-MCNC: 0.78 MG/DL (ref 0.76–1.27)
DEPRECATED RDW RBC AUTO: 47.9 FL (ref 37–54)
EGFRCR SERPLBLD CKD-EPI 2021: 90.7 ML/MIN/1.73
EOSINOPHIL # BLD AUTO: 0.35 10*3/MM3 (ref 0–0.4)
EOSINOPHIL NFR BLD AUTO: 4 % (ref 0.3–6.2)
ERYTHROCYTE [DISTWIDTH] IN BLOOD BY AUTOMATED COUNT: 14.4 % (ref 12.3–15.4)
GLUCOSE SERPL-MCNC: 96 MG/DL (ref 65–99)
HBA1C MFR BLD: 5.8 % (ref 4.8–5.6)
HCT VFR BLD AUTO: 36.7 % (ref 37.5–51)
HGB BLD-MCNC: 11.8 G/DL (ref 13–17.7)
IMM GRANULOCYTES # BLD AUTO: 0.04 10*3/MM3 (ref 0–0.05)
IMM GRANULOCYTES NFR BLD AUTO: 0.5 % (ref 0–0.5)
LYMPHOCYTES # BLD AUTO: 1.52 10*3/MM3 (ref 0.7–3.1)
LYMPHOCYTES NFR BLD AUTO: 17.5 % (ref 19.6–45.3)
MAGNESIUM SERPL-MCNC: 2.1 MG/DL (ref 1.6–2.4)
MCH RBC QN AUTO: 29.1 PG (ref 26.6–33)
MCHC RBC AUTO-ENTMCNC: 32.2 G/DL (ref 31.5–35.7)
MCV RBC AUTO: 90.4 FL (ref 79–97)
MONOCYTES # BLD AUTO: 0.8 10*3/MM3 (ref 0.1–0.9)
MONOCYTES NFR BLD AUTO: 9.2 % (ref 5–12)
NEUTROPHILS NFR BLD AUTO: 5.95 10*3/MM3 (ref 1.7–7)
NEUTROPHILS NFR BLD AUTO: 68.3 % (ref 42.7–76)
NRBC BLD AUTO-RTO: 0 /100 WBC (ref 0–0.2)
PHOSPHATE SERPL-MCNC: 3.8 MG/DL (ref 2.5–4.5)
PLATELET # BLD AUTO: 192 10*3/MM3 (ref 140–450)
PMV BLD AUTO: 10.3 FL (ref 6–12)
POTASSIUM SERPL-SCNC: 3.9 MMOL/L (ref 3.5–5.2)
RBC # BLD AUTO: 4.06 10*6/MM3 (ref 4.14–5.8)
SODIUM SERPL-SCNC: 138 MMOL/L (ref 136–145)
WBC NRBC COR # BLD: 8.7 10*3/MM3 (ref 3.4–10.8)

## 2023-05-06 PROCEDURE — 84100 ASSAY OF PHOSPHORUS: CPT | Performed by: INTERNAL MEDICINE

## 2023-05-06 PROCEDURE — 85025 COMPLETE CBC W/AUTO DIFF WBC: CPT | Performed by: INTERNAL MEDICINE

## 2023-05-06 PROCEDURE — 94799 UNLISTED PULMONARY SVC/PX: CPT

## 2023-05-06 PROCEDURE — 94660 CPAP INITIATION&MGMT: CPT

## 2023-05-06 PROCEDURE — 80048 BASIC METABOLIC PNL TOTAL CA: CPT | Performed by: INTERNAL MEDICINE

## 2023-05-06 PROCEDURE — 25010000002 HEPARIN (PORCINE) PER 1000 UNITS: Performed by: INTERNAL MEDICINE

## 2023-05-06 PROCEDURE — 99233 SBSQ HOSP IP/OBS HIGH 50: CPT | Performed by: INTERNAL MEDICINE

## 2023-05-06 PROCEDURE — 83735 ASSAY OF MAGNESIUM: CPT | Performed by: INTERNAL MEDICINE

## 2023-05-06 PROCEDURE — 99232 SBSQ HOSP IP/OBS MODERATE 35: CPT | Performed by: INTERNAL MEDICINE

## 2023-05-06 PROCEDURE — 83036 HEMOGLOBIN GLYCOSYLATED A1C: CPT | Performed by: INTERNAL MEDICINE

## 2023-05-06 PROCEDURE — 25010000002 AMIODARONE IN DEXTROSE 5% 360-4.14 MG/200ML-% SOLUTION: Performed by: INTERNAL MEDICINE

## 2023-05-06 RX ORDER — DOCUSATE SODIUM 100 MG/1
100 CAPSULE, LIQUID FILLED ORAL 2 TIMES DAILY
Status: DISCONTINUED | OUTPATIENT
Start: 2023-05-06 | End: 2023-05-08

## 2023-05-06 RX ORDER — AMIODARONE HYDROCHLORIDE 200 MG/1
400 TABLET ORAL EVERY 8 HOURS SCHEDULED
Status: DISCONTINUED | OUTPATIENT
Start: 2023-05-06 | End: 2023-05-09 | Stop reason: HOSPADM

## 2023-05-06 RX ORDER — POTASSIUM CHLORIDE 750 MG/1
20 CAPSULE, EXTENDED RELEASE ORAL DAILY
Status: DISCONTINUED | OUTPATIENT
Start: 2023-05-06 | End: 2023-05-08

## 2023-05-06 RX ORDER — HEPARIN SODIUM 5000 [USP'U]/ML
5000 INJECTION, SOLUTION INTRAVENOUS; SUBCUTANEOUS EVERY 8 HOURS SCHEDULED
Status: DISCONTINUED | OUTPATIENT
Start: 2023-05-06 | End: 2023-05-09 | Stop reason: HOSPADM

## 2023-05-06 RX ADMIN — AMIODARONE HYDROCHLORIDE 400 MG: 200 TABLET ORAL at 20:05

## 2023-05-06 RX ADMIN — LEVOTHYROXINE SODIUM 25 MCG: 25 TABLET ORAL at 06:42

## 2023-05-06 RX ADMIN — AMIODARONE HYDROCHLORIDE 200 MG: 200 TABLET ORAL at 10:51

## 2023-05-06 RX ADMIN — EPLERENONE 12.5 MG: 25 TABLET, FILM COATED ORAL at 10:52

## 2023-05-06 RX ADMIN — CARVEDILOL 6.25 MG: 6.25 TABLET, FILM COATED ORAL at 18:35

## 2023-05-06 RX ADMIN — CARVEDILOL 6.25 MG: 6.25 TABLET, FILM COATED ORAL at 10:51

## 2023-05-06 RX ADMIN — DOCUSATE SODIUM 100 MG: 100 CAPSULE, LIQUID FILLED ORAL at 20:05

## 2023-05-06 RX ADMIN — AMIODARONE HYDROCHLORIDE 1 MG/MIN: 1.8 INJECTION, SOLUTION INTRAVENOUS at 21:39

## 2023-05-06 RX ADMIN — MEXILETINE HYDROCHLORIDE 200 MG: 200 CAPSULE ORAL at 15:23

## 2023-05-06 RX ADMIN — PANTOPRAZOLE SODIUM 40 MG: 40 TABLET, DELAYED RELEASE ORAL at 06:42

## 2023-05-06 RX ADMIN — MEXILETINE HYDROCHLORIDE 200 MG: 200 CAPSULE ORAL at 06:42

## 2023-05-06 RX ADMIN — POLYETHYLENE GLYCOL 3350 17 G: 17 POWDER, FOR SOLUTION ORAL at 10:49

## 2023-05-06 RX ADMIN — AMIODARONE HYDROCHLORIDE 1 MG/MIN: 1.8 INJECTION, SOLUTION INTRAVENOUS at 15:44

## 2023-05-06 RX ADMIN — CLOPIDOGREL BISULFATE 75 MG: 75 TABLET ORAL at 10:51

## 2023-05-06 RX ADMIN — IPRATROPIUM BROMIDE 2 SPRAY: 21 SPRAY, METERED NASAL at 12:00

## 2023-05-06 RX ADMIN — Medication 10 MG: at 22:26

## 2023-05-06 RX ADMIN — MEXILETINE HYDROCHLORIDE 200 MG: 200 CAPSULE ORAL at 22:26

## 2023-05-06 RX ADMIN — FINASTERIDE 5 MG: 5 TABLET, FILM COATED ORAL at 10:52

## 2023-05-06 RX ADMIN — POTASSIUM CHLORIDE 20 MEQ: 750 CAPSULE, EXTENDED RELEASE ORAL at 10:50

## 2023-05-06 RX ADMIN — HEPARIN SODIUM 5000 UNITS: 5000 INJECTION INTRAVENOUS; SUBCUTANEOUS at 22:26

## 2023-05-06 NOTE — PROGRESS NOTES
Intensivist Note     5/6/2023  Hospital Day: 3  * No surgery found *  ICU Stays Timeline            Hospital Admission: 05/02/23 1246 - Current  ICU stays: 1      In Date/Time Event Department ICU Stay Duration     05/02/23 1246 Admission  [facility]      05/02/23 1500 Transfer In  [facility] 6A      05/02/23 1937 Transfer In  [facility] 2A ICU 3 days 12 hours 26 minutes             Mr. Jonnie Roth, 78 y.o. male is followed for:    Recurrent monomorphic ventricular tachycardia    Hypokalemia at admission. Resolved    ICD in place    Ischemic cardiomyopathy (EF < 20%)    Chronic HFrEF with LVEF less than 20% (HCC)    Biventricular PPM    COPD     GEO on nocturnal CPAP    Type 2 diabetes mellitus with HbA1c 5.8, without long-term current use of insulin       SUBJECTIVE     78-year-old white male remote smoker with PMH of CAD and ischemic cardiomyopathy s/p previous stents 1995 and 2010 (residual LVEF less than 20% on 5/2/2023), on chronic anticoagulation with Xarelto, Sudden cardiac death 9/2000 due to primary V-fib with ICD placement (last generator change 7/2021), multiple episodes of ICD discharge due to VT over the years requiring EP studies and RFA of left ventricular scar, LBBB, hyperlipidemia, COPD, history of PE 2015, GEO with CPAP use, and hypothyroidism on replacement.  Patient was recently hospitalized 4/26/2023 after his ICD fired x 2 at home despite being on amiodarone 100 mg daily.  He was loaded with IV amiodarone and mexiletine added and after no further VT was discharged home next day 4/27/2023 on amiodarone 200 mg 3 times daily and mexiletine 200 mg twice daily.  On 5/2/2023 after completing cardiac rehab and going home he noted chest pressure and dizziness and received an ICD shock.  He presented to the ER and it was noted he had had an episode of monomorphic VT and rhythm was restored with the ICD shock.  In the ER he again went into VT and was externally cardioverted.  Blood pressure was  "marginal after the above so was transferred to the ICU.  The same evening of admission (5/2/2023) he had another episode of VT with brief LOC but again ICD shock returned him to normal sinus rhythm (he did however receive about 15 compressions due to a transient period of asystole).  Was given another bolus of amiodarone and has been observed without problems since that episode.    Interval history: No new episodes of VT last evening and is presently in a paced rhythm with adequate blood pressure 124/75.  Denies chest pain, cough, purulent sputum production, hemoptysis, or pleurisy.  No dyspnea and O2 sat is presently 96% on 2 L.  No nausea, vomiting, diarrhea, melena, hematochezia, or hematemesis.  No difficulty voiding, UOP is adequate at 975 cc out over 24 hours and BUN/creatinine are normal at 13/0.78.  Patient afebrile with WBC 8.7 and not requiring antimicrobial therapy.    ROS: Per subjective, all other systems reviewed and were negative.    The patient's relevant PMH, PSH, FH, and SH were reviewed and updated in Epic as appropriate. Allergies and Medications reviewed.    OBJECTIVE     /75   Pulse 71   Temp 98.3 °F (36.8 °C) (Oral)   Resp 20   Ht 185.4 cm (72.99\")   Wt 80.7 kg (177 lb 14.6 oz)   SpO2 98%   BMI 23.48 kg/m²   Oxygen Concentration (%): 40  Flow (L/min): 2    Flowsheet Rows    Flowsheet Row First Filed Value   Admission Height 185.4 cm (73\") Documented at 05/02/2023 1324   Admission Weight 79.4 kg (175 lb) Documented at 05/02/2023 1324        Intake & Output (last day)       05/05 0701  05/06 0700 05/06 0701  05/07 0700    P.O. 780     I.V. (mL/kg) 50 (0.6)     Total Intake(mL/kg) 830 (10.3)     Urine (mL/kg/hr) 975 (0.5) 200 (0.2)    Total Output 975 200    Net -145 -200                Exam:  General Exam:  Well-developed well-nourished very pleasant elderly white male sitting up in bed in NAD  HEENT: Pupils equal and reactive. Nose and throat clear.  Neck:                          " Supple, no JVD, thyromegaly, or adenopathy  Lungs: Clear to auscultation and percussion anteriorly and posteriorly.  No wheezes, rales, rhonchi  Cardiovascular: Regular rate and rhythm without murmurs or gallops.  Paced rhythm at 70 on monitor  Abdomen: Soft nontender without organomegaly or masses.   and rectal: Deferred.  Extremities: No cyanosis clubbing edema.  Neurologic:                 Symmetric strength. No focal deficits.  Alert, oriented, cooperative and follows all commands      CXR 5/3/2023: Heart size normal.  Biventricular PPM/ICD in place with the generator over the left anterolateral chest.  Slight increase in interstitial markings but improved from admission film.    INFUSIONS  amiodarone, 1 mg/min, Last Rate: 1 mg/min (05/06/23 1544)        Results from last 7 days   Lab Units 05/06/23 0440 05/05/23 0427 05/04/23  0315   WBC 10*3/mm3 8.70 9.56 10.21   HEMOGLOBIN g/dL 11.8* 12.2* 12.9*   HEMATOCRIT % 36.7* 39.1 40.3   PLATELETS 10*3/mm3 192 189 220     Results from last 7 days   Lab Units 05/06/23  0440 05/05/23  0427   SODIUM mmol/L 138 140   POTASSIUM mmol/L 3.9 3.8   CHLORIDE mmol/L 103 106   CO2 mmol/L 27.0 25.0   BUN mg/dL 13 13   CREATININE mg/dL 0.78 0.67*   GLUCOSE mg/dL 96 103*   CALCIUM mg/dL 8.7 8.7     Results from last 7 days   Lab Units 05/06/23  0440 05/05/23  0427 05/04/23  0315   MAGNESIUM mg/dL 2.1 1.9 2.0   PHOSPHORUS mg/dL 3.8 3.9 3.6     Results from last 7 days   Lab Units 05/03/23  0456 05/02/23  1932 05/02/23  1302   ALK PHOS U/L 69 66 73   BILIRUBIN mg/dL 0.4 0.3 0.5   ALT (SGPT) U/L 44* 42* 43*   AST (SGOT) U/L 40 43* 50*       No results found for: SEDRATE    Lab Results   Component Value Date    PROBNP 1,065.0 05/02/2023    PROBNP 671.4 04/26/2023           Covid Tests        12/8/2022    17:13   Common Labsle   COVID19 Not Detected        COVID LABS:  Results From Last 14 Days   Lab Units 05/03/23  0456 05/02/23  2258 05/02/23  1932 05/02/23  1642 05/02/23  1302  04/26/23  0944   PROBNP pg/mL  --   --   --   --  1,065.0 671.4   LACTATE mmol/L  --  1.1 3.0*  --   --   --    PROTIME Seconds  --   --   --   --  16.5*  --    INR   --   --   --   --  1.32*  --    HSTROP T ng/L 38*  --  28* 24* 16* 20*          Lab Results   Component Value Date    CKTOTAL 53 12/08/2022    TROPONINI <0.006 01/16/2017    TROPONINT 38 (H) 05/03/2023     Lab Results   Component Value Date    TSH 5.210 (H) 05/02/2023     Lab Results   Component Value Date    LACTATE 1.1 05/02/2023     No results found for: CORTISOL    Results from last 7 days   Lab Units 05/02/23 1951   PH, ARTERIAL pH units 7.468*   PCO2, ARTERIAL mm Hg 27.5*   PO2 ART mm Hg 125.0*   HCO3 ART mmol/L 19.9*   FIO2 % 80           I reviewed the patient's results, images and medication.    Assessment & Plan   ASSESSMENT        Recurrent monomorphic ventricular tachycardia    Hypokalemia at admission. Resolved    ICD in place    Ischemic cardiomyopathy (EF < 20%)    Chronic HFrEF with LVEF less than 20% (HCC)    Biventricular PPM    COPD     GEO on nocturnal CPAP    Type 2 diabetes mellitus with HbA1c 5.8, without long-term current use of insulin      DISCUSSION: At the present time appears to be doing reasonably well since his last ICD discharge the night of admission (5/2/2023) after which he was bolused x 2 with amiodarone and placed on amiodarone 200 mg tid and mexiletine 200 mg tid.  He has been followed very closely by cardiology who today increased his oral amiodarone to 400 mg tid as well as starting a 1 mg infusion of IV amiodarone.  Mexiletine is being maintained at the same dose.  Both the patient and cardiology are deciding whether or not to proceed with another ablation versus remaining on higher doses of antiarrhythmics.  The patient understands there are risks with either approach, and he and cardiology will make a final decision on ablation after this weekend.  Patient remains off his home dose of Xarelto because of the  possibility of proceeding with an AV ablation first of the week.  Because of this will place him on low-dose heparin for DVT prophylaxis.    An additional problem the patient has complained of is frequent urination.  He remains on his home dose of Proscar which will be continued and postvoiding residuals were obtained today with a lot of variation but no evidence of severe retention.  We will continue to observe and check postvoiding residuals intermittently.    PLAN     Check postvoiding residual every 8 to 12 hours  Antiarrhythmics per cardiology  Heparin 5000 units subcu q8h  Continue to observe closely in the ICU    Plan of care and goals reviewed with multidisciplinary team at daily rounds.    I discussed the patient's findings and my recommendations with patient, family and nursing staff    Patient is critically ill due to severe ischemic cardiomyopathy and unstable rhythm with recurrent VT despite aggressive antiarrhythmics.  He has a high risk of life-threatening decline in condition and requires continuous monitoring and frequent reassessment of condition for adjustment of management in order to lessen risk.  .    Time spent Critical care 30 min (It does not include procedure time).    Electronically signed by Brian Mukherjee MD, 05/06/23, 8:03 AM EDT.   Pulmonary / Critical care medicine

## 2023-05-06 NOTE — PROGRESS NOTES
"      Cardiac Electrophysiology Inpatient Follow Up Note         [facility] at [facility]    Progress Note    INITIAL REASON FOR CONSULT: VT / ICM / ICD / ICD shocks / coronary artery disease    CHIEF COMPLAINT:  Chief Complaint   Patient presents with   •  VT / ICM / ICD / ICD shocks / coronary artery disease    VT / ICM / ICD / ICD shocks / coronary artery disease    Subjective   Mr. Jonnie Roth denies vomiting, abdominal pain, fussiness, diarrhea and cough.        Objective   VITAL SIGNS  /75   Pulse 70   Temp 97.7 °F (36.5 °C) (Oral)   Resp 20   Ht 185.4 cm (72.99\")   Wt 80.7 kg (177 lb 14.6 oz)   SpO2 95%   BMI 23.48 kg/m²   [unfilled]  Pulse Ox: SpO2  Av.7 %  Min: 88 %  Max: 100 %  Supplemental O2:       Admit Weight  Weight: 79.4 kg (175 lb)  Last 3 Weights  [unfilled]  Body mass index is 23.48 kg/m².    INTAKE/OUTPUT  I/O last 3 completed shifts:  In: 1476.9 [P.O.:1260; I.V.:216.9]  Out: 1800 [Urine:1800]    Intake/Output Summary (Last 24 hours) at 2023 1202  Last data filed at 2023 1000  Gross per 24 hour   Intake 830 ml   Output 925 ml   Net -95 ml       PHYSICAL EXAM  General appearance: awake, alert, oriented, moves all extremities  Lungs: no rhonchi, no wheezes, no rales  Heart: RRR  Abdomen: positive bowel sounds, no bruits, no masses  Extremities: warm and dry, no cyanosis, no clubbing    LABS  Results from last 7 days   Lab Units 23  0440 23  0427 23  0315   WBC 10*3/mm3 8.70 9.56 10.21   HEMOGLOBIN g/dL 11.8* 12.2* 12.9*   HEMATOCRIT % 36.7* 39.1 40.3   MCV fL 90.4 91.6 90.6   PLATELETS 10*3/mm3 192 189 220         Results from last 7 days   Lab Units 23  0440 23  0427 23  0315   POTASSIUM mmol/L 3.9 3.8 4.1   CHLORIDE mmol/L 103 106 112*   CO2 mmol/L 27.0 25.0 22.0   BUN mg/dL 13 13 12   CREATININE mg/dL 0.78 0.67* 0.63*   GLUCOSE mg/dL 96 103* 103*   CALCIUM mg/dL 8.7 8.7 8.6   MAGNESIUM mg/dL 2.1 1.9 " 2.0     Results from last 7 days   Lab Units 05/02/23  1302   PROTIME Seconds 16.5*   INR  1.32*         Results from last 7 days   Lab Units 05/06/23  0440 05/05/23  0427 05/04/23  0315   MAGNESIUM mg/dL 2.1 1.9 2.0                 No results found for: CHOL, TRIG, HDL    CURRENT MEDICATIONS  amiodarone, 400 mg, Oral, TID  carvedilol, 6.25 mg, Oral, BID With Meals  clopidogrel, 75 mg, Oral, Daily  eplerenone, 12.5 mg, Oral, Q24H  finasteride, 5 mg, Oral, Daily  ipratropium, 2 spray, Each Nare, Q12H  levothyroxine, 25 mcg, Oral, Q AM  mexiletine, 200 mg, Oral, Q8H  pantoprazole, 40 mg, Oral, QAM  polyethylene glycol, 17 g, Oral, Daily  potassium chloride, 20 mEq, Oral, Daily      CONTINUOUS INFUSIONS  amiodarone, 1 mg/min            EKG: sinus rhythm  ECHO:REVIEWED   STRESS: REVIEWED  CATH: REVIEWED  CXR: noted    TELEMETRY:no further VT         Diagnosis Plan   1. Ventricular tachycardia, monomorphic  No further VT    Reload amiodarone and mexiletine    Continue to monitor over the weekend    Patient and family at this point have a strong preference for ( inpatient ) VT ablation.  Long conversation about the risks and benefits of this.  We will re-discuss on Monday with Dr. Alba.    No further ICD therapy          2. History of CHF (congestive heart failure)  Euvolemic. Continue coreg      3. History of coronary artery disease  aspirin        Body mass index is 23.48 kg/m².    I spent 35 minutes in consultation with this patient which included more than 65% of this time in direct face-to-face counseling, physical examination and discussion of my assessment and findings and this shared decision making with the patient.  The remainder of the time not spent face-to-face was performing one, some or all of the following actions: preparing to see the patient (e.g. reviewing tests, prior clinicians' notes, etc), ordering medications, tests or procedures, coordination of care, discussion of the plan with other  healthcare providers, documenting clinical information in epic as well as independently interpreting results and communication of these results to the patient family and/or caregiver(s).  Please note that this explicitly excludes time spent on other separate billable services such as performing procedures or test interpretation, when applicable.        Javad Victoria DO, FACC, RS  Cardiac Electrophysiologist  [facility] / [facility]

## 2023-05-06 NOTE — PLAN OF CARE
Goal Outcome Evaluation:            VSS. No rhythm change overnight. CPAP worn while sleeping. Adequate UOP. Awaiting AM labs.

## 2023-05-07 ENCOUNTER — APPOINTMENT (OUTPATIENT)
Dept: GENERAL RADIOLOGY | Facility: HOSPITAL | Age: 78
End: 2023-05-07
Payer: MEDICARE

## 2023-05-07 LAB
ALBUMIN SERPL-MCNC: 3.6 G/DL (ref 3.5–5.2)
ANION GAP SERPL CALCULATED.3IONS-SCNC: 10 MMOL/L (ref 5–15)
APTT PPP: 25.8 SECONDS (ref 60–90)
BASOPHILS # BLD AUTO: 0.03 10*3/MM3 (ref 0–0.2)
BASOPHILS NFR BLD AUTO: 0.3 % (ref 0–1.5)
BUN SERPL-MCNC: 16 MG/DL (ref 8–23)
BUN/CREAT SERPL: 19.5 (ref 7–25)
CALCIUM SPEC-SCNC: 8.6 MG/DL (ref 8.6–10.5)
CHLORIDE SERPL-SCNC: 104 MMOL/L (ref 98–107)
CO2 SERPL-SCNC: 25 MMOL/L (ref 22–29)
CREAT SERPL-MCNC: 0.82 MG/DL (ref 0.76–1.27)
DEPRECATED RDW RBC AUTO: 48 FL (ref 37–54)
EGFRCR SERPLBLD CKD-EPI 2021: 89.4 ML/MIN/1.73
EOSINOPHIL # BLD AUTO: 0.36 10*3/MM3 (ref 0–0.4)
EOSINOPHIL NFR BLD AUTO: 3.4 % (ref 0.3–6.2)
ERYTHROCYTE [DISTWIDTH] IN BLOOD BY AUTOMATED COUNT: 14.4 % (ref 12.3–15.4)
GLUCOSE SERPL-MCNC: 102 MG/DL (ref 65–99)
HCT VFR BLD AUTO: 36.9 % (ref 37.5–51)
HGB BLD-MCNC: 11.9 G/DL (ref 13–17.7)
IMM GRANULOCYTES # BLD AUTO: 0.04 10*3/MM3 (ref 0–0.05)
IMM GRANULOCYTES NFR BLD AUTO: 0.4 % (ref 0–0.5)
INR PPP: 1.06 (ref 0.89–1.12)
LYMPHOCYTES # BLD AUTO: 1.67 10*3/MM3 (ref 0.7–3.1)
LYMPHOCYTES NFR BLD AUTO: 15.6 % (ref 19.6–45.3)
MAGNESIUM SERPL-MCNC: 2.1 MG/DL (ref 1.6–2.4)
MCH RBC QN AUTO: 29.2 PG (ref 26.6–33)
MCHC RBC AUTO-ENTMCNC: 32.2 G/DL (ref 31.5–35.7)
MCV RBC AUTO: 90.7 FL (ref 79–97)
MONOCYTES # BLD AUTO: 0.8 10*3/MM3 (ref 0.1–0.9)
MONOCYTES NFR BLD AUTO: 7.5 % (ref 5–12)
NEUTROPHILS NFR BLD AUTO: 7.78 10*3/MM3 (ref 1.7–7)
NEUTROPHILS NFR BLD AUTO: 72.8 % (ref 42.7–76)
NRBC BLD AUTO-RTO: 0 /100 WBC (ref 0–0.2)
NT-PROBNP SERPL-MCNC: 2750 PG/ML (ref 0–1800)
PHOSPHATE SERPL-MCNC: 3.8 MG/DL (ref 2.5–4.5)
PLATELET # BLD AUTO: 196 10*3/MM3 (ref 140–450)
PMV BLD AUTO: 10.1 FL (ref 6–12)
POTASSIUM SERPL-SCNC: 4.1 MMOL/L (ref 3.5–5.2)
PROTHROMBIN TIME: 13.9 SECONDS (ref 12.2–14.5)
QT INTERVAL: 528 MS
QTC INTERVAL: 570 MS
RBC # BLD AUTO: 4.07 10*6/MM3 (ref 4.14–5.8)
SODIUM SERPL-SCNC: 139 MMOL/L (ref 136–145)
WBC NRBC COR # BLD: 10.68 10*3/MM3 (ref 3.4–10.8)

## 2023-05-07 PROCEDURE — 85025 COMPLETE CBC W/AUTO DIFF WBC: CPT | Performed by: INTERNAL MEDICINE

## 2023-05-07 PROCEDURE — 93005 ELECTROCARDIOGRAM TRACING: CPT | Performed by: INTERNAL MEDICINE

## 2023-05-07 PROCEDURE — 94799 UNLISTED PULMONARY SVC/PX: CPT

## 2023-05-07 PROCEDURE — 99232 SBSQ HOSP IP/OBS MODERATE 35: CPT | Performed by: INTERNAL MEDICINE

## 2023-05-07 PROCEDURE — 83735 ASSAY OF MAGNESIUM: CPT | Performed by: INTERNAL MEDICINE

## 2023-05-07 PROCEDURE — 83880 ASSAY OF NATRIURETIC PEPTIDE: CPT | Performed by: INTERNAL MEDICINE

## 2023-05-07 PROCEDURE — 25010000002 HEPARIN (PORCINE) PER 1000 UNITS: Performed by: INTERNAL MEDICINE

## 2023-05-07 PROCEDURE — 94660 CPAP INITIATION&MGMT: CPT

## 2023-05-07 PROCEDURE — 93010 ELECTROCARDIOGRAM REPORT: CPT | Performed by: INTERNAL MEDICINE

## 2023-05-07 PROCEDURE — 71046 X-RAY EXAM CHEST 2 VIEWS: CPT

## 2023-05-07 PROCEDURE — 80069 RENAL FUNCTION PANEL: CPT | Performed by: INTERNAL MEDICINE

## 2023-05-07 PROCEDURE — 85730 THROMBOPLASTIN TIME PARTIAL: CPT | Performed by: INTERNAL MEDICINE

## 2023-05-07 PROCEDURE — 71045 X-RAY EXAM CHEST 1 VIEW: CPT

## 2023-05-07 PROCEDURE — 25010000002 AMIODARONE IN DEXTROSE 5% 360-4.14 MG/200ML-% SOLUTION: Performed by: INTERNAL MEDICINE

## 2023-05-07 PROCEDURE — 85610 PROTHROMBIN TIME: CPT | Performed by: INTERNAL MEDICINE

## 2023-05-07 PROCEDURE — 25010000002 FUROSEMIDE PER 20 MG: Performed by: NURSE PRACTITIONER

## 2023-05-07 RX ORDER — FUROSEMIDE 10 MG/ML
40 INJECTION INTRAMUSCULAR; INTRAVENOUS ONCE
Status: COMPLETED | OUTPATIENT
Start: 2023-05-08 | End: 2023-05-07

## 2023-05-07 RX ORDER — BISACODYL 5 MG/1
10 TABLET, DELAYED RELEASE ORAL DAILY PRN
Status: DISCONTINUED | OUTPATIENT
Start: 2023-05-07 | End: 2023-05-08

## 2023-05-07 RX ORDER — BISACODYL 10 MG
10 SUPPOSITORY, RECTAL RECTAL DAILY PRN
Status: DISCONTINUED | OUTPATIENT
Start: 2023-05-07 | End: 2023-05-08

## 2023-05-07 RX ORDER — METOLAZONE 5 MG/1
10 TABLET ORAL ONCE
Status: COMPLETED | OUTPATIENT
Start: 2023-05-08 | End: 2023-05-07

## 2023-05-07 RX ADMIN — HEPARIN SODIUM 5000 UNITS: 5000 INJECTION INTRAVENOUS; SUBCUTANEOUS at 14:06

## 2023-05-07 RX ADMIN — MEXILETINE HYDROCHLORIDE 200 MG: 200 CAPSULE ORAL at 14:05

## 2023-05-07 RX ADMIN — POTASSIUM CHLORIDE 20 MEQ: 750 CAPSULE, EXTENDED RELEASE ORAL at 08:53

## 2023-05-07 RX ADMIN — CLOPIDOGREL BISULFATE 75 MG: 75 TABLET ORAL at 08:53

## 2023-05-07 RX ADMIN — HEPARIN SODIUM 5000 UNITS: 5000 INJECTION INTRAVENOUS; SUBCUTANEOUS at 07:01

## 2023-05-07 RX ADMIN — IPRATROPIUM BROMIDE 2 SPRAY: 21 SPRAY, METERED NASAL at 18:01

## 2023-05-07 RX ADMIN — MEXILETINE HYDROCHLORIDE 200 MG: 200 CAPSULE ORAL at 07:00

## 2023-05-07 RX ADMIN — METOLAZONE 10 MG: 5 TABLET ORAL at 23:51

## 2023-05-07 RX ADMIN — HEPARIN SODIUM 5000 UNITS: 5000 INJECTION INTRAVENOUS; SUBCUTANEOUS at 21:55

## 2023-05-07 RX ADMIN — POLYETHYLENE GLYCOL 3350 17 G: 17 POWDER, FOR SOLUTION ORAL at 08:50

## 2023-05-07 RX ADMIN — AMIODARONE HYDROCHLORIDE 1 MG/MIN: 1.8 INJECTION, SOLUTION INTRAVENOUS at 22:53

## 2023-05-07 RX ADMIN — MEXILETINE HYDROCHLORIDE 200 MG: 200 CAPSULE ORAL at 22:09

## 2023-05-07 RX ADMIN — AMIODARONE HYDROCHLORIDE 400 MG: 200 TABLET ORAL at 07:00

## 2023-05-07 RX ADMIN — PANTOPRAZOLE SODIUM 40 MG: 40 TABLET, DELAYED RELEASE ORAL at 07:00

## 2023-05-07 RX ADMIN — LEVOTHYROXINE SODIUM 25 MCG: 25 TABLET ORAL at 07:00

## 2023-05-07 RX ADMIN — CARVEDILOL 6.25 MG: 6.25 TABLET, FILM COATED ORAL at 18:00

## 2023-05-07 RX ADMIN — AMIODARONE HYDROCHLORIDE 1 MG/MIN: 1.8 INJECTION, SOLUTION INTRAVENOUS at 16:38

## 2023-05-07 RX ADMIN — AMIODARONE HYDROCHLORIDE 1 MG/MIN: 1.8 INJECTION, SOLUTION INTRAVENOUS at 03:46

## 2023-05-07 RX ADMIN — FINASTERIDE 5 MG: 5 TABLET, FILM COATED ORAL at 08:53

## 2023-05-07 RX ADMIN — EPLERENONE 12.5 MG: 25 TABLET, FILM COATED ORAL at 08:50

## 2023-05-07 RX ADMIN — DOCUSATE SODIUM 100 MG: 100 CAPSULE, LIQUID FILLED ORAL at 21:52

## 2023-05-07 RX ADMIN — CARVEDILOL 6.25 MG: 6.25 TABLET, FILM COATED ORAL at 08:53

## 2023-05-07 RX ADMIN — AMIODARONE HYDROCHLORIDE 400 MG: 200 TABLET ORAL at 21:52

## 2023-05-07 RX ADMIN — AMIODARONE HYDROCHLORIDE 1 MG/MIN: 1.8 INJECTION, SOLUTION INTRAVENOUS at 10:33

## 2023-05-07 RX ADMIN — AMIODARONE HYDROCHLORIDE 400 MG: 200 TABLET ORAL at 14:06

## 2023-05-07 RX ADMIN — DOCUSATE SODIUM 100 MG: 100 CAPSULE, LIQUID FILLED ORAL at 08:52

## 2023-05-07 RX ADMIN — FUROSEMIDE 40 MG: 10 INJECTION, SOLUTION INTRAMUSCULAR; INTRAVENOUS at 23:51

## 2023-05-07 NOTE — PROGRESS NOTES
Intensivist Note     5/7/2023  Hospital Day: 4  * No surgery found *  ICU Stays Timeline            Hospital Admission: 05/02/23 1246 - Current  ICU stays: 1      In Date/Time Event Department ICU Stay Duration     05/02/23 1246 Admission  [facility]      05/02/23 1500 Transfer In  [facility]      05/02/23 1937 Transfer In  [facility] ICU 4 days 11 hours 45 minutes             Mr. Jonnie Roth, 78 y.o. male is followed for:    Recurrent monomorphic ventricular tachycardia    Hypokalemia at admission. Resolved    ICD in place    Ischemic cardiomyopathy (EF < 20%)    Chronic HFrEF with LVEF less than 20% (HCC)    Biventricular PPM    COPD     GEO on nocturnal CPAP    Type 2 diabetes mellitus with HbA1c 5.8, without long-term current use of insulin       SUBJECTIVE     78-year-old white male remote smoker with PMH of CAD and ischemic cardiomyopathy s/p previous stents 1995 and 2010 (residual LVEF less than 20% on 5/2/2023), on chronic anticoagulation with Xarelto, Sudden cardiac death 9/2000 due to primary V-fib with ICD placement (last generator change 7/2021), multiple episodes of ICD discharge due to VT over the years requiring EP studies and RFA of left ventricular scar, LBBB, hyperlipidemia, COPD, history of PE 2015, GEO with CPAP use, and hypothyroidism on replacement.  Patient was recently hospitalized 4/26/2023 after his ICD fired x 2 at home despite being on amiodarone 100 mg daily.  He was loaded with IV amiodarone and mexiletine added and after no further VT was discharged home next day 4/27/2023 on amiodarone 200 mg 3 times daily and mexiletine 200 mg twice daily.  On 5/2/2023 after completing cardiac rehab and going home he noted chest pressure and dizziness and received an ICD shock.  He presented to the ER and it was noted he had had an episode of monomorphic VT and rhythm was restored with the ICD shock.  In the ER he again went into VT and was externally cardioverted.  Blood pressure was  "marginal after the above so was transferred to the ICU.  The same evening of admission (5/2/2023) he had another episode of VT with brief LOC but again ICD shock returned him to normal sinus rhythm (he did however receive about 15 compressions due to a transient period of asystole).  Was given another bolus of amiodarone and has been observed without problems since that episode.     Interval history:  Uneventful evening and slept well without new episodes of VT last evening and remains in a paced rhythm (biventricular PPM) with adequate blood pressure 120/68.  Remains on amiodarone drip at 1 mg/min as well as amiodarone 400 mg every 8 hours. Denies chest pain, cough, purulent sputum production, hemoptysis, or pleurisy.  No dyspnea and O2 sat is presently 96% on 2 L.  No nausea, vomiting, diarrhea, melena, hematochezia, or hematemesis.  Indicates however that he has been constipated with no bowel movement for 4 days. No difficulty voiding, UOP is adequate at 1450 cc out over 24 hours and BUN/creatinine are normal at 16/0.82.    Remains afebrile with WBC 10.68 and not requiring antimicrobial therapy.  CXR revealed some increased interstitial markings and proBNP has increased, but clinically does not appear to be in heart failure.       ROS: Per subjective, all other systems reviewed and were negative.    The patient's relevant PMH, PSH, FH, and SH were reviewed and updated in Epic as appropriate. Allergies and Medications reviewed.    OBJECTIVE     /78   Pulse 72   Temp 98.3 °F (36.8 °C) (Oral)   Resp 20   Ht 185.4 cm (72.99\")   Wt 80.7 kg (177 lb 14.6 oz)   SpO2 91%   BMI 23.48 kg/m²   Oxygen Concentration (%): 40  Flow (L/min): 2    Flowsheet Rows    Flowsheet Row First Filed Value   Admission Height 185.4 cm (73\") Documented at 05/02/2023 1324   Admission Weight 79.4 kg (175 lb) Documented at 05/02/2023 1324        Intake & Output (last day)       05/06 0701  05/07 0700 05/07 0701  05/08 0700    P.O. " 1490 480    I.V. (mL/kg) 542 (6.7) 303.8 (3.8)    Total Intake(mL/kg) 2032 (25.2) 783.8 (9.7)    Urine (mL/kg/hr) 1450 (0.7) 1150 (1.2)    Stool  0    Total Output 1450 1150    Net +582 -366.2          Stool Unmeasured Occurrence  1 x          Exam:  General Exam:  Pleasant elderly white male sitting up in a chair in NAD  HEENT: Pupils equal and reactive. Nose and throat clear.  Neck:                          Supple, no JVD, thyromegaly, or adenopathy  Lungs: Clear to auscultation and percussion anteriorly and posteriorly.  Cardiovascular: Regular rate and rhythm without murmurs or gallops.  Abdomen: Soft nontender without organomegaly or masses.   and rectal: Deferred.  Extremities: No cyanosis clubbing edema.  Neurologic:                 Symmetric strength. No focal deficits.      PA and lateral CXR 5/7/2023: Mild cardiomegaly.  Slight increase in interstitial markings in the right midlung but no consolidative infiltrates or effusions.  Biventricular dual-chamber PPM/ICD in position with the generator over the left upper lateral chest.    INFUSIONS  amiodarone, 1 mg/min, Last Rate: 1 mg/min (05/07/23 1638)        Results from last 7 days   Lab Units 05/07/23  0356 05/06/23 0440 05/05/23 0427   WBC 10*3/mm3 10.68 8.70 9.56   HEMOGLOBIN g/dL 11.9* 11.8* 12.2*   HEMATOCRIT % 36.9* 36.7* 39.1   PLATELETS 10*3/mm3 196 192 189     Results from last 7 days   Lab Units 05/07/23  0356 05/06/23 0440   SODIUM mmol/L 139 138   POTASSIUM mmol/L 4.1 3.9   CHLORIDE mmol/L 104 103   CO2 mmol/L 25.0 27.0   BUN mg/dL 16 13   CREATININE mg/dL 0.82 0.78   GLUCOSE mg/dL 102* 96   CALCIUM mg/dL 8.6 8.7     Results from last 7 days   Lab Units 05/07/23  0356 05/06/23  0440 05/05/23 0427   MAGNESIUM mg/dL 2.1 2.1 1.9   PHOSPHORUS mg/dL 3.8 3.8 3.9     Results from last 7 days   Lab Units 05/03/23  0456 05/02/23  1932 05/02/23  1302   ALK PHOS U/L 69 66 73   BILIRUBIN mg/dL 0.4 0.3 0.5   ALT (SGPT) U/L 44* 42* 43*   AST (SGOT) U/L  40 43* 50*       No results found for: SEDRATE    Lab Results   Component Value Date    PROBNP 2,750.0 (H) 05/07/2023    PROBNP 1,065.0 05/02/2023         Procalitonin Results:          Covid Tests        12/8/2022    17:13   Common Labsle   COVID19 Not Detected        COVID LABS:  Results From Last 14 Days   Lab Units 05/07/23  0356 05/03/23  0456 05/02/23  2258 05/02/23  1932 05/02/23  1642 05/02/23  1302 04/26/23  0944   PROBNP pg/mL 2,750.0*  --   --   --   --  1,065.0 671.4   LACTATE mmol/L  --   --  1.1 3.0*  --   --   --    PROTIME Seconds 13.9  --   --   --   --  16.5*  --    INR  1.06  --   --   --   --  1.32*  --    HSTROP T ng/L  --  38*  --  28* 24* 16* 20*          Lab Results   Component Value Date    CKTOTAL 53 12/08/2022    TROPONINI <0.006 01/16/2017    TROPONINT 38 (H) 05/03/2023     Lab Results   Component Value Date    TSH 5.210 (H) 05/02/2023     Lab Results   Component Value Date    LACTATE 1.1 05/02/2023     No results found for: CORTISOL    Results from last 7 days   Lab Units 05/02/23  1951   PH, ARTERIAL pH units 7.468*   PCO2, ARTERIAL mm Hg 27.5*   PO2 ART mm Hg 125.0*   HCO3 ART mmol/L 19.9*   FIO2 % 80         I reviewed the patient's results, images and medication.    Assessment & Plan   ASSESSMENT        Recurrent monomorphic ventricular tachycardia    Hypokalemia at admission. Resolved    ICD in place    Ischemic cardiomyopathy (EF < 20%)    Chronic HFrEF with LVEF less than 20% (HCC)    Biventricular PPM    COPD     GEO on nocturnal CPAP    Type 2 diabetes mellitus with HbA1c 5.8, without long-term current use of insulin      DISCUSSION: No further discharge from patient's ICD and no evidence of malignant ventricular arrhythmias on monitor and remains on very high dose amiodarone.  Volume status appears adequate but I do note some increased interstitial markings developing on CXR.  This will need to be watched closely while on amiodarone.    PLAN     HCTZ 25 mg  Continue oral  potassium  Watch CXR closely.  If interstitial infiltrates increase but no evidence of decompensated heart failure keep amiodarone pulmonary toxicity in mind  Defer changes and antiarrhythmics to cardiology  Final decision regarding another VT ablation to be made tomorrow by patient and Dr. Victoria  Cardiology to decide when to transfer to telemetry    Plan of care and goals reviewed with multidisciplinary team at daily rounds.    I discussed the patient's findings and my recommendations with patient, family and nursing staff    Patient is critically ill due to recurrent V. tach and the need for high-dose antiarrhythmics as well as a potential requirement for another VT ablation.  Patient has a high risk of life-threatening decline in condition.  They require continuous monitoring and frequent reassessment of condition for adjustment of management in order to lessen risk.  I visited the patient's bedside and interacted with nurse numerous times today.    Time spent Critical care 20 min (It does not include procedure time).    Electronically signed by Brian Mukherjee MD, 05/07/23, 7:22 AM EDT.   Pulmonary / Critical care medicine

## 2023-05-07 NOTE — PROGRESS NOTES
"      Cardiac Electrophysiology Inpatient Follow Up Note         [facility]    Progress Note    INITIAL REASON FOR CONSULT: VT / ICM / ICD / ICD shocks / coronary artery diseaseVT / ICM / ICD / ICD shocks / coronary artery disease    CHIEF COMPLAINT:  Chief Complaint   Patient presents with   • VT / ICM / ICD / ICD shocks / coronary artery disease     VT / ICM / ICD / ICD shocks / coronary artery disease    Subjective   Mr. Jonnie Roth denies vomiting, abdominal pain, fussiness, diarrhea, cough and difficulty breathing.        Objective   VITAL SIGNS  /66   Pulse 70   Temp 98.3 °F (36.8 °C) (Oral)   Resp 16   Ht 185.4 cm (72.99\")   Wt 80.7 kg (177 lb 14.6 oz)   SpO2 94%   BMI 23.48 kg/m²   [unfilled]  Pulse Ox: SpO2  Av %  Min: 90 %  Max: 100 %  Supplemental O2:       Admit Weight  Weight: 79.4 kg (175 lb)  Last 3 Weights  [unfilled]  Body mass index is 23.48 kg/m².    INTAKE/OUTPUT  I/O last 3 completed shifts:  In: 2812 [P.O.:2270; I.V.:542]  Out: 1900 [Urine:1900]    Intake/Output Summary (Last 24 hours) at 2023 1528  Last data filed at 2023 1200  Gross per 24 hour   Intake 1731.96 ml   Output 1500 ml   Net 231.96 ml       PHYSICAL EXAM  General appearance: awake, alert, oriented, moves all extremities  Lungs: no rhonchi, no wheezes, no rales  Heart: RRR  Abdomen: positive bowel sounds, no bruits, no masses  Extremities: warm and dry, no cyanosis, no clubbing    LABS  Results from last 7 days   Lab Units 23  0356 23  0440 23   WBC 10*3/mm3 10.68 8.70 9.56   HEMOGLOBIN g/dL 11.9* 11.8* 12.2*   HEMATOCRIT % 36.9* 36.7* 39.1   MCV fL 90.7 90.4 91.6   PLATELETS 10*3/mm3 196 192 189         Results from last 7 days   Lab Units 23  0356 23  0440 23  042   POTASSIUM mmol/L 4.1 3.9 3.8   CHLORIDE mmol/L 104 103 106   CO2 mmol/L 25.0 27.0 25.0   BUN mg/dL 16 13 13   CREATININE mg/dL 0.82 0.78 0.67*   GLUCOSE " mg/dL 102* 96 103*   CALCIUM mg/dL 8.6 8.7 8.7   MAGNESIUM mg/dL 2.1 2.1 1.9     Results from last 7 days   Lab Units 05/07/23  0356 05/02/23  1302   PROTIME Seconds 13.9 16.5*   INR  1.06 1.32*         Results from last 7 days   Lab Units 05/07/23  0356 05/06/23  0440 05/05/23  0427   MAGNESIUM mg/dL 2.1 2.1 1.9                 No results found for: CHOL, TRIG, HDL    CURRENT MEDICATIONS  amiodarone, 400 mg, Oral, Q8H  carvedilol, 6.25 mg, Oral, BID With Meals  clopidogrel, 75 mg, Oral, Daily  docusate sodium, 100 mg, Oral, BID  eplerenone, 12.5 mg, Oral, Q24H  finasteride, 5 mg, Oral, Daily  heparin (porcine), 5,000 Units, Subcutaneous, Q8H  ipratropium, 2 spray, Each Nare, Q12H  levothyroxine, 25 mcg, Oral, Q AM  mexiletine, 200 mg, Oral, Q8H  pantoprazole, 40 mg, Oral, QAM  polyethylene glycol, 17 g, Oral, Daily  potassium chloride, 20 mEq, Oral, Daily      CONTINUOUS INFUSIONS  amiodarone, 1 mg/min, Last Rate: 1 mg/min (05/07/23 1033)            EKG: noted  ECHO:REVIEWED   STRESS: REVIEWED  CATH: REVIEWED  CXR: noted    TELEMETRY:sinus         Diagnosis Plan   1. Ventricular tachycardia, monomorphic  No further VT     Reload amiodarone and mexiletine     Continue to monitor over the weekend     Patient and family at this point have a strong preference for ( inpatient ) VT ablation.  Long conversation about the risks and benefits of this.  We will re-discuss on Monday with Dr. Alba.     No further ICD therapy             Ambulatory Referral to Physical Therapy Evaluate and treat    Ambulatory Referral to Occupational Therapy   2. History of CHF (congestive heart failure)    Euvolemic. Continue coreg   3. History of coronary artery disease  aspirin     Body mass index is 23.48 kg/m².    I spent 40 minutes in consultation with this patient which included more than 65% of this time in direct face-to-face counseling, physical examination and discussion of my assessment and findings and this shared decision  making with the patient.  The remainder of the time not spent face-to-face was performing one, some or all of the following actions: preparing to see the patient (e.g. reviewing tests, prior clinicians' notes, etc), ordering medications, tests or procedures, coordination of care, discussion of the plan with other healthcare providers, documenting clinical information in epic as well as independently interpreting results and communication of these results to the patient family and/or caregiver(s).  Please note that this explicitly excludes time spent on other separate billable services such as performing procedures or test interpretation, when applicable.        Javad Victoria DO, FACC, RS  Cardiac Electrophysiologist  [facility]

## 2023-05-07 NOTE — PLAN OF CARE
Goal Outcome Evaluation:            VSS. No rhythm change overnight. Amio gtt continues. No c/o pain. Adequate UOP. No BM. Pt rested well.

## 2023-05-08 LAB
ALBUMIN SERPL-MCNC: 3.7 G/DL (ref 3.5–5.2)
ALBUMIN/GLOB SERPL: 1.3 G/DL
ALP SERPL-CCNC: 89 U/L (ref 39–117)
ALT SERPL W P-5'-P-CCNC: 43 U/L (ref 1–41)
ANION GAP SERPL CALCULATED.3IONS-SCNC: 13 MMOL/L (ref 5–15)
AST SERPL-CCNC: 33 U/L (ref 1–40)
BASOPHILS # BLD AUTO: 0.03 10*3/MM3 (ref 0–0.2)
BASOPHILS NFR BLD AUTO: 0.3 % (ref 0–1.5)
BILIRUB SERPL-MCNC: 0.6 MG/DL (ref 0–1.2)
BUN SERPL-MCNC: 15 MG/DL (ref 8–23)
BUN/CREAT SERPL: 20.8 (ref 7–25)
CALCIUM SPEC-SCNC: 8.9 MG/DL (ref 8.6–10.5)
CHLORIDE SERPL-SCNC: 99 MMOL/L (ref 98–107)
CO2 SERPL-SCNC: 24 MMOL/L (ref 22–29)
CREAT SERPL-MCNC: 0.72 MG/DL (ref 0.76–1.27)
DEPRECATED RDW RBC AUTO: 46.3 FL (ref 37–54)
EGFRCR SERPLBLD CKD-EPI 2021: 92.9 ML/MIN/1.73
EOSINOPHIL # BLD AUTO: 0.07 10*3/MM3 (ref 0–0.4)
EOSINOPHIL NFR BLD AUTO: 0.6 % (ref 0.3–6.2)
ERYTHROCYTE [DISTWIDTH] IN BLOOD BY AUTOMATED COUNT: 14.4 % (ref 12.3–15.4)
GLOBULIN UR ELPH-MCNC: 2.8 GM/DL
GLUCOSE SERPL-MCNC: 137 MG/DL (ref 65–99)
HCT VFR BLD AUTO: 38.7 % (ref 37.5–51)
HGB BLD-MCNC: 12.5 G/DL (ref 13–17.7)
IMM GRANULOCYTES # BLD AUTO: 0.05 10*3/MM3 (ref 0–0.05)
IMM GRANULOCYTES NFR BLD AUTO: 0.4 % (ref 0–0.5)
LYMPHOCYTES # BLD AUTO: 0.98 10*3/MM3 (ref 0.7–3.1)
LYMPHOCYTES NFR BLD AUTO: 8.2 % (ref 19.6–45.3)
MAGNESIUM SERPL-MCNC: 2 MG/DL (ref 1.6–2.4)
MCH RBC QN AUTO: 28.8 PG (ref 26.6–33)
MCHC RBC AUTO-ENTMCNC: 32.3 G/DL (ref 31.5–35.7)
MCV RBC AUTO: 89.2 FL (ref 79–97)
MONOCYTES # BLD AUTO: 0.98 10*3/MM3 (ref 0.1–0.9)
MONOCYTES NFR BLD AUTO: 8.2 % (ref 5–12)
NEUTROPHILS NFR BLD AUTO: 82.3 % (ref 42.7–76)
NEUTROPHILS NFR BLD AUTO: 9.79 10*3/MM3 (ref 1.7–7)
NRBC BLD AUTO-RTO: 0 /100 WBC (ref 0–0.2)
PHOSPHATE SERPL-MCNC: 3.8 MG/DL (ref 2.5–4.5)
PLATELET # BLD AUTO: 212 10*3/MM3 (ref 140–450)
PMV BLD AUTO: 10.6 FL (ref 6–12)
POTASSIUM SERPL-SCNC: 3.6 MMOL/L (ref 3.5–5.2)
PROCALCITONIN SERPL-MCNC: 0.1 NG/ML (ref 0–0.25)
PROT SERPL-MCNC: 6.5 G/DL (ref 6–8.5)
QT INTERVAL: 552 MS
QT INTERVAL: 714 MS
QTC INTERVAL: 604 MS
QTC INTERVAL: 771 MS
RBC # BLD AUTO: 4.34 10*6/MM3 (ref 4.14–5.8)
SODIUM SERPL-SCNC: 136 MMOL/L (ref 136–145)
WBC NRBC COR # BLD: 11.9 10*3/MM3 (ref 3.4–10.8)

## 2023-05-08 PROCEDURE — 97116 GAIT TRAINING THERAPY: CPT

## 2023-05-08 PROCEDURE — 94799 UNLISTED PULMONARY SVC/PX: CPT

## 2023-05-08 PROCEDURE — 83735 ASSAY OF MAGNESIUM: CPT | Performed by: INTERNAL MEDICINE

## 2023-05-08 PROCEDURE — 80053 COMPREHEN METABOLIC PANEL: CPT | Performed by: INTERNAL MEDICINE

## 2023-05-08 PROCEDURE — 99232 SBSQ HOSP IP/OBS MODERATE 35: CPT | Performed by: INTERNAL MEDICINE

## 2023-05-08 PROCEDURE — 84100 ASSAY OF PHOSPHORUS: CPT | Performed by: INTERNAL MEDICINE

## 2023-05-08 PROCEDURE — 25010000002 HEPARIN (PORCINE) PER 1000 UNITS: Performed by: INTERNAL MEDICINE

## 2023-05-08 PROCEDURE — 25010000002 AMIODARONE IN DEXTROSE 5% 360-4.14 MG/200ML-% SOLUTION: Performed by: INTERNAL MEDICINE

## 2023-05-08 PROCEDURE — 84145 PROCALCITONIN (PCT): CPT | Performed by: INTERNAL MEDICINE

## 2023-05-08 PROCEDURE — 85025 COMPLETE CBC W/AUTO DIFF WBC: CPT | Performed by: INTERNAL MEDICINE

## 2023-05-08 RX ORDER — TORSEMIDE 10 MG/1
10 TABLET ORAL DAILY
Status: DISCONTINUED | OUTPATIENT
Start: 2023-05-09 | End: 2023-05-09 | Stop reason: HOSPADM

## 2023-05-08 RX ORDER — POTASSIUM CHLORIDE 750 MG/1
40 CAPSULE, EXTENDED RELEASE ORAL EVERY 4 HOURS
Status: COMPLETED | OUTPATIENT
Start: 2023-05-08 | End: 2023-05-08

## 2023-05-08 RX ORDER — POTASSIUM CHLORIDE 750 MG/1
20 CAPSULE, EXTENDED RELEASE ORAL DAILY
Status: DISCONTINUED | OUTPATIENT
Start: 2023-05-08 | End: 2023-05-09 | Stop reason: HOSPADM

## 2023-05-08 RX ADMIN — LEVOTHYROXINE SODIUM 25 MCG: 25 TABLET ORAL at 06:48

## 2023-05-08 RX ADMIN — AMIODARONE HYDROCHLORIDE 400 MG: 200 TABLET ORAL at 21:00

## 2023-05-08 RX ADMIN — CARVEDILOL 6.25 MG: 6.25 TABLET, FILM COATED ORAL at 08:03

## 2023-05-08 RX ADMIN — PANTOPRAZOLE SODIUM 40 MG: 40 TABLET, DELAYED RELEASE ORAL at 06:48

## 2023-05-08 RX ADMIN — MEXILETINE HYDROCHLORIDE 200 MG: 200 CAPSULE ORAL at 06:49

## 2023-05-08 RX ADMIN — HEPARIN SODIUM 5000 UNITS: 5000 INJECTION INTRAVENOUS; SUBCUTANEOUS at 13:30

## 2023-05-08 RX ADMIN — POLYETHYLENE GLYCOL 3350 17 G: 17 POWDER, FOR SOLUTION ORAL at 08:02

## 2023-05-08 RX ADMIN — IPRATROPIUM BROMIDE 2 SPRAY: 21 SPRAY, METERED NASAL at 11:26

## 2023-05-08 RX ADMIN — POTASSIUM CHLORIDE 40 MEQ: 750 CAPSULE, EXTENDED RELEASE ORAL at 06:49

## 2023-05-08 RX ADMIN — AMIODARONE HYDROCHLORIDE 1 MG/MIN: 1.8 INJECTION, SOLUTION INTRAVENOUS at 04:47

## 2023-05-08 RX ADMIN — DOCUSATE SODIUM 100 MG: 100 CAPSULE, LIQUID FILLED ORAL at 08:03

## 2023-05-08 RX ADMIN — FINASTERIDE 5 MG: 5 TABLET, FILM COATED ORAL at 08:03

## 2023-05-08 RX ADMIN — MEXILETINE HYDROCHLORIDE 200 MG: 200 CAPSULE ORAL at 21:00

## 2023-05-08 RX ADMIN — AMIODARONE HYDROCHLORIDE 400 MG: 200 TABLET ORAL at 13:30

## 2023-05-08 RX ADMIN — MEXILETINE HYDROCHLORIDE 200 MG: 200 CAPSULE ORAL at 13:30

## 2023-05-08 RX ADMIN — AMIODARONE HYDROCHLORIDE 400 MG: 200 TABLET ORAL at 06:48

## 2023-05-08 RX ADMIN — CARVEDILOL 6.25 MG: 6.25 TABLET, FILM COATED ORAL at 17:27

## 2023-05-08 RX ADMIN — EPLERENONE 12.5 MG: 25 TABLET, FILM COATED ORAL at 08:03

## 2023-05-08 RX ADMIN — POTASSIUM CHLORIDE 40 MEQ: 750 CAPSULE, EXTENDED RELEASE ORAL at 11:26

## 2023-05-08 RX ADMIN — Medication 10 MG: at 21:00

## 2023-05-08 RX ADMIN — CLOPIDOGREL BISULFATE 75 MG: 75 TABLET ORAL at 08:03

## 2023-05-08 RX ADMIN — HEPARIN SODIUM 5000 UNITS: 5000 INJECTION INTRAVENOUS; SUBCUTANEOUS at 21:00

## 2023-05-08 RX ADMIN — POTASSIUM CHLORIDE 20 MEQ: 10 CAPSULE, COATED, EXTENDED RELEASE ORAL at 17:27

## 2023-05-08 RX ADMIN — HEPARIN SODIUM 5000 UNITS: 5000 INJECTION INTRAVENOUS; SUBCUTANEOUS at 06:49

## 2023-05-08 NOTE — PLAN OF CARE
Goal Outcome Evaluation:         A&O x4. A/v paced. Overnight pt had episode of dyspnea requiring increased o2 support. APRN notified. Chest x-ray obtained. Diuretics given. >2.5L UOP. Lungs CTA. Pt currently reports no SOA. Amio gtt continues. 1 large BM. Awaiting AM labs. Pt rested poorly.

## 2023-05-08 NOTE — CASE MANAGEMENT/SOCIAL WORK
Continued Stay Note  [facility]     Patient Name: Jonnie Roth  MRN: [id number]  Today's Date: 5/8/2023    Admit Date: 5/2/2023    Plan: Home with [facility]   Discharge Plan     Row Name 05/08/23 0831       Plan    Plan Home with [facility]    Patient/Family in Agreement with Plan yes    Plan Comments Spoke with patient at bedside. Plan is home with [facility]. Patient requested [facility]. CM will continue to follow.    Final Discharge Disposition Code 06 - home with home health care               Discharge Codes    No documentation.               Expected Discharge Date and Time     Expected Discharge Date Expected Discharge Time    May 8, 2023             Davis Maciel RN

## 2023-05-08 NOTE — PROGRESS NOTES
INTENSIVIST / PULMONARY FOLLOW UP NOTE     Hospital:  LOS: 5 days   Mr. Jonnie Roth, 78 y.o. male is followed for:     Recurrent monomorphic ventricular tachycardia    COPD     GEO on nocturnal CPAP    ICD in place    Type 2 diabetes mellitus with HbA1c 5.8, without long-term current use of insulin    Ischemic cardiomyopathy (EF < 20%)    Chronic HFrEF with LVEF less than 20% (HCC)    Hypokalemia at admission. Resolved    Biventricular PPM          SUBJECTIVE   Walking halls this morning    The patient's relevant past medical, surgical, family, and social history were reviewed    Allergies and medications were reviewed    ROS:    Per subjective, all other systems were reviewed and were negative        OBJECTIVE     Vital Sign Min/Max for last 24 hours:  Temp  Min: 97.7 °F (36.5 °C)  Max: 98.7 °F (37.1 °C)   BP  Min: 102/65  Max: 137/86   Pulse  Min: 69  Max: 82   Resp  Min: 16  Max: 26   SpO2  Min: 90 %  Max: 99 %   No data recorded     Physical Exam:  General Appearance:  No acute distress  Eyes:  No scleral icterus or pallor, pupils normal  Ears, Nose, Mouth, Throat:  Atraumatic, oropharynx clear  Neck:  Trachea midline, thyroid normal  Respiratory:  Clear to auscultation bilaterally, normal effort  Cardiovascular:  Regular rate and rhythm, no murmurs, no peripheral edema  Gastrointestinal:  Soft, non-tender, non-distended, no hepatosplenomegaly  Skin:  Normal temperature, no rash  Psychiatric:  No agitation  Neuro:  No new focal neurologic deficits observed    Telemetry:              Hemodynamics:   CVP:     PAP:     PAOP:     CO:     CI:     SVI:     SVR:       SpO2: 91 % SpO2  Min: 90 %  Max: 99 %   Device:      Flow Rate:   No data recorded     Mechanical Ventilator Settings:                                         Intake/Ouptut 24 hrs (7:00AM - 6:59 AM)  Intake & Output (last 3 days)       05/05 0701  05/06 0700 05/06 0701  05/07 0700 05/07 0701  05/08 0700 05/08 0701  05/09 0700    P.O. 780 1490 1240  240    I.V. (mL/kg) 50 (0.6) 542 (6.7) 708 (8.8)     Total Intake(mL/kg) 830 (10.3) 2032 (25.2) 1948 (24.1) 240 (3)    Urine (mL/kg/hr) 975 (0.5) 1450 (0.7) 3725 (1.9) 825 (1.3)    Stool   0     Total Output 975 1450 3725 825    Net -145 +582 -1777 -585            Urine Unmeasured Occurrence   1 x     Stool Unmeasured Occurrence   2 x           Lines, Drains & Airways     Active LDAs     Name Placement date Placement time Site Days    Peripheral IV 05/07/23 1945 Left;Posterior;Medial Hand 05/07/23 1945  Hand  less than 1    External Urinary Catheter 05/08/23  0000  --  less than 1                Hematology:  Results from last 7 days   Lab Units 05/08/23 0446 05/07/23  0356 05/06/23  0440 05/05/23  0427 05/04/23  0315 05/03/23 0456 05/02/23 1932   WBC 10*3/mm3 11.90* 10.68 8.70 9.56 10.21 9.44 8.91   HEMOGLOBIN g/dL 12.5* 11.9* 11.8* 12.2* 12.9* 13.0 12.3*   HEMATOCRIT % 38.7 36.9* 36.7* 39.1 40.3 40.9 40.3   PLATELETS 10*3/mm3 212 196 192 189 220 220 214     Electrolytes, Magnesium and Phosphorus:  Results from last 7 days   Lab Units 05/08/23 0446 05/07/23  0356 05/06/23  0440 05/05/23  0427 05/04/23  0315 05/03/23  1801 05/03/23  0456 05/02/23  1932   SODIUM mmol/L 136 139 138 140 145  --  140 140   CHLORIDE mmol/L 99 104 103 106 112*  --  105 107   POTASSIUM mmol/L 3.6 4.1 3.9 3.8 4.1 3.9 3.5 3.7   CO2 mmol/L 24.0 25.0 27.0 25.0 22.0  --  22.0 20.0*   MAGNESIUM mg/dL 2.0 2.1 2.1 1.9 2.0  --  2.1 1.9   PHOSPHORUS mg/dL 3.8 3.8 3.8 3.9 3.6  --  3.8 3.7     Renal:  Results from last 7 days   Lab Units 05/08/23  0446 05/07/23  0356 05/06/23  0440 05/05/23  0427 05/04/23  0315 05/03/23 0456 05/02/23  1932   CREATININE mg/dL 0.72* 0.82 0.78 0.67* 0.63* 0.68* 0.77   BUN mg/dL 15 16 13 13 12 14 19     Estimated Creatinine Clearance: 95 mL/min (A) (by C-G formula based on SCr of 0.72 mg/dL (L)).  Hepatic:  Results from last 7 days   Lab Units 05/08/23  0446 05/03/23  0456 05/02/23  1932 05/02/23  1302   ALK PHOS U/L  89 69 66 73   BILIRUBIN mg/dL 0.6 0.4 0.3 0.5   ALT (SGPT) U/L 43* 44* 42* 43*   AST (SGOT) U/L 33 40 43* 50*     Arterial Blood Gases:  Results from last 7 days   Lab Units 05/02/23  1951   PH, ARTERIAL pH units 7.468*   PCO2, ARTERIAL mm Hg 27.5*   PO2 ART mm Hg 125.0*   FIO2 % 80       Results from last 7 days   Lab Units 05/06/23  0440   HEMOGLOBIN A1C % 5.80*       Lab Results   Component Value Date    LACTATE 1.1 05/02/2023       Relevant imaging studies and labs from 05/08/23 were reviewed    Medications (drips):       amiodarone, 400 mg, Oral, Q8H  carvedilol, 6.25 mg, Oral, BID With Meals  clopidogrel, 75 mg, Oral, Daily  docusate sodium, 100 mg, Oral, BID  eplerenone, 12.5 mg, Oral, Q24H  finasteride, 5 mg, Oral, Daily  heparin (porcine), 5,000 Units, Subcutaneous, Q8H  ipratropium, 2 spray, Each Nare, Q12H  levothyroxine, 25 mcg, Oral, Q AM  mexiletine, 200 mg, Oral, Q8H  pantoprazole, 40 mg, Oral, QAM  polyethylene glycol, 17 g, Oral, Daily  potassium chloride, 20 mEq, Oral, Daily        •  bisacodyl  •  bisacodyl  •  Calcium Replacement - Follow Nurse / BPA Driven Protocol  •  Magnesium Standard Dose Replacement - Follow Nurse / BPA Driven Protocol  •  melatonin  •  Phosphorus Replacement - Follow Nurse / BPA Driven Protocol  •  Potassium Replacement - Follow Nurse / BPA Driven Protocol    Assessment & Plan   IMPRESSION / PLAN     Inpatient Problem List:  78 y.o.male:  Active Hospital Problems    Diagnosis    • **Recurrent monomorphic ventricular tachycardia    • Biventricular PPM    • Hypokalemia at admission. Resolved    • Chronic HFrEF with LVEF less than 20% (HCC)    • Ischemic cardiomyopathy (EF < 20%)    • COPD     • GEO on nocturnal CPAP    • ICD in place    • Type 2 diabetes mellitus with HbA1c 5.8, without long-term current use of insulin         Hospital Course:    78 y.o.male with relevant PMH of cardiac arrest s/p primary ventricular fibrillation s/p pacemaker 2000, ischemic heart disease,  HLD, tobacco use with COPD, Left bundle branch block, pulmonary embolism, hypothyroidism, GEO with CPAP use, and recent admission  to hospital from 4/26 - 4/27 with recurrent VT and ICD shock x 2 at home. During that admission he was initiated on amidarone drip per protocol and transitioned to PO Amiodarone 200 mg TID, Mexiletine 200 mg BID.    He was re-admitted 5/2/2023 w/ AICD firing secondary to VT.  Had an episode on the floor and briefly had chest compressions (estimated to be < 10-20 seconds).  Transferred to ICU for further management.    Impression:    Remains on amio gtt    Plan:    Recurrent VT  ICM EF < 20% w/ AoC SHF  AICD  Prolonged Qtc  COPD  GEO  T2DM    Medical management and possible VT ablation per Cardiology for recurrent VT.  Watch for amio toxicity.    Diuresis as needed for decompensated SHF     Cut down levothyroxine from 50 to 25, does he even need this? Will defer to PCP to f/u TSH / FT4 in a few weeks.    BiPAP hs and prn    D/c stool softeners    Replace lytes prn    Has had issues with IV's infiltrating - if he is to continue IV amio get PICC    Resume / continue appropriate home meds    SCDs / PPI    Nutrition  Dietary Orders (From admission, onward)     Start     Ordered    05/03/23 0803  Diet: Cardiac Diets; Healthy Heart (2-3 Na+); Texture: Regular Texture (IDDSI 7); Fluid Consistency: Thin (IDDSI 0)  Diet Effective Now        References:    Diet Order Crosswalk   Question Answer Comment   Diets: Cardiac Diets    Cardiac Diet: Healthy Heart (2-3 Na+)    Texture: Regular Texture (IDDSI 7)    Fluid Consistency: Thin (IDDSI 0)        05/03/23 0802                Plan of care and goals reviewed with multidisciplinary team at daily rounds    Remains high risk       Maico Cain MD  Intensive Care Medicine  05/08/23 14:38 EDT

## 2023-05-08 NOTE — NURSING NOTE
"                             Wound, Ostomy and Continence (WOC) Note    Reason for WOC Consultation: \"Right upper arm and left lower arm limb assessment-former IV site red and hard, tender to touch\"     Patient awake.  Family/support person at bedside.     Skin/Wound Assessment:     Skin around patient's former IV sites are erythemic and indurated.  Does not feel warm.  Unclear what was infusing through these IVs or when it might have infiltrated?  2 days ago?    There are no open wounds to manage by WOC.  In this situation if there was a concern for IV infiltration, pharmacy should be contacted to determine the best possible solution to resolve the infiltration. Antidote vs compression vs warm compress vs cold compress?              Summary and Recommendation(s):     -Contact pharmacy to determine if there is an antidote or recommendation for infiltration if suspected.  -If erythema and induration does not improve, would consider ID consult to evaluate  - Specialty support surface in place: Isolibrium       Pressure Injury Prevention Measures (initiate for a Jose Scale Score of <18):     Most recent Jose Scale score:  Sensory Perception: 3-->slightly limited  Moisture: 4-->rarely moist  Activity: 3-->walks occasionally  Mobility: 3-->slightly limited  Nutrition: 3-->adequate  Friction and Shear: 3-->no apparent problem  Jose Score: 19 (05/07/23 2000)     -Turn q 2 hr. using an offloading foam wedge, keep heels elevated and offloaded with offloading heel boots.    -Raise knee-gatch before elevating HOB to reduce shearing   -Follow C.A.R.E protocol if medical devices (Bipap, camagro, Ng tube, etc) are being used.  -Apply moisture barrier cream to bottom BID & PRN, if incontinent.  -Clean skin gently with no-rinse PH-balanced foam cleanser and a soft, disposable cloth (barrier wipes-blue pack).   -Reduce layers under patient (one sheet as drawsheet and two incontinence pads)    Thank you for consulting the WOC " Nurse.  WOC Team will sign off.  Please re consult if the wound(s) worsens.     Dung Boyd RN, BSN, CCRN, CWOCN  Wound, Ostomy and Continence (WOC) Department  [facility]

## 2023-05-08 NOTE — THERAPY TREATMENT NOTE
Patient Name: Jonnie Roth  : 1945    MRN: [id number]                              Today's Date: 2023       Admit Date: 2023    Visit Dx:     ICD-10-CM ICD-9-CM   1. Ventricular tachycardia, monomorphic  I47.29 427.1   2. History of CHF (congestive heart failure)  Z86.79 V12.59   3. History of coronary artery disease  Z86.79 V12.59     Patient Active Problem List   Diagnosis   • Coronary artery disease involving native coronary artery of native heart with angina pectoris   • Chronic systolic congestive heart failure   • BPH (benign prostatic hypertrophy)   • LBBB (left bundle branch block)   • Pulmonary emphysema (HCC)   • Hyperlipidemia LDL goal <70   • Recurrent monomorphic ventricular tachycardia   • Post-traumatic subdural hematoma   • COPD    • Pulmonary nodule (Stable)   • Bilateral renal cysts   • GEO on nocturnal CPAP   • ICD in place   • Hypothyroidism (acquired)   • GERD without esophagitis   • Type 2 diabetes mellitus with HbA1c 5.8, without long-term current use of insulin   • Ischemic cardiomyopathy (EF < 20%)   • H/O PE ()   • ICD (implantable cardioverter-defibrillator) discharge   • Chronic HFrEF with LVEF less than 20% (HCC)   • Hypokalemia at admission. Resolved   • Ventricular tachycardia, monomorphic   • Biventricular PPM     Past Medical History:   Diagnosis Date   • Arthritis    • BPH (benign prostatic hypertrophy) 2016   • CAD (coronary artery disease)    • Disease of thyroid gland    • Enlarged prostate    • HFrEF (heart failure with reduced ejection fraction)    • History of transfusion     NO REACTION RECALLED    • Hypertension    • LBBB (left bundle branch block) 2016   • Lung nodule    • Macular degeneration    • Pancreatic cyst 2022   • Polio     as a child   • Pulmonary embolism    • Pulmonary emphysema 2016   • Sepsis     A. Secondary to [place] trauma with hematuria and urosepsis requiring hospitalization May 2015   • Sleep apnea with  use of continuous positive airway pressure (CPAP)     CPAP- SETTING 4    • Ventricular tachycardia 12/28/2016   • Wears glasses     READERS     Past Surgical History:   Procedure Laterality Date   • BIVENTRICULLAR IMPLANTABLE CARDIOVERTER DEFIBRILLATOR PLACEMENT     • CARDIAC CATHETERIZATION N/A 1/16/2017    Procedure: Left Heart Cath;  Surgeon: Diego Ayala MD;  Location:  [facility];  Service:    • CARDIAC DEFIBRILLATOR PLACEMENT       A. ICD generator change out with upgrade to BiV pacemaker implantable cardioverter    defibrillator, 12/17/2007. Ventricular fibrillation s/p pacesetter [place] ICD in [place]. [place] 09/2000   • CARDIAC ELECTROPHYSIOLOGY PROCEDURE N/A 10/2/2017    Procedure: BIV ICD  generator change S[place];  Surgeon: Ferdinand Alba MD;  Location:  [facility];  Service:    • CARDIAC ELECTROPHYSIOLOGY PROCEDURE N/A 7/6/2021    Procedure: BIV ICD generator change with [employer]. This will need to be done in late June or early July. Hold Xarelto one day prior.;  Surgeon: Ferdinand Alba MD;  Location:  [facility];  Service: Cardiology;  Laterality: N/A;   • CATARACT EXTRACTION      Bilateral    • COLONOSCOPY     • CORONARY ANGIOPLASTY WITH STENT PLACEMENT      X7 STENTS TOTAL    • CYSTOSCOPY URETERAL TUMOR FULGURATION  05/29/2015     A. Status post cystoscopy with clot evacuation and fulguration of the prostate secondary to hematuria, 5/29/2015.   • FOOT SURGERY      RIGHT - POLIO RELATED    • PROSTATE SURGERY      A. Status post laser vaporization, 08/19/2009.   • ROOT CANAL     • TOOTH EXTRACTION        General Information     Row Name 05/08/23 1444          Physical Therapy Time and Intention    Document Type therapy note (daily note)  -AY     Mode of Treatment physical therapy  -AY     Row Name 05/08/23 1444          General Information    Patient Profile Reviewed yes  -AY     Existing Precautions/Restrictions fall  RLE weakness/incoordination  (residual from polio)  -AY     Barriers to Rehab medically complex  -AY     Row Name 05/08/23 1444          Cognition    Orientation Status (Cognition) oriented x 3  -AY     Row Name 05/08/23 1444          Safety Issues, Functional Mobility    Impairments Affecting Function (Mobility) balance;endurance/activity tolerance;shortness of breath;strength  -AY           User Key  (r) = Recorded By, (t) = Taken By, (c) = Cosigned By    Initials Name Provider Type    Letty Kraft PT Physical Therapist               Mobility     Row Name 05/08/23 1445          Sit-Stand Transfer    Sit-Stand [place] (Transfers) contact guard;verbal cues  -AY     Assistive Device (Sit-Stand Transfers) walker, front-wheeled  -AY     Row Name 05/08/23 1445          Gait/Stairs (Locomotion)    [place] Level (Gait) moderate assist (50% patient effort);1 person assist  -AY     Distance in Feet (Gait) 350  -AY     Deviations/Abnormal Patterns (Gait) delgado decreased;festinating/shuffling;gait speed decreased;stride length decreased;weight shifting decreased;base of support, narrow  -AY     Bilateral Gait Deviations heel strike decreased;forward flexed posture  -AY     Right Sided Gait Deviations foot drop/toe drag  -AY     Comment, (Gait/Stairs) pt ambulated 100ft intially with RWx and SBA. Attempted ambulation without AD, and pt required min A initially and progressed to mod A with fatigue. Cueing required throughout for posture, increased width FERNANDO, heel strike, and quad activation d/t mild R knee buckling. Improved gait mechanics noted, but continues to demonstrate mild R foot drop and knee buckling. Gait distance limited by fatigue.  -AY           User Key  (r) = Recorded By, (t) = Taken By, (c) = Cosigned By    Initials Name Provider Type    Letty Kraft PT Physical Therapist               Obj/Interventions     Row Name 05/08/23 1447          Motor Skills    Therapeutic Exercise knee  -AY     Row Name 05/08/23 1446           Knee (Therapeutic Exercise)    Knee (Therapeutic Exercise) isometric exercises  -AY     Knee Isometrics (Therapeutic Exercise) right;quad sets;standing;10 repetitions  -AY     Row Name 05/08/23 1447          Balance    Balance Assessment sitting static balance;sitting dynamic balance;sit to stand dynamic balance;standing static balance;standing dynamic balance  -AY     Static Sitting Balance independent  -AY     Dynamic Sitting Balance standby assist  -AY     Position, Sitting Balance unsupported;sitting in chair  -AY     Sit to Stand Dynamic Balance contact guard  -AY     Static Standing Balance contact guard  -AY     Dynamic Standing Balance moderate assist  -AY     Position/Device Used, Standing Balance unsupported  -AY           User Key  (r) = Recorded By, (t) = Taken By, (c) = Cosigned By    Initials Name Provider Type    AY Letty Rojas, PT Physical Therapist               Goals/Plan    No documentation.                Clinical Impression     Row Name 05/08/23 1447          Pain    Pretreatment Pain Rating 0/10 - no pain  -AY     Posttreatment Pain Rating 0/10 - no pain  -AY     Pain Intervention(s) Ambulation/increased activity;Repositioned  -AY     Additional Documentation Pain Scale: Numbers Pre/Post-Treatment (Group)  -AY     Row Name 05/08/23 1447          Plan of Care Review    Plan of Care Reviewed With patient;spouse;friend  -AY     Progress no change  -AY     Outcome Evaluation Pt ambulated 100ft with RWx and SBA and ambulated 250ft without AD with min A progressing to mod A, limited by R knee buckling and mild foot drop. Educated about quad activation to prevent knee buckling with stance. Continue to progress as able with endurance activities and RLE strenghtening. d/c rec for home with assist and OP PT.  -AY     Row Name 05/08/23 1447          Vital Signs    Pre Systolic BP Rehab 125  VSS  -AY     Pre Treatment Diastolic BP 87  -AY     Post Systolic BP Rehab --  left with pt talking with MD and  RN  -AY     Pretreatment Heart Rate (beats/min) 79  -AY     Posttreatment Heart Rate (beats/min) 88  -AY     Pre SpO2 (%) 94  -AY     O2 Delivery Pre Treatment room air  -AY     O2 Delivery Intra Treatment room air  -AY     Post SpO2 (%) 96  -AY     O2 Delivery Post Treatment room air  -AY     Pre Patient Position Sitting  -AY     Intra Patient Position Standing  -AY     Post Patient Position Sitting  -AY     Row Name 05/08/23 1447          Positioning and Restraints    Pre-Treatment Position sitting in chair/recliner  -AY     Post Treatment Position chair  -AY     In Chair notified nsg;reclined;sitting;call light within reach;encouraged to call for assist;exit alarm on;with family/caregiver;LUE elevated;RUE elevated;legs elevated;with nsg;with other staff;waffle cushion  -AY           User Key  (r) = Recorded By, (t) = Taken By, (c) = Cosigned By    Initials Name Provider Type    Letty Kraft PT Physical Therapist               Outcome Measures     Row Name 05/08/23 1449          How much help from another person do you currently need...    Turning from your back to your side while in flat bed without using bedrails? 4  -AY     Moving from lying on back to sitting on the side of a flat bed without bedrails? 4  -AY     Moving to and from a bed to a chair (including a wheelchair)? 4  -AY     Standing up from a chair using your arms (e.g., wheelchair, bedside chair)? 3  -AY     Climbing 3-5 steps with a railing? 3  -AY     To walk in hospital room? 3  -AY     AM-PAC 6 Clicks Score (PT) 21  -AY     Highest level of mobility 6 --> Walked 10 steps or more  -AY     Row Name 05/08/23 1449          Functional Assessment    Outcome Measure Options AM-PAC 6 Clicks Basic Mobility (PT)  -AY           User Key  (r) = Recorded By, (t) = Taken By, (c) = Cosigned By    Initials Name Provider Type    Letty Kraft PT Physical Therapist                             Physical Therapy Education     Title: PT OT SLP Therapies (In  Progress)     Topic: Physical Therapy (Done)     Point: Mobility training (Done)     Learning Progress Summary           Patient Acceptance, E,TB, VU,NR by AY at 5/8/2023 1450    Acceptance, E, VU by EL at 5/4/2023 1518    Acceptance, E,TB, VU,NR by AY at 5/4/2023 1423   Family Acceptance, E,TB, VU,NR by AY at 5/8/2023 1450    Acceptance, E, VU by EL at 5/4/2023 1518                   Point: Home exercise program (Done)     Learning Progress Summary           Patient Acceptance, E,TB, VU,NR by AY at 5/8/2023 1450    Acceptance, E, VU by EL at 5/4/2023 1518   Family Acceptance, E,TB, VU,NR by AY at 5/8/2023 1450    Acceptance, E, VU by EL at 5/4/2023 1518                   Point: Body mechanics (Done)     Learning Progress Summary           Patient Acceptance, E,TB, VU,NR by AY at 5/8/2023 1450    Acceptance, E, VU by EL at 5/4/2023 1518    Acceptance, E,TB, VU,NR by AY at 5/4/2023 1423   Family Acceptance, E,TB, VU,NR by AY at 5/8/2023 1450    Acceptance, E, VU by EL at 5/4/2023 1518                   Point: Precautions (Done)     Learning Progress Summary           Patient Acceptance, E,TB, VU,NR by AY at 5/8/2023 1450    Acceptance, E, VU by EL at 5/4/2023 1518    Acceptance, E,TB, VU,NR by AY at 5/4/2023 1423   Family Acceptance, E,TB, VU,NR by AY at 5/8/2023 1450    Acceptance, E, VU by EL at 5/4/2023 1518                               User Key     Initials Effective Dates Name Provider Type Discipline     06/16/21 -  Shauna Flowers RN Registered Nurse Nurse     11/10/20 -  Letty Rojas, KONG Physical Therapist PT              PT Recommendation and Plan  Planned Therapy Interventions (PT): balance training, bed mobility training, gait training, home exercise program, postural re-education, transfer training, patient/family education, strengthening, stair training, neuromuscular re-education  Plan of Care Reviewed With: patient, spouse, friend  Progress: no change  Outcome Evaluation: Pt ambulated 100ft with  RWx and SBA and ambulated 250ft without AD with min A progressing to mod A, limited by R knee buckling and mild foot drop. Educated about quad activation to prevent knee buckling with stance. Continue to progress as able with endurance activities and RLE strenghtening. d/c rec for home with assist and OP PT.     Time Calculation:    PT Charges     Row Name 05/08/23 1450             Time Calculation    Start Time 1035  -AY      PT Received On 05/08/23  -AY         Timed Charges    77084 - Gait Training Minutes  24  -AY         Total Minutes    Timed Charges Total Minutes 24  -AY       Total Minutes 24  -AY            User Key  (r) = Recorded By, (t) = Taken By, (c) = Cosigned By    Initials Name Provider Type    AY Letty Rojas, KONG Physical Therapist              Therapy Charges for Today     Code Description Service Date Service Provider Modifiers Qty    88173345413 HC GAIT TRAINING EA 15 MIN 5/8/2023 Letty Rojas, PT GP 2          PT G-Codes  Outcome Measure Options: AM-PAC 6 Clicks Basic Mobility (PT)  AM-PAC 6 Clicks Score (PT): 21  AM-PAC 6 Clicks Score (OT): 19  PT Discharge Summary  Anticipated Discharge Disposition (PT): home with assist, home with outpatient therapy services    Letty Rojas PT  5/8/2023

## 2023-05-08 NOTE — PLAN OF CARE
Goal Outcome Evaluation:  Plan of Care Reviewed With: patient, spouse, friend        Progress: no change  Outcome Evaluation: Pt ambulated 100ft with RWx and SBA and ambulated 250ft without AD with min A progressing to mod A, limited by R knee buckling and mild foot drop. Educated about quad activation to prevent knee buckling with stance. Continue to progress as able with endurance activities and RLE strenghtening. d/c rec for home with assist and OP PT.

## 2023-05-08 NOTE — PROGRESS NOTES
[facility] at [facility]  Progress Note       LOS: 5 days   Patient Care Team:  Brian Lewis MD as PCP - General (Internal Medicine)  Ferdinand Alba MD as Consulting Physician (Cardiac Electrophysiology)  Viet Manzano PA as Physician Assistant (Cardiology)  Michele Flanagan IV, MD as Consulting Physician (Interventional Cardiology)    Chief Complaint:  Follow up VT    Subjective   No further ventricular ectopy over the weekend. Patient had an episode of SOB last night that required increase in O2. Chest xray revealed moderate interstitial edema, increased from prior exams. Lasix 40mg IV given last night and he has had 3L UOP. Denies SOB this morning and on room air. Denies dizziness, CP.  No new issues at this time.      Review of Systems:   Pertinent positives in HPI, all others reviewed and negative.      Objective       Current Facility-Administered Medications:   •  amiodarone (PACERONE) tablet 400 mg, 400 mg, Oral, Q8H, Javad Victoria DO, 400 mg at 05/08/23 0648  •  amiodarone 360 mg in 200 mL D5W infusion, 1 mg/min, Intravenous, Continuous, Javad Victoria DO, Last Rate: 33.3 mL/hr at 05/08/23 0447, 1 mg/min at 05/08/23 0447  •  bisacodyl (DULCOLAX) EC tablet 10 mg, 10 mg, Oral, Daily PRN, Brian Mukherjee MD  •  bisacodyl (DULCOLAX) suppository 10 mg, 10 mg, Rectal, Daily PRN, Brian Mukherjee MD  •  Calcium Replacement - Follow Nurse / BPA Driven Protocol, , Does not apply, PRN, Merary Rojas, APRN  •  carvedilol (COREG) tablet 6.25 mg, 6.25 mg, Oral, BID With Meals, Mary Hills, APRN, 6.25 mg at 05/08/23 0803  •  clopidogrel (PLAVIX) tablet 75 mg, 75 mg, Oral, Daily, Shauna Byrne PA, 75 mg at 05/08/23 0803  •  docusate sodium (COLACE) capsule 100 mg, 100 mg, Oral, BID, Brian Mukherjee MD, 100 mg at 05/08/23 0803  •  eplerenone (INSPRA) tablet 12.5 mg, 12.5 mg, Oral, Q24H, Shauna Byrne PA, 12.5 mg at 05/08/23 0803  •  finasteride  "(PROSCAR) tablet 5 mg, 5 mg, Oral, Daily, Shauna Byrne PA, 5 mg at 05/08/23 0803  •  heparin (porcine) 5000 UNIT/ML injection 5,000 Units, 5,000 Units, Subcutaneous, Q8H, Brian Mukherjee MD, 5,000 Units at 05/08/23 0649  •  ipratropium (ATROVENT) nasal spray 0.03%, 2 spray, Each Nare, Q12H, Boris Willett MD, 2 spray at 05/07/23 1801  •  levothyroxine (SYNTHROID, LEVOTHROID) tablet 25 mcg, 25 mcg, Oral, Q AM, Maico Cain MD, 25 mcg at 05/08/23 0648  •  Magnesium Standard Dose Replacement - Follow Nurse / BPA Driven Protocol, , Does not apply, PRN, Merary Rojas, APRN  •  melatonin tablet 10 mg, 10 mg, Oral, Nightly PRN, Shauna Byrne PA, 10 mg at 05/06/23 2226  •  mexiletine (MEXITIL) capsule 200 mg, 200 mg, Oral, Q8H, Merary Rojas APRN, 200 mg at 05/08/23 0649  •  pantoprazole (PROTONIX) EC tablet 40 mg, 40 mg, Oral, QAM, Shauna Byrne PA, 40 mg at 05/08/23 0648  •  Phosphorus Replacement - Follow Nurse / BPA Driven Protocol, , Does not apply, PRN, Merary Rojas, APRN  •  polyethylene glycol (MIRALAX) packet 17 g, 17 g, Oral, Daily, Shauna Byrne PA, 17 g at 05/08/23 0802  •  potassium chloride (MICRO-K) CR capsule 20 mEq, 20 mEq, Oral, Daily, Brian Mukherjee MD  •  potassium chloride (MICRO-K) CR capsule 40 mEq, 40 mEq, Oral, Q4H, Melecoi Caban, APRN, 40 mEq at 05/08/23 0649  •  Potassium Replacement - Follow Nurse / BPA Driven Protocol, , Does not apply, PRN, Ferdinand Alba MD    Vital Sign Min/Max for last 24 hours  Temp  Min: 97.9 °F (36.6 °C)  Max: 98.7 °F (37.1 °C)   BP  Min: 104/63  Max: 137/86   Pulse  Min: 69  Max: 73   Resp  Min: 16  Max: 26   SpO2  Min: 91 %  Max: 99 %   Flow (L/min)  Min: 2  Max: 6   No data recorded     Flowsheet Rows    Flowsheet Row First Filed Value   Admission Height 185.4 cm (73\") Documented at 05/02/2023 1324   Admission Weight 79.4 kg (175 lb) Documented at 05/02/2023 1324            Intake/Output Summary (Last 24 hours) at " 2023 0907  Last data filed at 2023 0500  Gross per 24 hour   Intake 1588 ml   Output 3625 ml   Net -7 ml       Physical Exam:     General Appearance:    Alert, cooperative, in no acute distress   Lungs:    Decreased breath sounds bilaterally.  Respiratory effort fair    Heart:   Regular rhythm normal S1-S2.  S3 and S4 present..   Chest Wall:    No abnormalities observed   Abdomen:     Normal bowel sounds, benign examination.   Extremities:  Moves all extremities well, no edema, no cyanosis, no            redness              Pulses:   Pulses palpable and equal bilaterally   Skin:   No bleeding, bruising or rash        Results Review:   Results from last 7 days   Lab Units 23  0446 23  0356 23  0440   WBC 10*3/mm3 11.90* 10.68 8.70   HEMOGLOBIN g/dL 12.5* 11.9* 11.8*   HEMATOCRIT % 38.7 36.9* 36.7*   PLATELETS 10*3/mm3 212 196 192     Results from last 7 days   Lab Units 23  0446 23  0356 23  0440   SODIUM mmol/L 136 139 138   POTASSIUM mmol/L 3.6 4.1 3.9   CHLORIDE mmol/L 99 104 103   CO2 mmol/L 24.0 25.0 27.0   BUN mg/dL 15 16 13   CREATININE mg/dL 0.72* 0.82 0.78   GLUCOSE mg/dL 137* 102* 96      Results from last 7 days   Lab Units 23  0440   HEMOGLOBIN A1C % 5.80*         Results from last 7 days   Lab Units 23  1302   TSH uIU/mL 5.210*   FREE T4 ng/dL 2.11*     Results from last 7 days   Lab Units 23  0356   PROBNP pg/mL 2,750.0*     Results from last 7 days   Lab Units 23  0356 23  1302   PROTIME Seconds 13.9 16.5*   INR  1.06 1.32*   APTT seconds 25.8*  --      Results from last 7 days   Lab Units 23  0456 23  1932 23  1642   HSTROP T ng/L 38* 28* 24*       Intake/Output Summary (Last 24 hours) at 2023 0907  Last data filed at 2023 0500  Gross per 24 hour   Intake 1588 ml   Output 3625 ml   Net -203 ml       I personally viewed and interpreted the patient's EKG/Telemetry data    EK23: AV paced, HR 70  bpm,  ms, QTc 426 ms when corrected for wide QRS    Telemetry: V paced, HR 69-73 bpm    Ejection Fraction  No results found for: EF    Echo EF Estimated  Lab Results   Component Value Date    ECHOEFEST 18 10/14/2022     Echo 5/2/23:   The left ventricle is dilated.  Left ventricular systolic function is severely decreased. Left ventricular ejection fraction appears to be less than 20%.  The anterior and anterior lateral segments and apex are akinetic.  •  The left atrial cavity is dilated.  •  Left atrial volume is moderately increased.  •  Mild to moderate much regurgitation is present.  •  Estimated right ventricular systolic pressure from tricuspid regurgitation is normal (<35 mmHg).    Chest xray 5/8/23:   Exam: Frontal chest  INDICATION: Dyspnea  COMPARISON: May 03, 2023  FINDINGS:  There are diffuse bilateral infiltrates slightly increased from the prior exam.  No effusions.  Heart size is normal.  No mediastinal widening.  Pacemaker in satisfactory position.  Bones are intact.  Normal upper abdomen.  IMPRESSION:  Findings are suggestive of moderate interstitial edema, increased from prior exams.    Present on Admission:  • Recurrent monomorphic ventricular tachycardia  • COPD   • GEO on nocturnal CPAP  • ICD in place  • Type 2 diabetes mellitus with HbA1c 5.8, without long-term current use of insulin  • Ischemic cardiomyopathy (EF < 20%)  • Chronic HFrEF with LVEF less than 20% (HCC)  • Hypokalemia at admission. Resolved  • Biventricular PPM    Assessment & Plan   1.  Recurrent VT:  -Recurrent ventricular tachycardia and ICD shocks since September 2022.  He was just discharged from [facility] last week with VT and started on amiodarone 200 mg 3 times daily, mexiletine 200 mg twice daily.  -No recurrence of VT over the weekend. Patient was reinitiated on IV amiodarone Saturday. Will stop IV amiodarone now. Continue oral amiodarone 400mg TID and Mexiletine 200mg TID. Likely discharge home  tomorrow with plans for possible VT ablation outpatient.      2.  Ischemic cardiomyopathy/severe LV dysfunction/HFrEF:  - Most recent echo 10/16/2022: EF less than 20%, LV wall segments hypokinetic, mid anterior, basal anterolateral, mid anterolateral, basal inferior lateral, mid inferior lateral, apical inferior, mid inferior, basal inferior septal, mid inferior septalmid anteroseptal, basal anterior, basal inferior and basal inferoseptal. The following left ventricular wall segments are dyskinetic: apical anterior, apical lateral, apical septal and apex.The left ventricular cavity is severely dilated. Mild mitral regurgitation is present  -Overall compensated from a heart failure standpoint, chest x-ray with no acute cardiopulmonary process  -Echo 5/2/2023: LV dilated, LV systolic function severely decreased, LVEF appears less than 20%, anterior and anterior lateral segments and apex are akinetic, LA cavity dilated, LA volume moderately increased, mild to moderate regurgitation present  -On Coreg, Jardiance, Inspra, Entresto at home  -Episode of hypoxia overnight, chest xray revealed moderate interstitial edema, increased from prior exams. Lasix 40mg IV given last night and he has had 3L UOP. Denies SOB this morning and on room air now.  Restart home torsemide.  Monitor potassium closely       3. DM:  - sliding scale insulin and accu checks      Electronically signed by CHENTE Umanzor, 05/08/23, 9:12 AM EDT.      I, Ferdinand Alba MD, personally performed the services face to face as described and documented by the above named individual. I have made any necessary edits and it is both accurate and complete 5/8/2023  18:00 EDT

## 2023-05-09 ENCOUNTER — READMISSION MANAGEMENT (OUTPATIENT)
Dept: CALL CENTER | Facility: HOSPITAL | Age: 78
End: 2023-05-09
Payer: MEDICARE

## 2023-05-09 VITALS
DIASTOLIC BLOOD PRESSURE: 66 MMHG | RESPIRATION RATE: 18 BRPM | BODY MASS INDEX: 21.88 KG/M2 | HEIGHT: 73 IN | SYSTOLIC BLOOD PRESSURE: 108 MMHG | OXYGEN SATURATION: 93 % | WEIGHT: 165.12 LBS | TEMPERATURE: 97.5 F | HEART RATE: 70 BPM

## 2023-05-09 LAB
ALBUMIN SERPL-MCNC: 3.8 G/DL (ref 3.5–5.2)
ALBUMIN/GLOB SERPL: 1.3 G/DL
ALP SERPL-CCNC: 90 U/L (ref 39–117)
ALT SERPL W P-5'-P-CCNC: 71 U/L (ref 1–41)
ANION GAP SERPL CALCULATED.3IONS-SCNC: 13 MMOL/L (ref 5–15)
AST SERPL-CCNC: 67 U/L (ref 1–40)
BASOPHILS # BLD AUTO: 0.05 10*3/MM3 (ref 0–0.2)
BASOPHILS NFR BLD AUTO: 0.5 % (ref 0–1.5)
BILIRUB SERPL-MCNC: 0.8 MG/DL (ref 0–1.2)
BUN SERPL-MCNC: 18 MG/DL (ref 8–23)
BUN/CREAT SERPL: 22.2 (ref 7–25)
CALCIUM SPEC-SCNC: 9.3 MG/DL (ref 8.6–10.5)
CHLORIDE SERPL-SCNC: 102 MMOL/L (ref 98–107)
CO2 SERPL-SCNC: 27 MMOL/L (ref 22–29)
CREAT SERPL-MCNC: 0.81 MG/DL (ref 0.76–1.27)
DEPRECATED RDW RBC AUTO: 47.9 FL (ref 37–54)
EGFRCR SERPLBLD CKD-EPI 2021: 89.7 ML/MIN/1.73
EOSINOPHIL # BLD AUTO: 0.38 10*3/MM3 (ref 0–0.4)
EOSINOPHIL NFR BLD AUTO: 4.1 % (ref 0.3–6.2)
ERYTHROCYTE [DISTWIDTH] IN BLOOD BY AUTOMATED COUNT: 14.3 % (ref 12.3–15.4)
GLOBULIN UR ELPH-MCNC: 2.9 GM/DL
GLUCOSE SERPL-MCNC: 100 MG/DL (ref 65–99)
HCT VFR BLD AUTO: 39.9 % (ref 37.5–51)
HGB BLD-MCNC: 12.7 G/DL (ref 13–17.7)
IMM GRANULOCYTES # BLD AUTO: 0.06 10*3/MM3 (ref 0–0.05)
IMM GRANULOCYTES NFR BLD AUTO: 0.6 % (ref 0–0.5)
LYMPHOCYTES # BLD AUTO: 1.63 10*3/MM3 (ref 0.7–3.1)
LYMPHOCYTES NFR BLD AUTO: 17.6 % (ref 19.6–45.3)
MAGNESIUM SERPL-MCNC: 2.1 MG/DL (ref 1.6–2.4)
MCH RBC QN AUTO: 28.9 PG (ref 26.6–33)
MCHC RBC AUTO-ENTMCNC: 31.8 G/DL (ref 31.5–35.7)
MCV RBC AUTO: 90.7 FL (ref 79–97)
MONOCYTES # BLD AUTO: 0.86 10*3/MM3 (ref 0.1–0.9)
MONOCYTES NFR BLD AUTO: 9.3 % (ref 5–12)
NEUTROPHILS NFR BLD AUTO: 6.27 10*3/MM3 (ref 1.7–7)
NEUTROPHILS NFR BLD AUTO: 67.9 % (ref 42.7–76)
NRBC BLD AUTO-RTO: 0 /100 WBC (ref 0–0.2)
PHOSPHATE SERPL-MCNC: 4.5 MG/DL (ref 2.5–4.5)
PLATELET # BLD AUTO: 222 10*3/MM3 (ref 140–450)
PMV BLD AUTO: 10.3 FL (ref 6–12)
POTASSIUM SERPL-SCNC: 4.6 MMOL/L (ref 3.5–5.2)
PROT SERPL-MCNC: 6.7 G/DL (ref 6–8.5)
RBC # BLD AUTO: 4.4 10*6/MM3 (ref 4.14–5.8)
SODIUM SERPL-SCNC: 142 MMOL/L (ref 136–145)
WBC NRBC COR # BLD: 9.25 10*3/MM3 (ref 3.4–10.8)

## 2023-05-09 PROCEDURE — 84100 ASSAY OF PHOSPHORUS: CPT | Performed by: INTERNAL MEDICINE

## 2023-05-09 PROCEDURE — 85025 COMPLETE CBC W/AUTO DIFF WBC: CPT | Performed by: INTERNAL MEDICINE

## 2023-05-09 PROCEDURE — 83735 ASSAY OF MAGNESIUM: CPT | Performed by: INTERNAL MEDICINE

## 2023-05-09 PROCEDURE — 94799 UNLISTED PULMONARY SVC/PX: CPT

## 2023-05-09 PROCEDURE — 25010000002 HEPARIN (PORCINE) PER 1000 UNITS: Performed by: INTERNAL MEDICINE

## 2023-05-09 PROCEDURE — 80053 COMPREHEN METABOLIC PANEL: CPT | Performed by: INTERNAL MEDICINE

## 2023-05-09 PROCEDURE — 99238 HOSP IP/OBS DSCHRG MGMT 30/<: CPT | Performed by: INTERNAL MEDICINE

## 2023-05-09 PROCEDURE — 94660 CPAP INITIATION&MGMT: CPT

## 2023-05-09 RX ORDER — CARVEDILOL 6.25 MG/1
6.25 TABLET ORAL 2 TIMES DAILY WITH MEALS
Qty: 60 TABLET | Refills: 6 | Status: SHIPPED | OUTPATIENT
Start: 2023-05-09

## 2023-05-09 RX ORDER — AMIODARONE HYDROCHLORIDE 200 MG/1
200 TABLET ORAL 3 TIMES DAILY
Qty: 60 TABLET | Refills: 6 | Status: SHIPPED | OUTPATIENT
Start: 2023-05-09

## 2023-05-09 RX ORDER — POTASSIUM CHLORIDE 750 MG/1
20 CAPSULE, EXTENDED RELEASE ORAL DAILY
Qty: 60 CAPSULE | Refills: 6 | Status: SHIPPED | OUTPATIENT
Start: 2023-05-09

## 2023-05-09 RX ORDER — LEVOTHYROXINE SODIUM 0.03 MG/1
50 TABLET ORAL EVERY MORNING
Qty: 30 TABLET | Refills: 6 | Status: SHIPPED | OUTPATIENT
Start: 2023-05-09 | End: 2023-05-09 | Stop reason: SDUPTHER

## 2023-05-09 RX ORDER — TORSEMIDE 10 MG/1
10 TABLET ORAL DAILY
Qty: 30 TABLET | Refills: 6 | Status: SHIPPED | OUTPATIENT
Start: 2023-05-09

## 2023-05-09 RX ORDER — LEVOTHYROXINE SODIUM 0.03 MG/1
25 TABLET ORAL EVERY MORNING
Qty: 30 TABLET | Refills: 6 | Status: SHIPPED | OUTPATIENT
Start: 2023-05-09

## 2023-05-09 RX ORDER — MEXILETINE HYDROCHLORIDE 200 MG/1
200 CAPSULE ORAL 3 TIMES DAILY
Qty: 90 CAPSULE | Refills: 6 | Status: SHIPPED | OUTPATIENT
Start: 2023-05-09

## 2023-05-09 RX ADMIN — POTASSIUM CHLORIDE 20 MEQ: 10 CAPSULE, COATED, EXTENDED RELEASE ORAL at 08:40

## 2023-05-09 RX ADMIN — LEVOTHYROXINE SODIUM 25 MCG: 25 TABLET ORAL at 05:04

## 2023-05-09 RX ADMIN — PANTOPRAZOLE SODIUM 40 MG: 40 TABLET, DELAYED RELEASE ORAL at 06:23

## 2023-05-09 RX ADMIN — FINASTERIDE 5 MG: 5 TABLET, FILM COATED ORAL at 08:40

## 2023-05-09 RX ADMIN — CARVEDILOL 6.25 MG: 6.25 TABLET, FILM COATED ORAL at 08:40

## 2023-05-09 RX ADMIN — AMIODARONE HYDROCHLORIDE 400 MG: 200 TABLET ORAL at 05:03

## 2023-05-09 RX ADMIN — HEPARIN SODIUM 5000 UNITS: 5000 INJECTION INTRAVENOUS; SUBCUTANEOUS at 05:03

## 2023-05-09 RX ADMIN — CLOPIDOGREL BISULFATE 75 MG: 75 TABLET ORAL at 08:40

## 2023-05-09 RX ADMIN — TORSEMIDE 10 MG: 10 TABLET ORAL at 08:40

## 2023-05-09 RX ADMIN — EPLERENONE 12.5 MG: 25 TABLET, FILM COATED ORAL at 08:40

## 2023-05-09 RX ADMIN — MEXILETINE HYDROCHLORIDE 200 MG: 200 CAPSULE ORAL at 05:03

## 2023-05-09 NOTE — DISCHARGE PLACEMENT REQUEST
" [phone]    Margot Roth \"Ilda\" (78 y.o. Male)     Date of Birth   1945    Social Security Number       Address   [address] [place] [address]    Home Phone   [phone]    MRN   [id number]       [place]   Orthodox    Marital Status                               Admission Date   5/2/23    Admission Type   Emergency    Admitting Provider   Ferdinand Alba MD    Attending Provider   Ferdinand Alba MD    Department, Room/Bed   [facility] 2A ICU, N209/1       Discharge Date       Discharge Disposition   Home or Self Care    Discharge Destination                               Attending Provider: Ferdinand Alba MD    Allergies: No Known Allergies    Isolation: None   Infection: None   Code Status: CPR    Ht: 185.4 cm (72.99\")   Wt: 74.9 kg (165 lb 2 oz)    Admission Cmt: None   Principal Problem: Recurrent monomorphic ventricular tachycardia [I47.20]                 Active Insurance as of 5/2/2023     Primary Coverage     Payor Plan Insurance Group Employer/Plan Group    MEDICARE MEDICARE A & B      Payor Plan Address Payor Plan Phone Number Payor Plan Fax Number Effective Dates    [address] [phone]  4/1/2001 - None Entered    [facility] [id number]       Subscriber Name Subscriber Birth Date Member ID       MARGOT ROTH 1945 [id number]           Secondary Coverage     Payor Plan Insurance Group Employer/Plan Group    CIG[employer] CIGNA MULTIPLAN [id number]     Payor Plan Address Payor Plan Phone Number Payor Plan Fax Number Effective Dates    [address] [phone]  1/1/2002 - None Entered    [facility] [id number]       Subscriber Name Subscriber Birth Date Member ID       MARGOT ROTH 1945 [id number]                 Emergency Contacts      (Rel.) Home Phone Work Phone Mobile Phone    Trena Roth (Spouse) [phone] -- [phone]    Aleksander Roth (Son) -- -- [phone]    " Kathryn Pierce (Daughter) -- -- [phone]         [facility] 2A ICU  [address] [facility] [address]  Phone:  [phone]  Fax:  [phone] Date: May 9, 2023      Ambulatory Referral to Home Health     Patient:  Jonnie Roth MRN:  [id number]   [address] [address] :  1945  SSN:    Phone: [phone] Sex:  M      INSURANCE PAYOR PLAN GROUP # SUBSCRIBER ID   Primary:  Secondary:    MEDICARE  CIG [id number]  [id number]    [id number]  [id number]      Referring Provider Information:  MARTELL RESENDIZ Phone: [phone] Fax: [phone]       Referral Information:   # Visits:  999 Referral Type: Home Health [42]   Urgency:  Routine Referral Reason: Specialty Services Required   Start Date: May 9, 2023 End Date:  To be determined by Insurer   Diagnosis: Ventricular tachycardia, monomorphic (I47.29 [ICD-10-CM] 427.1 [ICD-9-CM])  Chronic systolic congestive heart failure (I50.22 [ICD-10-CM] 428.22,428.0 [ICD-9-CM])  Chronic obstructive pulmonary disease, unspecified COPD type (J44.9 [ICD-10-CM] 496 [ICD-9-CM])      Refer to Dept:   Refer to Provider:   Refer to Provider Phone:   Refer to Facility:       Face to Face Visit Date: 2023  Follow-up provider for Plan of Care? I treated the patient in an acute care facility and will not continue treatment after discharge.  Follow-up provider: MARCOS TEE [[id number]]  Reason/Clinical Findings: limited by R knee buckling and mild foot drop. Educated about quad activation to prevent knee buckling with stance. Continue to progress as able with endurance activities and RLE strenghtening  Describe mobility limitations that make leaving home difficult: Impaired Functional Mobility, Gait, Balance, and Endurance  Nursing/Therapeutic Services Requested: Physical Therapy  Nursing/Therapeutic Services Requested: Occupational Therapy  PT orders: Therapeutic exercise  PT orders: Gait Training  PT  orders: Transfer training  PT orders: Strengthening  PT orders: Home safety assessment  Weight Bearing Status: As Tolerated  Occupational orders: Activities of daily living  Occupational orders: Energy conservation  Occupational orders: Strengthening  Occupational orders: Home safety assessment  Frequency: 1 Week 1     This document serves as a request of services and does not constitute Insurance authorization or approval of services.  To determine eligibility, please contact the members Insurance carrier to verify and review coverage.     If you have medical questions regarding this request for services. Please contact [address] ICU at [phone] during normal business hours.        Authorizing Provider:Ferdinand Alba MD  Authorizing Provider's NPI: [id number]  Order Entered By: Rosana Hartman RN 5/9/2023 10:11 AM     Electronically signed by: Ferdinand Alba MD 5/9/2023 10:11 AM              Physical Therapy Notes (last 7 days)      Letty Rojas PT at 05/04/23 1300  Version 2 of 2       Goal Outcome Evaluation:  Plan of Care Reviewed With: patient, spouse        Progress: improving  Outcome Evaluation: PT initial eval completed. Pt limited by decreased functional endurance, generalized weakness, balance deficit, and residual RLE deficits. Pt ambulated 100ft wiht SBA and RWx and 250ft without AD and with CGA. Stair navigation performed with min A. Rec continued skilled PT to increase indep with mobility. d/c rec for home with assist and OP PT. Pt owns necessary equipment. Rec R AFO trial next session.    Electronically signed by Letty Rojas PT at 05/04/23 3232     Letty Rojas PT at 05/04/23 1300  Version 1 of 2       Goal Outcome Evaluation:  Plan of Care Reviewed With: patient, spouse        Progress: improving  Outcome Evaluation: PT initial eval completed. Pt limited by decreased functional endurance, generalized weakness, balance deficit, and residual RLE deficits. Pt  ambulated 100ft wiht SBA and RWx and 250ft without AD and with CGA. Stair navigation performed with min A. Rec continued skilled PT to increase indep with mobility. d/c rec for home with assist and OP PT. Pt owns necessary equipment.    Electronically signed by Letty Rojas, PT at 23 1423     Letty Rojas, PT at 23 1300  Version 1 of 1         Patient Name: Jonnie Roth  : 1945    MRN: [id number]                              Today's Date: 2023       Admit Date: 2023    Visit Dx:     ICD-10-CM ICD-9-CM   1. Ventricular tachycardia, monomorphic  I47.29 427.1   2. History of CHF (congestive heart failure)  Z86.79 V12.59   3. History of coronary artery disease  Z86.79 V12.59     Patient Active Problem List   Diagnosis   • Coronary artery disease involving native coronary artery of native heart with angina pectoris   • Chronic systolic congestive heart failure   • BPH (benign prostatic hypertrophy)   • LBBB (left bundle branch block)   • Pulmonary emphysema (HCC)   • Hyperlipidemia LDL goal <70   • Ventricular tachycardia   • Post-traumatic subdural hematoma   • COPD (chronic obstructive pulmonary disease)   • Pulmonary nodule (Stable)   • Bilateral renal cysts   • GEO (obstructive sleep apnea)   • ICD (implantable cardioverter-defibrillator) in place   • Hypothyroidism (acquired)   • GERD without esophagitis   • Type 2 diabetes mellitus, without long-term current use of insulin   • Ischemic cardiomyopathy (EF < 20%)   • H/O PE ()   • ICD (implantable cardioverter-defibrillator) discharge   • Chronic HFrEF (heart failure with reduced ejection fraction) (HCC)   • Hypokalemia   • Ventricular tachycardia, monomorphic     Past Medical History:   Diagnosis Date   • Arthritis    • BPH (benign prostatic hypertrophy) 2016   • CAD (coronary artery disease)    • Disease of thyroid gland    • Enlarged prostate    • HFrEF (heart failure with reduced ejection fraction)    • History of  transfusion     NO REACTION RECALLED    • Hypertension    • LBBB (left bundle branch block) 12/28/2016   • Lung nodule    • Macular degeneration    • Pancreatic cyst 12/08/2022   • Polio     as a child   • Pulmonary embolism    • Pulmonary emphysema 12/28/2016   • Sepsis     A. Secondary to [place] trauma with hematuria and urosepsis requiring hospitalization May 2015   • Sleep apnea with use of continuous positive airway pressure (CPAP)     CPAP- SETTING 4    • Ventricular tachycardia 12/28/2016   • Wears glasses     READERS     Past Surgical History:   Procedure Laterality Date   • BIVENTRICULLAR IMPLANTABLE CARDIOVERTER DEFIBRILLATOR PLACEMENT     • CARDIAC CATHETERIZATION N/A 1/16/2017    Procedure: Left Heart Cath;  Surgeon: Diego Ayala MD;  Location:  [facility];  Service:    • CARDIAC DEFIBRILLATOR PLACEMENT       A. ICD generator change out with upgrade to BiV pacemaker implantable cardioverter    defibrillator, 12/17/2007. Ventricular fibrillation s/p pacesetter Wilson ICD in [place]. [place] 09/2000   • CARDIAC ELECTROPHYSIOLOGY PROCEDURE N/A 10/2/2017    Procedure: BIV ICD  generator change [facility];  Surgeon: Ferdinand Alba MD;  Location:  [facility];  Service:    • CARDIAC ELECTROPHYSIOLOGY PROCEDURE N/A 7/6/2021    Procedure: BIV ICD generator change with [employer]. This will need to be done in late June or early July. Hold Xarelto one day prior.;  Surgeon: Ferdinand Alba MD;  Location:  [facility];  Service: Cardiology;  Laterality: N/A;   • CATARACT EXTRACTION      Bilateral    • COLONOSCOPY     • CORONARY ANGIOPLASTY WITH STENT PLACEMENT      X7 STENTS TOTAL    • CYSTOSCOPY URETERAL TUMOR FULGURATION  05/29/2015     A. Status post cystoscopy with clot evacuation and fulguration of the prostate secondary to hematuria, 5/29/2015.   • FOOT SURGERY      RIGHT - POLIO RELATED    • PROSTATE SURGERY      A. Status post laser vaporization, 08/19/2009.   • ROOT  CANAL     • TOOTH EXTRACTION        General Information     Row Name 05/04/23 1350          Physical Therapy Time and Intention    Document Type evaluation  -AY     Mode of Treatment physical therapy  -AY     Row Name 05/04/23 1350          General Information    Patient Profile Reviewed yes  -AY     Prior Level of Function independent:;all household mobility;community mobility;gait;transfer;bed mobility;using stairs  amb with SPC. Owns rollator and RWx  -AY     Existing Precautions/Restrictions fall  -AY     Barriers to Rehab medically complex  -AY     Row Name 05/04/23 1350          Living Environment    People in Home spouse  -AY     Row Name 05/04/23 1350          Home Main Entrance    Number of Stairs, Main Entrance one  -AY     Stair Railings, Main Entrance none  -AY     Row Name 05/04/23 1350          Stairs Within Home, Primary    Stairs, Within Home, Primary Pt reports 16 steps to 2nd level master bedroom & bathroom, pt reports there is a bedroom & bathroom he can stay in on main floor if needed  -AY     Number of Stairs, Within Home, Primary other (see comments)  -AY     Stair Railings, Within Home, Primary railing on left side (ascending)  -AY     Row Name 05/04/23 1350          Cognition    Orientation Status (Cognition) oriented x 3  -AY     Row Name 05/04/23 1350          Safety Issues, Functional Mobility    Impairments Affecting Function (Mobility) balance;endurance/activity tolerance;shortness of breath;strength  -AY           User Key  (r) = Recorded By, (t) = Taken By, (c) = Cosigned By    Initials Name Provider Type    AY Letty Rojas, PT Physical Therapist               Mobility     Row Name 05/04/23 1352          Sit-Stand Transfer    Sit-Stand Williamson (Transfers) contact guard;verbal cues  -AY     Assistive Device (Sit-Stand Transfers) walker, front-wheeled  -AY     Row Name 05/04/23 1352          Gait/Stairs (Locomotion)    Williamson Level (Gait) standby assist  -AY     Assistive  Device (Gait) walker, front-wheeled  -AY     Distance in Feet (Gait) 350  -AY     Deviations/Abnormal Patterns (Gait) delgado decreased;festinating/shuffling;gait speed decreased;stride length decreased;weight shifting decreased;base of support, narrow  -AY     Bilateral Gait Deviations heel strike decreased;forward flexed posture  -AY     Right Sided Gait Deviations foot drop/toe drag  -AY     [place] Level (Stairs) contact guard  -AY     Handrail Location (Stairs) left side (ascending);right side (descending)  -AY     Number of Steps (Stairs) 6  -AY     Ascending Technique (Stairs) step-over-step  -AY     Descending Technique (Stairs) step-over-step  -AY     Comment, (Gait/Stairs) pt demonstrated step through gait pattern with decreased delgado and flexed posture. R drop foot noted, with progressive severity with continued ambulation. cueing for posture, increased stride length, and heel strike. Gait distance limited by fatigue. Pt/family educated about safety and correct sequencing with stair navigation, with further mobility deferred d/t IV length. pt educated to continue ambulation with RWx due to balance deficits. Pt ambulated last 200ft without AD with CGA. Pt required SBA when ambulating with RWx. Rec R AFO trial next session d/t drop foot. Pt interested upon education.  -AY           User Key  (r) = Recorded By, (t) = Taken By, (c) = Cosigned By    Initials Name Provider Type    AY Letty Rojas, KONG Physical Therapist               Obj/Interventions     Row Name 05/04/23 1411          Range of Motion Comprehensive    General Range of Motion bilateral lower extremity ROM WFL  -AY     Row Name 05/04/23 1411          Strength Comprehensive (MMT)    General Manual Muscle Testing (MMT) Assessment lower extremity strength deficits identified  -AY     Comment, General Manual Muscle Testing (MMT) Assessment RLE grossly 4-/5. LLE grossly 4+/5  -AY     Row Name 05/04/23 1411          Balance    Balance  Assessment sitting static balance;sitting dynamic balance;sit to stand dynamic balance;standing static balance;standing dynamic balance  -AY     Static Sitting Balance standby assist  -AY     Dynamic Sitting Balance contact guard  -AY     Position, Sitting Balance unsupported;sitting in chair  -AY     Sit to Stand Dynamic Balance contact guard  -AY     Static Standing Balance contact guard  -AY     Dynamic Standing Balance contact guard  -AY     Position/Device Used, Standing Balance supported;walker, rolling  -AY     Row Name 05/04/23 1411          Sensory Assessment (Somatosensory)    Sensory Assessment (Somatosensory) LE sensation intact  -AY           User Key  (r) = Recorded By, (t) = Taken By, (c) = Cosigned By    Initials Name Provider Type    AY Letty Rojas, PT Physical Therapist               Goals/Plan     Row Name 05/04/23 1422          Bed Mobility Goal 1 (PT)    Activity/Assistive Device (Bed Mobility Goal 1, PT) sit to supine/supine to sit  -AY     Jbphh Level/Cues Needed (Bed Mobility Goal 1, PT) independent  -AY     Time Frame (Bed Mobility Goal 1, PT) long term goal (LTG);10 days  -AY     Progress/Outcomes (Bed Mobility Goal 1, PT) goal ongoing  -AY     Row Name 05/04/23 1422          Transfer Goal 1 (PT)    Activity/Assistive Device (Transfer Goal 1, PT) sit-to-stand/stand-to-sit  -AY     Jbphh Level/Cues Needed (Transfer Goal 1, PT) independent  -AY     Time Frame (Transfer Goal 1, PT) long term goal (LTG);10 days  -AY     Progress/Outcome (Transfer Goal 1, PT) goal ongoing  -AY     Row Name 05/04/23 1422          Gait Training Goal 1 (PT)    Activity/Assistive Device (Gait Training Goal 1, PT) gait (walking locomotion);cane, straight  -AY     Jbphh Level (Gait Training Goal 1, PT) modified independence  -AY     Distance (Gait Training Goal 1, PT) 500  -AY     Time Frame (Gait Training Goal 1, PT) long term goal (LTG);10 days  -AY     Progress/Outcome (Gait Training Goal 1,  PT) goal ongoing  -AY     Row Name 05/04/23 1422          Stairs Goal 1 (PT)    Activity/Assistive Device (Stairs Goal 1, PT) ascending stairs;descending stairs;using handrail, left;cane, straight  -AY     [place] Level/Cues Needed (Stairs Goal 1, PT) modified independence  -AY     Number of Stairs (Stairs Goal 1, PT) 16  -AY     Time Frame (Stairs Goal 1, PT) long term goal (LTG);10 days  -AY     Progress/Outcome (Stairs Goal 1, PT) goal ongoing  -AY     Row Name 05/04/23 1422          Therapy Assessment/Plan (PT)    Planned Therapy Interventions (PT) balance training;bed mobility training;gait training;home exercise program;postural re-education;transfer training;patient/family education;strengthening;stair training;neuromuscular re-education  -AY           User Key  (r) = Recorded By, (t) = Taken By, (c) = Cosigned By    Initials Name Provider Type    AY Letty Rojas, PT Physical Therapist               Clinical Impression     Row Name 05/04/23 1412          Pain    Pretreatment Pain Rating 0/10 - no pain  -AY     Posttreatment Pain Rating 0/10 - no pain  -AY     Pain Intervention(s) Ambulation/increased activity;Repositioned  -AY     Additional Documentation Pain Scale: Numbers Pre/Post-Treatment (Group)  -AY     Row Name 05/04/23 1412          Plan of Care Review    Plan of Care Reviewed With patient;spouse  -AY     Progress improving  -AY     Outcome Evaluation PT initial eval completed. Pt limited by decreased functional endurance, generalized weakness, balance deficit, and residual RLE deficits. Pt ambulated 100ft wiht SBA and RWx and 250ft without AD and with CGA. Stair navigation performed with min A. Rec continued skilled PT to increase indep with mobility. d/c rec for home with assist and OP PT. Pt owns necessary equipment. Rec R AFO trial next session.  -AY     Row Name 05/04/23 1412          Therapy Assessment/Plan (PT)    Rehab Potential (PT) fair, will monitor progress closely  -AY     Criteria  for Skilled Interventions Met (PT) yes;meets criteria;skilled treatment is necessary  -AY     Therapy Frequency (PT) daily  -AY     Row Name 05/04/23 1412          Vital Signs    Pre Systolic BP Rehab 131  -AY     Pre Treatment Diastolic BP 70  -AY     Post Systolic BP Rehab 122  -AY     Post Treatment Diastolic BP 76  -AY     Pretreatment Heart Rate (beats/min) 70  -AY     Posttreatment Heart Rate (beats/min) 80  -AY     Pre SpO2 (%) 94  -AY     O2 Delivery Pre Treatment room air  -AY     O2 Delivery Intra Treatment room air  -AY     Post SpO2 (%) 95  -AY     O2 Delivery Post Treatment room air  -AY     Pre Patient Position Sitting  -AY     Intra Patient Position Standing  -AY     Post Patient Position Sitting  -AY     Row Name 05/04/23 1412          Positioning and Restraints    Pre-Treatment Position sitting in chair/recliner  -AY     Post Treatment Position chair  -AY     In Chair notified nsg;reclined;sitting;call light within reach;encouraged to call for assist;exit alarm on;with family/caregiver;waffle cushion;legs elevated  -AY           User Key  (r) = Recorded By, (t) = Taken By, (c) = Cosigned By    Initials Name Provider Type    Letty Kraft, PT Physical Therapist               Outcome Measures     Row Name 05/04/23 1422          How much help from another person do you currently need...    Turning from your back to your side while in flat bed without using bedrails? 4  -AY     Moving from lying on back to sitting on the side of a flat bed without bedrails? 3  -AY     Moving to and from a bed to a chair (including a wheelchair)? 3  -AY     Standing up from a chair using your arms (e.g., wheelchair, bedside chair)? 3  -AY     Climbing 3-5 steps with a railing? 3  -AY     To walk in hospital room? 3  -AY     AM-PAC 6 Clicks Score (PT) 19  -AY     Highest level of mobility 6 --> Walked 10 steps or more  -AY     Row Name 05/04/23 1422 05/04/23 0951       Functional Assessment    Outcome Measure Options  AM-PAC 6 Clicks Basic Mobility (PT)  -AY AM-PAC 6 Clicks Daily Activity (OT)  -          User Key  (r) = Recorded By, (t) = Taken By, (c) = Cosigned By    Initials Name Provider Type    AY Letty Rojas, PT Physical Therapist    Rosana Matamoros, OT Occupational Therapist                             Physical Therapy Education     Title: PT OT SLP Therapies (In Progress)     Topic: Physical Therapy (In Progress)     Point: Mobility training (Done)     Learning Progress Summary           Patient Acceptance, E,TB, VU,NR by AY at 5/4/2023 1423                   Point: Home exercise program (Not Started)     Learner Progress:  Not documented in this visit.          Point: Body mechanics (Done)     Learning Progress Summary           Patient Acceptance, E,TB, VU,NR by AY at 5/4/2023 1423                   Point: Precautions (Done)     Learning Progress Summary           Patient Acceptance, E,TB, VU,NR by AY at 5/4/2023 1423                               User Key     Initials Effective Dates Name Provider Type Discipline    AY 11/10/20 -  Letty Rojas, PT Physical Therapist PT              PT Recommendation and Plan  Planned Therapy Interventions (PT): balance training, bed mobility training, gait training, home exercise program, postural re-education, transfer training, patient/family education, strengthening, stair training, neuromuscular re-education  Plan of Care Reviewed With: patient, spouse  Progress: improving  Outcome Evaluation: PT initial eval completed. Pt limited by decreased functional endurance, generalized weakness, balance deficit, and residual RLE deficits. Pt ambulated 100ft wiht SBA and RWx and 250ft without AD and with CGA. Stair navigation performed with min A. Rec continued skilled PT to increase indep with mobility. d/c rec for home with assist and OP PT. Pt owns necessary equipment. Rec R AFO trial next session.     Time Calculation:    PT Charges     Row Name 05/04/23 7485             Time  Calculation    Start Time 1300  -AY      PT Received On 23  -AY      PT Goal Re-Cert Due Date 23  -AY         Timed Charges    80717 - Gait Training Minutes  23  -AY         Untimed Charges    PT Eval/Re-eval Minutes 46  -AY         Total Minutes    Timed Charges Total Minutes 23  -AY      Untimed Charges Total Minutes 46  -AY       Total Minutes 69  -AY            User Key  (r) = Recorded By, (t) = Taken By, (c) = Cosigned By    Initials Name Provider Type    AY Letty Rojas, PT Physical Therapist              Therapy Charges for Today     Code Description Service Date Service Provider Modifiers Qty    98505756785 HC GAIT TRAINING EA 15 MIN 2023 Letty Rojas, PT GP 2    48011960048 HC PT EVAL LOW COMPLEXITY 4 2023 Letty Rojas, PT GP 1    64041344888 HC PT THER SUPP EA 15 MIN 2023 Letty Rojas, PT GP 3          PT G-Codes  Outcome Measure Options: AM-PAC 6 Clicks Basic Mobility (PT)  AM-PAC 6 Clicks Score (PT): 19  AM-PAC 6 Clicks Score (OT): 19  PT Discharge Summary  Anticipated Discharge Disposition (PT): home with assist, home with outpatient therapy services    Letty Rojas PT  2023      Electronically signed by Letty Rojas PT at 23 1425     Letty Rojas PT at 23 1035  Version 1 of 1       Goal Outcome Evaluation:  Plan of Care Reviewed With: patient, spouse, friend        Progress: no change  Outcome Evaluation: Pt ambulated 100ft with RWx and SBA and ambulated 250ft without AD with min A progressing to mod A, limited by R knee buckling and mild foot drop. Educated about quad activation to prevent knee buckling with stance. Continue to progress as able with endurance activities and RLE strenghtening. d/c rec for home with assist and OP PT.    Electronically signed by Letty Rojas PT at 23 1100     Letty Rojas PT at 23 1035  Version 1 of 1         Patient Name: Jonnie Roth  : 1945    MRN: [id number]                               Today's Date: 5/8/2023       Admit Date: 5/2/2023    Visit Dx:     ICD-10-CM ICD-9-CM   1. Ventricular tachycardia, monomorphic  I47.29 427.1   2. History of CHF (congestive heart failure)  Z86.79 V12.59   3. History of coronary artery disease  Z86.79 V12.59     Patient Active Problem List   Diagnosis   • Coronary artery disease involving native coronary artery of native heart with angina pectoris   • Chronic systolic congestive heart failure   • BPH (benign prostatic hypertrophy)   • LBBB (left bundle branch block)   • Pulmonary emphysema (HCC)   • Hyperlipidemia LDL goal <70   • Recurrent monomorphic ventricular tachycardia   • Post-traumatic subdural hematoma   • COPD    • Pulmonary nodule (Stable)   • Bilateral renal cysts   • EGO on nocturnal CPAP   • ICD in place   • Hypothyroidism (acquired)   • GERD without esophagitis   • Type 2 diabetes mellitus with HbA1c 5.8, without long-term current use of insulin   • Ischemic cardiomyopathy (EF < 20%)   • H/O PE (2015)   • ICD (implantable cardioverter-defibrillator) discharge   • Chronic HFrEF with LVEF less than 20% (HCC)   • Hypokalemia at admission. Resolved   • Ventricular tachycardia, monomorphic   • Biventricular PPM     Past Medical History:   Diagnosis Date   • Arthritis    • BPH (benign prostatic hypertrophy) 12/28/2016   • CAD (coronary artery disease)    • Disease of thyroid gland    • Enlarged prostate    • HFrEF (heart failure with reduced ejection fraction)    • History of transfusion     NO REACTION RECALLED    • Hypertension    • LBBB (left bundle branch block) 12/28/2016   • Lung nodule    • Macular degeneration    • Pancreatic cyst 12/08/2022   • Polio     as a child   • Pulmonary embolism    • Pulmonary emphysema 12/28/2016   • Sepsis     A. Secondary to [place] trauma with hematuria and urosepsis requiring hospitalization May 2015   • Sleep apnea with use of continuous positive airway pressure (CPAP)     CPAP- SETTING 4    • Ventricular tachycardia  12/28/2016   • Wears glasses     READERS     Past Surgical History:   Procedure Laterality Date   • BIVENTRICULLAR IMPLANTABLE CARDIOVERTER DEFIBRILLATOR PLACEMENT     • CARDIAC CATHETERIZATION N/A 1/16/2017    Procedure: Left Heart Cath;  Surgeon: Diego Ayala MD;  Location:  [facility];  Service:    • CARDIAC DEFIBRILLATOR PLACEMENT       A. ICD generator change out with upgrade to BiV pacemaker implantable cardioverter    defibrillator, 12/17/2007. Ventricular fibrillation s/p pacesetter [place] ICD in [place]. [place] 09/2000   • CARDIAC ELECTROPHYSIOLOGY PROCEDURE N/A 10/2/2017    Procedure: BIV ICD  generator change SJ;  Surgeon: Ferdinand Alba MD;  Location:  [facility];  Service:    • CARDIAC ELECTROPHYSIOLOGY PROCEDURE N/A 7/6/2021    Procedure: BIV ICD generator change with [employer]. This will need to be done in late June or early July. Hold Xarelto one day prior.;  Surgeon: Ferdinand Alba MD;  Location:  [facility];  Service: Cardiology;  Laterality: N/A;   • CATARACT EXTRACTION      Bilateral    • COLONOSCOPY     • CORONARY ANGIOPLASTY WITH STENT PLACEMENT      X7 STENTS TOTAL    • CYSTOSCOPY URETERAL TUMOR FULGURATION  05/29/2015     A. Status post cystoscopy with clot evacuation and fulguration of the prostate secondary to hematuria, 5/29/2015.   • FOOT SURGERY      RIGHT - POLIO RELATED    • PROSTATE SURGERY      A. Status post laser vaporization, 08/19/2009.   • ROOT CANAL     • TOOTH EXTRACTION        General Information     Row Name 05/08/23 1444          Physical Therapy Time and Intention    Document Type therapy note (daily note)  -AY     Mode of Treatment physical therapy  -AY     Row Name 05/08/23 1444          General Information    Patient Profile Reviewed yes  -AY     Existing Precautions/Restrictions fall  RLE weakness/incoordination (residual from polio)  -AY     Barriers to Rehab medically complex  -AY     Row Name 05/08/23 1442           Cognition    Orientation Status (Cognition) oriented x 3  -AY     Row Name 05/08/23 1444          Safety Issues, Functional Mobility    Impairments Affecting Function (Mobility) balance;endurance/activity tolerance;shortness of breath;strength  -AY           User Key  (r) = Recorded By, (t) = Taken By, (c) = Cosigned By    Initials Name Provider Type    Letty Kraft PT Physical Therapist               Mobility     Row Name 05/08/23 1445          Sit-Stand Transfer    Sit-Stand [place] (Transfers) contact guard;verbal cues  -AY     Assistive Device (Sit-Stand Transfers) walker, front-wheeled  -AY     Row Name 05/08/23 1445          Gait/Stairs (Locomotion)    [place] Level (Gait) moderate assist (50% patient effort);1 person assist  -AY     Distance in Feet (Gait) 350  -AY     Deviations/Abnormal Patterns (Gait) delgado decreased;festinating/shuffling;gait speed decreased;stride length decreased;weight shifting decreased;base of support, narrow  -AY     Bilateral Gait Deviations heel strike decreased;forward flexed posture  -AY     Right Sided Gait Deviations foot drop/toe drag  -AY     Comment, (Gait/Stairs) pt ambulated 100ft intially with RWx and SBA. Attempted ambulation without AD, and pt required min A initially and progressed to mod A with fatigue. Cueing required throughout for posture, increased width FERNANDO, heel strike, and quad activation d/t mild R knee buckling. Improved gait mechanics noted, but continues to demonstrate mild R foot drop and knee buckling. Gait distance limited by fatigue.  -AY           User Key  (r) = Recorded By, (t) = Taken By, (c) = Cosigned By    Initials Name Provider Type    Letty Kraft PT Physical Therapist               Obj/Interventions     Row Name 05/08/23 1447          Motor Skills    Therapeutic Exercise knee  -AY     Row Name 05/08/23 1447          Knee (Therapeutic Exercise)    Knee (Therapeutic Exercise) isometric exercises  -AY     Knee Isometrics  (Therapeutic Exercise) right;quad sets;standing;10 repetitions  -AY     Row Name 05/08/23 1447          Balance    Balance Assessment sitting static balance;sitting dynamic balance;sit to stand dynamic balance;standing static balance;standing dynamic balance  -AY     Static Sitting Balance independent  -AY     Dynamic Sitting Balance standby assist  -AY     Position, Sitting Balance unsupported;sitting in chair  -AY     Sit to Stand Dynamic Balance contact guard  -AY     Static Standing Balance contact guard  -AY     Dynamic Standing Balance moderate assist  -AY     Position/Device Used, Standing Balance unsupported  -AY           User Key  (r) = Recorded By, (t) = Taken By, (c) = Cosigned By    Initials Name Provider Type    AY Letty Rojas, PT Physical Therapist               Goals/Plan    No documentation.                Clinical Impression     Row Name 05/08/23 1447          Pain    Pretreatment Pain Rating 0/10 - no pain  -AY     Posttreatment Pain Rating 0/10 - no pain  -AY     Pain Intervention(s) Ambulation/increased activity;Repositioned  -AY     Additional Documentation Pain Scale: Numbers Pre/Post-Treatment (Group)  -AY     Row Name 05/08/23 1447          Plan of Care Review    Plan of Care Reviewed With patient;spouse;friend  -AY     Progress no change  -AY     Outcome Evaluation Pt ambulated 100ft with RWx and SBA and ambulated 250ft without AD with min A progressing to mod A, limited by R knee buckling and mild foot drop. Educated about quad activation to prevent knee buckling with stance. Continue to progress as able with endurance activities and RLE strenghtening. d/c rec for home with assist and OP PT.  -AY     Row Name 05/08/23 1447          Vital Signs    Pre Systolic BP Rehab 125  VSS  -AY     Pre Treatment Diastolic BP 87  -AY     Post Systolic BP Rehab --  left with pt talking with MD and RN  -AY     Pretreatment Heart Rate (beats/min) 79  -AY     Posttreatment Heart Rate (beats/min) 88  -AY      Pre SpO2 (%) 94  -AY     O2 Delivery Pre Treatment room air  -AY     O2 Delivery Intra Treatment room air  -AY     Post SpO2 (%) 96  -AY     O2 Delivery Post Treatment room air  -AY     Pre Patient Position Sitting  -AY     Intra Patient Position Standing  -AY     Post Patient Position Sitting  -AY     Row Name 05/08/23 1447          Positioning and Restraints    Pre-Treatment Position sitting in chair/recliner  -AY     Post Treatment Position chair  -AY     In Chair notified nsg;reclined;sitting;call light within reach;encouraged to call for assist;exit alarm on;with family/caregiver;LUE elevated;RUE elevated;legs elevated;with nsg;with other staff;waffle cushion  -AY           User Key  (r) = Recorded By, (t) = Taken By, (c) = Cosigned By    Initials Name Provider Type    Letty Kraft PT Physical Therapist               Outcome Measures     Row Name 05/08/23 1449          How much help from another person do you currently need...    Turning from your back to your side while in flat bed without using bedrails? 4  -AY     Moving from lying on back to sitting on the side of a flat bed without bedrails? 4  -AY     Moving to and from a bed to a chair (including a wheelchair)? 4  -AY     Standing up from a chair using your arms (e.g., wheelchair, bedside chair)? 3  -AY     Climbing 3-5 steps with a railing? 3  -AY     To walk in hospital room? 3  -AY     AM-PAC 6 Clicks Score (PT) 21  -AY     Highest level of mobility 6 --> Walked 10 steps or more  -AY     Row Name 05/08/23 1449          Functional Assessment    Outcome Measure Options AM-PAC 6 Clicks Basic Mobility (PT)  -AY           User Key  (r) = Recorded By, (t) = Taken By, (c) = Cosigned By    Initials Name Provider Type    Letty Kraft PT Physical Therapist                             Physical Therapy Education     Title: PT OT SLP Therapies (In Progress)     Topic: Physical Therapy (Done)     Point: Mobility training (Done)     Learning Progress  Summary           Patient Acceptance, E,TB, VU,NR by AY at 5/8/2023 1450    Acceptance, E, VU by EL at 5/4/2023 1518    Acceptance, E,TB, VU,NR by AY at 5/4/2023 1423   Family Acceptance, E,TB, VU,NR by AY at 5/8/2023 1450    Acceptance, E, VU by EL at 5/4/2023 1518                   Point: Home exercise program (Done)     Learning Progress Summary           Patient Acceptance, E,TB, VU,NR by AY at 5/8/2023 1450    Acceptance, E, VU by EL at 5/4/2023 1518   Family Acceptance, E,TB, VU,NR by AY at 5/8/2023 1450    Acceptance, E, VU by EL at 5/4/2023 1518                   Point: Body mechanics (Done)     Learning Progress Summary           Patient Acceptance, E,TB, VU,NR by AY at 5/8/2023 1450    Acceptance, E, VU by EL at 5/4/2023 1518    Acceptance, E,TB, VU,NR by AY at 5/4/2023 1423   Family Acceptance, E,TB, VU,NR by AY at 5/8/2023 1450    Acceptance, E, VU by EL at 5/4/2023 1518                   Point: Precautions (Done)     Learning Progress Summary           Patient Acceptance, E,TB, VU,NR by AY at 5/8/2023 1450    Acceptance, E, VU by EL at 5/4/2023 1518    Acceptance, E,TB, VU,NR by AY at 5/4/2023 1423   Family Acceptance, E,TB, VU,NR by AY at 5/8/2023 1450    Acceptance, E, VU by EL at 5/4/2023 1518                               User Key     Initials Effective Dates Name Provider Type Discipline     06/16/21 -  Shauna Flowers RN Registered Nurse Nurse     11/10/20 -  Letty Rojas, PT Physical Therapist PT              PT Recommendation and Plan  Planned Therapy Interventions (PT): balance training, bed mobility training, gait training, home exercise program, postural re-education, transfer training, patient/family education, strengthening, stair training, neuromuscular re-education  Plan of Care Reviewed With: patient, spouse, friend  Progress: no change  Outcome Evaluation: Pt ambulated 100ft with RWx and SBA and ambulated 250ft without AD with min A progressing to mod A, limited by R knee buckling  and mild foot drop. Educated about quad activation to prevent knee buckling with stance. Continue to progress as able with endurance activities and RLE strenghtening. d/c rec for home with assist and OP PT.     Time Calculation:    PT Charges     Row Name 23 1450             Time Calculation    Start Time 1035  -AY      PT Received On 23  -AY         Timed Charges    36747 - Gait Training Minutes  24  -AY         Total Minutes    Timed Charges Total Minutes 24  -AY       Total Minutes 24  -AY            User Key  (r) = Recorded By, (t) = Taken By, (c) = Cosigned By    Initials Name Provider Type    AY Letty Rojas, PT Physical Therapist              Therapy Charges for Today     Code Description Service Date Service Provider Modifiers Qty    [id number] HC GAIT TRAINING EA 15 MIN 2023 Letty Rojas, PT GP 2          PT G-Codes  Outcome Measure Options: AM-PAC 6 Clicks Basic Mobility (PT)  AM-PAC 6 Clicks Score (PT): 21  AM-PAC 6 Clicks Score (OT): 19  PT Discharge Summary  Anticipated Discharge Disposition (PT): home with assist, home with outpatient therapy services    Letty Rojas PT  2023      Electronically signed by Letty Rojas PT at 23 1450          Occupational Therapy Notes (last 7 days)      Rosana Rodriguez, OT at 23 0856          Patient Name: Jonnie Roth  : 1945    MRN: [id number]                              Today's Date: 2023       Admit Date: 2023    Visit Dx:     ICD-10-CM ICD-9-CM   1. Ventricular tachycardia, monomorphic  I47.29 427.1   2. History of CHF (congestive heart failure)  Z86.79 V12.59   3. History of coronary artery disease  Z86.79 V12.59     Patient Active Problem List   Diagnosis   • Coronary artery disease involving native coronary artery of native heart with angina pectoris   • Chronic systolic congestive heart failure   • BPH (benign prostatic hypertrophy)   • LBBB (left bundle branch block)   • Pulmonary emphysema (HCC)    • Hyperlipidemia LDL goal <70   • Ventricular tachycardia   • Post-traumatic subdural hematoma   • COPD (chronic obstructive pulmonary disease)   • Pulmonary nodule (Stable)   • Bilateral renal cysts   • GEO (obstructive sleep apnea)   • ICD (implantable cardioverter-defibrillator) in place   • Hypothyroidism (acquired)   • GERD without esophagitis   • Type 2 diabetes mellitus, without long-term current use of insulin   • Ischemic cardiomyopathy (EF < 20%)   • H/O PE (2015)   • ICD (implantable cardioverter-defibrillator) discharge   • Chronic HFrEF (heart failure with reduced ejection fraction) ([facility])   • Hypokalemia   • Ventricular tachycardia, monomorphic     Past Medical History:   Diagnosis Date   • Arthritis    • BPH (benign prostatic hypertrophy) 12/28/2016   • CAD (coronary artery disease)    • Disease of thyroid gland    • Enlarged prostate    • HFrEF (heart failure with reduced ejection fraction)    • History of transfusion     NO REACTION RECALLED    • Hypertension    • LBBB (left bundle branch block) 12/28/2016   • Lung nodule    • Macular degeneration    • Pancreatic cyst 12/08/2022   • Polio     as a child   • Pulmonary embolism    • Pulmonary emphysema 12/28/2016   • Sepsis     A. Secondary to [place] trauma with hematuria and urosepsis requiring hospitalization May 2015   • Sleep apnea with use of continuous positive airway pressure (CPAP)     CPAP- SETTING 4    • Ventricular tachycardia 12/28/2016   • Wears glasses     READERS     Past Surgical History:   Procedure Laterality Date   • BIVENTRICULLAR IMPLANTABLE CARDIOVERTER DEFIBRILLATOR PLACEMENT     • CARDIAC CATHETERIZATION N/A 1/16/2017    Procedure: Left Heart Cath;  Surgeon: Diego Ayala MD;  Location: [facility] CATH INVASIVE LOCATION;  Service:    • CARDIAC DEFIBRILLATOR PLACEMENT       A. ICD generator change out with upgrade to BiV pacemaker implantable cardioverter    defibrillator, 12/17/2007. Ventricular fibrillation s/p pacesetter [place] ICD  in [place]. [place] 09/2000   • CARDIAC ELECTROPHYSIOLOGY PROCEDURE N/A 10/2/2017    Procedure: BIV ICD  generator change SJ;  Surgeon: Ferdinand Alba MD;  Location:  [facility];  Service:    • CARDIAC ELECTROPHYSIOLOGY PROCEDURE N/A 7/6/2021    Procedure: BIV ICD generator change with [employer]. This will need to be done in late June or early July. Hold Xarelto one day prior.;  Surgeon: Ferdinand Alba MD;  Location:  [facility];  Service: Cardiology;  Laterality: N/A;   • CATARACT EXTRACTION      Bilateral    • COLONOSCOPY     • CORONARY ANGIOPLASTY WITH STENT PLACEMENT      X7 STENTS TOTAL    • CYSTOSCOPY URETERAL TUMOR FULGURATION  05/29/2015     A. Status post cystoscopy with clot evacuation and fulguration of the prostate secondary to hematuria, 5/29/2015.   • FOOT SURGERY      RIGHT - POLIO RELATED    • PROSTATE SURGERY      A. Status post laser vaporization, 08/19/2009.   • ROOT CANAL     • TOOTH EXTRACTION        General Information     Row Name 05/04/23 0945          OT Time and Intention    Document Type evaluation  -     Mode of Treatment occupational therapy  -     Row Name 05/04/23 0945          General Information    Patient Profile Reviewed yes  -     Prior Level of Function independent:;bed mobility;transfer;all household mobility;community mobility;ADL's  Pt reports he uses a SPC for mobility at baseline  -     Existing Precautions/Restrictions fall  -     Barriers to Rehab medically complex  -     Row Name 05/04/23 0945          Living Environment    People in Home spouse  -     Row Name 05/04/23 0945          Home Main Entrance    Number of Stairs, Main Entrance one  -     Stair Railings, Main Entrance none  -     Row Name 05/04/23 0945          Stairs Within Home, Primary    Stairs, Within Home, Primary Pt reports 16 steps to 2nd level master bedroom & bathroom, pt reports there is a bedroom & bathroom he can stay in on main floor if needed   -     Number of Stairs, Within Home, Primary other (see comments)  -     Row Name 05/04/23 0945          Cognition    Orientation Status (Cognition) oriented x 3  -     Row Name 05/04/23 0945          Safety Issues, Functional Mobility    Safety Issues Affecting Function (Mobility) safety precaution awareness  -     Impairments Affecting Function (Mobility) balance;endurance/activity tolerance;shortness of breath;strength  -           User Key  (r) = Recorded By, (t) = Taken By, (c) = Cosigned By    Initials Name Provider Type     Rosana Rodriguez OT Occupational Therapist                 Mobility/ADL's     Row Name 05/04/23 0947          Bed Mobility    Bed Mobility supine-sit  -     Supine-Sit [place] (Bed Mobility) contact guard;verbal cues  -     Assistive Device (Bed Mobility) bed rails;head of bed elevated  -[facility] Name 05/04/23 0947          Transfers    Transfers sit-stand transfer;stand-sit transfer  -[facility] Name 05/04/23 0947          Sit-Stand Transfer    Sit-Stand [place] (Transfers) contact guard;verbal cues  -     Assistive Device (Sit-Stand Transfers) walker, front-wheeled  -[facility] Name 05/04/23 0947          Stand-Sit Transfer    Stand-Sit [place] (Transfers) contact guard;verbal cues  -     Assistive Device (Stand-Sit Transfers) walker, front-wheeled  -[facility] Name 05/04/23 0947          Functional Mobility    Functional Mobility- Ind. Level contact guard assist;verbal cues required  -     Functional Mobility- Device walker, front-wheeled  -     Functional Mobility-Distance (Feet) --  household distance in hallway  -     Functional Mobility- Safety Issues balance decreased during turns;step length decreased  -     Functional Mobility- Comment Pt c/o mild dizziness w/ functional mobility, BP checked and stable  -[facility] Name 05/04/23 0947          Activities of Daily Living    BADL Assessment/Intervention lower body dressing;upper body  dressing;toileting  -     Row Name 05/04/23 0947          Lower Body Dressing Assessment/Training    [place] Level (Lower Body Dressing) don;shoes/slippers;set up  -     Position (Lower Body Dressing) edge of bed sitting  -     Row Name 05/04/23 0947          Upper Body Dressing Assessment/Training    [place] Level (Upper Body Dressing) don;pajama/robe;minimum assist (75% patient effort);verbal cues  -     Position (Upper Body Dressing) edge of bed sitting  -     Row Name 05/04/23 0947          Toileting Assessment/Training    [place] Level (Toileting) adjust/manage clothing;set up  -     Assistive Devices (Toileting) urinal  -     Position (Toileting) sitting up in bed  -           User Key  (r) = Recorded By, (t) = Taken By, (c) = Cosigned By    Initials Name Provider Type    Rosana Matamoros OT Occupational Therapist               Obj/Interventions     [facility] Name 05/04/23 0948          Sensory Assessment (Somatosensory)    Sensory Assessment (Somatosensory) UE sensation intact  -MC     Row Name 05/04/23 0948          Vision Assessment/Intervention    Visual Impairment/Limitations WFL  -[facility] Name 05/04/23 0948          Range of Motion Comprehensive    General Range of Motion bilateral upper extremity ROM WFL  -[facility] Name 05/04/23 0948          Strength Comprehensive (MMT)    General Manual Muscle Testing (MMT) Assessment upper extremity strength deficits identified  -     Comment, General Manual Muscle Testing (MMT) Assessment BUE grossly 4/5  -     Row Name 05/04/23 0948          Balance    Balance Assessment sitting static balance;sitting dynamic balance;sit to stand dynamic balance;standing static balance;standing dynamic balance  -     Static Sitting Balance standby assist  -     Dynamic Sitting Balance contact guard  -     Position, Sitting Balance unsupported;sitting edge of bed;sitting in chair  -     Sit to Stand Dynamic Balance contact guard;verbal  cues  -     Static Standing Balance contact guard;verbal cues  -     Dynamic Standing Balance contact guard;verbal cues  -     Position/Device Used, Standing Balance supported;walker, front-wheeled  -     Balance Interventions sitting;sit to stand;occupation based/functional task  -           User Key  (r) = Recorded By, (t) = Taken By, (c) = Cosigned By    Initials Name Provider Type     Rosana Rodriguez, CHELSEY Occupational Therapist               Goals/Plan     Row Name 05/04/23 6422          Transfer Goal 1 (OT)    Activity/Assistive Device (Transfer Goal 1, OT) sit-to-stand/stand-to-sit;bed-to-chair/chair-to-bed;toilet;cane, straight  -     [place] Level/Cues Needed (Transfer Goal 1, OT) standby assist  -     Time Frame (Transfer Goal 1, OT) long term goal (LTG);10 days  -     Progress/Outcome (Transfer Goal 1, OT) goal ongoing  -     Row Name 05/04/23 0906          Toileting Goal 1 (OT)    Activity/Device (Toileting Goal 1, OT) adjust/manage clothing;perform perineal hygiene;commode;grab bar/safety frame  -     [place] Level/Cues Needed (Toileting Goal 1, OT) standby assist  -     Time Frame (Toileting Goal 1, OT) long term goal (LTG);10 days  -     Progress/Outcome (Toileting Goal 1, OT) goal ongoing  -     Row Name 05/04/23 0988          Grooming Goal 1 (OT)    Activity/Device (Grooming Goal 1, OT) hair care;oral care;wash face, hands  standing sinkside  -     [place] (Grooming Goal 1, OT) standby assist  -     Time Frame (Grooming Goal 1, OT) long term goal (LTG);10 days  -     Progress/Outcome (Grooming Goal 1, OT) goal ongoing  -     Row Name 05/04/23 0950          Therapy Assessment/Plan (OT)    Planned Therapy Interventions (OT) activity tolerance training;BADL retraining;functional balance retraining;IADL retraining;occupation/activity based interventions;ROM/therapeutic exercise;strengthening exercise;transfer/mobility retraining;patient/caregiver  education/training  -           User Key  (r) = Recorded By, (t) = Taken By, (c) = Cosigned By    Initials Name Provider Type     Rosana Rodriguez, OT Occupational Therapist               Clinical Impression     Row Name 05/04/23 0949          Pain Assessment    Pretreatment Pain Rating 0/10 - no pain  -     Posttreatment Pain Rating 0/10 - no pain  -     Row Name 05/04/23 0949          Plan of Care Review    Plan of Care Reviewed With patient  -     Outcome Evaluation Pt presents w/ generalized weakness, decreased functional endurance, and mild balance deficits limiting his ADL independence. Pt would benefit from continued skilled IPOT services to address current functional deficits and facilitate return to [facility]. Rec home w/ A & OPPT at d/c.  -     Row Name 05/04/23 0949          Therapy Assessment/Plan (OT)    Rehab Potential (OT) good, to achieve stated therapy goals  -     Criteria for Skilled Therapeutic Interventions Met (OT) yes;skilled treatment is necessary  -     Therapy Frequency (OT) daily  -[facility] Name 05/04/23 0949          Therapy Plan Review/Discharge Plan (OT)    Anticipated Discharge Disposition (OT) home with assist;home with outpatient therapy services  -     Row Name 05/04/23 0949          Vital Signs    Pre Systolic BP Rehab 126  -MC     Pre Treatment Diastolic BP 83  -MC     Post Systolic BP Rehab 131  -MC     Post Treatment Diastolic BP 83  -MC     Pretreatment Heart Rate (beats/min) 70  -MC     Posttreatment Heart Rate (beats/min) 72  -MC     Pre SpO2 (%) 96  -MC     O2 Delivery Pre Treatment room air  -     O2 Delivery Intra Treatment room air  -     Post SpO2 (%) 96  -     O2 Delivery Post Treatment room air  -     Pre Patient Position Supine  -     Intra Patient Position Standing  -     Post Patient Position Sitting  -[facility] Name 05/04/23 0949          Positioning and Restraints    Pre-Treatment Position in bed  -     Post Treatment Position chair   -     In Chair notified nsg;reclined;call light within reach;encouraged to call for assist;exit alarm on;with family/caregiver;waffle cushion;legs elevated  -           User Key  (r) = Recorded By, (t) = Taken By, (c) = Cosigned By    Initials Name Provider Type    Rosana Matamoros OT Occupational Therapist               Outcome Measures     Row Name 05/04/23 0951          How much help from another is currently needed...    Putting on and taking off regular lower body clothing? 3  -     Bathing (including washing, rinsing, and drying) 3  -     Toileting (which includes using toilet bed pan or urinal) 3  -     Putting on and taking off regular upper body clothing 3  -     Taking care of personal grooming (such as brushing teeth) 3  -     Eating meals 4  -     AM-PAC 6 Clicks Score (OT) 19  -     Row Name 05/04/23 0951          Functional Assessment    Outcome Measure Options AM-PAC 6 Clicks Daily Activity (OT)  -           User Key  (r) = Recorded By, (t) = Taken By, (c) = Cosigned By    Initials Name Provider Type    Rosana Matamoros OT Occupational Therapist                Occupational Therapy Education     Title: PT OT SLP Therapies (In Progress)     Topic: Occupational Therapy (In Progress)     Point: ADL training (Done)     Description:   Instruct learner(s) on proper safety adaptation and remediation techniques during self care or transfers.   Instruct in proper use of assistive devices.              Learning Progress Summary           Patient Acceptance, E, VU by  at 5/4/2023 0951                   Point: Home exercise program (Not Started)     Description:   Instruct learner(s) on appropriate technique for monitoring, assisting and/or progressing therapeutic exercises/activities.              Learner Progress:  Not documented in this visit.          Point: Precautions (Done)     Description:   Instruct learner(s) on prescribed precautions during self-care and functional  transfers.              Learning Progress Summary           Patient Acceptance, E, VU by  at 5/4/2023 0951                   Point: Body mechanics (Done)     Description:   Instruct learner(s) on proper positioning and spine alignment during self-care, functional mobility activities and/or exercises.              Learning Progress Summary           Patient Acceptance, E, VU by  at 5/4/2023 0951                               User Key     Initials Effective Dates Name Provider Type Discipline     10/14/22 -  Rosana Rodriguez OT Occupational Therapist OT              OT Recommendation and Plan  Planned Therapy Interventions (OT): activity tolerance training, BADL retraining, functional balance retraining, IADL retraining, occupation/activity based interventions, ROM/therapeutic exercise, strengthening exercise, transfer/mobility retraining, patient/caregiver education/training  Therapy Frequency (OT): daily  Plan of Care Review  Plan of Care Reviewed With: patient  Outcome Evaluation: Pt presents w/ generalized weakness, decreased functional endurance, and mild balance deficits limiting his ADL independence. Pt would benefit from continued skilled IPOT services to address current functional deficits and facilitate return to OF. Rec home w/ A & OPPT at d/c.     Time Calculation:    Time Calculation- OT     Row Name 05/04/23 0952             Time Calculation- OT    OT Start Time 0856  -      OT Received On 05/04/23  -      OT Goal Re-Cert Due Date 05/14/23  -         Timed Charges    13763 - OT Therapeutic Activity Minutes 8  -      47941 - OT Self Care/Mgmt Minutes 7  -         Untimed Charges    OT Eval/Re-eval Minutes 31  -         Total Minutes    Timed Charges Total Minutes 15  -      Untimed Charges Total Minutes 31  -       Total Minutes 46  -MC            User Key  (r) = Recorded By, (t) = Taken By, (c) = Cosigned By    Initials Name Provider Type     Rosana Rodriguez OT  Occupational Therapist              Therapy Charges for Today     Code Description Service Date Service Provider Modifiers Qty    [id number]  OT THERAPEUTIC ACT EA 15 MIN 5/4/2023 Rosana Rodriguez OT GO 1    [id number]  OT EVAL MOD COMPLEXITY 3 5/4/2023 Rosana Rodriguez OT GO 1               Rosana Rodriguez OT  5/4/2023    Electronically signed by Rosana Rodriguez OT at 05/04/23 0952     Rosana Rodriguez OT at 05/04/23 0856        Goal Outcome Evaluation:  Plan of Care Reviewed With: patient           Outcome Evaluation: Pt presents w/ generalized weakness, decreased functional endurance, and mild balance deficits limiting his ADL independence. Pt would benefit from continued skilled IPOT services to address current functional deficits and facilitate return to PLOF. Rec home w/ A & OPPT at d/c.    Electronically signed by Rosana Rodriguez OT at 05/04/23 0967

## 2023-05-09 NOTE — CASE MANAGEMENT/SOCIAL WORK
Continued Stay Note   [place]     Patient Name: Jonnie Roth  MRN: [id number]  Today's Date: 5/9/2023    Admit Date: 5/2/2023    Plan: Home with [facility]    Discharge Plan     Row Name 05/09/23 1014       Plan    Plan Home with [facility]    Patient/Family in Agreement with Plan yes    Plan Comments I spoke with Raya/[facility] and Phil, on the phone. HH orders are in [facility]. I faxed orders and updated notes to [facility]. I spoke with Mr. Roth and his wife at the bedside. They are agreeable to [facility]  and are requesting Kalia Wolf. I updated Raya with this information. Pt is being discharged home today. [facility] will contact him by phone. Pt's wife will transport him home.    Final Discharge Disposition Code 06 - home with home health care               Discharge Codes    No documentation.               Expected Discharge Date and Time     Expected Discharge Date Expected Discharge Time    May 9, 2023             Rosana Hartman RN

## 2023-05-09 NOTE — PROGRESS NOTES
[facility] at [facility]  Progress Note       LOS: 6 days   Patient Care Team:  Brian Lewis MD as PCP - General (Internal Medicine)  Ferdinand Alba MD as Consulting Physician (Cardiac Electrophysiology)  Viet Manzano PA as Physician Assistant (Cardiology)  Michele Flanagan IV, MD as Consulting Physician (Interventional Cardiology)    Chief Complaint:  Follow up VT    Subjective     Feeling better today. On RA, O2 sats 94%. No CP or SOB.   Wants to go home.  No issues overnight.  No recurrent ventricular tachycardia.  Breathing well today.      Review of Systems:   Pertinent positives in HPI, all others reviewed and negative.      Objective       Current Facility-Administered Medications:   •  amiodarone (PACERONE) tablet 400 mg, 400 mg, Oral, Q8H, Javad Victoria DO, 400 mg at 05/09/23 0503  •  Calcium Replacement - Follow Nurse / BPA Driven Protocol, , Does not apply, PRN, Merary Rojas, APRN  •  carvedilol (COREG) tablet 6.25 mg, 6.25 mg, Oral, BID With Meals, Mary Hills, APRN, 6.25 mg at 05/08/23 1727  •  clopidogrel (PLAVIX) tablet 75 mg, 75 mg, Oral, Daily, Shauna Byrne PA, 75 mg at 05/08/23 0803  •  eplerenone (INSPRA) tablet 12.5 mg, 12.5 mg, Oral, Q24H, Shauna Byrne PA, 12.5 mg at 05/08/23 0803  •  finasteride (PROSCAR) tablet 5 mg, 5 mg, Oral, Daily, Shauna Byrne PA, 5 mg at 05/08/23 0803  •  heparin (porcine) 5000 UNIT/ML injection 5,000 Units, 5,000 Units, Subcutaneous, Q8H, Brian Mukherjee MD, 5,000 Units at 05/09/23 0503  •  ipratropium (ATROVENT) nasal spray 0.03%, 2 spray, Each Nare, Q12H, Boris Willett MD, 2 spray at 05/08/23 1126  •  levothyroxine (SYNTHROID, LEVOTHROID) tablet 25 mcg, 25 mcg, Oral, Q AM, Maico Cain MD, 25 mcg at 05/09/23 0504  •  Magnesium Standard Dose Replacement - Follow Nurse / BPA Driven Protocol, , Does not apply, PRN, Merary Rojas, APRN  •  melatonin tablet 10 mg, 10 mg,  "Oral, Nightly PRN, Shauna Byrne PA, 10 mg at 05/08/23 2100  •  mexiletine (MEXITIL) capsule 200 mg, 200 mg, Oral, Q8H, Merary Rojas APRN, 200 mg at 05/09/23 0503  •  pantoprazole (PROTONIX) EC tablet 40 mg, 40 mg, Oral, QAM, Shauna Byrne PA, 40 mg at 05/09/23 0623  •  Phosphorus Replacement - Follow Nurse / BPA Driven Protocol, , Does not apply, PRN, Merary Rojas APRN  •  potassium chloride (MICRO-K) CR capsule 20 mEq, 20 mEq, Oral, Daily, Brian Mukherjee MD, 20 mEq at 05/08/23 1727  •  Potassium Replacement - Follow Nurse / BPA Driven Protocol, , Does not apply, PRN, Ferdinand Alba MD  •  torsemide (DEMADEX) tablet 10 mg, 10 mg, Oral, Daily, Ferdinand Alba MD    Vital Sign Min/Max for last 24 hours  Temp  Min: 97.7 °F (36.5 °C)  Max: 97.9 °F (36.6 °C)   BP  Min: 102/65  Max: 125/90   Pulse  Min: 69  Max: 82   Resp  Min: 16  Max: 20   SpO2  Min: 90 %  Max: 100 %   No data recorded   Weight  Min: 74.9 kg (165 lb 2 oz)  Max: 74.9 kg (165 lb 2 oz)     Flowsheet Rows    Flowsheet Row First Filed Value   Admission Height 185.4 cm (73\") Documented at 05/02/2023 1324   Admission Weight 79.4 kg (175 lb) Documented at 05/02/2023 1324            Intake/Output Summary (Last 24 hours) at 5/9/2023 0812  Last data filed at 5/9/2023 0500  Gross per 24 hour   Intake 480 ml   Output 1965 ml   Net -1485 ml       Physical Exam:     General Appearance:    Alert, cooperative, in no acute distress   Lungs:    Clear to auscultation bilateral.  Respaire effort fair.    Heart:   Regular rhythm normal S1-S2.  There is an S3 and S4.   Chest Wall:    No abnormalities observed   Abdomen:     Normal bowel sounds, benign examination.   Extremities:  Moves all extremities well, no edema, no cyanosis, no redness              Pulses:   Pulses palpable and equal bilaterally   Skin:   No bleeding, bruising or rash        Results Review:   Results from last 7 days   Lab Units 05/09/23  0424 05/08/23  0446 05/07/23  0356   WBC " 10*3/mm3 9.25 11.90* 10.68   HEMOGLOBIN g/dL 12.7* 12.5* 11.9*   HEMATOCRIT % 39.9 38.7 36.9*   PLATELETS 10*3/mm3 222 212 196     Results from last 7 days   Lab Units 23  0424 23  0446 23  0356   SODIUM mmol/L 142 136 139   POTASSIUM mmol/L 4.6 3.6 4.1   CHLORIDE mmol/L 102 99 104   CO2 mmol/L 27.0 24.0 25.0   BUN mg/dL 18 15 16   CREATININE mg/dL 0.81 0.72* 0.82   GLUCOSE mg/dL 100* 137* 102*      Results from last 7 days   Lab Units 23  0440   HEMOGLOBIN A1C % 5.80*         Results from last 7 days   Lab Units 23  1302   TSH uIU/mL 5.210*   FREE T4 ng/dL 2.11*     Results from last 7 days   Lab Units 23  0356   PROBNP pg/mL 2,750.0*     Results from last 7 days   Lab Units 23  0356 23  1302   PROTIME Seconds 13.9 16.5*   INR  1.06 1.32*   APTT seconds 25.8*  --      Results from last 7 days   Lab Units 23  0456 23  1932 23  1642   HSTROP T ng/L 38* 28* 24*       Intake/Output Summary (Last 24 hours) at 2023 0812  Last data filed at 2023 0500  Gross per 24 hour   Intake 480 ml   Output 1965 ml   Net -1485 ml       I personally viewed and interpreted the patient's EKG/Telemetry data    EK23: AV paced, HR 70 bpm,  ms, QTc 426 ms when corrected for wide QRS  None for review today.    Telemetry: V paced, 69-82 bpm, no recurrent ventricular tachycardia.    Ejection Fraction  No results found for: EF    Echo EF Estimated  Lab Results   Component Value Date    ECHOEFEST 18 10/14/2022     Echo 23:   The left ventricle is dilated.  Left ventricular systolic function is severely decreased. Left ventricular ejection fraction appears to be less than 20%.  The anterior and anterior lateral segments and apex are akinetic.  •  The left atrial cavity is dilated.  •  Left atrial volume is moderately increased.  •  Mild to moderate much regurgitation is present.  •  Estimated right ventricular systolic pressure from tricuspid regurgitation  is normal (<35 mmHg).    Chest xray 5/8/23:   Exam: Frontal chest  INDICATION: Dyspnea  COMPARISON: May 03, 2023  FINDINGS:  There are diffuse bilateral infiltrates slightly increased from the prior exam.  No effusions.  Heart size is normal.  No mediastinal widening.  Pacemaker in satisfactory position.  Bones are intact.  Normal upper abdomen.  IMPRESSION:  Findings are suggestive of moderate interstitial edema, increased from prior exams.    Present on Admission:  • Recurrent monomorphic ventricular tachycardia  • COPD   • GEO on nocturnal CPAP  • ICD in place  • Type 2 diabetes mellitus with HbA1c 5.8, without long-term current use of insulin  • Ischemic cardiomyopathy (EF < 20%)  • Chronic HFrEF with LVEF less than 20% (HCC)  • Hypokalemia at admission. Resolved  • Biventricular PPM    Assessment & Plan   1.  Recurrent VT:  -Recurrent ventricular tachycardia and ICD shocks since September 2022.  Recent discharge from [facility] April 2023, VT and started on amiodarone 200 mg 3 times daily, mexiletine 200 mg twice daily.  -No recurrence of VT over last 48-72 hours.  Continue oral amiodarone 200 mg TID x 10 days, then 400 mg daily and Mexiletine 200mg TID. Discharge home today with plans for possible VT ablation outpatient.   - AST 71, ALT 67; will discontinue Crestor that he was taking as outpatient.  We will reprogram ICD today for lower zone to meet threshold of slow ventricular tachycardia in the 130 bpm range.     2.  Ischemic cardiomyopathy/severe LV dysfunction/HFrEF:  - Most recent echo 10/16/2022: EF less than 20%, LV wall segments hypokinetic, mid anterior, basal anterolateral, mid anterolateral, basal inferior lateral, mid inferior lateral, apical inferior, mid inferior, basal inferior septal, mid inferior septalmid anteroseptal, basal anterior, basal inferior and basal inferoseptal. The following left ventricular wall segments are dyskinetic: apical anterior, apical lateral, apical  septal and apex.The left ventricular cavity is severely dilated. Mild mitral regurgitation is present  -Overall compensated from a heart failure standpoint at this time, s/p IV lasix  -Echo 5/2/2023: LV dilated, LV systolic function severely decreased, LVEF appears less than 20%, anterior and anterior lateral segments and apex are akinetic, LA cavity dilated, LA volume moderately increased, mild to moderate regurgitation present  -On Coreg, Inspra  - restarted home torsemide 10 mg 5/8/2023. BMP on Friday   - home entresto and jardiance on hold currently.  Will hold Entresto until we see back in 4 to 6 weeks.  Restart Jardiance as an outpatient.       3. DM:  - sliding scale insulin and accu checks    Disposition: The patient is stable for discharge home today. Follow up in 4-6 weeks with Dr. Alba. CMP on Friday. Consider outpatient VT ablation if he has further episodes.    Electronically signed by PAULA Arrington, 05/09/23, 7:58 AM EDT.      I, Ferdinand Alba MD, personally performed the services face to face as described and documented by the above named individual. I have made any necessary edits and it is both accurate and complete 5/9/2023  08:17 EDT

## 2023-05-09 NOTE — OUTREACH NOTE
Prep Survey    Flowsheet Row Responses   [facility] facility patient discharged from? [place]   Is LACE score < 7 ? No   Eligibility Readm Mgmt   Discharge diagnosis Recurrent monomorphic ventricular tachycardia   Does the patient have one of the following disease processes/diagnoses(primary or secondary)? Other   Does the patient have Home health ordered? Yes   What is the Home health agency?  [facility]    Is there a DME ordered? No   Prep survey completed? Yes          Beata PRECIADO - Registered Nurse

## 2023-05-09 NOTE — PLAN OF CARE
Problem: Adult Inpatient Plan of Care  Goal: Plan of Care Review  Flowsheets (Taken 5/9/2023 9989)  Progress: improving  Plan of Care Reviewed With:   patient   spouse  Outcome Evaluation: No acute events overnight. Patient remained hemodynamicallty stable and did not have runs of VT this shift. UOP adequate at 700mL and pt denies any pain, nausea or shortness of breath. States he is looking forward to going home today.

## 2023-05-09 NOTE — PLAN OF CARE
Problem: Adult Inpatient Plan of Care  Goal: Plan of Care Review  Outcome: Met  Flowsheets (Taken 5/9/2023 1826)  Plan of Care Reviewed With: patient  Outcome Evaluation: Discharge home today per Cardiology   Goal Outcome Evaluation:  Plan of Care Reviewed With: patient           Outcome Evaluation: Discharge home today per Cardiology

## 2023-05-09 NOTE — DISCHARGE INSTR - ACTIVITY
Patient and spouse verbalizes understanding of discharge instruction, discharge instructions given to spouse to take home. Patient accompanied by wife, Trena [phone]. Transported via wheelchair by staff. Patient belongings sent home with spouse.

## 2023-05-11 ENCOUNTER — READMISSION MANAGEMENT (OUTPATIENT)
Dept: CALL CENTER | Facility: HOSPITAL | Age: 78
End: 2023-05-11
Payer: MEDICARE

## 2023-05-11 NOTE — OUTREACH NOTE
Medical Week 1 Survey    Flowsheet Row Responses   [facility] [place] patient discharged from? [place]   Does the patient have one of the following disease processes/diagnoses(primary or secondary)? Other   Week 1 attempt successful? Yes   Call start time 1046   Call end time 1101   Discharge diagnosis Recurrent monomorphic ventricular tachycardia   Person spoke with today (if not patient) and relationship spouse   Meds reviewed with patient/caregiver? Yes   Is the patient having any side effects they believe may be caused by any medication additions or changes? No   Does the patient have all medications ordered at discharge? Yes   Is the patient taking all medications as directed (includes completed medication regime)? Yes   Does the patient have a primary care provider?  Yes   Does the patient have an appointment with their PCP within 7 days of discharge? Yes   Comments regarding PCP 5/12/23   Has the patient kept scheduled appointments due by today? N/A   What is the Home health agency?  [facility]    Has home health visited the patient within 72 hours of discharge? Yes   Psychosocial issues? No   Did the patient receive a copy of their discharge instructions? Yes   Nursing interventions Reviewed instructions with patient   What is the patient's perception of their health status since discharge? Improving   Is the patient/caregiver able to teach back signs and symptoms related to disease process for when to call PCP? Yes   Is the patient/caregiver able to teach back signs and symptoms related to disease process for when to call 911? Yes   Is the patient/caregiver able to teach back the hierarchy of who to call/visit for symptoms/problems? PCP, Specialist, Home health nurse, Urgent Care, ED, 911 Yes   If the patient is a current smoker, are they able to teach back resources for cessation? Not a smoker   Week 1 call completed? Yes   Is the patient interested in additional calls from an ambulatory case  manager?  NOTE:  applies to high risk patients requiring additional follow-up. No   Wrap up additional comments Spouse states pt is still very weak, and nauseated when trying to eat,  spouse denies pt is vomiting. Spouse advised to have patient drink 1/3 of a protein drink, and try a quarter part of a sandwhich, and then offer small portions every 2-3 hours,  spouse verbalized understanding. Reviewed AVS/medication with spouse. Spouse verified PCP hospital fu appt on 5/12/23, cardiology on 6/7/23. Spouse will inquired about needed CMP at PCP fu appt tomorrow.           Diane TINOCO - Registered Nurse

## 2023-05-12 ENCOUNTER — APPOINTMENT (OUTPATIENT)
Dept: GENERAL RADIOLOGY | Facility: HOSPITAL | Age: 78
End: 2023-05-12
Payer: MEDICARE

## 2023-05-12 ENCOUNTER — HOSPITAL ENCOUNTER (EMERGENCY)
Facility: HOSPITAL | Age: 78
Discharge: HOME | End: 2023-05-12
Attending: EMERGENCY MEDICINE
Payer: MEDICARE

## 2023-05-12 VITALS
BODY MASS INDEX: 22.13 KG/M2 | DIASTOLIC BLOOD PRESSURE: 81 MMHG | HEIGHT: 73 IN | WEIGHT: 167 LBS | SYSTOLIC BLOOD PRESSURE: 118 MMHG | OXYGEN SATURATION: 94 % | TEMPERATURE: 97.7 F | HEART RATE: 64 BPM | RESPIRATION RATE: 18 BRPM

## 2023-05-12 DIAGNOSIS — E86.9 VOLUME DEPLETION: Primary | ICD-10-CM

## 2023-05-12 DIAGNOSIS — I50.22 CHRONIC SYSTOLIC CHF (CONGESTIVE HEART FAILURE): ICD-10-CM

## 2023-05-12 DIAGNOSIS — N17.9 AKI (ACUTE KIDNEY INJURY): ICD-10-CM

## 2023-05-12 LAB
ALBUMIN SERPL-MCNC: 4.2 G/DL (ref 3.5–5.2)
ALBUMIN/GLOB SERPL: 1.4 G/DL
ALP SERPL-CCNC: 100 U/L (ref 39–117)
ALT SERPL W P-5'-P-CCNC: 131 U/L (ref 1–41)
ANION GAP SERPL CALCULATED.3IONS-SCNC: 16 MMOL/L (ref 5–15)
AST SERPL-CCNC: 95 U/L (ref 1–40)
BASOPHILS # BLD AUTO: 0.03 10*3/MM3 (ref 0–0.2)
BASOPHILS NFR BLD AUTO: 0.4 % (ref 0–1.5)
BILIRUB SERPL-MCNC: 0.4 MG/DL (ref 0–1.2)
BUN SERPL-MCNC: 51 MG/DL (ref 8–23)
BUN/CREAT SERPL: 28.7 (ref 7–25)
CALCIUM SPEC-SCNC: 9.6 MG/DL (ref 8.6–10.5)
CHLORIDE SERPL-SCNC: 97 MMOL/L (ref 98–107)
CO2 SERPL-SCNC: 24 MMOL/L (ref 22–29)
CREAT SERPL-MCNC: 1.78 MG/DL (ref 0.76–1.27)
DEPRECATED RDW RBC AUTO: 44.7 FL (ref 37–54)
EGFRCR SERPLBLD CKD-EPI 2021: 38.3 ML/MIN/1.73
EOSINOPHIL # BLD AUTO: 0.18 10*3/MM3 (ref 0–0.4)
EOSINOPHIL NFR BLD AUTO: 2.1 % (ref 0.3–6.2)
ERYTHROCYTE [DISTWIDTH] IN BLOOD BY AUTOMATED COUNT: 13.8 % (ref 12.3–15.4)
GLOBULIN UR ELPH-MCNC: 3 GM/DL
GLUCOSE SERPL-MCNC: 108 MG/DL (ref 65–99)
HCT VFR BLD AUTO: 42.1 % (ref 37.5–51)
HGB BLD-MCNC: 13.5 G/DL (ref 13–17.7)
HOLD SPECIMEN: NORMAL
IMM GRANULOCYTES # BLD AUTO: 0.05 10*3/MM3 (ref 0–0.05)
IMM GRANULOCYTES NFR BLD AUTO: 0.6 % (ref 0–0.5)
LYMPHOCYTES # BLD AUTO: 1.22 10*3/MM3 (ref 0.7–3.1)
LYMPHOCYTES NFR BLD AUTO: 14.3 % (ref 19.6–45.3)
MAGNESIUM SERPL-MCNC: 2.3 MG/DL (ref 1.6–2.4)
MCH RBC QN AUTO: 28.6 PG (ref 26.6–33)
MCHC RBC AUTO-ENTMCNC: 32.1 G/DL (ref 31.5–35.7)
MCV RBC AUTO: 89.2 FL (ref 79–97)
MONOCYTES # BLD AUTO: 1.1 10*3/MM3 (ref 0.1–0.9)
MONOCYTES NFR BLD AUTO: 12.9 % (ref 5–12)
NEUTROPHILS NFR BLD AUTO: 5.95 10*3/MM3 (ref 1.7–7)
NEUTROPHILS NFR BLD AUTO: 69.7 % (ref 42.7–76)
NRBC BLD AUTO-RTO: 0 /100 WBC (ref 0–0.2)
NT-PROBNP SERPL-MCNC: 3231 PG/ML (ref 0–1800)
PLATELET # BLD AUTO: 267 10*3/MM3 (ref 140–450)
PMV BLD AUTO: 10.2 FL (ref 6–12)
POTASSIUM SERPL-SCNC: 4.1 MMOL/L (ref 3.5–5.2)
PROT SERPL-MCNC: 7.2 G/DL (ref 6–8.5)
RBC # BLD AUTO: 4.72 10*6/MM3 (ref 4.14–5.8)
SODIUM SERPL-SCNC: 137 MMOL/L (ref 136–145)
TROPONIN T SERPL HS-MCNC: 19 NG/L
WBC NRBC COR # BLD: 8.53 10*3/MM3 (ref 3.4–10.8)
WHOLE BLOOD HOLD COAG: NORMAL
WHOLE BLOOD HOLD SPECIMEN: NORMAL

## 2023-05-12 PROCEDURE — 99285 EMERGENCY DEPT VISIT HI MDM: CPT

## 2023-05-12 PROCEDURE — 84484 ASSAY OF TROPONIN QUANT: CPT | Performed by: EMERGENCY MEDICINE

## 2023-05-12 PROCEDURE — 83880 ASSAY OF NATRIURETIC PEPTIDE: CPT | Performed by: EMERGENCY MEDICINE

## 2023-05-12 PROCEDURE — 80053 COMPREHEN METABOLIC PANEL: CPT | Performed by: EMERGENCY MEDICINE

## 2023-05-12 PROCEDURE — 99284 EMERGENCY DEPT VISIT MOD MDM: CPT

## 2023-05-12 PROCEDURE — 85025 COMPLETE CBC W/AUTO DIFF WBC: CPT | Performed by: EMERGENCY MEDICINE

## 2023-05-12 PROCEDURE — 93005 ELECTROCARDIOGRAM TRACING: CPT | Performed by: EMERGENCY MEDICINE

## 2023-05-12 PROCEDURE — 71045 X-RAY EXAM CHEST 1 VIEW: CPT

## 2023-05-12 PROCEDURE — 83735 ASSAY OF MAGNESIUM: CPT | Performed by: EMERGENCY MEDICINE

## 2023-05-12 RX ORDER — SODIUM CHLORIDE 0.9 % (FLUSH) 0.9 %
10 SYRINGE (ML) INJECTION AS NEEDED
Status: DISCONTINUED | OUTPATIENT
Start: 2023-05-12 | End: 2023-05-12 | Stop reason: HOSPADM

## 2023-05-12 RX ADMIN — SODIUM CHLORIDE, POTASSIUM CHLORIDE, SODIUM LACTATE AND CALCIUM CHLORIDE 500 ML: 600; 310; 30; 20 INJECTION, SOLUTION INTRAVENOUS at 19:50

## 2023-05-12 RX ADMIN — SODIUM CHLORIDE 500 ML: 9 INJECTION, SOLUTION INTRAVENOUS at 17:43

## 2023-05-12 NOTE — ED PROVIDER NOTES
Subjective   History of Present Illness    Pt presents with low blood pressure, sent from PCP office.  He has severe cardiomyopathy and has had multiple recent admissions for ventricular arrhythmias, most recently discharged about four days ago.  Since going home he has had postural lightheadedness to the point that he feels faint and cannot stand.  He had followup appt with PCP today and had a standing systolic blood pressure of 70 and he was sent here for IV fluids.  He denies chest pain, fever, cough, dyspnea, vomiting, diarrhea, leg swelling.  His ICD has not fired since discharge.    Per medical records he has EF less than 20% on echo last week and has had both VT and VF recently.      History provided by:  Patient, spouse and medical records      Review of Systems   Constitutional: Negative for fever.   Respiratory: Negative for cough and shortness of breath.    Cardiovascular: Negative for chest pain, palpitations and leg swelling.   Gastrointestinal: Negative for abdominal pain.   Neurological: Positive for light-headedness.   All other systems reviewed and are negative.      Past Medical History:   Diagnosis Date   • Arthritis    • BPH (benign prostatic hypertrophy) 12/28/2016   • CAD (coronary artery disease)    • Disease of thyroid gland    • Enlarged prostate    • HFrEF (heart failure with reduced ejection fraction)    • History of transfusion     NO REACTION RECALLED    • Hypertension    • LBBB (left bundle branch block) 12/28/2016   • Lung nodule    • Macular degeneration    • Pancreatic cyst 12/08/2022   • Polio     as a child   • Pulmonary embolism    • Pulmonary emphysema 12/28/2016   • Sepsis     A. Secondary to [place] trauma with hematuria and urosepsis requiring hospitalization May 2015   • Sleep apnea with use of continuous positive airway pressure (CPAP)     CPAP- SETTING 4    • Ventricular tachycardia 12/28/2016   • Wears glasses     READERS       No Known Allergies    Past Surgical History:    Procedure Laterality Date   • BIVENTRICULLAR IMPLANTABLE CARDIOVERTER DEFIBRILLATOR PLACEMENT     • CARDIAC CATHETERIZATION N/A 2017    Procedure: Left Heart Cath;  Surgeon: Diego Ayala MD;  Location:  [facility];  Service:    • CARDIAC DEFIBRILLATOR PLACEMENT       A. ICD generator change out with upgrade to BiV pacemaker implantable cardioverter    defibrillator, 2007. Ventricular fibrillation s/p pacesetter [place] ICD in [place] 2000   • CARDIAC ELECTROPHYSIOLOGY PROCEDURE N/A 10/2/2017    Procedure: BIV ICD  generator change SJ;  Surgeon: Ferdinand Alba MD;  Location:  [facility];  Service:    • CARDIAC ELECTROPHYSIOLOGY PROCEDURE N/A 2021    Procedure: BIV ICD generator change with [employer]. This will need to be done in late  or early July. Hold Xarelto one day prior.;  Surgeon: Ferdinand Alba MD;  Location:  [facility];  Service: Cardiology;  Laterality: N/A;   • CATARACT EXTRACTION      Bilateral    • COLONOSCOPY     • CORONARY ANGIOPLASTY WITH STENT PLACEMENT      X7 STENTS TOTAL    • CYSTOSCOPY URETERAL TUMOR FULGURATION  2015     A. Status post cystoscopy with clot evacuation and fulguration of the prostate secondary to hematuria, 2015.   • FOOT SURGERY      RIGHT - POLIO RELATED    • PROSTATE SURGERY      A. Status post laser vaporization, 2009.   • ROOT CANAL     • TOOTH EXTRACTION         Family History   Problem Relation Age of Onset   • Heart failure Father    • Stroke Father        Social History     Socioeconomic History   • Marital status:    Tobacco Use   • Smoking status: Former     Packs/day: 2.00     Years: 30.00     Pack years: 60.00     Types: Cigarettes     Quit date: 1995     Years since quittin.1   • Smokeless tobacco: Never   Vaping Use   • Vaping Use: Never used   Substance and Sexual Activity   • Alcohol use: No   • Drug use: No   • Sexual activity: Defer            Objective   Physical Exam  Vitals and nursing note reviewed.   Constitutional:       General: He is not in acute distress.     Appearance: Normal appearance. He is not ill-appearing.   HENT:      Head: Normocephalic and atraumatic.      Mouth/Throat:      Mouth: Mucous membranes are moist.   Eyes:      General: No scleral icterus.        Right eye: No discharge.         Left eye: No discharge.      Conjunctiva/sclera: Conjunctivae normal.   Cardiovascular:      Rate and Rhythm: Normal rate and regular rhythm.      Heart sounds: Murmur heard.   Pulmonary:      Effort: Pulmonary effort is normal. No respiratory distress.      Breath sounds: Normal breath sounds. No wheezing.   Abdominal:      General: Bowel sounds are normal. There is no distension.      Palpations: Abdomen is soft.      Tenderness: There is no abdominal tenderness. There is no guarding or rebound.   Musculoskeletal:         General: No swelling. Normal range of motion.      Cervical back: Normal range of motion and neck supple.      Right lower leg: No edema.      Left lower leg: No edema.   Skin:     General: Skin is warm and dry.      Findings: No rash.   Neurological:      General: No focal deficit present.      Mental Status: He is alert. Mental status is at baseline.   Psychiatric:         Mood and Affect: Mood normal.         Behavior: Behavior normal.         Thought Content: Thought content normal.         Procedures           ED Course    Reviewed cardiology notes.  He did have over 3L UOP on diuretics recent hospitalization.     EKG paced.  CXR NAD.  Labs notable for BERHANE.      Pt given saline in two 500 cc boluses due to his severely depressed EF.  He feels better after fluids and was able to ambulate with a walker, which is his baseline.    Long talk with pt and wife at the bedside.  He has had multiple recent hospitalizations and he does not want to be admitted if it can be avoided.  We discussed his tenuous cardiovascular  status and concerns about orthostasis leading to falls and injuries such as hip and head.  He understands all of this. He promises to be better about fluid intake and to return if he feels worse.  Wife is agreeable.                                  Medical Decision Making  BERHANE (acute kidney injury): acute illness or injury  Chronic systolic CHF (congestive heart failure): chronic illness or injury  Volume depletion: acute illness or injury with systemic symptoms  Amount and/or Complexity of Data Reviewed  Independent Historian: spouse  Labs: ordered. Decision-making details documented in ED Course.  Radiology: ordered. Decision-making details documented in ED Course.  ECG/medicine tests: ordered and independent interpretation performed. Decision-making details documented in ED Course.      Risk  Prescription drug management.  Decision regarding hospitalization.          Final diagnoses:   Volume depletion   BERHANE (acute kidney injury)   Chronic systolic CHF (congestive heart failure)       ED Disposition  ED Disposition     ED Disposition   Discharge    Condition   Stable    Comment   --             Brian Lewis MD  [address] [address]  [phone]    Call in 1 day           Medication List      No changes were made to your prescriptions during this visit.          Phil Gilman MD  05/13/23 [id number]

## 2023-05-13 LAB
QT INTERVAL: 580 MS
QTC INTERVAL: 626 MS

## 2023-05-17 LAB
QT INTERVAL: 578 MS
QTC INTERVAL: 624 MS

## 2023-05-19 ENCOUNTER — READMISSION MANAGEMENT (OUTPATIENT)
Dept: CALL CENTER | Facility: HOSPITAL | Age: 78
End: 2023-05-19
Payer: MEDICARE

## 2023-05-19 NOTE — OUTREACH NOTE
Medical Week 2 Survey    Flowsheet Row Responses   [facility] patient discharged from? [place]   Does the patient have one of the following disease processes/diagnoses(primary or secondary)? Other   Week 2 attempt successful? Yes   Call start time 1448   Discharge diagnosis Recurrent monomorphic ventricular tachycardia   Call end time 1455   Person spoke with today (if not patient) and relationship spouse- Rashad Thomass reviewed with patient/caregiver? Yes   Prescription comments Tolerating medication now   Is the patient taking all medications as directed (includes completed medication regime)? Yes   Does the patient have a primary care provider?  Yes   Comments regarding PCP Patient has seen his pcp.   Has the patient kept scheduled appointments due by today? N/A   What is the Home health agency?  [facility]    Has home health visited the patient within 72 hours of discharge? Yes   Home health comments PT is still coming.   Psychosocial issues? No   Did the patient receive a copy of their discharge instructions? Yes   Nursing interventions Reviewed instructions with patient   What is the patient's perception of their health status since discharge? Improving   Is the patient/caregiver able to teach back signs and symptoms related to disease process for when to call PCP? Yes   Is the patient/caregiver able to teach back signs and symptoms related to disease process for when to call 911? Yes   Is the patient/caregiver able to teach back the hierarchy of who to call/visit for symptoms/problems? PCP, Specialist, Home health nurse, Urgent Care, ED, 911 Yes   Additional teach back comments 02-97, BP WNL per spouse.   Week 2 Call Completed? Yes   Graduated Yes   Is the patient interested in additional calls from an ambulatory ?  NOTE:  applies to high risk patients requiring additional follow-up. No   Graduated/Revoked comments Patient improving. no concerns   Wrap up additional comments Spouse  states he is doing better, [facility] is still coming. Follows closely with Dr. Lewis. Patient tolerating medication and food better. Spouse follows his lab work closely. Will fu with pcp in regards BUN/CRT. No other  concerns or questions noted.          Beata PRECIADO - Registered Nurse

## 2023-05-23 ENCOUNTER — LAB (OUTPATIENT)
Dept: LAB | Facility: HOSPITAL | Age: 78
End: 2023-05-23
Payer: MEDICARE

## 2023-05-23 ENCOUNTER — TELEPHONE (OUTPATIENT)
Dept: CARDIOLOGY | Facility: CLINIC | Age: 78
End: 2023-05-23
Payer: MEDICARE

## 2023-05-23 DIAGNOSIS — I47.20 VENTRICULAR TACHYCARDIA: ICD-10-CM

## 2023-05-23 DIAGNOSIS — I47.20 VENTRICULAR TACHYCARDIA: Primary | ICD-10-CM

## 2023-05-23 LAB
ALBUMIN SERPL-MCNC: 4 G/DL (ref 3.5–5.2)
ALBUMIN/GLOB SERPL: 1.7 G/DL
ALP SERPL-CCNC: 72 U/L (ref 39–117)
ALT SERPL W P-5'-P-CCNC: 81 U/L (ref 1–41)
ANION GAP SERPL CALCULATED.3IONS-SCNC: 14.9 MMOL/L (ref 5–15)
AST SERPL-CCNC: 43 U/L (ref 1–40)
BILIRUB SERPL-MCNC: 0.4 MG/DL (ref 0–1.2)
BUN SERPL-MCNC: 23 MG/DL (ref 8–23)
BUN/CREAT SERPL: 20 (ref 7–25)
CALCIUM SPEC-SCNC: 9.2 MG/DL (ref 8.6–10.5)
CHLORIDE SERPL-SCNC: 101 MMOL/L (ref 98–107)
CO2 SERPL-SCNC: 21.1 MMOL/L (ref 22–29)
CREAT SERPL-MCNC: 1.15 MG/DL (ref 0.76–1.27)
EGFRCR SERPLBLD CKD-EPI 2021: 64.7 ML/MIN/1.73
GLOBULIN UR ELPH-MCNC: 2.3 GM/DL
GLUCOSE SERPL-MCNC: 87 MG/DL (ref 65–99)
POTASSIUM SERPL-SCNC: 4.2 MMOL/L (ref 3.5–5.2)
PROT SERPL-MCNC: 6.3 G/DL (ref 6–8.5)
SODIUM SERPL-SCNC: 137 MMOL/L (ref 136–145)

## 2023-05-23 PROCEDURE — 36415 COLL VENOUS BLD VENIPUNCTURE: CPT

## 2023-05-23 PROCEDURE — 80053 COMPREHEN METABOLIC PANEL: CPT

## 2023-05-23 NOTE — TELEPHONE ENCOUNTER
----- Message from PAULA Stanley sent at 5/23/2023 11:42 AM EDT -----  Gemini,   Can you call this patient and have him to get a CMP to check liver function tests now that he is on Amiodarone 400 mg daily, instead of the 200 mg TID dose. His LFTs were high on 5/12/2023 and I want to recheck them now. Thanks!

## 2023-05-23 NOTE — TELEPHONE ENCOUNTER
Ok thank you for the update. If he is needing nausea medication frequently, I would like him to stop Zofran and use Phenergan instead due to potential for QT prolongation. Please send in Phenergan 6.25 mg Q8H PRN. Thanks.

## 2023-05-23 NOTE — TELEPHONE ENCOUNTER
Patient's wife updated and aware.   Patient continues to be nauseas with Mexiletine but wife states she has been giving him zofran to help. He does have a follow up appointment on 6/7/23.

## 2023-05-24 ENCOUNTER — TELEPHONE (OUTPATIENT)
Dept: CARDIOLOGY | Facility: CLINIC | Age: 78
End: 2023-05-24
Payer: MEDICARE

## 2023-05-24 RX ORDER — PROMETHAZINE HYDROCHLORIDE 12.5 MG/1
6.25 TABLET ORAL EVERY 8 HOURS PRN
Qty: 15 TABLET | Refills: 1 | Status: SHIPPED | OUTPATIENT
Start: 2023-05-24

## 2023-05-24 NOTE — TELEPHONE ENCOUNTER
Called Cristina to let him know that his liver tests, Cr, and potassium look better. He has been sick/nauseous with his Mexiletine, so Dr. Alba said he could take it 200 mg once daily. Continue Amiodarone 400 mg daily.     Electronically signed by PAULA Arrington, 05/24/23, 1:15 PM EDT.

## 2023-05-26 ENCOUNTER — TELEPHONE (OUTPATIENT)
Dept: CARDIOLOGY | Facility: CLINIC | Age: 78
End: 2023-05-26
Payer: MEDICARE

## 2023-05-26 NOTE — TELEPHONE ENCOUNTER
----- Message from Jonnie Roth sent at 5/25/2023  7:48 AM EDT -----  Regarding: Mexilitine  Contact: [phone]  Thank you for clarification of medication schedule: one Mexitil 200MG capsule/day; two Amioderone 200MG tablets/day (400 MG); Ilda feels much better this morning;   Also, is Torsemide dose only 5MG / day?

## 2023-06-07 ENCOUNTER — OFFICE VISIT (OUTPATIENT)
Dept: CARDIOLOGY | Facility: CLINIC | Age: 78
End: 2023-06-07
Payer: MEDICARE

## 2023-06-07 VITALS
HEART RATE: 67 BPM | BODY MASS INDEX: 21.2 KG/M2 | HEIGHT: 73 IN | DIASTOLIC BLOOD PRESSURE: 60 MMHG | OXYGEN SATURATION: 97 % | WEIGHT: 160 LBS | SYSTOLIC BLOOD PRESSURE: 118 MMHG

## 2023-06-07 DIAGNOSIS — I47.29 VENTRICULAR TACHYCARDIA, MONOMORPHIC: Primary | ICD-10-CM

## 2023-06-07 DIAGNOSIS — I25.5 ISCHEMIC CARDIOMYOPATHY: ICD-10-CM

## 2023-06-07 DIAGNOSIS — I50.22 CHRONIC HFREF (HEART FAILURE WITH REDUCED EJECTION FRACTION): ICD-10-CM

## 2023-06-07 RX ORDER — LANOLIN ALCOHOL/MO/W.PET/CERES
400 CREAM (GRAM) TOPICAL DAILY
COMMUNITY

## 2023-06-07 NOTE — PROGRESS NOTES
Jonnie Roth  1945  [phone]    06/07/2023    [facility]     Referring Provider: No ref. provider found     Brian Lewis MD  [address]  [place] [id number]    Chief Complaint   Patient presents with    Ventricular tachycardia       Problem List:        Sudden cardiac death with primary ventricular fibrillation status post Pacesetter [place] ICD, [place], [place] in September 2000:   ICD generator change out with upgrade to a biventricular pacemaker ICD on 12/17/2007, [employer] device.  ICD discharge, 08/09/2012, secondary to ventricular flutter with initiation of amiodarone therapy.   Hospitalization, 02/01/2014, secondary to ICD discharge for ventricular tachycardia with subsequent discontinuation of Coreg and initiation of sotalol therapy.   Hospitalization, February 2014, secondary to ICD discharge for VT with subsequent discontinuation of Coreg and initiation of sotalol.  Echocardiogram, 04/07/2015: EF less than 20%.  Hospitalization secondary to VF and ICD shocks, 04/18/2015.  NIPS procedure with defibrillation threshold testing for VF and noninvasive program stimulation, 04/18/2015.   Initiation of mexiletine for recurrent ventricular tachycardia with ICD shocks on 05/05/2015 with amiodarone load, recurrent VT and ICD shocks with hospitalization 05/16/2015-05/18/2015.   EP study with RFA of large anterior left ventricular scar extending from mid-left ventricular wall down apex by Dr. Ferdinand Alba, 05/21/2015.  ICD generator change 10/2017  Echo 11/19 LVEF 30%, mild TR  ICD generator change 07/2021  ICD shocks for VT/VF 9/2022, ICD reprogrammed with less ATP attemps, started on Amiodarone  ICD shocks for VT/VF in setting of Flu A/dehydration 12/2022  ICD shocks x 2  for VT on 4/26/2023, Amiodarone increased, Mexiletine added  ER presentation for VT with ICD shock x 1 at home, external ECV in ED for VT at 138 bpm with hypotension  5/2/2023                Ischemic heart disease:  Remote progressive angina pectoris/acute extensive anterolateral myocardial infarction/delayed presentation with thrombolysis/severe 3-vessel coronary atherosclerosis with severe single vessel involvement/PTCA with intracoronary stent deployment proximal-mid segment LAD/moderate-severe compensated left ventricular dysfunction. LVEF (0.35)/abnormal positive signal averaged EKG/oral anticoagulation, April 1995.  Remote NYHA class I-II angina pectoris/class III CHF/abnormal quantitative SPECT gated Cardiolite GXT, July 1998.  Stable persistent MUGA, LVEF (0.33, January 1999 and January 2000).   Residual NYHA class I angina pectoris/CHF with reduced MUGA scan: LVEF (0.25), April 2002.  Left heart catheterization with distal circumflex disease: EF 20%, September 2002.   MUGA in May 2003: EF 32%.   Sestamibi GXT on 04/08/2005: No ischemia. EF 29%.   Residual NYHA class I angina pectoris/CHF with reduced acceptable MUGA scan: EF (0.32) and acceptable Pacesetter PCD interrogation/reprogramming, May 2003 with interrogation, September 2004.  Echocardiogram, September 2009 with EF 30%  Left heart catheterization by Dr. Bhupinder Gonzalez, August 2010, with LVEF of 35%, with 3-vessel native coronary artery disease, 95% mid-LAD status post PTCA and stenting with two 3 mm stents by Dr. Llanes.  Echocardiogram, 06/07/2012: Left ventricular ejection fraction of 30%.  Hospitalization, 02/01/2014, for non-ST elevation MI, left heart catheterization by Dr. Ayala that demonstrated 60% mid stenosis of the right coronary artery that remains unchanged compared to previous, widely patent stents of the LAD with severe LV dysfunction of approximately 25%.   Left heart catheterization status post drug-eluting stent to the distal LAD and mid-RCA with an EF of 20% to 25% by Dr. Diego Ayala, 04/20/2015.   Left heart catheterization 1/16/17; nonobstructive CAD with patent previously placed stents,  dilated cardiomyopathy with severe LV systolic dysfunction, EF less than 20%, normal hemodynamics, recommendations for continued medical therapy and risk factor, evaluation for noncardiac etiology of symptoms.  Residual CCS Class I/II angina pectoris/NYHA Class II exertional dyspnea and fatigue syndrome, summer 2017  Residual CCS 0 angina pectoris/NYHA class I-II exertional dyspnea and fatigue, February 2018, September 2018, March 2019.  Echocardiogram 5/2/2023: LV dilated, LV systolic function severely decreased, LVEF appears less than 20%, anterior and anterior lateral segments and apex are akinetic, LA cavity dilated, LA volume moderately increased, mild to moderate regurgitation present  Dyslipidemia.  Status post remote operations.  Remote chronic tobacco use/abnormal chest x-ray with stable abnormal thoracic  CT scan without contrast demonstrating bilateral diffuse perimeter scarring and fibrosis with scattered subcentimeter noncalcified nodules (unchanged from February 2016), March 2017.  Left bundle branch block.  Burks trauma with hematuria with urosepsis requiring hospitalization, May 2015.  Pulmonary embolism, spring 2015.   Remote apparent hypothyroidism/replacement therapy - data deficit, January 2016.  Remote diagnosis of obstructive sleep apnea with CPAP use, 2017.  Bilateral cataract extraction November 2018, February 2019    Allergies  No Known Allergies    Current Medications    Current Outpatient Medications:     amiodarone (PACERONE) 200 MG tablet, Take 1 tablet by mouth 3 (Three) Times a Day. Take 200 mg three times daily x 10 days, then 400 mg daily. (Patient taking differently: Take 2 tablets by mouth Daily.), Disp: 60 tablet, Rfl: 6    carvedilol (COREG) 6.25 MG tablet, Take 1 tablet by mouth 2 (Two) Times a Day With Meals., Disp: 60 tablet, Rfl: 6    cholecalciferol (VITAMIN D3) 25 MCG (1000 UT) tablet, Take 4 tablets by mouth Daily. OTC, Disp: , Rfl:     clopidogrel (PLAVIX) 75 MG tablet,  Take 1 tablet by mouth Every Morning., Disp: 90 tablet, Rfl: 3    empagliflozin (JARDIANCE) 10 MG tablet tablet, Take 1 tablet by mouth Daily., Disp: 90 tablet, Rfl: 3    eplerenone (INSPRA) 25 MG tablet, Take 0.5 tablets by mouth Daily., Disp: 90 tablet, Rfl: 0    L-TRYPTOPHAN PO, Take  by mouth Every Night. 3 TABLETS NIGHTLY-     1500 MG EACH, Disp: , Rfl:     levothyroxine (SYNTHROID, LEVOTHROID) 25 MCG tablet, Take 1 tablet by mouth Every Morning., Disp: 30 tablet, Rfl: 6    Magnesium Oxide 400 (240 Mg) MG tablet, Take 1 tablet by mouth Daily., Disp: , Rfl:     melatonin 5 MG tablet tablet, Take 2 tablets by mouth At Night As Needed. OTC, Disp: , Rfl:     metFORMIN (GLUCOPHAGE) 1000 MG tablet, Take 1 tablet by mouth 2 (two) times a day., Disp: , Rfl:     mexiletine (MEXITIL) 200 MG capsule, Take 1 capsule by mouth 3 (Three) Times a Day. (Patient taking differently: Take 1 capsule by mouth Daily.), Disp: 90 capsule, Rfl: 6    Multiple Vitamins-Minerals (ICAPS AREDS 2 PO), Take 1 tablet by mouth 2 (two) times a day. OTC, Disp: , Rfl:     Multiple Vitamins-Minerals (MULTIVITAMIN ADULTS 50+) tablet, Take 1 tablet by mouth Daily. OTC, Disp: , Rfl:     niacin (NIASPAN) 1000 MG CR tablet, Take 1 tablet by mouth Every Night., Disp: , Rfl:     nitroglycerin (NITROSTAT) 0.4 MG SL tablet, Place 1 tablet under the tongue Every 5 (Five) Minutes As Needed for Chest Pain. Take no more than 3 doses in 15 minutes., Disp: 25 tablet, Rfl: 5    pantoprazole (PROTONIX) 40 MG EC tablet, Take 1 tablet by mouth Every Morning., Disp: , Rfl:     polyethylene glycol (MIRALAX) packet, Take 17 g by mouth Every Other Day. OTC, Disp: , Rfl:     potassium chloride (MICRO-K) 10 MEQ CR capsule, Take 2 capsules by mouth Daily., Disp: 60 capsule, Rfl: 6    promethazine (PHENERGAN) 12.5 MG tablet, Take 0.5 tablets by mouth Every 8 (Eight) Hours As Needed for Nausea or Vomiting., Disp: 15 tablet, Rfl: 1    rivaroxaban (Xarelto) 10 MG tablet, Take 1  "tablet by mouth Daily., Disp: 90 tablet, Rfl: 3    torsemide (DEMADEX) 10 MG tablet, Take 1 tablet by mouth Daily., Disp: 30 tablet, Rfl: 6    vitamin B-12 (CYANOCOBALAMIN) 1000 MCG tablet, Take 1 tablet by mouth Daily. OTC, Disp: , Rfl:     History of Present Illness:      Pt presents for follow up of VT, ICM, chronic systolic CHF. He was recently admitted at [facility] for VT with ICD shocks on 4/26/2023 and again on 5/2/2023. Amiodarone was increased and Mexiletine added after the first admission. At the admission 5/2-5/9/2023, he was re bolused with IV amiodarone and discussion was had about possibility of VT ablation. However, it was decided to monitor for recurrence of VT and re-evaluate as an outpatient.  His Jardiance and Entresto were held due to hypotension.  CMP obtained as outpatient showed improvement of his liver enzymes and hypokalemia on 5/24/2023. His Mexiletine was decreased to once daily due to severe nausea. He unfortunately is still having a lot of nausea with no appetite and experiences dry heaves. He is able to drink Premium Protein ok. He tries to eat more frequently and less volume. Has lost 15 lbs. He is severely weak and gets SOB with minimal activity. He denies swelling. No PND/orthopnea. He has not been able to sleep the whole night, tossing and turning. He feels that he has insomnia and is having a hard time. He is not wearing his CPAP. No syncope or ICD shocks. BP at home around 130/80 mmHg on average.     ROS:  General:  + fatigue, weight gain + loss (15 lbs)  Cardiovascular:  Denies CP, PND, syncope, near syncope, edema or palpitations.  Pulmonary:  + LU, - cough, or wheezing      Vitals:    06/07/23 1310   BP: 118/60   BP Location: Left arm   Patient Position: Sitting   Pulse: 67   SpO2: 97%   Weight: 72.6 kg (160 lb)   Height: 185.4 cm (73\")     Body mass index is 21.11 kg/m².  PE:  General: NAD. A & O x 3 . Frail appearing.   Neck: no JVD, no carotid bruits, no TM  Heart RRR, NL S1, S2, " S4 present, no rubs, murmurs  Lungs: CTA, no wheezes, rhonchi, or rales  Abd: soft, non-tender, NL BS  Ext: No musculoskeletal deformities, no edema, cyanosis, or clubbing  Psych: normal mood and affect    Diagnostic Data:    ICD Manual Interrogation:   RA paced >99% Biv Paced 99%. Normal thresholds. LV threshold 2.25 v @ 1.5 ms.   1.2 years on battery. NO VT or AFIB.       ECG 12 Lead    Date/Time: 6/7/2023 1:41 PM  Performed by: Shauna Byrne PA  Authorized by: Shauna Byrne PA   Comparison: compared with previous ECG from 5/12/2023  Similar to previous ECG  Rhythm: sinus rhythm and paced  BPM: 70            1. Ventricular tachycardia, monomorphic    2. Chronic HFrEF with LVEF less than 20% ([facility])    3. Ischemic cardiomyopathy (EF < 20%)          Plan:  VT:  Recurrent episodes of VT with recurrent admissions, 4 since 9/2022. Most recent admission 5/2022, adjusted Amiodarone and mexiletine. Not tolerating mexiletine well due to nausea. ICD interrogation today shows no recurrent VT.   We will discontinue mexiletine today due to his persistent nausea.  Long discussion with the patient and his wife.  We will pursue catheter ablation since the patient is so symptomatic and his quality of life is so poor on amiodarone and mexiletine and he  is at high risk for recurrent ventricular arrhythmias and ICD shocks.  They understand the risk and benefits of the procedure and that he is at very high risk for possible complication including stroke and even death.  Chronic CHF/ICM:  Class III symptoms  Echocardiogram 5/2/2023: LV dilated, LV systolic function severely decreased, LVEF appears less than 20%, anterior and anterior lateral segments and apex are akinetic, LA cavity dilated, LA volume moderately increased, mild to moderate regurgitation present  Coreg, Entresto held during last admission. Jardiance, Torsemide, Inspra     F/up in 6 months    Scribed for Ferdinand Alba MD by Shauna Byrne PA-C. 6/7/2023  13:42  EDT    I, Ferdinand Alba MD, personally performed the services described in this documentation as scribed by the above named individual in my presence, and it is both accurate and complete.  6/7/2023  13:53 EDT

## 2023-06-12 ENCOUNTER — TREATMENT (OUTPATIENT)
Dept: CARDIAC REHAB | Facility: HOSPITAL | Age: 78
End: 2023-06-12

## 2023-06-12 DIAGNOSIS — Z00.00 PREVENTATIVE HEALTH CARE: Primary | ICD-10-CM

## 2023-06-13 ENCOUNTER — TREATMENT (OUTPATIENT)
Dept: CARDIAC REHAB | Facility: HOSPITAL | Age: 78
End: 2023-06-13

## 2023-06-13 DIAGNOSIS — Z00.00 PREVENTATIVE HEALTH CARE: Primary | ICD-10-CM

## 2023-06-14 ENCOUNTER — TREATMENT (OUTPATIENT)
Dept: CARDIAC REHAB | Facility: HOSPITAL | Age: 78
End: 2023-06-14

## 2023-06-14 DIAGNOSIS — Z00.00 PREVENTATIVE HEALTH CARE: Primary | ICD-10-CM

## 2023-06-15 ENCOUNTER — TREATMENT (OUTPATIENT)
Dept: CARDIAC REHAB | Facility: HOSPITAL | Age: 78
End: 2023-06-15

## 2023-06-15 DIAGNOSIS — Z00.00 PREVENTATIVE HEALTH CARE: Primary | ICD-10-CM

## 2023-06-16 ENCOUNTER — TREATMENT (OUTPATIENT)
Dept: CARDIAC REHAB | Facility: HOSPITAL | Age: 78
End: 2023-06-16

## 2023-06-16 DIAGNOSIS — Z00.00 PREVENTATIVE HEALTH CARE: Primary | ICD-10-CM

## 2023-06-18 ENCOUNTER — APPOINTMENT (OUTPATIENT)
Dept: CT IMAGING | Facility: HOSPITAL | Age: 78
End: 2023-06-18
Payer: MEDICARE

## 2023-06-18 ENCOUNTER — HOSPITAL ENCOUNTER (EMERGENCY)
Facility: HOSPITAL | Age: 78
Discharge: HOME | End: 2023-06-18
Attending: EMERGENCY MEDICINE | Admitting: EMERGENCY MEDICINE
Payer: MEDICARE

## 2023-06-18 VITALS
SYSTOLIC BLOOD PRESSURE: 131 MMHG | WEIGHT: 158 LBS | BODY MASS INDEX: 20.94 KG/M2 | DIASTOLIC BLOOD PRESSURE: 91 MMHG | HEART RATE: 70 BPM | HEIGHT: 73 IN | RESPIRATION RATE: 16 BRPM | OXYGEN SATURATION: 97 % | TEMPERATURE: 97.5 F

## 2023-06-18 DIAGNOSIS — K56.41 FECAL IMPACTION: ICD-10-CM

## 2023-06-18 DIAGNOSIS — R33.8 ACUTE URINARY RETENTION: Primary | ICD-10-CM

## 2023-06-18 LAB
ALBUMIN SERPL-MCNC: 4 G/DL (ref 3.5–5.2)
ALBUMIN/GLOB SERPL: 1.7 G/DL
ALP SERPL-CCNC: 69 U/L (ref 39–117)
ALT SERPL W P-5'-P-CCNC: 27 U/L (ref 1–41)
ANION GAP SERPL CALCULATED.3IONS-SCNC: 11 MMOL/L (ref 5–15)
AST SERPL-CCNC: 25 U/L (ref 1–40)
BASOPHILS # BLD AUTO: 0.02 10*3/MM3 (ref 0–0.2)
BASOPHILS NFR BLD AUTO: 0.2 % (ref 0–1.5)
BILIRUB SERPL-MCNC: 0.4 MG/DL (ref 0–1.2)
BILIRUB UR QL STRIP: NEGATIVE
BUN SERPL-MCNC: 26 MG/DL (ref 8–23)
BUN/CREAT SERPL: 26.8 (ref 7–25)
CALCIUM SPEC-SCNC: 8.7 MG/DL (ref 8.6–10.5)
CHLORIDE SERPL-SCNC: 101 MMOL/L (ref 98–107)
CLARITY UR: CLEAR
CO2 SERPL-SCNC: 26 MMOL/L (ref 22–29)
COLOR UR: YELLOW
CREAT SERPL-MCNC: 0.97 MG/DL (ref 0.76–1.27)
D-LACTATE SERPL-SCNC: 1 MMOL/L (ref 0.5–2)
D-LACTATE SERPL-SCNC: 2.6 MMOL/L (ref 0.5–2)
DEPRECATED RDW RBC AUTO: 47.3 FL (ref 37–54)
EGFRCR SERPLBLD CKD-EPI 2021: 79.4 ML/MIN/1.73
EOSINOPHIL # BLD AUTO: 0.08 10*3/MM3 (ref 0–0.4)
EOSINOPHIL NFR BLD AUTO: 0.7 % (ref 0.3–6.2)
ERYTHROCYTE [DISTWIDTH] IN BLOOD BY AUTOMATED COUNT: 14.1 % (ref 12.3–15.4)
GEN 5 2HR TROPONIN T REFLEX: 21 NG/L
GLOBULIN UR ELPH-MCNC: 2.3 GM/DL
GLUCOSE SERPL-MCNC: 121 MG/DL (ref 65–99)
GLUCOSE UR STRIP-MCNC: ABNORMAL MG/DL
HCT VFR BLD AUTO: 40.8 % (ref 37.5–51)
HGB BLD-MCNC: 12.7 G/DL (ref 13–17.7)
HGB UR QL STRIP.AUTO: NEGATIVE
HOLD SPECIMEN: NORMAL
IMM GRANULOCYTES # BLD AUTO: 0.05 10*3/MM3 (ref 0–0.05)
IMM GRANULOCYTES NFR BLD AUTO: 0.5 % (ref 0–0.5)
KETONES UR QL STRIP: ABNORMAL
LEUKOCYTE ESTERASE UR QL STRIP.AUTO: NEGATIVE
LYMPHOCYTES # BLD AUTO: 1.1 10*3/MM3 (ref 0.7–3.1)
LYMPHOCYTES NFR BLD AUTO: 10 % (ref 19.6–45.3)
MCH RBC QN AUTO: 28.5 PG (ref 26.6–33)
MCHC RBC AUTO-ENTMCNC: 31.1 G/DL (ref 31.5–35.7)
MCV RBC AUTO: 91.5 FL (ref 79–97)
MONOCYTES # BLD AUTO: 0.64 10*3/MM3 (ref 0.1–0.9)
MONOCYTES NFR BLD AUTO: 5.8 % (ref 5–12)
NEUTROPHILS NFR BLD AUTO: 82.8 % (ref 42.7–76)
NEUTROPHILS NFR BLD AUTO: 9.16 10*3/MM3 (ref 1.7–7)
NITRITE UR QL STRIP: NEGATIVE
NRBC BLD AUTO-RTO: 0 /100 WBC (ref 0–0.2)
NT-PROBNP SERPL-MCNC: 1951 PG/ML (ref 0–1800)
PH UR STRIP.AUTO: 7 [PH] (ref 5–8)
PLATELET # BLD AUTO: 209 10*3/MM3 (ref 140–450)
PMV BLD AUTO: 10.6 FL (ref 6–12)
POTASSIUM SERPL-SCNC: 4.2 MMOL/L (ref 3.5–5.2)
PROT SERPL-MCNC: 6.3 G/DL (ref 6–8.5)
PROT UR QL STRIP: NEGATIVE
RBC # BLD AUTO: 4.46 10*6/MM3 (ref 4.14–5.8)
SODIUM SERPL-SCNC: 138 MMOL/L (ref 136–145)
SP GR UR STRIP: 1.03 (ref 1–1.03)
TROPONIN T DELTA: 1 NG/L
TROPONIN T SERPL HS-MCNC: 20 NG/L
UROBILINOGEN UR QL STRIP: ABNORMAL
WBC NRBC COR # BLD: 11.05 10*3/MM3 (ref 3.4–10.8)
WHOLE BLOOD HOLD COAG: NORMAL
WHOLE BLOOD HOLD SPECIMEN: NORMAL

## 2023-06-18 PROCEDURE — 80053 COMPREHEN METABOLIC PANEL: CPT

## 2023-06-18 PROCEDURE — 87040 BLOOD CULTURE FOR BACTERIA: CPT | Performed by: NURSE PRACTITIONER

## 2023-06-18 PROCEDURE — 96374 THER/PROPH/DIAG INJ IV PUSH: CPT

## 2023-06-18 PROCEDURE — 25510000001 IOPAMIDOL 61 % SOLUTION: Performed by: EMERGENCY MEDICINE

## 2023-06-18 PROCEDURE — 83880 ASSAY OF NATRIURETIC PEPTIDE: CPT | Performed by: NURSE PRACTITIONER

## 2023-06-18 PROCEDURE — 83605 ASSAY OF LACTIC ACID: CPT | Performed by: NURSE PRACTITIONER

## 2023-06-18 PROCEDURE — 74177 CT ABD & PELVIS W/CONTRAST: CPT

## 2023-06-18 PROCEDURE — 81003 URINALYSIS AUTO W/O SCOPE: CPT | Performed by: NURSE PRACTITIONER

## 2023-06-18 PROCEDURE — 99283 EMERGENCY DEPT VISIT LOW MDM: CPT

## 2023-06-18 PROCEDURE — 87150 DNA/RNA AMPLIFIED PROBE: CPT | Performed by: NURSE PRACTITIONER

## 2023-06-18 PROCEDURE — 51702 INSERT TEMP BLADDER CATH: CPT

## 2023-06-18 PROCEDURE — 99284 EMERGENCY DEPT VISIT MOD MDM: CPT

## 2023-06-18 PROCEDURE — 84484 ASSAY OF TROPONIN QUANT: CPT | Performed by: NURSE PRACTITIONER

## 2023-06-18 PROCEDURE — 25010000002 FENTANYL CITRATE (PF) 50 MCG/ML SOLUTION: Performed by: EMERGENCY MEDICINE

## 2023-06-18 PROCEDURE — 85025 COMPLETE CBC W/AUTO DIFF WBC: CPT

## 2023-06-18 PROCEDURE — 87147 CULTURE TYPE IMMUNOLOGIC: CPT | Performed by: NURSE PRACTITIONER

## 2023-06-18 PROCEDURE — 36415 COLL VENOUS BLD VENIPUNCTURE: CPT

## 2023-06-18 RX ORDER — BISACODYL 10 MG
10 SUPPOSITORY, RECTAL RECTAL DAILY
Status: DISCONTINUED | OUTPATIENT
Start: 2023-06-18 | End: 2023-06-18 | Stop reason: HOSPADM

## 2023-06-18 RX ORDER — FENTANYL CITRATE 50 UG/ML
25 INJECTION, SOLUTION INTRAMUSCULAR; INTRAVENOUS ONCE
Status: COMPLETED | OUTPATIENT
Start: 2023-06-18 | End: 2023-06-18

## 2023-06-18 RX ORDER — MAG HYDROX/ALUMINUM HYD/SIMETH 400-400-40
2 SUSPENSION, ORAL (FINAL DOSE FORM) ORAL ONCE
Status: COMPLETED | OUTPATIENT
Start: 2023-06-18 | End: 2023-06-18

## 2023-06-18 RX ORDER — ONDANSETRON 2 MG/ML
4 INJECTION INTRAMUSCULAR; INTRAVENOUS ONCE
Status: DISCONTINUED | OUTPATIENT
Start: 2023-06-18 | End: 2023-06-18 | Stop reason: HOSPADM

## 2023-06-18 RX ADMIN — Medication 10 MG: at 12:41

## 2023-06-18 RX ADMIN — IOPAMIDOL 90 ML: 612 INJECTION, SOLUTION INTRAVENOUS at 11:54

## 2023-06-18 RX ADMIN — GLYCERIN 2 SUPPOSITORY: 2 SUPPOSITORY RECTAL at 14:24

## 2023-06-18 RX ADMIN — SODIUM CHLORIDE 500 ML: 9 INJECTION, SOLUTION INTRAVENOUS at 09:47

## 2023-06-18 RX ADMIN — FENTANYL CITRATE 25 MCG: 50 INJECTION, SOLUTION INTRAMUSCULAR; INTRAVENOUS at 09:46

## 2023-06-18 NOTE — DISCHARGE INSTRUCTIONS
Camargo catheter care; keeping the catheter clean.  Empty at least twice daily.  Dr. Morin, your urologist wants to see you next Monday, a week from now (not tomorrow) at which time he will remove the camargo catheter.      Maintain your best hydration and nutrition.      Sitz baths to soothe your sore rectum.      Resume your usual bowel regimen with once daily miralax.      Return to the ED as needed.

## 2023-06-18 NOTE — ED PROVIDER NOTES
EMERGENCY DEPARTMENT ENCOUNTER    Pt Name: Jonnie Roth  MRN: [id number]  Pt :   1945  Room Number:  15/15  Date of encounter:  2023  PCP: Brian Lewis MD  ED Provider: CHENTE Pham    Historian: Patient and his wife        HPI:  Chief Complaint: fecal impaction with acute urinary retention         Context: Jonnie Roth is a 78 y.o. male who presents to the ED c/o acute discomfort to the anus/rectal area with rectal impaction sensation accompanied by inability to urinary since yesterday around 5pm.      Mr. Roth has a history of urinary retention in the past with prostate enlargement.  He has no prior history of fecal impaction.  He has recently discontinued miralax daily routine.     He denies any abdominal pain or fever.       Review of systems is negative for fever chills or recent illness.  Negative for chest pain or cough or shortness of breath.  GI systems are positive for fecal impaction and constipation without nausea or vomiting or abdominal pain.  Positive for rectal fullness with some bleeding with bearing down.   systems: Positive for acute urinary retention.  Negative for profound weakness, dizziness or syncope.  No neurosensory complaint or focal weakness          PAST MEDICAL HISTORY  Past Medical History:   Diagnosis Date   • Arthritis    • BPH (benign prostatic hypertrophy) 2016   • CAD (coronary artery disease)    • Congenital heart disease    • Disease of thyroid gland    • Enlarged prostate    • HFrEF (heart failure with reduced ejection fraction)    • History of transfusion     NO REACTION RECALLED    • Hypertension    • LBBB (left bundle branch block) 2016   • Lung nodule    • Macular degeneration    • Pancreatic cyst 2022   • Polio     as a child   • Pulmonary embolism    • Pulmonary emphysema 2016   • Sepsis     A. Secondary to Burks trauma with hematuria and urosepsis requiring hospitalization May 2015   • Sleep  apnea with use of continuous positive airway pressure (CPAP)     CPAP- SETTING 4    • Ventricular tachycardia 12/28/2016   • Wears glasses     READERS         PAST SURGICAL HISTORY  Past Surgical History:   Procedure Laterality Date   • BIVENTRICULLAR IMPLANTABLE CARDIOVERTER DEFIBRILLATOR PLACEMENT     • CARDIAC CATHETERIZATION N/A 01/16/2017    Procedure: Left Heart Cath;  Surgeon: Diego Ayala MD;  Location:  [facility];  Service:    • CARDIAC DEFIBRILLATOR PLACEMENT       A. ICD generator change out with upgrade to BiV pacemaker implantable cardioverter    defibrillator, 12/17/2007. Ventricular fibrillation s/p pacesetter [employer] ICD in [place]. [place] 09/2000   • CARDIAC ELECTROPHYSIOLOGY PROCEDURE N/A 10/02/2017    Procedure: BIV ICD  generator change S[place];  Surgeon: Ferdinand Alba MD;  Location:  [facility];  Service:    • CARDIAC ELECTROPHYSIOLOGY PROCEDURE N/A 07/06/2021    Procedure: BIV ICD generator change with [employer]. This will need to be done in late June or early July. Hold Xarelto one day prior.;  Surgeon: Ferdinand Alba MD;  Location:  [facility];  Service: Cardiology;  Laterality: N/A;   • CAROTID STENT  Several   • CATARACT EXTRACTION      Bilateral    • COLONOSCOPY     • CORONARY ANGIOPLASTY WITH STENT PLACEMENT      X7 STENTS TOTAL    • CYSTOSCOPY URETERAL TUMOR FULGURATION  05/29/2015     A. Status post cystoscopy with clot evacuation and fulguration of the prostate secondary to hematuria, 5/29/2015.   • FOOT SURGERY      RIGHT - POLIO RELATED    • INSERT / REPLACE / REMOVE PACEMAKER  2002   • PROSTATE SURGERY      A. Status post laser vaporization, 08/19/2009.   • ROOT CANAL     • TOOTH EXTRACTION           FAMILY HISTORY  Family History   Problem Relation Age of Onset   • Heart failure Father    • Stroke Father          SOCIAL HISTORY  Social History     Socioeconomic History   • Marital status:    Tobacco Use   • Smoking status:  Former     Packs/day: 2.00     Years: 30.00     Pack years: 60.00     Types: Cigarettes     Start date: 1960     Quit date: 1995     Years since quittin.2   • Smokeless tobacco: Never   • Tobacco comments:     Heavy smoker for years   Vaping Use   • Vaping Use: Never used   Substance and Sexual Activity   • Alcohol use: No   • Drug use: No   • Sexual activity: Not Currently     Partners: Female         ALLERGIES  Patient has no known allergies.        REVIEW OF SYSTEMS  Review of Systems     All systems reviewed and negative except for those discussed in HPI.       PHYSICAL EXAM    I have reviewed the triage vital signs and nursing notes.    ED Triage Vitals [23 0655]   Temp Heart Rate Resp BP SpO2   97.5 °F (36.4 °C) 70 14 109/60 96 %      Temp src Heart Rate Source Patient Position BP Location FiO2 (%)   Oral Monitor Sitting Right arm --       Physical Exam  GENERAL:   Appears in no acute distress.  Vital signs are normal.  He is a very good historian.  He has diffuse sarcopenia.  HENT: Nares patent.  EYES: No scleral icterus.  CV: Regular rhythm, regular rate.  No tachycardia.  No peripheral edema  RESPIRATORY: Normal effort.  No audible wheezes, rales or rhonchi.  ABDOMEN: Soft, fullness in the bladder appreciated on palpation.  Digital exam of the rectum reveals a large caliber firm stool at the anus.  No fissure or fistula or acute bleeding or hemorrhoid is appreciated  MUSCULOSKELETAL: No deformities.   NEURO: Alert, moves all extremities, follows commands.  SKIN: Warm, dry, no rash visualized.      LAB RESULTS  Recent Results (from the past 24 hour(s))   Comprehensive Metabolic Panel    Collection Time: 23  7:21 AM    Specimen: Blood   Result Value Ref Range    Glucose 121 (H) 65 - 99 mg/dL    BUN 26 (H) 8 - 23 mg/dL    Creatinine 0.97 0.76 - 1.27 mg/dL    Sodium 138 136 - 145 mmol/L    Potassium 4.2 3.5 - 5.2 mmol/L    Chloride 101 98 - 107 mmol/L    CO2 26.0 22.0 - 29.0 mmol/L     Calcium 8.7 8.6 - 10.5 mg/dL    Total Protein 6.3 6.0 - 8.5 g/dL    Albumin 4.0 3.5 - 5.2 g/dL    ALT (SGPT) 27 1 - 41 U/L    AST (SGOT) 25 1 - 40 U/L    Alkaline Phosphatase 69 39 - 117 U/L    Total Bilirubin 0.4 0.0 - 1.2 mg/dL    Globulin 2.3 gm/dL    A/G Ratio 1.7 g/dL    BUN/Creatinine Ratio 26.8 (H) 7.0 - 25.0    Anion Gap 11.0 5.0 - 15.0 mmol/L    eGFR 79.4 >60.0 mL/min/1.73   CBC Auto Differential    Collection Time: 06/18/23  7:21 AM    Specimen: Blood   Result Value Ref Range    WBC 11.05 (H) 3.40 - 10.80 10*3/mm3    RBC 4.46 4.14 - 5.80 10*6/mm3    Hemoglobin 12.7 (L) 13.0 - 17.7 g/dL    Hematocrit 40.8 37.5 - 51.0 %    MCV 91.5 79.0 - 97.0 fL    MCH 28.5 26.6 - 33.0 pg    MCHC 31.1 (L) 31.5 - 35.7 g/dL    RDW 14.1 12.3 - 15.4 %    RDW-SD 47.3 37.0 - 54.0 fl    MPV 10.6 6.0 - 12.0 fL    Platelets 209 140 - 450 10*3/mm3    Neutrophil % 82.8 (H) 42.7 - 76.0 %    Lymphocyte % 10.0 (L) 19.6 - 45.3 %    Monocyte % 5.8 5.0 - 12.0 %    Eosinophil % 0.7 0.3 - 6.2 %    Basophil % 0.2 0.0 - 1.5 %    Immature Grans % 0.5 0.0 - 0.5 %    Neutrophils, Absolute 9.16 (H) 1.70 - 7.00 10*3/mm3    Lymphocytes, Absolute 1.10 0.70 - 3.10 10*3/mm3    Monocytes, Absolute 0.64 0.10 - 0.90 10*3/mm3    Eosinophils, Absolute 0.08 0.00 - 0.40 10*3/mm3    Basophils, Absolute 0.02 0.00 - 0.20 10*3/mm3    Immature Grans, Absolute 0.05 0.00 - 0.05 10*3/mm3    nRBC 0.0 0.0 - 0.2 /100 WBC   Green Top (Gel)    Collection Time: 06/18/23  7:21 AM   Result Value Ref Range    Extra Tube Hold for add-ons.    Lavender Top    Collection Time: 06/18/23  7:21 AM   Result Value Ref Range    Extra Tube hold for add-on    Gold Top - SST    Collection Time: 06/18/23  7:21 AM   Result Value Ref Range    Extra Tube Hold for add-ons.    Gray Top    Collection Time: 06/18/23  7:21 AM   Result Value Ref Range    Extra Tube Hold for add-ons.    Light Blue Top    Collection Time: 06/18/23  7:21 AM   Result Value Ref Range    Extra Tube Hold for add-ons.     Lactic Acid, Plasma    Collection Time: 06/18/23  7:21 AM    Specimen: Blood   Result Value Ref Range    Lactate 2.6 (C) 0.5 - 2.0 mmol/L   High Sensitivity Troponin T    Collection Time: 06/18/23  7:21 AM    Specimen: Blood   Result Value Ref Range    HS Troponin T 20 (H) <15 ng/L   BNP    Collection Time: 06/18/23  7:21 AM    Specimen: Blood   Result Value Ref Range    proBNP 1,951.0 (H) 0.0 - 1,800.0 pg/mL   STAT Lactic Acid, Reflex    Collection Time: 06/18/23 11:08 AM    Specimen: Blood   Result Value Ref Range    Lactate 1.0 0.5 - 2.0 mmol/L   High Sensitivity Troponin T 2Hr    Collection Time: 06/18/23 11:08 AM    Specimen: Blood   Result Value Ref Range    HS Troponin T 21 (H) <15 ng/L    Troponin T Delta 1 >=-4 - <+4 ng/L   Urinalysis With Microscopic If Indicated (No Culture) - Urine, Clean Catch    Collection Time: 06/18/23 11:32 AM    Specimen: Urine, Clean Catch   Result Value Ref Range    Color, UA Yellow Yellow, Straw    Appearance, UA Clear Clear    pH, UA 7.0 5.0 - 8.0    Specific Gravity, UA 1.026 1.001 - 1.030    Glucose, UA >=1000 mg/dL (3+) (A) Negative    Ketones, UA Trace (A) Negative    Bilirubin, UA Negative Negative    Blood, UA Negative Negative    Protein, UA Negative Negative    Leuk Esterase, UA Negative Negative    Nitrite, UA Negative Negative    Urobilinogen, UA 0.2 E.U./dL 0.2 - 1.0 E.U./dL       If labs were ordered, I independently reviewed the results and considered them in treating the patient.        RADIOLOGY  CT Abdomen Pelvis With Contrast    Result Date: 6/18/2023  CT ABDOMEN PELVIS W CONTRAST Date of Exam: 6/18/2023 11:41 AM EDT Indication: acute fecal impaction and rectal pain and acute urinary retention. Comparison: 12/8/2022 Technique: Axial CT images were obtained of the abdomen and pelvis following the uneventful intravenous administration of Isovue-300 . Reconstructed coronal and sagittal images were also obtained. Automated exposure control and iterative  construction methods were used. Findings: Within the right middle lobe and right lower lobes there are noncalcified 4 mm pulmonary nodules. These are unchanged from 2000. Within the left lower lobe there is a noncalcified 12 mm pulmonary nodule. This is stable from prior studies dating back to 2016 there is minimal left basilar atelectasis. The liver appears within normal limits. There is in the neck of the gallbladder. No inflammatory change seen around the gallbladder. No evidence of biliary tract obstruction. Again seen within the head of the pancreas is a 1.4 cm low-density mass favored to be a cyst or pseudocyst. This appears unchanged from 2016. Neck duct is of normal caliber. Spleen is normal. Bilateral adrenal glands are within normal limits. There are multiple low-density renal masses consistent with cysts. No renal or ureteral stone or hydronephrosis. There is no abdominal or retroperitoneal the upper GI tract is within normal limits. Pelvis: Urinary bladder is within normal limits. There is a large amount of stool within the rectum could be related to fecal impaction. There are scattered colonic diverticula. No evidence of diverticulitis. The appendix is normal. There is no pelvic or  inguinal adenopathy. No free intraperitoneal fluid. There are degenerative changes of the spine. There are no lytic or sclerotic bony lesions identified.     Impression: 1. Large amount stool within the rectum which could be related to fecal impaction. 2. Stable within the head of the pancreas favored to be a cyst or pseudocyst. This is stable since 2016. 3. Noncalcified pulmonary nodules in both lung bases unchanged from 2016 and therefore considered to be 9. Electronically Signed: John Mejía  6/18/2023 12:13 PM EDT  Workstation ID: [id number]      PROCEDURES    Procedures    No orders to display       MEDICATIONS GIVEN IN ER    Medications   ondansetron (ZOFRAN) injection 4 mg (4 mg Intravenous Not Given 6/18/23 0946)    bisacodyl (DULCOLAX) suppository 10 mg (10 mg Rectal Given 6/18/23 1241)   sodium chloride 0.9 % bolus 500 mL (0 mL Intravenous Stopped 6/18/23 1032)   fentaNYL citrate (PF) (SUBLIMAZE) injection 25 mcg (25 mcg Intravenous Given 6/18/23 0946)   iopamidol (ISOVUE-300) 61 % injection 100 mL (90 mL Intravenous Given 6/18/23 1154)   glycerin adult 2 suppository (2 suppositories Rectal Given 6/18/23 1424)         MEDICAL DECISION MAKING, PROGRESS, and CONSULTS    All labs have been independently reviewed by me.  All radiology studies have been reviewed by me and the radiologist dictating the report.  All EKG's have been independently viewed and interpreted by me.        Orders placed during this visit:  Orders Placed This Encounter   Procedures   • Blood Culture - Blood,   • Blood Culture - Blood,   • CT Abdomen Pelvis With Contrast   • Comprehensive Metabolic Panel   • [place] Draw   • CBC Auto Differential   • Lactic Acid, Plasma   • High Sensitivity Troponin T   • BNP   • Urinalysis With Microscopic If Indicated (No Culture) - Urine, Catheter   • STAT Lactic Acid, Reflex   • High Sensitivity Troponin T 2Hr   • Insert Indwelling Urinary Catheter   • Assess Need for Indwelling Urinary Catheter - Follow Removal Protocol   • Urinary Catheter Care   • CBC & Differential   • Green Top (Gel)   • Lavender Top   • Gold Top - SST   • Gray Top   • Light Blue Top         Additional orders considered but not ordered:      ED Course:    Consultants:      ED Course as of 06/18/23 1432   Sun Jun 18, 2023   1009 Lactate(!!): 2.6 [MS]   1009 proBNP(!): 1,951.0 [MS]   1154 Glucose(!): >=1000 mg/dL (3+) [MS]   1415 Patient is draining clear yellow colored fluid into his camargo catheter.  He has tolerated manual disimpaction of a large volume of stool, followed by another very large volume of stool passed spontaneously while sitting on bedside commode.  He has had some blood tinged on the normal-color stool with bleeding ceased, checked  30 minutes after completion of procedure.  HE assures he is feeling much better.   [MS]   1430 Earlier in the patient's ED course, it was recognized that the patient had a stricture at the urinary meatus and 2 attempts even with the smallest Burks catheter were and successful.  I spoke to his urologist, Dr. Morin who came to the emergency department and personally dilated his urethra and inserted a Burks catheter draining 1400 mL of clear urine.  Patient tolerated this very well.  CT imaging was negative for acute findings with exception of fecal impaction without infectious process.  After inserting Dulcolax suppositories, patient tolerated partial manual disimpaction with approximately half a quart of formed stool.  He then sat on a bedside commode and was able to spontaneously evacuate another approximately half quart volume of normal colored stool.  He had some bright red bleeding on the normal colored stool this bleeding ceased after checking for 30 minutes.  He feels much better.  His vital signs have been stable throughout his ED course.  It is his desire to be discharged with his Burks catheter.  Dr. Morin agrees and will follow-up with him in 8 days.  We discussed parameters for concern that would warrant return to the emergency department.  Patient understands together with his wife and they both concur with this outpatient plan of care [MS]      ED Course User Index  [MS] Mark Christiansonkanu Gordon, CHENTE                  AS OF 14:32 EDT VITALS:    BP - 131/91  HR - 70  TEMP - 97.5 °F (36.4 °C) (Oral)  O2 SATS - 97%                  DIAGNOSIS  Final diagnoses:   Acute urinary retention   Fecal impaction         DISPOSITION    DISCHARGE    Patient discharged in stable condition.    Reviewed implications of results, diagnosis, meds, responsibility to follow up, warning signs and symptoms of possible worsening, potential complications and reasons to return to ER.    Patient/Family voiced understanding of above  instructions.    Discussed plan for discharge, as there is no emergent indication for admission.  Pt/family is agreeable and understands need for follow up and possible repeat testing.  Pt/family is aware that discharge does not mean that nothing is wrong but that it indicates no emergency is currently present that requires admission and they must continue care with follow-up as given below or with a physician of their choice.     FOLLOW-UP  Brian Lewis MD  [address] [address]  [phone]    Schedule an appointment as soon as possible for a visit in 2 days  If symptoms worsen         Medication List      Changed    amiodarone 200 MG tablet  Commonly known as: PACERONE  Take 1 tablet by mouth 3 (Three) Times a Day. Take 200 mg three times daily x 10 days, then 400 mg daily.  What changed:   · how much to take  · when to take this  · additional instructions     mexiletine 200 MG capsule  Commonly known as: MEXITIL  Take 1 capsule by mouth 3 (Three) Times a Day.  What changed: when to take this                Please note that portions of this document were completed with voice recognition software.        Olivia Christianson, APRN  06/18/23 [id number]

## 2023-06-18 NOTE — CONSULTS
Urology Consult in [facility] emergency room.    Date of Admission: 6/18/2023  Date of Consult: 06/18/23    Primary Care Physician: Brian Lewis MD  Referring Physician: Emergency room    Chief complaint/Reason for consultation urinary retention and inability to place catheter    Subjective     History of Present Illness Ilda is a 78-year-old gentleman who presents the emergency room overnight with inability to have bowel movement in the last 3 to 4 days.  Then he has had the inability to urinate in the last 12 hours.  Prior to his constipation episode he was voiding just fine including the last time I saw him in the office in March and prior to that in November.  He is status post TURP it roughly 2009 ([place]).  For some reason he stopped taking his daily MiraLAX to 3 days ago.    Review of Systems denies any chest pain or shortness of breath.  Otherwise complete ROS is negative except as mentioned above.    Past Medical History:  has a past medical history of Arthritis, BPH (benign prostatic hypertrophy) (12/28/2016), CAD (coronary artery disease), Congenital heart disease (1998), Disease of thyroid gland, Enlarged prostate, HFrEF (heart failure with reduced ejection fraction), History of transfusion, Hypertension, LBBB (left bundle branch block) (12/28/2016), Lung nodule, Macular degeneration, Pancreatic cyst (12/08/2022), Polio, Pulmonary embolism, Pulmonary emphysema (12/28/2016), Sepsis, Sleep apnea with use of continuous positive airway pressure (CPAP), Ventricular tachycardia (12/28/2016), and Wears glasses.    Past Surgical History:  has a past surgical history that includes cystoscopy ureteral tumor fulguration (05/29/2015); Foot surgery; Prostate surgery TURP circa 2009; colonoscopy; Coronary angioplasty with stent; biventricullar implantable cardioverter defibrillator placement; Cardiac electrophysiology procedure (N/A, 10/02/2017); Root canal; Cataract extraction; Cardiac  catheterization (N/A, 01/16/2017); Cardiac defibrillator placement; Cardiac electrophysiology procedure (N/A, 07/06/2021); Tooth extraction; Insert / replace / remove pacemaker (2002); and Carotid stent (Several).    Family History: family history includes Heart failure in his father; Stroke in his father.    Social History:  reports that he quit smoking about 28 years ago. His smoking use included cigarettes. He started smoking about 63 years ago. He has a 60.00 pack-year smoking history. He has never used smokeless tobacco. He reports that he does not drink alcohol and does not use drugs.    Medications: Scheduled Meds:bisacodyl, 10 mg, Rectal, Daily  glycerin adult, 2 suppository, Rectal, Once  ondansetron, 4 mg, Intravenous, Once      Continuous Infusions:   PRN Meds:.  No Known Allergies    Objective    Physical Exam:   Vital Signs have been reviewed  Physical Exam  Well-developed well-nourished elderly gentleman in no acute distress  HEENT: NCAT PERRLA EOMI  Heart: Regular rate rhythm  Abdomen is distended  Genitalia penis is normal circumcised  Scrotum testes descended equal bilaterally no inguinal hernias  Extremities Free of sinus clubbing or edema.    Results Reviewed:  CT scan shows minimal hydronephrosis and distended bladder, small prostate  Results from last 7 days   Lab Units 06/18/23  0721   WBC 10*3/mm3 11.05*   HEMOGLOBIN g/dL 12.7*   PLATELETS 10*3/mm3 209     Results from last 7 days   Lab Units 06/18/23  0721   SODIUM mmol/L 138   POTASSIUM mmol/L 4.2   CO2 mmol/L 26.0   BUN mg/dL 26*   CREATININE mg/dL 0.97   GLUCOSE mg/dL 121*   CALCIUM mg/dL 8.7       Procedure under sterile conditions I dilated the meatal stenosis with Greer sounds from 14-24 Turkish and inserted a 16 Turkish Burks catheter.  Immediately 1400  mLs.  of clear urine was obtained and he got immediate relief.        Assessment & Plan   Assessment & Plan meatal stenosis treated with dilation that should suffice.  Urinary  retention most likely due to his constipation/obstipation.  I would prefer that he keep his catheter in about a week.  I will give him a voiding trial from the office in a week's time.

## 2023-06-19 ENCOUNTER — TELEPHONE (OUTPATIENT)
Dept: EMERGENCY DEPT | Facility: HOSPITAL | Age: 78
End: 2023-06-19
Payer: MEDICARE

## 2023-06-19 LAB
BACTERIA BLD CULT: ABNORMAL
BOTTLE TYPE: ABNORMAL

## 2023-06-19 NOTE — TELEPHONE ENCOUNTER
I spoke to the patient's son and advised him of the positive blood cultures and the concern for infection, sepsis, uremia and recommended returning to the ER for further evaluation and repeat lab work.  Also the possibility of a contamination.  He verbalized understanding and he plans on calling his father about my recommendations on returning to the ER for further evaluation.

## 2023-06-19 NOTE — TELEPHONE ENCOUNTER
I left a voicemail on both his spouse and his son's phone number to discuss his test results.  Particularly his blood culture.  Patient's phone number did not have a working voicemail.

## 2023-06-20 LAB
BACTERIA SPEC AEROBE CULT: ABNORMAL
GRAM STN SPEC: ABNORMAL
ISOLATED FROM: ABNORMAL

## 2023-06-23 LAB — BACTERIA SPEC AEROBE CULT: NORMAL

## 2023-07-03 ENCOUNTER — APPOINTMENT (OUTPATIENT)
Dept: CARDIAC REHAB | Facility: HOSPITAL | Age: 78
End: 2023-07-03

## 2023-07-05 ENCOUNTER — APPOINTMENT (OUTPATIENT)
Dept: CARDIAC REHAB | Facility: HOSPITAL | Age: 78
End: 2023-07-05

## 2023-07-06 ENCOUNTER — APPOINTMENT (OUTPATIENT)
Dept: CARDIAC REHAB | Facility: HOSPITAL | Age: 78
End: 2023-07-06

## 2023-07-07 ENCOUNTER — APPOINTMENT (OUTPATIENT)
Dept: CARDIAC REHAB | Facility: HOSPITAL | Age: 78
End: 2023-07-07

## 2023-07-10 ENCOUNTER — APPOINTMENT (OUTPATIENT)
Dept: CARDIAC REHAB | Facility: HOSPITAL | Age: 78
End: 2023-07-10

## 2023-07-11 ENCOUNTER — APPOINTMENT (OUTPATIENT)
Dept: CARDIAC REHAB | Facility: HOSPITAL | Age: 78
End: 2023-07-11

## 2023-07-11 NOTE — H&P
[facility] Cardiology, History and Physical  Date of Hospital Visit: 23  Encounter Provider: Lisbet Lopez PA-C    Place of Service: [facility]  Patient Name: Jonnie Roth  :1945  MRN: [id number]     Primary Care Provider: Brian Lewis MD    Chief complaint: ICD discharge      Problem List:      1. Sudden cardiac death with primary ventricular fibrillation status post Pacesetter [place] ICD, [place], [place] in 2000:   a. ICD generator change out with upgrade to a biventricular pacemaker ICD on 2007, [employer] device.  b. ICD discharge, 2012, secondary to ventricular flutter with initiation of amiodarone therapy.   c. Hospitalization, 2014, secondary to ICD discharge for ventricular tachycardia with subsequent discontinuation of Coreg and initiation of sotalol therapy.   d. Hospitalization, 2014, secondary to ICD discharge for VT with subsequent discontinuation of Coreg and initiation of sotalol.  e. Echocardiogram, 2015: EF less than 20%.  f. Hospitalization secondary to VF and ICD shocks, 2015.  g. NIPS procedure with defibrillation threshold testing for VF and noninvasive program stimulation, 2015.   h. Initiation of mexiletine for recurrent ventricular tachycardia with ICD shocks on 2015 with amiodarone load, recurrent VT and ICD shocks with hospitalization 2015-2015.   i. EP study with RFA of large anterior left ventricular scar extending from mid-left ventricular wall down apex by Dr. Ferdinand Alba, 2015.                      j.   ICD generator change 10/2017                     K.   Echo  LVEF 30%, mild TR                     L.  ICD generatot change 2021  2. Ischemic heart disease:  a. Remote progressive angina pectoris/acute extensive anterolateral myocardial infarction/delayed presentation with thrombolysis/severe 3-vessel coronary atherosclerosis with severe  Dr. Goldman has reviewed echo and US results. Recommend referral to Dr. Wong for possible EVLT for venous reflux.  Spoke to Ms. Sorto and she is interested pursuing option.  Scheduled consult with Dr. Wong for Friday 7/14 @ 10:40.     single vessel involvement/PTCA with intracoronary stent deployment proximal-mid segment LAD/moderate-severe compensated left ventricular dysfunction. LVEF (0.35)/abnormal positive signal averaged EKG/oral anticoagulation, April 1995.  b. Remote NYHA class I-II angina pectoris/class III CHF/abnormal quantitative SPECT gated Cardiolite GXT, July 1998.  c. Stable persistent MUGA, LVEF (0.33, January 1999 and January 2000).   d. Residual NYHA class I angina pectoris/CHF with reduced MUGA scan: LVEF (0.25), April 2002.  e. Left heart catheterization with distal circumflex disease: EF 20%, September 2002.   f. MUGA in May 2003: EF 32%.   g. Sestamibi GXT on 04/08/2005: No ischemia. EF 29%.   h. Residual NYHA class I angina pectoris/CHF with reduced acceptable MUGA scan: EF (0.32) and acceptable Pacesetter PCD interrogation/reprogramming, May 2003 with interrogation, September 2004.  i. Echocardiogram, September 2009 with EF 30%  j. Left heart catheterization by Dr. Bhupinder Gonzalez, August 2010, with LVEF of 35%, with 3-vessel native coronary artery disease, 95% mid-LAD status post PTCA and stenting with two 3 mm stents by Dr. Llanes.  k. Echocardiogram, 06/07/2012: Left ventricular ejection fraction of 30%.  l. Hospitalization, 02/01/2014, for non-ST elevation MI, left heart catheterization by Dr. Ayala that demonstrated 60% mid stenosis of the right coronary artery that remains unchanged compared to previous, widely patent stents of the LAD with severe LV dysfunction of approximately 25%.   m. Left heart catheterization status post drug-eluting stent to the distal LAD and mid-RCA with an EF of 20% to 25% by Dr. Diego Ayala, 04/20/2015.   n. Left heart catheterization 1/16/17; nonobstructive CAD with patent previously placed stents, dilated cardiomyopathy with severe LV systolic dysfunction, EF less than 20%, normal hemodynamics, recommendations for continued medical therapy and risk factor, evaluation for noncardiac  etiology of symptoms.  o. Residual CCS Class I/II angina pectoris/NYHA Class II exertional dyspnea and fatigue syndrome, summer 2017  p. Residual CCS 0 angina pectoris/NYHA class I-II exertional dyspnea and fatigue, February 2018, September 2018, March 2019.  3. Dyslipidemia.  4. Status post remote operations.  5. Remote chronic tobacco use/abnormal chest x-ray with stable abnormal thoracic  CT scan without contrast demonstrating bilateral diffuse perimeter scarring and fibrosis with scattered subcentimeter noncalcified nodules (unchanged from February 2016), March 2017.  6. Left bundle branch block.  7. Burks trauma with hematuria with urosepsis requiring hospitalization, May 2015.  8. Pulmonary embolism, spring 2015.   9. Remote apparent hypothyroidism/replacement therapy - data deficit, January 2016.  10. Remote diagnosis of obstructive sleep apnea with CPAP use, 2017.  11. Bilateral cataract extraction November 2018, February 2019    History of Present Illness:  Patient is a 78-year-old history of CAD with ischemic cardiomyopathy, and history of V-fib s/p [employer] ICD with generator change in 2021 presented to the emergency department this morning after his ICD fired.  Patient and wife in the room states that patient has been doing well on his amiodarone 100 mg for his history VF and VT.  Patient has not been ill recently with any fever, chills, nausea or vomiting.  This morning staff from our office called the patient after he was shocked twice.  This was after 18 unsuccessful ATPs.  On the phone per staff patient was noted to be sluggish and lethargic so patient came into the emergency department for evaluation.  Currently patient states that he feels much better than he did this morning.  Patient has not had any chest pain or shortness of breath.  He has been to cardiac rehab and is starting to go 5 days a week without any difficulty.  Patient has received no medication in the emergency department prior to  arrival but is feeling much better.  Patient does have intermittent swelling to his legs his last episode where he felt more swollen was about 6 weeks ago.  He denies any orthopnea or PND.    Patient was admitted in December for VT s/p ICD shock and at that time he had influenza A but patient does not feel unwell like he did the last admission.  Patient was treated with amiodarone and then eventually a lidocaine drip while inpatient due to elevated LFTs.  Patient has been taking 100 mg of amiodarone at home at baseline.        Reviewed patient's past medical history, surgical history, family history and social history within the chart.    Current Outpatient Medications   Medication Instructions   • acetaminophen (TYLENOL) 500 mg, Oral, Every 6 Hours   • amiodarone (PACERONE) 100 mg, Oral, Daily   • carvedilol (COREG) 12.5 mg, Oral, 2 Times Daily With Meals   • cholecalciferol (VITAMIN D3) 4,000 Units, Oral, Daily   • clopidogrel (PLAVIX) 75 mg, Oral, Every Morning   • econazole nitrate (SPECTAZOLE) 1 % cream econazole 1 % topical cream   • empagliflozin (JARDIANCE) 10 mg, Oral, Daily   • eplerenone (INSPRA) 12.5 mg, Oral, Every 24 Hours Scheduled   • finasteride (PROSCAR) 5 mg, Oral, Every Morning   • L-TRYPTOPHAN PO Oral, Nightly, 3 TABLETS NIGHTLY-     1500 MG EACH   • levothyroxine (SYNTHROID, LEVOTHROID) 50 mcg, Oral, Every Morning   • melatonin 5 mg, Oral, Nightly PRN, 2 tabs at night    • metFORMIN (GLUCOPHAGE) 1,000 mg, Oral, 2 times daily   • Multiple Vitamins-Minerals (ICAPS AREDS 2 PO) 1 tablet, Oral, 2 times daily   • Multiple Vitamins-Minerals (MULTIVITAMIN ADULTS 50+) tablet 1 tablet, Oral, Daily   • niacin (NIASPAN) 1,000 mg, Oral, Nightly   • nitroglycerin (NITROSTAT) 0.4 mg, Sublingual, Every 5 Minutes PRN, Take no more than 3 doses in 15 minutes.   • pantoprazole (PROTONIX) 40 mg, Oral, Every Morning   • polyethylene glycol (MIRALAX) packet 17 g, Oral, Every Other Day   • rivaroxaban (XARELTO) 10  "mg, Oral, Daily   • rosuvastatin (CRESTOR) 40 mg, Oral, Daily   • sacubitril-valsartan (Entresto) 49-51 MG tablet 1 tablet, Oral, 2 Times Daily   • temazepam (RESTORIL) 15 mg, Oral, Nightly PRN   • torsemide (DEMADEX) 5 mg, Oral, Daily   • vitamin B-12 (CYANOCOBALAMIN) 1,000 mcg, Oral, Daily          Scheduled Meds:amiodarone, 150 mg, Intravenous, Once  carvedilol, 12.5 mg, Oral, BID With Meals  [START ON 4/27/2023] clopidogrel, 75 mg, Oral, QAM  [START ON 4/27/2023] empagliflozin, 10 mg, Oral, Daily  [START ON 4/27/2023] eplerenone, 12.5 mg, Oral, Q24H  [START ON 4/27/2023] finasteride, 5 mg, Oral, QAM  levothyroxine, 50 mcg, Oral, QAM  metFORMIN, 1,000 mg, Oral, BID With Meals  pantoprazole, 40 mg, Oral, QAM  rivaroxaban, 10 mg, Oral, Daily  rosuvastatin, 40 mg, Oral, Nightly  sacubitril-valsartan, 1 tablet, Oral, BID  senna-docusate sodium, 2 tablet, Oral, BID  [START ON 4/27/2023] torsemide, 5 mg, Oral, Daily      Continuous Infusions:amiodarone, 1 mg/min   Followed by  amiodarone, 0.5 mg/min        REVIEW OF SYSTEMS:   Review of Systems   Constitutional: Positive for fatigue.   Neurological: Positive for light-headedness.             Objective    Objective:     Vitals:    04/26/23 1045 04/26/23 1100 04/26/23 1115 04/26/23 1130   BP: 92/56 104/67 110/66 107/61   BP Location:       Patient Position:       Pulse: 70 70 70 70   Resp:       Temp:       TempSrc:       SpO2: 92% 92% 94% 93%   Weight:       Height:           Body mass index is 23.75 kg/m².  Flowsheet Rows    Flowsheet Row First Filed Value   Admission Height 185.4 cm (73\") Documented at 04/26/2023 0936   Admission Weight 81.6 kg (180 lb) Documented at 04/26/2023 0936        No intake or output data in the 24 hours ending 04/26/23 1301    Vitals reviewed.   Constitutional:       Appearance: Healthy appearance. Not in distress.   Eyes:      Conjunctiva/sclera: Conjunctivae normal.   HENT:    Mouth/Throat:      Pharynx: Oropharynx is clear.   Neck:      " Vascular: JVD normal.   Pulmonary:      Effort: Pulmonary effort is normal.      Breath sounds: No wheezing. No rhonchi. No rales.   Cardiovascular:      Normal rate. Occasional ectopic beats. Regular rhythm.      Murmurs: There is no murmur.      No rub.      Comments: Paced rhythm on telemetry.  Pulses:     Intact distal pulses.   Edema:     Peripheral edema absent.   Abdominal:      General: There is no distension.   Musculoskeletal:         General: No deformity. Skin:     General: Skin is warm and dry.   Neurological:      General: No focal deficit present.      Mental Status: Alert and oriented to person, place and time.           Lab Review:                CBC:      Lab 04/26/23  0944   WBC 8.55   HEMOGLOBIN 13.2   HEMATOCRIT 43.0   PLATELETS 219   NEUTROS ABS 6.20   IMMATURE GRANS (ABS) 0.02   LYMPHS ABS 1.52   MONOS ABS 0.57   EOS ABS 0.20   MCV 92.9     CMP:        Lab 04/26/23  0944   SODIUM 137   POTASSIUM 4.6   CHLORIDE 104   CO2 21.0*   ANION GAP 12.0   BUN 23   CREATININE 0.74*   EGFR 92.2   GLUCOSE 120*   CALCIUM 9.3   MAGNESIUM 2.2   TOTAL PROTEIN 6.8   ALBUMIN 4.2   GLOBULIN 2.6   ALT (SGPT) 14   AST (SGOT) 20   BILIRUBIN 0.5   ALK PHOS 63     Results from last 7 days   Lab Units 04/26/23  0944   MAGNESIUM mg/dL 2.2     Lab Results   Component Value Date    HGBA1C 5.60 12/09/2022     Estimated Creatinine Clearance: 93.4 mL/min (A) (by C-G formula based on SCr of 0.74 mg/dL (L)).  No results found for: DDIMER        Lab 04/26/23 0944   PROBNP 671.4   HSTROP T 20*       Lab Results   Component Value Date    CHOL 116 09/10/2022    CHLPL 116 01/30/2014    TRIG 111 09/10/2022    HDL 43 09/10/2022    LDL 53 09/10/2022           EKG: AV paced, heart rate 70 bpm    XR Chest 1 View    Result Date: 4/26/2023  Impression: No acute cardiopulmonary process. Electronically Signed: Micaela Navarro  4/26/2023 10:01 AM EDT  Workstation ID: [id number]       Results for orders placed during the hospital encounter of  10/14/22    Adult Transthoracic Echo Complete W/ Cont if Necessary Per Protocol    Interpretation Summary  •  Left ventricular systolic function is severely decreased. Left ventricular ejection fraction appears to be less than 20%.  •  The following left ventricular wall segments are hypokinetic: mid anterior, basal anterolateral, mid anterolateral, basal inferolateral, mid inferolateral, apical inferior, mid inferior, basal inferoseptal, mid inferoseptal, mid anteroseptal, basal anterior, basal inferior and basal inferoseptal. The following left ventricular wall segments are dyskinetic: apical anterior, apical lateral, apical septal and apex.  •  The left ventricular cavity is severely dilated.  •  Mild mitral regurgitation is present.       Result Review:  I have personally reviewed the results from the time of admission and agree with these findings:  [x]  Laboratory  [x]  Radiology  [x]  EKG/Telemetry   []  Pathology  []  Old records  [x]  Other:           Assessment:   1. VT/VF s/p ICD shock x2, on amiodarone 100 mg daily  2. History of ischemic cardiomyopathy, chronic systolic heart failure,  3. History of CAD, on Plavix  4. Hypertension, controlled  5. Hyperlipidemia, on Crestor  6. Controlled type 2 diabetes      Plan:   1. Admit patient to the hospital.  2. Start amiodarone protocol including amiodarone 150 mg bolus then start drip at 1 mg/min.  Will monitor patient's QTc on this medication.  3. Will restart patient's GDMT medications including carvedilol 12.5 twice daily, Inspra 25 mg daily, Entresto 49-51 twice daily, and Jardiance 10 mg.  Would hold Entresto if systolic blood pressure is less than 100.  4. Continue all other at home medications.  5. Patient will be placed on a cardiac/consistent carbohydrate diet.  6. Patient will remain on metformin 1000 mg twice daily and sliding scale as needed.  7. Have asked [employer] to come interrogate the device.      I discussed the plan of care of this patient  with Dr. Clarence Mendez and he agrees with plan of care.  Electronically signed by Lisbet Lopez PA-C, 04/26/23, 1:07 PM EDT.        STAFF CARDIOLOGIST:      SUMMARY:    1. 78 years old gentleman with history of ischemic cardiomyopathy and V. tach in the past admitted with episodes of V. tach again.      PERTINENT:    1. EF 15 to 20%  2. V. tach s/p shock       IMPRESSION:    1. Ischemic cardiomyopathy  2. V. tach      RECOMMENDATIONS:    1. We will load patient with amiodarone and hopefully we will be able to suppress his V. tach admissions.  We might need to increase his amiodarone dose a little bit from his home dose at this time.  2.  We will keep monitoring his QT interval  3.  Hopefully his liver will not be effective  4.  Home in the morning if there is no other events        I have seen and examined the patient, reviewed the above note, necessary changes were made and I agree with the final note.   Clarence Mendez MD

## 2023-07-12 ENCOUNTER — APPOINTMENT (OUTPATIENT)
Dept: CARDIAC REHAB | Facility: HOSPITAL | Age: 78
End: 2023-07-12

## 2023-07-13 ENCOUNTER — APPOINTMENT (OUTPATIENT)
Dept: CARDIAC REHAB | Facility: HOSPITAL | Age: 78
End: 2023-07-13

## 2023-07-14 ENCOUNTER — APPOINTMENT (OUTPATIENT)
Dept: CARDIAC REHAB | Facility: HOSPITAL | Age: 78
End: 2023-07-14

## 2023-07-17 ENCOUNTER — APPOINTMENT (OUTPATIENT)
Dept: CARDIAC REHAB | Facility: HOSPITAL | Age: 78
End: 2023-07-17

## 2023-07-18 ENCOUNTER — APPOINTMENT (OUTPATIENT)
Dept: CARDIAC REHAB | Facility: HOSPITAL | Age: 78
End: 2023-07-18

## 2023-07-19 ENCOUNTER — APPOINTMENT (OUTPATIENT)
Dept: CARDIAC REHAB | Facility: HOSPITAL | Age: 78
End: 2023-07-19

## 2023-07-20 ENCOUNTER — APPOINTMENT (OUTPATIENT)
Dept: CARDIAC REHAB | Facility: HOSPITAL | Age: 78
End: 2023-07-20

## 2023-07-21 ENCOUNTER — APPOINTMENT (OUTPATIENT)
Dept: CARDIAC REHAB | Facility: HOSPITAL | Age: 78
End: 2023-07-21

## 2023-07-24 ENCOUNTER — TREATMENT (OUTPATIENT)
Dept: CARDIAC REHAB | Facility: HOSPITAL | Age: 78
End: 2023-07-24

## 2023-07-24 ENCOUNTER — APPOINTMENT (OUTPATIENT)
Dept: CARDIAC REHAB | Facility: HOSPITAL | Age: 78
End: 2023-07-24

## 2023-07-24 DIAGNOSIS — Z00.00 PREVENTATIVE HEALTH CARE: Primary | ICD-10-CM

## 2023-07-25 ENCOUNTER — APPOINTMENT (OUTPATIENT)
Dept: CARDIAC REHAB | Facility: HOSPITAL | Age: 78
End: 2023-07-25

## 2023-07-25 ENCOUNTER — TREATMENT (OUTPATIENT)
Dept: CARDIAC REHAB | Facility: HOSPITAL | Age: 78
End: 2023-07-25

## 2023-07-25 DIAGNOSIS — Z00.00 PREVENTATIVE HEALTH CARE: Primary | ICD-10-CM

## 2023-07-26 ENCOUNTER — TREATMENT (OUTPATIENT)
Dept: CARDIAC REHAB | Facility: HOSPITAL | Age: 78
End: 2023-07-26

## 2023-07-26 ENCOUNTER — APPOINTMENT (OUTPATIENT)
Dept: CARDIAC REHAB | Facility: HOSPITAL | Age: 78
End: 2023-07-26

## 2023-07-26 DIAGNOSIS — Z00.00 PREVENTATIVE HEALTH CARE: Primary | ICD-10-CM

## 2023-07-27 ENCOUNTER — APPOINTMENT (OUTPATIENT)
Dept: CARDIAC REHAB | Facility: HOSPITAL | Age: 78
End: 2023-07-27

## 2023-07-27 ENCOUNTER — TREATMENT (OUTPATIENT)
Dept: CARDIAC REHAB | Facility: HOSPITAL | Age: 78
End: 2023-07-27

## 2023-07-27 DIAGNOSIS — Z00.00 PREVENTATIVE HEALTH CARE: Primary | ICD-10-CM

## 2023-07-28 ENCOUNTER — APPOINTMENT (OUTPATIENT)
Dept: CARDIAC REHAB | Facility: HOSPITAL | Age: 78
End: 2023-07-28

## 2023-07-28 ENCOUNTER — TREATMENT (OUTPATIENT)
Dept: CARDIAC REHAB | Facility: HOSPITAL | Age: 78
End: 2023-07-28

## 2023-07-28 DIAGNOSIS — Z00.00 PREVENTATIVE HEALTH CARE: Primary | ICD-10-CM

## 2023-07-31 ENCOUNTER — TREATMENT (OUTPATIENT)
Dept: CARDIAC REHAB | Facility: HOSPITAL | Age: 78
End: 2023-07-31

## 2023-07-31 ENCOUNTER — APPOINTMENT (OUTPATIENT)
Dept: CARDIAC REHAB | Facility: HOSPITAL | Age: 78
End: 2023-07-31

## 2023-07-31 DIAGNOSIS — Z00.00 PREVENTATIVE HEALTH CARE: Primary | ICD-10-CM

## 2023-08-01 ENCOUNTER — APPOINTMENT (OUTPATIENT)
Dept: CARDIAC REHAB | Facility: HOSPITAL | Age: 78
End: 2023-08-01

## 2023-08-01 ENCOUNTER — TREATMENT (OUTPATIENT)
Dept: CARDIAC REHAB | Facility: HOSPITAL | Age: 78
End: 2023-08-01

## 2023-08-01 DIAGNOSIS — Z00.00 PREVENTATIVE HEALTH CARE: Primary | ICD-10-CM

## 2023-08-02 ENCOUNTER — TREATMENT (OUTPATIENT)
Dept: CARDIAC REHAB | Facility: HOSPITAL | Age: 78
End: 2023-08-02

## 2023-08-02 ENCOUNTER — APPOINTMENT (OUTPATIENT)
Dept: CARDIAC REHAB | Facility: HOSPITAL | Age: 78
End: 2023-08-02

## 2023-08-02 DIAGNOSIS — Z00.00 PREVENTATIVE HEALTH CARE: Primary | ICD-10-CM

## 2023-08-03 ENCOUNTER — TREATMENT (OUTPATIENT)
Dept: CARDIAC REHAB | Facility: HOSPITAL | Age: 78
End: 2023-08-03

## 2023-08-03 ENCOUNTER — APPOINTMENT (OUTPATIENT)
Dept: CARDIAC REHAB | Facility: HOSPITAL | Age: 78
End: 2023-08-03

## 2023-08-03 DIAGNOSIS — Z00.00 PREVENTATIVE HEALTH CARE: Primary | ICD-10-CM

## 2023-08-04 ENCOUNTER — APPOINTMENT (OUTPATIENT)
Dept: CARDIAC REHAB | Facility: HOSPITAL | Age: 78
End: 2023-08-04

## 2023-08-07 ENCOUNTER — TREATMENT (OUTPATIENT)
Dept: CARDIAC REHAB | Facility: HOSPITAL | Age: 78
End: 2023-08-07

## 2023-08-07 ENCOUNTER — APPOINTMENT (OUTPATIENT)
Dept: CARDIAC REHAB | Facility: HOSPITAL | Age: 78
End: 2023-08-07

## 2023-08-07 DIAGNOSIS — Z00.00 PREVENTATIVE HEALTH CARE: Primary | ICD-10-CM

## 2023-08-08 ENCOUNTER — TREATMENT (OUTPATIENT)
Dept: CARDIAC REHAB | Facility: HOSPITAL | Age: 78
End: 2023-08-08

## 2023-08-08 ENCOUNTER — APPOINTMENT (OUTPATIENT)
Dept: CARDIAC REHAB | Facility: HOSPITAL | Age: 78
End: 2023-08-08

## 2023-08-08 DIAGNOSIS — Z00.00 PREVENTATIVE HEALTH CARE: Primary | ICD-10-CM

## 2023-08-09 ENCOUNTER — TREATMENT (OUTPATIENT)
Dept: CARDIAC REHAB | Facility: HOSPITAL | Age: 78
End: 2023-08-09

## 2023-08-09 ENCOUNTER — APPOINTMENT (OUTPATIENT)
Dept: CARDIAC REHAB | Facility: HOSPITAL | Age: 78
End: 2023-08-09

## 2023-08-09 DIAGNOSIS — Z00.00 PREVENTATIVE HEALTH CARE: Primary | ICD-10-CM

## 2023-08-10 ENCOUNTER — APPOINTMENT (OUTPATIENT)
Dept: CARDIAC REHAB | Facility: HOSPITAL | Age: 78
End: 2023-08-10

## 2023-08-10 ENCOUNTER — TREATMENT (OUTPATIENT)
Dept: CARDIAC REHAB | Facility: HOSPITAL | Age: 78
End: 2023-08-10

## 2023-08-10 DIAGNOSIS — Z00.00 PREVENTATIVE HEALTH CARE: Primary | ICD-10-CM

## 2023-08-11 ENCOUNTER — TREATMENT (OUTPATIENT)
Dept: CARDIAC REHAB | Facility: HOSPITAL | Age: 78
End: 2023-08-11

## 2023-08-11 ENCOUNTER — APPOINTMENT (OUTPATIENT)
Dept: CARDIAC REHAB | Facility: HOSPITAL | Age: 78
End: 2023-08-11

## 2023-08-11 DIAGNOSIS — Z00.00 PREVENTATIVE HEALTH CARE: Primary | ICD-10-CM

## 2023-08-13 ENCOUNTER — APPOINTMENT (OUTPATIENT)
Dept: GENERAL RADIOLOGY | Facility: HOSPITAL | Age: 78
DRG: 281 | End: 2023-08-13
Payer: MEDICARE

## 2023-08-13 ENCOUNTER — HOSPITAL ENCOUNTER (INPATIENT)
Facility: HOSPITAL | Age: 78
LOS: 2 days | Discharge: HOME | DRG: 281 | End: 2023-08-15
Attending: EMERGENCY MEDICINE | Admitting: INTERNAL MEDICINE
Payer: MEDICARE

## 2023-08-13 DIAGNOSIS — R06.02 SHORTNESS OF BREATH: ICD-10-CM

## 2023-08-13 DIAGNOSIS — I47.20: ICD-10-CM

## 2023-08-13 DIAGNOSIS — R07.2 PRECORDIAL CHEST PAIN: Primary | ICD-10-CM

## 2023-08-13 LAB
ALBUMIN SERPL-MCNC: 4.2 G/DL (ref 3.5–5.2)
ALBUMIN/GLOB SERPL: 1.4 G/DL
ALP SERPL-CCNC: 119 U/L (ref 39–117)
ALT SERPL W P-5'-P-CCNC: 37 U/L (ref 1–41)
ANION GAP SERPL CALCULATED.3IONS-SCNC: 11 MMOL/L (ref 5–15)
APTT PPP: 26.2 SECONDS (ref 60–90)
AST SERPL-CCNC: 43 U/L (ref 1–40)
BASOPHILS # BLD AUTO: 0.06 10*3/MM3 (ref 0–0.2)
BASOPHILS NFR BLD AUTO: 0.6 % (ref 0–1.5)
BILIRUB SERPL-MCNC: 0.2 MG/DL (ref 0–1.2)
BUN SERPL-MCNC: 28 MG/DL (ref 8–23)
BUN/CREAT SERPL: 23.3 (ref 7–25)
CA-I SERPL ISE-MCNC: 1.25 MMOL/L (ref 1.12–1.32)
CALCIUM SPEC-SCNC: 8.7 MG/DL (ref 8.6–10.5)
CHLORIDE SERPL-SCNC: 105 MMOL/L (ref 98–107)
CO2 SERPL-SCNC: 23 MMOL/L (ref 22–29)
CREAT SERPL-MCNC: 1.2 MG/DL (ref 0.76–1.27)
DEPRECATED RDW RBC AUTO: 47.3 FL (ref 37–54)
EGFRCR SERPLBLD CKD-EPI 2021: 61.5 ML/MIN/1.73
EOSINOPHIL # BLD AUTO: 0.6 10*3/MM3 (ref 0–0.4)
EOSINOPHIL NFR BLD AUTO: 6.2 % (ref 0.3–6.2)
ERYTHROCYTE [DISTWIDTH] IN BLOOD BY AUTOMATED COUNT: 14 % (ref 12.3–15.4)
GEN 5 2HR TROPONIN T REFLEX: 33 NG/L
GLOBULIN UR ELPH-MCNC: 2.9 GM/DL
GLUCOSE SERPL-MCNC: 140 MG/DL (ref 65–99)
HCT VFR BLD AUTO: 40.7 % (ref 37.5–51)
HGB BLD-MCNC: 12.6 G/DL (ref 13–17.7)
HOLD SPECIMEN: NORMAL
IMM GRANULOCYTES # BLD AUTO: 0.04 10*3/MM3 (ref 0–0.05)
IMM GRANULOCYTES NFR BLD AUTO: 0.4 % (ref 0–0.5)
INR PPP: 1.16 (ref 0.89–1.12)
LIPASE SERPL-CCNC: 123 U/L (ref 13–60)
LYMPHOCYTES # BLD AUTO: 2.43 10*3/MM3 (ref 0.7–3.1)
LYMPHOCYTES NFR BLD AUTO: 25 % (ref 19.6–45.3)
MAGNESIUM SERPL-MCNC: 2.2 MG/DL (ref 1.6–2.4)
MCH RBC QN AUTO: 28.3 PG (ref 26.6–33)
MCHC RBC AUTO-ENTMCNC: 31 G/DL (ref 31.5–35.7)
MCV RBC AUTO: 91.5 FL (ref 79–97)
MONOCYTES # BLD AUTO: 0.97 10*3/MM3 (ref 0.1–0.9)
MONOCYTES NFR BLD AUTO: 10 % (ref 5–12)
NEUTROPHILS NFR BLD AUTO: 5.61 10*3/MM3 (ref 1.7–7)
NEUTROPHILS NFR BLD AUTO: 57.8 % (ref 42.7–76)
NRBC BLD AUTO-RTO: 0 /100 WBC (ref 0–0.2)
NT-PROBNP SERPL-MCNC: 1726 PG/ML (ref 0–1800)
PHOSPHATE SERPL-MCNC: 4.1 MG/DL (ref 2.5–4.5)
PLATELET # BLD AUTO: 308 10*3/MM3 (ref 140–450)
PMV BLD AUTO: 10.1 FL (ref 6–12)
POTASSIUM SERPL-SCNC: 4.6 MMOL/L (ref 3.5–5.2)
PROT SERPL-MCNC: 7.1 G/DL (ref 6–8.5)
PROTHROMBIN TIME: 14.9 SECONDS (ref 12.2–14.5)
RBC # BLD AUTO: 4.45 10*6/MM3 (ref 4.14–5.8)
SODIUM SERPL-SCNC: 139 MMOL/L (ref 136–145)
TROPONIN T DELTA: 8 NG/L
TROPONIN T SERPL HS-MCNC: 25 NG/L
TSH SERPL DL<=0.05 MIU/L-ACNC: 6.95 UIU/ML (ref 0.27–4.2)
UFH PPP CHRO-ACNC: 0.24 IU/ML (ref 0.3–0.7)
UFH PPP CHRO-ACNC: 0.28 IU/ML (ref 0.3–0.7)
UFH PPP CHRO-ACNC: 0.69 IU/ML (ref 0.3–0.7)
WBC NRBC COR # BLD: 9.71 10*3/MM3 (ref 3.4–10.8)
WHOLE BLOOD HOLD COAG: NORMAL
WHOLE BLOOD HOLD SPECIMEN: NORMAL

## 2023-08-13 PROCEDURE — 25010000002 AMIODARONE IN DEXTROSE 5% 360-4.14 MG/200ML-% SOLUTION: Performed by: PHYSICIAN ASSISTANT

## 2023-08-13 PROCEDURE — 84443 ASSAY THYROID STIM HORMONE: CPT | Performed by: INTERNAL MEDICINE

## 2023-08-13 PROCEDURE — 83880 ASSAY OF NATRIURETIC PEPTIDE: CPT

## 2023-08-13 PROCEDURE — 93010 ELECTROCARDIOGRAM REPORT: CPT | Performed by: INTERNAL MEDICINE

## 2023-08-13 PROCEDURE — 84100 ASSAY OF PHOSPHORUS: CPT | Performed by: INTERNAL MEDICINE

## 2023-08-13 PROCEDURE — 85025 COMPLETE CBC W/AUTO DIFF WBC: CPT

## 2023-08-13 PROCEDURE — 85520 HEPARIN ASSAY: CPT | Performed by: INTERNAL MEDICINE

## 2023-08-13 PROCEDURE — 85610 PROTHROMBIN TIME: CPT | Performed by: INTERNAL MEDICINE

## 2023-08-13 PROCEDURE — 83735 ASSAY OF MAGNESIUM: CPT | Performed by: EMERGENCY MEDICINE

## 2023-08-13 PROCEDURE — 94660 CPAP INITIATION&MGMT: CPT

## 2023-08-13 PROCEDURE — 71045 X-RAY EXAM CHEST 1 VIEW: CPT

## 2023-08-13 PROCEDURE — 85520 HEPARIN ASSAY: CPT

## 2023-08-13 PROCEDURE — 92960 CARDIOVERSION ELECTRIC EXT: CPT

## 2023-08-13 PROCEDURE — 83690 ASSAY OF LIPASE: CPT

## 2023-08-13 PROCEDURE — 84484 ASSAY OF TROPONIN QUANT: CPT

## 2023-08-13 PROCEDURE — 82330 ASSAY OF CALCIUM: CPT | Performed by: EMERGENCY MEDICINE

## 2023-08-13 PROCEDURE — 99285 EMERGENCY DEPT VISIT HI MDM: CPT

## 2023-08-13 PROCEDURE — 25010000002 HEPARIN (PORCINE) 25000-0.45 UT/250ML-% SOLUTION

## 2023-08-13 PROCEDURE — 80053 COMPREHEN METABOLIC PANEL: CPT

## 2023-08-13 PROCEDURE — 93005 ELECTROCARDIOGRAM TRACING: CPT | Performed by: EMERGENCY MEDICINE

## 2023-08-13 PROCEDURE — 93005 ELECTROCARDIOGRAM TRACING: CPT

## 2023-08-13 PROCEDURE — 85730 THROMBOPLASTIN TIME PARTIAL: CPT | Performed by: INTERNAL MEDICINE

## 2023-08-13 PROCEDURE — 25010000002 MIDAZOLAM PER 1 MG: Performed by: EMERGENCY MEDICINE

## 2023-08-13 PROCEDURE — 25010000002 AMIODARONE IN DEXTROSE 5% 360-4.14 MG/200ML-% SOLUTION: Performed by: EMERGENCY MEDICINE

## 2023-08-13 RX ORDER — CARVEDILOL 6.25 MG/1
6.25 TABLET ORAL 2 TIMES DAILY WITH MEALS
Status: DISCONTINUED | OUTPATIENT
Start: 2023-08-13 | End: 2023-08-15 | Stop reason: HOSPADM

## 2023-08-13 RX ORDER — POTASSIUM CHLORIDE 750 MG/1
20 CAPSULE, EXTENDED RELEASE ORAL DAILY
Status: DISCONTINUED | OUTPATIENT
Start: 2023-08-13 | End: 2023-08-15 | Stop reason: HOSPADM

## 2023-08-13 RX ORDER — MIDAZOLAM HYDROCHLORIDE 1 MG/ML
2 INJECTION INTRAMUSCULAR; INTRAVENOUS ONCE
Status: COMPLETED | OUTPATIENT
Start: 2023-08-13 | End: 2023-08-13

## 2023-08-13 RX ORDER — HEPARIN SODIUM 10000 [USP'U]/100ML
12 INJECTION, SOLUTION INTRAVENOUS
Status: DISCONTINUED | OUTPATIENT
Start: 2023-08-13 | End: 2023-08-13 | Stop reason: DRUGHIGH

## 2023-08-13 RX ORDER — HEPARIN SODIUM 1000 [USP'U]/ML
25 INJECTION, SOLUTION INTRAVENOUS; SUBCUTANEOUS AS NEEDED
Status: DISCONTINUED | OUTPATIENT
Start: 2023-08-13 | End: 2023-08-13 | Stop reason: SDUPTHER

## 2023-08-13 RX ORDER — POLYETHYLENE GLYCOL 3350 17 G/17G
17 POWDER, FOR SOLUTION ORAL DAILY
Status: DISCONTINUED | OUTPATIENT
Start: 2023-08-13 | End: 2023-08-15 | Stop reason: HOSPADM

## 2023-08-13 RX ORDER — HEPARIN SODIUM 10000 [USP'U]/100ML
14 INJECTION, SOLUTION INTRAVENOUS
Status: DISCONTINUED | OUTPATIENT
Start: 2023-08-13 | End: 2023-08-14

## 2023-08-13 RX ORDER — AMIODARONE HYDROCHLORIDE 200 MG/1
400 TABLET ORAL
Status: DISCONTINUED | OUTPATIENT
Start: 2023-08-13 | End: 2023-08-14

## 2023-08-13 RX ORDER — PANTOPRAZOLE SODIUM 40 MG/1
40 TABLET, DELAYED RELEASE ORAL EVERY MORNING
Status: DISCONTINUED | OUTPATIENT
Start: 2023-08-13 | End: 2023-08-15 | Stop reason: HOSPADM

## 2023-08-13 RX ORDER — ASPIRIN 81 MG/1
324 TABLET, CHEWABLE ORAL ONCE
Status: COMPLETED | OUTPATIENT
Start: 2023-08-13 | End: 2023-08-13

## 2023-08-13 RX ORDER — HEPARIN SODIUM 10000 [USP'U]/100ML
9 INJECTION, SOLUTION INTRAVENOUS
Status: DISCONTINUED | OUTPATIENT
Start: 2023-08-13 | End: 2023-08-13

## 2023-08-13 RX ORDER — SODIUM CHLORIDE 0.9 % (FLUSH) 0.9 %
10 SYRINGE (ML) INJECTION AS NEEDED
Status: DISCONTINUED | OUTPATIENT
Start: 2023-08-13 | End: 2023-08-15 | Stop reason: HOSPADM

## 2023-08-13 RX ORDER — CLOPIDOGREL BISULFATE 75 MG/1
75 TABLET ORAL EVERY MORNING
Status: DISCONTINUED | OUTPATIENT
Start: 2023-08-13 | End: 2023-08-15 | Stop reason: HOSPADM

## 2023-08-13 RX ORDER — HEPARIN SODIUM 1000 [USP'U]/ML
50 INJECTION, SOLUTION INTRAVENOUS; SUBCUTANEOUS AS NEEDED
Status: DISCONTINUED | OUTPATIENT
Start: 2023-08-13 | End: 2023-08-13 | Stop reason: SDUPTHER

## 2023-08-13 RX ORDER — TORSEMIDE 20 MG/1
10 TABLET ORAL DAILY
Status: DISCONTINUED | OUTPATIENT
Start: 2023-08-13 | End: 2023-08-15 | Stop reason: HOSPADM

## 2023-08-13 RX ORDER — LEVOTHYROXINE SODIUM 0.05 MG/1
50 TABLET ORAL EVERY MORNING
Status: DISCONTINUED | OUTPATIENT
Start: 2023-08-13 | End: 2023-08-15 | Stop reason: HOSPADM

## 2023-08-13 RX ORDER — SODIUM CHLORIDE, SODIUM LACTATE, POTASSIUM CHLORIDE, CALCIUM CHLORIDE 600; 310; 30; 20 MG/100ML; MG/100ML; MG/100ML; MG/100ML
50 INJECTION, SOLUTION INTRAVENOUS CONTINUOUS
Status: ACTIVE | OUTPATIENT
Start: 2023-08-13 | End: 2023-08-13

## 2023-08-13 RX ADMIN — HEPARIN SODIUM 9 UNITS/KG/HR: 10000 INJECTION, SOLUTION INTRAVENOUS at 05:39

## 2023-08-13 RX ADMIN — POTASSIUM CHLORIDE 20 MEQ: 750 CAPSULE, EXTENDED RELEASE ORAL at 13:32

## 2023-08-13 RX ADMIN — SODIUM CHLORIDE 500 ML: 9 INJECTION, SOLUTION INTRAVENOUS at 01:01

## 2023-08-13 RX ADMIN — CARVEDILOL 6.25 MG: 6.25 TABLET, FILM COATED ORAL at 08:48

## 2023-08-13 RX ADMIN — MIDAZOLAM HYDROCHLORIDE 2 MG: 1 INJECTION, SOLUTION INTRAMUSCULAR; INTRAVENOUS at 01:06

## 2023-08-13 RX ADMIN — AMIODARONE HYDROCHLORIDE 400 MG: 200 TABLET ORAL at 13:33

## 2023-08-13 RX ADMIN — AMIODARONE HYDROCHLORIDE 1 MG/MIN: 1.8 INJECTION, SOLUTION INTRAVENOUS at 02:47

## 2023-08-13 RX ADMIN — SODIUM CHLORIDE, POTASSIUM CHLORIDE, SODIUM LACTATE AND CALCIUM CHLORIDE 75 ML/HR: 600; 310; 30; 20 INJECTION, SOLUTION INTRAVENOUS at 03:22

## 2023-08-13 RX ADMIN — PANTOPRAZOLE SODIUM 40 MG: 40 TABLET, DELAYED RELEASE ORAL at 05:17

## 2023-08-13 RX ADMIN — CARVEDILOL 6.25 MG: 6.25 TABLET, FILM COATED ORAL at 17:25

## 2023-08-13 RX ADMIN — CLOPIDOGREL BISULFATE 75 MG: 75 TABLET ORAL at 13:32

## 2023-08-13 RX ADMIN — ASPIRIN 324 MG: 81 TABLET, CHEWABLE ORAL at 02:47

## 2023-08-13 RX ADMIN — POLYETHYLENE GLYCOL 3350 17 G: 17 POWDER, FOR SOLUTION ORAL at 13:39

## 2023-08-13 RX ADMIN — AMIODARONE HYDROCHLORIDE 0.5 MG/MIN: 1.8 INJECTION, SOLUTION INTRAVENOUS at 08:43

## 2023-08-13 RX ADMIN — EMPAGLIFLOZIN 10 MG: 10 TABLET, FILM COATED ORAL at 13:32

## 2023-08-13 RX ADMIN — LEVOTHYROXINE SODIUM 50 MCG: 0.05 TABLET ORAL at 05:17

## 2023-08-13 RX ADMIN — AMIODARONE HYDROCHLORIDE 0.5 MG/MIN: 1.8 INJECTION, SOLUTION INTRAVENOUS at 18:51

## 2023-08-13 RX ADMIN — TORSEMIDE 10 MG: 20 TABLET ORAL at 13:33

## 2023-08-13 NOTE — CONSULTS
Cardiology Consult - [facility]    Jonnie Roth     [id number]  1945  [address] [address]         Admission Date:  8/13/2023    Consultation Date:  08/13/23        PCP:  Brian Lewis MD  Referring MD:  Dr. Travis  Consulting MD:  Dr. Mack  Primary Cardiologist/EP:  Dr. Alba        CC:  CP, SOA, palpitations    Reason for Consult: VT         Allergies:  has No Known Allergies.    Medications Prior to Admission   Medication Sig Dispense Refill Last Dose    amiodarone (PACERONE) 200 MG tablet Take 1 tablet by mouth 3 (Three) Times a Day. Take 200 mg three times daily x 10 days, then 400 mg daily. (Patient taking differently: Take 2 tablets by mouth Daily.) 60 tablet 6 8/12/2023    carvedilol (COREG) 6.25 MG tablet Take 1 tablet by mouth 2 (Two) Times a Day With Meals. 60 tablet 6 8/12/2023    cholecalciferol (VITAMIN D3) 25 MCG (1000 UT) tablet Take 4 tablets by mouth Daily. OTC   8/12/2023    clopidogrel (PLAVIX) 75 MG tablet Take 1 tablet by mouth Every Morning. 90 tablet 3 8/12/2023    empagliflozin (JARDIANCE) 10 MG tablet tablet Take 1 tablet by mouth Daily. 90 tablet 3 8/12/2023    eplerenone (INSPRA) 25 MG tablet Take 0.5 tablets by mouth Daily. 90 tablet 0 8/12/2023    L-TRYPTOPHAN PO Take  by mouth Every Night. 3 TABLETS NIGHTLY-     1500 MG EACH   8/12/2023    levothyroxine (SYNTHROID, LEVOTHROID) 25 MCG tablet Take 1 tablet by mouth Every Morning. (Patient taking differently: Take 2 tablets by mouth Every Morning.) 30 tablet 6 8/12/2023    melatonin 5 MG tablet tablet Take 2 tablets by mouth At Night As Needed. OTC   8/12/2023    metFORMIN (GLUCOPHAGE) 1000 MG tablet Take 1 tablet by mouth 2 (two) times a day.   8/12/2023    Multiple Vitamins-Minerals (MULTIVITAMIN ADULTS 50+) tablet Take 1 tablet by mouth Daily. OTC   8/12/2023    niacin (NIASPAN) 1000 MG CR tablet Take 1 tablet by mouth Daily.   8/12/2023    pantoprazole (PROTONIX) 40  MG EC tablet Take 1 tablet by mouth Every Morning.   8/12/2023    potassium chloride (MICRO-K) 10 MEQ CR capsule Take 2 capsules by mouth Daily. 60 capsule 6 8/12/2023    rivaroxaban (Xarelto) 10 MG tablet Take 1 tablet by mouth Daily. 90 tablet 3 8/12/2023    torsemide (DEMADEX) 10 MG tablet Take 1 tablet by mouth Daily. 30 tablet 6 8/12/2023    vitamin B-12 (CYANOCOBALAMIN) 1000 MCG tablet Take 1 tablet by mouth Daily. OTC   8/12/2023    Magnesium Oxide -Mg Supplement 400 (240 Mg) MG tablet Take 1 tablet by mouth Daily.   Unknown    Multiple Vitamins-Minerals (ICAPS AREDS 2 PO) Take 1 tablet by mouth 2 (two) times a day. OTC       nitroglycerin (NITROSTAT) 0.4 MG SL tablet Place 1 tablet under the tongue Every 5 (Five) Minutes As Needed for Chest Pain. Take no more than 3 doses in 15 minutes. 25 tablet 5 Unknown    polyethylene glycol (MIRALAX) packet Take 17 g by mouth Every Other Day. OTC       promethazine (PHENERGAN) 12.5 MG tablet Take 0.5 tablets by mouth Every 8 (Eight) Hours As Needed for Nausea or Vomiting. 15 tablet 1 Unknown       amiodarone, 0.5 mg/min, Last Rate: 0.5 mg/min (08/13/23 0843)  heparin, 9 Units/kg/hr, Last Rate: 9 Units/kg/hr (08/13/23 0737)  lactated ringers, 50 mL/hr, Last Rate: 50 mL/hr (08/13/23 4939)  Pharmacy to Dose Heparin,         Problem List:         Sudden cardiac death with primary ventricular fibrillation status post Pacesetter [place] ICD, [place], [place] in September 2000:   ICD generator change out with upgrade to a biventricular pacemaker ICD on 12/17/2007, [employer] device.  ICD discharge, 08/09/2012, secondary to ventricular flutter with initiation of amiodarone therapy.   Hospitalization, 02/01/2014, secondary to ICD discharge for ventricular tachycardia with subsequent discontinuation of Coreg and initiation of sotalol therapy.   Hospitalization, February 2014, secondary to ICD discharge for VT with subsequent discontinuation of Coreg and initiation of  sotalol.  Echocardiogram, 04/07/2015: EF less than 20%.  Hospitalization secondary to VF and ICD shocks, 04/18/2015.  NIPS procedure with defibrillation threshold testing for VF and noninvasive program stimulation, 04/18/2015.   Initiation of mexiletine for recurrent ventricular tachycardia with ICD shocks on 05/05/2015 with amiodarone load, recurrent VT and ICD shocks with hospitalization 05/16/2015-05/18/2015.   EP study with RFA of large anterior left ventricular scar extending from mid-left ventricular wall down apex by Dr. Ferdinand Alba, 05/21/2015.  ICD generator change 10/2017  Echo 11/19 LVEF 30%, mild TR  ICD generator change 07/2021  ICD shocks for VT/VF 9/2022, ICD reprogrammed with less ATP attemps, started on Amiodarone  ICD shocks for VT/VF in setting of Flu A/dehydration 12/2022  ICD shocks x 2  for VT on 4/26/2023, Amiodarone increased, Mexiletine added  ER presentation for VT with ICD shock x 1 at home, external ECV in ED for VT at 138 bpm with hypotension 5/2/2023                Ischemic heart disease:  Remote progressive angina pectoris/acute extensive anterolateral myocardial infarction/delayed presentation with thrombolysis/severe 3-vessel coronary atherosclerosis with severe single vessel involvement/PTCA with intracoronary stent deployment proximal-mid segment LAD/moderate-severe compensated left ventricular dysfunction. LVEF (0.35)/abnormal positive signal averaged EKG/oral anticoagulation, April 1995.  Remote NYHA class I-II angina pectoris/class III CHF/abnormal quantitative SPECT gated Cardiolite GXT, July 1998.  Stable persistent MUGA, LVEF (0.33, January 1999 and January 2000).   Residual NYHA class I angina pectoris/CHF with reduced MUGA scan: LVEF (0.25), April 2002.  Left heart catheterization with distal circumflex disease: EF 20%, September 2002.   MUGA in May 2003: EF 32%.   Sestamibi GXT on 04/08/2005: No ischemia. EF 29%.   Residual NYHA class I angina pectoris/CHF with reduced  acceptable MUGA scan: EF (0.32) and acceptable Pacesetter PCD interrogation/reprogramming, May 2003 with interrogation, September 2004.  Echocardiogram, September 2009 with EF 30%  Left heart catheterization by Dr. Bhupinder Gonzalez, August 2010, with LVEF of 35%, with 3-vessel native coronary artery disease, 95% mid-LAD status post PTCA and stenting with two 3 mm stents by Dr. Llanes.  Echocardiogram, 06/07/2012: Left ventricular ejection fraction of 30%.  Hospitalization, 02/01/2014, for non-ST elevation MI, left heart catheterization by Dr. Ayala that demonstrated 60% mid stenosis of the right coronary artery that remains unchanged compared to previous, widely patent stents of the LAD with severe LV dysfunction of approximately 25%.   Left heart catheterization status post drug-eluting stent to the distal LAD and mid-RCA with an EF of 20% to 25% by Dr. Diego Ayala, 04/20/2015.   Left heart catheterization 1/16/17; nonobstructive CAD with patent previously placed stents, dilated cardiomyopathy with severe LV systolic dysfunction, EF less than 20%, normal hemodynamics, recommendations for continued medical therapy and risk factor, evaluation for noncardiac etiology of symptoms.  Residual CCS Class I/II angina pectoris/NYHA Class II exertional dyspnea and fatigue syndrome, summer 2017  Residual CCS 0 angina pectoris/NYHA class I-II exertional dyspnea and fatigue, February 2018, September 2018, March 2019.  Echocardiogram 5/2/2023: LV dilated, LV systolic function severely decreased, LVEF appears less than 20%, anterior and anterior lateral segments and apex are akinetic, LA cavity dilated, LA volume moderately increased, mild to moderate regurgitation present  Dyslipidemia.  Status post remote operations.  Remote chronic tobacco use/abnormal chest x-ray with stable abnormal thoracic  CT scan without contrast demonstrating bilateral diffuse perimeter scarring and fibrosis with scattered subcentimeter noncalcified nodules  (unchanged from February 2016), March 2017.  Left bundle branch block.  Burks trauma with hematuria with urosepsis requiring hospitalization, May 2015.  Pulmonary embolism, spring 2015.   Remote apparent hypothyroidism/replacement therapy - data deficit, January 2016.  Remote diagnosis of obstructive sleep apnea with CPAP use, 2017.  Bilateral cataract extraction November 2018, February 2019      HPI:  Jonnie Roth is a 79 yo CM with the noted above history who follows routinely with Marycruz Flanagan and Anjali, last seen in the office June 7, 2023 as a follow up for hospitalizations 4/26 and 5/2/23 for ICD shocks secondary to VT.  At that follow up visit, his mexiletine was discontinued secondary to nausea and they discussed pursuing catheter ablation.  This was scheduled at one point but postponed due to a family graduation that he could not miss.   He presents to [facility] ED over night after he woke suddenly from sleep with complaints of chest pain, palpitations, and shortness of air. He reports compliance with his amiodarone at home and thinks he is taking 400 mg daily. He reports he is compliant with his CPAP as well. Came to the ER tonight where he was noted to be in monomorphic V. tach. Patient given 150 mg bolus of amiodarone and placed on amiodarone drip. Was successfully defibrillated in the ER. ER discussed case with on call cardiologist, Dr. Schmid who recommended continuing amiodarone drip and observing overnight. Cardiology has been consulted today.    At time of consultation, he is awake in bed.  Wife and other family present at bedside.  He has not received any shocks from his device.  IV Amio and IV Heparin currently hanging.  He states his nausea has completely resolved since discontinuing the mexiletine.  He reports no changes in medications, no recent illness/fever, no new activity or excess exertion.        Social History     Socioeconomic History    Marital status:    Tobacco Use     Smoking status: Former     Packs/day: 2.00     Years: 30.00     Pack years: 60.00     Types: Cigarettes     Start date: 1960     Quit date: 1995     Years since quittin.3    Smokeless tobacco: Never    Tobacco comments:     Heavy smoker for years   Vaping Use    Vaping Use: Never used   Substance and Sexual Activity    Alcohol use: No    Drug use: No    Sexual activity: Not Currently     Partners: Female     Family History   Problem Relation Age of Onset    Heart failure Father     Stroke Father      Past Surgical History:   Procedure Laterality Date    BIVENTRICULLAR IMPLANTABLE CARDIOVERTER DEFIBRILLATOR PLACEMENT      CARDIAC CATHETERIZATION N/A 2017    Procedure: Left Heart Cath;  Surgeon: Diego Ayala MD;  Location:  [facility];  Service:     CARDIAC DEFIBRILLATOR PLACEMENT       A. ICD generator change out with upgrade to BiV pacemaker implantable cardioverter    defibrillator, 2007. Ventricular fibrillation s/p pacesetter [place] ICD in [place]. [place] 2000    CARDIAC ELECTROPHYSIOLOGY PROCEDURE N/A 10/02/2017    Procedure: BIV ICD  generator change [facility];  Surgeon: Ferdinand Alba MD;  Location:  [facility];  Service:     CARDIAC ELECTROPHYSIOLOGY PROCEDURE N/A 2021    Procedure: BIV ICD generator change with [employer]. This will need to be done in late  or early July. Hold Xarelto one day prior.;  Surgeon: Ferdinand Alba MD;  Location:  [facility];  Service: Cardiology;  Laterality: N/A;    CAROTID STENT  Several    CATARACT EXTRACTION      Bilateral     COLONOSCOPY      CORONARY ANGIOPLASTY WITH STENT PLACEMENT      X7 STENTS TOTAL     CYSTOSCOPY URETERAL TUMOR FULGURATION  2015     A. Status post cystoscopy with clot evacuation and fulguration of the prostate secondary to hematuria, 2015.    FOOT SURGERY      RIGHT - POLIO RELATED     INSERT / REPLACE / REMOVE PACEMAKER      PROSTATE SURGERY      A.  "Status post laser vaporization, 08/19/2009.    ROOT CANAL      TOOTH EXTRACTION       ROS: Pertinent items are noted in HPI, all other systems reviewed and negative     Objective     height is 175.3 cm (69\") and weight is 77.1 kg (170 lb). His axillary temperature is 96.3 øF (35.7 øC). His blood pressure is 107/61 and his pulse is 69. His respiration is 18 and oxygen saturation is 96%.    Intake/Output Summary (Last 24 hours) at 8/13/2023 1300  Last data filed at 8/13/2023 0742  Gross per 24 hour   Intake --   Output 175 ml   Net -175 ml     Intake/Output                   08/13/23 0701 - 08/14/23 0700     [id number] Total              Intake    Total Intake -- -- --       Output    Urine  175  -- 175    Total Output 175 -- 175               08/13/23  0036 08/13/23  0302   Weight: 72.6 kg (160 lb) 77.1 kg (170 lb)          Physical Exam:  General Appearance:    Alert, cooperative, in no acute distress   Head:    Normocephalic, without obvious abnormality, atraumatic   Eyes:            Lids and lashes normal, conjunctivae and sclerae normal, no   icterus, no pallor, corneas clear, PERRLA   Ears:    Ears appear intact with no abnormalities noted   Throat:   No oral lesions, no thrush, oral mucosa moist   Neck:   No adenopathy, supple, trachea midline, no thyromegaly, no carotid bruit, no JVD   Back:     No kyphosis present, no scoliosis present, no skin lesions,    erythema or scars, no tenderness to percussion or                   palpation, range of motion normal   Lungs:     Clear to auscultation,respirations regular, even and               unlabored    Heart:    Regular rhythm and normal rate, normal S1 and S2, no         murmur, no gallop, no rub, no click   Abdomen:     Normal bowel sounds, no masses, no organomegaly, soft     nontender, nondistended, no guarding, no rebound   tenderness   Extremities:   Moves all extremities well,  no cyanosis, no redness, no edema   Pulses:   Pulses palpable and equal " bilaterally   Skin:   No bleeding, bruising or rash   Lymph nodes:   No palpable adenopathy   Neurologic:   Cranial nerves 2 - 12 grossly intact, sensation intact, DTR     present and equal bilaterally          Results Review:  I personally reviewed the patient's clinical results.  Results from last 7 days   Lab Units 08/13/23  0042   WBC 10*3/mm3 9.71   HEMOGLOBIN g/dL 12.6*   HEMATOCRIT % 40.7   PLATELETS 10*3/mm3 308     Results from last 7 days   Lab Units 08/13/23  0042   SODIUM mmol/L 139   POTASSIUM mmol/L 4.6   CHLORIDE mmol/L 105   CO2 mmol/L 23.0   BUN mg/dL 28*   CREATININE mg/dL 1.20   CALCIUM mg/dL 8.7   BILIRUBIN mg/dL 0.2   ALK PHOS U/L 119*   ALT (SGPT) U/L 37   AST (SGOT) U/L 43*   GLUCOSE mg/dL 140*     Results from last 7 days   Lab Units 08/13/23  0042   SODIUM mmol/L 139   POTASSIUM mmol/L 4.6   CHLORIDE mmol/L 105   CO2 mmol/L 23.0   BUN mg/dL 28*   CREATININE mg/dL 1.20   GLUCOSE mg/dL 140*   CALCIUM mg/dL 8.7     Results from last 7 days   Lab Units 08/13/23  0439   INR  1.16*     Results from last 7 days   Lab Units 08/13/23  0310   TSH uIU/mL 6.950*         Results from last 7 days   Lab Units 08/13/23  0042   PROBNP pg/mL 1,726.0             Radiology:  Imaging Results (Last 72 Hours)       Procedure Component Value Units Date/Time    XR Chest 1 View [[id number]] Collected: 08/13/23 0241     Updated: 08/13/23 0245    Narrative:      XR CHEST 1 VW    Date of Exam: 8/13/2023 2:06 AM EDT    Indication: Chest Pain Triage Protocol    Comparison: 5/12/2023.    Findings:  There are no airspace consolidations. No pleural fluid. No pneumothorax. The pulmonary vasculature appears mildly indistinct. The heart appears enlarged. There is a left subclavian AICD/pacemaker device. Chronic interstitial changes are present   bilaterally. Unremarkable. No acute osseous abnormality identified.      Impression:      Impression:  Questional mild vascular congestion/pulmonary edema. Stable  cardiomegaly..    Electronically Signed: Micaela Navarro MD    8/13/2023 2:42 AM EDT    Workstation ID: [id number]              Tele:  NSR    ASSESSMENT:  -  79 yo CM with known VT/ hospitalized twice earlier this year with ICD shocks presents [facility] ED with chest tightness and noted to be in VT, defibrillated in ED.  No shocks from ICD.  -  IHDz  -  LBBB  -  Hypothyroidism on chronic supplementation.          PLAN:  -  Admit to hospitalist service.  -  Continue IV Heparin per pharmacy.  Continue IV Amiodarone along with oral amiodarone .  Patient was taking 400mg PO daily.  Continue coreg.  -  Dr. Alba to evaluate patient in a.m.          I discussed the patient's findings and my recommendations with the patient, any present family members, and the nursing staff.  Viet Mack MD saw and examined patient, verified hx and PE, read all radiographic studies, reviewed labs and micro data, and formulated dx, plan for treatment and all medical decision making.      Adelia Ames PA-C, working with Viet Mack MD  08/13/23 13:00 EDT

## 2023-08-13 NOTE — PROGRESS NOTES
[facility] Medicine Services  ADMISSION FOLLOW-UP NOTE          Patient admitted after midnight, H&P by my partner performed earlier on today's date reviewed.  Interim findings, labs, and charting also reviewed.        The [facility] Hospital Problem List has been managed and updated to include any new diagnoses:  Active Hospital Problems    Diagnosis  POA    **V-tach [I47.20]  Yes    Ischemic cardiomyopathy (EF < 20%) [I25.5]  Yes    COPD  [J44.9]  Yes    GEO on nocturnal CPAP [G47.33]  Yes    ICD in place [Z95.810]  Yes    Hypothyroidism (acquired) [E03.9]  Yes    GERD without esophagitis [K21.9]  Yes    Type 2 diabetes mellitus with HbA1c 5.8, without long-term current use of insulin [E11.9]  Yes    Hyperlipidemia LDL goal <70 [E78.5]  Yes    BPH (benign prostatic hypertrophy) [N40.0]  Yes    LBBB (left bundle branch block) [I44.7]  Yes    Pulmonary emphysema [J43.9]  Yes    Coronary artery disease involving native coronary artery of native heart with angina pectoris [I25.119]  Yes    Chronic systolic congestive heart failure [I50.22]  Yes      Resolved Hospital Problems   No resolved problems to display.         ADDITIONAL PLAN:  - detailed assessment and plan from admission reviewed  -   Patient seen and examined at bedside. H&P by my partner, Dr Travis, on this same date reviewed. Agree with current plan of care with below updates    Cardiology note reviewed. Awaiting Dr Alba recs for AM. Continue to hold plavix/xarelto for possible procedure. Continue to monitor on tele with labs in am      Expected Discharge home  Expected Discharge Date: 8/15/2023; Expected Discharge Time:      Cate Mcpherson MD  08/13/23

## 2023-08-13 NOTE — PLAN OF CARE
Goal Outcome Evaluation:      Pt's VSS, RA-CPAP while sleeping, AV paced, no complaints of chest pain, NPO. Heparin, Amiodarone, and lactated ringers infusing per orders. Continue POC.      Problem: Adult Inpatient Plan of Care  Goal: Plan of Care Review  Outcome: Ongoing, Progressing  Goal: Patient-Specific Goal (Individualized)  Outcome: Ongoing, Progressing  Goal: Absence of Hospital-Acquired Illness or Injury  Outcome: Ongoing, Progressing  Intervention: Identify and Manage Fall Risk  Recent Flowsheet Documentation  Taken 8/13/2023 0315 by Lissett, Jannie, RN  Safety Promotion/Fall Prevention:   activity supervised   clutter free environment maintained   fall prevention program maintained   nonskid shoes/slippers when out of bed   room organization consistent   safety round/check completed  Intervention: Prevent Skin Injury  Recent Flowsheet Documentation  Taken 8/13/2023 0600 by Jannie Galeana RN  Body Position: position changed independently  Taken 8/13/2023 0400 by Jannie Galeana RN  Body Position: position changed independently  Taken 8/13/2023 0315 by Jannie Galeana RN  Body Position: position changed independently  Intervention: Prevent Infection  Recent Flowsheet Documentation  Taken 8/13/2023 0315 by Jannie Galeana RN  Infection Prevention:   cohorting utilized   rest/sleep promoted   single patient room provided  Goal: Optimal Comfort and Wellbeing  Outcome: Ongoing, Progressing  Intervention: Provide Person-Centered Care  Recent Flowsheet Documentation  Taken 8/13/2023 0315 by Jannie Galeana RN  Trust Relationship/Rapport:   care explained   questions answered   questions encouraged   thoughts/feelings acknowledged  Goal: Readiness for Transition of Care  Outcome: Ongoing, Progressing  Intervention: Mutually Develop Transition Plan  Recent Flowsheet Documentation  Taken 8/13/2023 0305 by Jannie Galeana RN  Transportation Anticipated: family or friend will  provide  Patient/Family Anticipated Services at Transition: none  Patient/Family Anticipates Transition to: home with family  Taken 8/13/2023 0304 by Jannie Galeana RN  Equipment Currently Used at Home:   cane, straight   cpap     Problem: Chest Pain  Goal: Resolution of Chest Pain Symptoms  Outcome: Ongoing, Progressing     Problem: Fall Injury Risk  Goal: Absence of Fall and Fall-Related Injury  Outcome: Ongoing, Progressing  Intervention: Identify and Manage Contributors  Recent Flowsheet Documentation  Taken 8/13/2023 0315 by Jannie Galeana RN  Medication Review/Management: medications reviewed  Intervention: Promote Injury-Free Environment  Recent Flowsheet Documentation  Taken 8/13/2023 0315 by Janine Galeana, RN  Safety Promotion/Fall Prevention:   activity supervised   clutter free environment maintained   fall prevention program maintained   nonskid shoes/slippers when out of bed   room organization consistent   safety round/check completed

## 2023-08-13 NOTE — PLAN OF CARE
Goal Outcome Evaluation:  Plan of Care Reviewed With: patient, spouse        Progress: improving  Outcome Evaluation: Pt remains on room air. NSR on monitor. Heparin gtt infusing at 10 u/kg/hr. Amio gtt infusing. PO amio started today. LR still infusing at 50/hr. No complaints from patient today. Will continue poc.

## 2023-08-13 NOTE — Clinical Note
Level of Care: Telemetry [5]   Diagnosis: V-tach [[id number]]   Admitting Physician: MUSTAPHA SEAY [[id number]]   Attending Physician: MUSTAPHA SEAY [[id number]]

## 2023-08-13 NOTE — ED PROVIDER NOTES
Subjective   History of Present Illness  78-year-old male presents to the emergency department brought by EMS with concerns about chest pain and shortness of breath which woke him from sleep approximately 1 hour prior to arrival.  Upon EMS arrival, the patient was found to be in ventricular tachycardia, but was alert and had a pulse.  Blood pressure prior to arrival 110 systolic.  Patient was given amiodarone 150 mg IV infusion on the way to the emergency department without change in rhythm.  The patient has had similar prior episode and was actually hospitalized at our facility in early May 2023 for similar symptoms.  He is followed by Dr. Flanagan for cardiology and Dr. Alba for electrophysiology.  Patient has a history of ischemic cardiomyopathy, heart failure with reduced ejection fraction, and has an Abbott pacemaker defibrillator.  He has also had 1 prior cardiac ablation and Dr. Alba has been talking about doing a second 1 recently.  The patient denies having his ICD fired prior to arrival.  He also has a history of prior cardiac arrest.  He has coronary artery disease and has had several stents placed.  He takes amiodarone 400 mg p.o. daily.    Review of Systems   Constitutional:  Negative for fever.   HENT:  Negative for facial swelling.    Eyes:  Negative for photophobia and discharge.   Respiratory:  Positive for shortness of breath.    Cardiovascular:  Positive for chest pain.   Neurological:  Negative for facial asymmetry and speech difficulty.     Past Medical History:   Diagnosis Date    Arthritis     BPH (benign prostatic hypertrophy) 12/28/2016    CAD (coronary artery disease)     Congenital heart disease 1998    Disease of thyroid gland     Enlarged prostate     HFrEF (heart failure with reduced ejection fraction)     History of transfusion     NO REACTION RECALLED     Hypertension     LBBB (left bundle branch block) 12/28/2016    Lung nodule     Macular degeneration     Pancreatic cyst  12/08/2022    Polio     as a child    Pulmonary embolism     Pulmonary emphysema 12/28/2016    Sepsis     A. Secondary to [place] trauma with hematuria and urosepsis requiring hospitalization May 2015    Sleep apnea with use of continuous positive airway pressure (CPAP)     CPAP- SETTING 4     Ventricular tachycardia 12/28/2016    Wears glasses     READERS       No Known Allergies    Past Surgical History:   Procedure Laterality Date    BIVENTRICULLAR IMPLANTABLE CARDIOVERTER DEFIBRILLATOR PLACEMENT      CARDIAC CATHETERIZATION N/A 01/16/2017    Procedure: Left Heart Cath;  Surgeon: Diego Ayala MD;  Location:  [facility];  Service:     CARDIAC DEFIBRILLATOR PLACEMENT       A. ICD generator change out with upgrade to BiV pacemaker implantable cardioverter    defibrillator, 12/17/2007. Ventricular fibrillation s/p pacesetter [place] ICD in [place]. [place] 09/2000    CARDIAC ELECTROPHYSIOLOGY PROCEDURE N/A 10/02/2017    Procedure: BIV ICD  generator change [facility];  Surgeon: Ferdinand Alba MD;  Location:  [facility];  Service:     CARDIAC ELECTROPHYSIOLOGY PROCEDURE N/A 07/06/2021    Procedure: BIV ICD generator change with [employer]. This will need to be done in late June or early July. Hold Xarelto one day prior.;  Surgeon: Ferdinand Alba MD;  Location:  [facility];  Service: Cardiology;  Laterality: N/A;    CAROTID STENT  Several    CATARACT EXTRACTION      Bilateral     COLONOSCOPY      CORONARY ANGIOPLASTY WITH STENT PLACEMENT      X7 STENTS TOTAL     CYSTOSCOPY URETERAL TUMOR FULGURATION  05/29/2015     A. Status post cystoscopy with clot evacuation and fulguration of the prostate secondary to hematuria, 5/29/2015.    FOOT SURGERY      RIGHT - POLIO RELATED     INSERT / REPLACE / REMOVE PACEMAKER  2002    PROSTATE SURGERY      A. Status post laser vaporization, 08/19/2009.    ROOT CANAL      TOOTH EXTRACTION         Family History   Problem Relation Age of Onset     Heart failure Father     Stroke Father        Social History     Socioeconomic History    Marital status:    Tobacco Use    Smoking status: Former     Packs/day: 2.00     Years: 30.00     Pack years: 60.00     Types: Cigarettes     Start date: 1960     Quit date: 1995     Years since quittin.3    Smokeless tobacco: Never    Tobacco comments:     Heavy smoker for years   Vaping Use    Vaping Use: Never used   Substance and Sexual Activity    Alcohol use: No    Drug use: No    Sexual activity: Not Currently     Partners: Female           Objective   Physical Exam  Vitals and nursing note reviewed.   Constitutional:       Appearance: He is not diaphoretic.      Comments: Appears uncomfortable.  He is an excellent historian.  BMI 23.   Cardiovascular:      Heart sounds:     No friction rub. No gallop.      Comments: S1, S2, mild regular tachycardia.  No murmur appreciated.  Pulmonary:      Breath sounds: Normal breath sounds. No decreased breath sounds, wheezing, rhonchi or rales.      Comments: Mild tachypnea.  Good air entry.  Lungs clear to auscultation bilaterally.  Abdominal:      Palpations: Abdomen is soft.      Tenderness: There is no abdominal tenderness. There is no guarding or rebound.   Musculoskeletal:      Cervical back: Neck supple.      Comments: No significant peripheral edema.  No calf tenderness bilaterally.   Skin:     General: Skin is warm and dry.      Comments: Mild pallor.   Neurological:      Mental Status: He is alert.      Comments: Normal speech, no dysarthria.  No facial droop.       Electrical Cardioversion    Date/Time: 2023 2:00 AM  Performed by: Mary Wade MD  Authorized by: Mary Wade MD     Consent:     Consent obtained:  Verbal    Consent given by:  Patient and spouse    Risks, benefits, and alternatives were discussed: yes      Risks discussed:  Pain    Alternatives discussed:  Observation  Universal protocol:     Procedure explained and  questions answered to patient or proxy's satisfaction: yes      Patient identity confirmed:  Verbally with patient and arm band  Pre-procedure details:     Cardioversion basis:  Emergent (Chest pain and shortness of breath)    Rhythm:  Ventricular tachycardia    Electrode placement:  Anterior-posterior  Attempt one:     Cardioversion mode:  Synchronous    Waveform:  Biphasic    Shock (Joules):  200    Shock outcome:  Conversion to other rhythm (Conversion to paced rhythm)  Post-procedure details:     Patient status:  Awake    Procedure completion:  Tolerated well, no immediate complications  Critical Care  Performed by: Mary Wade MD  Authorized by: Mary Wade MD     Critical care provider statement:     Critical care time (minutes):  32    Critical care time was exclusive of:  Separately billable procedures and treating other patients    Critical care was necessary to treat or prevent imminent or life-threatening deterioration of the following conditions:  Cardiac failure    Critical care was time spent personally by me on the following activities:  Discussions with consultants, ordering and review of radiographic studies, ordering and review of laboratory studies, development of treatment plan with patient or surrogate, evaluation of patient's response to treatment, examination of patient, interpretation of cardiac output measurements, obtaining history from patient or surrogate and pulse oximetry    I assumed direction of critical care for this patient from another provider in my specialty: no      Care discussed with: admitting provider             ED Course  ED Course as of 08/13/23 0205   Sun Aug 13, 2023   0201 Unremarkable ED course, patient remains in paced rhythm. [LD]      ED Course User Index  [LD] Mary Wade MD                                           Medical Decision Making  Differential diagnosis includes ventricular tachycardia, ischemic cardiomyopathy, acute coronary syndrome,  electrolyte abnormality, and others.    Amount and/or Complexity of Data Reviewed  Independent Historian: EMS     Details: Spouse at bedside  External Data Reviewed: notes.     Details: I reviewed his discharge summary from his hospitalization from 5-23 through 5/9/2023.  Labs: ordered. Decision-making details documented in ED Course.  Radiology: ordered. Decision-making details documented in ED Course.  ECG/medicine tests: ordered and independent interpretation performed. Decision-making details documented in ED Course.     Details: EKG at 0039 shows monomorphic ventricular tachycardia at a rate of 139 bpm.  Repeat EKG at 0112 shows paced rhythm at a rate of 89 bpm.  Discussion of management or test interpretation with external provider(s): Case discussed with Dr. Schmid of cardiology at approximately 00 50, and electrical synchronized cardioversion recommended, and admission to hospitalist on amiodarone continuous infusion recommended.  Will contact hospitalist Dr. Travis regarding admission.    Risk  Prescription drug management.  Decision regarding hospitalization.    Critical Care  Total time providing critical care: 32 minutes    Recent Results (from the past 24 hour(s))   ECG 12 Lead ED Triage Standing Order; Chest Pain    Collection Time: 08/13/23 12:39 AM   Result Value Ref Range    QT Interval 532 ms    QTC Interval 809 ms   High Sensitivity Troponin T    Collection Time: 08/13/23 12:42 AM    Specimen: Blood   Result Value Ref Range    HS Troponin T 25 (H) <15 ng/L   Comprehensive Metabolic Panel    Collection Time: 08/13/23 12:42 AM    Specimen: Blood   Result Value Ref Range    Glucose 140 (H) 65 - 99 mg/dL    BUN 28 (H) 8 - 23 mg/dL    Creatinine 1.20 0.76 - 1.27 mg/dL    Sodium 139 136 - 145 mmol/L    Potassium 4.6 3.5 - 5.2 mmol/L    Chloride 105 98 - 107 mmol/L    CO2 23.0 22.0 - 29.0 mmol/L    Calcium 8.7 8.6 - 10.5 mg/dL    Total Protein 7.1 6.0 - 8.5 g/dL    Albumin 4.2 3.5 - 5.2 g/dL    ALT  (SGPT) 37 1 - 41 U/L    AST (SGOT) 43 (H) 1 - 40 U/L    Alkaline Phosphatase 119 (H) 39 - 117 U/L    Total Bilirubin 0.2 0.0 - 1.2 mg/dL    Globulin 2.9 gm/dL    A/G Ratio 1.4 g/dL    BUN/Creatinine Ratio 23.3 7.0 - 25.0    Anion Gap 11.0 5.0 - 15.0 mmol/L    eGFR 61.5 >60.0 mL/min/1.73   Lipase    Collection Time: 08/13/23 12:42 AM    Specimen: Blood   Result Value Ref Range    Lipase 123 (H) 13 - 60 U/L   BNP    Collection Time: 08/13/23 12:42 AM    Specimen: Blood   Result Value Ref Range    proBNP 1,726.0 0.0 - 1,800.0 pg/mL   Green Top (Gel)    Collection Time: 08/13/23 12:42 AM   Result Value Ref Range    Extra Tube Hold for add-ons.    Lavender Top    Collection Time: 08/13/23 12:42 AM   Result Value Ref Range    Extra Tube hold for add-on    Gold Top - SST    Collection Time: 08/13/23 12:42 AM   Result Value Ref Range    Extra Tube Hold for add-ons.    Light Blue Top    Collection Time: 08/13/23 12:42 AM   Result Value Ref Range    Extra Tube Hold for add-ons.    CBC Auto Differential    Collection Time: 08/13/23 12:42 AM    Specimen: Blood   Result Value Ref Range    WBC 9.71 3.40 - 10.80 10*3/mm3    RBC 4.45 4.14 - 5.80 10*6/mm3    Hemoglobin 12.6 (L) 13.0 - 17.7 g/dL    Hematocrit 40.7 37.5 - 51.0 %    MCV 91.5 79.0 - 97.0 fL    MCH 28.3 26.6 - 33.0 pg    MCHC 31.0 (L) 31.5 - 35.7 g/dL    RDW 14.0 12.3 - 15.4 %    RDW-SD 47.3 37.0 - 54.0 fl    MPV 10.1 6.0 - 12.0 fL    Platelets 308 140 - 450 10*3/mm3    Neutrophil % 57.8 42.7 - 76.0 %    Lymphocyte % 25.0 19.6 - 45.3 %    Monocyte % 10.0 5.0 - 12.0 %    Eosinophil % 6.2 0.3 - 6.2 %    Basophil % 0.6 0.0 - 1.5 %    Immature Grans % 0.4 0.0 - 0.5 %    Neutrophils, Absolute 5.61 1.70 - 7.00 10*3/mm3    Lymphocytes, Absolute 2.43 0.70 - 3.10 10*3/mm3    Monocytes, Absolute 0.97 (H) 0.10 - 0.90 10*3/mm3    Eosinophils, Absolute 0.60 (H) 0.00 - 0.40 10*3/mm3    Basophils, Absolute 0.06 0.00 - 0.20 10*3/mm3    Immature Grans, Absolute 0.04 0.00 - 0.05 10*3/mm3     nRBC 0.0 0.0 - 0.2 /100 WBC   Magnesium    Collection Time: 08/13/23 12:42 AM    Specimen: Blood   Result Value Ref Range    Magnesium 2.2 1.6 - 2.4 mg/dL   Calcium, Ionized    Collection Time: 08/13/23 12:42 AM    Specimen: Blood   Result Value Ref Range    Ionized Calcium 1.25 1.12 - 1.32 mmol/L   ECG 12 Lead ED Triage Standing Order; Chest Pain    Collection Time: 08/13/23  1:12 AM   Result Value Ref Range    QT Interval 562 ms    QTC Interval 683 ms     Note: In addition to lab results from this visit, the labs listed above may include labs taken at another facility or during a different encounter within the last 24 hours. Please correlate lab times with ED admission and discharge times for further clarification of the services performed during this visit.    XR Chest 1 View    (Results Pending)     Vitals:    08/13/23 0110 08/13/23 0120 08/13/23 0130 08/13/23 0140   BP: 115/71 97/61 98/57 97/59   Pulse: 69 70 70 70   Resp:       Temp:       TempSrc:       SpO2: 93% 95% 95% 97%   Weight:       Height:         Medications   sodium chloride 0.9 % flush 10 mL (has no administration in time range)   aspirin chewable tablet 324 mg (has no administration in time range)   amiodarone 360 mg in 200 mL D5W infusion (has no administration in time range)     Followed by   amiodarone 360 mg in 200 mL D5W infusion (has no administration in time range)   midazolam (VERSED) injection 2 mg (2 mg Intravenous Given 8/13/23 0106)   sodium chloride 0.9 % bolus 500 mL (0 mL Intravenous Stopped 8/13/23 0132)     ECG/EMG Results (last 24 hours)       Procedure Component Value Units Date/Time    ECG 12 Lead ED Triage Standing Order; Chest Pain [[id number]] Collected: 08/13/23 0039     Updated: 08/13/23 0038     QT Interval 532 ms      QTC Interval 809 ms     Narrative:      Test Reason : ED Triage Standing Order~  Blood Pressure :   */*   mmHG  Vent. Rate : 139 BPM     Atrial Rate : 133 BPM     P-R Int :   * ms          QRS Dur : 180  ms      QT Int : 532 ms       P-R-T Axes :   * 238  36 degrees     QTc Int : 809 ms    ** Suspect arm lead reversal, interpretation assumes no reversal  Wide QRS tachycardia with occasional ventricular-paced complexes  Nonspecific intraventricular block  Right ventricular hypertrophy  Inferior infarct , age undetermined  Anterolateral infarct , age undetermined  Abnormal ECG  When compared with ECG of 12-MAY-2023 16:22,  Vent. rate has increased BY  69 BPM    Referred By: EDMD           Confirmed By:     ECG 12 Lead ED Triage Standing Order; Chest Pain [[id number]] Collected: 08/13/23 0112     Updated: 08/13/23 0111     QT Interval 562 ms      QTC Interval 683 ms     Narrative:      Test Reason : ED Triage Standing Order~  Blood Pressure :   */*   mmHG  Vent. Rate :  89 BPM     Atrial Rate :  70 BPM     P-R Int : 174 ms          QRS Dur : 212 ms      QT Int : 562 ms       P-R-T Axes :  49 -31  92 degrees     QTc Int : 683 ms    AV dual-paced rhythm with frequent premature ventricular complexes  Biventricular pacemaker detected  Abnormal ECG  When compared with ECG of 13-AUG-2023 00:39, (Unconfirmed)  premature ventricular complexes are now present  Vent. rate has decreased BY  50 BPM    Referred By: EDMD           Confirmed By:           ECG 12 Lead ED Triage Standing Order; Chest Pain   Preliminary Result   Test Reason : ED Triage Standing Order~   Blood Pressure :   */*   mmHG   Vent. Rate :  89 BPM     Atrial Rate :  70 BPM      P-R Int : 174 ms          QRS Dur : 212 ms       QT Int : 562 ms       P-R-T Axes :  49 -31  92 degrees      QTc Int : 683 ms      AV dual-paced rhythm with frequent premature ventricular complexes   Biventricular pacemaker detected   Abnormal ECG   When compared with ECG of 13-AUG-2023 00:39, (Unconfirmed)   premature ventricular complexes are now present   Vent. rate has decreased BY  50 BPM      Referred By: EDMD           Confirmed By:       ECG 12 Lead ED Triage Standing Order; Chest  Pain   Preliminary Result   Test Reason : ED Triage Standing Order~   Blood Pressure :   */*   mmHG   Vent. Rate : 139 BPM     Atrial Rate : 133 BPM      P-R Int :   * ms          QRS Dur : 180 ms       QT Int : 532 ms       P-R-T Axes :   * 238  36 degrees      QTc Int : 809 ms      ** Suspect arm lead reversal, interpretation assumes no reversal   Wide QRS tachycardia with occasional ventricular-paced complexes   Nonspecific intraventricular block   Right ventricular hypertrophy   Inferior infarct , age undetermined   Anterolateral infarct , age undetermined   Abnormal ECG   When compared with ECG of 12-MAY-2023 16:22,   Vent. rate has increased BY  69 BPM      Referred By: EDMD           Confirmed By:       ECG 12 Lead Drug Monitoring; Amiodarone    (Results Pending)           Final diagnoses:   Precordial chest pain   Shortness of breath   Ventricular tachycardia by electrocardiography       ED Disposition  ED Disposition       ED Disposition   Decision to Admit    Condition   --    Comment   --               No follow-up provider specified.       Medication List        Stop      mexiletine 200 MG capsule  Commonly known as: MEXITIL                 Mary Wade MD  08/13/23 6469

## 2023-08-13 NOTE — H&P
[facility] Medicine Services  HISTORY AND PHYSICAL    Patient Name: Jonnie Roth  : 1945  MRN: [id number]  Primary Care Physician: Brian Lewis MD  Date of admission: 2023      Subjective   Subjective     Chief Complaint:  Chest pain, shortness of air, palpitations    HPI:  Jonnie Roth is a 78 y.o. male with past medical history of coronary artery disease with history of multiple MIs, prior history of cardiac arrest, heart failure with reduced ejection fraction with EF less than 20% status post pacer/AICD placement, recurrent monomorphic V. tach, hypothyroidism, GERD, type 2 diabetes, GEO on CPAP, COPD, hyperlipidemia, BPH, who presents from home after waking from sleep with acute chest pain, palpitations, and shortness of air.    Patient reports he woke suddenly from sleep tonight with complaints of chest pain, palpitations, and shortness of air.  He reports compliance with his amiodarone at home and thinks he is taking 400 mg daily.  He reports he is compliant with his CPAP as well.  Came to the ER tonight where he was noted to be in monomorphic V. tach.  Patient given 150 mg bolus of amiodarone and placed on amiodarone drip.  Was successfully fibrillated in the ER.  ER discussed case with cardiology, Dr. Schmid who recommended continuing amiodarone drip and observing overnight.  Patient follows with Dr. Flanagan of cardiology and Dr. Alba of EP.  Reportedly, patient was supposed to undergo EP study for his recurrent V. tach, however rescheduled due to a graduation he did not want to miss.    Patient reports his chest pain has resolved.  No further shortness of air.  He has otherwise been in his normal state of health up until tonight.      Review of Systems   Gen- No fevers, chills  CV-reports chest pain, palpitations  Resp- No cough, reports dyspnea  GI- No N/V/D, abd pain      Personal History     Past Medical History:   Diagnosis Date     Arthritis     BPH (benign prostatic hypertrophy) 12/28/2016    CAD (coronary artery disease)     Congenital heart disease 1998    Disease of thyroid gland     Enlarged prostate     HFrEF (heart failure with reduced ejection fraction)     History of transfusion     NO REACTION RECALLED     Hypertension     LBBB (left bundle branch block) 12/28/2016    Lung nodule     Macular degeneration     Pancreatic cyst 12/08/2022    Polio     as a child    Pulmonary embolism     Pulmonary emphysema 12/28/2016    Sepsis     A. Secondary to [place] trauma with hematuria and urosepsis requiring hospitalization May 2015    Sleep apnea with use of continuous positive airway pressure (CPAP)     CPAP- SETTING 4     Ventricular tachycardia 12/28/2016    Wears glasses     READERS             Past Surgical History:   Procedure Laterality Date    BIVENTRICULLAR IMPLANTABLE CARDIOVERTER DEFIBRILLATOR PLACEMENT      CARDIAC CATHETERIZATION N/A 01/16/2017    Procedure: Left Heart Cath;  Surgeon: Diego Ayala MD;  Location:  [facility];  Service:     CARDIAC DEFIBRILLATOR PLACEMENT       A. ICD generator change out with upgrade to BiV pacemaker implantable cardioverter    defibrillator, 12/17/2007. Ventricular fibrillation s/p pacesetter [place] ICD in [place]. [place] 09/2000    CARDIAC ELECTROPHYSIOLOGY PROCEDURE N/A 10/02/2017    Procedure: BIV ICD  generator change [facility];  Surgeon: Ferdinand Alba MD;  Location:  [facility];  Service:     CARDIAC ELECTROPHYSIOLOGY PROCEDURE N/A 07/06/2021    Procedure: BIV ICD generator change with [employer]. This will need to be done in late June or early July. Hold Xarelto one day prior.;  Surgeon: Ferdinand Alba MD;  Location:  [facility];  Service: Cardiology;  Laterality: N/A;    CAROTID STENT  Several    CATARACT EXTRACTION      Bilateral     COLONOSCOPY      CORONARY ANGIOPLASTY WITH STENT PLACEMENT      X7 STENTS TOTAL     CYSTOSCOPY URETERAL TUMOR  FULGURATION  05/29/2015     A. Status post cystoscopy with clot evacuation and fulguration of the prostate secondary to hematuria, 5/29/2015.    FOOT SURGERY      RIGHT - POLIO RELATED     INSERT / REPLACE / REMOVE PACEMAKER  2002    PROSTATE SURGERY      A. Status post laser vaporization, 08/19/2009.    ROOT CANAL      TOOTH EXTRACTION         Family History: family history includes Heart failure in his father; Stroke in his father.     Social History:  reports that he quit smoking about 28 years ago. His smoking use included cigarettes. He started smoking about 63 years ago. He has a 60.00 pack-year smoking history. He has never used smokeless tobacco. He reports that he does not drink alcohol and does not use drugs.  Social History     Social History Narrative    Not on file       Medications:  Available home medication information reviewed.  Medications Prior to Admission   Medication Sig Dispense Refill Last Dose    amiodarone (PACERONE) 200 MG tablet Take 1 tablet by mouth 3 (Three) Times a Day. Take 200 mg three times daily x 10 days, then 400 mg daily. (Patient taking differently: Take 2 tablets by mouth Daily.) 60 tablet 6 8/12/2023    carvedilol (COREG) 6.25 MG tablet Take 1 tablet by mouth 2 (Two) Times a Day With Meals. 60 tablet 6 8/12/2023    cholecalciferol (VITAMIN D3) 25 MCG (1000 UT) tablet Take 4 tablets by mouth Daily. OTC   8/12/2023    clopidogrel (PLAVIX) 75 MG tablet Take 1 tablet by mouth Every Morning. 90 tablet 3 8/12/2023    empagliflozin (JARDIANCE) 10 MG tablet tablet Take 1 tablet by mouth Daily. 90 tablet 3 8/12/2023    eplerenone (INSPRA) 25 MG tablet Take 0.5 tablets by mouth Daily. 90 tablet 0 8/12/2023    L-TRYPTOPHAN PO Take  by mouth Every Night. 3 TABLETS NIGHTLY-     1500 MG EACH   8/12/2023    levothyroxine (SYNTHROID, LEVOTHROID) 25 MCG tablet Take 1 tablet by mouth Every Morning. (Patient taking differently: Take 2 tablets by mouth Every Morning.) 30 tablet 6 8/12/2023     melatonin 5 MG tablet tablet Take 2 tablets by mouth At Night As Needed. OTC   8/12/2023    metFORMIN (GLUCOPHAGE) 1000 MG tablet Take 1 tablet by mouth 2 (two) times a day.   8/12/2023    Multiple Vitamins-Minerals (MULTIVITAMIN ADULTS 50+) tablet Take 1 tablet by mouth Daily. OTC   8/12/2023    niacin (NIASPAN) 1000 MG CR tablet Take 1 tablet by mouth Daily.   8/12/2023    pantoprazole (PROTONIX) 40 MG EC tablet Take 1 tablet by mouth Every Morning.   8/12/2023    potassium chloride (MICRO-K) 10 MEQ CR capsule Take 2 capsules by mouth Daily. 60 capsule 6 8/12/2023    rivaroxaban (Xarelto) 10 MG tablet Take 1 tablet by mouth Daily. 90 tablet 3 8/12/2023    torsemide (DEMADEX) 10 MG tablet Take 1 tablet by mouth Daily. 30 tablet 6 8/12/2023    vitamin B-12 (CYANOCOBALAMIN) 1000 MCG tablet Take 1 tablet by mouth Daily. OTC   8/12/2023    Magnesium Oxide -Mg Supplement 400 (240 Mg) MG tablet Take 1 tablet by mouth Daily.   Unknown    Multiple Vitamins-Minerals (ICAPS AREDS 2 PO) Take 1 tablet by mouth 2 (two) times a day. OTC       nitroglycerin (NITROSTAT) 0.4 MG SL tablet Place 1 tablet under the tongue Every 5 (Five) Minutes As Needed for Chest Pain. Take no more than 3 doses in 15 minutes. 25 tablet 5 Unknown    polyethylene glycol (MIRALAX) packet Take 17 g by mouth Every Other Day. OTC       promethazine (PHENERGAN) 12.5 MG tablet Take 0.5 tablets by mouth Every 8 (Eight) Hours As Needed for Nausea or Vomiting. 15 tablet 1 Unknown       No Known Allergies    Objective   Objective     Vital Signs:   Temp:  [98.2 øF (36.8 øC)] 98.2 øF (36.8 øC)  Heart Rate:  [] 70  Resp:  [18] 18  BP: ()/(55-82) 95/55  Flow (L/min):  [4] 4       Physical Exam   Constitutional: Somnolent from sedation but wakes easily, oriented. nad  Eyes: PERRLA, sclerae anicteric, no conjunctival injection  HENT: NCAT, mucous membranes dry  Neck: Supple, no thyromegaly, no lymphadenopathy, trachea midline  Respiratory: Clear to  auscultation bilaterally, nonlabored respirations   Cardiovascular: RRR, no murmurs, rubs, or gallops, palpable pedal pulses bilaterally  Gastrointestinal: Positive bowel sounds, soft, nontender, nondistended  Musculoskeletal: No bilateral ankle edema, no clubbing or cyanosis to extremities  Psychiatric: Appropriate affect, cooperative  Neurologic: Oriented x 3, strength symmetric in all extremities, Cranial Nerves grossly intact to confrontation, speech clear  Skin: No rashes      Result Review:  I have personally reviewed the results from the time of this admission to 8/13/2023 03:32 EDT and agree with these findings:  [x]  Laboratory list / accordion  []  Microbiology  []  Radiology  []  EKG/Telemetry   []  Cardiology/Vascular   []  Pathology  []  Old records  []  Other:  Most notable findings include: Mildly elevated troponin, BUN 28, monomorphic V. tach noted.  Now normal sinus rhythm without ischemic changes.  Possible mild congestive changes on x-ray.  BMP normal.        LAB RESULTS:      Lab 08/13/23 0042   WBC 9.71   HEMOGLOBIN 12.6*   HEMATOCRIT 40.7   PLATELETS 308   NEUTROS ABS 5.61   IMMATURE GRANS (ABS) 0.04   LYMPHS ABS 2.43   MONOS ABS 0.97*   EOS ABS 0.60*   MCV 91.5         Lab 08/13/23 0042   SODIUM 139   POTASSIUM 4.6   CHLORIDE 105   CO2 23.0   ANION GAP 11.0   BUN 28*   CREATININE 1.20   EGFR 61.5   GLUCOSE 140*   CALCIUM 8.7   IONIZED CALCIUM 1.25   MAGNESIUM 2.2   PHOSPHORUS 4.1         Lab 08/13/23 0042   TOTAL PROTEIN 7.1   ALBUMIN 4.2   GLOBULIN 2.9   ALT (SGPT) 37   AST (SGOT) 43*   BILIRUBIN 0.2   ALK PHOS 119*   LIPASE 123*         Lab 08/13/23  0042   PROBNP 1,726.0   HSTROP T 25*                 UA          12/8/2022    17:15 12/8/2022    23:14 6/18/2023    11:32   Urinalysis   Squamous Epithelial Cells, UA 3-6  0-2     Specific Gravity, UA 1.024  1.025  1.026    Ketones, UA Trace  15 mg/dL (1+)  Trace    Blood, UA Trace  Negative  Negative    Leukocytes, UA Negative  Negative   Negative    Nitrite, UA Negative  Negative  Negative    RBC, UA 0-2  0-2     WBC, UA 0-2  0-2     Bacteria, UA 2+  None Seen         Microbiology Results (last 10 days)       ** No results found for the last 240 hours. **            XR Chest 1 View    Result Date: 8/13/2023  XR CHEST 1 VW Date of Exam: 8/13/2023 2:06 AM EDT Indication: Chest Pain Triage Protocol Comparison: 5/12/2023. Findings: There are no airspace consolidations. No pleural fluid. No pneumothorax. The pulmonary vasculature appears mildly indistinct. The heart appears enlarged. There is a left subclavian AICD/pacemaker device. Chronic interstitial changes are present bilaterally. Unremarkable. No acute osseous abnormality identified.     Impression: Impression: Questional mild vascular congestion/pulmonary edema. Stable cardiomegaly.. Electronically Signed: Micaela Navarro MD  8/13/2023 2:42 AM EDT  Workstation ID: [id number]     Results for orders placed during the hospital encounter of 05/02/23    Adult Transthoracic Echo Complete W/ Cont if Necessary Per Protocol    Interpretation Summary    The left ventricle is dilated.  Left ventricular systolic function is severely decreased. Left ventricular ejection fraction appears to be less than 20%.  The anterior and anterior lateral segments and apex are akinetic.    The left atrial cavity is dilated.    Left atrial volume is moderately increased.    Mild to moderate much regurgitation is present.    Estimated right ventricular systolic pressure from tricuspid regurgitation is normal (<35 mmHg).      Assessment & Plan   Assessment & Plan     Active Hospital Problems    Diagnosis  POA    **V-tach [I47.20]  Yes    Ischemic cardiomyopathy (EF < 20%) [I25.5]  Yes    COPD  [J44.9]  Yes    GEO on nocturnal CPAP [G47.33]  Yes    ICD in place [Z95.810]  Yes    Hypothyroidism (acquired) [E03.9]  Yes    GERD without esophagitis [K21.9]  Yes    Type 2 diabetes mellitus with HbA1c 5.8, without long-term current use of  insulin [E11.9]  Yes    Hyperlipidemia LDL goal <70 [E78.5]  Yes     High intensity statin therapy indicated given the presence of CAD      BPH (benign prostatic hypertrophy) [N40.0]  Yes    LBBB (left bundle branch block) [I44.7]  Yes    Pulmonary emphysema [J43.9]  Yes    Coronary artery disease involving native coronary artery of native heart with angina pectoris [I25.119]  Yes     Cardiac catheterization for anterior MI (4/1995):  PCI of the proximal/mid LAD, 4/1995.  LVEF 35%  Cardiac catheterization for NSTEMI (2/1/2014): Moderate RCA stenosis.  Widely patent LAD stents.  LVEF 25%.  Cardiac catheterization by Dr. Ayala (4/20/2015): PCI of distal LAD.  Cardiac catheterization (1/16/2017): Nonobstructive CAD.  LVEF <20%.  Normal hemodynamics.      Chronic systolic congestive heart failure [I50.22]  Yes     Large anterior MI with LVEF 35%, 1995  Echo (11/8/2019): LVEF 30%.  Akinesis/dyskinesis of the mid to distal anterior wall and apex.  Echo (9/10/2022): LVEF < 20%.  Dilated left ventricle with diffuse hypokinesis and a kinesis of the mid to distal anterior segments and apex       Patient is a 78-year-old male with past medical history of coronary artery disease with history of multiple MIs, prior history of cardiac arrest, heart failure with reduced ejection fraction with EF less than 20% status post pacer/AICD placement, recurrent monomorphic V. tach, hypothyroidism, GERD, type 2 diabetes, GEO on CPAP, COPD, hyperlipidemia, BPH, who presents from home after waking from sleep with acute chest pain, palpitations, and shortness of air.    Recurrent monomorphic V. Tach  --Status post defibrillation in ER  -- Amiodarone bolus and drip per cardiology  -- Reportedly on amiodarone 400 mg daily at home  -- Was supposed to undergo EP study with Dr. Alba  -- Cardiology consult  -- N.p.o.  -- Hold Xarelto and plavix pending possible procedure, transition to heparin  -- Electrolytes normal  -- Defibrillator did not  fire likely secondary to rate of V. tach.    Elevated trop  Chest pain  --chest pain resolved after resolution of vtach  --suspect type 2  --aspirin  --cards    Ischemic cardiomyopathy EF less than 20%  -- Continue diuretics in a.m.  -- Slightly dry, gentle fluid denies any    DM2  -- Hemoglobin A1c  -- Sliding scale insulin    GEO  -- CPAP    Hypothyroidism  -- Check TSH  -- Continue levothyroxine    Other chronic conditions: GERD, BPH, hyperlipidemia, COPD  -- Continue home meds      Total time spent: 75  Time spent includes time reviewing chart, face-to-face time, counseling patient/family/caregiver, ordering medications/tests/procedures, communicating with other health care professionals, documenting clinical information in the electronic health record, and coordination of care.      DVT prophylaxis: xarelto      CODE STATUS:  full code  Code Status and Medical Interventions:   Ordered at: 08/13/23 0330     Code Status (Patient has no pulse and is not breathing):    CPR (Attempt to Resuscitate)     Medical Interventions (Patient has pulse or is breathing):    Full Support       Expected Discharge   Expected Discharge Date: 8/15/2023; Expected Discharge Time:      Matt Travis DO  08/13/23

## 2023-08-14 ENCOUNTER — APPOINTMENT (OUTPATIENT)
Dept: CARDIAC REHAB | Facility: HOSPITAL | Age: 78
End: 2023-08-14

## 2023-08-14 LAB
ALBUMIN SERPL-MCNC: 3.5 G/DL (ref 3.5–5.2)
ALBUMIN/GLOB SERPL: 1.6 G/DL
ALP SERPL-CCNC: 104 U/L (ref 39–117)
ALT SERPL W P-5'-P-CCNC: 33 U/L (ref 1–41)
ANION GAP SERPL CALCULATED.3IONS-SCNC: 12 MMOL/L (ref 5–15)
AST SERPL-CCNC: 33 U/L (ref 1–40)
BASOPHILS # BLD AUTO: 0.05 10*3/MM3 (ref 0–0.2)
BASOPHILS NFR BLD AUTO: 0.5 % (ref 0–1.5)
BILIRUB SERPL-MCNC: 0.3 MG/DL (ref 0–1.2)
BUN SERPL-MCNC: 21 MG/DL (ref 8–23)
BUN/CREAT SERPL: 21 (ref 7–25)
CALCIUM SPEC-SCNC: 8.8 MG/DL (ref 8.6–10.5)
CHLORIDE SERPL-SCNC: 107 MMOL/L (ref 98–107)
CO2 SERPL-SCNC: 23 MMOL/L (ref 22–29)
CREAT SERPL-MCNC: 1 MG/DL (ref 0.76–1.27)
DEPRECATED RDW RBC AUTO: 45.1 FL (ref 37–54)
EGFRCR SERPLBLD CKD-EPI 2021: 76.6 ML/MIN/1.73
EOSINOPHIL # BLD AUTO: 0.62 10*3/MM3 (ref 0–0.4)
EOSINOPHIL NFR BLD AUTO: 6.8 % (ref 0.3–6.2)
ERYTHROCYTE [DISTWIDTH] IN BLOOD BY AUTOMATED COUNT: 13.8 % (ref 12.3–15.4)
GLOBULIN UR ELPH-MCNC: 2.2 GM/DL
GLUCOSE SERPL-MCNC: 88 MG/DL (ref 65–99)
HCT VFR BLD AUTO: 36.5 % (ref 37.5–51)
HGB BLD-MCNC: 11.5 G/DL (ref 13–17.7)
IMM GRANULOCYTES # BLD AUTO: 0.04 10*3/MM3 (ref 0–0.05)
IMM GRANULOCYTES NFR BLD AUTO: 0.4 % (ref 0–0.5)
LYMPHOCYTES # BLD AUTO: 1.93 10*3/MM3 (ref 0.7–3.1)
LYMPHOCYTES NFR BLD AUTO: 21 % (ref 19.6–45.3)
MCH RBC QN AUTO: 28.1 PG (ref 26.6–33)
MCHC RBC AUTO-ENTMCNC: 31.5 G/DL (ref 31.5–35.7)
MCV RBC AUTO: 89.2 FL (ref 79–97)
MONOCYTES # BLD AUTO: 0.77 10*3/MM3 (ref 0.1–0.9)
MONOCYTES NFR BLD AUTO: 8.4 % (ref 5–12)
NEUTROPHILS NFR BLD AUTO: 5.77 10*3/MM3 (ref 1.7–7)
NEUTROPHILS NFR BLD AUTO: 62.9 % (ref 42.7–76)
NRBC BLD AUTO-RTO: 0 /100 WBC (ref 0–0.2)
PLATELET # BLD AUTO: 240 10*3/MM3 (ref 140–450)
PMV BLD AUTO: 10.3 FL (ref 6–12)
POTASSIUM SERPL-SCNC: 4.4 MMOL/L (ref 3.5–5.2)
PROT SERPL-MCNC: 5.7 G/DL (ref 6–8.5)
QT INTERVAL: 532 MS
QT INTERVAL: 532 MS
QT INTERVAL: 548 MS
QT INTERVAL: 562 MS
QTC INTERVAL: 574 MS
QTC INTERVAL: 591 MS
QTC INTERVAL: 683 MS
QTC INTERVAL: 809 MS
RBC # BLD AUTO: 4.09 10*6/MM3 (ref 4.14–5.8)
SODIUM SERPL-SCNC: 142 MMOL/L (ref 136–145)
UFH PPP CHRO-ACNC: 0.13 IU/ML (ref 0.3–0.7)
UFH PPP CHRO-ACNC: 0.21 IU/ML (ref 0.3–0.7)
WBC NRBC COR # BLD: 9.18 10*3/MM3 (ref 3.4–10.8)

## 2023-08-14 PROCEDURE — 25010000002 HEPARIN (PORCINE) PER 1000 UNITS

## 2023-08-14 PROCEDURE — 80053 COMPREHEN METABOLIC PANEL: CPT | Performed by: INTERNAL MEDICINE

## 2023-08-14 PROCEDURE — 85025 COMPLETE CBC W/AUTO DIFF WBC: CPT | Performed by: INTERNAL MEDICINE

## 2023-08-14 PROCEDURE — 85520 HEPARIN ASSAY: CPT

## 2023-08-14 PROCEDURE — 99232 SBSQ HOSP IP/OBS MODERATE 35: CPT

## 2023-08-14 PROCEDURE — 25010000002 HEPARIN (PORCINE) 25000-0.45 UT/250ML-% SOLUTION

## 2023-08-14 PROCEDURE — 94660 CPAP INITIATION&MGMT: CPT

## 2023-08-14 PROCEDURE — 94799 UNLISTED PULMONARY SVC/PX: CPT

## 2023-08-14 PROCEDURE — 25010000002 AMIODARONE IN DEXTROSE 5% 360-4.14 MG/200ML-% SOLUTION: Performed by: PHYSICIAN ASSISTANT

## 2023-08-14 PROCEDURE — 93005 ELECTROCARDIOGRAM TRACING: CPT | Performed by: EMERGENCY MEDICINE

## 2023-08-14 RX ORDER — HEPARIN SODIUM 1000 [USP'U]/ML
1900 INJECTION, SOLUTION INTRAVENOUS; SUBCUTANEOUS ONCE
Status: COMPLETED | OUTPATIENT
Start: 2023-08-14 | End: 2023-08-14

## 2023-08-14 RX ORDER — AMIODARONE HYDROCHLORIDE 200 MG/1
400 TABLET ORAL EVERY 12 HOURS SCHEDULED
Status: DISCONTINUED | OUTPATIENT
Start: 2023-08-14 | End: 2023-08-15 | Stop reason: HOSPADM

## 2023-08-14 RX ADMIN — HEPARIN SODIUM 14 UNITS/KG/HR: 10000 INJECTION, SOLUTION INTRAVENOUS at 13:59

## 2023-08-14 RX ADMIN — CARVEDILOL 6.25 MG: 6.25 TABLET, FILM COATED ORAL at 08:40

## 2023-08-14 RX ADMIN — AMIODARONE HYDROCHLORIDE 0.5 MG/MIN: 1.8 INJECTION, SOLUTION INTRAVENOUS at 06:24

## 2023-08-14 RX ADMIN — AMIODARONE HYDROCHLORIDE 400 MG: 200 TABLET ORAL at 08:40

## 2023-08-14 RX ADMIN — AMIODARONE HYDROCHLORIDE 400 MG: 200 TABLET ORAL at 20:20

## 2023-08-14 RX ADMIN — EMPAGLIFLOZIN 10 MG: 10 TABLET, FILM COATED ORAL at 08:40

## 2023-08-14 RX ADMIN — TORSEMIDE 10 MG: 20 TABLET ORAL at 08:40

## 2023-08-14 RX ADMIN — HEPARIN SODIUM 1900 UNITS: 1000 INJECTION INTRAVENOUS; SUBCUTANEOUS at 13:16

## 2023-08-14 RX ADMIN — LEVOTHYROXINE SODIUM 50 MCG: 0.05 TABLET ORAL at 04:53

## 2023-08-14 RX ADMIN — POTASSIUM CHLORIDE 20 MEQ: 750 CAPSULE, EXTENDED RELEASE ORAL at 08:40

## 2023-08-14 RX ADMIN — RIVAROXABAN 10 MG: 10 TABLET, FILM COATED ORAL at 17:32

## 2023-08-14 RX ADMIN — PANTOPRAZOLE SODIUM 40 MG: 40 TABLET, DELAYED RELEASE ORAL at 04:53

## 2023-08-14 RX ADMIN — POLYETHYLENE GLYCOL 3350 17 G: 17 POWDER, FOR SOLUTION ORAL at 08:40

## 2023-08-14 RX ADMIN — CLOPIDOGREL BISULFATE 75 MG: 75 TABLET ORAL at 08:40

## 2023-08-14 NOTE — PROGRESS NOTES
[facility] Medicine Services  PROGRESS NOTE    Patient Name: Jonnie Roth  : 1945  MRN: [id number]    Date of Admission: 2023  Primary Care Physician: Brian Lewis MD    Subjective   Subjective     CC:  Palpitation, shortness of breath.    HPI:  Patient is resting in bed in no acute distress and overall feels comfortable.  Currently does not have any complaint.  No chest pain or palpitation.  No shortness of breath.  No nausea vomiting or diarrhea or abdominal pain.  No fever or chills    ROS:  As above    Objective   Objective     Vital Signs:   Temp:  [95 øF (35 øC)-96.8 øF (36 øC)] 96.4 øF (35.8 øC)  Heart Rate:  [64-84] 84  Resp:  [16-18] 16  BP: ()/(62-89) 95/73     Physical Exam:  Constitutional: No acute distress  HENT: NCAT, mucous membranes moist  Respiratory: Clear to auscultation bilaterally, respiratory effort normal   Cardiovascular: Pacemaker pulse, no murmurs, rubs, or gallops  Gastrointestinal: Positive bowel sounds, soft, nontender, nondistended  Musculoskeletal: No bilateral ankle edema  Psychiatric: Appropriate affect, cooperative  Neurologic: Oriented x 3, strength symmetric in all extremities, Cranial Nerves grossly intact to confrontation, speech clear  Skin: No rashes     Results Reviewed:  LAB RESULTS:      Lab 23  1001 23  0234 23  1943 23  1218 23  0439 23  0042   WBC  --  9.18  --   --   --  9.71   HEMOGLOBIN  --  11.5*  --   --   --  12.6*   HEMATOCRIT  --  36.5*  --   --   --  40.7   PLATELETS  --  240  --   --   --  308   NEUTROS ABS  --  5.77  --   --   --  5.61   IMMATURE GRANS (ABS)  --  0.04  --   --   --  0.04   LYMPHS ABS  --  1.93  --   --   --  2.43   MONOS ABS  --  0.77  --   --   --  0.97*   EOS ABS  --  0.62*  --   --   --  0.60*   MCV  --  89.2  --   --   --  91.5   PROTIME  --   --   --   --  14.9*  --    APTT  --   --   --   --  26.2*  --    HEPARIN ANTI-XA 0.13* 0.21* 0.24*  0.28* 0.69  --          Lab 08/14/23  0234 08/13/23  0310 08/13/23  0042   SODIUM 142  --  139   POTASSIUM 4.4  --  4.6   CHLORIDE 107  --  105   CO2 23.0  --  23.0   ANION GAP 12.0  --  11.0   BUN 21  --  28*   CREATININE 1.00  --  1.20   EGFR 76.6  --  61.5   GLUCOSE 88  --  140*   CALCIUM 8.8  --  8.7   IONIZED CALCIUM  --   --  1.25   MAGNESIUM  --   --  2.2   PHOSPHORUS  --   --  4.1   TSH  --  6.950*  --          Lab 08/14/23  0234 08/13/23  0042   TOTAL PROTEIN 5.7* 7.1   ALBUMIN 3.5 4.2   GLOBULIN 2.2 2.9   ALT (SGPT) 33 37   AST (SGOT) 33 43*   BILIRUBIN 0.3 0.2   ALK PHOS 104 119*   LIPASE  --  123*         Lab 08/13/23  0439 08/13/23 0310 08/13/23 0042   PROBNP  --   --  1,726.0   HSTROP T  --  33* 25*   PROTIME 14.9*  --   --    INR 1.16*  --   --                  Brief Urine Lab Results  (Last result in the past 365 days)        Color   Clarity   Blood   Leuk Est   Nitrite   Protein   CREAT   Urine HCG        06/18/23 1132 Yellow   Clear   Negative   Negative   Negative   Negative                   Microbiology Results Abnormal       None            XR Chest 1 View    Result Date: 8/13/2023  XR CHEST 1 VW Date of Exam: 8/13/2023 2:06 AM EDT Indication: Chest Pain Triage Protocol Comparison: 5/12/2023. Findings: There are no airspace consolidations. No pleural fluid. No pneumothorax. The pulmonary vasculature appears mildly indistinct. The heart appears enlarged. There is a left subclavian AICD/pacemaker device. Chronic interstitial changes are present bilaterally. Unremarkable. No acute osseous abnormality identified.     Impression: Impression: Questional mild vascular congestion/pulmonary edema. Stable cardiomegaly.. Electronically Signed: Micaela Navarro MD  8/13/2023 2:42 AM EDT  Workstation ID: [id number]     Results for orders placed during the hospital encounter of 05/02/23    Adult Transthoracic Echo Complete W/ Cont if Necessary Per Protocol    Interpretation Summary    The left ventricle is  dilated.  Left ventricular systolic function is severely decreased. Left ventricular ejection fraction appears to be less than 20%.  The anterior and anterior lateral segments and apex are akinetic.    The left atrial cavity is dilated.    Left atrial volume is moderately increased.    Mild to moderate much regurgitation is present.    Estimated right ventricular systolic pressure from tricuspid regurgitation is normal (<35 mmHg).      Current medications:  Scheduled Meds:amiodarone, 400 mg, Oral, Q12H  carvedilol, 6.25 mg, Oral, BID With Meals  clopidogrel, 75 mg, Oral, QAM  empagliflozin, 10 mg, Oral, Daily  levothyroxine, 50 mcg, Oral, QAM  pantoprazole, 40 mg, Oral, QAM  polyethylene glycol, 17 g, Oral, Daily  potassium chloride, 20 mEq, Oral, Daily  rivaroxaban, 10 mg, Oral, Daily With Dinner  torsemide, 10 mg, Oral, Daily      Continuous Infusions:   PRN Meds:.  sodium chloride    Assessment & Plan   Assessment & Plan     Active Hospital Problems    Diagnosis  POA    **V-tach [I47.20]  Yes    Ischemic cardiomyopathy (EF < 20%) [I25.5]  Yes    COPD  [J44.9]  Yes    GEO on nocturnal CPAP [G47.33]  Yes    ICD in place [Z95.810]  Yes    Hypothyroidism (acquired) [E03.9]  Yes    GERD without esophagitis [K21.9]  Yes    Type 2 diabetes mellitus with HbA1c 5.8, without long-term current use of insulin [E11.9]  Yes    Hyperlipidemia LDL goal <70 [E78.5]  Yes    BPH (benign prostatic hypertrophy) [N40.0]  Yes    LBBB (left bundle branch block) [I44.7]  Yes    Pulmonary emphysema [J43.9]  Yes    Coronary artery disease involving native coronary artery of native heart with angina pectoris [I25.119]  Yes    Chronic systolic congestive heart failure [I50.22]  Yes      Resolved Hospital Problems   No resolved problems to display.        Brief Hospital Course to date:  Jonnie Roth is a 78 y.o. male  with past medical history of coronary artery disease with history of multiple MIs, prior history of cardiac arrest,  heart failure with reduced ejection fraction with EF less than 20% status post pacer/AICD placement, recurrent monomorphic V. tach, hypothyroidism, GERD, type 2 diabetes, GEO on CPAP, COPD, hyperlipidemia, BPH, who presents from home after waking from sleep with acute chest pain, palpitations, and shortness of air.     Recurrent monomorphic V. Tach  --Status post defibrillation in ER  -- Amiodarone bolus and drip per cardiology  -- Defibrillator did not fire likely secondary to rate of V. tach.  -- Cardiology has seen the patient and electrophysiology also is going to see and evaluate.     Elevated trop  Chest pain  --chest pain resolved after resolution of vtach  --suspect type 2       Ischemic cardiomyopathy EF less than 20%  -- Continue diuretics in a.m.  -- Slightly dry, gentle fluid denies any     DM2  -- Hemoglobin A1c in a.m.  -- Sliding scale insulin     GEO  -- CPAP     Hypothyroidism  -- Continue levothyroxine     Other chronic conditions: GERD, BPH, hyperlipidemia, COPD  -- Continue home meds             Expected Discharge Location and Transportation: Home  Expected Discharge in a day or 2, when okay with cardiology  Expected Discharge Date: 8/15/2023; Expected Discharge Time:      DVT prophylaxis:  Medical DVT prophylaxis orders are present.          CODE STATUS:   Code Status and Medical Interventions:   Ordered at: 08/13/23 0330     Code Status (Patient has no pulse and is not breathing):    CPR (Attempt to Resuscitate)     Medical Interventions (Patient has pulse or is breathing):    Full Support       Aleksandr Shearer MD  08/14/23

## 2023-08-14 NOTE — PROGRESS NOTES
[facility]  Progress Note       LOS: 1 day   Patient Care Team:  Brian Lewis MD as PCP - General (Internal Medicine)  Ferdinand Alba MD as Consulting Physician (Cardiac Electrophysiology)  Viet Manzano PA as Physician Assistant (Cardiology)  Michele Flanagan IV, MD as Consulting Physician (Interventional Cardiology)    Chief Complaint:  CP, palpitations    Subjective     Interval History:   Denies further CP, palpitations. He does complain of mild soreness of skin on chest likely related to defibrillation.       Review of Systems:   Pertinent positives in HPI, all others reviewed and negative.      Objective       Current Facility-Administered Medications:     amiodarone (PACERONE) tablet 400 mg, 400 mg, Oral, Q24H, Adelia Ames PA, 400 mg at 23 0840    [] amiodarone 360 mg in 200 mL D5W infusion, 1 mg/min, Intravenous, Continuous, Last Rate: 33.3 mL/hr at 23 0529, 1 mg/min at 23 0529 **FOLLOWED BY** amiodarone 360 mg in 200 mL D5W infusion, 0.5 mg/min, Intravenous, Continuous, Adelia Ames PA, Last Rate: 16.67 mL/hr at 23 0624, 0.5 mg/min at 23 0624    carvedilol (COREG) tablet 6.25 mg, 6.25 mg, Oral, BID With Meals, Angy, Matt, DO, 6.25 mg at 23 0840    clopidogrel (PLAVIX) tablet 75 mg, 75 mg, Oral, QAM, Angy, Matt, DO, 75 mg at 23 0840    empagliflozin (JARDIANCE) tablet 10 mg, 10 mg, Oral, Daily, Angy, Matt, DO, 10 mg at 23 0840    heparin 51172 units/250 mL (100 units/mL) in 0.45 % NaCl infusion, 12 Units/kg/hr, Intravenous, Titrated, Bonilla Hutton, PharmD, Last Rate: 8.71 mL/hr at 23 0452, 12 Units/kg/hr at 23 0452    levothyroxine (SYNTHROID, LEVOTHROID) tablet 50 mcg, 50 mcg, Oral, Angy HAMILTON Logan, , 50 mcg at 23 0453    pantoprazole (PROTONIX) EC tablet 40 mg, 40 mg, Oral, Angy HAMILTON Logan, , 40 mg at 23 0453    Pharmacy to  "Dose Heparin, , Does not apply, Continuous PRN, Matt Travis,     polyethylene glycol (MIRALAX) packet 17 g, 17 g, Oral, Daily, Matt Travis, , 17 g at 08/14/23 0840    potassium chloride (MICRO-K) CR capsule 20 mEq, 20 mEq, Oral, Daily, Matt Travis, , 20 mEq at 08/14/23 0840    sodium chloride 0.9 % flush 10 mL, 10 mL, Intravenous, PRN, Matt Travis DO    torsemide (DEMADEX) tablet 10 mg, 10 mg, Oral, Daily, Matt Travis, , 10 mg at 08/14/23 0840    Vital Sign Min/Max for last 24 hours  Temp  Min: 95 øF (35 øC)  Max: 97.5 øF (36.4 øC)   BP  Min: 97/62  Max: 122/74   Pulse  Min: 64  Max: 79   Resp  Min: 16  Max: 18   SpO2  Min: 89 %  Max: 97 %   No data recorded   No data recorded     Flowsheet Rows      Flowsheet Row First Filed Value   Admission Height 175.3 cm (69\") Documented at 08/13/2023 0036   Admission Weight 72.6 kg (160 lb) Documented at 08/13/2023 0036              Intake/Output Summary (Last 24 hours) at 8/14/2023 0934  Last data filed at 8/14/2023 0800  Gross per 24 hour   Intake 525 ml   Output 2665 ml   Net -2140 ml       Physical Exam:     General Appearance:    Alert, cooperative, in no acute distress   Lungs:     Clear to auscultation,respirations regular, even and                unlabored    Heart:    Regular rhythm and normal rate, normal S1 and S2,   no        murmur, no gallop, no rub, no click   Chest Wall:    No abnormalities observed   Abdomen:     Normal bowel sounds, no masses, no organomegaly, soft      nontender, nondistended, no guarding, no rebound                tenderness   Extremities:  Moves all extremities well, no edema, no cyanosis, no            redness              Pulses:   Pulses palpable and equal bilaterally   Skin:   No bleeding, bruising or rash        Results Review:   Results from last 7 days   Lab Units 08/14/23  0234 08/13/23  0042   WBC 10*3/mm3 9.18 9.71   HEMOGLOBIN g/dL 11.5* 12.6*   HEMATOCRIT % 36.5* 40.7   PLATELETS 10*3/mm3 240 308 "     Results from last 7 days   Lab Units 08/14/23  0234 08/13/23  0042   SODIUM mmol/L 142 139   POTASSIUM mmol/L 4.4 4.6   CHLORIDE mmol/L 107 105   CO2 mmol/L 23.0 23.0   BUN mg/dL 21 28*   CREATININE mg/dL 1.00 1.20   GLUCOSE mg/dL 88 140*              Results from last 7 days   Lab Units 08/13/23  0310   TSH uIU/mL 6.950*     Results from last 7 days   Lab Units 08/13/23  0042   PROBNP pg/mL 1,726.0     Results from last 7 days   Lab Units 08/13/23  0439   PROTIME Seconds 14.9*   INR  1.16*   APTT seconds 26.2*     Results from last 7 days   Lab Units 08/13/23  0310 08/13/23  0042   HSTROP T ng/L 33* 25*       Intake/Output Summary (Last 24 hours) at 8/14/2023 0934  Last data filed at 8/14/2023 0800  Gross per 24 hour   Intake 525 ml   Output 2665 ml   Net -2140 ml       I personally viewed and interpreted the patient's EKG/Telemetry data    EKG: AV paced, HR 70 bpm.    Telemetry:AV paced, HR 64-79 bpm    Ejection Fraction  No results found for: EF    Echo EF Estimated  Lab Results   Component Value Date    ECHOEFEST 18 10/14/2022         Present on Admission:   V-tach   Coronary artery disease involving native coronary artery of native heart with angina pectoris   Chronic systolic congestive heart failure   BPH (benign prostatic hypertrophy)   LBBB (left bundle branch block)   Pulmonary emphysema   Hyperlipidemia LDL goal <70   COPD    GEO on nocturnal CPAP   ICD in place   Hypothyroidism (acquired)   GERD without esophagitis   Type 2 diabetes mellitus with HbA1c 5.8, without long-term current use of insulin   Ischemic cardiomyopathy (EF < 20%)    Assessment & Plan   Ventricular tachycardia  -VT/ hospitalized twice earlier this year with ICD shocks. Presented to [facility] 8/13/23 c/o CP, dizziness and palpitations and found to be in ventricular tachycardia, heart rate 130s. Defibrillated in the ED. He received no shocks from his ICD.   - He was scheduled for VT ablation earlier this year but canceled due to a family  event. He was on Amiodarone 400mg daily at home. Mexiletine stopped in June due to side effect of nausea. He is interested in rescheduling VT ablation. Will likely do this outpatient.  -initiated on IV amiodarone and home oral amiodarone continued upon admission. Stop IV amiodarone today. Will increase oral Amiodarone to 400mg BID.   -will obtain device interrogation today. Patient can likely discharge tomorrow if no recurrence of VT. Will plan for VT ablation outpatient.     Electronically signed by CHENTE Umanzor, 08/14/23, 9:41 AM EDT.

## 2023-08-14 NOTE — CASE MANAGEMENT/SOCIAL WORK
Continued Stay Note  [facility]     Patient Name: Jonnie Roth  MRN: [id number]  Today's Date: 8/14/2023    Admit Date: 8/13/2023    Plan: Home   Discharge Plan       Row Name 08/14/23 1549       Plan    Plan Home    Patient/Family in Agreement with Plan yes    Plan Comments Spoke with patient and wife in room to initiated discharge planning. Janell lives at home with wife in [facility]. Prior to admission, he was independent with ADL's and assist of straight cane. Not current with home health. No home oxygen. He has a rolling walker and rollator, if needed. His PCP is Brian Lewis. He has drug coverage and scripts filled through [employer]. His plan is home. CM will continue to follow.    Final Discharge Disposition Code 01 - home or self-care                   Discharge Codes    No documentation.                 Expected Discharge Date and Time       Expected Discharge Date Expected Discharge Time    Aug 15, 2023               Alina Byrne RN

## 2023-08-14 NOTE — PROGRESS NOTES
HEPARIN INFUSION  Jonnie Roth is a  78 y.o. male receiving heparin infusion.        Therapy for (VTE/Cardiac):   Cardiac  Patient Weight: 72.6 kg  Initial Bolus (Y/N):   No  Any Bolus (Y/N):   Yes        Signs or Symptoms of Bleeding: No  Cardiac or Other (Not VTE)   Initial Bolus: 60 units/kg (Max 4,000 units)  Initial rate: 12 units/kg/hr (Max 1,000 units/hr)   Anti Xa Rebolus Infusion Hold time Change infusion Dose (Units/kg/hr) Next Anti Xa Level Due   < 0.11 50 units/kg   (4000 Units Max) None  Increase by  3 Units/kg/hr 6 hours   0.11 - 0.19 25 units/kg             (2000 Units Max) None  Increase by  2 Units/kg/hr 6 hours   0.2 - 0.29 0 None  Increase by  1 Units/kg/hr 6 hours   0.3 - 0.5 0 None  No Change 6 hours   (after 2 consecutive  levels in range check qAM)   0.51 - 0.6 0 None  Decrease by  1 Units/kg/hr 6 hours   0.61 - 0.8 0 30 Minutes Decrease by  2 Units/kg/hr 6 hours   0.81 - 1 0 60 Minutes  Decrease by  3 Units/kg/hr 6 hours   >1 0 Hold  After Anti Xa <0.5  decrease old rate by   4 Units/kg/hr  Every 2 hours until Anti Xa <0.5  then  when infusion restarts in 6 hours     Results from last 7 days   Lab Units 08/14/23  0234 08/13/23  0439 08/13/23  0042   INR   --  1.16*  --    HEMOGLOBIN g/dL 11.5*  --  12.6*   HEMATOCRIT % 36.5*  --  40.7   PLATELETS 10*3/mm3 240  --  308       Date   Time   Anti-Xa Current Rate (Unit/kg/hr) Bolus   (Units) Rate Change   (Unit/kg/hr) New Rate (Unit/kg/hr) Next   Anti-Xa Comments  Pump Check Daily   8/13 0515 0.69 0 0 +9 9 1200 Start at reduced rate for patient with elevated anti-Xa and h/o Xarelto use.    8/13 1218 0.28 9 -- +1 10 2000 Me[address]shi [address]241   8/13 1943 0.24 10 -- +1 11 0300 Jannie 3242   8/14 0234 0.21 11 -- +1 12 1000 Jannie 3242 -[facility]   8/14 1001 0.13 12 1900 +2 14 2000 [address]shi 3241                                                                                                                                                                                    Kelvin Cruz, [facility]  8/14/2023  13:06 EDT

## 2023-08-14 NOTE — PROGRESS NOTES
[facility]  ADMISSION FOLLOW-UP NOTE          Patient admitted after midnight, H&P by my partner performed earlier on today's date reviewed.  Interim findings, labs, and charting also reviewed.        The [facility] Hospital Problem List has been managed and updated to include any new diagnoses:  Active Hospital Problems    Diagnosis  POA    **V-tach [I47.20]  Yes    Ischemic cardiomyopathy (EF < 20%) [I25.5]  Yes    COPD  [J44.9]  Yes    GEO on nocturnal CPAP [G47.33]  Yes    ICD in place [Z95.810]  Yes    Hypothyroidism (acquired) [E03.9]  Yes    GERD without esophagitis [K21.9]  Yes    Type 2 diabetes mellitus with HbA1c 5.8, without long-term current use of insulin [E11.9]  Yes    Hyperlipidemia LDL goal <70 [E78.5]  Yes    BPH (benign prostatic hypertrophy) [N40.0]  Yes    LBBB (left bundle branch block) [I44.7]  Yes    Pulmonary emphysema [J43.9]  Yes    Coronary artery disease involving native coronary artery of native heart with angina pectoris [I25.119]  Yes    Chronic systolic congestive heart failure [I50.22]  Yes      Resolved Hospital Problems   No resolved problems to display.         ADDITIONAL PLAN:  - detailed assessment and plan from admission reviewed  -   Patient seen and examined at bedside. H&P by my partner, Dr Travis, on this same date reviewed. Agree with current plan of care with below updates    Cardiology note reviewed. Awaiting Dr Alba recs for AM. Continue to hold plavix/xarelto for possible procedure. Continue to monitor on tele with labs in am      Expected Discharge home  Expected Discharge Date: 8/15/2023; Expected Discharge Time:      Aleksandr Shearer MD  08/14/23

## 2023-08-14 NOTE — PLAN OF CARE
Goal Outcome Evaluation:  Plan of Care Reviewed With: patient, spouse        Progress: improving  Outcome Evaluation: Pt remains on room air. NSR on monitor. Heparin gtt and Amio gtt d/c'd today. Xarelto restarted. Pt ambulated in halls today with no complaints or heart rate changes. Will continue poc.

## 2023-08-15 ENCOUNTER — READMISSION MANAGEMENT (OUTPATIENT)
Dept: CALL CENTER | Facility: HOSPITAL | Age: 78
End: 2023-08-15
Payer: MEDICARE

## 2023-08-15 ENCOUNTER — APPOINTMENT (OUTPATIENT)
Dept: CARDIAC REHAB | Facility: HOSPITAL | Age: 78
End: 2023-08-15

## 2023-08-15 VITALS
HEIGHT: 69 IN | RESPIRATION RATE: 18 BRPM | WEIGHT: 170 LBS | HEART RATE: 69 BPM | BODY MASS INDEX: 25.18 KG/M2 | OXYGEN SATURATION: 92 % | SYSTOLIC BLOOD PRESSURE: 119 MMHG | DIASTOLIC BLOOD PRESSURE: 73 MMHG | TEMPERATURE: 96.5 F

## 2023-08-15 LAB
ANION GAP SERPL CALCULATED.3IONS-SCNC: 13 MMOL/L (ref 5–15)
BUN SERPL-MCNC: 19 MG/DL (ref 8–23)
BUN/CREAT SERPL: 21.8 (ref 7–25)
CALCIUM SPEC-SCNC: 9 MG/DL (ref 8.6–10.5)
CHLORIDE SERPL-SCNC: 105 MMOL/L (ref 98–107)
CO2 SERPL-SCNC: 23 MMOL/L (ref 22–29)
CREAT SERPL-MCNC: 0.87 MG/DL (ref 0.76–1.27)
EGFRCR SERPLBLD CKD-EPI 2021: 87.8 ML/MIN/1.73
GLUCOSE SERPL-MCNC: 91 MG/DL (ref 65–99)
HBA1C MFR BLD: 5.7 % (ref 4.8–5.6)
MAGNESIUM SERPL-MCNC: 2.2 MG/DL (ref 1.6–2.4)
PHOSPHATE SERPL-MCNC: 4.2 MG/DL (ref 2.5–4.5)
POTASSIUM SERPL-SCNC: 4.1 MMOL/L (ref 3.5–5.2)
SODIUM SERPL-SCNC: 141 MMOL/L (ref 136–145)

## 2023-08-15 PROCEDURE — 84100 ASSAY OF PHOSPHORUS: CPT | Performed by: INTERNAL MEDICINE

## 2023-08-15 PROCEDURE — 83036 HEMOGLOBIN GLYCOSYLATED A1C: CPT | Performed by: INTERNAL MEDICINE

## 2023-08-15 PROCEDURE — 83735 ASSAY OF MAGNESIUM: CPT | Performed by: INTERNAL MEDICINE

## 2023-08-15 PROCEDURE — 93005 ELECTROCARDIOGRAM TRACING: CPT | Performed by: EMERGENCY MEDICINE

## 2023-08-15 PROCEDURE — 80048 BASIC METABOLIC PNL TOTAL CA: CPT | Performed by: INTERNAL MEDICINE

## 2023-08-15 PROCEDURE — 99232 SBSQ HOSP IP/OBS MODERATE 35: CPT | Performed by: PHYSICIAN ASSISTANT

## 2023-08-15 RX ORDER — AMIODARONE HYDROCHLORIDE 400 MG/1
400 TABLET ORAL EVERY 12 HOURS SCHEDULED
Qty: 180 TABLET | Refills: 3 | Status: SHIPPED | OUTPATIENT
Start: 2023-08-15

## 2023-08-15 RX ADMIN — LEVOTHYROXINE SODIUM 50 MCG: 0.05 TABLET ORAL at 05:09

## 2023-08-15 RX ADMIN — POLYETHYLENE GLYCOL 3350 17 G: 17 POWDER, FOR SOLUTION ORAL at 08:06

## 2023-08-15 RX ADMIN — POTASSIUM CHLORIDE 20 MEQ: 750 CAPSULE, EXTENDED RELEASE ORAL at 08:04

## 2023-08-15 RX ADMIN — CLOPIDOGREL BISULFATE 75 MG: 75 TABLET ORAL at 08:05

## 2023-08-15 RX ADMIN — TORSEMIDE 10 MG: 20 TABLET ORAL at 08:04

## 2023-08-15 RX ADMIN — EMPAGLIFLOZIN 10 MG: 10 TABLET, FILM COATED ORAL at 08:05

## 2023-08-15 RX ADMIN — AMIODARONE HYDROCHLORIDE 400 MG: 200 TABLET ORAL at 08:05

## 2023-08-15 RX ADMIN — PANTOPRAZOLE SODIUM 40 MG: 40 TABLET, DELAYED RELEASE ORAL at 05:09

## 2023-08-15 RX ADMIN — CARVEDILOL 6.25 MG: 6.25 TABLET, FILM COATED ORAL at 08:05

## 2023-08-15 NOTE — DISCHARGE SUMMARY
[facility] Medicine Services  DISCHARGE SUMMARY    Patient Name: Jonnie Roth  : 1945  MRN: [id number]    Date of Admission: 2023 12:40 AM  Date of Discharge: 8/15/2023  Primary Care Physician: Brian Lewis MD    Consults       Date and Time Order Name Status Description    2023  2:12 AM Inpatient Cardiology Consult Completed             Hospital Course     Presenting Problem:     Active Hospital Problems    Diagnosis  POA    **V-tach [I47.20]  Yes    Chronic HFrEF (heart failure with reduced ejection fraction) [I50.22]  Yes    Ischemic cardiomyopathy (EF < 20%) [I25.5]  Yes    COPD  [J44.9]  Yes    GEO on nocturnal CPAP [G47.33]  Yes    ICD in place [Z95.810]  Yes    Hypothyroidism (acquired) [E03.9]  Yes    GERD without esophagitis [K21.9]  Yes    Type 2 diabetes mellitus with HbA1c 5.8, without long-term current use of insulin [E11.9]  Yes    Hyperlipidemia LDL goal <70 [E78.5]  Yes    BPH (benign prostatic hypertrophy) [N40.0]  Yes    LBBB (left bundle branch block) [I44.7]  Yes    Pulmonary emphysema [J43.9]  Yes    Coronary artery disease involving native coronary artery of native heart with angina pectoris [I25.119]  Yes    Chronic systolic congestive heart failure [I50.22]  Yes      Resolved Hospital Problems   No resolved problems to display.          Hospital Course:  Jonnie Roth is a 78 y.o. male  with past medical history of coronary artery disease with history of multiple MIs, prior history of cardiac arrest, heart failure with reduced ejection fraction with EF less than 20% status post pacer/AICD placement, recurrent monomorphic V. tach, hypothyroidism, GERD, type 2 diabetes, GEO on CPAP, COPD, hyperlipidemia, BPH, who presents from home after waking from sleep with acute chest pain, palpitations, and shortness of air.  He was admitted for recurrent monomorphic V. tach and defibrillated in ER.  Started on amiodarone drip.  Seen by  cardiology team who recommended to discharge him on amiodarone 400 mg twice daily and EP team will schedule him for ablation as outpatient.     Recurrent monomorphic V. Tach  Status post defibrillation in ER  Amiodarone bolus and drip per cardiology  Defibrillator did not fire likely secondary to rate of V. tach.  EP team recommended to discharge the patient admitted for 1 mg twice daily and they will schedule him for outpatient ablation    Elevated trop, likely demand ischemia  Chest pain  Chest pain resolved after resolution of vtach  Elevated troponin likely type II secondary to V. tach and cardioversion  No ischemic work-up recommended per cardiology team     Ischemic cardiomyopathy EF less than 20%  Continue diuretics in a.m.    DM2     GEO  CPAP     Hypothyroidism  Continue levothyroxine     Other chronic conditions: GERD, BPH, hyperlipidemia, COPD  Continue home meds      Discharge Follow Up Recommendations for outpatient labs/diagnostics:  PCP in 1 week  EP team to schedule the patient for ablation as outpatient    Day of Discharge     HPI:   I have seen and evaluated the patient this morning.  Feeling well.  Cleared for discharge by cardiology team.  Instructed him about the change in his amiodarone dose to twice daily.  EP team will schedule him for ablation as outpatient    Review of Systems  General : no fevers, chills  CVS: No chest pain, palpitations  Respiratory: No cough, dyspnea  GI: No N/V/D, abd pain  10 point review of systems is negative except for what is mentioned in HPI    Vital Signs:   Temp:  [95.7 øF (35.4 øC)-97.5 øF (36.4 øC)] 96.5 øF (35.8 øC)  Heart Rate:  [69-84] 69  Resp:  [16-20] 18  BP: ()/(66-89) 119/73      Physical Exam:  General: Comfortable, not in distress, conversant and cooperative  Head: Atraumatic and normocephalic  Eyes: No Icterus. No pallor  Ears:  Ears appear intact with no abnormalities noted  Throat: No oral lesions, no thrush  Neck: Supple, trachea  midline  Lungs: Clear to auscultation bilaterally, equal air entry, no wheezing or crackles  Heart:  Normal S1 and S2, no murmur, no gallop, No JVD, no lower extremity swelling  Abdomen:  Soft, no tenderness, no organomegaly, normal bowel sounds, no organomegaly  Extremities: pulses equal bilaterally  Skin: No bleeding, bruising or rash, normal skin turgor and elasticity  Neurologic: Cranial nerves appear intact with no evidence of facial asymmetry, normal motor and sensory functions in all 4 extremities  Psych: Alert and oriented x 3, normal mood    Pertinent  and/or Most Recent Results     LAB RESULTS:      Lab 08/14/23  1001 08/14/23  0234 08/13/23  1943 08/13/23  1218 08/13/23  0439 08/13/23  0042   WBC  --  9.18  --   --   --  9.71   HEMOGLOBIN  --  11.5*  --   --   --  12.6*   HEMATOCRIT  --  36.5*  --   --   --  40.7   PLATELETS  --  240  --   --   --  308   NEUTROS ABS  --  5.77  --   --   --  5.61   IMMATURE GRANS (ABS)  --  0.04  --   --   --  0.04   LYMPHS ABS  --  1.93  --   --   --  2.43   MONOS ABS  --  0.77  --   --   --  0.97*   EOS ABS  --  0.62*  --   --   --  0.60*   MCV  --  89.2  --   --   --  91.5   PROTIME  --   --   --   --  14.9*  --    APTT  --   --   --   --  26.2*  --    HEPARIN ANTI-XA 0.13* 0.21* 0.24* 0.28* 0.69  --          Lab 08/15/23  0454 08/14/23  0234 08/13/23  0310 08/13/23  0042   SODIUM 141 142  --  139   POTASSIUM 4.1 4.4  --  4.6   CHLORIDE 105 107  --  105   CO2 23.0 23.0  --  23.0   ANION GAP 13.0 12.0  --  11.0   BUN 19 21  --  28*   CREATININE 0.87 1.00  --  1.20   EGFR 87.8 76.6  --  61.5   GLUCOSE 91 88  --  140*   CALCIUM 9.0 8.8  --  8.7   IONIZED CALCIUM  --   --   --  1.25   MAGNESIUM 2.2  --   --  2.2   PHOSPHORUS 4.2  --   --  4.1   HEMOGLOBIN A1C 5.70*  --   --   --    TSH  --   --  6.950*  --          Lab 08/14/23  0234 08/13/23  0042   TOTAL PROTEIN 5.7* 7.1   ALBUMIN 3.5 4.2   GLOBULIN 2.2 2.9   ALT (SGPT) 33 37   AST (SGOT) 33 43*   BILIRUBIN 0.3 0.2   ALK  PHOS 104 119*   LIPASE  --  123*         Lab 08/13/23  0439 08/13/23  0310 08/13/23  0042   PROBNP  --   --  1,726.0   HSTROP T  --  33* 25*   PROTIME 14.9*  --   --    INR 1.16*  --   --                  Brief Urine Lab Results  (Last result in the past 365 days)        Color   Clarity   Blood   Leuk Est   Nitrite   Protein   CREAT   Urine HCG        06/18/23 1132 Yellow   Clear   Negative   Negative   Negative   Negative                 Microbiology Results (last 10 days)       ** No results found for the last 240 hours. **            XR Chest 1 View    Result Date: 8/13/2023  XR CHEST 1 VW Date of Exam: 8/13/2023 2:06 AM EDT Indication: Chest Pain Triage Protocol Comparison: 5/12/2023. Findings: There are no airspace consolidations. No pleural fluid. No pneumothorax. The pulmonary vasculature appears mildly indistinct. The heart appears enlarged. There is a left subclavian AICD/pacemaker device. Chronic interstitial changes are present bilaterally. Unremarkable. No acute osseous abnormality identified.     Impression: Questional mild vascular congestion/pulmonary edema. Stable cardiomegaly.. Electronically Signed: Micaela Navarro MD  8/13/2023 2:42 AM EDT  Workstation ID: [id number]             Results for orders placed during the hospital encounter of 05/02/23    Adult Transthoracic Echo Complete W/ Cont if Necessary Per Protocol    Interpretation Summary    The left ventricle is dilated.  Left ventricular systolic function is severely decreased. Left ventricular ejection fraction appears to be less than 20%.  The anterior and anterior lateral segments and apex are akinetic.    The left atrial cavity is dilated.    Left atrial volume is moderately increased.    Mild to moderate much regurgitation is present.    Estimated right ventricular systolic pressure from tricuspid regurgitation is normal (<35 mmHg).      Plan for Follow-up of Pending Labs/Results:     Discharge Details        Discharge Medications         Changes to Medications        Instructions Start Date   amiodarone 400 MG tablet  Commonly known as: PACERONE  What changed:   medication strength  how much to take  when to take this  additional instructions   400 mg, Oral, Every 12 Hours Scheduled      levothyroxine 25 MCG tablet  Commonly known as: SYNTHROID, LEVOTHROID  What changed: how much to take   25 mcg, Oral, Every Morning             Continue These Medications        Instructions Start Date   carvedilol 6.25 MG tablet  Commonly known as: COREG   6.25 mg, Oral, 2 Times Daily With Meals      cholecalciferol 25 MCG (1000 UT) tablet  Commonly known as: VITAMIN D3   4,000 Units, Oral, Daily, OTC      clopidogrel 75 MG tablet  Commonly known as: PLAVIX   75 mg, Oral, Every Morning      empagliflozin 10 MG tablet tablet  Commonly known as: JARDIANCE   10 mg, Oral, Daily      eplerenone 25 MG tablet  Commonly known as: INSPRA   12.5 mg, Oral, Every 24 Hours Scheduled      L-TRYPTOPHAN PO   Oral, Nightly, 3 TABLETS NIGHTLY-     1500 MG EACH      Magnesium Oxide -Mg Supplement 400 (240 Mg) MG tablet   400 mg, Oral, Daily      melatonin 5 MG tablet tablet   10 mg, Oral, Nightly PRN, OTC      metFORMIN 1000 MG tablet  Commonly known as: GLUCOPHAGE   1,000 mg, Oral, 2 times daily      Multivitamin Adults 50+ tablet tablet  Generic drug: multivitamin with minerals   1 tablet, Oral, Daily, OTC      ICAPS AREDS 2 PO   1 tablet, Oral, 2 times daily, OTC      niacin 1000 MG CR tablet  Commonly known as: NIASPAN   1,000 mg, Oral, Daily      nitroglycerin 0.4 MG SL tablet  Commonly known as: NITROSTAT   0.4 mg, Sublingual, Every 5 Minutes PRN, Take no more than 3 doses in 15 minutes.      pantoprazole 40 MG EC tablet  Commonly known as: PROTONIX   40 mg, Oral, Every Morning      polyethylene glycol packet  Commonly known as: MIRALAX   17 g, Oral, Every Other Day, OTC      potassium chloride 10 MEQ CR capsule  Commonly known as: MICRO-K   20 mEq, Oral, Daily       promethazine 12.5 MG tablet  Commonly known as: PHENERGAN   6.25 mg, Oral, Every 8 Hours PRN      rivaroxaban 10 MG tablet  Commonly known as: Xarelto   10 mg, Oral, Daily      torsemide 10 MG tablet  Commonly known as: DEMADEX   10 mg, Oral, Daily      vitamin B-12 1000 MCG tablet  Commonly known as: CYANOCOBALAMIN   1,000 mcg, Oral, Daily, OTC             Stop These Medications      mexiletine 200 MG capsule  Commonly known as: MEXITIL              No Known Allergies      Discharge Disposition:  Home or Self Care    Diet:  Hospital:  Diet Order   Procedures    Diet: Cardiac Diets; Healthy Heart (2-3 Na+); Texture: Regular Texture (IDDSI 7); Fluid Consistency: Thin (IDDSI 0)       Activity:  Activity Instructions       Activity as Tolerated              Restrictions or Other Recommendations:  None        CODE STATUS:    Code Status and Medical Interventions:   Ordered at: 08/13/23 0330     Code Status (Patient has no pulse and is not breathing):    CPR (Attempt to Resuscitate)     Medical Interventions (Patient has pulse or is breathing):    Full Support       Future Appointments   Date Time Provider Department Center   8/16/2023  8:00 AM [facility]   8/17/2023  8:00 AM [facility]   8/18/2023  8:00 AM [facility]   8/21/2023  8:00 AM [facility]   8/22/2023  8:00 AM [facility]   8/23/2023  8:00 AM [facility]   8/24/2023  8:00 AM [facility]   8/25/2023  8:00 AM [facility]   8/28/2023  8[facility]00 AM [facility]   8/29/2023  8:00 AM [facility]   8/30/2023  8:00 AM [facility]   8/31/2023  8:00 AM [facility]   9/1/2023  8:00  AM [facility]   9/5/2023  8[facility]00 AM [facility]   9/6/2023  8[facility]00 AM [facility]   9/7/2023  8:00 AM [facility]   9/8/2023  8:00 AM [facility]   9/11/2023  8:00 AM [facility]   9/12/2023  8:00 AM [facility]   9/13/2023  8:00 AM [facility]   9/14/2023  8:00 AM [facility]   9/15/2023  8:00 AM [facility]   9/18/2023  8:00 AM [facility]   9/19/2023  8:00 AM [facility]   9/20/2023  8:00 AM [facility]   9/21/2023  8:00 AM [facility]   9/22/2023  8:00 AM [facility]   9/25/2023  8:00 AM [facility]   9/26/2023  8[facility]00 AM [facility]   9/27/2023  8:00 AM [facility]   12/12/2023 10:20 AM Adelia Jiang APRN [facility]   2/28/2024 11:30 AM Ferdinand Alba MD [facility]                 Boris Bear MD  08/15/23      Time Spent on Discharge:  I spent  35  minutes on this discharge activity which included: face-to-face encounter with the patient, reviewing the data in the system, coordination of the care with the nursing staff as well as consultants, documentation, and entering orders.

## 2023-08-15 NOTE — OUTREACH NOTE
Prep Survey      Flowsheet Row Responses   [facility] patient discharged from? [facility]   Is LACE score < 7 ? No   Eligibility Readm Mgmt   Discharge diagnosis V-tach   Does the patient have one of the following disease processes/diagnoses(primary or secondary)? Other   Does the patient have Home health ordered? No   Is there a DME ordered? No   Prep survey completed? Yes            Beata PRECIADO - Registered Nurse

## 2023-08-15 NOTE — PROGRESS NOTES
[facility] at [facility]  Progress Note       LOS: 2 days   Patient Care Team:  Brian Lewis MD as PCP - General (Internal Medicine)  Ferdinand Alba MD as Consulting Physician (Cardiac Electrophysiology)  Viet Manzano PA as Physician Assistant (Cardiology)  Michele Flanagan IV, MD as Consulting Physician (Interventional Cardiology)    Chief Complaint:  follow up VT     Subjective     Patient is doing well this AM. Hopes to go home. No complaints. No VT overnight. Tolerating higher dose of Amiodarone.       Review of Systems:   Pertinent positives in HPI, all others reviewed and negative.      Objective       Current Facility-Administered Medications:     amiodarone (PACERONE) tablet 400 mg, 400 mg, Oral, Q12H, [place] APRN, 400 mg at 08/14/23 2020    carvedilol (COREG) tablet 6.25 mg, 6.25 mg, Oral, BID With Meals, Angy, Matt, DO, 6.25 mg at 08/14/23 0840    clopidogrel (PLAVIX) tablet 75 mg, 75 mg, Oral, QAM, Angy, Matt, DO, 75 mg at 08/14/23 0840    empagliflozin (JARDIANCE) tablet 10 mg, 10 mg, Oral, Daily, Angy, Matt, DO, 10 mg at 08/14/23 0840    levothyroxine (SYNTHROID, LEVOTHROID) tablet 50 mcg, 50 mcg, Oral, QAM, Angy, Matt, DO, 50 mcg at 08/15/23 0509    pantoprazole (PROTONIX) EC tablet 40 mg, 40 mg, Oral, QAM, [place], Matt, DO, 40 mg at 08/15/23 0509    polyethylene glycol (MIRALAX) packet 17 g, 17 g, Oral, Daily, [place], Matt, DO, 17 g at 08/14/23 0840    potassium chloride (MICRO-K) CR capsule 20 mEq, 20 mEq, Oral, Daily, Angy, Matt, DO, 20 mEq at 08/14/23 0840    rivaroxaban (XARELTO) tablet 10 mg, 10 mg, Oral, Daily With [place], [place] APRN, 10 mg at 08/14/23 1732    sodium chloride 0.9 % flush 10 mL, 10 mL, Intravenous, PRN, Matt Travis DO    torsemide (DEMADEX) tablet 10 mg, 10 mg, Oral, Daily, Matt Travis DO, 10 mg at 08/14/23 0840    Vital Sign Min/Max for last 24 hours  Temp   "Min: 95.6 øF (35.3 øC)  Max: 97.5 øF (36.4 øC)   BP  Min: 95/73  Max: 135/89   Pulse  Min: 69  Max: 84   Resp  Min: 16  Max: 20   SpO2  Min: 92 %  Max: 96 %   No data recorded   No data recorded     Flowsheet Rows      Flowsheet Row First Filed Value   Admission Height 175.3 cm (69\") Documented at 08/13/2023 0036   Admission Weight 72.6 kg (160 lb) Documented at 08/13/2023 0036              Intake/Output Summary (Last 24 hours) at 8/15/2023 0746  Last data filed at 8/15/2023 0500  Gross per 24 hour   Intake --   Output 2565 ml   Net -2565 ml       Physical Exam:     General Appearance:    Alert, cooperative, in no acute distress   Lungs:     Clear to auscultation bilaterally,respirations regular, even and                unlabored    Heart:    Regular rhythm and normal rate, normal S1 and S2,   no        murmur, no gallop, no rub, no click   Chest Wall:    No abnormalities observed   Abdomen:     Normal bowel sounds, no masses, no organomegaly, soft      nontender, nondistended, no guarding, no rebound                tenderness   Extremities:  Moves all extremities well, no edema, no cyanosis, no            redness              Pulses:   Pulses palpable and equal bilaterally   Skin:   No bleeding, bruising or rash        Results Review:   Results from last 7 days   Lab Units 08/14/23  0234 08/13/23  0042   WBC 10*3/mm3 9.18 9.71   HEMOGLOBIN g/dL 11.5* 12.6*   HEMATOCRIT % 36.5* 40.7   PLATELETS 10*3/mm3 240 308     Results from last 7 days   Lab Units 08/15/23  0454 08/14/23  0234 08/13/23  0042   SODIUM mmol/L 141 142 139   POTASSIUM mmol/L 4.1 4.4 4.6   CHLORIDE mmol/L 105 107 105   CO2 mmol/L 23.0 23.0 23.0   BUN mg/dL 19 21 28*   CREATININE mg/dL 0.87 1.00 1.20   GLUCOSE mg/dL 91 88 140*      Results from last 7 days   Lab Units 08/15/23  0454   HEMOGLOBIN A1C % 5.70*         Results from last 7 days   Lab Units 08/13/23  0310   TSH uIU/mL 6.950*     Results from last 7 days   Lab Units 08/13/23  0042   PROBNP " pg/mL 1,726.0     Results from last 7 days   Lab Units 08/13/23  0439   PROTIME Seconds 14.9*   INR  1.16*   APTT seconds 26.2*     Results from last 7 days   Lab Units 08/13/23  0310 08/13/23  0042   HSTROP T ng/L 33* 25*       Intake/Output Summary (Last 24 hours) at 8/15/2023 0746  Last data filed at 8/15/2023 0500  Gross per 24 hour   Intake --   Output 2565 ml   Net -2565 ml       I personally viewed and interpreted the patient's EKG/Telemetry data    EKG: V paced 70 bpm     Telemetry: V paced.     Ejection Fraction  No results found for: EF    Echo EF Estimated  Lab Results   Component Value Date    ECHOEFEST 18 10/14/2022         Present on Admission:   V-tach   Coronary artery disease involving native coronary artery of native heart with angina pectoris   Chronic systolic congestive heart failure   BPH (benign prostatic hypertrophy)   LBBB (left bundle branch block)   Pulmonary emphysema   Hyperlipidemia LDL goal <70   COPD    GEO on nocturnal CPAP   ICD in place   Hypothyroidism (acquired)   GERD without esophagitis   Type 2 diabetes mellitus with HbA1c 5.8, without long-term current use of insulin   Ischemic cardiomyopathy (EF < 20%)    Assessment & Plan     Ventricular tachycardia  -VT/ hospitalized twice earlier this year with ICD shocks. Presented to [facility] 8/13/23 c/o CP, dizziness and palpitations and found to be in ventricular tachycardia, heart rate 130s. Defibrillated in the ED. He received no shocks from his ICD.   - He was scheduled for VT ablation earlier this year but canceled due to a family event. He was on Amiodarone 400mg daily at home. Mexiletine stopped in June due to side effect of nausea. He is interested in rescheduling VT ablation. Will likely do this outpatient.  -initiated on IV amiodarone and home oral amiodarone continued upon admission.   - no on increased oral Amiodarone to 400mg BID.   -No VT overnight.   - ok to go home from EP standpoint  - will plan for outpatient VT ablation  and set that up with our . Hold Plavix 7 days and Xarelto 2 days prior.         Electronically signed by PAULA Arrington, 08/15/23, 7:44 AM EDT.

## 2023-08-16 ENCOUNTER — APPOINTMENT (OUTPATIENT)
Dept: CARDIAC REHAB | Facility: HOSPITAL | Age: 78
End: 2023-08-16

## 2023-08-17 ENCOUNTER — APPOINTMENT (OUTPATIENT)
Dept: CARDIAC REHAB | Facility: HOSPITAL | Age: 78
End: 2023-08-17

## 2023-08-17 LAB
QT INTERVAL: 560 MS
QTC INTERVAL: 604 MS

## 2023-08-18 ENCOUNTER — APPOINTMENT (OUTPATIENT)
Dept: CARDIAC REHAB | Facility: HOSPITAL | Age: 78
End: 2023-08-18

## 2023-08-18 ENCOUNTER — READMISSION MANAGEMENT (OUTPATIENT)
Dept: CALL CENTER | Facility: HOSPITAL | Age: 78
End: 2023-08-18
Payer: MEDICARE

## 2023-08-18 PROCEDURE — 93296 REM INTERROG EVL PM/IDS: CPT | Performed by: INTERNAL MEDICINE

## 2023-08-18 PROCEDURE — 93295 DEV INTERROG REMOTE 1/2/MLT: CPT | Performed by: INTERNAL MEDICINE

## 2023-08-18 NOTE — OUTREACH NOTE
Medical Week 1 Survey      Flowsheet Row Responses   [facility] patient discharged from? [place]   Does the patient have one of the following disease processes/diagnoses(primary or secondary)? Other   Week 1 attempt successful? Yes   Call start time 1319   Call end time 1321   Discharge diagnosis V-tach   Meds reviewed with patient/caregiver? Yes   Is the patient having any side effects they believe may be caused by any medication additions or changes? No   Does the patient have all medications ordered at discharge? N/A   Is the patient taking all medications as directed (includes completed medication regime)? Yes   Comments regarding appointments Cardiac rehab starts 8/21/23   Does the patient have a primary care provider?  Yes   Comments regarding PCP 8/22/23 apt with PCP   Has the patient kept scheduled appointments due by today? N/A   Has home health visited the patient within 72 hours of discharge? N/A   Psychosocial issues? No   Did the patient receive a copy of their discharge instructions? Yes   Nursing interventions Reviewed instructions with patient   What is the patient's perception of their health status since discharge? Improving   Is the patient/caregiver able to teach back signs and symptoms related to disease process for when to call PCP? Yes   Is the patient/caregiver able to teach back signs and symptoms related to disease process for when to call 911? Yes   Is the patient/caregiver able to teach back the hierarchy of who to call/visit for symptoms/problems? PCP, Specialist, Home health nurse, Urgent Care, ED, 911 Yes   If the patient is a current smoker, are they able to teach back resources for cessation? Not a smoker   Week 1 call completed? Yes   Wrap up additional comments Improving-doing his PT exercises at home   Call end time 1321            Tiffanie H - Registered Nurse

## 2023-08-21 ENCOUNTER — APPOINTMENT (OUTPATIENT)
Dept: CARDIAC REHAB | Facility: HOSPITAL | Age: 78
End: 2023-08-21

## 2023-08-22 ENCOUNTER — APPOINTMENT (OUTPATIENT)
Dept: CARDIAC REHAB | Facility: HOSPITAL | Age: 78
End: 2023-08-22

## 2023-08-23 ENCOUNTER — TREATMENT (OUTPATIENT)
Dept: CARDIAC REHAB | Facility: HOSPITAL | Age: 78
End: 2023-08-23

## 2023-08-23 ENCOUNTER — APPOINTMENT (OUTPATIENT)
Dept: CARDIAC REHAB | Facility: HOSPITAL | Age: 78
End: 2023-08-23

## 2023-08-23 DIAGNOSIS — Z00.00 PREVENTATIVE HEALTH CARE: Primary | ICD-10-CM

## 2023-08-24 ENCOUNTER — TREATMENT (OUTPATIENT)
Dept: CARDIAC REHAB | Facility: HOSPITAL | Age: 78
End: 2023-08-24

## 2023-08-24 ENCOUNTER — APPOINTMENT (OUTPATIENT)
Dept: CARDIAC REHAB | Facility: HOSPITAL | Age: 78
End: 2023-08-24

## 2023-08-24 DIAGNOSIS — Z00.00 PREVENTATIVE HEALTH CARE: Primary | ICD-10-CM

## 2023-08-25 ENCOUNTER — APPOINTMENT (OUTPATIENT)
Dept: CARDIAC REHAB | Facility: HOSPITAL | Age: 78
End: 2023-08-25

## 2023-08-25 ENCOUNTER — TREATMENT (OUTPATIENT)
Dept: CARDIAC REHAB | Facility: HOSPITAL | Age: 78
End: 2023-08-25

## 2023-08-25 ENCOUNTER — READMISSION MANAGEMENT (OUTPATIENT)
Dept: CALL CENTER | Facility: HOSPITAL | Age: 78
End: 2023-08-25
Payer: MEDICARE

## 2023-08-25 DIAGNOSIS — Z00.00 PREVENTATIVE HEALTH CARE: Primary | ICD-10-CM

## 2023-08-25 NOTE — OUTREACH NOTE
Medical Week 2 Survey      Flowsheet Row Responses   [facility] patient discharged from? [place]   Does the patient have one of the following disease processes/diagnoses(primary or secondary)? Other   Week 2 attempt successful? Yes   Call start time 1509   Discharge diagnosis V-tach   Call end time 1512   Meds reviewed with patient/caregiver? Yes   Is the patient having any side effects they believe may be caused by any medication additions or changes? No   Does the patient have all medications ordered at discharge? Yes   Prescription comments Hopeful he can be weaned down on amiodarone dosing after his ablation   Is the patient taking all medications as directed (includes completed medication regime)? Yes   Comments regarding appointments Ablation scheduled 9/7/23   Does the patient have a primary care provider?  Yes   Does the patient have an appointment with their PCP within 7 days of discharge? Yes   Comments regarding PCP 8/22/23 apt with PCP   Has the patient kept scheduled appointments due by today? Yes   Comments Pt has started back to his cardiac rehab   Has home health visited the patient within 72 hours of discharge? N/A   Psychosocial issues? No   Did the patient receive a copy of their discharge instructions? Yes   Nursing interventions Reviewed instructions with patient   What is the patient's perception of their health status since discharge? Improving  [Denies chest pain, palpitations.  Reports he lost some strength while inpt but working towards regaining,  he back at cardiorehab and does 12 minutes each on 3 different machines as well as some sit/stands,  reports right leg weak from h/o polio. ]   Is the patient/caregiver able to teach back signs and symptoms related to disease process for when to call PCP? Yes   Week 2 Call Completed? Yes   Graduated Yes  [No immediate needs, ablation is scheduled]   Call end time 1512            RASHEED SULLIVAN - Registered Nurse

## 2023-08-28 ENCOUNTER — APPOINTMENT (OUTPATIENT)
Dept: CARDIAC REHAB | Facility: HOSPITAL | Age: 78
End: 2023-08-28

## 2023-08-28 ENCOUNTER — TREATMENT (OUTPATIENT)
Dept: CARDIAC REHAB | Facility: HOSPITAL | Age: 78
End: 2023-08-28

## 2023-08-28 ENCOUNTER — PREP FOR SURGERY (OUTPATIENT)
Dept: OTHER | Facility: HOSPITAL | Age: 78
End: 2023-08-28
Payer: MEDICARE

## 2023-08-28 DIAGNOSIS — Z00.00 PREVENTATIVE HEALTH CARE: Primary | ICD-10-CM

## 2023-08-28 DIAGNOSIS — I47.29 VENTRICULAR TACHYCARDIA, MONOMORPHIC: Primary | ICD-10-CM

## 2023-08-28 RX ORDER — SODIUM CHLORIDE 0.9 % (FLUSH) 0.9 %
10 SYRINGE (ML) INJECTION AS NEEDED
OUTPATIENT
Start: 2023-08-28

## 2023-08-28 RX ORDER — SODIUM CHLORIDE 9 MG/ML
1 INJECTION, SOLUTION INTRAVENOUS CONTINUOUS
OUTPATIENT
Start: 2023-08-28 | End: 2023-08-28

## 2023-08-28 RX ORDER — NITROGLYCERIN 0.4 MG/1
0.4 TABLET SUBLINGUAL
OUTPATIENT
Start: 2023-08-28

## 2023-08-28 RX ORDER — SODIUM CHLORIDE 9 MG/ML
40 INJECTION, SOLUTION INTRAVENOUS AS NEEDED
OUTPATIENT
Start: 2023-08-28

## 2023-08-28 RX ORDER — ACETAMINOPHEN 325 MG/1
650 TABLET ORAL EVERY 4 HOURS PRN
OUTPATIENT
Start: 2023-08-28

## 2023-08-28 RX ORDER — SODIUM CHLORIDE 0.9 % (FLUSH) 0.9 %
3 SYRINGE (ML) INJECTION EVERY 12 HOURS SCHEDULED
OUTPATIENT
Start: 2023-08-28

## 2023-08-28 RX ORDER — CEFAZOLIN SODIUM 2 G/100ML
2000 INJECTION, SOLUTION INTRAVENOUS ONCE
OUTPATIENT
Start: 2023-08-28 | End: 2023-08-28

## 2023-08-29 ENCOUNTER — TREATMENT (OUTPATIENT)
Dept: CARDIAC REHAB | Facility: HOSPITAL | Age: 78
End: 2023-08-29

## 2023-08-29 ENCOUNTER — APPOINTMENT (OUTPATIENT)
Dept: CARDIAC REHAB | Facility: HOSPITAL | Age: 78
End: 2023-08-29

## 2023-08-29 DIAGNOSIS — Z00.00 PREVENTATIVE HEALTH CARE: Primary | ICD-10-CM

## 2023-08-30 ENCOUNTER — TREATMENT (OUTPATIENT)
Dept: CARDIAC REHAB | Facility: HOSPITAL | Age: 78
End: 2023-08-30

## 2023-08-30 ENCOUNTER — APPOINTMENT (OUTPATIENT)
Dept: CARDIAC REHAB | Facility: HOSPITAL | Age: 78
End: 2023-08-30

## 2023-08-30 DIAGNOSIS — Z00.00 PREVENTATIVE HEALTH CARE: Primary | ICD-10-CM

## 2023-08-31 ENCOUNTER — APPOINTMENT (OUTPATIENT)
Dept: CARDIAC REHAB | Facility: HOSPITAL | Age: 78
End: 2023-08-31

## 2023-08-31 ENCOUNTER — TREATMENT (OUTPATIENT)
Dept: CARDIAC REHAB | Facility: HOSPITAL | Age: 78
End: 2023-08-31

## 2023-08-31 ENCOUNTER — PRE-ADMISSION TESTING (OUTPATIENT)
Dept: PREADMISSION TESTING | Facility: HOSPITAL | Age: 78
End: 2023-08-31
Payer: MEDICARE

## 2023-08-31 DIAGNOSIS — Z00.00 PREVENTATIVE HEALTH CARE: Primary | ICD-10-CM

## 2023-08-31 DIAGNOSIS — I47.29 VENTRICULAR TACHYCARDIA, MONOMORPHIC: ICD-10-CM

## 2023-08-31 LAB
ANION GAP SERPL CALCULATED.3IONS-SCNC: 12 MMOL/L (ref 5–15)
BUN SERPL-MCNC: 37 MG/DL (ref 8–23)
BUN/CREAT SERPL: 30.8 (ref 7–25)
CALCIUM SPEC-SCNC: 8.8 MG/DL (ref 8.6–10.5)
CHLORIDE SERPL-SCNC: 101 MMOL/L (ref 98–107)
CO2 SERPL-SCNC: 26 MMOL/L (ref 22–29)
CREAT SERPL-MCNC: 1.2 MG/DL (ref 0.76–1.27)
DEPRECATED RDW RBC AUTO: 45.5 FL (ref 37–54)
EGFRCR SERPLBLD CKD-EPI 2021: 61.5 ML/MIN/1.73
ERYTHROCYTE [DISTWIDTH] IN BLOOD BY AUTOMATED COUNT: 14.1 % (ref 12.3–15.4)
GLUCOSE SERPL-MCNC: 94 MG/DL (ref 65–99)
HBA1C MFR BLD: 5.6 % (ref 4.8–5.6)
HCT VFR BLD AUTO: 37.9 % (ref 37.5–51)
HGB BLD-MCNC: 11.9 G/DL (ref 13–17.7)
INR PPP: 1.62 (ref 0.89–1.12)
MCH RBC QN AUTO: 27.9 PG (ref 26.6–33)
MCHC RBC AUTO-ENTMCNC: 31.4 G/DL (ref 31.5–35.7)
MCV RBC AUTO: 88.8 FL (ref 79–97)
PLATELET # BLD AUTO: 288 10*3/MM3 (ref 140–450)
PMV BLD AUTO: 10.4 FL (ref 6–12)
POTASSIUM SERPL-SCNC: 5.4 MMOL/L (ref 3.5–5.2)
PROTHROMBIN TIME: 19.3 SECONDS (ref 12.2–14.5)
RBC # BLD AUTO: 4.27 10*6/MM3 (ref 4.14–5.8)
SODIUM SERPL-SCNC: 139 MMOL/L (ref 136–145)
WBC NRBC COR # BLD: 8.77 10*3/MM3 (ref 3.4–10.8)

## 2023-08-31 PROCEDURE — 80048 BASIC METABOLIC PNL TOTAL CA: CPT

## 2023-08-31 PROCEDURE — 85610 PROTHROMBIN TIME: CPT

## 2023-08-31 PROCEDURE — 85027 COMPLETE CBC AUTOMATED: CPT

## 2023-08-31 PROCEDURE — 83036 HEMOGLOBIN GLYCOSYLATED A1C: CPT | Performed by: PHYSICIAN ASSISTANT

## 2023-08-31 PROCEDURE — 36415 COLL VENOUS BLD VENIPUNCTURE: CPT

## 2023-08-31 NOTE — PAT
An arrival time for procedure was not provided during PAT visit. If patient had any questions or concerns about their arrival time, they were instructed to contact their surgeon/physician.  Additionally, if the patient referred to an arrival time that was acquired from their my chart account, patient was encouraged to verify that time with their surgeon/physician. Arrival times are NOT provided in Pre Admission Testing Department.    Patient viewed general PAT education video as instructed in their preoperative information received from their surgeon.  Patient stated the general PAT education video was viewed in its entirety and survey completed.  Copies of PAT general education handouts (Incentive Spirometry, Meds to Beds Program, Patient Belongings, Pre-op skin preparation instructions, Blood Glucose testing, Visitor policy, Surgery FAQ, Code H) distributed to patient if not printed. Education related to the PAT pass and skin preparation for surgery (if applicable) completed in PAT as a reinforcement to PAT education video. Patient instructed to return PAT pass provided today as well as completed skin preparation sheet (if applicable) on the day of procedure.     Additionally if patient had not viewed video yet but intended to view it at home or in our waiting area, then referred them to the handout with QR code/link provided during PAT visit.  Instructed patient to complete survey after viewing the video in its entirety.  Encouraged patient/family to read PAT general education handouts thoroughly and notify PAT staff with any questions or concerns. Patient verbalized understanding of all information and priority content.    Patient denies any current skin issues.

## 2023-09-01 ENCOUNTER — APPOINTMENT (OUTPATIENT)
Dept: CARDIAC REHAB | Facility: HOSPITAL | Age: 78
End: 2023-09-01

## 2023-09-04 ENCOUNTER — TELEPHONE (OUTPATIENT)
Dept: CARDIOLOGY | Facility: CLINIC | Age: 78
End: 2023-09-04
Payer: MEDICARE

## 2023-09-04 NOTE — TELEPHONE ENCOUNTER
Called Mr. Roth today to inform him to hold his potassium tablets until his procedure on 9/7/2023, as his potassium was 5.4 on his PAT labwork. Will recheck again on 9/7/2023. He expresses understanding.   Electronically signed by PAULA Arrington, 09/04/23, 3:08 PM EDT.

## 2023-09-05 ENCOUNTER — APPOINTMENT (OUTPATIENT)
Dept: CARDIAC REHAB | Facility: HOSPITAL | Age: 78
End: 2023-09-05

## 2023-09-06 ENCOUNTER — APPOINTMENT (OUTPATIENT)
Dept: CARDIAC REHAB | Facility: HOSPITAL | Age: 78
End: 2023-09-06

## 2023-09-06 ENCOUNTER — ANESTHESIA EVENT (OUTPATIENT)
Dept: CARDIOLOGY | Facility: HOSPITAL | Age: 78
DRG: 274 | End: 2023-09-06
Payer: MEDICARE

## 2023-09-07 ENCOUNTER — ANESTHESIA (OUTPATIENT)
Dept: CARDIOLOGY | Facility: HOSPITAL | Age: 78
DRG: 274 | End: 2023-09-07
Payer: MEDICARE

## 2023-09-07 ENCOUNTER — APPOINTMENT (OUTPATIENT)
Dept: CARDIAC REHAB | Facility: HOSPITAL | Age: 78
End: 2023-09-07

## 2023-09-07 ENCOUNTER — HOSPITAL ENCOUNTER (INPATIENT)
Facility: HOSPITAL | Age: 78
LOS: 1 days | Discharge: HOME | DRG: 274 | End: 2023-09-08
Attending: INTERNAL MEDICINE | Admitting: INTERNAL MEDICINE
Payer: MEDICARE

## 2023-09-07 DIAGNOSIS — Z86.711 PERSONAL HISTORY OF PE (PULMONARY EMBOLISM): ICD-10-CM

## 2023-09-07 DIAGNOSIS — I47.29 VENTRICULAR TACHYCARDIA, MONOMORPHIC: ICD-10-CM

## 2023-09-07 PROBLEM — I47.20 VT (VENTRICULAR TACHYCARDIA): Status: ACTIVE | Noted: 2023-09-07

## 2023-09-07 LAB
ACT BLD: 101 SECONDS (ref 82–152)
ACT BLD: 239 SECONDS (ref 82–152)
ACT BLD: 281 SECONDS (ref 82–152)
ACT BLD: 347 SECONDS (ref 82–152)
ACT BLD: 360 SECONDS (ref 82–152)
BASE EXCESS BLDA CALC-SCNC: -2 MMOL/L (ref -5–5)
BASE EXCESS BLDA CALC-SCNC: -4 MMOL/L (ref -5–5)
BASE EXCESS BLDA CALC-SCNC: -5 MMOL/L (ref -5–5)
CA-I BLDA-SCNC: 1.05 MMOL/L (ref 1.2–1.32)
CA-I BLDA-SCNC: 1.13 MMOL/L (ref 1.2–1.32)
CA-I BLDA-SCNC: 1.15 MMOL/L (ref 1.2–1.32)
CO2 BLDA-SCNC: 22 MMOL/L (ref 24–29)
CO2 BLDA-SCNC: 24 MMOL/L (ref 24–29)
CO2 BLDA-SCNC: 26 MMOL/L (ref 24–29)
GLUCOSE BLDC GLUCOMTR-MCNC: 155 MG/DL (ref 70–130)
GLUCOSE BLDC GLUCOMTR-MCNC: 158 MG/DL (ref 70–130)
GLUCOSE BLDC GLUCOMTR-MCNC: 164 MG/DL (ref 70–130)
GLUCOSE BLDC GLUCOMTR-MCNC: 95 MG/DL (ref 70–130)
HCO3 BLDA-SCNC: 20.9 MMOL/L (ref 22–26)
HCO3 BLDA-SCNC: 22.3 MMOL/L (ref 22–26)
HCO3 BLDA-SCNC: 24.2 MMOL/L (ref 22–26)
HCT VFR BLDA CALC: 34 % (ref 38–51)
HCT VFR BLDA CALC: 34 % (ref 38–51)
HCT VFR BLDA CALC: 36 % (ref 38–51)
HGB BLDA-MCNC: 11.6 G/DL (ref 12–17)
HGB BLDA-MCNC: 11.6 G/DL (ref 12–17)
HGB BLDA-MCNC: 12.2 G/DL (ref 12–17)
PCO2 BLDA: 38.2 MM HG (ref 35–45)
PCO2 BLDA: 46.7 MM HG (ref 35–45)
PCO2 BLDA: 48.7 MM HG (ref 35–45)
PH BLDA: 7.29 PH UNITS (ref 7.35–7.6)
PH BLDA: 7.3 PH UNITS (ref 7.35–7.6)
PH BLDA: 7.35 PH UNITS (ref 7.35–7.6)
PO2 BLDA: 24 MMHG (ref 80–105)
PO2 BLDA: 28 MMHG (ref 80–105)
PO2 BLDA: 55 MMHG (ref 80–105)
POTASSIUM BLDA-SCNC: 4.2 MMOL/L (ref 3.5–4.9)
POTASSIUM BLDA-SCNC: 4.5 MMOL/L (ref 3.5–4.9)
POTASSIUM BLDA-SCNC: 4.6 MMOL/L (ref 3.5–4.9)
SAO2 % BLDA: 37 % (ref 95–98)
SAO2 % BLDA: 45 % (ref 95–98)
SAO2 % BLDA: 86 % (ref 95–98)
SODIUM BLD-SCNC: 139 MMOL/L (ref 138–146)
SODIUM BLD-SCNC: 139 MMOL/L (ref 138–146)
SODIUM BLD-SCNC: 141 MMOL/L (ref 138–146)

## 2023-09-07 PROCEDURE — 25010000002 PHENYLEPHRINE 10 MG/ML SOLUTION

## 2023-09-07 PROCEDURE — C1894 INTRO/SHEATH, NON-LASER: HCPCS | Performed by: INTERNAL MEDICINE

## 2023-09-07 PROCEDURE — 85014 HEMATOCRIT: CPT

## 2023-09-07 PROCEDURE — 02583ZZ DESTRUCTION OF CONDUCTION MECHANISM, PERCUTANEOUS APPROACH: ICD-10-PCS | Performed by: INTERNAL MEDICINE

## 2023-09-07 PROCEDURE — 93654 COMPRE EP EVAL TX VT: CPT | Performed by: INTERNAL MEDICINE

## 2023-09-07 PROCEDURE — 25010000002 PHENYLEPHRINE 10 MG/ML SOLUTION 5 ML VIAL: Performed by: ANESTHESIOLOGY

## 2023-09-07 PROCEDURE — C1760 CLOSURE DEV, VASC: HCPCS | Performed by: INTERNAL MEDICINE

## 2023-09-07 PROCEDURE — 84295 ASSAY OF SERUM SODIUM: CPT

## 2023-09-07 PROCEDURE — C1732 CATH, EP, DIAG/ABL, 3D/VECT: HCPCS | Performed by: INTERNAL MEDICINE

## 2023-09-07 PROCEDURE — 25010000002 FUROSEMIDE PER 20 MG: Performed by: INTERNAL MEDICINE

## 2023-09-07 PROCEDURE — 82947 ASSAY GLUCOSE BLOOD QUANT: CPT

## 2023-09-07 PROCEDURE — 25010000002 SUGAMMADEX 200 MG/2ML SOLUTION: Performed by: ANESTHESIOLOGY

## 2023-09-07 PROCEDURE — 5A2204Z RESTORATION OF CARDIAC RHYTHM, SINGLE: ICD-10-PCS | Performed by: INTERNAL MEDICINE

## 2023-09-07 PROCEDURE — 25010000002 DEXAMETHASONE PER 1 MG: Performed by: NURSE ANESTHETIST, CERTIFIED REGISTERED

## 2023-09-07 PROCEDURE — 25010000002 CEFAZOLIN IN DEXTROSE 2-4 GM/100ML-% SOLUTION: Performed by: NURSE ANESTHETIST, CERTIFIED REGISTERED

## 2023-09-07 PROCEDURE — 25010000002 HEPARIN (PORCINE) PER 1000 UNITS: Performed by: INTERNAL MEDICINE

## 2023-09-07 PROCEDURE — C1730 CATH, EP, 19 OR FEW ELECT: HCPCS | Performed by: INTERNAL MEDICINE

## 2023-09-07 PROCEDURE — 82330 ASSAY OF CALCIUM: CPT

## 2023-09-07 PROCEDURE — 25010000002 PROPOFOL 10 MG/ML EMULSION: Performed by: NURSE ANESTHETIST, CERTIFIED REGISTERED

## 2023-09-07 PROCEDURE — B246ZZZ ULTRASONOGRAPHY OF RIGHT AND LEFT HEART: ICD-10-PCS | Performed by: INTERNAL MEDICINE

## 2023-09-07 PROCEDURE — C1893 INTRO/SHEATH, FIXED,NON-PEEL: HCPCS | Performed by: INTERNAL MEDICINE

## 2023-09-07 PROCEDURE — 85347 COAGULATION TIME ACTIVATED: CPT

## 2023-09-07 PROCEDURE — 25010000002 PROTAMINE SULFATE PER 10 MG: Performed by: INTERNAL MEDICINE

## 2023-09-07 PROCEDURE — 84132 ASSAY OF SERUM POTASSIUM: CPT

## 2023-09-07 PROCEDURE — 93462 L HRT CATH TRNSPTL PUNCTURE: CPT | Performed by: INTERNAL MEDICINE

## 2023-09-07 PROCEDURE — 25010000002 DOPAMINE PER 40 MG: Performed by: INTERNAL MEDICINE

## 2023-09-07 PROCEDURE — C1769 GUIDE WIRE: HCPCS | Performed by: INTERNAL MEDICINE

## 2023-09-07 PROCEDURE — C1759 CATH, INTRA ECHOCARDIOGRAPHY: HCPCS | Performed by: INTERNAL MEDICINE

## 2023-09-07 PROCEDURE — 02K83ZZ MAP CONDUCTION MECHANISM, PERCUTANEOUS APPROACH: ICD-10-PCS | Performed by: INTERNAL MEDICINE

## 2023-09-07 PROCEDURE — 25010000002 ONDANSETRON PER 1 MG: Performed by: ANESTHESIOLOGY

## 2023-09-07 PROCEDURE — 82803 BLOOD GASES ANY COMBINATION: CPT

## 2023-09-07 PROCEDURE — 93662 INTRACARDIAC ECG (ICE): CPT | Performed by: INTERNAL MEDICINE

## 2023-09-07 PROCEDURE — C1766 INTRO/SHEATH,STRBLE,NON-PEEL: HCPCS | Performed by: INTERNAL MEDICINE

## 2023-09-07 PROCEDURE — 25010000002 PHENYLEPHRINE 10 MG/ML SOLUTION 1 ML VIAL: Performed by: NURSE ANESTHETIST, CERTIFIED REGISTERED

## 2023-09-07 RX ORDER — PHENYLEPHRINE HYDROCHLORIDE 10 MG/ML
INJECTION INTRAVENOUS
Status: COMPLETED
Start: 2023-09-07 | End: 2023-09-07

## 2023-09-07 RX ORDER — CEFAZOLIN SODIUM 2 G/100ML
INJECTION, SOLUTION INTRAVENOUS AS NEEDED
Status: DISCONTINUED | OUTPATIENT
Start: 2023-09-07 | End: 2023-09-07 | Stop reason: SURG

## 2023-09-07 RX ORDER — PROPOFOL 10 MG/ML
VIAL (ML) INTRAVENOUS AS NEEDED
Status: DISCONTINUED | OUTPATIENT
Start: 2023-09-07 | End: 2023-09-07 | Stop reason: SURG

## 2023-09-07 RX ORDER — SODIUM CHLORIDE 0.9 % (FLUSH) 0.9 %
3 SYRINGE (ML) INJECTION EVERY 12 HOURS SCHEDULED
Status: DISCONTINUED | OUTPATIENT
Start: 2023-09-07 | End: 2023-09-07 | Stop reason: HOSPADM

## 2023-09-07 RX ORDER — PROTAMINE SULFATE 10 MG/ML
INJECTION, SOLUTION INTRAVENOUS
Status: DISCONTINUED | OUTPATIENT
Start: 2023-09-07 | End: 2023-09-07 | Stop reason: HOSPADM

## 2023-09-07 RX ORDER — ACETAMINOPHEN 325 MG/1
650 TABLET ORAL EVERY 4 HOURS PRN
Status: DISCONTINUED | OUTPATIENT
Start: 2023-09-07 | End: 2023-09-08 | Stop reason: HOSPADM

## 2023-09-07 RX ORDER — SODIUM CHLORIDE 9 MG/ML
250 INJECTION, SOLUTION INTRAVENOUS ONCE AS NEEDED
Status: DISCONTINUED | OUTPATIENT
Start: 2023-09-07 | End: 2023-09-08 | Stop reason: HOSPADM

## 2023-09-07 RX ORDER — LIDOCAINE HYDROCHLORIDE 10 MG/ML
INJECTION, SOLUTION EPIDURAL; INFILTRATION; INTRACAUDAL; PERINEURAL AS NEEDED
Status: DISCONTINUED | OUTPATIENT
Start: 2023-09-07 | End: 2023-09-07 | Stop reason: SURG

## 2023-09-07 RX ORDER — LIDOCAINE HYDROCHLORIDE 5 MG/ML
INJECTION, SOLUTION INFILTRATION; PERINEURAL
Status: DISCONTINUED | OUTPATIENT
Start: 2023-09-07 | End: 2023-09-07 | Stop reason: HOSPADM

## 2023-09-07 RX ORDER — NITROGLYCERIN 0.4 MG/1
0.4 TABLET SUBLINGUAL
Status: DISCONTINUED | OUTPATIENT
Start: 2023-09-07 | End: 2023-09-07 | Stop reason: HOSPADM

## 2023-09-07 RX ORDER — DEXAMETHASONE SODIUM PHOSPHATE 4 MG/ML
INJECTION, SOLUTION INTRA-ARTICULAR; INTRALESIONAL; INTRAMUSCULAR; INTRAVENOUS; SOFT TISSUE AS NEEDED
Status: DISCONTINUED | OUTPATIENT
Start: 2023-09-07 | End: 2023-09-07 | Stop reason: SURG

## 2023-09-07 RX ORDER — SODIUM CHLORIDE 9 MG/ML
40 INJECTION, SOLUTION INTRAVENOUS AS NEEDED
Status: DISCONTINUED | OUTPATIENT
Start: 2023-09-07 | End: 2023-09-07 | Stop reason: HOSPADM

## 2023-09-07 RX ORDER — LIDOCAINE HYDROCHLORIDE 10 MG/ML
0.5 INJECTION, SOLUTION EPIDURAL; INFILTRATION; INTRACAUDAL; PERINEURAL ONCE AS NEEDED
Status: DISCONTINUED | OUTPATIENT
Start: 2023-09-07 | End: 2023-09-07 | Stop reason: HOSPADM

## 2023-09-07 RX ORDER — HEPARIN SODIUM 1000 [USP'U]/ML
INJECTION, SOLUTION INTRAVENOUS; SUBCUTANEOUS
Status: DISCONTINUED | OUTPATIENT
Start: 2023-09-07 | End: 2023-09-07 | Stop reason: HOSPADM

## 2023-09-07 RX ORDER — FUROSEMIDE 10 MG/ML
INJECTION INTRAMUSCULAR; INTRAVENOUS
Status: DISCONTINUED | OUTPATIENT
Start: 2023-09-07 | End: 2023-09-07 | Stop reason: HOSPADM

## 2023-09-07 RX ORDER — ACETAMINOPHEN 325 MG/1
650 TABLET ORAL EVERY 4 HOURS PRN
Status: DISCONTINUED | OUTPATIENT
Start: 2023-09-07 | End: 2023-09-07 | Stop reason: HOSPADM

## 2023-09-07 RX ORDER — CEFAZOLIN SODIUM 2 G/100ML
2000 INJECTION, SOLUTION INTRAVENOUS ONCE
Status: DISCONTINUED | OUTPATIENT
Start: 2023-09-07 | End: 2023-09-07 | Stop reason: HOSPADM

## 2023-09-07 RX ORDER — ROCURONIUM BROMIDE 10 MG/ML
INJECTION, SOLUTION INTRAVENOUS AS NEEDED
Status: DISCONTINUED | OUTPATIENT
Start: 2023-09-07 | End: 2023-09-07 | Stop reason: SURG

## 2023-09-07 RX ORDER — SODIUM CHLORIDE, SODIUM LACTATE, POTASSIUM CHLORIDE, CALCIUM CHLORIDE 600; 310; 30; 20 MG/100ML; MG/100ML; MG/100ML; MG/100ML
9 INJECTION, SOLUTION INTRAVENOUS CONTINUOUS
Status: DISCONTINUED | OUTPATIENT
Start: 2023-09-07 | End: 2023-09-08

## 2023-09-07 RX ORDER — ACETAMINOPHEN 650 MG/1
650 SUPPOSITORY RECTAL EVERY 4 HOURS PRN
Status: DISCONTINUED | OUTPATIENT
Start: 2023-09-07 | End: 2023-09-08 | Stop reason: HOSPADM

## 2023-09-07 RX ORDER — LIDOCAINE HYDROCHLORIDE 20 MG/ML
10 INJECTION, SOLUTION INFILTRATION; PERINEURAL ONCE
Status: DISCONTINUED | OUTPATIENT
Start: 2023-09-07 | End: 2023-09-08 | Stop reason: HOSPADM

## 2023-09-07 RX ORDER — HYDROMORPHONE HYDROCHLORIDE 1 MG/ML
0.5 INJECTION, SOLUTION INTRAMUSCULAR; INTRAVENOUS; SUBCUTANEOUS
Status: DISCONTINUED | OUTPATIENT
Start: 2023-09-07 | End: 2023-09-07 | Stop reason: HOSPADM

## 2023-09-07 RX ORDER — ONDANSETRON 2 MG/ML
4 INJECTION INTRAMUSCULAR; INTRAVENOUS EVERY 6 HOURS PRN
Status: DISCONTINUED | OUTPATIENT
Start: 2023-09-07 | End: 2023-09-08 | Stop reason: HOSPADM

## 2023-09-07 RX ORDER — FENTANYL CITRATE 50 UG/ML
50 INJECTION, SOLUTION INTRAMUSCULAR; INTRAVENOUS
Status: DISCONTINUED | OUTPATIENT
Start: 2023-09-07 | End: 2023-09-07 | Stop reason: HOSPADM

## 2023-09-07 RX ORDER — FAMOTIDINE 20 MG/1
20 TABLET, FILM COATED ORAL ONCE
Status: DISCONTINUED | OUTPATIENT
Start: 2023-09-07 | End: 2023-09-07 | Stop reason: HOSPADM

## 2023-09-07 RX ORDER — SODIUM CHLORIDE 0.9 % (FLUSH) 0.9 %
10 SYRINGE (ML) INJECTION AS NEEDED
Status: DISCONTINUED | OUTPATIENT
Start: 2023-09-07 | End: 2023-09-07 | Stop reason: HOSPADM

## 2023-09-07 RX ORDER — SODIUM CHLORIDE 0.9 % (FLUSH) 0.9 %
10 SYRINGE (ML) INJECTION EVERY 12 HOURS SCHEDULED
Status: DISCONTINUED | OUTPATIENT
Start: 2023-09-07 | End: 2023-09-07 | Stop reason: HOSPADM

## 2023-09-07 RX ORDER — FAMOTIDINE 10 MG/ML
20 INJECTION, SOLUTION INTRAVENOUS ONCE
Status: DISCONTINUED | OUTPATIENT
Start: 2023-09-07 | End: 2023-09-07 | Stop reason: HOSPADM

## 2023-09-07 RX ORDER — SODIUM CHLORIDE 9 MG/ML
1 INJECTION, SOLUTION INTRAVENOUS CONTINUOUS
Status: ACTIVE | OUTPATIENT
Start: 2023-09-07 | End: 2023-09-07

## 2023-09-07 RX ORDER — EPHEDRINE SULFATE 50 MG/ML
INJECTION, SOLUTION INTRAVENOUS AS NEEDED
Status: DISCONTINUED | OUTPATIENT
Start: 2023-09-07 | End: 2023-09-07 | Stop reason: SURG

## 2023-09-07 RX ORDER — ONDANSETRON 2 MG/ML
INJECTION INTRAMUSCULAR; INTRAVENOUS AS NEEDED
Status: DISCONTINUED | OUTPATIENT
Start: 2023-09-07 | End: 2023-09-07 | Stop reason: SURG

## 2023-09-07 RX ORDER — DOPAMINE HYDROCHLORIDE 160 MG/100ML
INJECTION, SOLUTION INTRAVENOUS
Status: DISCONTINUED | OUTPATIENT
Start: 2023-09-07 | End: 2023-09-07 | Stop reason: HOSPADM

## 2023-09-07 RX ADMIN — PHENYLEPHRINE HYDROCHLORIDE 0.3 MCG: 10 INJECTION INTRAVENOUS at 13:17

## 2023-09-07 RX ADMIN — PROPOFOL 120 MG: 10 INJECTION, EMULSION INTRAVENOUS at 07:58

## 2023-09-07 RX ADMIN — SODIUM CHLORIDE 100 ML: 9 INJECTION, SOLUTION INTRAVENOUS at 08:23

## 2023-09-07 RX ADMIN — ROCURONIUM BROMIDE 40 MG: 10 SOLUTION INTRAVENOUS at 07:58

## 2023-09-07 RX ADMIN — ONDANSETRON 4 MG: 2 INJECTION INTRAMUSCULAR; INTRAVENOUS at 10:34

## 2023-09-07 RX ADMIN — CEFAZOLIN SODIUM 2 G: 2 INJECTION, SOLUTION INTRAVENOUS at 08:02

## 2023-09-07 RX ADMIN — EPHEDRINE SULFATE 10 MG: 50 INJECTION INTRAVENOUS at 08:17

## 2023-09-07 RX ADMIN — PHENYLEPHRINE HYDROCHLORIDE 0.5 MCG/KG/MIN: 10 INJECTION INTRAVENOUS at 11:57

## 2023-09-07 RX ADMIN — ROCURONIUM BROMIDE 20 MG: 10 SOLUTION INTRAVENOUS at 08:47

## 2023-09-07 RX ADMIN — SUGAMMADEX 200 MG: 100 INJECTION, SOLUTION INTRAVENOUS at 10:35

## 2023-09-07 RX ADMIN — ROCURONIUM BROMIDE 10 MG: 10 SOLUTION INTRAVENOUS at 08:20

## 2023-09-07 RX ADMIN — DEXAMETHASONE SODIUM PHOSPHATE 4 MG: 4 INJECTION, SOLUTION INTRAMUSCULAR; INTRAVENOUS at 08:08

## 2023-09-07 RX ADMIN — PHENYLEPHRINE HYDROCHLORIDE 0.5 MCG/KG/MIN: 10 INJECTION INTRAVENOUS at 08:21

## 2023-09-07 RX ADMIN — LIDOCAINE HYDROCHLORIDE 50 MG: 10 INJECTION, SOLUTION EPIDURAL; INFILTRATION; INTRACAUDAL; PERINEURAL at 07:58

## 2023-09-07 RX ADMIN — SODIUM CHLORIDE 40 ML: 9 INJECTION, SOLUTION INTRAVENOUS at 07:46

## 2023-09-07 NOTE — PLAN OF CARE
Goal Outcome Evaluation:   Otilio off, BP adequate. No episodes of VT. Aox4. Unable to void, camargo placed. 1125 ml output. No BM. Diet ordered. Afebrile. Wife at bedside.

## 2023-09-07 NOTE — Clinical Note
Hemostasis started on the left femoral vein. Vascade MVP was used in achieving hemostasis. Closure device deployed in the vessel. Hemostasis achieved successfully. Closure device additional comment: LFV Vascade x 1

## 2023-09-07 NOTE — ANESTHESIA POSTPROCEDURE EVALUATION
Patient: Jonnie Roth    Procedure Summary       Date: 09/07/23 Room / Location: [facility] / [facility]    Anesthesia Start: 0746 Anesthesia Stop: 1105    Procedure: Ablation VT Diagnosis:       Ventricular tachycardia, monomorphic      (VT)    Providers: Ferdinand Alba MD Provider: José Miguel Robertson MD    Anesthesia Type: general ASA Status: 4            Anesthesia Type: general    Vitals  No vitals data found for the desired time range.          Post Anesthesia Care and Evaluation    Patient location during evaluation: PACU  Patient participation: complete - patient participated  Level of consciousness: awake and alert  Pain management: adequate    Airway patency: patent  Anesthetic complications: No anesthetic complications  PONV Status: none  Cardiovascular status: hemodynamically stable and acceptable  Respiratory status: nonlabored ventilation, acceptable and nasal cannula  Hydration status: acceptable

## 2023-09-07 NOTE — ANESTHESIA PREPROCEDURE EVALUATION
Anesthesia Evaluation     Patient summary reviewed and Nursing notes reviewed   NPO Solid Status: > 8 hours  NPO Liquid Status: > 2 hours           Airway   Mallampati: II  TM distance: >3 FB  Neck ROM: full  No difficulty expected  Dental      Pulmonary    (+) pulmonary embolism, a smoker Former, COPD (new use MDI) mild,sleep apnea on CPAP  (-) shortness of breath, recent URI, no home oxygen  Cardiovascular     ECG reviewed    (+) pacemaker ICD, hypertension, CAD, cardiac stents , dysrhythmias Tachycardia, angina, CHF , hyperlipidemia    ROS comment: ELECTRONIC PM    ECHO EF <20%   The anterior and anterior lateral segments and apex are akinetic.  LA dilated c increased volume Mod MR RVSP < 35     CATH · Nonobstructive CAD with patent previously placed stents.  Dilated cardiomyopathy with severe left ventricular systolic dysfunction, estimated ejection fraction less than 20%.              Neuro/Psych  (-) seizures, CVA  GI/Hepatic/Renal/Endo    (+) GERD, renal disease (creat upto 1.2  ACUTE INCREASE), diabetes mellitus, thyroid problem hypothyroidism    Musculoskeletal     Abdominal    Substance History      OB/GYN          Other   arthritis,                     Anesthesia Plan    ASA 4     general     ([place] > [place]   Aim for MAp >65 high risk )  intravenous induction     Anesthetic plan, risks, benefits, and alternatives have been provided, discussed and informed consent has been obtained with: patient.    Plan discussed with CRNA.      CODE STATUS:

## 2023-09-07 NOTE — Clinical Note
Hemostasis started on the right femoral vein. Vascade MVP was used in achieving hemostasis. Closure device deployed in the vessel. Hemostasis achieved successfully. Closure device additional comment: RFV Vascade x 2

## 2023-09-07 NOTE — H&P
Cardiology H&P     Jonine Roth  1945  [phone]  There is no work phone number on file.    09/07/23    DATE OF ADMISSION: 9/7/2023  [facility]    Borders, Brian Ferraro MD  [address] / [facility] [place] [address]  Referring Provider: Ferdinand Alba MD     CC: VT    Problem List:         Sudden cardiac death with primary ventricular fibrillation status post Pacesetter [place] ICD, [place], [place] in September 2000:   ICD generator change out with upgrade to a biventricular pacemaker ICD on 12/17/2007, [employer] device.  ICD discharge, 08/09/2012, secondary to ventricular flutter with initiation of amiodarone therapy.   Hospitalization, 02/01/2014, secondary to ICD discharge for ventricular tachycardia with subsequent discontinuation of Coreg and initiation of sotalol therapy.   Hospitalization, February 2014, secondary to ICD discharge for VT with subsequent discontinuation of Coreg and initiation of sotalol.  Echocardiogram, 04/07/2015: EF less than 20%.  Hospitalization secondary to VF and ICD shocks, 04/18/2015.  NIPS procedure with defibrillation threshold testing for VF and noninvasive program stimulation, 04/18/2015.   Initiation of mexiletine for recurrent ventricular tachycardia with ICD shocks on 05/05/2015 with amiodarone load, recurrent VT and ICD shocks with hospitalization 05/16/2015-05/18/2015.   EP study with RFA of large anterior left ventricular scar extending from mid-left ventricular wall down apex by Dr. Ferdinand Alba, 05/21/2015.  ICD generator change 10/2017  Echo 11/19 LVEF 30%, mild TR  ICD generator change 07/2021  ICD shocks for VT/VF 9/2022, ICD reprogrammed with less ATP attemps, started on Amiodarone  ICD shocks for VT/VF in setting of Flu A/dehydration 12/2022  ICD shocks x 2  for VT on 4/26/2023, Amiodarone increased, Mexiletine added  ER presentation for VT with ICD shock x 1 at home, external ECV in ED for VT at 138 bpm with hypotension  5/2/2023  VT 8/13/23, HR 130s, defibrillated in [facility]                Ischemic heart disease:  Remote progressive angina pectoris/acute extensive anterolateral myocardial infarction/delayed presentation with thrombolysis/severe 3-vessel coronary atherosclerosis with severe single vessel involvement/PTCA with intracoronary stent deployment proximal-mid segment LAD/moderate-severe compensated left ventricular dysfunction. LVEF (0.35)/abnormal positive signal averaged EKG/oral anticoagulation, April 1995.  Remote NYHA class I-II angina pectoris/class III CHF/abnormal quantitative SPECT gated Cardiolite GXT, July 1998.  Stable persistent MUGA, LVEF (0.33, January 1999 and January 2000).   Residual NYHA class I angina pectoris/CHF with reduced MUGA scan: LVEF (0.25), April 2002.  Left heart catheterization with distal circumflex disease: EF 20%, September 2002.   MUGA in May 2003: EF 32%.   Sestamibi GXT on 04/08/2005: No ischemia. EF 29%.   Residual NYHA class I angina pectoris/CHF with reduced acceptable MUGA scan: EF (0.32) and acceptable Pacesetter PCD interrogation/reprogramming, May 2003 with interrogation, September 2004.  Echocardiogram, September 2009 with EF 30%  Left heart catheterization by Dr. Bhupinder Gonzalez, August 2010, with LVEF of 35%, with 3-vessel native coronary artery disease, 95% mid-LAD status post PTCA and stenting with two 3 mm stents by Dr. Llanes.  Echocardiogram, 06/07/2012: Left ventricular ejection fraction of 30%.  Hospitalization, 02/01/2014, for non-ST elevation MI, left heart catheterization by Dr. Ayala that demonstrated 60% mid stenosis of the right coronary artery that remains unchanged compared to previous, widely patent stents of the LAD with severe LV dysfunction of approximately 25%.   Left heart catheterization status post drug-eluting stent to the distal LAD and mid-RCA with an EF of 20% to 25% by Dr. Diego Ayala, 04/20/2015.   Left heart catheterization 1/16/17;  nonobstructive CAD with patent previously placed stents, dilated cardiomyopathy with severe LV systolic dysfunction, EF less than 20%, normal hemodynamics, recommendations for continued medical therapy and risk factor, evaluation for noncardiac etiology of symptoms.  Residual CCS Class I/II angina pectoris/NYHA Class II exertional dyspnea and fatigue syndrome, summer 2017  Residual CCS 0 angina pectoris/NYHA class I-II exertional dyspnea and fatigue, February 2018, September 2018, March 2019.  Echocardiogram 5/2/2023: LV dilated, LV systolic function severely decreased, LVEF appears less than 20%, anterior and anterior lateral segments and apex are akinetic, LA cavity dilated, LA volume moderately increased, mild to moderate regurgitation present  Dyslipidemia.  Status post remote operations.  Remote chronic tobacco use/abnormal chest x-ray with stable abnormal thoracic  CT scan without contrast demonstrating bilateral diffuse perimeter scarring and fibrosis with scattered subcentimeter noncalcified nodules (unchanged from February 2016), March 2017.  Left bundle branch block.  Burks trauma with hematuria with urosepsis requiring hospitalization, May 2015.  Pulmonary embolism, spring 2015.   Remote apparent hypothyroidism/replacement therapy - data deficit, January 2016.  Remote diagnosis of obstructive sleep apnea with CPAP use, 2017.  Bilateral cataract extraction November 2018, February 2019    History of Present Illness:   Jonnie Roth is a 78 year old male with above PMHx presenting today for scheduled EPS +/- VT ablation. He has had multiple episodes of VT requiring ICD shocks over the last few months despite being on Amiodarone. Most recent episode 8/13/23, he received no shocks from ICD, HR was in the 130s, he presented to [facility] due to CP, dizziness, palpitations and was defibrillated in the ED. He is unable to tolerate Mexiletine due to side effect of nausea. He has a history of previous EP study with RFA  of large anterior left ventricular scar extending from mid-left ventricular wall down apex by Dr. Alba, 05/21/2015 without recurrence of sustained VT for several years. He would benefit from repeat EPS +/- RFA of VT. He denies any further palpitations, dizziness, CP since last admission 8/13/23. He denies further hospitalization, infection, chills, bleeding, signs of CVA/TIA.       No Known Allergies    Prior to Admission Medications       Prescriptions Last Dose Informant Patient Reported? Taking?    amiodarone (PACERONE) 400 MG tablet  Medication Bottle No No    Take 1 tablet by mouth Every 12 (Twelve) Hours.    carvedilol (COREG) 6.25 MG tablet  Medication Bottle No No    Take 1 tablet by mouth 2 (Two) Times a Day With Meals.    cholecalciferol (VITAMIN D3) 25 MCG (1000 UT) tablet  Medication Bottle Yes No    Take 2 tablets by mouth 2 (Two) Times a Day. OTC    clopidogrel (PLAVIX) 75 MG tablet  Medication Bottle No No    Take 1 tablet by mouth Every Morning.    Cyanocobalamin (Vitamin B-12) 5000 MCG tablet dispersible  Medication Bottle Yes No    Take 2,500 mcg by mouth Daily. OTC    empagliflozin (JARDIANCE) 10 MG tablet tablet  Medication Bottle No No    Take 1 tablet by mouth Daily.    eplerenone (INSPRA) 25 MG tablet  Medication Bottle No No    Take 0.5 tablets by mouth Daily.    L-TRYPTOPHAN PO  Medication Bottle Yes No    Take 3,000 mg by mouth Every Night.    levothyroxine (SYNTHROID, LEVOTHROID) 25 MCG tablet  Medication Bottle No No    Take 1 tablet by mouth Every Morning.    Patient taking differently:  Take 2 tablets by mouth Every Morning.    Magnesium Oxide -Mg Supplement 500 MG tablet  Medication Bottle Yes No    Take 1 tablet by mouth Daily.    melatonin 5 MG tablet tablet  Medication Bottle Yes No    Take 2 tablets by mouth Every Night. OTC    metFORMIN (GLUCOPHAGE) 1000 MG tablet  Medication Bottle Yes No    Take 1 tablet by mouth 2 (two) times a day.    Multiple Vitamins-Minerals (ICAPS  AREDS 2 PO)  Medication Bottle Yes No    Take 1 tablet by mouth 2 (two) times a day. OTC    Multiple Vitamins-Minerals (MULTIVITAMIN ADULTS 50+) tablet  Medication Bottle Yes No    Take 1 tablet by mouth Daily. OTC    Niacin, Antihyperlipidemic, (NIACIN ER, ANTIHYPERLIPIDEMIC, PO)  Medication Bottle Yes No    Take 500 mg by mouth Daily.    nitroglycerin (NITROSTAT) 0.4 MG SL tablet  Self No No    Place 1 tablet under the tongue Every 5 (Five) Minutes As Needed for Chest Pain. Take no more than 3 doses in 15 minutes.    pantoprazole (PROTONIX) 40 MG EC tablet  Medication Bottle Yes No    Take 1 tablet by mouth Every Morning.    polyethylene glycol (MIRALAX) packet  Self Yes No    Take 17 g by mouth Every Other Day. OTC    potassium chloride (MICRO-K) 10 MEQ CR capsule  Medication Bottle No No    Take 2 capsules by mouth Daily.    rivaroxaban (Xarelto) 10 MG tablet  Medication Bottle No No    Take 1 tablet by mouth Daily.    Patient taking differently:  Take 1 tablet by mouth Daily. PT TO HOLD (2) DAYS PRIOR TO PROCEDURE PER DR. RESENDIZ.    torsemide (DEMADEX) 10 MG tablet  Medication Bottle No No    Take 1 tablet by mouth Daily.            No current facility-administered medications for this encounter.    Social History     Socioeconomic History    Marital status:    Tobacco Use    Smoking status: Former     Packs/day: 2.00     Years: 30.00     Pack years: 60.00     Types: Cigarettes     Start date: 1960     Quit date: 1995     Years since quittin.4    Smokeless tobacco: Never    Tobacco comments:     Heavy smoker for years   Vaping Use    Vaping Use: Never used   Substance and Sexual Activity    Alcohol use: No    Drug use: No    Sexual activity: Defer       Family History   Problem Relation Age of Onset    Heart failure Father     Stroke Father        REVIEW OF SYSTEMS:   CONSTITUTIONAL:         No weight loss, fever, chills, +weakness - fatigue.   HEENT:                            No visual  "loss, blurred vision, double vision, yellow sclerae.                                             No hearing loss, congestion, sore throat.   SKIN:                                No rashes, urticaria, ulcers, sores.     RESPIRATORY:               No shortness of breath, hemoptysis, cough, sputum.   GI:                                     No anorexia, nausea, vomiting, diarrhea. No abdominal pain, melena.   :                                   No burning on urination, hematuria or increased frequency.  ENDOCRINE:                   No diaphoresis, cold or heat intolerance. No polyuria or polydipsia.   NEURO:                            No headache, +dizziness, -syncope, paralysis, ataxia, or parasthesias.                                            No change in bowel or bladder control. No history of CVA/TIA  MUSCULOSKELETAL:    No muscle, back pain, joint pain or stiffness.   HEMATOLOGY:               No anemia, bleeding, bruising. No history of DVT/PE.  PSYCH:                            No history of depression, anxiety    Vitals:    09/07/23 0650   BP: 118/78   BP Location: Right arm   Pulse: 70   Temp: 97 °F (36.1 °C)   TempSrc: Temporal   SpO2: 96%   Weight: 76.7 kg (169 lb)   Height: 185.4 cm (73\")         Vital Sign Min/Max for last 24 hours  Temp  Min: 97 °F (36.1 °C)  Max: 97 °F (36.1 °C)   BP  Min: 118/78  Max: 118/78   Pulse  Min: 70  Max: 70   No data recorded   SpO2  Min: 96 %  Max: 96 %   No data recorded    No intake or output data in the 24 hours ending 09/07/23 0722          Physical Exam:  GEN:  No acute distress  HEENT: Normocephalic, Atraumatic, moist mucous membranes  NECK: supple, NO JVD  CARDIAC: S1S2  paced, no murmur  LUNGS: Clear to ausculation, normal respiratory effort  ABDOMEN: Soft, nontender  EXTREMITIES:No gross deformities,  No clubbing, cyanosis, or edema  SKIN: Warm, dry  NEURO: No focal deficits  PSYCHIATRIC: Normal affect and mood      I personally viewed and interpreted the " patient's EKG/Telemetry/lab data    Data:   Results from last 7 days   Lab Units 08/31/23  1518   WBC 10*3/mm3 8.77   HEMOGLOBIN g/dL 11.9*   HEMATOCRIT % 37.9   PLATELETS 10*3/mm3 288     Results from last 7 days   Lab Units 08/31/23  1518   SODIUM mmol/L 139   POTASSIUM mmol/L 5.4*   CHLORIDE mmol/L 101   CO2 mmol/L 26.0   BUN mg/dL 37*   CREATININE mg/dL 1.20   GLUCOSE mg/dL 94      Results from last 7 days   Lab Units 08/31/23  1518   HEMOGLOBIN A1C % 5.60             Results from last 7 days   Lab Units 08/31/23  1518   PROTIME Seconds 19.3*   INR  1.62*                 No intake or output data in the 24 hours ending 09/07/23 0722        Telemetry: AV paced, HR 70 bpm        Assessment and Plan:   Ventricular tachycardia  -History of VT ablation in 2015 and no recurrence of sustained VT for several years. Several episodes of VT/hospitalizations in the last year with ICD shocks despite Amiodarone therapy. Most recent 8/13/23 with symptoms of CP, dizziness and palpitations and found to be in ventricular tachycardia, heart rate 130s. Defibrillated in the ED. He received no shocks from his ICD.   -last dose of Xarelto 9/4/23. Last dose of plavix this a.m.  -Plan for EPS +/- VT ablation today. The risks, benefits, and alternatives of the procedure have been reviewed and the patient wishes to proceed.       2. Hyperkalemia  -potassium 5.4 on 8/31/23. Has been holding potassium tablets since 9/4/23. Repeat potassium today      Electronically signed by CHENTE Umanzor, 09/07/23, 7:14 AM EDT.

## 2023-09-07 NOTE — Clinical Note
Hemostasis started on the left femoral artery. Vascade MVP was used in achieving hemostasis. Closure device deployed in the vessel. Hemostasis achieved successfully. Closure device additional comment: LFA Vascade x 1

## 2023-09-07 NOTE — ANESTHESIA PROCEDURE NOTES
Airway  Urgency: elective    Date/Time: 9/7/2023 8:02 AM  Airway not difficult    General Information and Staff    Patient location during procedure: OR    Indications and Patient Condition  Indications for airway management: airway protection    Preoxygenated: yes  MILS not maintained throughout  Mask difficulty assessment: 1 - vent by mask    Final Airway Details  Final airway type: endotracheal airway      Successful airway: ETT  Cuffed: yes   Successful intubation technique: direct laryngoscopy  Endotracheal tube insertion site: oral  Blade: Melina  Blade size: 4  ETT size (mm): 7.5  Cormack-Lehane Classification: grade I - full view of glottis  Placement verified by: chest auscultation and capnometry   Measured from: lips  ETT/EBT  to lips (cm): 20  Number of attempts at approach: 1  Assessment: lips, teeth, and gum same as pre-op and atraumatic intubation    Additional Comments  Negative epigastric sounds, Breath sound equal bilaterally with symmetric chest rise and fall

## 2023-09-07 NOTE — Clinical Note
Replaced previous sheath in the right femoral vein. RFV Vizjeovanny exchanged for short 8.5FR sheath over wire

## 2023-09-08 ENCOUNTER — READMISSION MANAGEMENT (OUTPATIENT)
Dept: CALL CENTER | Facility: HOSPITAL | Age: 78
End: 2023-09-08
Payer: MEDICARE

## 2023-09-08 ENCOUNTER — APPOINTMENT (OUTPATIENT)
Dept: CARDIAC REHAB | Facility: HOSPITAL | Age: 78
End: 2023-09-08

## 2023-09-08 VITALS
RESPIRATION RATE: 18 BRPM | HEART RATE: 71 BPM | HEIGHT: 73 IN | DIASTOLIC BLOOD PRESSURE: 72 MMHG | BODY MASS INDEX: 22.4 KG/M2 | SYSTOLIC BLOOD PRESSURE: 118 MMHG | TEMPERATURE: 97.8 F | WEIGHT: 169 LBS | OXYGEN SATURATION: 94 %

## 2023-09-08 LAB
ANION GAP SERPL CALCULATED.3IONS-SCNC: 12 MMOL/L (ref 5–15)
BASOPHILS # BLD AUTO: 0.03 10*3/MM3 (ref 0–0.2)
BASOPHILS NFR BLD AUTO: 0.3 % (ref 0–1.5)
BUN SERPL-MCNC: 26 MG/DL (ref 8–23)
BUN/CREAT SERPL: 23.6 (ref 7–25)
CALCIUM SPEC-SCNC: 8.5 MG/DL (ref 8.6–10.5)
CHLORIDE SERPL-SCNC: 104 MMOL/L (ref 98–107)
CO2 SERPL-SCNC: 24 MMOL/L (ref 22–29)
CREAT SERPL-MCNC: 1.1 MG/DL (ref 0.76–1.27)
DEPRECATED RDW RBC AUTO: 45.1 FL (ref 37–54)
EGFRCR SERPLBLD CKD-EPI 2021: 68.3 ML/MIN/1.73
EOSINOPHIL # BLD AUTO: 0.08 10*3/MM3 (ref 0–0.4)
EOSINOPHIL NFR BLD AUTO: 0.8 % (ref 0.3–6.2)
ERYTHROCYTE [DISTWIDTH] IN BLOOD BY AUTOMATED COUNT: 14 % (ref 12.3–15.4)
GLUCOSE SERPL-MCNC: 130 MG/DL (ref 65–99)
HCT VFR BLD AUTO: 38.6 % (ref 37.5–51)
HGB BLD-MCNC: 11.8 G/DL (ref 13–17.7)
IMM GRANULOCYTES # BLD AUTO: 0.05 10*3/MM3 (ref 0–0.05)
IMM GRANULOCYTES NFR BLD AUTO: 0.5 % (ref 0–0.5)
LYMPHOCYTES # BLD AUTO: 2.22 10*3/MM3 (ref 0.7–3.1)
LYMPHOCYTES NFR BLD AUTO: 21.7 % (ref 19.6–45.3)
MCH RBC QN AUTO: 27 PG (ref 26.6–33)
MCHC RBC AUTO-ENTMCNC: 30.6 G/DL (ref 31.5–35.7)
MCV RBC AUTO: 88.3 FL (ref 79–97)
MONOCYTES # BLD AUTO: 0.87 10*3/MM3 (ref 0.1–0.9)
MONOCYTES NFR BLD AUTO: 8.5 % (ref 5–12)
NEUTROPHILS NFR BLD AUTO: 6.98 10*3/MM3 (ref 1.7–7)
NEUTROPHILS NFR BLD AUTO: 68.2 % (ref 42.7–76)
NRBC BLD AUTO-RTO: 0 /100 WBC (ref 0–0.2)
PLATELET # BLD AUTO: 223 10*3/MM3 (ref 140–450)
PMV BLD AUTO: 10.8 FL (ref 6–12)
POTASSIUM SERPL-SCNC: 4.2 MMOL/L (ref 3.5–5.2)
RBC # BLD AUTO: 4.37 10*6/MM3 (ref 4.14–5.8)
SODIUM SERPL-SCNC: 140 MMOL/L (ref 136–145)
WBC NRBC COR # BLD: 10.23 10*3/MM3 (ref 3.4–10.8)

## 2023-09-08 PROCEDURE — 63710000001 CLOPIDOGREL 75 MG TABLET: Performed by: INTERNAL MEDICINE

## 2023-09-08 PROCEDURE — 80048 BASIC METABOLIC PNL TOTAL CA: CPT

## 2023-09-08 PROCEDURE — A9270 NON-COVERED ITEM OR SERVICE: HCPCS | Performed by: INTERNAL MEDICINE

## 2023-09-08 PROCEDURE — 63710000001 PANTOPRAZOLE 40 MG TABLET DELAYED-RELEASE: Performed by: INTERNAL MEDICINE

## 2023-09-08 PROCEDURE — 63710000001 LEVOTHYROXINE 25 MCG TABLET: Performed by: INTERNAL MEDICINE

## 2023-09-08 PROCEDURE — 99238 HOSP IP/OBS DSCHRG MGMT 30/<: CPT | Performed by: INTERNAL MEDICINE

## 2023-09-08 PROCEDURE — 25010000002 KETOROLAC TROMETHAMINE PER 15 MG

## 2023-09-08 PROCEDURE — 85025 COMPLETE CBC W/AUTO DIFF WBC: CPT

## 2023-09-08 PROCEDURE — 63710000001 EMPAGLIFLOZIN 10 MG TABLET: Performed by: INTERNAL MEDICINE

## 2023-09-08 PROCEDURE — 63710000001 AMIODARONE 200 MG TABLET: Performed by: INTERNAL MEDICINE

## 2023-09-08 RX ORDER — AMIODARONE HYDROCHLORIDE 400 MG/1
400 TABLET ORAL DAILY
Qty: 180 TABLET | Refills: 3 | Status: SHIPPED | OUTPATIENT
Start: 2023-09-08

## 2023-09-08 RX ORDER — CHOLECALCIFEROL (VITAMIN D3) 125 MCG
10 CAPSULE ORAL NIGHTLY
Status: DISCONTINUED | OUTPATIENT
Start: 2023-09-08 | End: 2023-09-08 | Stop reason: HOSPADM

## 2023-09-08 RX ORDER — CLOPIDOGREL BISULFATE 75 MG/1
75 TABLET ORAL EVERY MORNING
Status: DISCONTINUED | OUTPATIENT
Start: 2023-09-08 | End: 2023-09-08 | Stop reason: HOSPADM

## 2023-09-08 RX ORDER — KETOROLAC TROMETHAMINE 15 MG/ML
15 INJECTION, SOLUTION INTRAMUSCULAR; INTRAVENOUS ONCE AS NEEDED
Status: COMPLETED | OUTPATIENT
Start: 2023-09-08 | End: 2023-09-08

## 2023-09-08 RX ORDER — LEVOTHYROXINE SODIUM 0.05 MG/1
50 TABLET ORAL DAILY
Qty: 30 TABLET | Refills: 6 | Status: SHIPPED | OUTPATIENT
Start: 2023-09-08

## 2023-09-08 RX ORDER — LEVOTHYROXINE SODIUM 0.03 MG/1
25 TABLET ORAL EVERY MORNING
Status: DISCONTINUED | OUTPATIENT
Start: 2023-09-08 | End: 2023-09-08 | Stop reason: HOSPADM

## 2023-09-08 RX ORDER — PANTOPRAZOLE SODIUM 40 MG/1
40 TABLET, DELAYED RELEASE ORAL EVERY MORNING
Status: DISCONTINUED | OUTPATIENT
Start: 2023-09-08 | End: 2023-09-08 | Stop reason: HOSPADM

## 2023-09-08 RX ORDER — LEVOTHYROXINE SODIUM 0.03 MG/1
50 TABLET ORAL EVERY MORNING
Qty: 30 TABLET | Refills: 6 | Status: CANCELLED | OUTPATIENT
Start: 2023-09-08

## 2023-09-08 RX ORDER — AMIODARONE HYDROCHLORIDE 200 MG/1
400 TABLET ORAL
Status: DISCONTINUED | OUTPATIENT
Start: 2023-09-08 | End: 2023-09-08 | Stop reason: HOSPADM

## 2023-09-08 RX ADMIN — AMIODARONE HYDROCHLORIDE 400 MG: 200 TABLET ORAL at 08:22

## 2023-09-08 RX ADMIN — LEVOTHYROXINE SODIUM 25 MCG: 25 TABLET ORAL at 06:12

## 2023-09-08 RX ADMIN — CLOPIDOGREL BISULFATE 75 MG: 75 TABLET ORAL at 08:22

## 2023-09-08 RX ADMIN — EMPAGLIFLOZIN 10 MG: 10 TABLET, FILM COATED ORAL at 08:22

## 2023-09-08 RX ADMIN — KETOROLAC TROMETHAMINE 15 MG: 15 INJECTION, SOLUTION INTRAMUSCULAR; INTRAVENOUS at 10:15

## 2023-09-08 RX ADMIN — PANTOPRAZOLE SODIUM 40 MG: 40 TABLET, DELAYED RELEASE ORAL at 06:12

## 2023-09-08 NOTE — PROGRESS NOTES
[facility] at [facility]  Progress Note     LOS: 0 days   Patient Care Team:  Brian Lewis MD as PCP - General (Internal Medicine)  Ferdinand Alba MD as Consulting Physician (Cardiac Electrophysiology)  Viet Manzano PA as Physician Assistant (Cardiology)  Michele Flanagan IV, MD as Consulting Physician (Interventional Cardiology)    Chief Complaint:  VT    Subjective     Interval History:   Did not rest well overnight due to inability to get comfortable. Reports mid sternal chest discomfort with breathing that he only notices while sitting up with inspiration.  Denies SOB, palpitations. Unable to void overnight and required camargo catheter.  Camargo removed this morning as well as IV neosynephrine stopped.      Review of Systems:   Pertinent positives in HPI, all others reviewed and negative.      Objective       Current Facility-Administered Medications:     acetaminophen (TYLENOL) tablet 650 mg, 650 mg, Oral, Q4H PRN **OR** acetaminophen (TYLENOL) suppository 650 mg, 650 mg, Rectal, Q4H PRN, Ferdinand Alba MD    amiodarone (PACERONE) tablet 400 mg, 400 mg, Oral, Q24H, Ferdinand Alba MD, 400 mg at 09/08/23 0822    atropine sulfate injection 0.5 mg, 0.5 mg, Intravenous, Q5 Min PRN, Ferdinand Alba MD    clopidogrel (PLAVIX) tablet 75 mg, 75 mg, Oral, QAM, Ferdinand Alba MD, 75 mg at 09/08/23 0822    empagliflozin (JARDIANCE) tablet 10 mg, 10 mg, Oral, Daily, Ferdinand Alba MD, 10 mg at 09/08/23 0822    levothyroxine (SYNTHROID, LEVOTHROID) tablet 25 mcg, 25 mcg, Oral, QAM, Ferdinand Alba MD, 25 mcg at 09/08/23 0612    lidocaine (XYLOCAINE) 2% injection 10 mL, 10 mL, Injection, Once, Ferdinand Alba MD    melatonin tablet 10 mg, 10 mg, Oral, Nightly, Ferdinand Alba MD    ondansetron (ZOFRAN) injection 4 mg, 4 mg, Intravenous, Q6H PRN, Ferdinand Alba MD    pantoprazole (PROTONIX) EC tablet 40 mg, 40 mg, Oral, Anjali HAMILTON Gery F  "MD, 40 mg at 09/08/23 0612    phenylephrine (ERIBERTO-SYNEPHRINE) 50 mg in sodium chloride 0.9 % 250 mL infusion, 0.5-3 mcg/kg/min, Intravenous, Titrated, Ferdinand Alba MD, Stopped at 09/07/23 1403    sodium chloride 0.9 % infusion 250 mL, 250 mL, Intravenous, Once PRN, Ferdinand Alba MD    Vital Sign Min/Max for last 24 hours  Temp  Min: 97 °F (36.1 °C)  Max: 99.1 °F (37.3 °C)   BP  Min: 82/55  Max: 120/77   Pulse  Min: 68  Max: 91   Resp  Min: 14  Max: 18   SpO2  Min: 91 %  Max: 100 %   No data recorded   No data recorded     Flowsheet Rows      Flowsheet Row First Filed Value   Admission Height 185.4 cm (73\") Documented at 09/07/2023 0650   Admission Weight 76.7 kg (169 lb) Documented at 09/07/2023 0650            Physical Exam:     General Appearance:    Alert, cooperative, in no acute distress   Lungs:   Clear to auscultation bilaterally anteriorly respiratory effort fair    Heart:  Regular rhythm normal S1-S2.  S3 and S4 is present.  No new murmurs   Chest Wall:    No abnormalities observed   Abdomen:     Normal bowel sounds   Extremities:   Moves all extremities well, no edema, no cyanosis, no  redness   Pulses:   Pulses palpable and equal bilaterally   Skin:   Groin sites are clean, dry and intact. No redness, swelling or drainage.  No hematoma or bleeding        Results Review:   Results from last 7 days   Lab Units 09/07/23  0953 09/07/23  0937 09/07/23  0916   HEMOGLOBIN, POC g/dL 11.6* 11.6* 12.2   HEMATOCRIT POC % 34* 34* 36*                                    Intake/Output Summary (Last 24 hours) at 9/8/2023 0825  Last data filed at 9/8/2023 0700  Gross per 24 hour   Intake 750 ml   Output 2100 ml   Net -1350 ml       I personally viewed and interpreted the patient's EKG/Telemetry data    EKG: no EKG for review  Telemetry: AV paced, HR 68-91 bpm no recurrent VT      Present on Admission:   VT (ventricular tachycardia)    Assessment & Plan     1. VT: S/p RFA of slow VT x 2 yesterday in patient with " extensive left ventricular scar, severe LV dysfunction and inability to hemodynamically tolerate the ventricular tachycardias at 100 and 110 bpm.  No clinical recurrence overnight.  Lower amiodarone to 400 mg daily.  Pt complaining of mild chest discomfort with breathing overnight, it has improved throughout the night.  We will give 1 dose of IV Toradol 15 mg now.  Groin sites CDI. Medications, wound care and follow up have been reviewed with the patient.     2. Urinary retention  -unable to void overnight, required cmaargo catheter  -remove camargo catheter and voiding trial today    3. Hyperkalemia, K level NL      Plan for disposition: Voiding trial today.  Ambulate.  If patient can void and ambulates without difficulty, will discharge likely this afternoon with plan to follow up with Dr. Alba in 10-12 weeks.  We will have ICD interrogated this morning prior to discharge later this afternoon.      Electronically signed by CHENTE Umanzor, 09/08/23, 7:03 AM EDT.      I, Ferdinand Alba MD, personally performed the services face to face as described and documented by the above named individual. I have made any necessary edits and it is both accurate and complete 9/8/2023  08:26 EDT

## 2023-09-08 NOTE — PLAN OF CARE
Goal Outcome Evaluation:  Plan of Care Reviewed With: patient        Progress: improving            VSS. No episodes of VT. Groin sites soft, nontender, no signs of bleeding. No complaints of pain throughout the night. Patient did have difficulty sleeping and spent most of the night awake. , no BM. Afebrile.

## 2023-09-09 NOTE — OUTREACH NOTE
Prep Survey      Flowsheet Row Responses   [facility] patient discharged from? [facility]   Is LACE score < 7 ? No   Eligibility Readm Mgmt   Discharge diagnosis Ventricular tachycardia, monomorphic   Does the patient have one of the following disease processes/diagnoses(primary or secondary)? General Surgery   Does the patient have Home health ordered? No   Is there a DME ordered? No   Prep survey completed? Yes            Malgorzata OG - Registered Nurse

## 2023-09-11 ENCOUNTER — APPOINTMENT (OUTPATIENT)
Dept: CARDIAC REHAB | Facility: HOSPITAL | Age: 78
End: 2023-09-11

## 2023-09-12 ENCOUNTER — APPOINTMENT (OUTPATIENT)
Dept: CARDIAC REHAB | Facility: HOSPITAL | Age: 78
End: 2023-09-12

## 2023-09-13 ENCOUNTER — APPOINTMENT (OUTPATIENT)
Dept: CARDIAC REHAB | Facility: HOSPITAL | Age: 78
End: 2023-09-13

## 2023-09-13 ENCOUNTER — READMISSION MANAGEMENT (OUTPATIENT)
Dept: CALL CENTER | Facility: HOSPITAL | Age: 78
End: 2023-09-13
Payer: MEDICARE

## 2023-09-13 NOTE — OUTREACH NOTE
General Surgery Week 1 Survey      Flowsheet Row Responses   [facility] patient discharged from? [place]   Does the patient have one of the following disease processes/diagnoses(primary or secondary)? General Surgery   Week 1 attempt successful? Yes   Call start time 1151   Call end time 1157   List who call center can speak with pt   Meds reviewed with patient/caregiver? Yes   Is the patient having any side effects they believe may be caused by any medication additions or changes? No   Does the patient have all medications related to this admission filled (includes all antibiotics, pain medications, etc.) Yes   Is the patient taking all medications as directed (includes completed medication regime)? Yes   Does the patient have a follow up appointment scheduled with their surgeon? Yes   Has the patient kept scheduled appointments due by today? Yes   Comments Pt has had hospital f/u with pcp.   Has home health visited the patient within 72 hours of discharge? N/A   Psychosocial issues? No   Did the patient receive a copy of their discharge instructions? Yes   Nursing interventions Reviewed instructions with patient   What is the patient's perception of their health status since discharge? Improving   Nursing interventions Nurse provided patient education   Is the patient /caregiver able to teach back basic post-op care? Drive as instructed by MD in discharge instructions   Is the patient/caregiver able to teach back signs and symptoms of incisional infection? Increased redness, swelling or pain at the incisonal site   Is the patient/caregiver able to teach back steps to recovery at home? Set small, achievable goals for return to baseline health, Rest and rebuild strength, gradually increase activity   Is the patient/caregiver able to teach back the hierarchy of who to call/visit for symptoms/problems? PCP, Specialist, Home health nurse, Urgent Care, ED, 911 Yes   Week 1 call completed? Yes   Wrap up  additional comments Pt reports improving. Pt does reports lethargy knowing this is normal. Groin site healing well. Pt has all medications.   Call end time 1156            Beata MONTE - Registered Nurse

## 2023-09-14 ENCOUNTER — APPOINTMENT (OUTPATIENT)
Dept: CARDIAC REHAB | Facility: HOSPITAL | Age: 78
End: 2023-09-14

## 2023-09-15 ENCOUNTER — APPOINTMENT (OUTPATIENT)
Dept: CARDIAC REHAB | Facility: HOSPITAL | Age: 78
End: 2023-09-15

## 2023-09-18 ENCOUNTER — APPOINTMENT (OUTPATIENT)
Dept: CARDIAC REHAB | Facility: HOSPITAL | Age: 78
End: 2023-09-18

## 2023-09-19 ENCOUNTER — APPOINTMENT (OUTPATIENT)
Dept: CARDIAC REHAB | Facility: HOSPITAL | Age: 78
End: 2023-09-19

## 2023-09-20 ENCOUNTER — APPOINTMENT (OUTPATIENT)
Dept: CARDIAC REHAB | Facility: HOSPITAL | Age: 78
End: 2023-09-20

## 2023-09-21 ENCOUNTER — READMISSION MANAGEMENT (OUTPATIENT)
Dept: CALL CENTER | Facility: HOSPITAL | Age: 78
End: 2023-09-21
Payer: MEDICARE

## 2023-09-21 ENCOUNTER — APPOINTMENT (OUTPATIENT)
Dept: CARDIAC REHAB | Facility: HOSPITAL | Age: 78
End: 2023-09-21

## 2023-09-21 NOTE — OUTREACH NOTE
General Surgery Week 2 Survey      Flowsheet Row Responses   [facility] patient discharged from? [place]   Does the patient have one of the following disease processes/diagnoses(primary or secondary)? General Surgery   Week 2 attempt successful? Yes   Call start time 1623   Call end time 1630   Discharge diagnosis Ventricular tachycardia, monomorphic   Meds reviewed with patient/caregiver? Yes   Is the patient having any side effects they believe may be caused by any medication additions or changes? No   Does the patient have all medications related to this admission filled (includes all antibiotics, pain medications, etc.) Yes   Is the patient taking all medications as directed (includes completed medication regime)? Yes   Does the patient have a follow up appointment scheduled with their surgeon? Yes   Has the patient kept scheduled appointments due by today? Yes   Has home health visited the patient within 72 hours of discharge? N/A   Psychosocial issues? No   Did the patient receive a copy of their discharge instructions? Yes   Nursing interventions Reviewed instructions with patient   What is the patient's perception of their health status since discharge? Improving   Nursing interventions Nurse provided patient education   Is the patient /caregiver able to teach back basic post-op care? Keep incision areas clean,dry and protected   Is the patient/caregiver able to teach back signs and symptoms of incisional infection? Increased redness, swelling or pain at the incisonal site, Increased drainage or bleeding, Incisional warmth, Pus or odor from incision, Fever   Is the patient/caregiver able to teach back steps to recovery at home? Set small, achievable goals for return to baseline health, Rest and rebuild strength, gradually increase activity, Eat a well-balance diet   Is the patient/caregiver able to teach back the hierarchy of who to call/visit for symptoms/problems? PCP, Specialist, Home health  nurse, Urgent Care, ED, 911 Yes   Additional teach back comments states has mild SOB on exertion, hopes to resume Level III cardiac rehab   Week 2 call completed? Yes   Call end time 1630            Connie TINOCO - Registered Nurse

## 2023-09-22 ENCOUNTER — APPOINTMENT (OUTPATIENT)
Dept: CARDIAC REHAB | Facility: HOSPITAL | Age: 78
End: 2023-09-22

## 2023-09-25 ENCOUNTER — APPOINTMENT (OUTPATIENT)
Dept: CARDIAC REHAB | Facility: HOSPITAL | Age: 78
End: 2023-09-25

## 2023-09-25 ENCOUNTER — TELEPHONE (OUTPATIENT)
Dept: CARDIOLOGY | Facility: CLINIC | Age: 78
End: 2023-09-25

## 2023-09-25 DIAGNOSIS — I50.22 CHRONIC SYSTOLIC CONGESTIVE HEART FAILURE: Primary | ICD-10-CM

## 2023-09-25 PROBLEM — I47.20 VT (VENTRICULAR TACHYCARDIA): Status: RESOLVED | Noted: 2023-09-07 | Resolved: 2023-09-25

## 2023-09-25 PROBLEM — I47.29 VENTRICULAR TACHYCARDIA, MONOMORPHIC: Status: RESOLVED | Noted: 2023-05-03 | Resolved: 2023-09-25

## 2023-09-25 PROBLEM — I47.20 V-TACH: Status: RESOLVED | Noted: 2023-08-13 | Resolved: 2023-09-25

## 2023-09-25 PROBLEM — Z95.810 ICD (IMPLANTABLE CARDIOVERTER-DEFIBRILLATOR) IN PLACE: Status: RESOLVED | Noted: 2022-12-08 | Resolved: 2023-09-25

## 2023-09-25 PROBLEM — E87.6 HYPOKALEMIA: Status: RESOLVED | Noted: 2023-05-02 | Resolved: 2023-09-25

## 2023-09-25 NOTE — TELEPHONE ENCOUNTER
Dr. Brian Lewis contacted me regarding this patient who he is seeing in the office today.  Evidently, Mr. Roth has not been feeling well since recent ablation by Dr. Alba.  Dr. Lewis asked whether the patient can come in to be evaluated sooner than presently scheduled.    Maliha, please add to my schedule this or next week.    Tyesha, see if they did labs    H. UBALDO Flanagan MD [facility], [facility]  Interventional and General Cardiology

## 2023-09-25 NOTE — PROGRESS NOTES
"    Cardiology Outpatient Visit      Identification: Jonnie Roth is a 78 y.o. male who resides in [place], [place].     Reason for visit:  HFrEF  CAD  VT      Subjective      Ilda returns to the office sooner than expected at the request of his PCP, Dr. Brian Lewis.  The patient underwent repeat radiofrequency ablation for recurrent monomorphic ventricular tachycardia earlier this month.  Since the procedure, Ilda has not \"bounced back\" well.  He is winded and on 1 occasion days after the ablation had significant orthopnea and slept in a chair.  The patient gets winded walking short distances with the assistance of his rolling walker.  At the time of his ablation, Entresto was discontinued due to hypotension.  His blood pressure has improved somewhat and he wonders whether he might benefit going back on it.    Review of Systems   Cardiovascular:  Positive for dyspnea on exertion.   Respiratory:  Positive for shortness of breath and sleep disturbances due to breathing.    All other systems reviewed and are negative.    No Known Allergies      Current Outpatient Medications   Medication Instructions    amiodarone (PACERONE) 400 mg, Oral, Daily    carvedilol (COREG) 3.125 mg, Oral, 2 Times Daily With Meals    cholecalciferol (VITAMIN D3) 2,000 Units, Oral, 2 Times Daily, OTC    clopidogrel (PLAVIX) 75 mg, Oral, Every Morning    empagliflozin (JARDIANCE) 10 mg, Oral, Daily    eplerenone (INSPRA) 12.5 mg, Oral, Every 24 Hours Scheduled    L-TRYPTOPHAN PO 3,000 mg, Oral, Nightly    levothyroxine (SYNTHROID) 50 mcg, Oral, Daily    Magnesium Oxide -Mg Supplement 500 mg, Oral, Daily    melatonin 10 mg, Oral, Nightly, OTC    metFORMIN (GLUCOPHAGE) 1,000 mg, Oral, 2 times daily    Multiple Vitamins-Minerals (ICAPS AREDS 2 PO) 1 tablet, Oral, 2 times daily, OTC    Niacin, Antihyperlipidemic, (NIACIN ER, ANTIHYPERLIPIDEMIC, PO) 500 mg, Oral, Daily    nitroglycerin (NITROSTAT) 0.4 mg, Sublingual, Every 5 Minutes PRN, Take no " "more than 3 doses in 15 minutes.    pantoprazole (PROTONIX) 40 mg, Oral, Every Morning    polyethylene glycol (MIRALAX) packet 17 g, Oral, Every Other Day, OTC    rivaroxaban (XARELTO) 10 mg, Oral, Daily    sacubitril-valsartan (Entresto) 24-26 MG tablet 1 tablet, Oral, 2 Times Daily    torsemide (DEMADEX) 10 mg, Oral, Daily    Vitamin B-12 2,500 mcg, Oral, Daily, OTC         Objective     /72 (BP Location: Left arm, Patient Position: Sitting)   Pulse 72   Ht 185.4 cm (73\")   Wt 73.4 kg (161 lb 12.8 oz)   SpO2 98%   BMI 21.35 kg/m²       Constitutional:       Appearance: Healthy appearance.   Eyes:      General: No scleral icterus.  Neck:      Thyroid: No thyroid mass.      Vascular: No carotid bruit or JVD. JVD normal.   Pulmonary:      Effort: Pulmonary effort is normal.      Breath sounds: Normal breath sounds.   Cardiovascular:      Normal rate. Regular rhythm.      Murmurs: There is no murmur.      No gallop.    Edema:     Peripheral edema absent.   Skin:     General: Skin is warm. There is no cyanosis.   Neurological:      General: No focal deficit present.      Mental Status: Alert.   Psychiatric:         Attention and Perception: Attention normal.       Result Review  (reviewed with patient):        ECG 12 Lead    Date/Time: 9/26/2023 9:53 AM  Performed by: Michele Flanagan IV, MD  Authorized by: Michele Flanagan IV, MD   Comparison: compared with previous ECG from 8/15/2023  Similar to previous ECG  BPM: 73    Clinical impression: abnormal EKG  Comments: Ventricular pacing.  Underlying rhythm unclear       Labs (9/15/2023):    Lab Results   Component Value Date    GLUCOSE 130 (H) 09/08/2023    BUN 26 (H) 09/08/2023    CREATININE 1.10 09/08/2023    EGFR 68.3 09/08/2023    BCR 23.6 09/08/2023    K 4.2 09/08/2023    CO2 24.0 09/08/2023    CALCIUM 8.5 (L) 09/08/2023    ALBUMIN 3.5 08/14/2023    BILITOT 0.3 08/14/2023    AST 33 08/14/2023    ALT 33 08/14/2023     Lab Results "   Component Value Date    WBC 10.23 09/08/2023    HGB 11.8 (L) 09/08/2023    HCT 38.6 09/08/2023    MCV 88.3 09/08/2023     09/08/2023     Lab Results   Component Value Date    CHOL 116 09/10/2022    CHLPL 116 01/30/2014    TRIG 111 09/10/2022    HDL 43 09/10/2022    LDL 53 09/10/2022     Lab Results   Component Value Date    HGBA1C 5.60 08/31/2023           Assessment     Diagnoses and all orders for this visit:    1. Chronic HFrEF (heart failure with reduced ejection fraction) (Primary)  Overview:  Large anterior MI with LVEF 35%, 1995  Echo (11/8/2019): LVEF 30%.  Akinesis/dyskinesis of the mid to distal anterior wall and apex.  Echo (9/10/2022): LVEF < 20%.  Dilated left ventricle with diffuse hypokinesis and a kinesis of the mid to distal anterior segments and apex  Echo (5/2/2023): LVEF <20%.  Dilated left ventricle.  Anterior and apical akinesis.  Mild to moderate mitral regurgitation    Assessment & Plan:  Stage C HFrEF with NYHA class III symptoms  Ischemic etiology  Will reattempt Entresto 24-26 mg.  Start by taking nightly  Reduce carvedilol to 3.125 mg twice daily  Continue empagliflozin, eplerenone  Obtain proBNP, CMP today    Orders:  -     proBNP; Future  -     sacubitril-valsartan (Entresto) 24-26 MG tablet; Take 1 tablet by mouth 2 (Two) Times a Day.  Dispense: 180 tablet; Refill: 3  -     CBC (No Diff); Future  -     carvedilol (COREG) 3.125 MG tablet; Take 1 tablet by mouth 2 (Two) Times a Day With Meals.  Dispense: 180 tablet; Refill: 3  -     Comprehensive Metabolic Panel; Future  -     Basic Metabolic Panel; Future    2. Coronary artery disease involving native coronary artery of native heart with angina pectoris  Overview:  Cardiac catheterization for anterior MI (4/1995):  PCI of the proximal/mid LAD, 4/1995.  LVEF 35%  Cardiac catheterization for NSTEMI (2/1/2014): Moderate RCA stenosis.  Widely patent LAD stents.  LVEF 25%.  Cardiac catheterization by Dr. Ayala (4/20/2015): PCI of  distal LAD.  Cardiac catheterization (1/16/2017): Nonobstructive CAD.  LVEF <20%.  Normal hemodynamics.    Assessment & Plan:  No angina   Continue antiplatelet, beta-blocker  Resume statin    Orders:  -     clopidogrel (PLAVIX) 75 MG tablet; Take 1 tablet by mouth Every Morning.  Dispense: 90 tablet; Refill: 3  -     nitroglycerin (NITROSTAT) 0.4 MG SL tablet; Place 1 tablet under the tongue Every 5 (Five) Minutes As Needed for Chest Pain. Take no more than 3 doses in 15 minutes.  Dispense: 25 tablet; Refill: 5  -     carvedilol (COREG) 3.125 MG tablet; Take 1 tablet by mouth 2 (Two) Times a Day With Meals.  Dispense: 180 tablet; Refill: 3    3. Recurrent monomorphic ventricular tachycardia  Overview:  Sudden cardiac death with primary VF status post ICD, September 2020  Multiple ICD discharges for VF/flutter/tachycardia  Amiodarone therapy initiated 2012 and 2015  EP study with radiofrequency ablation of large LV scar, 5/21/2015  [facility] admission for recurrent VT/VF status post shock after ineffective ATP, 9/10/2022  [facility] admission for recurrent ventricular arrhythmia on amiodarone in the setting of influenza, 12/8/2022  ER study and RFA of ischemic VT x2 and left ventricular scar substrate modification by Ferdinand Alba, 9/7/2023    Assessment & Plan:  No clinical recurrence since recent ablation  Continue amiodarone 400 mg daily  Managed by Dr. Alba    Orders:  -     amiodarone (PACERONE) 400 MG tablet; Take 1 tablet by mouth Daily.  Dispense: 90 tablet; Refill: 3    4. Hyperlipidemia LDL goal <70  Overview:  High intensity statin therapy indicated given the presence of CAD    Assessment & Plan:  Obtain lipids and CMP today  Resume rosuvastatin at 10 mg daily    Orders:  -     Lipid Panel; Future          Plan   Reduce carvedilol to 3.125 mg twice daily  Reattempt Entresto 24-26 mg.  Patient advised to start with evening dose for couple days prior to twice daily dosing.  Discontinue potassium  supplement  Obtain proBNP, CMP, lipids, CBC today  Obtain BMP in 3 weeks at PCP visit      Follow-up   Thi Jiang office visit already scheduled for 12/2023        Bro Flanagan MD, FACC, [facility]  9/26/2023

## 2023-09-26 ENCOUNTER — LAB (OUTPATIENT)
Dept: LAB | Facility: HOSPITAL | Age: 78
End: 2023-09-26
Payer: MEDICARE

## 2023-09-26 ENCOUNTER — APPOINTMENT (OUTPATIENT)
Dept: CARDIAC REHAB | Facility: HOSPITAL | Age: 78
End: 2023-09-26

## 2023-09-26 ENCOUNTER — OFFICE VISIT (OUTPATIENT)
Dept: CARDIOLOGY | Facility: CLINIC | Age: 78
End: 2023-09-26
Payer: MEDICARE

## 2023-09-26 VITALS
OXYGEN SATURATION: 98 % | BODY MASS INDEX: 21.44 KG/M2 | WEIGHT: 161.8 LBS | DIASTOLIC BLOOD PRESSURE: 72 MMHG | HEIGHT: 73 IN | HEART RATE: 72 BPM | SYSTOLIC BLOOD PRESSURE: 126 MMHG

## 2023-09-26 DIAGNOSIS — E78.5 HYPERLIPIDEMIA LDL GOAL <70: ICD-10-CM

## 2023-09-26 DIAGNOSIS — I47.20 VENTRICULAR TACHYCARDIA: ICD-10-CM

## 2023-09-26 DIAGNOSIS — I50.22 CHRONIC HFREF (HEART FAILURE WITH REDUCED EJECTION FRACTION): Primary | ICD-10-CM

## 2023-09-26 DIAGNOSIS — I25.119 CORONARY ARTERY DISEASE INVOLVING NATIVE CORONARY ARTERY OF NATIVE HEART WITH ANGINA PECTORIS: ICD-10-CM

## 2023-09-26 DIAGNOSIS — I50.22 CHRONIC HFREF (HEART FAILURE WITH REDUCED EJECTION FRACTION): ICD-10-CM

## 2023-09-26 PROBLEM — N28.1 BILATERAL RENAL CYSTS: Status: RESOLVED | Noted: 2022-12-08 | Resolved: 2023-09-26

## 2023-09-26 PROBLEM — Z95.810 CARDIAC RESYNCHRONIZATION THERAPY DEFIBRILLATOR (CRT-D) IN PLACE: Status: ACTIVE | Noted: 2023-05-06

## 2023-09-26 PROBLEM — I25.5 ISCHEMIC CARDIOMYOPATHY: Status: RESOLVED | Noted: 2022-12-09 | Resolved: 2023-09-26

## 2023-09-26 PROBLEM — S06.5XAA POST-TRAUMATIC SUBDURAL HEMATOMA: Status: RESOLVED | Noted: 2022-09-10 | Resolved: 2023-09-26

## 2023-09-26 PROBLEM — Z45.02 ICD (IMPLANTABLE CARDIOVERTER-DEFIBRILLATOR) DISCHARGE: Status: RESOLVED | Noted: 2023-04-26 | Resolved: 2023-09-26

## 2023-09-26 LAB
ALBUMIN SERPL-MCNC: 4.1 G/DL (ref 3.5–5.2)
ALBUMIN/GLOB SERPL: 1.4 G/DL
ALP SERPL-CCNC: 105 U/L (ref 39–117)
ALT SERPL W P-5'-P-CCNC: 65 U/L (ref 1–41)
ANION GAP SERPL CALCULATED.3IONS-SCNC: 14.5 MMOL/L (ref 5–15)
AST SERPL-CCNC: 55 U/L (ref 1–40)
BILIRUB SERPL-MCNC: 0.4 MG/DL (ref 0–1.2)
BUN SERPL-MCNC: 32 MG/DL (ref 8–23)
BUN/CREAT SERPL: 25.2 (ref 7–25)
CALCIUM SPEC-SCNC: 9.2 MG/DL (ref 8.6–10.5)
CHLORIDE SERPL-SCNC: 100 MMOL/L (ref 98–107)
CHOLEST SERPL-MCNC: 202 MG/DL (ref 0–200)
CO2 SERPL-SCNC: 21.5 MMOL/L (ref 22–29)
CREAT SERPL-MCNC: 1.27 MG/DL (ref 0.76–1.27)
DEPRECATED RDW RBC AUTO: 39.7 FL (ref 37–54)
EGFRCR SERPLBLD CKD-EPI 2021: 57.5 ML/MIN/1.73
ERYTHROCYTE [DISTWIDTH] IN BLOOD BY AUTOMATED COUNT: 13.1 % (ref 12.3–15.4)
GLOBULIN UR ELPH-MCNC: 3 GM/DL
GLUCOSE SERPL-MCNC: 110 MG/DL (ref 65–99)
HCT VFR BLD AUTO: 36.8 % (ref 37.5–51)
HDLC SERPL-MCNC: 46 MG/DL (ref 40–60)
HGB BLD-MCNC: 11.7 G/DL (ref 13–17.7)
LDLC SERPL CALC-MCNC: 129 MG/DL (ref 0–100)
LDLC/HDLC SERPL: 2.74 {RATIO}
MCH RBC QN AUTO: 26.5 PG (ref 26.6–33)
MCHC RBC AUTO-ENTMCNC: 31.8 G/DL (ref 31.5–35.7)
MCV RBC AUTO: 83.3 FL (ref 79–97)
NT-PROBNP SERPL-MCNC: 1976 PG/ML (ref 0–1800)
PLATELET # BLD AUTO: 459 10*3/MM3 (ref 140–450)
PMV BLD AUTO: 10 FL (ref 6–12)
POTASSIUM SERPL-SCNC: 4.4 MMOL/L (ref 3.5–5.2)
PROT SERPL-MCNC: 7.1 G/DL (ref 6–8.5)
RBC # BLD AUTO: 4.42 10*6/MM3 (ref 4.14–5.8)
SODIUM SERPL-SCNC: 136 MMOL/L (ref 136–145)
TRIGL SERPL-MCNC: 149 MG/DL (ref 0–150)
VLDLC SERPL-MCNC: 27 MG/DL (ref 5–40)
WBC NRBC COR # BLD: 8.7 10*3/MM3 (ref 3.4–10.8)

## 2023-09-26 PROCEDURE — 80061 LIPID PANEL: CPT

## 2023-09-26 PROCEDURE — 99214 OFFICE O/P EST MOD 30 MIN: CPT | Performed by: INTERNAL MEDICINE

## 2023-09-26 PROCEDURE — 85027 COMPLETE CBC AUTOMATED: CPT

## 2023-09-26 PROCEDURE — 80053 COMPREHEN METABOLIC PANEL: CPT

## 2023-09-26 PROCEDURE — 83880 ASSAY OF NATRIURETIC PEPTIDE: CPT

## 2023-09-26 PROCEDURE — 36415 COLL VENOUS BLD VENIPUNCTURE: CPT

## 2023-09-26 RX ORDER — AMIODARONE HYDROCHLORIDE 400 MG/1
400 TABLET ORAL DAILY
Qty: 90 TABLET | Refills: 3 | Status: SHIPPED | OUTPATIENT
Start: 2023-09-26

## 2023-09-26 RX ORDER — NITROGLYCERIN 0.4 MG/1
0.4 TABLET SUBLINGUAL
Qty: 25 TABLET | Refills: 5 | Status: SHIPPED | OUTPATIENT
Start: 2023-09-26

## 2023-09-26 RX ORDER — SACUBITRIL AND VALSARTAN 24; 26 MG/1; MG/1
1 TABLET, FILM COATED ORAL 2 TIMES DAILY
Qty: 180 TABLET | Refills: 3 | Status: SHIPPED | OUTPATIENT
Start: 2023-09-26

## 2023-09-26 RX ORDER — CARVEDILOL 3.12 MG/1
3.12 TABLET ORAL 2 TIMES DAILY WITH MEALS
Qty: 180 TABLET | Refills: 3 | Status: SHIPPED | OUTPATIENT
Start: 2023-09-26

## 2023-09-26 RX ORDER — CLOPIDOGREL BISULFATE 75 MG/1
75 TABLET ORAL EVERY MORNING
Qty: 90 TABLET | Refills: 3 | Status: SHIPPED | OUTPATIENT
Start: 2023-09-26

## 2023-09-26 NOTE — ASSESSMENT & PLAN NOTE
No clinical recurrence since recent ablation  Continue amiodarone 400 mg daily  Managed by Dr. Alba

## 2023-09-26 NOTE — ASSESSMENT & PLAN NOTE
Stage C HFrEF with NYHA class III symptoms  Will reattempt Entresto 24-26 mg.  Start by taking nightly  Reduce carvedilol to 3.125 mg twice daily  Continue empagliflozin, eplerenone  Obtain proBNP, CMP today

## 2023-09-26 NOTE — ASSESSMENT & PLAN NOTE
Stage C HFrEF with NYHA class III symptoms  Ischemic etiology  Will reattempt Entresto 24-26 mg.  Start by taking nightly  Reduce carvedilol to 3.125 mg twice daily  Continue empagliflozin, eplerenone  Obtain proBNP, CMP today

## 2023-09-26 NOTE — PROGRESS NOTES
Rosuvastatin 10 mg daily.  Also tell him that his heart failure lab is relatively stable from previous values.  Continue plan as outlined today

## 2023-09-27 ENCOUNTER — TELEPHONE (OUTPATIENT)
Dept: CARDIOLOGY | Facility: CLINIC | Age: 78
End: 2023-09-27
Payer: MEDICARE

## 2023-09-27 ENCOUNTER — APPOINTMENT (OUTPATIENT)
Dept: CARDIAC REHAB | Facility: HOSPITAL | Age: 78
End: 2023-09-27

## 2023-09-27 RX ORDER — ROSUVASTATIN CALCIUM 10 MG/1
10 TABLET, COATED ORAL DAILY
Qty: 90 TABLET | Refills: 3 | Status: SHIPPED | OUTPATIENT
Start: 2023-09-27

## 2023-09-27 NOTE — TELEPHONE ENCOUNTER
----- Message from Michele Flanagan IV, MD sent at 9/26/2023  4:32 PM EDT -----  Rosuvastatin 10 mg daily.  Also tell him that his heart failure lab is relatively stable from previous values.  Continue plan as outlined today

## 2023-09-28 ENCOUNTER — APPOINTMENT (OUTPATIENT)
Dept: CARDIAC REHAB | Facility: HOSPITAL | Age: 78
End: 2023-09-28

## 2023-09-28 RX ORDER — POTASSIUM CHLORIDE 750 MG/1
CAPSULE, EXTENDED RELEASE ORAL
Qty: 60 CAPSULE | Refills: 11 | OUTPATIENT
Start: 2023-09-28

## 2023-09-29 ENCOUNTER — APPOINTMENT (OUTPATIENT)
Dept: CARDIAC REHAB | Facility: HOSPITAL | Age: 78
End: 2023-09-29

## 2023-09-29 ENCOUNTER — READMISSION MANAGEMENT (OUTPATIENT)
Dept: CALL CENTER | Facility: HOSPITAL | Age: 78
End: 2023-09-29
Payer: MEDICARE

## 2023-09-29 NOTE — OUTREACH NOTE
General Surgery Week 3 Survey      Flowsheet Row Responses   [facility] patient discharged from? [place]   Does the patient have one of the following disease processes/diagnoses(primary or secondary)? General Surgery   Week 3 attempt successful? Yes   Call start time 1331   Call end time 1333   Person spoke with today (if not patient) and relationship Spouse- Trena Gonsales   Does the patient have a follow up appointment scheduled with their surgeon? Yes   Comments patient has had all fu visits   Has home health visited the patient within 72 hours of discharge? N/A   Psychosocial issues? No   Did the patient receive a copy of their discharge instructions? Yes   Nursing interventions Reviewed instructions with patient   What is the patient's perception of their health status since discharge? Improving   Nursing interventions Nurse provided patient education   Is the patient/caregiver able to teach back signs and symptoms of incisional infection? Increased redness, swelling or pain at the incisonal site, Increased drainage or bleeding, Incisional warmth, Pus or odor from incision, Fever   Week 3 call completed? Yes   Graduated Yes   Is the patient interested in additional calls from an ambulatory ? No   Would this patient benefit from a Referral to [facility] Social Work? No   Wrap up additional comments Patient reports doing very well all incisions are healed. he has seen his providers as advised. no concerns or questions noted.   Call end time 1333            Beata PRECIADO - Registered Nurse

## 2023-10-02 ENCOUNTER — TREATMENT (OUTPATIENT)
Dept: CARDIAC REHAB | Facility: HOSPITAL | Age: 78
End: 2023-10-02

## 2023-10-02 DIAGNOSIS — Z00.00 PREVENTATIVE HEALTH CARE: Primary | ICD-10-CM

## 2023-10-03 ENCOUNTER — TREATMENT (OUTPATIENT)
Dept: CARDIAC REHAB | Facility: HOSPITAL | Age: 78
End: 2023-10-03

## 2023-10-03 DIAGNOSIS — Z00.00 PREVENTATIVE HEALTH CARE: Primary | ICD-10-CM

## 2023-10-04 ENCOUNTER — TELEPHONE (OUTPATIENT)
Dept: CARDIOLOGY | Facility: CLINIC | Age: 78
End: 2023-10-04
Payer: MEDICARE

## 2023-10-04 NOTE — TELEPHONE ENCOUNTER
Called patient to discuss remote transmission. Patient states he was initially experiencing some dizziness after ICD shock this morning but is feeling better now. I reviewed transmission with Dr. Sanchez. We will continue Amiodarone 400mg daily for now. Patient did not tolerate Mexiletine in the past. Would like to get him in for a follow up within the next couple weeks to see if adjustments need to be made to his ICD ATP settings.

## 2023-10-04 NOTE — TELEPHONE ENCOUNTER
Per Tracy/SJM Merlin remote cardiac device transmission received today, 10/4/2023:     This morning, @ 4:36 AM, appropriate ICD therapy for VT-10 ATP attempts followed by 1 successful shock.   Duration: 1 min, 52 seconds.  V-rates: 118 bpm.     Pt reports he's an early riser & was going to get out of bed when he felt funny & decided to lay back down. Pt states he was aware of the shock, did not lose consciousness. Pt denies recent COVID or other recent illnesses. Pt reports compliance with all Rx. Pt denies any residual side effects or symptoms.     Report in [facility].

## 2023-10-05 ENCOUNTER — TREATMENT (OUTPATIENT)
Dept: CARDIAC REHAB | Facility: HOSPITAL | Age: 78
End: 2023-10-05

## 2023-10-05 DIAGNOSIS — Z00.00 PREVENTATIVE HEALTH CARE: Primary | ICD-10-CM

## 2023-10-06 ENCOUNTER — TREATMENT (OUTPATIENT)
Dept: CARDIAC REHAB | Facility: HOSPITAL | Age: 78
End: 2023-10-06

## 2023-10-06 DIAGNOSIS — Z00.00 PREVENTATIVE HEALTH CARE: Primary | ICD-10-CM

## 2023-10-09 ENCOUNTER — TREATMENT (OUTPATIENT)
Dept: CARDIAC REHAB | Facility: HOSPITAL | Age: 78
End: 2023-10-09

## 2023-10-09 DIAGNOSIS — Z00.00 PREVENTATIVE HEALTH CARE: Primary | ICD-10-CM

## 2023-10-10 ENCOUNTER — TREATMENT (OUTPATIENT)
Dept: CARDIAC REHAB | Facility: HOSPITAL | Age: 78
End: 2023-10-10

## 2023-10-10 DIAGNOSIS — Z00.00 PREVENTATIVE HEALTH CARE: Primary | ICD-10-CM

## 2023-10-12 ENCOUNTER — TREATMENT (OUTPATIENT)
Dept: CARDIAC REHAB | Facility: HOSPITAL | Age: 78
End: 2023-10-12

## 2023-10-12 DIAGNOSIS — Z00.00 PREVENTATIVE HEALTH CARE: Primary | ICD-10-CM

## 2023-10-13 ENCOUNTER — TREATMENT (OUTPATIENT)
Dept: CARDIAC REHAB | Facility: HOSPITAL | Age: 78
End: 2023-10-13

## 2023-10-13 DIAGNOSIS — Z00.00 PREVENTATIVE HEALTH CARE: Primary | ICD-10-CM

## 2023-10-16 ENCOUNTER — TREATMENT (OUTPATIENT)
Dept: CARDIAC REHAB | Facility: HOSPITAL | Age: 78
End: 2023-10-16

## 2023-10-16 DIAGNOSIS — Z00.00 PREVENTATIVE HEALTH CARE: Primary | ICD-10-CM

## 2023-10-17 ENCOUNTER — TREATMENT (OUTPATIENT)
Dept: CARDIAC REHAB | Facility: HOSPITAL | Age: 78
End: 2023-10-17

## 2023-10-17 DIAGNOSIS — Z00.00 PREVENTATIVE HEALTH CARE: Primary | ICD-10-CM

## 2023-10-18 ENCOUNTER — TREATMENT (OUTPATIENT)
Dept: CARDIAC REHAB | Facility: HOSPITAL | Age: 78
End: 2023-10-18

## 2023-10-18 ENCOUNTER — OFFICE VISIT (OUTPATIENT)
Dept: CARDIOLOGY | Facility: CLINIC | Age: 78
End: 2023-10-18
Payer: MEDICARE

## 2023-10-18 VITALS
WEIGHT: 167.6 LBS | BODY MASS INDEX: 22.21 KG/M2 | HEART RATE: 97 BPM | OXYGEN SATURATION: 98 % | DIASTOLIC BLOOD PRESSURE: 62 MMHG | HEIGHT: 73 IN | SYSTOLIC BLOOD PRESSURE: 108 MMHG

## 2023-10-18 DIAGNOSIS — Z00.00 PREVENTATIVE HEALTH CARE: Primary | ICD-10-CM

## 2023-10-18 DIAGNOSIS — I50.22 CHRONIC HFREF (HEART FAILURE WITH REDUCED EJECTION FRACTION): ICD-10-CM

## 2023-10-18 DIAGNOSIS — I47.20 VENTRICULAR TACHYCARDIA: Primary | ICD-10-CM

## 2023-10-18 NOTE — PROGRESS NOTES
Jonnie Roth  1945  [phone]    10/18/2023    [facility] CARDIOLOGY     Referring Provider: No ref. provider found     Brian Lewis MD  [address] [id number]    Chief Complaint   Patient presents with    Ventricular tachycardia, monomorphic     Problem List:   Sudden cardiac death with primary ventricular fibrillation status post Pacesetter [place] ICD, [place], [place] in September 2000:   ICD generator change out with upgrade to a biventricular pacemaker ICD on 12/17/2007, [employer] device.  ICD discharge, 08/09/2012, secondary to ventricular flutter with initiation of amiodarone therapy.   Hospitalization, 02/01/2014, secondary to ICD discharge for ventricular tachycardia with subsequent discontinuation of Coreg and initiation of sotalol therapy.   Hospitalization, February 2014, secondary to ICD discharge for VT with subsequent discontinuation of Coreg and initiation of sotalol.  Echocardiogram, 04/07/2015: EF less than 20%.  Hospitalization secondary to VF and ICD shocks, 04/18/2015.  NIPS procedure with defibrillation threshold testing for VF and noninvasive program stimulation, 04/18/2015.   Initiation of mexiletine for recurrent ventricular tachycardia with ICD shocks on 05/05/2015 with amiodarone load, recurrent VT and ICD shocks with hospitalization 05/16/2015-05/18/2015.   EP study with RFA of large anterior left ventricular scar extending from mid-left ventricular wall down apex by Dr. Ferdinand Alba, 05/21/2015.  ICD generator change 10/2017  Echo 11/19 LVEF 30%, mild TR  ICD generator change 07/2021  ICD shocks for VT/VF 9/2022, ICD reprogrammed with less ATP attemps, started on Amiodarone  ICD shocks for VT/VF in setting of Flu A/dehydration 12/2022  ICD shocks x 2  for VT on 4/26/2023, Amiodarone increased, Mexiletine added  ER presentation for VT with ICD shock x 1 at home, external ECV in ED for VT at 138 bpm with hypotension  5/2/2023  Echo 5/2023: EF 20%, anterior lateral segments and apex are akinetic, LA cavity dilated, LA moderately increased, mild to moderate MR  VT 8/13/23, HR 130s, defibrillated in the [facility] ED  EP study + VT ablation 9/7/23 radiofrequency ablation of an ischemic ventricular tachycardia x 2 and left ventricular scar substrate modification.   VT 10/4/23: VT- 10 ATP attempts followed by 1 successful shock                Ischemic heart disease:  Remote progressive angina pectoris/acute extensive anterolateral myocardial infarction/delayed presentation with thrombolysis/severe 3-vessel coronary atherosclerosis with severe single vessel involvement/PTCA with intracoronary stent deployment proximal-mid segment LAD/moderate-severe compensated left ventricular dysfunction. LVEF (0.35)/abnormal positive signal averaged EKG/oral anticoagulation, April 1995.  Remote NYHA class I-II angina pectoris/class III CHF/abnormal quantitative SPECT gated Cardiolite GXT, July 1998.  Stable persistent MUGA, LVEF (0.33, January 1999 and January 2000).   Residual NYHA class I angina pectoris/CHF with reduced MUGA scan: LVEF (0.25), April 2002.  Left heart catheterization with distal circumflex disease: EF 20%, September 2002.   MUGA in May 2003: EF 32%.   Sestamibi GXT on 04/08/2005: No ischemia. EF 29%.   Residual NYHA class I angina pectoris/CHF with reduced acceptable MUGA scan: EF (0.32) and acceptable Pacesetter PCD interrogation/reprogramming, May 2003 with interrogation, September 2004.  Echocardiogram, September 2009 with EF 30%  Left heart catheterization by Dr. Bhupinder Gonzalez, August 2010, with LVEF of 35%, with 3-vessel native coronary artery disease, 95% mid-LAD status post PTCA and stenting with two 3 mm stents by Dr. Llanes.  Echocardiogram, 06/07/2012: Left ventricular ejection fraction of 30%.  Hospitalization, 02/01/2014, for non-ST elevation MI, left heart catheterization by Dr. Ayala that demonstrated 60% mid stenosis of  the right coronary artery that remains unchanged compared to previous, widely patent stents of the LAD with severe LV dysfunction of approximately 25%.   Left heart catheterization status post drug-eluting stent to the distal LAD and mid-RCA with an EF of 20% to 25% by Dr. Diego Ayala, 04/20/2015.   Left heart catheterization 1/16/17; nonobstructive CAD with patent previously placed stents, dilated cardiomyopathy with severe LV systolic dysfunction, EF less than 20%, normal hemodynamics, recommendations for continued medical therapy and risk factor, evaluation for noncardiac etiology of symptoms.  Residual CCS Class I/II angina pectoris/NYHA Class II exertional dyspnea and fatigue syndrome, summer 2017  Residual CCS 0 angina pectoris/NYHA class I-II exertional dyspnea and fatigue, February 2018, September 2018, March 2019.  Echocardiogram 5/2/2023: LV dilated, LV systolic function severely decreased, LVEF appears less than 20%, anterior and anterior lateral segments and apex are akinetic, LA cavity dilated, LA volume moderately increased, mild to moderate regurgitation present  Dyslipidemia.  Status post remote operations.  Remote chronic tobacco use/abnormal chest x-ray with stable abnormal thoracic  CT scan without contrast demonstrating bilateral diffuse perimeter scarring and fibrosis with scattered subcentimeter noncalcified nodules (unchanged from February 2016), March 2017.  Left bundle branch block.  Burks trauma with hematuria with urosepsis requiring hospitalization, May 2015.  Pulmonary embolism, spring 2015.   Remote apparent hypothyroidism/replacement therapy - data deficit, January 2016.  Remote diagnosis of obstructive sleep apnea with CPAP use, 2017.  Bilateral cataract extraction November 2018, February 2019    Allergies  No Known Allergies    Current Medications    Current Outpatient Medications:     amiodarone (PACERONE) 400 MG tablet, Take 1 tablet by mouth Daily., Disp: 90 tablet, Rfl: 3     carvedilol (COREG) 3.125 MG tablet, Take 1 tablet by mouth 2 (Two) Times a Day With Meals. (Patient taking differently: Take 2 tablets by mouth 2 (Two) Times a Day With Meals.), Disp: 180 tablet, Rfl: 3    cholecalciferol (VITAMIN D3) 25 MCG (1000 UT) tablet, Take 2 tablets by mouth 2 (Two) Times a Day. OTC, Disp: , Rfl:     clopidogrel (PLAVIX) 75 MG tablet, Take 1 tablet by mouth Every Morning., Disp: 90 tablet, Rfl: 3    Cyanocobalamin (Vitamin B-12) 5000 MCG tablet dispersible, Take 2,500 mcg by mouth Daily. OTC, Disp: , Rfl:     empagliflozin (JARDIANCE) 10 MG tablet tablet, Take 1 tablet by mouth Daily., Disp: 90 tablet, Rfl: 3    eplerenone (INSPRA) 25 MG tablet, Take 0.5 tablets by mouth Daily., Disp: 90 tablet, Rfl: 0    L-TRYPTOPHAN PO, Take 3,000 mg by mouth Every Night., Disp: , Rfl:     levothyroxine (Synthroid) 50 MCG tablet, Take 1 tablet by mouth Daily., Disp: 30 tablet, Rfl: 6    Magnesium Oxide -Mg Supplement 500 MG tablet, Take 1 tablet by mouth Daily., Disp: , Rfl:     melatonin 5 MG tablet tablet, Take 2 tablets by mouth Every Night. OTC, Disp: , Rfl:     metFORMIN (GLUCOPHAGE) 1000 MG tablet, Take 1 tablet by mouth 2 (two) times a day., Disp: , Rfl:     Multiple Vitamins-Minerals (ICAPS AREDS 2 PO), Take 1 tablet by mouth 2 (two) times a day. OTC, Disp: , Rfl:     Niacin, Antihyperlipidemic, (NIACIN ER, ANTIHYPERLIPIDEMIC, PO), Take 500 mg by mouth Daily., Disp: , Rfl:     nitroglycerin (NITROSTAT) 0.4 MG SL tablet, Place 1 tablet under the tongue Every 5 (Five) Minutes As Needed for Chest Pain. Take no more than 3 doses in 15 minutes., Disp: 25 tablet, Rfl: 5    pantoprazole (PROTONIX) 40 MG EC tablet, Take 1 tablet by mouth Every Morning., Disp: , Rfl:     polyethylene glycol (MIRALAX) packet, Take 17 g by mouth Every Other Day. OTC, Disp: , Rfl:     rivaroxaban (Xarelto) 10 MG tablet, Take 1 tablet by mouth Daily., Disp: 90 tablet, Rfl: 3    rosuvastatin (CRESTOR) 10 MG tablet, Take 1 tablet  "by mouth Daily., Disp: 90 tablet, Rfl: 3    sacubitril-valsartan (Entresto) 24-26 MG tablet, Take 1 tablet by mouth 2 (Two) Times a Day. (Patient taking differently: Take 1 tablet by mouth Daily.), Disp: 180 tablet, Rfl: 3    torsemide (DEMADEX) 10 MG tablet, Take 1 tablet by mouth Daily., Disp: 30 tablet, Rfl: 6    History of Present Illness     Pt presents for follow up of VT and CHF. Since we last saw the pt, he had an episode of VT 10/4/23 at home with heart rate 117 bpm, he received 10 ATP attempts and 1 successful shock. He has had no further VT. He does report an episodes of dizziness last night with increased SOB however he woke up this morning feeling ok. He does report he recently restarted low dose Entresto and BP has been borderline hypotensive at times. He continues to have baseline SOB with exertion. He had CP right after the VT ablation but has had no further CP.  He feels however that he is slowly improving as he gets further out from the ablation.    ROS:  General:  + fatigue, -weight gain or loss  Cardiovascular:  Denies CP, PND, syncope, near syncope, +edema + palpitations.  Pulmonary:  + LU, -cough, or wheezing      Vitals:    10/18/23 0955   BP: 108/62   BP Location: Left arm   Patient Position: Sitting   Pulse: 97   SpO2: 98%   Weight: 76 kg (167 lb 9.6 oz)   Height: 185.4 cm (73\")     Body mass index is 22.11 kg/m².  PE:  General: NAD  Neck: no JVD, no carotid bruits, no TM  Heart regular rhythm normal S1-S2.  There is an S3 and S4.  MR murmur appreciated  Lungs: CTA, no wheezes, rhonchi, or rales  Abd: soft, non-tender, NL BS  Ext: No musculoskeletal deformities, no edema, cyanosis, or clubbing  Psych: normal mood and affect    Diagnostic Data:    ICD Manual Interrogation:   RA paced >99% Biv Paced 99%. Normal thresholds. LV threshold 2.25 v @ 1.5 ms.   9.4 months on battery. VT x1 10/4/23  bpm, received ATP x10 and 1 successful shock, No afib    Procedures      1. Recurrent " monomorphic ventricular tachycardia    2. Chronic HFrEF (heart failure with reduced ejection fraction)          Plan:  VT:  -Recurrent episodes of VT with recurrent admissions, now s/p EP study + VT ablation 9/7/23 radiofrequency ablation of an ischemic ventricular tachycardia x 2 and left ventricular scar substrate modification  -Episode of VT heart rate 117 bpm at home 10/4/23, 10 ATP attempts and 1 successful shock.  ICD was subsequently reprogrammed accordingly for more aggressive ATP.  No recurrent episodes since that time.  No changes today.  -Currently on Amiodarone 400mg daily. Did not tolerate Mexiletine in the past. No changes at this time.     Chronic CHF/ICM:  -Class III symptoms  -Echocardiogram 5/2/2023: LV dilated, LV systolic function severely decreased, LVEF appears less than 20%, anterior and anterior lateral segments and apex are akinetic, LA cavity dilated, LA volume moderately increased, mild to moderate regurgitation present  -Coreg, Jardiance, Torsemide, Inspra   -Low dose Entresto recently restarted by Dr. Flanagan, monitor of BP     F/up in 6 months    Scribed for Ferdinand Alba MD by CHENTE Ashton. 10/18/2023  10:54 EDT    I, Ferdinand Alba MD, personally performed the services described in this documentation as scribed by the above named individual in my presence, and it is both accurate and complete.  10/18/2023  10:56 EDT

## 2023-10-20 ENCOUNTER — TREATMENT (OUTPATIENT)
Dept: CARDIAC REHAB | Facility: HOSPITAL | Age: 78
End: 2023-10-20

## 2023-10-20 DIAGNOSIS — Z00.00 PREVENTATIVE HEALTH CARE: Primary | ICD-10-CM

## 2023-10-23 ENCOUNTER — TREATMENT (OUTPATIENT)
Dept: CARDIAC REHAB | Facility: HOSPITAL | Age: 78
End: 2023-10-23

## 2023-10-23 DIAGNOSIS — Z00.00 PREVENTATIVE HEALTH CARE: Primary | ICD-10-CM

## 2023-10-24 DIAGNOSIS — I47.20 VENTRICULAR TACHYCARDIA: ICD-10-CM

## 2023-10-24 RX ORDER — AMIODARONE HYDROCHLORIDE 400 MG/1
400 TABLET ORAL DAILY
Qty: 90 TABLET | Refills: 3 | Status: SHIPPED | OUTPATIENT
Start: 2023-10-24

## 2023-10-25 ENCOUNTER — TREATMENT (OUTPATIENT)
Dept: CARDIAC REHAB | Facility: HOSPITAL | Age: 78
End: 2023-10-25

## 2023-10-25 DIAGNOSIS — Z00.00 PREVENTATIVE HEALTH CARE: Primary | ICD-10-CM

## 2023-10-26 ENCOUNTER — TREATMENT (OUTPATIENT)
Dept: CARDIAC REHAB | Facility: HOSPITAL | Age: 78
End: 2023-10-26

## 2023-10-26 DIAGNOSIS — Z00.00 PREVENTATIVE HEALTH CARE: Primary | ICD-10-CM

## 2023-10-30 ENCOUNTER — TREATMENT (OUTPATIENT)
Dept: CARDIAC REHAB | Facility: HOSPITAL | Age: 78
End: 2023-10-30

## 2023-10-30 DIAGNOSIS — Z00.00 PREVENTATIVE HEALTH CARE: Primary | ICD-10-CM

## 2023-10-31 ENCOUNTER — TREATMENT (OUTPATIENT)
Dept: CARDIAC REHAB | Facility: HOSPITAL | Age: 78
End: 2023-10-31

## 2023-10-31 DIAGNOSIS — Z00.00 PREVENTATIVE HEALTH CARE: Primary | ICD-10-CM

## 2023-11-01 ENCOUNTER — TREATMENT (OUTPATIENT)
Dept: CARDIAC REHAB | Facility: HOSPITAL | Age: 78
End: 2023-11-01

## 2023-11-01 DIAGNOSIS — Z00.00 PREVENTATIVE HEALTH CARE: Primary | ICD-10-CM

## 2023-11-02 ENCOUNTER — TREATMENT (OUTPATIENT)
Dept: CARDIAC REHAB | Facility: HOSPITAL | Age: 78
End: 2023-11-02

## 2023-11-02 DIAGNOSIS — Z00.00 PREVENTATIVE HEALTH CARE: Primary | ICD-10-CM

## 2023-11-03 ENCOUNTER — TREATMENT (OUTPATIENT)
Dept: CARDIAC REHAB | Facility: HOSPITAL | Age: 78
End: 2023-11-03

## 2023-11-03 DIAGNOSIS — Z00.00 PREVENTATIVE HEALTH CARE: Primary | ICD-10-CM

## 2023-11-06 ENCOUNTER — TREATMENT (OUTPATIENT)
Dept: CARDIAC REHAB | Facility: HOSPITAL | Age: 78
End: 2023-11-06

## 2023-11-06 DIAGNOSIS — Z00.00 PREVENTATIVE HEALTH CARE: Primary | ICD-10-CM

## 2023-11-07 ENCOUNTER — TREATMENT (OUTPATIENT)
Dept: CARDIAC REHAB | Facility: HOSPITAL | Age: 78
End: 2023-11-07

## 2023-11-07 DIAGNOSIS — Z00.00 PREVENTATIVE HEALTH CARE: Primary | ICD-10-CM

## 2023-11-16 PROBLEM — I46.9 CARDIAC ARREST: Status: ACTIVE | Noted: 2023-01-01

## 2023-11-16 PROBLEM — U07.1 COVID-19 VIRUS DETECTED: Status: ACTIVE | Noted: 2023-01-01

## 2023-11-16 NOTE — ED PROVIDER NOTES
[facility]    EMERGENCY DEPARTMENT ENCOUNTER      Pt Name: Jonnie Roth  MRN: [id number]  YOB: 1945  Date of evaluation: 11/16/2023  Provider: Melecio Gurrola MD    CHIEF COMPLAINT       Chief Complaint   Patient presents with    Loss of Consciousness         HISTORY OF PRESENT ILLNESS   Jonnie Roth is a 78 y.o. male who presents to the emergency department following a witnessed cardiac arrest. History obtained from EMS. Pt was at home with wife when he suddenly became unresponsive. Wife started CPR and felt that the patient's ICD had shocked him multiple times. On EMS arrival, they noted the pt to be in VT. He was shocked x 3, started on epi drip, and intubated. He did not regain a pulse during transport but seemed to be spontaneously breathing and making slight arm movements per their report. The patient has significant cardiac history.        Nursing notes were reviewed.    REVIEW OF SYSTEMS     ROS:  A chief complaint appropriate review of systems was completed and is negative except as noted in the HPI.      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 Secondary to [place] trauma with hematuria and urosepsis requiring  hospitalization May 2015    Sleep apnea with use of continuous positive airway pressure (CPAP)     CPAP- SETTING 4     Ventricular tachycardia 12/28/2016    Wears glasses     READERS         SURGICAL HISTORY       Past Surgical History:   Procedure Laterality Date    BIVENTRICULLAR IMPLANTABLE CARDIOVERTER DEFIBRILLATOR PLACEMENT      CARDIAC CATHETERIZATION N/A 01/16/2017    Procedure: Left Heart Cath;  Surgeon: Diego Ayala MD;  Location:  [facility];  Service:     CARDIAC DEFIBRILLATOR PLACEMENT       A. ICD generator change out with upgrade to BiV pacemaker implantable cardioverter    defibrillator, 12/17/2007. Ventricular fibrillation s/p pacesetter [place] ICD in [place]. [place] 09/2000    CARDIAC ELECTROPHYSIOLOGY PROCEDURE N/A 10/02/2017    Procedure: BIV ICD  generator change S[place];  Surgeon: Ferdinand Alba MD;  Location:  [facility];  Service:     CARDIAC ELECTROPHYSIOLOGY PROCEDURE N/A 07/06/2021    Procedure: BIV ICD generator change with [employer]. This will need to be done in late June or early July. Hold Xarelto one day prior.;  Surgeon: Ferdinand Alba MD;  Location:  [facility];  Service: Cardiology;  Laterality: N/A;    CARDIAC ELECTROPHYSIOLOGY PROCEDURE N/A 9/7/2023    Procedure: Ablation VT;  Surgeon: Ferdinand Alba MD;  Location:  [facility];  Service: Cardiovascular;  Laterality: N/A;  with general anesthesia; hold Xarelto 2 days prior    CAROTID STENT  Several    CATARACT EXTRACTION      Bilateral     COLONOSCOPY      CORONARY ANGIOPLASTY WITH STENT PLACEMENT      X7 STENTS TOTAL     CYSTOSCOPY URETERAL TUMOR FULGURATION  05/29/2015     A. Status post cystoscopy with clot evacuation and fulguration of the prostate secondary to hematuria, 5/29/2015.    FOOT SURGERY      RIGHT - POLIO RELATED     INSERT / REPLACE / REMOVE PACEMAKER  2002    PROSTATE SURGERY      A. Status post laser vaporization, 08/19/2009.    ROOT CANAL       TOOTH EXTRACTION           CURRENT MEDICATIONS     No current facility-administered medications for this encounter.    Current Outpatient Medications:     amiodarone (PACERONE) 400 MG tablet, Take 1 tablet by mouth Daily., Disp: 90 tablet, Rfl: 3    carvedilol (COREG) 3.125 MG tablet, Take 1 tablet by mouth 2 (Two) Times a Day With Meals. (Patient taking differently: Take 2 tablets by mouth 2 (Two) Times a Day With Meals.), Disp: 180 tablet, Rfl: 3    cholecalciferol (VITAMIN D3) 25 MCG (1000 UT) tablet, Take 2 tablets by mouth 2 (Two) Times a Day. OTC, Disp: , Rfl:     clopidogrel (PLAVIX) 75 MG tablet, Take 1 tablet by mouth Every Morning., Disp: 90 tablet, Rfl: 3    Cyanocobalamin (Vitamin B-12) 5000 MCG tablet dispersible, Take 1,000 mcg by mouth Daily. OTC, Disp: , Rfl:     empagliflozin (JARDIANCE) 10 MG tablet tablet, Take 1 tablet by mouth Daily., Disp: 90 tablet, Rfl: 3    eplerenone (INSPRA) 25 MG tablet, Take 0.5 tablets by mouth Daily., Disp: 90 tablet, Rfl: 0    L-TRYPTOPHAN PO, Take 3,000 mg by mouth Every Night., Disp: , Rfl:     levothyroxine (Synthroid) 50 MCG tablet, Take 1 tablet by mouth Daily., Disp: 30 tablet, Rfl: 6    Magnesium Oxide -Mg Supplement 500 MG tablet, Take 1 tablet by mouth Daily., Disp: , Rfl:     melatonin 5 MG tablet tablet, Take 2 tablets by mouth Every Night. OTC, Disp: , Rfl:     metFORMIN (GLUCOPHAGE) 1000 MG tablet, Take 1 tablet by mouth 2 (two) times a day., Disp: , Rfl:     Multiple Vitamins-Minerals (ICAPS AREDS 2 PO), Take 1 tablet by mouth Daily. OTC, Disp: , Rfl:     Niacin, Antihyperlipidemic, (NIACIN ER, ANTIHYPERLIPIDEMIC, PO), Take 500 mg by mouth Daily., Disp: , Rfl:     nitroglycerin (NITROSTAT) 0.4 MG SL tablet, Place 1 tablet under the tongue Every 5 (Five) Minutes As Needed for Chest Pain. Take no more than 3 doses in 15 minutes., Disp: 25 tablet, Rfl: 5    pantoprazole (PROTONIX) 40 MG EC tablet, Take 1 tablet by mouth Every Morning., Disp: , Rfl:      polyethylene glycol (MIRALAX) packet, Take 17 g by mouth Every Other Day. OTC, Disp: , Rfl:     potassium chloride (MICRO-K) 10 MEQ CR capsule, Take 1 capsule by mouth Daily., Disp: , Rfl:     rivaroxaban (Xarelto) 10 MG tablet, Take 1 tablet by mouth Daily., Disp: 90 tablet, Rfl: 3    sacubitril-valsartan (Entresto) 24-26 MG tablet, Take 1 tablet by mouth 2 (Two) Times a Day. (Patient taking differently: Take 1 tablet by mouth Daily.), Disp: 180 tablet, Rfl: 3    torsemide (DEMADEX) 10 MG tablet, Take 1 tablet by mouth Daily., Disp: 30 tablet, Rfl: 6    ALLERGIES     Patient has no known allergies.    FAMILY HISTORY       Family History   Problem Relation Age of Onset    Heart failure Father     Stroke Father           SOCIAL HISTORY       Social History     Socioeconomic History    Marital status:    Tobacco Use    Smoking status: Former     Packs/day: 2.00     Years: 30.00     Additional pack years: 0.00     Total pack years: 60.00     Types: Cigarettes     Start date: 1960     Quit date: 1995     Years since quittin.6     Passive exposure: Past    Smokeless tobacco: Never    Tobacco comments:     Heavy smoker for years   Vaping Use    Vaping Use: Never used   Substance and Sexual Activity    Alcohol use: No    Drug use: No    Sexual activity: Defer         PHYSICAL EXAM    (up to 7 for level 4, 8 or more for level 5)     Vitals:    23 1318 23 1330 23 1345 23 1400   BP:  95/68 (!) 57/26 (!) 45/34   BP Location:    Right arm   Patient Position:    Lying   Pulse: 74 74 77 75   Resp:       Temp:       TempSrc:       SpO2: 92% 92% 92% 92%   Weight:       Height:           General: CPR ongoing  HEENT: Conjunctivae normal.  Neck: Trachea midline.  Cardiac: Pulseless  Lungs: ETT in place  Chest wall: No deformity  Abdomen: Non-distended  Musculoskeletal: No deformity.  Neuro: Can't assess  Dermatology: Skin is warm and dry  Psych: Can't assess        DIAGNOSTIC RESULTS      EKG: All EKGs are interpreted by the Emergency Department Physician who either signs or Co-signs this chart in the absence of a cardiologist.    ECG 12 Lead Other; Post Cardiac Arrest - Therapeutic Hypothermia   Final Result   Test Reason : Other~   Blood Pressure :   */*   mmHG   Vent. Rate :  72 BPM     Atrial Rate :  64 BPM      P-R Int :   * ms          QRS Dur : 202 ms       QT Int : 632 ms       P-R-T Axes :   * 123  58 degrees      QTc Int : 692 ms      Electronic ventricular pacemaker   When compared with ECG of 17-NOV-2023 04:44,   No significant change was found   Confirmed by Brian Zayas ([id number]) on 11/18/2023 6:20:30 PM      Referred By:            Confirmed By: Brian Zayas      ECG 12 Lead Other; Post Cardiac Arrest - Therapeutic Hypothermia   Final Result   Test Reason : Other~   Blood Pressure :   */*   mmHG   Vent. Rate :  73 BPM     Atrial Rate : 340 BPM      P-R Int :  82 ms          QRS Dur : 210 ms       QT Int : 524 ms       P-R-T Axes :  46 -37  82 degrees      QTc Int : 577 ms      Electronic ventricular pacemaker   When compared with ECG of 16-NOV-2023 20:48, (Unconfirmed)   Electronic ventricular pacemaker has replaced Atrial fibrillation   Confirmed by MD Ramirez John ([id number]) on 11/17/2023 9:06:00 AM      Referred By: KIRAN           Confirmed By: Brian Ramirez MD      ECG 12 Lead ED Triage Standing Order; Chest Pain   Preliminary Result   Test Reason : ED Triage Standing Order~   Blood Pressure :   */*   mmHG   Vent. Rate : 101 BPM     Atrial Rate :  72 BPM      P-R Int :   * ms          QRS Dur : 118 ms       QT Int : 114 ms       P-R-T Axes :   * -10   0 degrees      QTc Int : 147 ms      ** Poor data quality, interpretation may be adversely affected   Demand pacemaker, interpretation is based on intrinsic rhythm   Atrial fibrillation with rapid ventricular response with premature    ventricular or aberrantly conducted complexes   Low voltage QRS   Inferior infarct , age undetermined    Anterolateral infarct , age undetermined   Abnormal ECG   When compared with ECG of 16-NOV-2023 18:31, (Unconfirmed)   Atrial fibrillation has replaced Electronic ventricular pacemaker      Referred By: DR AG           Confirmed By:       ECG 12 Lead ED Triage Standing Order; Chest Pain   Preliminary Result   Test Reason : ED Triage Standing Order~   Blood Pressure :   */*   mmHG   Vent. Rate :  70 BPM     Atrial Rate :  66 BPM      P-R Int :   * ms          QRS Dur : 190 ms       QT Int : 620 ms       P-R-T Axes :   * -44 -16 degrees      QTc Int : 669 ms      Ventricular-paced rhythm with premature ventricular or aberrantly    conducted complexes   Biventricular pacemaker detected   Abnormal ECG   When compared with ECG of 15-AUG-2023 06:26,   No significant change was found      Referred By: EDMD           Confirmed By:       SCANNED - TELEMETRY     Final Result      SCANNED - TELEMETRY     Final Result            RADIOLOGY:   [x] Radiologist's Report Reviewed:  XR Chest 1 View   Final Result   Impression:   1.Support lines and tubes are in good position as described above. No pneumothorax.   2.The left costophrenic angle is not on the image. The lungs are grossly clear.            Electronically Signed: Neal Leon DO     11/16/2023 9:55 PM EST     Workstation ID: [id number]      XR Abdomen KUB   Final Result   Impression:   Gastric tube in the distal esophagus. Recommend advancing the tube at least 10 cm to ensure that both the tip and sideport are in the stomach         Electronically Signed: José Miguel Jordan     11/16/2023 9:54 PM EST     Workstation ID: [id number]      CT Head Without Contrast   Final Result   Impression:   1. No acute intracranial abnormality.   2. Cerebral atrophy and white matter findings of chronic microvascular disease.   3. Paranasal sinus disease.            Electronically Signed: Kevin Laureano MD     11/16/2023 8:23 PM EST     Workstation ID: [id number]      XR Chest 1 View   Final  Result   Impression:      1. Endotracheal tube 5 cm above the simran   2. Limited study due to artifact from overlying device. There is evidence of mild pulmonary edema. No gross pneumothorax is visualized         Electronically Signed: José Miguel Jordan     11/16/2023 7:01 PM EST     Workstation ID: [id number]          I ordered and independently reviewed the above noted radiographic studies.        LABS:    I have reviewed and interpreted all of the currently available lab results from this visit (if applicable):  Results for orders placed or performed during the hospital encounter of 11/16/23   Respiratory Panel PCR w/COVID-19(SARS-CoV-2) [facility] In-House, NP Swab in UTM/VTM, 2 HR TAT - Swab, Nasopharynx    Specimen: Nasopharynx; Swab   Result Value Ref Range    ADENOVIRUS, PCR Not Detected Not Detected    Coronavirus 229E Not Detected Not Detected    Coronavirus HKU1 Not Detected Not Detected    Coronavirus NL63 Not Detected Not Detected    Coronavirus OC43 Not Detected Not Detected    COVID19 Detected (C) Not Detected - Ref. Range    Human Metapneumovirus Not Detected Not Detected    Human Rhinovirus/Enterovirus Not Detected Not Detected    Influenza A PCR Not Detected Not Detected    Influenza B PCR Not Detected Not Detected    Parainfluenza Virus 1 Not Detected Not Detected    Parainfluenza Virus 2 Not Detected Not Detected    Parainfluenza Virus 3 Not Detected Not Detected    Parainfluenza Virus 4 Not Detected Not Detected    RSV, PCR Not Detected Not Detected    Bordetella pertussis pcr Not Detected Not Detected    Bordetella parapertussis PCR Not Detected Not Detected    Chlamydophila pneumoniae PCR Not Detected Not Detected    Mycoplasma pneumo by PCR Not Detected Not Detected   COVID-19 RAPID AG,VERITOR,[facility]/ IN-HOUSE,DRY SWAB, 1 HR TAT - Swab, Nasal Cavity    Specimen: Nasal Cavity; Swab   Result Value Ref Range    COVID19 Detected (C) Presumptive Negative - Ref. Range    Blood Culture - Blood, Neck    Specimen: Neck; Blood   Result Value Ref Range    Blood Culture No growth at 2 days    Blood Culture - Blood, Hand, Right    Specimen: Hand, Right; Blood   Result Value Ref Range    Blood Culture Streptococcus, Alpha Hemolytic (C)     Isolated from Anaerobic Bottle     Gram Stain (C)      Anaerobic Bottle Gram positive cocci in pairs and clusters   MRSA Screen, PCR (Inpatient) - Swab, Nares    Specimen: Nares; Swab   Result Value Ref Range    MRSA PCR Negative Negative   Urine Culture - Urine, Urine, Catheter    Specimen: Urine, Catheter   Result Value Ref Range    Urine Culture No growth    Blood Culture ID, PCR - Blood, Hand, Right    Specimen: Hand, Right; Blood   Result Value Ref Range    BCID, PCR (A) Negative by BCID PCR. Culture to Follow.     Streptococcus spp, not A, B, or pneumoniae. Identification by BCID2 PCR.    BOTTLE TYPE Anaerobic Bottle    High Sensitivity Troponin T    Specimen: Blood   Result Value Ref Range    HS Troponin T 199 (C) <22 ng/L   Comprehensive Metabolic Panel    Specimen: Blood   Result Value Ref Range    Glucose 197 (H) 65 - 99 mg/dL    BUN 25 (H) 8 - 23 mg/dL    Creatinine 1.21 0.76 - 1.27 mg/dL    Sodium 135 (L) 136 - 145 mmol/L    Potassium 3.5 3.5 - 5.2 mmol/L    Chloride 97 (L) 98 - 107 mmol/L    CO2 20.0 (L) 22.0 - 29.0 mmol/L    Calcium 7.9 (L) 8.6 - 10.5 mg/dL    Total Protein 6.1 6.0 - 8.5 g/dL    Albumin 3.5 3.5 - 5.2 g/dL    ALT (SGPT) 665 (H) 1 - 41 U/L    AST (SGOT) 500 (H) 1 - 40 U/L    Alkaline Phosphatase 180 (H) 39 - 117 U/L    Total Bilirubin 0.5 0.0 - 1.2 mg/dL    Globulin 2.6 gm/dL    A/G Ratio 1.3 g/dL    BUN/Creatinine Ratio 20.7 7.0 - 25.0    Anion Gap 18.0 (H) 5.0 - 15.0 mmol/L    eGFR 60.9 >60.0 mL/min/1.73   Lipase    Specimen: Blood   Result Value Ref Range    Lipase 134 (H) 13 - 60 U/L   BNP    Specimen: Blood   Result Value Ref Range    proBNP 3,356.0 (H) 0.0 - 1,800.0 pg/mL   CBC Auto Differential    Specimen: Blood    Result Value Ref Range    WBC 14.14 (H) 3.40 - 10.80 10*3/mm3    RBC 4.96 4.14 - 5.80 10*6/mm3    Hemoglobin 12.8 (L) 13.0 - 17.7 g/dL    Hematocrit 41.7 37.5 - 51.0 %    MCV 84.1 79.0 - 97.0 fL    MCH 25.8 (L) 26.6 - 33.0 pg    MCHC 30.7 (L) 31.5 - 35.7 g/dL    RDW 15.8 (H) 12.3 - 15.4 %    RDW-SD 48.4 37.0 - 54.0 fl    MPV 10.4 6.0 - 12.0 fL    Platelets 269 140 - 450 10*3/mm3   Heparin Anti-Xa    Specimen: Blood   Result Value Ref Range    Heparin Anti-Xa (UFH) >1.10 (C) 0.30 - 0.70 IU/ml   Protime-INR    Specimen: Blood   Result Value Ref Range    Protime 21.7 (H) 12.2 - 14.5 Seconds    INR 1.88 (H) 0.89 - 1.12   aPTT    Specimen: Blood   Result Value Ref Range    PTT 29.5 (L) 60.0 - 90.0 seconds   CBC Auto Differential    Specimen: Blood   Result Value Ref Range    WBC 15.23 (H) 3.40 - 10.80 10*3/mm3    RBC 4.65 4.14 - 5.80 10*6/mm3    Hemoglobin 12.2 (L) 13.0 - 17.7 g/dL    Hematocrit 37.8 37.5 - 51.0 %    MCV 81.3 79.0 - 97.0 fL    MCH 26.2 (L) 26.6 - 33.0 pg    MCHC 32.3 31.5 - 35.7 g/dL    RDW 15.8 (H) 12.3 - 15.4 %    RDW-SD 46.2 37.0 - 54.0 fl    MPV 10.3 6.0 - 12.0 fL    Platelets 271 140 - 450 10*3/mm3    Neutrophil % 82.7 (H) 42.7 - 76.0 %    Lymphocyte % 8.1 (L) 19.6 - 45.3 %    Monocyte % 7.5 5.0 - 12.0 %    Eosinophil % 0.1 (L) 0.3 - 6.2 %    Basophil % 0.1 0.0 - 1.5 %    Immature Grans % 1.5 (H) 0.0 - 0.5 %    Neutrophils, Absolute 12.60 (H) 1.70 - 7.00 10*3/mm3    Lymphocytes, Absolute 1.23 0.70 - 3.10 10*3/mm3    Monocytes, Absolute 1.14 (H) 0.10 - 0.90 10*3/mm3    Eosinophils, Absolute 0.01 0.00 - 0.40 10*3/mm3    Basophils, Absolute 0.02 0.00 - 0.20 10*3/mm3    Immature Grans, Absolute 0.23 (H) 0.00 - 0.05 10*3/mm3    nRBC 0.0 0.0 - 0.2 /100 WBC   Blood Gas, Arterial With Co-Ox    Specimen: Arterial Blood   Result Value Ref Range    Site Right Brachial     Osito's Test N/A     pH, Arterial 7.264 (L) 7.350 - 7.450 pH units    pCO2, Arterial 33.1 (L) 35.0 - 45.0 mm Hg    pO2, Arterial 77.0 (L)  83.0 - 108.0 mm Hg    HCO3, Arterial 15.0 (L) 20.0 - 26.0 mmol/L    Base Excess, Arterial -10.9 (L) 0.0 - 2.0 mmol/L    Hemoglobin, Blood Gas 12.7 (L) 13.5 - 17.5 g/dL    Hematocrit, Blood Gas 39.0 38.0 - 51.0 %    Oxyhemoglobin 91.1 (L) 94 - 99 %    Methemoglobin 0.30 0.00 - 1.50 %    Carboxyhemoglobin      CO2 Content 16.0 (L) 22 - 33 mmol/L    Temperature 37.0     Barometric Pressure for Blood Gas      Modality Ventilator     FIO2 100 %    Ventilator Mode VC+/AC     Set Tidal Volume 0.45     Rate 26 Breaths/minute    PEEP 5.0     PIP 0 cmH2O    IPAP 0     EPAP 0     pH, Temp Corrected 7.264 pH Units    pCO2, Temperature Corrected 33.1 (L) 35 - 48 mm Hg    pO2, Temperature Corrected 77.0 (L) 83 - 108 mm Hg   High Sensitivity Troponin T 2Hr    Specimen: Blood   Result Value Ref Range    HS Troponin T 1,074 (C) <22 ng/L    Troponin T Delta 875 (C) >=-4 - <+4 ng/L   CK Total & CKMB    Specimen: Blood   Result Value Ref Range    CKMB 6.07 <=10.40 ng/mL    Creatine Kinase 89 20 - 200 U/L   Protime-INR    Specimen: Blood   Result Value Ref Range    Protime 21.5 (H) 12.2 - 14.5 Seconds    INR 1.84 (H) 0.89 - 1.12   Lactic Acid, Plasma    Specimen: Blood   Result Value Ref Range    Lactate 2.7 (C) 0.5 - 2.0 mmol/L   aPTT    Specimen: Blood   Result Value Ref Range    PTT 43.6 (L) 60.0 - 90.0 seconds   aPTT    Specimen: Blood   Result Value Ref Range    PTT 29.5 (L) 60.0 - 90.0 seconds   Calcium, Ionized    Specimen: Blood   Result Value Ref Range    Ionized Calcium 1.10 (L) 1.12 - 1.32 mmol/L   Calcium, Ionized    Specimen: Blood   Result Value Ref Range    Ionized Calcium 1.08 (L) 1.12 - 1.32 mmol/L   Magnesium    Specimen: Blood   Result Value Ref Range    Magnesium 2.0 1.6 - 2.4 mg/dL   Magnesium    Specimen: Blood   Result Value Ref Range    Magnesium 1.9 1.6 - 2.4 mg/dL   Phosphorus    Specimen: Blood   Result Value Ref Range    Phosphorus 4.8 (H) 2.5 - 4.5 mg/dL   Phosphorus    Specimen: Blood   Result Value Ref  Range    Phosphorus 4.1 2.5 - 4.5 mg/dL   CBC Auto Differential    Specimen: Blood   Result Value Ref Range    WBC 20.69 (H) 3.40 - 10.80 10*3/mm3    RBC 4.70 4.14 - 5.80 10*6/mm3    Hemoglobin 12.1 (L) 13.0 - 17.7 g/dL    Hematocrit 38.0 37.5 - 51.0 %    MCV 80.9 79.0 - 97.0 fL    MCH 25.7 (L) 26.6 - 33.0 pg    MCHC 31.8 31.5 - 35.7 g/dL    RDW 15.5 (H) 12.3 - 15.4 %    RDW-SD 45.7 37.0 - 54.0 fl    MPV 10.2 6.0 - 12.0 fL    Platelets 282 140 - 450 10*3/mm3    Neutrophil % 87.5 (H) 42.7 - 76.0 %    Lymphocyte % 4.9 (L) 19.6 - 45.3 %    Monocyte % 6.7 5.0 - 12.0 %    Eosinophil % 0.0 (L) 0.3 - 6.2 %    Basophil % 0.1 0.0 - 1.5 %    Immature Grans % 0.8 (H) 0.0 - 0.5 %    Neutrophils, Absolute 18.09 (H) 1.70 - 7.00 10*3/mm3    Lymphocytes, Absolute 1.01 0.70 - 3.10 10*3/mm3    Monocytes, Absolute 1.39 (H) 0.10 - 0.90 10*3/mm3    Eosinophils, Absolute 0.01 0.00 - 0.40 10*3/mm3    Basophils, Absolute 0.02 0.00 - 0.20 10*3/mm3    Immature Grans, Absolute 0.17 (H) 0.00 - 0.05 10*3/mm3    nRBC 0.0 0.0 - 0.2 /100 WBC   Blood Gas, Arterial With Co-Ox    Specimen: Arterial Blood   Result Value Ref Range    Site Right Brachial     Osito's Test N/A     pH, Arterial 7.292 (L) 7.350 - 7.450 pH units    pCO2, Arterial 38.7 35.0 - 45.0 mm Hg    pO2, Arterial 131.0 (H) 83.0 - 108.0 mm Hg    HCO3, Arterial 18.7 (L) 20.0 - 26.0 mmol/L    Base Excess, Arterial -7.3 (L) 0.0 - 2.0 mmol/L    Hemoglobin, Blood Gas 12.5 (L) 13.5 - 17.5 g/dL    Hematocrit, Blood Gas 38.2 38.0 - 51.0 %    Oxyhemoglobin 96.4 94 - 99 %    Methemoglobin 0.30 0.00 - 1.50 %    Carboxyhemoglobin      CO2 Content 19.9 (L) 22 - 33 mmol/L    Temperature 37.0     Barometric Pressure for Blood Gas      Modality Ventilator     FIO2 100 %    Ventilator Mode VC+/AC     Set Tidal Volume 0.45     Rate 23 Breaths/minute    PEEP 5.0     PIP 0 cmH2O    IPAP 0     EPAP 0     pH, Temp Corrected 7.292 pH Units    pCO2, Temperature Corrected 38.7 35 - 48 mm Hg    pO2, Temperature  Corrected 131 (H) 83 - 108 mm Hg   Heparin Anti-Xa    Specimen: Blood   Result Value Ref Range    Heparin Anti-Xa (UFH) >1.10 (C) 0.30 - 0.70 IU/ml   Manual Differential    Specimen: Blood   Result Value Ref Range    Neutrophil % 55.0 42.7 - 76.0 %    Lymphocyte % 17.0 (L) 19.6 - 45.3 %    Monocyte % 5.0 5.0 - 12.0 %    Eosinophil % 0.0 (L) 0.3 - 6.2 %    Basophil % 0.0 0.0 - 1.5 %    Bands %  5.0 0.0 - 5.0 %    Metamyelocyte % 1.0 (H) 0.0 - 0.0 %    Atypical Lymphocyte % 17.0 (H) 0.0 - 5.0 %    Neutrophils Absolute 8.48 (H) 1.70 - 7.00 10*3/mm3    Lymphocytes Absolute 4.81 (H) 0.70 - 3.10 10*3/mm3    Monocytes Absolute 0.71 0.10 - 0.90 10*3/mm3    Eosinophils Absolute 0.00 0.00 - 0.40 10*3/mm3    Basophils Absolute 0.00 0.00 - 0.20 10*3/mm3    Feliciano Cells Slight/1+ None Seen    Ovalocytes Slight/1+ None Seen    WBC Morphology Normal Normal    Platelet Morphology Normal Normal   STAT Lactic Acid, Reflex    Specimen: Blood   Result Value Ref Range    Lactate 3.8 (C) 0.5 - 2.0 mmol/L   Basic Metabolic Panel    Specimen: Blood   Result Value Ref Range    Glucose 192 (H) 65 - 99 mg/dL    BUN 32 (H) 8 - 23 mg/dL    Creatinine 1.42 (H) 0.76 - 1.27 mg/dL    Sodium 136 136 - 145 mmol/L    Potassium 4.0 3.5 - 5.2 mmol/L    Chloride 100 98 - 107 mmol/L    CO2 19.0 (L) 22.0 - 29.0 mmol/L    Calcium 8.1 (L) 8.6 - 10.5 mg/dL    BUN/Creatinine Ratio 22.5 7.0 - 25.0    Anion Gap 17.0 (H) 5.0 - 15.0 mmol/L    eGFR 50.3 (L) >60.0 mL/min/1.73   Basic Metabolic Panel    Specimen: Blood   Result Value Ref Range    Glucose 188 (H) 65 - 99 mg/dL    BUN 31 (H) 8 - 23 mg/dL    Creatinine 1.45 (H) 0.76 - 1.27 mg/dL    Sodium 135 (L) 136 - 145 mmol/L    Potassium 3.9 3.5 - 5.2 mmol/L    Chloride 100 98 - 107 mmol/L    CO2 20.0 (L) 22.0 - 29.0 mmol/L    Calcium 8.0 (L) 8.6 - 10.5 mg/dL    BUN/Creatinine Ratio 21.4 7.0 - 25.0    Anion Gap 15.0 5.0 - 15.0 mmol/L    eGFR 49.0 (L) >60.0 mL/min/1.73   Hepatic Function Panel    Specimen: Blood    Result Value Ref Range    Total Protein 6.1 6.0 - 8.5 g/dL    Albumin 3.7 3.5 - 5.2 g/dL    ALT (SGPT) 806 (H) 1 - 41 U/L    AST (SGOT) 972 (H) 1 - 40 U/L    Alkaline Phosphatase 223 (H) 39 - 117 U/L    Total Bilirubin 0.8 0.0 - 1.2 mg/dL    Bilirubin, Direct 0.5 (H) 0.0 - 0.3 mg/dL    Bilirubin, Indirect 0.3 mg/dL   Procalcitonin    Specimen: Blood   Result Value Ref Range    Procalcitonin 0.54 (H) 0.00 - 0.25 ng/mL   Basic Metabolic Panel    Specimen: Blood   Result Value Ref Range    Glucose 260 (H) 65 - 99 mg/dL    BUN 30 (H) 8 - 23 mg/dL    Creatinine 1.25 0.76 - 1.27 mg/dL    Sodium 135 (L) 136 - 145 mmol/L    Potassium 3.2 (L) 3.5 - 5.2 mmol/L    Chloride 99 98 - 107 mmol/L    CO2 18.0 (L) 22.0 - 29.0 mmol/L    Calcium 8.0 (L) 8.6 - 10.5 mg/dL    BUN/Creatinine Ratio 24.0 7.0 - 25.0    Anion Gap 18.0 (H) 5.0 - 15.0 mmol/L    eGFR 58.6 (L) >60.0 mL/min/1.73   aPTT    Specimen: Blood   Result Value Ref Range    PTT 32.5 (L) 60.0 - 90.0 seconds   Calcium, Ionized    Specimen: Blood   Result Value Ref Range    Ionized Calcium 1.13 1.12 - 1.32 mmol/L   Magnesium    Specimen: Blood   Result Value Ref Range    Magnesium 2.0 1.6 - 2.4 mg/dL   Phosphorus    Specimen: Blood   Result Value Ref Range    Phosphorus 3.4 2.5 - 4.5 mg/dL   CBC Auto Differential    Specimen: Blood   Result Value Ref Range    WBC 16.51 (H) 3.40 - 10.80 10*3/mm3    RBC 4.64 4.14 - 5.80 10*6/mm3    Hemoglobin 11.7 (L) 13.0 - 17.7 g/dL    Hematocrit 37.1 (L) 37.5 - 51.0 %    MCV 80.0 79.0 - 97.0 fL    MCH 25.2 (L) 26.6 - 33.0 pg    MCHC 31.5 31.5 - 35.7 g/dL    RDW 15.8 (H) 12.3 - 15.4 %    RDW-SD 45.3 37.0 - 54.0 fl    MPV 10.0 6.0 - 12.0 fL    Platelets 288 140 - 450 10*3/mm3    Neutrophil % 77.4 (H) 42.7 - 76.0 %    Lymphocyte % 14.9 (L) 19.6 - 45.3 %    Monocyte % 6.8 5.0 - 12.0 %    Eosinophil % 0.0 (L) 0.3 - 6.2 %    Basophil % 0.2 0.0 - 1.5 %    Immature Grans % 0.7 (H) 0.0 - 0.5 %    Neutrophils, Absolute 12.77 (H) 1.70 - 7.00 10*3/mm3     Lymphocytes, Absolute 2.46 0.70 - 3.10 10*3/mm3    Monocytes, Absolute 1.13 (H) 0.10 - 0.90 10*3/mm3    Eosinophils, Absolute 0.00 0.00 - 0.40 10*3/mm3    Basophils, Absolute 0.04 0.00 - 0.20 10*3/mm3    Immature Grans, Absolute 0.11 (H) 0.00 - 0.05 10*3/mm3    nRBC 0.0 0.0 - 0.2 /100 WBC   STAT Lactic Acid, Reflex    Specimen: Blood   Result Value Ref Range    Lactate 1.0 0.5 - 2.0 mmol/L   Urinalysis With Culture If Indicated - Urine, Catheter    Specimen: Urine, Catheter   Result Value Ref Range    Color, UA Yellow Yellow, Straw    Appearance, UA Cloudy (A) Clear    pH, UA <=5.0 5.0 - 8.0    Specific Gravity, UA 1.024 1.001 - 1.030    Glucose, UA >=1000 mg/dL (3+) (A) Negative    Ketones, UA Negative Negative    Bilirubin, UA Negative Negative    Blood, UA Small (1+) (A) Negative    Protein, UA 30 mg/dL (1+) (A) Negative    Leuk Esterase, UA Negative Negative    Nitrite, UA Negative Negative    Urobilinogen, UA 1.0 E.U./dL 0.2 - 1.0 E.U./dL   Heparin Anti-Xa    Specimen: Blood   Result Value Ref Range    Heparin Anti-Xa (UFH) >1.10 (C) 0.30 - 0.70 IU/ml   Urinalysis, Microscopic Only - Urine, Catheter    Specimen: Urine, Catheter   Result Value Ref Range    RBC, UA 0-2 None Seen, 0-2 /HPF    WBC, UA 6-10 (A) None Seen, 0-2 /HPF    Bacteria, UA 2+ (A) None Seen, Trace /HPF    Squamous Epithelial Cells, UA 13-20 (A) None Seen, 0-2 /HPF    Hyaline Casts, UA Too Numerous to Count 0 - 6 /LPF    Fine Granular Casts, UA 13-20 None Seen /LPF    Amorphous Crystals, UA Moderate/2+ None Seen /HPF    Methodology Manual Light Microscopy    Blood Gas, Arterial With Co-Ox    Specimen: Arterial Blood   Result Value Ref Range    Site Arterial Line     Osito's Test N/A     pH, Arterial 7.404 7.350 - 7.450 pH units    pCO2, Arterial 36.1 35.0 - 45.0 mm Hg    pO2, Arterial 116.0 (H) 83.0 - 108.0 mm Hg    HCO3, Arterial 22.6 20.0 - 26.0 mmol/L    Base Excess, Arterial -1.8 (L) 0.0 - 2.0 mmol/L    Hemoglobin, Blood Gas 12.0 (L)  13.5 - 17.5 g/dL    Hematocrit, Blood Gas 36.8 (L) 38.0 - 51.0 %    Oxyhemoglobin 96.3 94 - 99 %    Methemoglobin 0.30 0.00 - 1.50 %    Carboxyhemoglobin      CO2 Content 23.7 22 - 33 mmol/L    Temperature 37.0     Barometric Pressure for Blood Gas      Modality Ventilator     FIO2 35 %    Ventilator Mode      Set Tidal Volume 0.56     Rate 14 Breaths/minute    PEEP 5.0     PIP 0 cmH2O    IPAP 0     EPAP 0     pH, Temp Corrected 7.404 pH Units    pCO2, Temperature Corrected 36.1 35 - 48 mm Hg    pO2, Temperature Corrected 116 (H) 83 - 108 mm Hg   Basic Metabolic Panel    Specimen: Blood   Result Value Ref Range    Glucose 123 (H) 65 - 99 mg/dL    BUN 30 (H) 8 - 23 mg/dL    Creatinine 1.28 (H) 0.76 - 1.27 mg/dL    Sodium 139 136 - 145 mmol/L    Potassium 4.2 3.5 - 5.2 mmol/L    Chloride 105 98 - 107 mmol/L    CO2 21.0 (L) 22.0 - 29.0 mmol/L    Calcium 8.0 (L) 8.6 - 10.5 mg/dL    BUN/Creatinine Ratio 23.4 7.0 - 25.0    Anion Gap 13.0 5.0 - 15.0 mmol/L    eGFR 56.9 (L) >60.0 mL/min/1.73   Basic Metabolic Panel    Specimen: Blood   Result Value Ref Range    Glucose 133 (H) 65 - 99 mg/dL    BUN 27 (H) 8 - 23 mg/dL    Creatinine 1.14 0.76 - 1.27 mg/dL    Sodium 139 136 - 145 mmol/L    Potassium 4.1 3.5 - 5.2 mmol/L    Chloride 107 98 - 107 mmol/L    CO2 20.0 (L) 22.0 - 29.0 mmol/L    Calcium 8.5 (L) 8.6 - 10.5 mg/dL    BUN/Creatinine Ratio 23.7 7.0 - 25.0    Anion Gap 12.0 5.0 - 15.0 mmol/L    eGFR 65.4 >60.0 mL/min/1.73   Lactic Acid, Plasma    Specimen: Blood   Result Value Ref Range    Lactate 1.9 0.5 - 2.0 mmol/L   aPTT    Specimen: Blood   Result Value Ref Range    PTT 28.0 (L) 60.0 - 90.0 seconds   Calcium, Ionized    Specimen: Blood   Result Value Ref Range    Ionized Calcium 1.20 1.12 - 1.32 mmol/L   Magnesium    Specimen: Blood   Result Value Ref Range    Magnesium 2.1 1.6 - 2.4 mg/dL   Phosphorus    Specimen: Blood   Result Value Ref Range    Phosphorus 3.4 2.5 - 4.5 mg/dL   CBC Auto Differential    Specimen:  Blood   Result Value Ref Range    WBC 16.30 (H) 3.40 - 10.80 10*3/mm3    RBC 5.01 4.14 - 5.80 10*6/mm3    Hemoglobin 12.8 (L) 13.0 - 17.7 g/dL    Hematocrit 40.3 37.5 - 51.0 %    MCV 80.4 79.0 - 97.0 fL    MCH 25.5 (L) 26.6 - 33.0 pg    MCHC 31.8 31.5 - 35.7 g/dL    RDW 16.0 (H) 12.3 - 15.4 %    RDW-SD 46.5 37.0 - 54.0 fl    MPV 10.0 6.0 - 12.0 fL    Platelets 300 140 - 450 10*3/mm3    Neutrophil % 72.6 42.7 - 76.0 %    Lymphocyte % 18.8 (L) 19.6 - 45.3 %    Monocyte % 7.6 5.0 - 12.0 %    Eosinophil % 0.1 (L) 0.3 - 6.2 %    Basophil % 0.3 0.0 - 1.5 %    Immature Grans % 0.6 (H) 0.0 - 0.5 %    Neutrophils, Absolute 11.84 (H) 1.70 - 7.00 10*3/mm3    Lymphocytes, Absolute 3.06 0.70 - 3.10 10*3/mm3    Monocytes, Absolute 1.24 (H) 0.10 - 0.90 10*3/mm3    Eosinophils, Absolute 0.02 0.00 - 0.40 10*3/mm3    Basophils, Absolute 0.05 0.00 - 0.20 10*3/mm3    Immature Grans, Absolute 0.09 (H) 0.00 - 0.05 10*3/mm3    nRBC 0.0 0.0 - 0.2 /100 WBC   Heparin Anti-Xa    Specimen: Blood   Result Value Ref Range    Heparin Anti-Xa (UFH) 0.84 (H) 0.30 - 0.70 IU/ml   Basic Metabolic Panel    Specimen: Blood   Result Value Ref Range    Glucose 120 (H) 65 - 99 mg/dL    BUN 26 (H) 8 - 23 mg/dL    Creatinine 1.14 0.76 - 1.27 mg/dL    Sodium 140 136 - 145 mmol/L    Potassium 3.4 (L) 3.5 - 5.2 mmol/L    Chloride 106 98 - 107 mmol/L    CO2 18.0 (L) 22.0 - 29.0 mmol/L    Calcium 8.2 (L) 8.6 - 10.5 mg/dL    BUN/Creatinine Ratio 22.8 7.0 - 25.0    Anion Gap 16.0 (H) 5.0 - 15.0 mmol/L    eGFR 65.4 >60.0 mL/min/1.73   Lactic Acid, Plasma    Specimen: Blood   Result Value Ref Range    Lactate 1.3 0.5 - 2.0 mmol/L   aPTT    Specimen: Blood   Result Value Ref Range    PTT 40.6 (L) 60.0 - 90.0 seconds   Calcium, Ionized    Specimen: Blood   Result Value Ref Range    Ionized Calcium 1.15 1.12 - 1.32 mmol/L   Magnesium    Specimen: Blood   Result Value Ref Range    Magnesium 1.9 1.6 - 2.4 mg/dL   Phosphorus    Specimen: Blood   Result Value Ref Range     Phosphorus 3.8 2.5 - 4.5 mg/dL   CBC Auto Differential    Specimen: Blood   Result Value Ref Range    WBC 20.01 (H) 3.40 - 10.80 10*3/mm3    RBC 5.02 4.14 - 5.80 10*6/mm3    Hemoglobin 13.0 13.0 - 17.7 g/dL    Hematocrit 41.2 37.5 - 51.0 %    MCV 82.1 79.0 - 97.0 fL    MCH 25.9 (L) 26.6 - 33.0 pg    MCHC 31.6 31.5 - 35.7 g/dL    RDW 16.3 (H) 12.3 - 15.4 %    RDW-SD 48.4 37.0 - 54.0 fl    MPV 10.1 6.0 - 12.0 fL    Platelets 274 140 - 450 10*3/mm3    Neutrophil % 85.5 (H) 42.7 - 76.0 %    Lymphocyte % 9.2 (L) 19.6 - 45.3 %    Monocyte % 4.6 (L) 5.0 - 12.0 %    Eosinophil % 0.1 (L) 0.3 - 6.2 %    Basophil % 0.2 0.0 - 1.5 %    Immature Grans % 0.4 0.0 - 0.5 %    Neutrophils, Absolute 17.08 (H) 1.70 - 7.00 10*3/mm3    Lymphocytes, Absolute 1.85 0.70 - 3.10 10*3/mm3    Monocytes, Absolute 0.93 (H) 0.10 - 0.90 10*3/mm3    Eosinophils, Absolute 0.02 0.00 - 0.40 10*3/mm3    Basophils, Absolute 0.04 0.00 - 0.20 10*3/mm3    Immature Grans, Absolute 0.09 (H) 0.00 - 0.05 10*3/mm3    nRBC 0.0 0.0 - 0.2 /100 WBC   Heparin Anti-Xa    Specimen: Arm, Right; Blood   Result Value Ref Range    Heparin Anti-Xa (UFH) 0.59 0.30 - 0.70 IU/ml   Basic Metabolic Panel    Specimen: Arm, Right; Blood   Result Value Ref Range    Glucose 127 (H) 65 - 99 mg/dL    BUN 29 (H) 8 - 23 mg/dL    Creatinine 1.45 (H) 0.76 - 1.27 mg/dL    Sodium 140 136 - 145 mmol/L    Potassium 3.8 3.5 - 5.2 mmol/L    Chloride 106 98 - 107 mmol/L    CO2 18.0 (L) 22.0 - 29.0 mmol/L    Calcium 8.3 (L) 8.6 - 10.5 mg/dL    BUN/Creatinine Ratio 20.0 7.0 - 25.0    Anion Gap 16.0 (H) 5.0 - 15.0 mmol/L    eGFR 49.0 (L) >60.0 mL/min/1.73   Lactic Acid, Plasma    Specimen: Arm, Right; Blood   Result Value Ref Range    Lactate 2.3 (C) 0.5 - 2.0 mmol/L   aPTT    Specimen: Arm, Right; Blood   Result Value Ref Range    PTT 52.3 (L) 60.0 - 90.0 seconds   Calcium, Ionized    Specimen: Arm, Right; Blood   Result Value Ref Range    Ionized Calcium 1.10 (L) 1.12 - 1.32 mmol/L   Magnesium     Specimen: Arm, Right; Blood   Result Value Ref Range    Magnesium 1.9 1.6 - 2.4 mg/dL   Phosphorus    Specimen: Arm, Right; Blood   Result Value Ref Range    Phosphorus 4.9 (H) 2.5 - 4.5 mg/dL   CBC Auto Differential    Specimen: Arm, Right; Blood   Result Value Ref Range    WBC 28.21 (H) 3.40 - 10.80 10*3/mm3    RBC 4.88 4.14 - 5.80 10*6/mm3    Hemoglobin 12.6 (L) 13.0 - 17.7 g/dL    Hematocrit 39.9 37.5 - 51.0 %    MCV 81.8 79.0 - 97.0 fL    MCH 25.8 (L) 26.6 - 33.0 pg    MCHC 31.6 31.5 - 35.7 g/dL    RDW 16.1 (H) 12.3 - 15.4 %    RDW-SD 48.0 37.0 - 54.0 fl    MPV 10.2 6.0 - 12.0 fL    Platelets 316 140 - 450 10*3/mm3   Hepatic Function Panel    Specimen: Arm, Right; Blood   Result Value Ref Range    Total Protein 5.7 (L) 6.0 - 8.5 g/dL    Albumin 3.0 (L) 3.5 - 5.2 g/dL    ALT (SGPT) 702 (H) 1 - 41 U/L    AST (SGOT) 690 (H) 1 - 40 U/L    Alkaline Phosphatase 194 (H) 39 - 117 U/L    Total Bilirubin 0.8 0.0 - 1.2 mg/dL    Bilirubin, Direct 0.6 (H) 0.0 - 0.3 mg/dL    Bilirubin, Indirect 0.2 mg/dL   Vancomycin, Trough    Specimen: Arm, Right; Blood   Result Value Ref Range    Vancomycin Trough 8.90 5.00 - 20.00 mcg/mL   Basic Metabolic Panel    Specimen: Arm, Right; Blood   Result Value Ref Range    Glucose 121 (H) 65 - 99 mg/dL    BUN 28 (H) 8 - 23 mg/dL    Creatinine 1.76 (H) 0.76 - 1.27 mg/dL    Sodium 141 136 - 145 mmol/L    Potassium 3.8 3.5 - 5.2 mmol/L    Chloride 107 98 - 107 mmol/L    CO2 19.0 (L) 22.0 - 29.0 mmol/L    Calcium 8.0 (L) 8.6 - 10.5 mg/dL    BUN/Creatinine Ratio 15.9 7.0 - 25.0    Anion Gap 15.0 5.0 - 15.0 mmol/L    eGFR 38.8 (L) >60.0 mL/min/1.73   Heparin Anti-Xa    Specimen: Arm, Right; Blood   Result Value Ref Range    Heparin Anti-Xa (UFH) 0.40 0.30 - 0.70 IU/ml   Lactic Acid, Plasma    Specimen: Blood   Result Value Ref Range    Lactate 2.4 (C) 0.5 - 2.0 mmol/L   aPTT    Specimen: Blood   Result Value Ref Range    PTT 44.6 (L) 60.0 - 90.0 seconds   Calcium, Ionized    Specimen: Blood    Result Value Ref Range    Ionized Calcium 1.12 1.12 - 1.32 mmol/L   Magnesium    Specimen: Blood   Result Value Ref Range    Magnesium 1.8 1.6 - 2.4 mg/dL   Phosphorus    Specimen: Blood   Result Value Ref Range    Phosphorus 5.4 (H) 2.5 - 4.5 mg/dL   CBC Auto Differential    Specimen: Blood   Result Value Ref Range    WBC 22.05 (H) 3.40 - 10.80 10*3/mm3    RBC 4.54 4.14 - 5.80 10*6/mm3    Hemoglobin 11.9 (L) 13.0 - 17.7 g/dL    Hematocrit 37.6 37.5 - 51.0 %    MCV 82.8 79.0 - 97.0 fL    MCH 26.2 (L) 26.6 - 33.0 pg    MCHC 31.6 31.5 - 35.7 g/dL    RDW 16.5 (H) 12.3 - 15.4 %    RDW-SD 50.2 37.0 - 54.0 fl    MPV 10.5 6.0 - 12.0 fL    Platelets 299 140 - 450 10*3/mm3    Neutrophil % 83.4 (H) 42.7 - 76.0 %    Lymphocyte % 11.3 (L) 19.6 - 45.3 %    Monocyte % 4.4 (L) 5.0 - 12.0 %    Eosinophil % 0.1 (L) 0.3 - 6.2 %    Basophil % 0.2 0.0 - 1.5 %    Immature Grans % 0.6 (H) 0.0 - 0.5 %    Neutrophils, Absolute 18.37 (H) 1.70 - 7.00 10*3/mm3    Lymphocytes, Absolute 2.50 0.70 - 3.10 10*3/mm3    Monocytes, Absolute 0.98 (H) 0.10 - 0.90 10*3/mm3    Eosinophils, Absolute 0.03 0.00 - 0.40 10*3/mm3    Basophils, Absolute 0.04 0.00 - 0.20 10*3/mm3    Immature Grans, Absolute 0.13 (H) 0.00 - 0.05 10*3/mm3    nRBC 0.0 0.0 - 0.2 /100 WBC   Scan Slide    Specimen: Arm, Right; Blood   Result Value Ref Range    Scan Slide     Cortisol    Specimen: Arm, Right; Blood   Result Value Ref Range    Cortisol 21.85   mcg/dL   Manual Differential    Specimen: Arm, Right; Blood   Result Value Ref Range    Neutrophil % 77.8 (H) 42.7 - 76.0 %    Lymphocyte % 6.1 (L) 19.6 - 45.3 %    Monocyte % 2.0 (L) 5.0 - 12.0 %    Eosinophil % 0.0 (L) 0.3 - 6.2 %    Basophil % 0.0 0.0 - 1.5 %    Bands %  14.1 (H) 0.0 - 5.0 %    Neutrophils Absolute 25.93 (H) 1.70 - 7.00 10*3/mm3    Lymphocytes Absolute 1.72 0.70 - 3.10 10*3/mm3    Monocytes Absolute 0.56 0.10 - 0.90 10*3/mm3    Eosinophils Absolute 0.00 0.00 - 0.40 10*3/mm3    Basophils Absolute 0.00 0.00 -  0.20 10*3/mm3    Watkins Cells Slight/1+ None Seen    WBC Morphology Normal Normal    Platelet Morphology Normal Normal   Comprehensive Metabolic Panel    Specimen: Arm, Right; Blood   Result Value Ref Range    Glucose 132 (H) 65 - 99 mg/dL    BUN 28 (H) 8 - 23 mg/dL    Creatinine 1.47 (H) 0.76 - 1.27 mg/dL    Sodium 141 136 - 145 mmol/L    Potassium 3.9 3.5 - 5.2 mmol/L    Chloride 105 98 - 107 mmol/L    CO2 18.0 (L) 22.0 - 29.0 mmol/L    Calcium 8.2 (L) 8.6 - 10.5 mg/dL    Total Protein 6.0 6.0 - 8.5 g/dL    Albumin 3.3 (L) 3.5 - 5.2 g/dL    ALT (SGPT) 806 (H) 1 - 41 U/L    AST (SGOT) 844 (H) 1 - 40 U/L    Alkaline Phosphatase 200 (H) 39 - 117 U/L    Total Bilirubin 0.9 0.0 - 1.2 mg/dL    Globulin 2.7 gm/dL    A/G Ratio 1.2 g/dL    BUN/Creatinine Ratio 19.0 7.0 - 25.0    Anion Gap 18.0 (H) 5.0 - 15.0 mmol/L    eGFR 48.2 (L) >60.0 mL/min/1.73   TSH    Specimen: Arm, Right; Blood   Result Value Ref Range    TSH 2.120 0.270 - 4.200 uIU/mL   Blood Gas, Arterial With Co-Ox    Specimen: Arterial Blood   Result Value Ref Range    Site Arterial Line     Osito's Test N/A     pH, Arterial 7.282 (L) 7.350 - 7.450 pH units    pCO2, Arterial 38.4 35.0 - 45.0 mm Hg    pO2, Arterial 72.7 (L) 83.0 - 108.0 mm Hg    HCO3, Arterial 18.1 (L) 20.0 - 26.0 mmol/L    Base Excess, Arterial -8.0 (L) 0.0 - 2.0 mmol/L    Hemoglobin, Blood Gas 12.3 (L) 13.5 - 17.5 g/dL    Hematocrit, Blood Gas 37.8 (L) 38.0 - 51.0 %    Oxyhemoglobin 91.2 (L) 94 - 99 %    Methemoglobin 0.50 0.00 - 1.50 %    Carboxyhemoglobin      CO2 Content 19.3 (L) 22 - 33 mmol/L    Temperature 37.0     Barometric Pressure for Blood Gas      Modality Ventilator     FIO2 30 %    Ventilator Mode VC+/AC     Set Tidal Volume 0.56     Rate 0 Breaths/minute    PEEP 5.0     PIP 0 cmH2O    IPAP 0     EPAP 0     pH, Temp Corrected 7.282 pH Units    pCO2, Temperature Corrected 38.4 35 - 48 mm Hg    pO2, Temperature Corrected 72.7 (L) 83 - 108 mm Hg   Basic Metabolic Panel    Specimen:  Blood   Result Value Ref Range    Glucose 108 (H) 65 - 99 mg/dL    BUN 31 (H) 8 - 23 mg/dL    Creatinine 1.94 (H) 0.76 - 1.27 mg/dL    Sodium 140 136 - 145 mmol/L    Potassium 3.8 3.5 - 5.2 mmol/L    Chloride 107 98 - 107 mmol/L    CO2 18.0 (L) 22.0 - 29.0 mmol/L    Calcium 8.0 (L) 8.6 - 10.5 mg/dL    BUN/Creatinine Ratio 16.0 7.0 - 25.0    Anion Gap 15.0 5.0 - 15.0 mmol/L    eGFR 34.6 (L) >60.0 mL/min/1.73   Heparin Anti-Xa    Specimen: Blood   Result Value Ref Range    Heparin Anti-Xa (UFH) 0.34 0.30 - 0.70 IU/ml   POC Glucose Once    Specimen: Blood   Result Value Ref Range    Glucose 279 (H) 70 - 130 mg/dL   POC Glucose Once    Specimen: Blood   Result Value Ref Range    Glucose 166 (H) 70 - 130 mg/dL   POC Glucose Once    Specimen: Blood   Result Value Ref Range    Glucose 129 70 - 130 mg/dL   POC Glucose Once    Specimen: Blood   Result Value Ref Range    Glucose 111 70 - 130 mg/dL   POC Glucose Once    Specimen: Blood   Result Value Ref Range    Glucose 105 70 - 130 mg/dL   POC Glucose Once    Specimen: Blood   Result Value Ref Range    Glucose 111 70 - 130 mg/dL   POC Glucose Once    Specimen: Blood   Result Value Ref Range    Glucose 134 (H) 70 - 130 mg/dL   POC Glucose Once    Specimen: Blood   Result Value Ref Range    Glucose 111 70 - 130 mg/dL   POC Glucose Once    Specimen: Blood   Result Value Ref Range    Glucose 143 (H) 70 - 130 mg/dL   POC Glucose Once    Specimen: Blood   Result Value Ref Range    Glucose 118 70 - 130 mg/dL   POC Glucose Once    Specimen: Blood   Result Value Ref Range    Glucose 127 70 - 130 mg/dL   POC Glucose Once    Specimen: Blood   Result Value Ref Range    Glucose 128 70 - 130 mg/dL   POC Glucose Once    Specimen: Blood   Result Value Ref Range    Glucose 116 70 - 130 mg/dL   POC Glucose Once    Specimen: Blood   Result Value Ref Range    Glucose 130 70 - 130 mg/dL   POC Glucose Once    Specimen: Blood   Result Value Ref Range    Glucose 124 70 - 130 mg/dL   POC Glucose  Once    Specimen: Blood   Result Value Ref Range    Glucose 145 (H) 70 - 130 mg/dL   POC Glucose Once    Specimen: Blood   Result Value Ref Range    Glucose 119 70 - 130 mg/dL   POC Glucose Once    Specimen: Blood   Result Value Ref Range    Glucose 115 70 - 130 mg/dL   POC Glucose Once    Specimen: Blood   Result Value Ref Range    Glucose 111 70 - 130 mg/dL   POC Glucose Once    Specimen: Blood   Result Value Ref Range    Glucose 117 70 - 130 mg/dL   POC Glucose Once    Specimen: Blood   Result Value Ref Range    Glucose 113 70 - 130 mg/dL   POC Glucose Once    Specimen: Blood   Result Value Ref Range    Glucose 116 70 - 130 mg/dL   POC Glucose Once    Specimen: Blood   Result Value Ref Range    Glucose 118 70 - 130 mg/dL   POC Glucose Once    Specimen: Blood   Result Value Ref Range    Glucose 117 70 - 130 mg/dL   POC Glucose Once    Specimen: Blood   Result Value Ref Range    Glucose 118 70 - 130 mg/dL   POC Glucose Once    Specimen: Blood   Result Value Ref Range    Glucose 104 70 - 130 mg/dL   POC Glucose Once    Specimen: Blood   Result Value Ref Range    Glucose 116 70 - 130 mg/dL   POC Glucose Once    Specimen: Blood   Result Value Ref Range    Glucose 118 70 - 130 mg/dL   POC Glucose Once    Specimen: Blood   Result Value Ref Range    Glucose 111 70 - 130 mg/dL   ECG 12 Lead ED Triage Standing Order; Chest Pain   Result Value Ref Range    QT Interval 620 ms    QTC Interval 669 ms   ECG 12 Lead ED Triage Standing Order; Chest Pain   Result Value Ref Range    QT Interval 114 ms    QTC Interval 147 ms   ECG 12 Lead Other; Post Cardiac Arrest - Therapeutic Hypothermia   Result Value Ref Range    QT Interval 524 ms    QTC Interval 577 ms   ECG 12 Lead Other; Post Cardiac Arrest - Therapeutic Hypothermia   Result Value Ref Range    QT Interval 632 ms    QTC Interval 692 ms   Green Top (Gel)   Result Value Ref Range    Extra Tube Hold for add-ons.    Lavender Top   Result Value Ref Range    Extra Tube hold for  add-on    Gold Top - SST   Result Value Ref Range    Extra Tube Hold for add-ons.    Gray Top   Result Value Ref Range    Extra Tube Hold for add-ons.    Light Blue Top   Result Value Ref Range    Extra Tube Hold for add-ons.      *Note: Due to a large number of results and/or encounters for the requested time period, some results have not been displayed. A complete set of results can be found in Results Review.        If labs were ordered, I independently reviewed the results and considered them in treating the patient.      EMERGENCY DEPARTMENT COURSE and DIFFERENTIAL DIAGNOSIS/MDM:   Vitals:  AS OF 11:43 EST    BP - (!) 45/34  HR - 75  TEMP - 99.1 °F (37.3 °C) (Esophageal)  O2 SATS - 92%        Discussion below represents my analysis of pertinent findings related to patient's condition, differential diagnosis, treatment plan and final disposition.      Differential diagnosis:  The differential diagnosis associated with the patient's presentation includes: acs, dysrhythmia, PE, ICH, electroltye abnormality      Independent interpretations (ECG/rhythm strip/X-ray/US/CT scan): I independently interpreted the pt CXR and cardiac monitor - ETT  in good position, pt is in V paced rhythm      Additional sources:  Discussed/obtained information from independent historians:   [] Spouse:   [] Parent:   [] Friend:   [x] EMS: Report was taken from EMS and is as noted in HPI   [] Other:  External (non-ED) record review:   [] Inpatient record:   [] Office record:   [] Outpatient record:   [] Prior Outpatient labs:   [] Prior Outpatient radiology:   [] Primary Care record:   [] Outside ED record:   [x] Other: I reviewed prior echo. Pt w EF less than 20 percent      Patient's care impacted by:   [] Diabetes   [] Hypertension   [x] Coronary Artery Disease   [] Cancer   [x] Other: CHF    Care significantly affected by Social Determinants of Health (housing and economic circumstances, unemployment)    [] Yes     [x] No   If yes,  Patient's care significantly limited by  Social Determinants of Health including:    [] Inadequate housing    [] Low income    [] Alcoholism and drug addiction in family    [] Problems related to primary support group    [] Unemployment    [] Problems related to employment    [] Other Social Determinants of Health:       Consideration of admission/observation vs discharge: Pt post arrest and req admission      I considered prescription management with:    [] Pain medication:   [] Antiviral:   [] Antibiotic:   [x] Other: Pt started on amio and levo infusion    ED Course:    ED Course as of 11/19/23 1143   Thu Nov 16, 2023   4799 I spoke w/ Dr. Tomlinson with cardiology.  He has personally reviewed the patient's ECG.  We discussed the patient's history including past echocardiogram and cardiac catheterization reports.  Does not feel this warrants emergent catheterization at this point.  Agrees with amiodarone, recommends heparin with pharmacy to dose, admission to ICU. [NS]      ED Course User Index  [NS] Melecio Gurrola MD         Pt regained pulse after one round of CPR. He was started on amio and required vaspopressors. He is being admitted to the ICU.        PROCEDURES:  CPR  Indication: cardiac arrest  Performed following ACLS guidelines   Narrative: Pt w/ ongiong CPR on arrival. 2 min of CPR was completed and 1 mg of epi was administered. At next pulse check, pt was found to have a strong pulse and rhythm on monitor.      CRITICAL CARE TIME    Approximately 35 minutes of discontinuous critical care time was provided to this patient by myself absent of any time spent performing procedures.  Patient presents critically ill with cardiogenic shock and acute hypoxic respiratory failure requiring the following interventions: IV fluids, IV vasopressors, sedation management, interpretation of labs/ECG/imaging, freq reassessment, specialist consultation, discussion with family members, coordination of ICU admission with  the following response: HD stabilization.  Patient at high risk of deterioration and possibly death without these interventions.      FINAL IMPRESSION      1. Cardiac arrest    2. History of coronary artery disease    3. History of CHF (congestive heart failure)    4. Ventricular tachycardia    5. History of hypertension          DISPOSITION/PLAN     ED Disposition       ED Disposition   Decision to Admit    Condition   --    Comment   Level of Care: Critical Care [6]   Admitting Physician: BAN GHOSH [[id number]]   Attending Physician: BAN GHOSH [[id number]]                   Comment: Please note this report has been produced using speech recognition software.      Melecio Gurrola MD  Attending Emergency Physician             Melecio Gurrola MD  11/19/23 2315

## 2023-11-17 PROBLEM — I50.23 ACUTE ON CHRONIC SYSTOLIC CONGESTIVE HEART FAILURE: Status: ACTIVE | Noted: 2023-01-01

## 2023-11-17 NOTE — PROGRESS NOTES
Intensive Care Follow-up     Hospital:  LOS: 1 day   Mr. Jonnie Roth, 78 y.o. male is followed for:   Cardiac arrest        History of present illness:   78-year-old male admitted this evening with an out of hospital cardiac arrest.     His history from a cardiac standpoint is significant for ischemic heart disease with prior LAD stents placement and a reduced ejection fraction for nearly 30 years.  His most recent ejection fraction appeared less than 20%.  The LA cavity was dilated and there was mild to moderate regurgitation present..     He has a history of sudden cardiac death with primary ventricular fibrillation and had an ICD in [place] 9/2000 period he underwent EP study with VT ablation on 9/7/2023.     His past history is also significant for dyslipidemia, GEO with CPAP use, hypothyroidism, prior pulmonary embolism in 2015, and remote smoking.  His most recent home medication regimen included Coreg, Plavix, Jardiance, Inspra, Entresto, Demadex, amiodarone, and Xarelto from a cardiac standpoint.     It was in this setting that his wife witnessed loss of consciousness and multiple ICD shocks this evening.  CPR was initiated and he was transported immediately to our ER.     On arrival to our ER CPR was in progress.  Epinephrine and amiodarone were given.  The patient was intubated.  Code proceeded from the patient's arrival at 1823 until 1845.  He was stabilized in ER and transferred to ICU and on arrival here is on drips consisting of amiodarone, norepinephrine, and propofol.  Not responsive to painful stimuli or voice.  He is triggering the ventilator but has no oculomotor reflex.      Subjective   Interval History:  Patient stable this morning.  Remains on pressors and mechanical ventilation.  Actively being cooled.             The patient's past medical, surgical and social history were reviewed and updated in Epic as appropriate.       Objective     Infusions:  EPINEPHrine, 0.02-0.3 mcg/kg/min,  Last Rate: Stopped (11/16/23 1957)  fentanyl 10 mcg/mL,  mcg/hr, Last Rate: 100 mcg/hr (11/17/23 1324)  heparin, 9 Units/kg/hr, Last Rate: 9 Units/kg/hr (11/17/23 1445)  insulin, 0-100 Units/hr, Last Rate: 0.3 Units/hr (11/17/23 1434)  norepinephrine, 0.02-0.3 mcg/kg/min, Last Rate: 0.2 mcg/kg/min (11/17/23 1447)  Pharmacy to Dose Heparin,   Pharmacy to dose vancomycin,   phenylephrine, 0.5-3 mcg/kg/min, Last Rate: Stopped (11/17/23 1447)  propofol, 5-50 mcg/kg/min, Last Rate: 45 mcg/kg/min (11/17/23 1433)      Medications:  artificial tears, , Both Eyes, Q2H  chlorhexidine, 15 mL, Mouth/Throat, Q12H  clopidogrel, 75 mg, Nasogastric, QAM  levothyroxine, 50 mcg, Nasogastric, Daily  pantoprazole, 40 mg, Intravenous, Q24H  piperacillin-tazobactam, 3.375 g, Intravenous, Q8H  [START ON 11/18/2023] vancomycin (dosing per levels), , Does not apply, Daily      I reviewed the patient's medications.    Vital Sign Min/Max for last 24 hours  Temp  Min: 96.1 °F (35.6 °C)  Max: 99.2 °F (37.3 °C)   BP  Min: 53/41  Max: 142/94   Pulse  Min: 56  Max: 76   Resp  Min: 14  Max: 25   SpO2  Min: 82 %  Max: 100 %   No data recorded       Input/Output for last 24 hour shift  11/16 0701 - 11/17 0700  In: 1213.5 [I.V.:638.5]  Out: 985 [Urine:985]   FiO2 (%):  [30 %-100 %] 30 %  S RR:  [14] 14  PEEP/CPAP (cm H2O):  [5 cm H20] 5 cm H20  MAP (cm H2O):  [7.2-15] 13  GENERAL : Sedated, lying in bed, NAD  RESPIRATORY/THORAX : ET tube in place, Coarse breath sounds bilaterally  CARDIOVASCULAR : Normal S1/S2, RRR. 1+ lower ext edema.  GASTROINTESTINAL : Soft, NT/ND. BS x 4 normoactive. No hepatosplenomegaly.  MUSCULOSKELETAL : No cyanosis, clubbing, or ischemia  NEUROLOGICAL: Sedated    Results from last 7 days   Lab Units 11/17/23  1310 11/17/23 0626 11/17/23  0011   WBC 10*3/mm3 16.30* 16.51* 20.69*   HEMOGLOBIN g/dL 12.8* 11.7* 12.1*   PLATELETS 10*3/mm3 300 288 282     Results from last 7 days   Lab Units 11/17/23  1310 11/17/23 0626  11/17/23  0011   SODIUM mmol/L 139 139 135*  136   POTASSIUM mmol/L 4.1 4.2 3.9  4.0   CO2 mmol/L 20.0* 21.0* 20.0*  19.0*   BUN mg/dL 27* 30* 31*  32*   CREATININE mg/dL 1.14 1.28* 1.45*  1.42*   MAGNESIUM mg/dL 2.1 2.0 1.9   PHOSPHORUS mg/dL 3.4 3.4 4.1   GLUCOSE mg/dL 133* 123* 188*  192*     Estimated Creatinine Clearance: 56.6 mL/min (by C-G formula based on SCr of 1.14 mg/dL).    Results from last 7 days   Lab Units 11/17/23  0350   PH, ARTERIAL pH units 7.404   PCO2, ARTERIAL mm Hg 36.1   PO2 ART mm Hg 116.0*       I reviewed the patient's new clinical results.  I reviewed the patient's new imaging results/reports including actual images and agree with reports.       Imaging Results (Last 24 Hours)       Procedure Component Value Units Date/Time    XR Chest 1 View [[id number]] Collected: 11/16/23 2151     Updated: 11/16/23 2158    Narrative:      XR CHEST 1 VW    Date of Exam: 11/16/2023 9:24 PM EST    Indication: Cenrtral line and Esophageal probe placement    Comparison: 11/16/2023 from earlier in the day, portable chest radiograph dated 8/13/2023    Findings:  Cardiac silhouette remains prominent. Mediastinal silhouette is within normal limits. Pulmonary vascularity is within range of normal. There is a 3-lead pacemaker in place. Tip of an ET tube is 6.8 cm above the simran. An esophageal probe is present with   the tip overlying the T7 vertebral body level. NG tube courses below the diaphragm right IJ central venous catheter is present with the tip in the lower SVC. No pneumothorax. The lungs are grossly clear. The left costophrenic angle is not on the image.   The trachea is midline. There are degenerative changes in the glenohumeral joints bilaterally.      Impression:      Impression:  1.Support lines and tubes are in good position as described above. No pneumothorax.  2.The left costophrenic angle is not on the image. The lungs are grossly clear.        Electronically Signed: Neal Leon DO     11/16/2023 9:55 PM EST    Workstation ID: [id number]    XR Abdomen KUB [[id number]] Collected: 11/16/23 2153     Updated: 11/16/23 2157    Narrative:      XR ABDOMEN KUB    Date of Exam: 11/16/2023 9:24 PM EST    Indication: NG    Comparison: None available.    Findings:  A gastric tube is present. The tip is in the distal esophagus just above the diaphragm. Visualized bowel gas pattern appears within normal limits      Impression:      Impression:  Gastric tube in the distal esophagus. Recommend advancing the tube at least 10 cm to ensure that both the tip and sideport are in the stomach      Electronically Signed: José Miguel Jordan    11/16/2023 9:54 PM EST    Workstation ID: [id number]    CT Head Without Contrast [[id number]] Collected: 11/16/23 2019     Updated: 11/16/23 2026    Narrative:      CT HEAD WO CONTRAST    Date of Exam: 11/16/2023 8:09 PM EST    Indication: Neuro deficit(s), subacute  Target Temperature Management Initiation. Code    Comparison: CT head without contrast 12/8/2022    Technique: Axial CT images were obtained of the head without contrast administration.  Automated exposure control and iterative construction methods were used.    Findings:  Cerebral atrophy stable from prior study. Mild white matter findings suggesting chronic microvascular disease. No intracranial hemorrhage. No mass effect or midline shift. Posterior fossa without acute abnormality. No abnormal extra-axial fluid   collection. Globes intact and symmetric. Thinning of the orbital lens bilaterally. The mastoid air cells are well-aerated. There is sinus thickening in the maxillary sinuses and ethmoid air cells with chronic mucoperiosteal thickening in the maxillary   sinuses left greater than right. No calvarial fracture. ET tube noted on the  radiograph.      Impression:      Impression:  1. No acute intracranial abnormality.  2. Cerebral atrophy and white matter findings of chronic microvascular disease.  3. Paranasal sinus  disease.        Electronically Signed: Kevin Laureano MD    11/16/2023 8:23 PM EST    Workstation ID: [id number]    XR Chest 1 View [[id number]] Collected: 11/16/23 1858     Updated: 11/16/23 1904    Narrative:      XR CHEST 1 VW    Date of Exam: 11/16/2023 6:34 PM EST    Indication: Chest Pain Triage Protocol    Comparison: 8/13/2023    Findings:  There is an endotracheal tube that is 5 cm above the simran. There is pronounced artifact from a compression device obscuring the lung bases. Heart size appears enlarged. The peripheral pulmonary vessels are indistinct and there is haziness of the lungs.   No gross pneumothorax is demonstrated      Impression:      Impression:    1. Endotracheal tube 5 cm above the simran  2. Limited study due to artifact from overlying device. There is evidence of mild pulmonary edema. No gross pneumothorax is visualized      Electronically Signed: José Miguel Jordan    11/16/2023 7:01 PM EST    Workstation ID: [id number]            Assessment & Plan   Impression        Cardiac arrest    Coronary artery disease involving native coronary artery of native heart with angina pectoris    Recurrent monomorphic ventricular tachycardia    GEO on nocturnal CPAP    Hypothyroidism (acquired)    Type 2 diabetes mellitus with HbA1c 5.8, without long-term current use of insulin    Elevated LFTs (R/O shock liver)    ICD (implantable cardioverter-defibrillator) discharge    Acute on chronic systolic congestive heart failure    Cardiac resynchronization therapy defibrillator (CRT-D) in place    COVID-19 virus detected       Plan        78-year-old male with a past medical history significant for coronary artery disease, systolic heart failure, hypothyroidism, diabetes mellitus type 2, ICD is in place, and obstructive sleep apnea.  Who presented to [facility] ICU on 11/17/2023 after a cardiac arrest for a prolonged time.  Undergoing active targeted temperature management.  Was found to have COVID-19  roughly a week ago.    -Continue mechanical ventilation and wean to an oxygen saturation greater than 88%  -Continue current sedation, will adjust once patient no longer on targeted management  -Continue targeted tension management protocol  -Continue heparin drip and amiodarone drip for atrial fibrillation and anticoagulation  -Wean pressors as able to maintain a saturation above 65%  -Continue insulin drip for now per protocol  -Continue vancomycin and Zosyn  -I explained to the patient's family that ultimately once his targeted management has been completed.  We can assess his neurological status.  -A.m. labs    I conducted multidisciplinary rounds in the plan of care was discussed with the multidisciplinary team at that time. In attendance at multidisciplinary rounds was clinical pharmacist, dietitian, nursing staff, and case management.    I discussed the patient's findings and my recommendations with family and nursing staff     Critical Care time spent in direct patient care: 35 minutes (excluding procedure time, if applicable) including high complexity decision making to assess, manipulate, and support vital organ system failure in this individual who has impairment of one or more vital organ systems such that there is a high probability of imminent or life threatening deterioration in the patient's condition.      Farzana Dickens, DO  Pulmonary, Critical care and Sleep Medicine

## 2023-11-17 NOTE — PROGRESS NOTES
Cardiology Progress Note      Reason for visit:    Out of hospital cardiac arrest/ICD discharge  Recurrent monomorphic ventricular tachycardia    IDENTIFICATION: 88-year-old gentleman who resides in [place], [place]    Active Hospital Problems    Diagnosis  POA    **Cardiac arrest [I46.9]  Yes     Priority: High    COVID-19 virus detected [U07.1]  Yes     Priority: High     Positive test 11/10/2023  For symptoms roughly 11/7/2023  Probable exposure 11/5/2023      ICD (implantable cardioverter-defibrillator) discharge [Z45.02]  Not Applicable     Priority: High    Type 2 diabetes mellitus with HbA1c 5.8, without long-term current use of insulin [E11.9]  Yes     Priority: High    Recurrent monomorphic ventricular tachycardia [I47.20]  Yes     Priority: High     Sudden cardiac death with primary VF status post ICD, September 2020  Multiple ICD discharges for VF/flutter/tachycardia  Amiodarone therapy initiated 2012 and 2015  EP study with radiofrequency ablation of large LV scar, 5/21/2015  [facility] admission for recurrent VT/VF status post shock after ineffective ATP, 9/10/2022  [facility] admission for recurrent ventricular arrhythmia on amiodarone in the setting of influenza, 12/8/2022  ER study and RFA of ischemic VT x2 and left ventricular scar substrate modification by Ferdinand Alba, 9/7/2023      Coronary artery disease involving native coronary artery of native heart with angina pectoris [I25.119]  Yes     Priority: High     Cardiac catheterization for anterior MI (4/1995):  PCI of the proximal/mid LAD, 4/1995.  LVEF 35%  Cardiac catheterization for NSTEMI (2/1/2014): Moderate RCA stenosis.  Widely patent LAD stents.  LVEF 25%.  Cardiac catheterization by Dr. Ayala (4/20/2015): PCI of distal LAD.  Cardiac catheterization (1/16/2017): Nonobstructive CAD.  LVEF <20%.  Normal hemodynamics.      Chronic systolic congestive heart failure [I50.22]  Yes     Priority: High     Large anterior MI with LVEF 35%,  1995  Echo (11/8/2019): LVEF 30%.  Akinesis/dyskinesis of the mid to distal anterior wall and apex.  Echo (9/10/2022): LVEF < 20%.  Dilated left ventricle with diffuse hypokinesis and a kinesis of the mid to distal anterior segments and apex  Echo (5/2/2023): LVEF <20%.  Dilated left ventricle.  Anterior and apical akinesis.  Mild to moderate mitral regurgitation      Cardiac resynchronization therapy defibrillator (CRT-D) in place [Z95.810]  Yes     Priority: Medium     CRT ICD in situ with history of multiple therapies in the past      Elevated LFTs (R/O shock liver) [R79.89]  Yes     Priority: Medium    Hypothyroidism (acquired) [E03.9]  Yes     Priority: Low    Chronic HFrEF (heart failure with reduced ejection fraction) [I50.22]  Yes     Large anterior MI with LVEF 35%, 1995  Echo (11/8/2019): LVEF 30%.  Akinesis/dyskinesis of the mid to distal anterior wall and apex.  Echo (9/10/2022): LVEF < 20%.  Dilated left ventricle with diffuse hypokinesis and a kinesis of the mid to distal anterior segments and apex  Echo (5/2/2023): LVEF <20%.  Dilated left ventricle.  Anterior and apical akinesis.  Mild to moderate mitral regurgitation      GEO on nocturnal CPAP [G47.33]  Yes            Patient sedated with fentanyl and Dirpivan and intubated.  IV Levophed drip is infusing.  He is currently being cooled for target temperature management postcardiac arrest.   His wife is at bedside.  He is currently hemodynamically stable.  No ventricular arrhythmias have been noted overnight.  He is currently biventricular paced.  He is  in airborne precautions for COVID-19.  According to the wife they were both diagnosed with COVID last week.  Initially the patient did not feel that bad but for the past 2 days has been very fatigued and mostly in the bed.           Vital Sign Min/Max for last 24 hours  Temp  Min: 96.1 °F (35.6 °C)  Max: 99.2 °F (37.3 °C)   BP  Min: 53/41  Max: 142/94   Pulse  Min: 56  Max: 76   Resp  Min: 14  Max: 25    SpO2  Min: 82 %  Max: 100 %   No data recorded      Intake/Output Summary (Last 24 hours) at 11/17/2023 0857  Last data filed at 11/17/2023 0628  Gross per 24 hour   Intake 1213.48 ml   Output 985 ml   Net 228.48 ml           Physical Exam  Constitutional:       Appearance: He is normal weight.      Interventions: He is sedated and intubated.   Neck:      Vascular: No JVD.   Cardiovascular:      Rate and Rhythm: Normal rate and regular rhythm.      Heart sounds: No murmur heard.     Comments: Biventricular paced  Pulmonary:      Effort: Pulmonary effort is normal. He is intubated.      Breath sounds: Normal breath sounds.   Musculoskeletal:         General: No swelling.   Skin:     General: Skin is cool and dry.         Tele:  Paced    Results Review (reviewed the patient's recent labs in the electronic medical record):     EKG (11/17/2023): Paced rhythm    CXR (11/16/2023): Lungs are clear    ECHO (5/3/2023): LVEF less than 20%.  Mild to moderate MR.  RVSP less than 35 mmHg    Results from last 7 days   Lab Units 11/17/23 0626 11/17/23 0011 11/16/23 2111 11/16/23  1832   SODIUM mmol/L 139 135*  136 135* 135*   POTASSIUM mmol/L 4.2 3.9  4.0 3.2* 3.5   CHLORIDE mmol/L 105 100  100 99 97*   BUN mg/dL 30* 31*  32* 30* 25*   CREATININE mg/dL 1.28* 1.45*  1.42* 1.25 1.21   MAGNESIUM mg/dL 2.0 1.9 2.0  --      Results from last 7 days   Lab Units 11/16/23 2111 11/16/23  1832   HSTROP T ng/L 1,074* 199*     Results from last 7 days   Lab Units 11/17/23  0626 11/17/23 0011 11/16/23 2111   WBC 10*3/mm3 16.51* 20.69* 15.23*   HEMOGLOBIN g/dL 11.7* 12.1* 12.2*   HEMATOCRIT % 37.1* 38.0 37.8   PLATELETS 10*3/mm3 288 282 271       Lab Results   Component Value Date    HGBA1C 5.60 08/31/2023       Lab Results   Component Value Date    CHOL 202 (H) 09/26/2023    CHLPL 116 01/30/2014    TRIG 149 09/26/2023    HDL 46 09/26/2023     (H) 09/26/2023              Witnessed cardiac arrest   Witnessed loss of  consciousness and possible multiple ICD shocks.  CPR initiated and transported to the emergency room, 11/16/2020  Recent URI and positive for COVID-19.    Respiratory distress status post intubation     Acute on Chronic HFrEF (heart failure with reduced ejection fraction)  Large anterior MI with LVEF 35%, 1995  Last echo 5/2023 LVEF less than 20%.  Mild to moderate MR     Coronary artery disease involving native coronary artery of native heart with angina pectoris  History of MI and PCI.  Last cath 2017 nonobstructive CAD in previously placed stents patent  Received full-strength aspirin through NG tube on arrival  On Plavix 75 mg daily through NG  Takes Xarelto at home but is currently on hold.  IV heparin not been initiated due to anti-- Xa levels.  Will start IV heparin when level indicates it is appropriate to do so.     Recurrent monomorphic ventricular tachycardia  Sudden cardiac death with primary VF status post ICD, September 2020  Multiple ICD discharges for VF/flutter/tachycardia  History of ablation for large LV scar.  Last ablation 9/2023 with Dr. Alba   ICD shock with cardiac arrest this admission   Long-term amiodarone use.  Amiodarone drip discontinued this admission by Dr. Tomlinson for EKG changes    Covid-19  Positive for COVID-19 this admission           Targeted temperature management per protocol.  Rewarming scheduled for 10 PM tonight  Takes Xarelto at home but currently on hold.  IV heparin will be initiated based on anti-- Xa levels.  Pharmacy following  Will have device interrogated and have EP to evaluate and weigh in on whether to reinitiate IV amiodarone.    Electronically signed by CHENTE Rodriguez, 11/17/23, 8:56 AM EST.

## 2023-11-17 NOTE — CASE MANAGEMENT/SOCIAL WORK
Continued Stay Note  [facility] [place] [facility]     Patient Name: Jonnie Roth  MRN: [id number]  Today's Date: 11/17/2023    Admit Date: 11/16/2023    Plan: Unknown   Discharge Plan       Row Name 11/17/23 1440       Plan    Plan Unknown    Patient/Family in Agreement with Plan yes    Plan Comments Due to the medical fragility of Mr. Roth, an IDP/DCP is not appropriate at this time. CM continues to follow.    Final Discharge Disposition Code 30 - still a patient                   Discharge Codes    No documentation.                 Expected Discharge Date and Time       Expected Discharge Date Expected Discharge Time    Nov 24, 2023               Flaquito Mcelroy RN

## 2023-11-17 NOTE — H&P
Chief complaint: Witnessed out of hospital cardiac arrest    Subjective     78-year-old male admitted this evening with an out of hospital cardiac arrest.    His history from a cardiac standpoint is significant for ischemic heart disease with prior LAD stents placement and a reduced ejection fraction for nearly 30 years.  His most recent ejection fraction appeared less than 20%.  The LA cavity was dilated and there was mild to moderate regurgitation present..    He has a history of sudden cardiac death with primary ventricular fibrillation and had an ICD in [place] 9/2000 period he underwent EP study with VT ablation on 9/7/2023.    His past history is also significant for dyslipidemia, GEO with CPAP use, hypothyroidism, prior pulmonary embolism in 2015, and remote smoking.  His most recent home medication regimen included Coreg, Plavix, Jardiance, Inspra, Entresto, Demadex, amiodarone, and Xarelto from a cardiac standpoint.    It was in this setting that his wife witnessed loss of consciousness and multiple ICD shocks this evening.  CPR was initiated and he was transported immediately to our ER.    On arrival to our ER CPR was in progress.  Epinephrine and amiodarone were given.  The patient was intubated.  Code proceeded from the patient's arrival at 1823 until 1845.  He was stabilized in ER and transferred to ICU and on arrival here is on drips consisting of amiodarone, norepinephrine, and propofol.  Not responsive to painful stimuli or voice.  He is triggering the ventilator but has no oculomotor reflex.      History  Past Medical History:   Diagnosis Date    Arthritis     BPH (benign prostatic hypertrophy) 12/28/2016    CAD (coronary artery disease)     Congenital heart disease 1998    Disease of thyroid gland     Enlarged prostate     GERD (gastroesophageal reflux disease)     HFrEF (heart failure with reduced ejection fraction)     History of transfusion     NO REACTION RECALLED     Hypertension      LBBB (left bundle branch block) 12/28/2016    Lung nodule     Macular degeneration     Pancreatic cyst 12/08/2022    Polio     as a child    Post-traumatic subdural hematoma 09/10/2022    Small subdural hematoma following fall due to ventricular arrhythmia, 9/10/2022  Follow-up CT head showing no extension of SDH, 8/19/2022    Pulmonary embolism     Pulmonary emphysema 12/28/2016    Sepsis     A. Secondary to [place] trauma with hematuria and urosepsis requiring hospitalization May 2015    Sleep apnea with use of continuous positive airway pressure (CPAP)     CPAP- SETTING 4     Ventricular tachycardia 12/28/2016    Wears glasses     READERS     Past Surgical History:   Procedure Laterality Date    BIVENTRICULLAR IMPLANTABLE CARDIOVERTER DEFIBRILLATOR PLACEMENT      CARDIAC CATHETERIZATION N/A 01/16/2017    Procedure: Left Heart Cath;  Surgeon: Diego Ayala MD;  Location:  [facility];  Service:     CARDIAC DEFIBRILLATOR PLACEMENT       A. ICD generator change out with upgrade to BiV pacemaker implantable cardioverter    defibrillator, 12/17/2007. Ventricular fibrillation s/p pacesetter [employer] ICD in [place] 09/2000    CARDIAC ELECTROPHYSIOLOGY PROCEDURE N/A 10/02/2017    Procedure: BIV ICD  generator change SJ;  Surgeon: Ferdinand Alba MD;  Location:  [facility];  Service:     CARDIAC ELECTROPHYSIOLOGY PROCEDURE N/A 07/06/2021    Procedure: BIV ICD generator change with [employer]. This will need to be done in late June or early July. Hold Xarelto one day prior.;  Surgeon: Ferdinand Alba MD;  Location:  [facility];  Service: Cardiology;  Laterality: N/A;    CARDIAC ELECTROPHYSIOLOGY PROCEDURE N/A 9/7/2023    Procedure: Ablation VT;  Surgeon: Ferdinand Alba MD;  Location:  [facility];  Service: Cardiovascular;  Laterality: N/A;  with general anesthesia; hold Xarelto 2 days prior    CAROTID STENT  Several    CATARACT EXTRACTION       "Bilateral     COLONOSCOPY      CORONARY ANGIOPLASTY WITH STENT PLACEMENT      X7 STENTS TOTAL     CYSTOSCOPY URETERAL TUMOR FULGURATION  2015     A. Status post cystoscopy with clot evacuation and fulguration of the prostate secondary to hematuria, 2015.    FOOT SURGERY      RIGHT - POLIO RELATED     INSERT / REPLACE / REMOVE PACEMAKER  2002    PROSTATE SURGERY      A. Status post laser vaporization, 2009.    ROOT CANAL      TOOTH EXTRACTION       Family History   Problem Relation Age of Onset    Heart failure Father     Stroke Father      Social History     Tobacco Use    Smoking status: Former     Packs/day: 2.00     Years: 30.00     Additional pack years: 0.00     Total pack years: 60.00     Types: Cigarettes     Start date: 1960     Quit date: 1995     Years since quittin.6     Passive exposure: Past    Smokeless tobacco: Never    Tobacco comments:     Heavy smoker for years   Vaping Use    Vaping Use: Never used   Substance Use Topics    Alcohol use: No    Drug use: No       Review of Systems   Review of systems could not be obtained due to   patient unresponsive.      Objective     Vital Signs  Temp:  [97.7 °F (36.5 °C)-99.2 °F (37.3 °C)] 97.7 °F (36.5 °C)  Heart Rate:  [61-75] 61  Resp:  [14-21] 20  BP: ()/(45-94) 77/45  FiO2 (%):  [35 %-100 %] 35 %    Physical Exam:    Objective:  General Appearance:  Ill-appearing.    Vital signs: (most recent): Blood pressure (!) 77/45, pulse 61, temperature 97.7 °F (36.5 °C), temperature source Oral, resp. rate 20, height 185.4 cm (73\"), weight 75.8 kg (167 lb), SpO2 94%.    HEENT: (Oral ET tube  NG tube)    Lungs:  No rales, wheezes or rhonchi.    Heart: Normal rate.  Regular rhythm.  S1 normal and S2 normal.  No murmur, gallop or friction rub.   Chest: Symmetric chest wall expansion.   Abdomen: Abdomen is soft and non-distended.  Hypoactive bowel sounds.   There is no mass. There is no splenomegaly. There is no hepatomegaly. "   Extremities: There is no deformity or dependent edema.    Neurological: (Spontaneously triggering ventilator  No response to painful stimuli  No ocular motor reflex  Eyes deviated upward).    Skin:  Warm and dry.                Results Review:    I reviewed the patient's new clinical results.  I reviewed the patient's new imaging results and agree with the interpretation.  I reviewed the patient's other test results and agree with the interpretation  I personally viewed and interpreted the patient's EKG/Telemetry data    Assessment & Plan     Assessment:    Active Hospital Problems    Diagnosis     **Cardiac arrest     COVID-19 virus detected     Cardiac resynchronization therapy defibrillator (CRT-D) in place     Elevated LFTs (R/O shock liver)     Hypothyroidism (acquired)     Type 2 diabetes mellitus with HbA1c 5.8, without long-term current use of insulin     GEO on nocturnal CPAP     Recurrent monomorphic ventricular tachycardia     Coronary artery disease involving native coronary artery of native heart with angina pectoris     Chronic systolic congestive heart failure        78-year-old male with extensive cardiac history as outlined above in history.  He has ischemic cardiomyopathy with an EF of less than 20% and ICD in place with a history of ventricular fibrillation and ventricular tachycardia with a recent EP study with VT ablation in September of this year.  Past medical history also consists of dyslipidemia, GEO on CPAP, hypothyroidism, and remote PE.    He presents with an out of hospital witnessed arrest with ICD shocks and prolonged CPR.  Admitted to ICU on pressor therapy.  Rhythm is paced.  Neurologic status is impaired with no response to pain or oculomotor reflex though he is triggering the ventilator.  No evidence of an acute large vessel STEMI requiring cardiac intervention the case was discussed with Dr. Tomlinson by Dr. Gurrola in the ER.    His wife tells me that he was exposed to COVID from a  family member on 11/5 and developed symptoms 2 or 3 days later and had a positive test on 11/10.  They have been isolating and he was not able to take Paxlovid due to his amiodarone.  They did receive the most recent booster.  His only symptoms were fatigue and some cough.    Admitted to ICU for ongoing pressor and ventilator support as well as initiation of TTM and trending of neurologic status.    Plan:    ICU admission  TTM protocol.  36 °C targeted temperature due to comorbid cardiac conditions, shock, and risk of arrhythmias  Central line placement  Arterial line placement  Amiodarone drip  Heparin drip  Wean pressors as tolerated  Insulin drip with target blood sugar 140-180  Cardiology consultation  Neurology consultation once normothermia restored  Neurologic prognosis guarded given prolonged CPR and current coma  Respiratory panel including COVID as well as a rapid antigen.  If the rapid antigen was negative and given that this is day 10 after symptoms I would be comfortable with discontinuing isolation.  I do not think his critical illness is related to his recent reported COVID infection.  Discussed immediate plans and guarded neurologic prognosis with his wife at the bedside.  She was comfortable with an aggressive stance for now.    I discussed the patients findings and my recommendations with family and nursing staff.     Critical Care time spent in direct patient care: 60 minutes (excluding procedure time, if applicable) including high complexity decision making to assess, manipulate, and support vital organ system failure in this individual who has impairment of one or more vital organ systems such that there is a high probability of imminent or life threatening deterioration in the patient’s condition.      Curtis Durant MD  Pulmonary and Critical Care Medicine  11/16/23  23:11 EST

## 2023-11-17 NOTE — SIGNIFICANT NOTE
Orders received and chart consult completed.  Pt currently intubated. Will place on hold and monitor for readiness.

## 2023-11-17 NOTE — PROGRESS NOTES
"Pharmacy Consult-Vancomycin Dosing  Jonnie Roth is a  78 y.o. male receiving vancomycin therapy.     Indication: sepsis  Consulting Provider: intensivist  ID Consult: No    Goal Trough: 15-20    Current Antimicrobial Therapy  Anti-Infectives (From admission, onward)      Ordered     Dose/Rate Route Frequency Start Stop    11/17/23 0122  vancomycin (dosing per levels)        Ordering Provider: Bonilla Hutton PharmD   See [facility] for full Linked Orders Report.     Does not apply Daily 11/18/23 0900 11/22/23 0859    11/17/23 0111  piperacillin-tazobactam (ZOSYN) 3.375 g in iso-osmotic dextrose 50 ml (premix)        Ordering Provider: Deloris Sahni APRN    3.375 g  over 4 Hours Intravenous Every 8 Hours 11/17/23 0800 11/22/23 0759    11/17/23 0122  vancomycin IVPB 1500 mg in 0.9% NaCl (Premix) 500 mL        Ordering Provider: Bonilla Hutton PharmD    20 mg/kg × 75.8 kg  333.3 mL/hr over 90 Minutes Intravenous Once 11/17/23 0215      11/17/23 0111  piperacillin-tazobactam (ZOSYN) 3.375 g in iso-osmotic dextrose 50 ml (premix)        Ordering Provider: Deloris Sahni APRN    3.375 g  over 30 Minutes Intravenous Once 11/17/23 0200      11/17/23 0111  Pharmacy to dose vancomycin        Ordering Provider: Deloris Sahni APRN     Does not apply Continuous PRN 11/17/23 0111 11/22/23 0110            Allergies  Allergies as of 11/16/2023    (No Known Allergies)       Labs  Results from last 7 days   Lab Units 11/17/23  0011 11/16/23 2111 11/16/23  1832   BUN mg/dL 31*  32* 30* 25*   CREATININE mg/dL 1.45*  1.42* 1.25 1.21     Results from last 7 days   Lab Units 11/17/23  0011 11/16/23  2111 11/16/23  1832   WBC 10*3/mm3 20.69* 15.23* 14.14*       Evaluation of Dosing  Last Dose Received in the ED/Outside Facility:        N/A  Is Patient on Dialysis or Renal Replacement:        No    Ht - 185.4 cm (73\")  Wt - 75.8 kg (167 lb)    Estimated Creatinine Clearance: 45.2 mL/min (A) (by C-G formula " based on SCr of 1.42 mg/dL (H)).  Intake & Output (last 3 days)         11/14 0701  11/15 0700 11/15 0701 11/16 0700 11/16 0701 11/17 0700    I.V. (mL/kg)   255 (3.4)    Total Intake(mL/kg)   255 (3.4)    Urine (mL/kg/hr)   575    Total Output   575    Net   -320                   Microbiology and Radiology  Microbiology Results (last 10 days)       Procedure Component Value - Date/Time    Respiratory Panel PCR w/COVID-19(SARS-CoV-2) [facility] In-House, NP Swab in UTM/VTM, 2 HR TAT - Swab, Nasopharynx [[id number]]  (Abnormal) Collected: 11/16/23 2351    Lab Status: Final result Specimen: Swab from Nasopharynx Updated: 11/17/23 0101     ADENOVIRUS, PCR Not Detected     Coronavirus 229E Not Detected     Coronavirus HKU1 Not Detected     Coronavirus NL63 Not Detected     Coronavirus OC43 Not Detected     COVID19 Detected     Human Metapneumovirus Not Detected     Human Rhinovirus/Enterovirus Not Detected     Influenza A PCR Not Detected     Influenza B PCR Not Detected     Parainfluenza Virus 1 Not Detected     Parainfluenza Virus 2 Not Detected     Parainfluenza Virus 3 Not Detected     Parainfluenza Virus 4 Not Detected     RSV, PCR Not Detected     Bordetella pertussis pcr Not Detected     Bordetella parapertussis PCR Not Detected     Chlamydophila pneumoniae PCR Not Detected     Mycoplasma pneumo by PCR Not Detected    Narrative:      In the setting of a positive respiratory panel with a viral infection PLUS a negative procalcitonin without other underlying concern for bacterial infection, consider observing off antibiotics or discontinuation of antibiotics and continue supportive care. If the respiratory panel is positive for atypical bacterial infection (Bordetella pertussis, Chlamydophila pneumoniae, or Mycoplasma pneumoniae), consider antibiotic de-escalation to target atypical bacterial infection.          Vancomycin Levels:                Assessment/Plan:  Will initiate dose at 1500 mg IV  once  Given recent cardiac arrest and initiation on targeted temperature management protocol, I expect clinical status and renal function to dynamic over the next couple of days.  I will hold off on ordering maintenance doses for now.  Vancomycin random level ordered 11/18 at 06:00.  Pharmacy will continue to monitor and order further vancomycin doses patient on vancomycin random level and clinical status.    Pharmacy will order additional vancomycin doses based on vancomycin random level result.    Bonilla Hutton, PharmD, BCPS  11/17/2023  01:24 EST

## 2023-11-17 NOTE — PROGRESS NOTES
Clinical Nutrition     Nutrition Support Assessment  Reason for Visit: MDR, Identified at risk by screening criteria      Patient Name: Jonnie Roth  YOB: 1945  MRN: [id number]  Date of Encounter: 11/17/23 16:09 EST  Admission date: 11/16/2023    Nutrition Assessment   Admission Diagnosis:  Cardiac arrest [I46.9]      Problem List:    Cardiac arrest    Coronary artery disease involving native coronary artery of native heart with angina pectoris    Recurrent monomorphic ventricular tachycardia    GEO on nocturnal CPAP    Hypothyroidism (acquired)    Type 2 diabetes mellitus with HbA1c 5.8, without long-term current use of insulin    Elevated LFTs (R/O shock liver)    ICD (implantable cardioverter-defibrillator) discharge    Acute on chronic systolic congestive heart failure    Cardiac resynchronization therapy defibrillator (CRT-D) in place    COVID-19 virus detected        PMH:   He  has a past medical history of Arthritis, BPH (benign prostatic hypertrophy) (12/28/2016), CAD (coronary artery disease), Congenital heart disease (1998), Disease of thyroid gland, Enlarged prostate, GERD (gastroesophageal reflux disease), HFrEF (heart failure with reduced ejection fraction), History of transfusion, Hypertension, LBBB (left bundle branch block) (12/28/2016), Lung nodule, Macular degeneration, Pancreatic cyst (12/08/2022), Polio, Post-traumatic subdural hematoma (09/10/2022), Pulmonary embolism, Pulmonary emphysema (12/28/2016), Sepsis, Sleep apnea with use of continuous positive airway pressure (CPAP), Ventricular tachycardia (12/28/2016), and Wears glasses.    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    Applicable Interval History:   [facility]  ARF/Intubated 11-16-23  [facility] protocol initiated 11-16-23      Reported/Observed/Food/Nutrition Related History:     Pt intubated, sedated, currently on cooling phase of [place]   + fentanyl, propofol 20.5ml, insulin, heparin, levophed 0.2mcg    Per RN: pt will respond to stimuli, but not purposefully, will bite down with oral care, plan to rewarm ~10:30pm        Labs    Labs Reviewed: Yes     Results from last 7 days   Lab Units 11/17/23  1310 11/17/23  0626 11/17/23  0417 11/17/23 0011 11/16/23 2111 11/16/23  1832   GLUCOSE mg/dL 133* 123*  --  188*  192*   < > 197*   BUN mg/dL 27* 30*  --  31*  32*   < > 25*   CREATININE mg/dL 1.14 1.28*  --  1.45*  1.42*   < > 1.21   SODIUM mmol/L 139 139  --  135*  136   < > 135*   CHLORIDE mmol/L 107 105  --  100  100   < > 97*   POTASSIUM mmol/L 4.1 4.2  --  3.9  4.0   < > 3.5   PHOSPHORUS mg/dL 3.4 3.4  --  4.1   < >  --    MAGNESIUM mg/dL 2.1 2.0  --  1.9   < >  --    ALT (SGPT) U/L  --   --   --  806*  --  665*   LACTATE mmol/L 1.9  --  1.0 3.8*   < >  --     < > = values in this interval not displayed.       Results from last 7 days   Lab Units 11/17/23  1310 11/17/23  0626 11/17/23  0011 11/16/23 2111 11/16/23  1832   ALBUMIN g/dL  --   --  3.7  --  3.5   IONIZED CALCIUM mmol/L 1.20 1.13 1.08*   < >  --     < > = values in this interval not displayed.       Results from last 7 days   Lab Units 11/17/23  1535 11/17/23  1432 11/17/23  1335 11/17/23  1232 11/17/23  1129 11/17/23  1032   GLUCOSE mg/dL 145* 124 130 116 128 127     Lab Results   Lab Value Date/Time    HGBA1C 5.60 08/31/2023 1518    HGBA1C 5.70 (H) 08/15/2023 0454        "  Results from last 7 days   Lab Units 11/16/23  1832   PROBNP pg/mL 3,356.0*         Medications    Medications Reviewed: Yes  Pertinent: abx, protonix  Infusion:fentanyl, propofol, levophed, heparin, insulin      Intake/Ouptut 24 hrs (0701 - 0700)   I&O's Reviewed: Yes     Intake & Output (last day)         11/16 0701 11/17 0700 11/17 0701 11/18 0700    I.V. (mL/kg) 638.5 (8.4) 361 (4.7)    NG/GT 25 90    IV Piggyback 550 52.9    Total Intake(mL/kg) 1213.5 (15.9) 503.9 (6.6)    Urine (mL/kg/hr) 985 535 (0.8)    Total Output 985 535    Net +228.5 -31.1                    Anthropometrics     Flowsheet Rows      Flowsheet Row First Filed Value   Admission Height 185.4 cm (73\") Documented at 11/16/2023 1916   Admission Weight 75.8 kg (167 lb) Documented at 11/16/2023 1916            Height: Height: 185.4 cm (72.99\")  Last Filed Weight: Weight: 76.1 kg (167 lb 12.3 oz) (11/16/23 2100)  Method: Weight Method: Bed scale  BMI: BMI (Calculated): 22.1  BMI classification: Normal: 18.5-24.9kg/m2   Weight       Weight (kg) Weight (lbs) Weight Method Visit Report   4/3/2019 94.167 kg  207 lb 9.6 oz   --    10/18/2019 92.987 kg  205 lb   --    11/8/2019 92.987 kg  205 lb      11/21/2019 92.534 kg  204 lb   --    4/27/2020 91.173 kg  201 lb   --    7/1/2020 93.713 kg  206 lb 9.6 oz   --    11/20/2020 93.895 kg  207 lb   --    3/17/2021 95.981 kg  211 lb 9.6 oz   --    6/2/2021 94.348 kg  208 lb   --    7/6/2021 92.08 kg  203 lb  Standing scale     10/12/2021 92.443 kg  203 lb 12.8 oz   --    3/23/2022 88.542 kg  195 lb 3.2 oz   --    9/10/2022 85.276 kg  188 lb  Stated      85 kg  187 lb 6.3 oz      10/11/2022 83.915 kg  185 lb   --    10/14/2022 83.9 kg  184 lb 15.5 oz      12/8/2022 81.647 kg  180 lb  Stated     12/9/2022 81.7 kg  180 lb 1.9 oz  Bed scale     2/20/2023 81.647 kg  180 lb   --    3/7/2023 81.647 kg  180 lb   --    4/26/2023 81.647 kg  180 lb  Stated     5/2/2023 79.379 kg  175 lb  Stated      79.4 kg  175 lb " "0.7 oz  Bed scale      80.7 kg  177 lb 14.6 oz      2023 74.9 kg  165 lb 2 oz  Bed scale     2023 75.751 kg  167 lb  Stated     2023 72.576 kg  160 lb   --    2023 71.668 kg  158 lb  Stated     2023 72.576 kg  160 lb  Estimated      77.111 kg  170 lb  Bed scale     2023 76.658 kg  169 lb  Standing scale     2023 73.392 kg  161 lb 12.8 oz   --    10/18/2023 76.023 kg  167 lb 9.6 oz   --    2023 75.751 kg  167 lb  Bed scale      76.1 kg  167 lb 12.3 oz            Nutrition Focused Physical Exam     Date:     Unable to perform exam due to: COVID Isolation     Needs Assessment   Date:23    Height used:Height: 185.4 cm (72.99\")  Weights used: 167lb/ 76kg      Estimated Calorie needs: ~ 1800kcal  Method:  MSJx1.2: 1835kcal  Method:  25Kcals/Kkcal  Method:  PSU- unable to calculate as pt being cooled    Estimated Protein needs: ~114g protein  Method: 1.5g protein per kg protein    Current Nutrition Prescription     PO: NPO Diet NPO Type: Strict NPO  Oral Nutrition Supplement:   Intake: N/A      Nutrition Diagnosis   Date: 23 Updated:   Problem Inadequate energy intake   Etiology [facility]   Signs/Symptoms NPO     Goal:   General: Nutrition support treatment  PO: N/A  EN/PN: Initiate EN once rewarmed and hemodynamically stable depending on GOC    Nutrition Intervention      Follow treatment progress, Care plan reviewed    Suggest start tophic feed once pt rewarmed and hemodynamically stable depending on GOC    TF recs if needed: start Peptamen VHP at 25ml, advance gradually as tolerated to goal rate @65ml/hr, free water @10ml/hr    TF at goal volume will provide 1300ml, 1300kcal, 72% kcal needs, 120g protein, 105% protein needs, 5g fiber, 1292ml free water including flush    Propofol @20.5ml will provide an additional 541kcal    Suggest add MVI as TF volume too low to meet RDI's      Monitoring/Evaluation:   Per protocol, I&O, Pertinent labs, Weight, Skin status, GI " status, Symptoms, Hemodynamic stability      Monse Kimble, ANUSHA  Time Spent: 60min

## 2023-11-17 NOTE — CONSULTS
provided support to patient's family re: code blue for patient. Family's  arrived and also provided support to family.

## 2023-11-17 NOTE — PLAN OF CARE
Goal Outcome Evaluation:  Plan of Care Reviewed With: spouse        Progress: no change  -TTM continues; plan to begin rewarming at 2222 this evening  -10mg Vec IVP given x1 for micro-shivering after increase sedation and initiating aliya huggar failed and pt's core temp zaida to 37 degrees C; paralytic helped an temp returned to goal of 36 degrees celcius; ERIBERTO pressor initiated during shivering episode but able to be turned off once paralytic given  -prop gtt, fent gtt, levo gtt continue; heparin gtt initiated; insulin gtt stopped per TTM protocol and APRN in anticipation for re-warming; continue monitoring BG with glucomander  -pt had all reflexes before paralytic given; still absent at time of note; pt did not follow commands at any point of the shift  -UOP ~600ml  -pt's family updated at bedside

## 2023-11-17 NOTE — CONSULTS
EP Cardiology Consult    Jonnie Roth  1945  [phone]  There is no work phone number on file.    11/17/23    DATE OF ADMISSION: 11/16/2023  [facility] 2A ICU    Borders, Brian Ferraro MD  [address] [facility] [address] / [facility] [address]  Referring Provider: No ref. provider found     Chief Complaint   Patient presents with    Loss of Consciousness     Problem List:   Sudden cardiac death with primary ventricular fibrillation status post Pacesetter Rew ICD, [place], [place] in September 2000:   ICD generator change out with upgrade to a biventricular pacemaker ICD on 12/17/2007, [employer] device.  ICD discharge, 08/09/2012, secondary to ventricular flutter with initiation of amiodarone therapy.   Hospitalization, 02/01/2014, secondary to ICD discharge for ventricular tachycardia with subsequent discontinuation of Coreg and initiation of sotalol therapy.   Hospitalization, February 2014, secondary to ICD discharge for VT with subsequent discontinuation of Coreg and initiation of sotalol.  Echocardiogram, 04/07/2015: EF less than 20%.  Hospitalization secondary to VF and ICD shocks, 04/18/2015.  NIPS procedure with defibrillation threshold testing for VF and noninvasive program stimulation, 04/18/2015.   Initiation of mexiletine for recurrent ventricular tachycardia with ICD shocks on 05/05/2015 with amiodarone load, recurrent VT and ICD shocks with hospitalization 05/16/2015-05/18/2015.   EP study with RFA of large anterior left ventricular scar extending from mid-left ventricular wall down apex by Dr. Ferdinand Alba, 05/21/2015.  ICD generator change 10/2017  Echo 11/19 LVEF 30%, mild TR  ICD generator change 07/2021  ICD shocks for VT/VF 9/2022, ICD reprogrammed with less ATP attemps, started on Amiodarone  ICD shocks for VT/VF in setting of Flu A/dehydration 12/2022  ICD shocks x 2  for VT on 4/26/2023, Amiodarone increased, Mexiletine added  ER presentation for VT with ICD shock x 1  at home, external ECV in ED for VT at 138 bpm with hypotension 5/2/2023  Echo 5/2023: EF 20%, anterior lateral segments and apex are akinetic, LA cavity dilated, LA moderately increased, mild to moderate MR  VT 8/13/23, HR 130s, defibrillated in the [facility] ED  EP study + VT ablation 9/7/23 radiofrequency ablation of an ischemic ventricular tachycardia x 2 and left ventricular scar substrate modification.   VT 10/4/23: VT- 10 ATP attempts followed by 1 successful shock                Ischemic heart disease:  Remote progressive angina pectoris/acute extensive anterolateral myocardial infarction/delayed presentation with thrombolysis/severe 3-vessel coronary atherosclerosis with severe single vessel involvement/PTCA with intracoronary stent deployment proximal-mid segment LAD/moderate-severe compensated left ventricular dysfunction. LVEF (0.35)/abnormal positive signal averaged EKG/oral anticoagulation, April 1995.  Remote NYHA class I-II angina pectoris/class III CHF/abnormal quantitative SPECT gated Cardiolite GXT, July 1998.  Stable persistent MUGA, LVEF (0.33, January 1999 and January 2000).   Residual NYHA class I angina pectoris/CHF with reduced MUGA scan: LVEF (0.25), April 2002.  Left heart catheterization with distal circumflex disease: EF 20%, September 2002.   MUGA in May 2003: EF 32%.   Sestamibi GXT on 04/08/2005: No ischemia. EF 29%.   Residual NYHA class I angina pectoris/CHF with reduced acceptable MUGA scan: EF (0.32) and acceptable Pacesetter PCD interrogation/reprogramming, May 2003 with interrogation, September 2004.  Echocardiogram, September 2009 with EF 30%  Left heart catheterization by Dr. Bhupinder Gonzalez, August 2010, with LVEF of 35%, with 3-vessel native coronary artery disease, 95% mid-LAD status post PTCA and stenting with two 3 mm stents by Dr. Llanes.  Echocardiogram, 06/07/2012: Left ventricular ejection fraction of 30%.  Hospitalization, 02/01/2014, for non-ST elevation MI, left heart  catheterization by Dr. Ayala that demonstrated 60% mid stenosis of the right coronary artery that remains unchanged compared to previous, widely patent stents of the LAD with severe LV dysfunction of approximately 25%.   Left heart catheterization status post drug-eluting stent to the distal LAD and mid-RCA with an EF of 20% to 25% by Dr. Diego Ayala, 04/20/2015.   Left heart catheterization 1/16/17; nonobstructive CAD with patent previously placed stents, dilated cardiomyopathy with severe LV systolic dysfunction, EF less than 20%, normal hemodynamics, recommendations for continued medical therapy and risk factor, evaluation for noncardiac etiology of symptoms.  Residual CCS Class I/II angina pectoris/NYHA Class II exertional dyspnea and fatigue syndrome, summer 2017  Residual CCS 0 angina pectoris/NYHA class I-II exertional dyspnea and fatigue, February 2018, September 2018, March 2019.  Echocardiogram 5/2/2023: LV dilated, LV systolic function severely decreased, LVEF appears less than 20%, anterior and anterior lateral segments and apex are akinetic, LA cavity dilated, LA volume moderately increased, mild to moderate regurgitation present  Dyslipidemia.  Status post remote operations.  Remote chronic tobacco use/abnormal chest x-ray with stable abnormal thoracic  CT scan without contrast demonstrating bilateral diffuse perimeter scarring and fibrosis with scattered subcentimeter noncalcified nodules (unchanged from February 2016), March 2017.  Left bundle branch block.  Burks trauma with hematuria with urosepsis requiring hospitalization, May 2015.  Pulmonary embolism, spring 2015.   Remote apparent hypothyroidism/replacement therapy - data deficit, January 2016.  Remote diagnosis of obstructive sleep apnea with CPAP use, 2017.  Bilateral cataract extraction November 2018, February 2019    History of Present Illness:   Jonnie Roth is a 78 year old male with above PMHx who arrived to [facility] ER by EMS last night  due to witnessed loss of consciousness by his wife. CPR was initiated and continued by EMS. Transported to ER and continued ACLS for PEA arrest in ER. Patient was intubated and given epinephrine and amiodarone. He was stabilized and transferred to the ICU. He has not been responsive to painful stimuli or voice. Remains intubated now on levophed, propofol and fentanyl gtt. Amiodarone discontinued by Dr. Tomlinson due to possible prolonged QTC. Targeted temperature protocol, cooling phase initiated at 11/16/23 at 22:24. Wife states he tested positive for COVID 11/10/23 and had been experiencing fatigue, weakness, and loss of appetite over the last week but that he was actually starting to feel better yesterday. Denies any respiratory symptoms, fevers, CP.       No Known Allergies    Prior to Admission Medications       Prescriptions Last Dose Informant Patient Reported? Taking?    amiodarone (PACERONE) 400 MG tablet   No No    Take 1 tablet by mouth Daily.    carvedilol (COREG) 3.125 MG tablet   No No    Take 1 tablet by mouth 2 (Two) Times a Day With Meals.    Patient taking differently:  Take 2 tablets by mouth 2 (Two) Times a Day With Meals.    cholecalciferol (VITAMIN D3) 25 MCG (1000 UT) tablet  Medication Bottle Yes No    Take 2 tablets by mouth 2 (Two) Times a Day. OTC    clopidogrel (PLAVIX) 75 MG tablet   No No    Take 1 tablet by mouth Every Morning.    Cyanocobalamin (Vitamin B-12) 5000 MCG tablet dispersible  Medication Bottle Yes No    Take 1,000 mcg by mouth Daily. OTC    empagliflozin (JARDIANCE) 10 MG tablet tablet  Medication Bottle No No    Take 1 tablet by mouth Daily.    eplerenone (INSPRA) 25 MG tablet  Medication Bottle No No    Take 0.5 tablets by mouth Daily.    L-TRYPTOPHAN PO  Medication Bottle Yes No    Take 3,000 mg by mouth Every Night.    levothyroxine (Synthroid) 50 MCG tablet   No No    Take 1 tablet by mouth Daily.    Magnesium Oxide -Mg Supplement 500 MG tablet  Medication Bottle Yes No     Take 1 tablet by mouth Daily.    melatonin 5 MG tablet tablet  Medication Bottle Yes No    Take 2 tablets by mouth Every Night. OTC    metFORMIN (GLUCOPHAGE) 1000 MG tablet  Medication Bottle Yes No    Take 1 tablet by mouth 2 (two) times a day.    Multiple Vitamins-Minerals (ICAPS AREDS 2 PO)  Medication Bottle Yes No    Take 1 tablet by mouth Daily. OTC    Niacin, Antihyperlipidemic, (NIACIN ER, ANTIHYPERLIPIDEMIC, PO)  Medication Bottle Yes No    Take 500 mg by mouth Daily.    nitroglycerin (NITROSTAT) 0.4 MG SL tablet   No No    Place 1 tablet under the tongue Every 5 (Five) Minutes As Needed for Chest Pain. Take no more than 3 doses in 15 minutes.    pantoprazole (PROTONIX) 40 MG EC tablet  Medication Bottle Yes No    Take 1 tablet by mouth Every Morning.    polyethylene glycol (MIRALAX) packet  Self Yes No    Take 17 g by mouth Every Other Day. OTC    potassium chloride (MICRO-K) 10 MEQ CR capsule   Yes No    Take 1 capsule by mouth Daily.    rivaroxaban (Xarelto) 10 MG tablet  Medication Bottle No No    Take 1 tablet by mouth Daily.    sacubitril-valsartan (Entresto) 24-26 MG tablet   No No    Take 1 tablet by mouth 2 (Two) Times a Day.    Patient taking differently:  Take 1 tablet by mouth Daily.    torsemide (DEMADEX) 10 MG tablet  Medication Bottle No No    Take 1 tablet by mouth Daily.              Current Facility-Administered Medications:     ! Heparin infusion on hold, Pharmacy trending aXa levels, , Does not apply, BID, Viet Raya IV, PharmD    artificial tears ophthalmic ointment, , Both Eyes, Q2H, Curtis Durant MD, Given at 11/17/23 1032    chlorhexidine (PERIDEX) 0.12 % solution 15 mL, 15 mL, Mouth/Throat, Q12H, Deloris Sahni, APRN, 15 mL at 11/17/23 0814    clopidogrel (PLAVIX) tablet 75 mg, 75 mg, Nasogastric, QAM, Bonilla Hutton, PharmD, 75 mg at 11/17/23 0814    dexmedetomidine (PRECEDEX) 400 mcg in 100 mL NS infusion, 0.2-1.5 mcg/kg/hr, Intravenous, Continuous PRN,  Curtis Durant MD    dextrose (D50W) (25 g/50 mL) IV injection 10-50 mL, 10-50 mL, Intravenous, Q15 Min PRN, Deloris Sahni APRN    EPINEPHrine 10 mg in 250 mL infusion, 0.02-0.3 mcg/kg/min, Intravenous, Titrated, Melecio Gurrola MD, Held at 11/16/23 1957    fentaNYL 2500 mcg/250 mL NS infusion,  mcg/hr, Intravenous, Continuous PRN, Curtis Durant MD, Last Rate: 5 mL/hr at 11/16/23 2228, 50 mcg/hr at 11/16/23 2228    glucagon (GLUCAGEN) injection 1 mg, 1 mg, Intramuscular, Q15 Min PRN, Deloris Sahni APRN    insulin regular 1 unit/mL in 0.9% sodium chloride (Glucommander), 0-100 Units/hr, Intravenous, Titrated, Deloris Sahni APRN, Last Rate: 0.4 mL/hr at 11/17/23 0929, 0.4 Units/hr at 11/17/23 0929    levothyroxine (SYNTHROID, LEVOTHROID) tablet 50 mcg, 50 mcg, Nasogastric, Daily, Curtis Durant MD, 50 mcg at 11/17/23 0618    norepinephrine (LEVOPHED) 8 mg in 250 mL NS infusion (premix), 0.02-0.3 mcg/kg/min, Intravenous, Titrated, Melecio Gurrola MD, Last Rate: 25.7 mL/hr at 11/17/23 0922, 0.18 mcg/kg/min at 11/17/23 0922    pantoprazole (PROTONIX) injection 40 mg, 40 mg, Intravenous, Q24H, Deloris Sahni APRN, 40 mg at 11/16/23 2216    Pharmacy to Dose Heparin, , Does not apply, Continuous PRN, Melecio Gurrola MD    Pharmacy to dose vancomycin, , Does not apply, Continuous PRN, Deloris Sahni APRN    piperacillin-tazobactam (ZOSYN) 3.375 g in iso-osmotic dextrose 50 ml (premix), 3.375 g, Intravenous, Q8H, Deloris Sahni, APRN, 3.375 g at 11/17/23 0814    Potassium Replacement - Follow Nurse / BPA Driven Protocol, , Does not apply, PRN, Curtis Durant MD    propofol (DIPRIVAN) infusion 10 mg/mL 100 mL, 5-50 mcg/kg/min, Intravenous, Titrated, Melecio Gurrola MD, Last Rate: 11.4 mL/hr at 11/17/23 1146, 25 mcg/kg/min at 11/17/23 1146    propofol (DIPRIVAN) infusion 10 mg/mL 100 mL, 5-50 mcg/kg/min, Intravenous, Continuous PRN, Carleen,  Curtis MARIO MD    sodium chloride 0.9 % flush 10 mL, 10 mL, Intravenous, PRN, Melecio Gurrola MD    [START ON 2023] vancomycin (dosing per levels), , Does not apply, Daily **AND** [START ON 2023] Vancomycin, Trough, , , Timed, Bonilla Hutton, PharmD    Social History     Socioeconomic History    Marital status:    Tobacco Use    Smoking status: Former     Packs/day: 2.00     Years: 30.00     Additional pack years: 0.00     Total pack years: 60.00     Types: Cigarettes     Start date: 1960     Quit date: 1995     Years since quittin.6     Passive exposure: Past    Smokeless tobacco: Never    Tobacco comments:     Heavy smoker for years   Vaping Use    Vaping Use: Never used   Substance and Sexual Activity    Alcohol use: No    Drug use: No    Sexual activity: Defer       Family History   Problem Relation Age of Onset    Heart failure Father     Stroke Father        REVIEW OF SYSTEMS: Unable to assess. Patient sedated, intubated.     Vitals:    23 1045 23 1100 23 1115 23 1130   BP: 105/58 109/60 109/58    BP Location:  Left arm     Patient Position:  Lying     Pulse: 74 73 73 73   Resp:  14     Temp:  97 °F (36.1 °C)     TempSrc:  Esophageal     SpO2: 94% 94% 94% 97%   Weight:       Height:             Vital Sign Min/Max for last 24 hours  Temp  Min: 96.1 °F (35.6 °C)  Max: 99.2 °F (37.3 °C)   BP  Min: 53/41  Max: 142/94   Pulse  Min: 56  Max: 76   Resp  Min: 14  Max: 25   SpO2  Min: 82 %  Max: 100 %   No data recorded      Intake/Output Summary (Last 24 hours) at 2023 1150  Last data filed at 2023 1100  Gross per 24 hour   Intake 1442.88 ml   Output 1360 ml   Net 82.88 ml             Physical Exam:  GEN: Sedated, intubated  HEENT: Normocephalic, Atraumatic  NECK: supple, NO JVD  CARDIAC: S1S2  AV paced, no murmur, gallop, rub  LUNGS: ventilated    ABDOMEN: Soft  EXTREMITIES:No gross deformities,  No clubbing, cyanosis, or edema  SKIN: Warm,  dry    I personally viewed and interpreted the patient's EKG/Telemetry/lab data    Data:   Results from last 7 days   Lab Units 11/17/23 0626 11/17/23 0011 11/16/23 2111   WBC 10*3/mm3 16.51* 20.69* 15.23*   HEMOGLOBIN g/dL 11.7* 12.1* 12.2*   HEMATOCRIT % 37.1* 38.0 37.8   PLATELETS 10*3/mm3 288 282 271     Results from last 7 days   Lab Units 11/17/23 0626 11/17/23 0011 11/16/23 2111   SODIUM mmol/L 139 135*  136 135*   POTASSIUM mmol/L 4.2 3.9  4.0 3.2*   CHLORIDE mmol/L 105 100  100 99   CO2 mmol/L 21.0* 20.0*  19.0* 18.0*   BUN mg/dL 30* 31*  32* 30*   CREATININE mg/dL 1.28* 1.45*  1.42* 1.25   GLUCOSE mg/dL 123* 188*  192* 260*              Results from last 7 days   Lab Units 11/16/23  1832   PROBNP pg/mL 3,356.0*     Results from last 7 days   Lab Units 11/17/23 0626 11/17/23 0011 11/16/23 2111 11/16/23  1832   PROTIME Seconds  --   --  21.5* 21.7*   INR   --   --  1.84* 1.88*   APTT seconds 32.5*   < > 43.6* 29.5*    < > = values in this interval not displayed.     Results from last 7 days   Lab Units 11/16/23 2111 11/16/23  1832   CK TOTAL U/L  --  89   HSTROP T ng/L 1,074* 199*         Results from last 7 days   Lab Units 11/16/23  1832   PROBNP pg/mL 3,356.0*       Intake/Output Summary (Last 24 hours) at 11/17/2023 1150  Last data filed at 11/17/2023 1100  Gross per 24 hour   Intake 1442.88 ml   Output 1360 ml   Net 82.88 ml       Chest X-Ray:  Imaging Results (Last 24 Hours)       Procedure Component Value Units Date/Time    XR Chest 1 View [[id number]] Collected: 11/16/23 2151     Updated: 11/16/23 2158    Narrative:      XR CHEST 1 VW    Date of Exam: 11/16/2023 9:24 PM EST    Indication: Cenrtral line and Esophageal probe placement    Comparison: 11/16/2023 from earlier in the day, portable chest radiograph dated 8/13/2023    Findings:  Cardiac silhouette remains prominent. Mediastinal silhouette is within normal limits. Pulmonary vascularity is within range of normal. There is a  3-lead pacemaker in place. Tip of an ET tube is 6.8 cm above the simran. An esophageal probe is present with   the tip overlying the T7 vertebral body level. NG tube courses below the diaphragm right IJ central venous catheter is present with the tip in the lower SVC. No pneumothorax. The lungs are grossly clear. The left costophrenic angle is not on the image.   The trachea is midline. There are degenerative changes in the glenohumeral joints bilaterally.      Impression:      Impression:  1.Support lines and tubes are in good position as described above. No pneumothorax.  2.The left costophrenic angle is not on the image. The lungs are grossly clear.        Electronically Signed: Neal Leon DO    11/16/2023 9:55 PM EST    Workstation ID: [id number]    XR Abdomen KUB [[id number]] Collected: 11/16/23 2153     Updated: 11/16/23 2157    Narrative:      XR ABDOMEN KUB    Date of Exam: 11/16/2023 9:24 PM EST    Indication: NG    Comparison: None available.    Findings:  A gastric tube is present. The tip is in the distal esophagus just above the diaphragm. Visualized bowel gas pattern appears within normal limits      Impression:      Impression:  Gastric tube in the distal esophagus. Recommend advancing the tube at least 10 cm to ensure that both the tip and sideport are in the stomach      Electronically Signed: José Miguel Jordan    11/16/2023 9:54 PM EST    Workstation ID: [id number]    CT Head Without Contrast [[id number]] Collected: 11/16/23 2019     Updated: 11/16/23 2026    Narrative:      CT HEAD WO CONTRAST    Date of Exam: 11/16/2023 8:09 PM EST    Indication: Neuro deficit(s), subacute  Target Temperature Management Initiation. Code    Comparison: CT head without contrast 12/8/2022    Technique: Axial CT images were obtained of the head without contrast administration.  Automated exposure control and iterative construction methods were used.    Findings:  Cerebral atrophy stable from prior study. Mild white matter  findings suggesting chronic microvascular disease. No intracranial hemorrhage. No mass effect or midline shift. Posterior fossa without acute abnormality. No abnormal extra-axial fluid   collection. Globes intact and symmetric. Thinning of the orbital lens bilaterally. The mastoid air cells are well-aerated. There is sinus thickening in the maxillary sinuses and ethmoid air cells with chronic mucoperiosteal thickening in the maxillary   sinuses left greater than right. No calvarial fracture. ET tube noted on the  radiograph.      Impression:      Impression:  1. No acute intracranial abnormality.  2. Cerebral atrophy and white matter findings of chronic microvascular disease.  3. Paranasal sinus disease.        Electronically Signed: Kevin Laureano MD    11/16/2023 8:23 PM EST    Workstation ID: [id number]    XR Chest 1 View [[id number]] Collected: 11/16/23 1858     Updated: 11/16/23 1904    Narrative:      XR CHEST 1 VW    Date of Exam: 11/16/2023 6:34 PM EST    Indication: Chest Pain Triage Protocol    Comparison: 8/13/2023    Findings:  There is an endotracheal tube that is 5 cm above the simran. There is pronounced artifact from a compression device obscuring the lung bases. Heart size appears enlarged. The peripheral pulmonary vessels are indistinct and there is haziness of the lungs.   No gross pneumothorax is demonstrated      Impression:      Impression:    1. Endotracheal tube 5 cm above the simran  2. Limited study due to artifact from overlying device. There is evidence of mild pulmonary edema. No gross pneumothorax is visualized      Electronically Signed: José Miguel Jordan    11/16/2023 7:01 PM EST    Workstation ID: [id number]            Telemetry: AV paced, HR 70-76 bpm  EKG: AV paced, HR 73 bpm        Assessment and Plan:   Cardiac arrest, VT/VF  -Witnessed loss of consciousness 11/16/23 with multiple ICD shocks for VT/VF. CPR initiated and transported to ER. PEA upon arrival to ER and ACLS continued until  intubated and stabilized. Targeted temperature management protocol initiated 11/16/23 at 22:24. Plan for rewarming to begin tonight at 22:00.   -s/p VT ablation 2015 and another VT ablation 9/7/23. Most recently maintained on Amiodarone 400mg daily.   -Device interrogation today shows VT x6 with ICD shocks delivered x8 yesterday 11/16/23. Dr. Alba reviewed rhythm strips from interrogation and based on rhythm there is concern for pump failure.   -Long term use of Amiodarone. Amiodarone stopped yesterday due to possible prolonged QTC. QTC is acceptable now. Will hold off on reinitiating Amiodarone for now. Patient did not tolerate Mexiletine in the past.     2. A/C HFrEF  -LVEF 35% 1995  -Most recent echo 5/2023 EF <20%    3. Covid +  -tested + for COVID 11/10/23 and has had symptoms of fatigue, weakness and loss of appetite over the last week. Denies respiratory symptoms or fevers.  -management per intensivist    Electronically signed by CHENTE Umanzor, 11/17/23, 3:12 PM EST.

## 2023-11-17 NOTE — CONSULTS
[facility]   [address], [address]  [place], [place], [address] [phone]  [email or web address]           INPATIENT CONSULTATION NOTE    Patient Care Team:  Patient Care Team:  Brian Lewis MD as PCP - General (Internal Medicine)  Ferdinand Alba MD as Consulting Physician (Cardiac Electrophysiology)  Viet Manzano PA as Physician Assistant (Cardiology)  Michele Flanagan IV, MD as Consulting Physician (Interventional Cardiology)    Requesting Provider and Reason for consultation: The patient is being seen today at the request of Dr Gurrola for cardiac arrest.     Chief complaint:   Chief Complaint   Patient presents with    Loss of Consciousness            Subjective:       HPI:      Jonnie Roth is a 78 y.o. male.  Patient presents with recent respiratory infection/possible COVID (data deficit).  Witnessed loss of consciousness by patient's wife this evening.  Reportedly had multiple ICD shocks.  Was not responsive.  CPR initiated and continued by EMS.  Transported to ER.  Continued ACLS for PEA arrest continued in ER.  Patient intubated.  Given epinephrine and amiodarone.  Stabilized in the ER and transferred to the ICU on amiodarone, norepinephrine, propofol, fentanyl.  Reportedly was not responsive to painful stimuli or voice.    Review of Systems:  As noted in the HPI    PFSH:  Patient Active Problem List   Diagnosis    Coronary artery disease involving native coronary artery of native heart with angina pectoris    Chronic systolic congestive heart failure    BPH (benign prostatic hypertrophy)    LBBB (left bundle branch block)    Pulmonary emphysema    Hyperlipidemia LDL goal <70    Recurrent monomorphic ventricular tachycardia    COPD     Pulmonary nodule (Stable)    GEO on nocturnal CPAP    Hypothyroidism (acquired)    GERD without esophagitis    Type 2 diabetes mellitus with HbA1c 5.8, without long-term current use of insulin     Elevated LFTs (R/O shock liver)    H/O PE (2015)    Chronic HFrEF (heart failure with reduced ejection fraction)    Cardiac resynchronization therapy defibrillator (CRT-D) in place    Cardiac arrest    COVID-19 virus detected       No current facility-administered medications on file prior to encounter.     Current Outpatient Medications on File Prior to Encounter   Medication Sig Dispense Refill    amiodarone (PACERONE) 400 MG tablet Take 1 tablet by mouth Daily. 90 tablet 3    carvedilol (COREG) 3.125 MG tablet Take 1 tablet by mouth 2 (Two) Times a Day With Meals. (Patient taking differently: Take 2 tablets by mouth 2 (Two) Times a Day With Meals.) 180 tablet 3    cholecalciferol (VITAMIN D3) 25 MCG (1000 UT) tablet Take 2 tablets by mouth 2 (Two) Times a Day. OTC      clopidogrel (PLAVIX) 75 MG tablet Take 1 tablet by mouth Every Morning. 90 tablet 3    Cyanocobalamin (Vitamin B-12) 5000 MCG tablet dispersible Take 2,500 mcg by mouth Daily. OTC      empagliflozin (JARDIANCE) 10 MG tablet tablet Take 1 tablet by mouth Daily. 90 tablet 3    eplerenone (INSPRA) 25 MG tablet Take 0.5 tablets by mouth Daily. 90 tablet 0    L-TRYPTOPHAN PO Take 3,000 mg by mouth Every Night.      levothyroxine (Synthroid) 50 MCG tablet Take 1 tablet by mouth Daily. 30 tablet 6    Magnesium Oxide -Mg Supplement 500 MG tablet Take 1 tablet by mouth Daily.      melatonin 5 MG tablet tablet Take 2 tablets by mouth Every Night. OTC      metFORMIN (GLUCOPHAGE) 1000 MG tablet Take 1 tablet by mouth 2 (two) times a day.      Multiple Vitamins-Minerals (ICAPS AREDS 2 PO) Take 1 tablet by mouth 2 (two) times a day. OTC      Niacin, Antihyperlipidemic, (NIACIN ER, ANTIHYPERLIPIDEMIC, PO) Take 500 mg by mouth Daily.      nitroglycerin (NITROSTAT) 0.4 MG SL tablet Place 1 tablet under the tongue Every 5 (Five) Minutes As Needed for Chest Pain. Take no more than 3 doses in 15 minutes. 25 tablet 5    pantoprazole (PROTONIX) 40 MG EC tablet Take 1  tablet by mouth Every Morning.      polyethylene glycol (MIRALAX) packet Take 17 g by mouth Every Other Day. OTC      rivaroxaban (Xarelto) 10 MG tablet Take 1 tablet by mouth Daily. 90 tablet 3    rosuvastatin (CRESTOR) 10 MG tablet Take 1 tablet by mouth Daily. 90 tablet 3    sacubitril-valsartan (Entresto) 24-26 MG tablet Take 1 tablet by mouth 2 (Two) Times a Day. (Patient taking differently: Take 1 tablet by mouth Daily.) 180 tablet 3    torsemide (DEMADEX) 10 MG tablet Take 1 tablet by mouth Daily. 30 tablet 6       Social History     Socioeconomic History    Marital status:    Tobacco Use    Smoking status: Former     Packs/day: 2.00     Years: 30.00     Additional pack years: 0.00     Total pack years: 60.00     Types: Cigarettes     Start date: 1960     Quit date: 1995     Years since quittin.6     Passive exposure: Past    Smokeless tobacco: Never    Tobacco comments:     Heavy smoker for years   Vaping Use    Vaping Use: Never used   Substance and Sexual Activity    Alcohol use: No    Drug use: No    Sexual activity: Defer            Objective:     Vital Sign Min/Max for last 24 hours  Temp  Min: 97.7 °F (36.5 °C)  Max: 99.2 °F (37.3 °C)   BP  Min: 77/45  Max: 142/94   Pulse  Min: 61  Max: 75   Resp  Min: 14  Max: 21   SpO2  Min: 82 %  Max: 100 %   No data recorded      Intake/Output Summary (Last 24 hours) at 2023 2342  Last data filed at 2023 2341  Gross per 24 hour   Intake 204.38 ml   Output --   Net 204.38 ml           Vitals:    23 2302   BP: (!) 77/45   Pulse:    Resp:    Temp:    SpO2:      CONSTITUTIONAL: Intubated, sedated  RESPIRATORY: Intubated, on ventilator, mild bilateral rhonchi  CARDIOVASCULAR: Regular rate and rhythm.  No notable murmur  PERIPHERAL VASCULAR: No significant lower extremity swelling.  2+ DP pulses bilaterally    Labs and radiologic results:  Today's results were reviewed by myself.    Cardiac Data:    EKG: Ventricular paced rhythm  with a severely prolonged QRS and QTc    Results for orders placed during the hospital encounter of 05/02/23    Adult Transthoracic Echo Complete W/ Cont if Necessary Per Protocol    Interpretation Summary    The left ventricle is dilated.  Left ventricular systolic function is severely decreased. Left ventricular ejection fraction appears to be less than 20%.  The anterior and anterior lateral segments and apex are akinetic.    The left atrial cavity is dilated.    Left atrial volume is moderately increased.    Mild to moderate much regurgitation is present.    Estimated right ventricular systolic pressure from tricuspid regurgitation is normal (<35 mmHg).      Tele: Paced rhythm         Assessment and Plan:     Problem list:    Cardiac arrest    Coronary artery disease involving native coronary artery of native heart with angina pectoris    Chronic systolic congestive heart failure    Recurrent monomorphic ventricular tachycardia    GEO on nocturnal CPAP    Hypothyroidism (acquired)    Type 2 diabetes mellitus with HbA1c 5.8, without long-term current use of insulin    Elevated LFTs (R/O shock liver)    Cardiac resynchronization therapy defibrillator (CRT-D) in place    COVID-19 virus detected      ASSESSMENT:  Witnessed cardiac arrest   Witnessed loss of consciousness and possible multiple ICD shocks.  CPR initiated and transported to the emergency room, 11/16/2020  Recent URI.  Possible COVID, data deficit.  Respiratory distress status post intubation    Acute on Chronic HFrEF (heart failure with reduced ejection fraction)  Overview:  Large anterior MI with LVEF 35%, 1995  Echo (11/8/2019): LVEF 30%.  Akinesis/dyskinesis of the mid to distal anterior wall and apex.  Echo (9/10/2022): LVEF < 20%.  Dilated left ventricle with diffuse hypokinesis and a kinesis of the mid to distal anterior segments and apex  Echo (5/2/2023): LVEF <20%.  Dilated left ventricle.  Anterior and apical akinesis.  Mild to moderate mitral  regurgitation     3. Coronary artery disease involving native coronary artery of native heart with angina pectoris  Overview:  Cardiac catheterization for anterior MI (4/1995):  PCI of the proximal/mid LAD, 4/1995.  LVEF 35%  Cardiac catheterization for NSTEMI (2/1/2014): Moderate RCA stenosis.  Widely patent LAD stents.  LVEF 25%.  Cardiac catheterization by Dr. Ayala (4/20/2015): PCI of distal LAD.  Cardiac catheterization (1/16/2017): Nonobstructive CAD.  LVEF <20%.  Normal hemodynamics.     4. Recurrent monomorphic ventricular tachycardia  Overview:  Sudden cardiac death with primary VF status post ICD, September 2020  Multiple ICD discharges for VF/flutter/tachycardia  Amiodarone therapy initiated 2012 and 2015  EP study with radiofrequency ablation of large LV scar, 5/21/2015  [facility] admission for recurrent VT/VF status post shock after ineffective ATP, 9/10/2022  [facility] admission for recurrent ventricular arrhythmia on amiodarone in the setting of influenza, 12/8/2022  ER study and RFA of ischemic VT x2 and left ventricular scar substrate modification by Ferdinand Alba, 9/7/2023     PLAN:  Admission to ICU  Targeted temperature management per protocol  Potassium and magnesium replacement  Discontinuing amiodarone for now due to EKG changes; has been on chronic therapy and is currently amiodarone loaded   On Xarelto, transition to heparin after allowing Xarelto to wear off; pharmacy to monitor anti-Xa levels and dose heparin thereafter  Continue aspirin, clopidogrel  Cardiology will continue to follow    Tong Tomlinson MD, MSc, FACC, [facility] [place] [place]  Interventional Cardiology  [facility]

## 2023-11-17 NOTE — PLAN OF CARE
Goal Outcome Evaluation:  Plan of Care Reviewed With: patient           Outcome Evaluation: Patient admitted s/p cardiac arrest. Levophed, Propofol, Fentanyl gtt infusing. Amio DC'd per Dr. Tomlinson. Heparin gtt on hold d/t elevated Xa. TTM initiated - cooling phase started at 2224. Family at bedside and updated. COVID PCR and Ag tests positive, placed in isolation. UOP 825ml.

## 2023-11-17 NOTE — CONSULTS
HEPARIN INFUSION  Jonnie Roth is a  78 y.o. male receiving heparin infusion.     Therapy for (VTE/Cardiac):   Cardiac  Patient Weight: 75.8 kg  Initial Bolus (Y/N):   Y - administered in code  Any Bolus (Y/N):   N - per DOAC protocol        Signs or Symptoms of Bleeding: N/A    Cardiac or Other (Not VTE)   Initial Bolus: 60 units/kg (Max 4,000 units)  Initial rate: 12 units/kg/hr (Max 1,000 units/hr)   Anti Xa Rebolus Infusion Hold time Change infusion Dose (Units/kg/hr) Next Anti Xa or aPTT Level Due   < 0.11 50 Units/kg  (4000 Units Max) None Increase by  3 Units/kg/hr 6 hours   0.11- 0.19 25 Units/kg  (2000 Units Max) None Increase by  2 Units/kg/hr 6 hours   0.2 - 0.29 0 None Increase by  1 Units/kg/hr 6 hours   0.3 - 0.5 0 None No Change 6 hours (after 2 consecutive levels in range check qAM)   0.51 - 0.6 0 None Decrease by  1 Units/kg/hr 6 hours   0.61 - 0.8 0 30 Minutes Decrease by  2 Units/kg/hr 6 hours   0.81 - 1 0 60 Minutes Decrease by  3 Units/kg/hr 6 hours   >1 0 Hold  After Anti Xa less than 0.5 decrease previous rate by  4 Units/kg/hr  Every 2 hours until Anti Xa  less than 0.5 then when infusion restarts in 6 hours     Recommend Xa every 6 hours.     Results from last 7 days   Lab Units 11/16/23  1832   INR  1.88*   HEMOGLOBIN g/dL 12.8*   HEMATOCRIT % 41.7   PLATELETS 10*3/mm3 269          Date   Time   Anti-Xa Current Rate (Unit/kg/hr) Bolus   (Units) Rate Change   (Unit/kg/hr) New Rate (Unit/kg/hr) Next   Anti-Xa Comments  Pump Check Daily   11/16 2033 >1.10 New 4000 mg admin ~1830 during code blue -- -- 0000 Dw Dr. Tomlinson - not a true DOAC treatment failure, no need to further heparinize until Xarelto cleared.     Viet Raya IV, PharmD, BCPS  11/16/2023  20:34 EST

## 2023-11-17 NOTE — PROCEDURES
Insert Arterial Line at Bedside    Date/Time: 11/16/2023 9:31 PM    Performed by: Deloris Sahni APRN  Authorized by: Curtis Durant MD    [place] Protocol:     Emergent situation      Patient identity confirmed:  Arm band and provided demographic data    Time out: Immediately prior to the procedure a time out was called    A time out verifies correct patient, procedure, equipment, support staff and site/side marked as required:   Indications:     Indications: multiple ABGs, respiratory failure and hemodynamic monitoring    Location:     Location:  Right radial  Anesthesia:     Patient sedated: Yes      Sedation:  See MAR for details  Procedure Details:     Ultrasound Guidance: yes  The ultrasound was used for evaluation of possible access sites.  Vessel patency was confirmed with the ultrasound.  Needle entry into vessel was visualized in realtime with the ultrasound.   Permanent recorded image(s) of the vascular access site will be included in the patient record.      Osito's test normal?: Yes      Needle gauge:  18    Seldinger technique: Seldinger technique used      Number of attempts:  1  Post-procedure:     Post-procedure:  Line sutured and dressing applied    Post-procedure CMS:  Normal     patient tolerated the procedure well with no immediate complications     Inserted with ultrasound guidance with bright red pulsatile blood detected. Connected to pressure tubing with arterial waveform present. Suture placed and sterile dressing applied.     Lesley Sahni, MSN, APRN, ACNPC-AG  Pulmonary and Critical Care Medicine  Electronically signed by CHENTE Mccarthy, 11/16/23, 9:32 PM EST.

## 2023-11-17 NOTE — PROCEDURES
"Insert Central Line At Bedside    Date/Time: 11/16/2023 9:29 PM    Performed by: Deloris Sahni APRN  Authorized by: Curtis Durant MD  Consent: The procedure was performed in an emergent situation.  Patient identity confirmed: arm band and provided demographic data  Time out: Immediately prior to procedure a \"time out\" was called to verify the correct patient, procedure, equipment, support staff and site/side marked as required.  Indications: vascular access  Anesthesia: local infiltration    Anesthesia:  Local Anesthetic: lidocaine 1% without epinephrine  Anesthetic total: 5 mL    Sedation:  Patient sedated: yes  Sedatives: see MAR for details    Preparation: skin prepped with 2% chlorhexidine  Skin prep agent dried: skin prep agent completely dried prior to procedure  Sterile barriers: all five maximum sterile barriers used - cap, mask, sterile gown, sterile gloves, and large sterile sheet  Hand hygiene: hand hygiene performed prior to central venous catheter insertion  Location details: right internal jugular  Patient position: flat  Catheter type: triple lumen  Catheter size: 7 Fr  Pre-procedure: landmarks identified  Ultrasound guidance: yes  Sterile ultrasound techniques: sterile gel and sterile probe covers were used  Number of attempts: 1  Successful placement: yes  Post-procedure: line sutured  Assessment: blood return through all ports, free fluid flow and placement verified by x-ray  Patient tolerance: patient tolerated the procedure well with no immediate complications  Comments: Dark, nonpulsatile blood was aspirated from RIJ using finder needle under ultrasound.  Wire was threaded via finder needle without difficulty and visualized in RIJ under ultrasound.  Dilator was advanced over wire after small incision was made in the skin.  Dilator was removed.  Catheter was advanced over the wire up to 16 cm.  Wire was removed.  Flushed end caps were placed on all three ports.  All three ports " aspirated dark, nonpulsatile blood and flushed back well.  Catheter was sutured in place x 3.  An antibacterial dressing was applied over the site.      Lesley Sahni, MSN, APRN, [facility]  Pulmonary and Critical Care Medicine  Electronically signed by CHENTE Mccarthy, 11/16/23, 9:30 PM EST.

## 2023-11-17 NOTE — PROGRESS NOTES
HEPARIN INFUSION  Jonnie Roth is a  78 y.o. male receiving heparin infusion.     Therapy for (VTE/Cardiac):   Cardiac  Patient Weight: 75.8 kg  Initial Bolus (Y/N):   Y - administered in code  Any Bolus (Y/N):   N - per DOAC protocol        Signs or Symptoms of Bleeding: N/A    Cardiac or Other (Not VTE)   Initial Bolus: 60 units/kg (Max 4,000 units)  Initial rate: 12 units/kg/hr (Max 1,000 units/hr)   Anti Xa Rebolus Infusion Hold time Change infusion Dose (Units/kg/hr) Next Anti Xa or aPTT Level Due   < 0.11 50 Units/kg  (4000 Units Max) None Increase by  3 Units/kg/hr 6 hours   0.11- 0.19 25 Units/kg  (2000 Units Max) None Increase by  2 Units/kg/hr 6 hours   0.2 - 0.29 0 None Increase by  1 Units/kg/hr 6 hours   0.3 - 0.5 0 None No Change 6 hours (after 2 consecutive levels in range check qAM)   0.51 - 0.6 0 None Decrease by  1 Units/kg/hr 6 hours   0.61 - 0.8 0 30 Minutes Decrease by  2 Units/kg/hr 6 hours   0.81 - 1 0 60 Minutes Decrease by  3 Units/kg/hr 6 hours   >1 0 Hold  After Anti Xa less than 0.5 decrease previous rate by  4 Units/kg/hr  Every 2 hours until Anti Xa  less than 0.5 then when infusion restarts in 6 hours   Recommend Xa every 6 hours.   Results from last 7 days   Lab Units 11/17/23  0011 11/16/23 2111 11/16/23 1832   INR   --  1.84* 1.88*   HEMOGLOBIN g/dL 12.1* 12.2* 12.8*   HEMATOCRIT % 38.0 37.8 41.7   PLATELETS 10*3/mm3 282 271 269        Date   Time   Anti-Xa Current Rate (Unit/kg/hr) Bolus   (Units) Rate Change   (Unit/kg/hr) New Rate (Unit/kg/hr) Next   Anti-Xa Comments  Pump Check Daily   11/16 2033 >1.10 New 4000 mg admin ~1830 during code blue -- -- 0000 Dw Dr. Tomlinson - not a true DOAC treatment failure, no need to further heparinize until Xarelto cleared.   11/17 0010 >1.10 HOLD -- -- HOLD 0600 Kory 8099 -b   11/17 0630 >1.1 HOLD -- -- HOLD 1300 Dw NEIL   11/17 1310 0.84 0 -- +9 9 2100 Pump reviewed /Dw with RN

## 2023-11-18 PROBLEM — G93.1 ANOXIC ENCEPHALOPATHY: Status: ACTIVE | Noted: 2023-01-01

## 2023-11-18 NOTE — DISCHARGE SUMMARY
Death Summary         Patient Name: Jonnie Roth    CSN: [id number]    MRN: [id number]    : 1945    Today's Date: 23    Date of Admission: 2023    Date/Time of Death: 2023 @ 1442    Admitting Physician:  Curtis Durant MD    Primary Care Provider: Brian Lewis MD    Consultations:     Consults       Date and Time Order Name Status Description    2023  8:34 AM Inpatient Neurology Consult General Completed     2023 10:19 PM Inpatient Cardiology Consult Completed             Admission Diagnosis:  Cardiac arrest [I46.9]    Diagnoses at Time of Death:   Active Hospital Problems    Diagnosis  POA    **Cardiac arrest [I46.9]  Yes    Anoxic encephalopathy [G93.1]  Yes    COVID-19 virus detected [U07.1]  Yes    Cardiac resynchronization therapy defibrillator (CRT-D) in place [Z95.810]  Yes    Acute on chronic systolic congestive heart failure [I50.23]  Yes    ICD (implantable cardioverter-defibrillator) discharge [Z45.02]  Not Applicable    Elevated LFTs (R/O shock liver) [R79.89]  Yes    Hypothyroidism (acquired) [E03.9]  Yes    Type 2 diabetes mellitus with HbA1c 5.8, without long-term current use of insulin [E11.9]  Yes    GEO on nocturnal CPAP [G47.33]  Yes    Recurrent monomorphic ventricular tachycardia [I47.20]  Yes    Coronary artery disease involving native coronary artery of native heart with angina pectoris [I25.119]  Yes      Resolved Hospital Problems   No resolved problems to display.         Procedures:    2131 Insert Arterial Line at Bedside  2129 Insert Central Line At Bedside      HPI     History of Present Illness:  78-year-old male admitted this evening with an out of hospital cardiac arrest.     His history from a cardiac standpoint is significant for ischemic heart disease with prior LAD stents placement and a reduced ejection fraction for nearly 30 years.  His most recent ejection fraction appeared less than 20%.  The LA  cavity was dilated and there was mild to moderate regurgitation present..     He has a history of sudden cardiac death with primary ventricular fibrillation and had an ICD in [place] 9/2000 period he underwent EP study with VT ablation on 9/7/2023.     His past history is also significant for dyslipidemia, GEO with CPAP use, hypothyroidism, prior pulmonary embolism in 2015, and remote smoking.  His most recent home medication regimen included Coreg, Plavix, Jardiance, Inspra, Entresto, Demadex, amiodarone, and Xarelto from a cardiac standpoint.     It was in this setting that his wife witnessed loss of consciousness and multiple ICD shocks this evening.  CPR was initiated and he was transported immediately to our ER.     On arrival to our ER CPR was in progress.  Epinephrine and amiodarone were given.  The patient was intubated.  Code proceeded from the patient's arrival at 1823 until 1845.  He was stabilized in ER and transferred to ICU and on arrival here is on drips consisting of amiodarone, norepinephrine, and propofol.  Not responsive to painful stimuli or voice.  He is triggering the ventilator but has no oculomotor reflex.         Hospital Course       Hospital Course:  Admitted for reasons stated above in HPI. He was initiated on targeted temperature management with a goal temperature of 36 degrees celsus. He was also found to be positive for COIVD 19. He required vasoactive medications to maintain MAP > 65.    He was initiated on an Amiodarone drip for A. Fib but once Cardiology was following they discontinued it due to EKG changes and the fact that he was on chronic therapy and currently was amiodarone loaded. Xarelto was transitioned to Heparin drip and he was continued on ASA and Plavix. Cardiology consulted EP who interrogated his ICD. This showed VT x 6 with ICD shocks delivered x 8 on 11/16/23. Dr. Alba reviewed rhythm strips from interrogation and based on rhythm there was concern for pump  failure.     On  he developed shivering/myoclonus. EEG showed diffuse cerebral dysfunction which appeared to be moderate to severe. Neurology was consulted and felt this was related to anoxic brain injury with poor prognosis. Primary attending held goals of care discussion with family who ultimately made the appropriate but difficult decision to palliatively extubate. On 23 at 1442 Mr. Jonnie Roth was pronounced  with family at his side.     CHENTE Berry  Pulmonary & Critical Care Medicine    Discharge Time Spent:     I spent  10  minutes on this discharge activity which included: face-to-face encounter with the patient, reviewing the data in the system, coordination of the care with the nursing staff as well as consultants, documentation, and entering orders.      Electronically signed by CHENTE Berry, 23, 2:49 PM EST.

## 2023-11-18 NOTE — SIGNIFICANT NOTE
Between 0861-6599, patient's BP began to drop on arterial line requiring increase in vasopressors. He has also had low UOP so far this shift. No shivering noted, but patient's telemetry monitor does have an unsteady waveform. (Possible microshivering). Patient is on maximum dose of propofol and on 100 mcg/hr of fentanyl. No cough, corneal, or gag reflexes. Patient does not withdraw to painful stimuli. Neurologically unchanged, but later noted patient to have repetitive jaw  movement and also very slight repetitive slow  bilateral arm posturing. Discussed case with APRN. Ativan given.     Addendum: No change after ativan given. Repetative jaw and arm movement is intermittent and mild. Otherwise neurologically unchanged.

## 2023-11-18 NOTE — PROGRESS NOTES
provided support to spouse of patient at end of life.  Spouse appreciative of prayer, narrative listening and pastoral presence.

## 2023-11-18 NOTE — SIGNIFICANT NOTE
Exam confirms with auscultation zero audible heart tones and zero audible respirations. Mr.Melton SOULEYMANE Roth was pronounced dead at 1442.  MD notified by Patient's RN.    Jacek Calles RN  Clinical House Supervisor  11/18/2023 14:57 EST

## 2023-11-18 NOTE — PROGRESS NOTES
Cardiology Progress Note      Reason for visit:    Out of hospital cardiac arrest/ICD discharge  Recurrent monomorphic ventricular tachycardia    IDENTIFICATION: 88-year-old gentleman who resides in [place], [place]    Active Hospital Problems    Diagnosis  POA    **Cardiac arrest [I46.9]  Yes    COVID-19 virus detected [U07.1]  Yes     Positive test 11/10/2023  For symptoms roughly 11/7/2023  Probable exposure 11/5/2023      Cardiac resynchronization therapy defibrillator (CRT-D) in place [Z95.810]  Yes     CRT ICD in situ with history of multiple therapies in the past      Acute on chronic systolic congestive heart failure [I50.23]  Yes     Large anterior MI with LVEF 35%, 1995  Echo (11/8/2019): LVEF 30%.  Akinesis/dyskinesis of the mid to distal anterior wall and apex.  Echo (9/10/2022): LVEF < 20%.  Dilated left ventricle with diffuse hypokinesis and a kinesis of the mid to distal anterior segments and apex  Echo (5/2/2023): LVEF <20%.  Dilated left ventricle.  Anterior and apical akinesis.  Mild to moderate mitral regurgitation      ICD (implantable cardioverter-defibrillator) discharge [Z45.02]  Not Applicable    Elevated LFTs (R/O shock liver) [R79.89]  Yes    Hypothyroidism (acquired) [E03.9]  Yes    Type 2 diabetes mellitus with HbA1c 5.8, without long-term current use of insulin [E11.9]  Yes    GEO on nocturnal CPAP [G47.33]  Yes    Recurrent monomorphic ventricular tachycardia [I47.20]  Yes     Sudden cardiac death with primary VF status post ICD, September 2020  Multiple ICD discharges for VF/flutter/tachycardia  Amiodarone therapy initiated 2012 and 2015  EP study with radiofrequency ablation of large LV scar, 5/21/2015  [facility] admission for recurrent VT/VF status post shock after ineffective ATP, 9/10/2022  [facility] admission for recurrent ventricular arrhythmia on amiodarone in the setting of influenza, 12/8/2022  ER study and RFA of ischemic VT x2 and left ventricular scar substrate  modification by Ferdinand Alba, 9/7/2023      Coronary artery disease involving native coronary artery of native heart with angina pectoris [I25.119]  Yes     Cardiac catheterization for anterior MI (4/1995):  PCI of the proximal/mid LAD, 4/1995.  LVEF 35%  Cardiac catheterization for NSTEMI (2/1/2014): Moderate RCA stenosis.  Widely patent LAD stents.  LVEF 25%.  Cardiac catheterization by Dr. Ayala (4/20/2015): PCI of distal LAD.  Cardiac catheterization (1/16/2017): Nonobstructive CAD.  LVEF <20%.  Normal hemodynamics.              Patient sedated with fentanyl and Dirpivan and intubated.  Levophed and carrie gtt infusing.    Hemodynamically stable. Airborne precaution for COVID infection.     Family at bedside- long discussion re: 'heroic measures' going forward in setting of uncertain neurologic recovery.        Vital Sign Min/Max for last 24 hours  Temp  Min: 96.1 °F (35.6 °C)  Max: 98.4 °F (36.9 °C)   BP  Min: 55/28  Max: 144/72   Pulse  Min: 64  Max: 78   Resp  Min: 14  Max: 21   SpO2  Min: 84 %  Max: 99 %   No data recorded      Intake/Output Summary (Last 24 hours) at 11/18/2023 0820  Last data filed at 11/18/2023 0622  Gross per 24 hour   Intake 2899.5 ml   Output 650 ml   Net 2249.5 ml           Physical Exam:  Gen:  intubated and sedated  Eyes:  sclera anicteric  ENT:  MMM, trachea midline, NC/AT, ETT noted  Resp:  Mechanical breath sounds noted  Cardio:  normal rate, regular rhythm, normal S1, S2, no M/R/G  GI:  S/NT/ND, +BS  Ext:  WWP, no LE edema, no joint effusions  Skin: No jaundice or rash  Neuro: unable to fully assess Cns or motor/sensory functions  Psych:  intubated and sedated      Tele:  Paced    Results Review (reviewed the patient's recent labs in the electronic medical record):     EKG (11/17/2023): Paced rhythm    CXR (11/16/2023): Lungs are clear    ECHO (5/3/2023): LVEF less than 20%.  Mild to moderate MR.  RVSP less than 35 mmHg    Results from last 7 days   Lab Units 11/18/23  0617  11/18/23  0015 11/17/23  1837 11/17/23  1310 11/17/23  0626 11/17/23  0011 11/16/23 2111   SODIUM mmol/L 141 141  140 140 139 139 135*  136 135*   POTASSIUM mmol/L 3.8 3.9  3.8 3.4* 4.1 4.2 3.9  4.0 3.2*   CHLORIDE mmol/L 107 105  106 106 107 105 100  100 99   BUN mg/dL 28* 28*  29* 26* 27* 30* 31*  32* 30*   CREATININE mg/dL 1.76* 1.47*  1.45* 1.14 1.14 1.28* 1.45*  1.42* 1.25   MAGNESIUM mg/dL  --  1.9 1.9 2.1 2.0 1.9 2.0     Results from last 7 days   Lab Units 11/16/23 2111 11/16/23 1832   HSTROP T ng/L 1,074* 199*     Results from last 7 days   Lab Units 11/18/23  0015 11/17/23  1837 11/17/23  1310   WBC 10*3/mm3 28.21* 20.01* 16.30*   HEMOGLOBIN g/dL 12.6* 13.0 12.8*   HEMATOCRIT % 39.9 41.2 40.3   PLATELETS 10*3/mm3 316 274 300       Lab Results   Component Value Date    HGBA1C 5.60 08/31/2023       Lab Results   Component Value Date    CHOL 202 (H) 09/26/2023    CHLPL 116 01/30/2014    TRIG 149 09/26/2023    HDL 46 09/26/2023     (H) 09/26/2023              Witnessed cardiac arrest   Recurrent monomorphic ventricular tachycardia  Witnessed loss of consciousness with multiple ICD shocks for VT/VF  PEA upon arrival and ACLS continued  Hx of recent COVID 19 infection  S/p CRT-D 2007 ([facility])  S/p VT RFA 2015 and 2023.   S/p TTM and now being planned for continuous EEG.   Amiodarone on hold due to prolonged QT. QTc appear to be acceptable now- ok to reinitiate if patient is back in VT. Currently sedated which should help with VT storm  Recommend palliative consult to preface his upcoming hospitalization.     Acute on Chronic HFrEF (heart failure with reduced ejection fraction)  Large anterior MI with LVEF 35%, 1995  Last echo 5/2023 LVEF less than 20%.  Mild to moderate MR     Coronary artery disease involving native coronary artery of native heart with angina pectoris  History of MI and PCI.  Last cath 2017 nonobstructive CAD in previously placed stents patent  Received full-strength aspirin  through NG tube on arrival  On Plavix 75 mg daily through NG  Takes Xarelto at home but is currently on hold.  Hep gtt for AC    Covid-19  Positive for COVID-19 this admission      Brian Zayas MD  [facility]

## 2023-11-18 NOTE — PROGRESS NOTES
"INTENSIVIST NOTE     Hospital Day: 2    Mr. Jonnie Roth, 78 y.o. male is followed for:   Witnessed cardiac arrest, cardiogenic shock, and anoxic encephalopathy       SUBJECTIVE     Interval history:    Neurologic status remains poor.  Evidence of what was likely myoclonic jerking earlier.  GCS remains 3.  Off all sedatives currently.  Remains on multiple pressors.  Wife at bedside.    The patient's relevant past medical, surgical and social history were reviewed and updated in Epic as appropriate.       OBJECTIVE     Vital Sign Min/Max for last 24 hours  Temp  Min: 96.1 °F (35.6 °C)  Max: 99.1 °F (37.3 °C)   BP  Min: 55/28  Max: 144/122   Pulse  Min: 64  Max: 77   Resp  Min: 14  Max: 21   SpO2  Min: 84 %  Max: 99 %   No data recorded   No data recorded      Intake/Output Summary (Last 24 hours) at 11/18/2023 1334  Last data filed at 11/18/2023 1200  Gross per 24 hour   Intake 3834.26 ml   Output 390 ml   Net 3444.26 ml      Flowsheet Rows      Flowsheet Row First Filed Value   Admission Height 185.4 cm (73\") Documented at 11/16/2023 1916   Admission Weight 75.8 kg (167 lb) Documented at 11/16/2023 1916 11/16/23  1916 11/16/23  2100   Weight: 75.8 kg (167 lb) 76.1 kg (167 lb 12.3 oz)            Objective:  General Appearance:  Ill-appearing.    Vital signs: (most recent): Blood pressure (!) 74/57, pulse 74, temperature 99.1 °F (37.3 °C), temperature source Esophageal, resp. rate 15, height 185.4 cm (72.99\"), weight 76.1 kg (167 lb 12.3 oz), SpO2 92%.    HEENT: (Oral ET tube  NG tube  Right IJ triple-lumen)    Lungs:  No rales, wheezes or rhonchi.    Heart: Normal rate.  Regular rhythm.  S1 normal and S2 normal.  No murmur, gallop or friction rub.   Chest: Symmetric chest wall expansion.   Abdomen: Abdomen is soft and non-distended.  Hypoactive bowel sounds.   There is no mass. There is no splenomegaly. There is no hepatomegaly.   Extremities: There is dependent edema.  There is no deformity.  " (Right radial arterial line)  Neurological: GCS score is 3.    Skin:  Dry and cool.                I reviewed the patient's new clinical results.  I reviewed the patient's new imaging results/reports including actual images and agree with reports.    EEG Awake or Asleep Portable Now    Result Date: 11/16/2023  Diffuse cerebral dysfunction which appears moderate to severe No evidence for epilepsy is seen This report is transcribed using the Dragon dictation system.      XR Chest 1 View    Result Date: 11/16/2023  Impression: 1.Support lines and tubes are in good position as described above. No pneumothorax. 2.The left costophrenic angle is not on the image. The lungs are grossly clear. Electronically Signed: Neal Leon DO  11/16/2023 9:55 PM EST  Workstation ID: [id number]    XR Abdomen KUB    Result Date: 11/16/2023  Impression: Gastric tube in the distal esophagus. Recommend advancing the tube at least 10 cm to ensure that both the tip and sideport are in the stomach Electronically Signed: José Miguel Jordan  11/16/2023 9:54 PM EST  Workstation ID: [id number]    CT Head Without Contrast    Result Date: 11/16/2023  Impression: 1. No acute intracranial abnormality. 2. Cerebral atrophy and white matter findings of chronic microvascular disease. 3. Paranasal sinus disease. Electronically Signed: Kevin Laureano MD  11/16/2023 8:23 PM EST  Workstation ID: [id number]    XR Chest 1 View    Result Date: 11/16/2023  Impression: 1. Endotracheal tube 5 cm above the simran 2. Limited study due to artifact from overlying device. There is evidence of mild pulmonary edema. No gross pneumothorax is visualized Electronically Signed: José Miguel Jordan  11/16/2023 7:01 PM EST  Workstation ID: [id number]      INFUSIONS  EPINEPHrine, 0.02-0.3 mcg/kg/min, Last Rate: 0.1 mcg/kg/min (11/18/23 1227)  fentanyl 10 mcg/mL,  mcg/hr, Last Rate: Stopped (11/18/23 1009)  norepinephrine, 0.02-0.3 mcg/kg/min, Last Rate: 0.3 mcg/kg/min (11/18/23  0529)  phenylephrine, 0.5-3 mcg/kg/min, Last Rate: 3 mcg/kg/min (11/18/23 0936)  propofol, 5-50 mcg/kg/min, Last Rate: Stopped (11/18/23 1030)        Results from last 7 days   Lab Units 11/18/23  0910 11/18/23  0015 11/17/23  1837   WBC 10*3/mm3 22.05* 28.21* 20.01*   HEMOGLOBIN g/dL 11.9* 12.6* 13.0   HEMATOCRIT % 37.6 39.9 41.2   PLATELETS 10*3/mm3 299 316 274     Results from last 7 days   Lab Units 11/18/23  0910 11/18/23  0617 11/18/23  0015 11/17/23  1837 11/17/23  0626 11/17/23  0011   SODIUM mmol/L 140 141 141  140 140   < > 135*  136   POTASSIUM mmol/L 3.8 3.8 3.9  3.8 3.4*   < > 3.9  4.0   CHLORIDE mmol/L 107 107 105  106 106   < > 100  100   CO2 mmol/L 18.0* 19.0* 18.0*  18.0* 18.0*   < > 20.0*  19.0*   BUN mg/dL 31* 28* 28*  29* 26*   < > 31*  32*   GLUCOSE mg/dL 108* 121* 132*  127* 120*   < > 188*  192*   CREATININE mg/dL 1.94* 1.76* 1.47*  1.45* 1.14   < > 1.45*  1.42*   MAGNESIUM mg/dL 1.8  --  1.9 1.9   < > 1.9   CALCIUM mg/dL 8.0* 8.0* 8.2*  8.3* 8.2*   < > 8.0*  8.1*   ALBUMIN g/dL  --  3.0* 3.3*  --   --  3.7    < > = values in this interval not displayed.         Results from last 7 days   Lab Units 11/18/23  0331 11/17/23  0350 11/16/23 1956 11/16/23  1856   PH, ARTERIAL pH units 7.282* 7.404 7.292* 7.264*   PCO2, ARTERIAL mm Hg 38.4 36.1 38.7 33.1*   PO2 ART mm Hg 72.7* 116.0* 131.0* 77.0*   FIO2 % 30 35 100 100       Patient isn't on Tube Feeding   /h  Patient doesn't have any tube feeding modular orders    Mechanical Ventilator:   Settings: Observed:   Mode: VC+/AC (11/18/23 1317)    Vt (Set, mL): 560 mL (11/18/23 1317) Vt Mandatory Ins (observed, mL): 0 mL (11/18/23 1317)   Resp Rate (Set): 14 (11/18/23 1317) Resp Rate (Observed) Vent: 23 (11/18/23 1317)     Minute Ventilation (L/min) (Obs): 5.32 L/min (11/18/23 1317)       FiO2 (%): 30 % (11/18/23 1317) Plateau Pressure (cm H2O): 15 cm H2O (11/18/23 0729)   PEEP/CPAP (cm H2O): 5 cm H20 (11/18/23 1317) I:E Ratio (Obs):  3.7:1 (11/18/23 4347)         I reviewed the patient's medications.    Assessment & Plan   ASSESSMENT/PLAN     Active Hospital Problems    Diagnosis     **Cardiac arrest     Anoxic encephalopathy     COVID-19 virus detected     Cardiac resynchronization therapy defibrillator (CRT-D) in place     Acute on chronic systolic congestive heart failure     ICD (implantable cardioverter-defibrillator) discharge     Elevated LFTs (R/O shock liver)     Hypothyroidism (acquired)     Type 2 diabetes mellitus with HbA1c 5.8, without long-term current use of insulin     GEO on nocturnal CPAP     Recurrent monomorphic ventricular tachycardia     Coronary artery disease involving native coronary artery of native heart with angina pectoris        78-year-old male with extensive cardiac history as outlined above in history.  He has ischemic cardiomyopathy with an EF of less than 20% and ICD in place with a history of ventricular fibrillation and ventricular tachycardia with a recent EP study with VT ablation in September of this year.  Past medical history also consists of dyslipidemia, GEO on CPAP, hypothyroidism, and remote PE.     He presented after an out of hospital witnessed arrest with ICD shocks and prolonged CPR.  Admitted to ICU on pressor therapy with paced rhythm.     His wife tells me that he was exposed to COVID from a family member on 11/5 and developed symptoms 2 or 3 days later and had a positive test on 11/10.  They have been isolating and he was not able to take Paxlovid due to his amiodarone.  They did receive the most recent booster.  His only symptoms were fatigue and some cough.     Admitted to ICU for ongoing pressor and ventilator support as well as initiation of TTM and trending of neurologic status on the evening of 11/16    He is currently on multiple pressors consisting of epinephrine, norepinephrine, and phenylephrine.  His propofol and fentanyl have been stopped.  He is renal function has deteriorated.   He has evidence of significant anoxic encephalopathy with myoclonic jerking noted earlier and an EEG with significant dysfunction.  He is status post prolonged CPR in the field and in the ER.  He has ongoing cardiogenic shock.    I discussed the case with Dr. Covington in neurology and with the patient's wife and son at the bedside.  Cardiology has also seen him as well as his internist, Dr. Brian Lewis.  We are all in agreement that the prognosis is grim without reasonable possibility of meaningful recovery.  His wife says that she is talked about this with him in detail and he would not want to be maintained on life support in this type of situation.    We will plan on a palliative extubation with a focus on comfort measures and removal of pressor support later today once all the family members have visited     I discussed the patient's findings and my recommendations with family, nursing staff, and consulting provider     Plan of care and goals reviewed with multidisciplinary team at daily rounds.    High level of risk due to:  decision for Do Not Attempt to Resuscitate and decision to de-escalate care.    Curtis Durant MD  Pulmonary and Critical Care Medicine  11/18/23 13:34 EST

## 2023-11-18 NOTE — PLAN OF CARE
Goal Outcome Evaluation:  Plan of Care Reviewed With: spouse, daughter, son        Progress: no change  Outcome Evaluation:     -Patient remains on the vent with PEEP 5 and FiO2 30%.   -TTM continues. Patient rewarmed to 37 degrees and maintenance phase started at 0226 this morning.   -Patient remains unresponsive. He does not open his eyes or withdraw from painful stimuli. Pupils are round and equal, but unresponsive. No cough, gag, or corneal reflexes.   -While cooled to 36 degrees, patient continued on fentanyl gtt and max dose of propofol to maintain target temperature.  No shivering noted, but patient did have movement in EKG leads/possible microshivering. He also began to display intermittent repetitive movement this shift. Occasional repetitive jaw movement and also slow repetitive slight flexion with both arms. No change with ativan prn x1 dose. Planning for continuous EEG this am. Began weaning fentanyl and propofol this am after rewarming completed.   -Patient required increase in vasopressor support and had a decrease in UOP this shift. 170 ml uop total for shift. Currently on levophed at 0.3 mcg/kg/min and neosynephrine at 2.5 mcg/kg/min. FloTrac monitoring applied with arterial line. Initial CO 4.4, CI 2.2, and SVV 42. 500 ml LR bolus given over 1 hours. BP, SVV, and uop improved while bolus infusing.  Once bolus complete, BP decreased and SVV returned to 40s.  Another 500 ml bolus is currently infusing over 2 hours.   -Heparin gtt infusing per pharmacy dosing.

## 2023-11-18 NOTE — CONSULTS
DOS: 2023  NAME: Jonnie Roth     : 1945  PCP: Brian Lewis MD  CC: Patient admitted with witnessed cardiac arrest Thursday evening.  Was noted to have myoclonic jerks.  Referring MD: Curtis Durant MD  Neurological Problem and Interval History:  78 y.o. right-handed white male with a Hx of severe coronary artery disease with cardiac pacemaker and defibrillator and had 5 arrests earlier.  Last  the patient was in the recliner and suddenly had a witnessed cardiac arrest.  His wife started CPR while the patient was on the recliner and so she could not do the proper chest compressions or breaths.  However within 10 minutes the  arrived and started doing the proper CPR along with chest compressions after getting the patient down on the floor.  Even in the emergency room it was another 20 minutes before return of circulation after CPR.  Patient is currently comatose and on the ventilator.  Had been on intravenous propofol drip which has been reduced.  Also the patient is on intravenous fentanyl drip which will be slowly cut down.  There has been intermittent opening and closing of the jaw noted.  But earlier Dr. Durant had noted myoclonic jerks which were rhythmic in both upper extremities.  Last evening the registered nurse had noted some intermittent turning in and out of the palms symmetrically.  This is currently stopped.  I was not able to get any kind of response upon stimulation and he has worsening kidney functions as well.  The patient was initially placed on the [place] sun for the hypothermia protocol and has been fully warmed up.     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 Secondary to Burks trauma with hematuria and urosepsis requiring hospitalization May 2015    Sleep apnea with use of continuous positive airway pressure (CPAP)       CPAP- SETTING 4     Ventricular tachycardia 12/28/2016    Wears glasses       READERS         Surgical History         Past Surgical History:   Procedure Laterality Date    BIVENTRICULLAR IMPLANTABLE CARDIOVERTER DEFIBRILLATOR PLACEMENT        CARDIAC CATHETERIZATION N/A 01/16/2017     Procedure: Left Heart Cath;  Surgeon: Diego Ayala MD;  Location:  [facility];  Service:     CARDIAC DEFIBRILLATOR PLACEMENT          A. ICD generator change out with upgrade to BiV pacemaker implantable cardioverter    defibrillator, 12/17/2007. Ventricular fibrillation s/p pacesetter [place] ICD in [place]. [place] 09/2000    CARDIAC ELECTROPHYSIOLOGY PROCEDURE N/A 10/02/2017     Procedure: BIV ICD  generator change SJM;  Surgeon: Ferdinand Alba MD;  Location:  [facility];  Service:     CARDIAC ELECTROPHYSIOLOGY PROCEDURE N/A 07/06/2021     Procedure: BIV ICD generator change with [employer]. This will need to be done in late June or early July. Hold Xarelto one day prior.;  Surgeon: Ferdinand Alba MD;  Location:  [facility];  Service: Cardiology;  Laterality: N/A;    CARDIAC ELECTROPHYSIOLOGY PROCEDURE N/A 9/7/2023     Procedure: Ablation VT;  Surgeon: Ferdinand Alba MD;  Location:   [facility];  Service: Cardiovascular;  Laterality: N/A;  with general anesthesia; hold Xarelto 2 days prior    CAROTID STENT   Several    CATARACT EXTRACTION         Bilateral     COLONOSCOPY        CORONARY ANGIOPLASTY WITH STENT PLACEMENT         X7 STENTS TOTAL     CYSTOSCOPY URETERAL TUMOR FULGURATION   05/29/2015      A. Status post cystoscopy with clot evacuation and fulguration of the prostate secondary to hematuria, 5/29/2015.    FOOT SURGERY         RIGHT - POLIO RELATED     INSERT / REPLACE / REMOVE PACEMAKER   2002    PROSTATE SURGERY         A. Status post laser vaporization, 08/19/2009.    ROOT CANAL        TOOTH EXTRACTION                Review of Systems:    Constitutional: Pleasant gentleman very thin and lean with a body mass index of only 22 kg/m².  Cardiovascular: Had witnessed cardiac arrest and this is his sixth episode.  Respiratory: Currently on the ventilator, not overriding the ventilator at all.  Gastrointestinal: Has a nasogastric tube in place.  Genitourinary: Has a Burks catheter in place.  Musculoskeletal: Very emaciated muscles noted.  Dermatological: No skin breakdown noted.  Neurological: [place] Coma Scale of 3.  Psychiatric: Unable to gauge at this time.  Ophthalmological: Pupils are fixed pinpoint not reacting to light                                                   Medications On Admission  Prescriptions Prior to Admission           Medications Prior to Admission   Medication Sig Dispense Refill Last Dose    amiodarone (PACERONE) 400 MG tablet Take 1 tablet by mouth Daily. 90 tablet 3      carvedilol (COREG) 3.125 MG tablet Take 1 tablet by mouth 2 (Two) Times a Day With Meals. (Patient taking differently: Take 2 tablets by mouth 2 (Two) Times a Day With Meals.) 180 tablet 3      cholecalciferol (VITAMIN D3) 25 MCG (1000 UT) tablet Take 2 tablets by mouth 2 (Two) Times a Day. OTC          clopidogrel (PLAVIX) 75 MG tablet Take 1 tablet by mouth Every Morning. 90  tablet 3      Cyanocobalamin (Vitamin B-12) 5000 MCG tablet dispersible Take 1,000 mcg by mouth Daily. OTC          empagliflozin (JARDIANCE) 10 MG tablet tablet Take 1 tablet by mouth Daily. 90 tablet 3      eplerenone (INSPRA) 25 MG tablet Take 0.5 tablets by mouth Daily. 90 tablet 0      L-TRYPTOPHAN PO Take 3,000 mg by mouth Every Night.          levothyroxine (Synthroid) 50 MCG tablet Take 1 tablet by mouth Daily. 30 tablet 6      Magnesium Oxide -Mg Supplement 500 MG tablet Take 1 tablet by mouth Daily.          melatonin 5 MG tablet tablet Take 2 tablets by mouth Every Night. OTC          metFORMIN (GLUCOPHAGE) 1000 MG tablet Take 1 tablet by mouth 2 (two) times a day.          Multiple Vitamins-Minerals (ICAPS AREDS 2 PO) Take 1 tablet by mouth Daily. OTC          Niacin, Antihyperlipidemic, (NIACIN ER, ANTIHYPERLIPIDEMIC, PO) Take 500 mg by mouth Daily.          nitroglycerin (NITROSTAT) 0.4 MG SL tablet Place 1 tablet under the tongue Every 5 (Five) Minutes As Needed for Chest Pain. Take no more than 3 doses in 15 minutes. 25 tablet 5      pantoprazole (PROTONIX) 40 MG EC tablet Take 1 tablet by mouth Every Morning.          polyethylene glycol (MIRALAX) packet Take 17 g by mouth Every Other Day. OTC          potassium chloride (MICRO-K) 10 MEQ CR capsule Take 1 capsule by mouth Daily.          rivaroxaban (Xarelto) 10 MG tablet Take 1 tablet by mouth Daily. 90 tablet 3      sacubitril-valsartan (Entresto) 24-26 MG tablet Take 1 tablet by mouth 2 (Two) Times a Day. (Patient taking differently: Take 1 tablet by mouth Daily.) 180 tablet 3      torsemide (DEMADEX) 10 MG tablet Take 1 tablet by mouth Daily. 30 tablet 6              Allergies:  No Known Allergies     Social Hx:  Social History   Social History            Socioeconomic History    Marital status:    Tobacco Use    Smoking status: Former       Packs/day: 2.00       Years: 30.00       Additional pack years: 0.00       Total pack years: 60.00        Types: Cigarettes       Start date: 1960       Quit date: 1995       Years since quittin.6       Passive exposure: Past    Smokeless tobacco: Never    Tobacco comments:       Heavy smoker for years   Vaping Use    Vaping Use: Never used   Substance and Sexual Activity    Alcohol use: No    Drug use: No    Sexual activity: Defer            Family Hx:        Family History   Problem Relation Age of Onset    Heart failure Father      Stroke Father           Review of Imaging (Interpretation of images not reports): CT of the abdomen and pelvis with contrast performed on 2023 showed the following:     Findings:  Within the right middle lobe and right lower lobes there are noncalcified 4 mm pulmonary nodules. These are unchanged from . Within the left lower lobe there is a noncalcified 12 mm pulmonary nodule. This is stable from prior studies dating back to   2016 there is minimal left basilar atelectasis.     The liver appears within normal limits. There is in the neck of the gallbladder. No inflammatory change seen around the gallbladder. No evidence of biliary tract obstruction.     Again seen within the head of the pancreas is a 1.4 cm low-density mass favored to be a cyst or pseudocyst. This appears unchanged from 2016. Neck duct is of normal caliber. Spleen is normal.     Bilateral adrenal glands are within normal limits. There are multiple low-density renal masses consistent with cysts. No renal or ureteral stone or hydronephrosis.     There is no abdominal or retroperitoneal the upper GI tract is within normal limits.     Pelvis: Urinary bladder is within normal limits. There is a large amount of stool within the rectum could be related to fecal impaction. There are scattered colonic diverticula. No evidence of diverticulitis. The appendix is normal. There is no pelvic or   inguinal adenopathy. No free intraperitoneal fluid.     There are degenerative changes of the spine. There  are no lytic or sclerotic bony lesions identified.     IMPRESSION:  Impression:     1. Large amount stool within the rectum which could be related to fecal impaction.  2. Stable within the head of the pancreas favored to be a cyst or pseudocyst. This is stable since 2016.  3. Noncalcified pulmonary nodules in both lung bases unchanged from 2016 and therefore considered to be 9.             CT head results from the last 21 days:   CT Head Without Contrast     Result Date: 11/16/2023  CT HEAD WO CONTRAST Date of Exam: 11/16/2023 8:09 PM EST Indication: Neuro deficit(s), subacute Target Temperature Management Initiation. Code Comparison: CT head without contrast 12/8/2022 Technique: Axial CT images were obtained of the head without contrast administration.  Automated exposure control and iterative construction methods were used. Findings: Cerebral atrophy stable from prior study. Mild white matter findings suggesting chronic microvascular disease. No intracranial hemorrhage. No mass effect or midline shift. Posterior fossa without acute abnormality. No abnormal extra-axial fluid collection. Globes intact and symmetric. Thinning of the orbital lens bilaterally. The mastoid air cells are well-aerated. There is sinus thickening in the maxillary sinuses and ethmoid air cells with chronic mucoperiosteal thickening in the maxillary sinuses left greater than right. No calvarial fracture. ET tube noted on the  radiograph.      Impression: Impression: 1. No acute intracranial abnormality. 2. Cerebral atrophy and white matter findings of chronic microvascular disease. 3. Paranasal sinus disease. Electronically Signed: Kevin Laureano MD  11/16/2023 8:23 PM EST  Workstation ID: [id number]          A KUB performed on November 16, 2023 showed the following:     Findings:  A gastric tube is present. The tip is in the distal esophagus just above the diaphragm. Visualized bowel gas pattern appears within normal limits      IMPRESSION:  Impression:  Gastric tube in the distal esophagus. Recommend advancing the tube at least 10 cm to ensure that both the tip and sideport are in the stomach           MRI of head results from the last 21 days:             Additional Tests Performed: An EEG performed on November 16, 2023 shows the following:     Findings:     The patient is intubated and sedated.  EMG artifact is copious at the beginning of the study and largely obscures the underlying background.  As a study proceeds, fentanyl was started and propofol was increased.  Despite this, for the most part EMG artifact remains prominent.  During an extremely brief period where EMG artifact subsides, diffuse very low amplitude fast activity is seen.  No focal features are noted.  No overt epileptiform activity or rhythmic discharges are present.  No electrographic seizures are seen.  Hyperventilation and photic stimulation are not performed.     Video: Available     Technical quality: Fair. Excess EMG artifact           SUMMARY:     Suboptimal study due to excess EMG artifact     Underlying background appears moderate-severe generalized suppression without focal features seen     No electrographic seizures are present     IMPRESSION:     Diffuse cerebral dysfunction which appears moderate to severe     No evidence for epilepsy is seen        Results for orders placed during the hospital encounter of 05/02/23     Adult Transthoracic Echo Complete W/ Cont if Necessary Per Protocol     Interpretation Summary    The left ventricle is dilated.  Left ventricular systolic function is severely decreased. Left ventricular ejection fraction appears to be less than 20%.  The anterior and anterior lateral segments and apex are akinetic.    The left atrial cavity is dilated.    Left atrial volume is moderately increased.    Mild to moderate much regurgitation is present.    Estimated right ventricular systolic pressure from tricuspid regurgitation is normal  (<35 mmHg).              Laboratory Results:         Lab Results   Component Value Date     GLUCOSE 108 (H) 11/18/2023     CALCIUM 8.0 (L) 11/18/2023      11/18/2023     K 3.8 11/18/2023     CO2 18.0 (L) 11/18/2023      11/18/2023     BUN 31 (H) 11/18/2023     CREATININE 1.94 (H) 11/18/2023     EGFRIFNONA 83 07/01/2021     BCR 16.0 11/18/2023     ANIONGAP 15.0 11/18/2023            Lab Results   Component Value Date     WBC 22.05 (H) 11/18/2023     HGB 11.9 (L) 11/18/2023     HCT 37.6 11/18/2023     MCV 82.8 11/18/2023      11/18/2023            Lab Results   Component Value Date     CHOL 202 (H) 09/26/2023     CHOL 116 09/10/2022     CHOL 130 08/14/2017            Lab Results   Component Value Date     HDL 46 09/26/2023     HDL 43 09/10/2022     HDL 40 08/14/2017            Lab Results   Component Value Date      (H) 09/26/2023     LDL 53 09/10/2022     LDL 77 08/14/2017            Lab Results   Component Value Date     TRIG 149 09/26/2023     TRIG 111 09/10/2022     TRIG 70 08/14/2017            Lab Results   Component Value Date     HGBA1C 5.60 08/31/2023            Lab Results   Component Value Date     INR 1.84 (H) 11/16/2023     INR 1.88 (H) 11/16/2023     INR 1.62 (H) 08/31/2023     PROTIME 21.5 (H) 11/16/2023     PROTIME 21.7 (H) 11/16/2023     PROTIME 19.3 (H) 08/31/2023         TSH Results:   TSH            5/2/2023    13:02 8/13/2023    03:10 11/18/2023    00:15   TSH   TSH 5.210  6.950  2.120          Brief Urine Lab Results  (Last result in the past 365 days)          Color   Clarity   Blood   Leuk Est   Nitrite   Protein   CREAT   Urine HCG         11/17/23 0129 Yellow    Cloudy    Small (1+)    Negative    Negative    30 mg/dL (1+)                          Result Review:  I have personally reviewed the results from the time of this admission to 11/18/2023 10:46 EST and agree with these findings:  [x]  Laboratory list / accordion  [x]  Microbiology  [x]  Radiology  [x]   EKG/Telemetry   [x]  Cardiology/Vascular   []  Pathology  [x]  Old records  []  Other:  Most notable findings include: Ischemic cardiac disease as documented by CHENTE Farias for Dr. Jose Alba, the electrophysiologist for cardiology on November 17, 2023 as follows:     Ischemic heart disease:  Remote progressive angina pectoris/acute extensive anterolateral myocardial infarction/delayed presentation with thrombolysis/severe 3-vessel coronary atherosclerosis with severe single vessel involvement/PTCA with intracoronary stent deployment proximal-mid segment LAD/moderate-severe compensated left ventricular dysfunction. LVEF (0.35)/abnormal positive signal averaged EKG/oral anticoagulation, April 1995.  Remote NYHA class I-II angina pectoris/class III CHF/abnormal quantitative SPECT gated Cardiolite GXT, July 1998.  Stable persistent MUGA, LVEF (0.33, January 1999 and January 2000).   Residual NYHA class I angina pectoris/CHF with reduced MUGA scan: LVEF (0.25), April 2002.  Left heart catheterization with distal circumflex disease: EF 20%, September 2002.   MUGA in May 2003: EF 32%.   Sestamibi GXT on 04/08/2005: No ischemia. EF 29%.   Residual NYHA class I angina pectoris/CHF with reduced acceptable MUGA scan: EF (0.32) and acceptable Pacesetter PCD interrogation/reprogramming, May 2003 with interrogation, September 2004.  Echocardiogram, September 2009 with EF 30%  Left heart catheterization by Dr. Bhupinder Gonzalez, August 2010, with LVEF of 35%, with 3-vessel native coronary artery disease, 95% mid-LAD status post PTCA and stenting with two 3 mm stents by Dr. Llanes.  Echocardiogram, 06/07/2012: Left ventricular ejection fraction of 30%.  Hospitalization, 02/01/2014, for non-ST elevation MI, left heart catheterization by Dr. Ayala that demonstrated 60% mid stenosis of the right coronary artery that remains unchanged compared to previous, widely patent stents of the LAD with severe LV dysfunction of  "approximately 25%.   Left heart catheterization status post drug-eluting stent to the distal LAD and mid-RCA with an EF of 20% to 25% by Dr. Diego Ayala, 04/20/2015.   Left heart catheterization 1/16/17; nonobstructive CAD with patent previously placed stents, dilated cardiomyopathy with severe LV systolic dysfunction, EF less than 20%, normal hemodynamics, recommendations for continued medical therapy and risk factor, evaluation for noncardiac etiology of symptoms.  Residual CCS Class I/II angina pectoris/NYHA Class II exertional dyspnea and fatigue syndrome, summer 2017  Residual CCS 0 angina pectoris/NYHA class I-II exertional dyspnea and fatigue, February 2018, September 2018, March 2019.  Echocardiogram 5/2/2023: LV dilated, LV systolic function severely decreased, LVEF appears less than 20%, anterior and anterior lateral segments and apex are akinetic, LA cavity dilated, LA volume moderately increased, mild to moderate regurgitation present           Physical Examination:  BP 91/49   Pulse 76   Temp 99 °F (37.2 °C) (Esophageal)   Resp 15   Ht 185.4 cm (72.99\")   Wt 76.1 kg (167 lb 12.3 oz)   SpO2 92%   BMI 22.14 kg/m²       Evaluation for coma:         Gen: NAD, [place] Coma Scale is 3     Comatose/Stuporous     Eyes: Fundi could not be determined at this time      PERRL: Doll's eye movements are absent conjugate gaze the eyelids are not moving at all.     Corneal reflexes absent     HEENT: Atraumatic       Cough to suction no evidence of any gag response     Neck: No nuchal rigidity, no thyromegaly noted     Respiration: No spontaneous respiration noted.     Ext: No edema, no cyanosis no spontaneous movements noted at this time.     Skin: Warm, dry, no rashes no skin breakdown noted.     Neuro: Does not open eyes to voice, does not follow commands. No response to pain all does not withdraw to any painful stimuli and the [place] Coma Scale is 3    Extremities: no clonus.  Does not withdraw to any " noxious stimulation in the inner part of the thighs.      Diagnoses / Discussion:  78 y.o. who presents with Sx of weakness cardiac arrest with initial myoclonic jerks which are a result of anoxic brain injury.  Currently I am not seeing any jerks.     Plan:  Discussed with the patient's wife and daughter as well as her son on the telephone that usually we wait 72 to 96 hours to make a good determination and all the sedatives need to be off before actual brain death can be determined.     Discussed with the registered nurse about cutting down the propofol drip as well as intravenous fentanyl drip and coming Monday we will repeat bedside EEG with noxious stimulation and photostimulation to look for reactivity.     No further myoclonic jerks have been noted so far..      I have discussed the above with the patient and family.  We will be following him up tomorrow.  Time spent with patient: 70 minutes in face-to-face evaluation and management of the patient.  Electronically signed by Jacob Covington MD, 11/18/23, 10:46 AM EST.     Dictated using Dragon dictation.

## 2023-11-18 NOTE — PLAN OF CARE
Goal Outcome Evaluation:   Patient had ventricular standstill at 1442.  He passed peacefully with his wife at bedside.                      Problem: End-of-Life Care  Goal: Comfort, Peace and Preserved Dignity  Outcome: Met

## 2023-11-18 NOTE — PROGRESS NOTES
Pharmacy Consult-Vancomycin Dosing  Jonnie Roth is a  78 y.o. male receiving vancomycin therapy.     Indication: sepsis  Consulting Provider: intensivist  ID Consult: No    Goal Trough: 15-20    Current Antimicrobial Therapy  Anti-Infectives (From admission, onward)      Ordered     Dose/Rate Route Frequency Start Stop    11/17/23 0122  vancomycin (dosing per levels)        Ordering Provider: Bonilla Hutton, PharmD   See [facility] for full Linked Orders Report.     Does not apply Daily 11/18/23 0900 11/22/23 0859    11/18/23 0729  vancomycin (VANCOCIN) 1000 mg/200 mL dextrose 5% IVPB        Ordering Provider: Merary Levy, MeenuD    1,000 mg  over 60 Minutes Intravenous Once 11/18/23 0815      11/17/23 0111  piperacillin-tazobactam (ZOSYN) 3.375 g in iso-osmotic dextrose 50 ml (premix)        Ordering Provider: Deloris Sahni APRN    3.375 g  over 4 Hours Intravenous Every 8 Hours 11/17/23 0800 11/22/23 0759    11/17/23 0122  vancomycin IVPB 1500 mg in 0.9% NaCl (Premix) 500 mL        Ordering Provider: Bonilla Hutton PharmD    20 mg/kg × 75.8 kg  333.3 mL/hr over 90 Minutes Intravenous Once 11/17/23 0215 11/17/23 0355    11/17/23 0111  piperacillin-tazobactam (ZOSYN) 3.375 g in iso-osmotic dextrose 50 ml (premix)        Ordering Provider: Deloris Sahni APRN    3.375 g  over 30 Minutes Intravenous Once 11/17/23 0200 11/17/23 0210    11/17/23 0111  Pharmacy to dose vancomycin        Ordering Provider: Deloris Sahni APRN     Does not apply Continuous PRN 11/17/23 0111 11/22/23 0110            Allergies  Allergies as of 11/16/2023    (No Known Allergies)       Labs  Results from last 7 days   Lab Units 11/18/23  0617 11/18/23  0015 11/17/23  1837   BUN mg/dL 28* 28*  29* 26*   CREATININE mg/dL 1.76* 1.47*  1.45* 1.14     Results from last 7 days   Lab Units 11/18/23  0015 11/17/23  1837 11/17/23  1310   WBC 10*3/mm3 28.21* 20.01* 16.30*       Evaluation of Dosing  Last Dose Received in  "the ED/Outside Facility:        N/A  Is Patient on Dialysis or Renal Replacement:        No    Ht - 185.4 cm (72.99\")  Wt - 76.1 kg (167 lb 12.3 oz)    Estimated Creatinine Clearance: 36.6 mL/min (A) (by C-G formula based on SCr of 1.76 mg/dL (H)).  Intake & Output (last 3 days)         11/15 0701 11/16 0700 11/16 0701 11/17 0700 11/17 0701 11/18 0700 11/18 0701 11/19 0700    I.V. (mL/kg)  638.5 (8.4) 2323.9 (30.5)     NG/GT  25 150     IV Piggyback  550 655     Total Intake(mL/kg)  1213.5 (15.9) 3128.9 (41.1)     Urine (mL/kg/hr)  985 855 (0.5)     Emesis/NG output   20     Total Output  985 875     Net  +228.5 +2253.9                     Microbiology and Radiology  Microbiology Results (last 10 days)       Procedure Component Value - Date/Time    MRSA Screen, PCR (Inpatient) - Swab, Nares [[id number]]  (Normal) Collected: 11/17/23 0127    Lab Status: Final result Specimen: Swab from Nares Updated: 11/17/23 1008     MRSA PCR Negative    Narrative:      The negative predictive value of this diagnostic test is high and should only be used to consider de-escalating anti-MRSA therapy. A positive result may indicate colonization with MRSA and must be correlated clinically.  MRSA Negative    Blood Culture - Blood, Hand, Right [[id number]]  (Abnormal) Collected: 11/17/23 0008    Lab Status: Final result Specimen: Blood from Hand, Right Updated: 11/18/23 0614     Blood Culture Streptococcus, Alpha Hemolytic     Isolated from Anaerobic Bottle     Gram Stain Anaerobic Bottle Gram positive cocci in pairs and clusters    Narrative:      Probable contaminant requires clinical correlation, susceptibility not performed unless requested by physician.      Blood Culture ID, PCR - Blood, Hand, Right [[id number]]  (Abnormal) Collected: 11/17/23 0008    Lab Status: Final result Specimen: Blood from Hand, Right Updated: 11/17/23 2157     BCID, PCR Streptococcus spp, not A, B, or pneumoniae. Identification by BCID2 PCR.     BOTTLE TYPE " Anaerobic Bottle    Blood Culture - Blood, Neck [[id number]]  (Normal) Collected: 11/17/23 0004    Lab Status: Preliminary result Specimen: Blood from Neck Updated: 11/18/23 0231     Blood Culture No growth at 24 hours    Respiratory Panel PCR w/COVID-19(SARS-CoV-2) SYLVIA/CHRISTIAN/JER/PAD/COR/SUYAPA In-House, NP Swab in UTM/VTM, 2 HR TAT - Swab, Nasopharynx [[id number]]  (Abnormal) Collected: 11/16/23 2351    Lab Status: Final result Specimen: Swab from Nasopharynx Updated: 11/17/23 0101     ADENOVIRUS, PCR Not Detected     Coronavirus 229E Not Detected     Coronavirus HKU1 Not Detected     Coronavirus NL63 Not Detected     Coronavirus OC43 Not Detected     COVID19 Detected     Human Metapneumovirus Not Detected     Human Rhinovirus/Enterovirus Not Detected     Influenza A PCR Not Detected     Influenza B PCR Not Detected     Parainfluenza Virus 1 Not Detected     Parainfluenza Virus 2 Not Detected     Parainfluenza Virus 3 Not Detected     Parainfluenza Virus 4 Not Detected     RSV, PCR Not Detected     Bordetella pertussis pcr Not Detected     Bordetella parapertussis PCR Not Detected     Chlamydophila pneumoniae PCR Not Detected     Mycoplasma pneumo by PCR Not Detected    Narrative:      In the setting of a positive respiratory panel with a viral infection PLUS a negative procalcitonin without other underlying concern for bacterial infection, consider observing off antibiotics or discontinuation of antibiotics and continue supportive care. If the respiratory panel is positive for atypical bacterial infection (Bordetella pertussis, Chlamydophila pneumoniae, or Mycoplasma pneumoniae), consider antibiotic de-escalation to target atypical bacterial infection.    COVID-19 RAPID AG,VERITOR,COR/JER/PAD/CHRISTIAN/SYLVIA/LAG/SUYAPA/ IN-HOUSE,DRY SWAB, 1 HR TAT - Swab, Nasal Cavity [[id number]]  (Abnormal) Collected: 11/16/23 2351    Lab Status: Final result Specimen: Swab from Nasal Cavity Updated: 11/17/23 0202     COVID19 Detected     Narrative:      Fact sheets for providers: https://www.fda.gov/media/986639/download    Fact sheets for patients: https://www.fda.gov/media/016872/download          Vancomycin Levels:          Results from last 7 days   Lab Units 11/18/23  0617   VANCOMYCIN TR mcg/mL 8.90       Assessment/Plan:    1. Pharmacy to dose vancomycin for sepsis.   2. Patient received a loading dose of vancomycin 1500mg IV x1.  3. Random level this AM is 8.9mcg/mL.   4. Will give a one-time dose of vancomycin 1000mg IV today and repeat random level tomorrow with AM labs given renal function and pressor requirement.  5. Monitor renal function, cultures and sensitivities, and clinical status, and adjust regimen as necessary.  Pharmacy will continue to follow.    Pharmacy will order additional vancomycin doses based on vancomycin random level result.    Merary Levy, PharmD  11/18/2023  07:34 EST

## 2023-11-18 NOTE — CONSULTS
.    DOS: 2023  NAME: Jonnie Roth   : 1945  PCP: Brian Lewis MD  CC: Patient admitted with witnessed cardiac arrest Thursday evening.  Was noted to have myoclonic jerks.  Referring MD: Curtis Durant MD  Neurological Problem and Interval History:  78 y.o. right-handed white male with a Hx of severe coronary artery disease with cardiac pacemaker and defibrillator and had 5 arrests earlier.  Last  the patient was in the recliner and suddenly had a witnessed cardiac arrest.  His wife started CPR while the patient was on the recliner and so she could not do the proper chest compressions or breaths.  However within 10 minutes the  arrived and started doing the proper CPR along with chest compressions after getting the patient down on the floor.  Even in the emergency room it was another 20 minutes before return of circulation after CPR.  Patient is currently comatose and on the ventilator.  Had been on intravenous propofol drip which has been reduced.  Also the patient is on intravenous fentanyl drip which will be slowly cut down.  There has been intermittent opening and closing of the jaw noted.  But earlier Dr. Durant had noted myoclonic jerks which were rhythmic in both upper extremities.  Last evening the registered nurse had noted some intermittent turning in and out of the palms symmetrically.  This is currently stopped.  I was not able to get any kind of response upon stimulation and he has worsening kidney functions as well.  The patient was initially placed on the [place] sun for the hypothermia protocol and has been fully warmed up.    Past Medical/Surgical Hx:  Past Medical History:   Diagnosis Date    Arthritis     BPH (benign prostatic hypertrophy) 2016    CAD (coronary artery disease)     Congenital heart disease     Disease of thyroid gland     Enlarged prostate     GERD (gastroesophageal reflux disease)     HFrEF (heart failure  with reduced ejection fraction)     History of transfusion     NO REACTION RECALLED     Hypertension     LBBB (left bundle branch block) 12/28/2016    Lung nodule     Macular degeneration     Pancreatic cyst 12/08/2022    Polio     as a child    Post-traumatic subdural hematoma 09/10/2022    Small subdural hematoma following fall due to ventricular arrhythmia, 9/10/2022  Follow-up CT head showing no extension of SDH, 8/19/2022    Pulmonary embolism     Pulmonary emphysema 12/28/2016    Sepsis     A. Secondary to Burks trauma with hematuria and urosepsis requiring hospitalization May 2015    Sleep apnea with use of continuous positive airway pressure (CPAP)     CPAP- SETTING 4     Ventricular tachycardia 12/28/2016    Wears glasses     READERS     Past Surgical History:   Procedure Laterality Date    BIVENTRICULLAR IMPLANTABLE CARDIOVERTER DEFIBRILLATOR PLACEMENT      CARDIAC CATHETERIZATION N/A 01/16/2017    Procedure: Left Heart Cath;  Surgeon: Diego Ayala MD;  Location:  [facility];  Service:     CARDIAC DEFIBRILLATOR PLACEMENT       A. ICD generator change out with upgrade to BiV pacemaker implantable cardioverter    defibrillator, 12/17/2007. Ventricular fibrillation s/p pacesetter [place] ICD in [place]. [place] 09/2000    CARDIAC ELECTROPHYSIOLOGY PROCEDURE N/A 10/02/2017    Procedure: BIV ICD  generator change SJM;  Surgeon: Ferdinand Alba MD;  Location:  [facility];  Service:     CARDIAC ELECTROPHYSIOLOGY PROCEDURE N/A 07/06/2021    Procedure: BIV ICD generator change with [employer]. This will need to be done in late June or early July. Hold Xarelto one day prior.;  Surgeon: Ferdinand Alba MD;  Location:  [facility];  Service: Cardiology;  Laterality: N/A;    CARDIAC ELECTROPHYSIOLOGY PROCEDURE N/A 9/7/2023    Procedure: Ablation VT;  Surgeon: Ferdinand Alba MD;  Location:  [facility];  Service: Cardiovascular;  Laterality: N/A;  with  general anesthesia; hold Xarelto 2 days prior    CAROTID STENT  Several    CATARACT EXTRACTION      Bilateral     COLONOSCOPY      CORONARY ANGIOPLASTY WITH STENT PLACEMENT      X7 STENTS TOTAL     CYSTOSCOPY URETERAL TUMOR FULGURATION  05/29/2015     A. Status post cystoscopy with clot evacuation and fulguration of the prostate secondary to hematuria, 5/29/2015.    FOOT SURGERY      RIGHT - POLIO RELATED     INSERT / REPLACE / REMOVE PACEMAKER  2002    PROSTATE SURGERY      A. Status post laser vaporization, 08/19/2009.    ROOT CANAL      TOOTH EXTRACTION         Review of Systems:    Constitutional: Pleasant gentleman very thin and lean with a body mass index of only 22 kg/m².  Cardiovascular: Had witnessed cardiac arrest and this is his sixth episode.  Respiratory: Currently on the ventilator, not overriding the ventilator at all.  Gastrointestinal: Has a nasogastric tube in place.  Genitourinary: Has a Burks catheter in place.  Musculoskeletal: Very emaciated muscles noted.  Dermatological: No skin breakdown noted.  Neurological: Reggie Coma Scale of 3.  Psychiatric: Unable to gauge at this time.  Ophthalmological: Pupils are fixed pinpoint not reacting to light          Medications On Admission  Medications Prior to Admission   Medication Sig Dispense Refill Last Dose    amiodarone (PACERONE) 400 MG tablet Take 1 tablet by mouth Daily. 90 tablet 3     carvedilol (COREG) 3.125 MG tablet Take 1 tablet by mouth 2 (Two) Times a Day With Meals. (Patient taking differently: Take 2 tablets by mouth 2 (Two) Times a Day With Meals.) 180 tablet 3     cholecalciferol (VITAMIN D3) 25 MCG (1000 UT) tablet Take 2 tablets by mouth 2 (Two) Times a Day. OTC       clopidogrel (PLAVIX) 75 MG tablet Take 1 tablet by mouth Every Morning. 90 tablet 3     Cyanocobalamin (Vitamin B-12) 5000 MCG tablet dispersible Take 1,000 mcg by mouth Daily. OTC       empagliflozin (JARDIANCE) 10 MG tablet tablet Take 1 tablet by mouth Daily. 90  tablet 3     eplerenone (INSPRA) 25 MG tablet Take 0.5 tablets by mouth Daily. 90 tablet 0     L-TRYPTOPHAN PO Take 3,000 mg by mouth Every Night.       levothyroxine (Synthroid) 50 MCG tablet Take 1 tablet by mouth Daily. 30 tablet 6     Magnesium Oxide -Mg Supplement 500 MG tablet Take 1 tablet by mouth Daily.       melatonin 5 MG tablet tablet Take 2 tablets by mouth Every Night. OTC       metFORMIN (GLUCOPHAGE) 1000 MG tablet Take 1 tablet by mouth 2 (two) times a day.       Multiple Vitamins-Minerals (ICAPS AREDS 2 PO) Take 1 tablet by mouth Daily. OTC       Niacin, Antihyperlipidemic, (NIACIN ER, ANTIHYPERLIPIDEMIC, PO) Take 500 mg by mouth Daily.       nitroglycerin (NITROSTAT) 0.4 MG SL tablet Place 1 tablet under the tongue Every 5 (Five) Minutes As Needed for Chest Pain. Take no more than 3 doses in 15 minutes. 25 tablet 5     pantoprazole (PROTONIX) 40 MG EC tablet Take 1 tablet by mouth Every Morning.       polyethylene glycol (MIRALAX) packet Take 17 g by mouth Every Other Day. OTC       potassium chloride (MICRO-K) 10 MEQ CR capsule Take 1 capsule by mouth Daily.       rivaroxaban (Xarelto) 10 MG tablet Take 1 tablet by mouth Daily. 90 tablet 3     sacubitril-valsartan (Entresto) 24-26 MG tablet Take 1 tablet by mouth 2 (Two) Times a Day. (Patient taking differently: Take 1 tablet by mouth Daily.) 180 tablet 3     torsemide (DEMADEX) 10 MG tablet Take 1 tablet by mouth Daily. 30 tablet 6        Allergies:  No Known Allergies    Social Hx:  Social History     Socioeconomic History    Marital status:    Tobacco Use    Smoking status: Former     Packs/day: 2.00     Years: 30.00     Additional pack years: 0.00     Total pack years: 60.00     Types: Cigarettes     Start date: 1960     Quit date: 1995     Years since quittin.6     Passive exposure: Past    Smokeless tobacco: Never    Tobacco comments:     Heavy smoker for years   Vaping Use    Vaping Use: Never used   Substance and  Sexual Activity    Alcohol use: No    Drug use: No    Sexual activity: Defer       Family Hx:  Family History   Problem Relation Age of Onset    Heart failure Father     Stroke Father        Review of Imaging (Interpretation of images not reports): CT of the abdomen and pelvis with contrast performed on June 18, 2023 showed the following:    Findings:  Within the right middle lobe and right lower lobes there are noncalcified 4 mm pulmonary nodules. These are unchanged from 2000. Within the left lower lobe there is a noncalcified 12 mm pulmonary nodule. This is stable from prior studies dating back to   2016 there is minimal left basilar atelectasis.     The liver appears within normal limits. There is in the neck of the gallbladder. No inflammatory change seen around the gallbladder. No evidence of biliary tract obstruction.     Again seen within the head of the pancreas is a 1.4 cm low-density mass favored to be a cyst or pseudocyst. This appears unchanged from 2016. Neck duct is of normal caliber. Spleen is normal.     Bilateral adrenal glands are within normal limits. There are multiple low-density renal masses consistent with cysts. No renal or ureteral stone or hydronephrosis.     There is no abdominal or retroperitoneal the upper GI tract is within normal limits.     Pelvis: Urinary bladder is within normal limits. There is a large amount of stool within the rectum could be related to fecal impaction. There are scattered colonic diverticula. No evidence of diverticulitis. The appendix is normal. There is no pelvic or   inguinal adenopathy. No free intraperitoneal fluid.     There are degenerative changes of the spine. There are no lytic or sclerotic bony lesions identified.     IMPRESSION:  Impression:     1. Large amount stool within the rectum which could be related to fecal impaction.  2. Stable within the head of the pancreas favored to be a cyst or pseudocyst. This is stable since 2016.  3. Noncalcified  pulmonary nodules in both lung bases unchanged from 2016 and therefore considered to be 9.          CT Head Without Contrast    Result Date: 11/16/2023  CT HEAD WO CONTRAST Date of Exam: 11/16/2023 8:09 PM EST Indication: Neuro deficit(s), subacute Target Temperature Management Initiation. Code Comparison: CT head without contrast 12/8/2022 Technique: Axial CT images were obtained of the head without contrast administration.  Automated exposure control and iterative construction methods were used. Findings: Cerebral atrophy stable from prior study. Mild white matter findings suggesting chronic microvascular disease. No intracranial hemorrhage. No mass effect or midline shift. Posterior fossa without acute abnormality. No abnormal extra-axial fluid collection. Globes intact and symmetric. Thinning of the orbital lens bilaterally. The mastoid air cells are well-aerated. There is sinus thickening in the maxillary sinuses and ethmoid air cells with chronic mucoperiosteal thickening in the maxillary sinuses left greater than right. No calvarial fracture. ET tube noted on the  radiograph.     Impression: Impression: 1. No acute intracranial abnormality. 2. Cerebral atrophy and white matter findings of chronic microvascular disease. 3. Paranasal sinus disease. Electronically Signed: Kevin Laureano MD  11/16/2023 8:23 PM EST  Workstation ID: [id number]      A KUB performed on November 16, 2023 showed the following:    Findings:  A gastric tube is present. The tip is in the distal esophagus just above the diaphragm. Visualized bowel gas pattern appears within normal limits     IMPRESSION:  Impression:  Gastric tube in the distal esophagus. Recommend advancing the tube at least 10 cm to ensure that both the tip and sideport are in the stomach                Additional Tests Performed: An EEG performed on November 16, 2023 shows the following:    Findings:     The patient is intubated and sedated.  EMG artifact is copious at  the beginning of the study and largely obscures the underlying background.  As a study proceeds, fentanyl was started and propofol was increased.  Despite this, for the most part EMG artifact remains prominent.  During an extremely brief period where EMG artifact subsides, diffuse very low amplitude fast activity is seen.  No focal features are noted.  No overt epileptiform activity or rhythmic discharges are present.  No electrographic seizures are seen.  Hyperventilation and photic stimulation are not performed.     Video: Available     Technical quality: Fair. Excess EMG artifact           SUMMARY:     Suboptimal study due to excess EMG artifact     Underlying background appears moderate-severe generalized suppression without focal features seen     No electrographic seizures are present     IMPRESSION:     Diffuse cerebral dysfunction which appears moderate to severe     No evidence for epilepsy is seen       Results for orders placed during the hospital encounter of 05/02/23    Adult Transthoracic Echo Complete W/ Cont if Necessary Per Protocol    Interpretation Summary    The left ventricle is dilated.  Left ventricular systolic function is severely decreased. Left ventricular ejection fraction appears to be less than 20%.  The anterior and anterior lateral segments and apex are akinetic.    The left atrial cavity is dilated.    Left atrial volume is moderately increased.    Mild to moderate much regurgitation is present.    Estimated right ventricular systolic pressure from tricuspid regurgitation is normal (<35 mmHg).            Laboratory Results:   Lab Results   Component Value Date    GLUCOSE 108 (H) 11/18/2023    CALCIUM 8.0 (L) 11/18/2023     11/18/2023    K 3.8 11/18/2023    CO2 18.0 (L) 11/18/2023     11/18/2023    BUN 31 (H) 11/18/2023    CREATININE 1.94 (H) 11/18/2023    EGFRIFNONA 83 07/01/2021    BCR 16.0 11/18/2023    ANIONGAP 15.0 11/18/2023     Lab Results   Component Value Date     WBC 22.05 (H) 11/18/2023    HGB 11.9 (L) 11/18/2023    HCT 37.6 11/18/2023    MCV 82.8 11/18/2023     11/18/2023     Lab Results   Component Value Date    CHOL 202 (H) 09/26/2023    CHOL 116 09/10/2022    CHOL 130 08/14/2017     Lab Results   Component Value Date    HDL 46 09/26/2023    HDL 43 09/10/2022    HDL 40 08/14/2017     Lab Results   Component Value Date     (H) 09/26/2023    LDL 53 09/10/2022    LDL 77 08/14/2017     Lab Results   Component Value Date    TRIG 149 09/26/2023    TRIG 111 09/10/2022    TRIG 70 08/14/2017     Lab Results   Component Value Date    HGBA1C 5.60 08/31/2023     Lab Results   Component Value Date    INR 1.84 (H) 11/16/2023    INR 1.88 (H) 11/16/2023    INR 1.62 (H) 08/31/2023    PROTIME 21.5 (H) 11/16/2023    PROTIME 21.7 (H) 11/16/2023    PROTIME 19.3 (H) 08/31/2023       TSH          5/2/2023    13:02 8/13/2023    03:10 11/18/2023    00:15   TSH   TSH 5.210  6.950  2.120       Brief Urine Lab Results  (Last result in the past 365 days)        Color   Clarity   Blood   Leuk Est   Nitrite   Protein   CREAT   Urine HCG        11/17/23 0129 Yellow   Cloudy   Small (1+)   Negative   Negative   30 mg/dL (1+)                  Result Review:  I have personally reviewed the results from the time of this admission to 11/18/2023 10:46 EST and agree with these findings:  [x]  Laboratory list / accordion  [x]  Microbiology  [x]  Radiology  [x]  EKG/Telemetry   [x]  Cardiology/Vascular   []  Pathology  [x]  Old records  []  Other:  Most notable findings include: Ischemic cardiac disease as documented by CHENTE Farias for Dr. Jose Alba, the electrophysiologist for cardiology on November 17, 2023 as follows:    Ischemic heart disease:  Remote progressive angina pectoris/acute extensive anterolateral myocardial infarction/delayed presentation with thrombolysis/severe 3-vessel coronary atherosclerosis with severe single vessel involvement/PTCA with intracoronary stent  deployment proximal-mid segment LAD/moderate-severe compensated left ventricular dysfunction. LVEF (0.35)/abnormal positive signal averaged EKG/oral anticoagulation, April 1995.  Remote NYHA class I-II angina pectoris/class III CHF/abnormal quantitative SPECT gated Cardiolite GXT, July 1998.  Stable persistent MUGA, LVEF (0.33, January 1999 and January 2000).   Residual NYHA class I angina pectoris/CHF with reduced MUGA scan: LVEF (0.25), April 2002.  Left heart catheterization with distal circumflex disease: EF 20%, September 2002.   MUGA in May 2003: EF 32%.   Sestamibi GXT on 04/08/2005: No ischemia. EF 29%.   Residual NYHA class I angina pectoris/CHF with reduced acceptable MUGA scan: EF (0.32) and acceptable Pacesetter PCD interrogation/reprogramming, May 2003 with interrogation, September 2004.  Echocardiogram, September 2009 with EF 30%  Left heart catheterization by Dr. Bhupinder Gonzalez, August 2010, with LVEF of 35%, with 3-vessel native coronary artery disease, 95% mid-LAD status post PTCA and stenting with two 3 mm stents by Dr. Llanes.  Echocardiogram, 06/07/2012: Left ventricular ejection fraction of 30%.  Hospitalization, 02/01/2014, for non-ST elevation MI, left heart catheterization by Dr. Ayala that demonstrated 60% mid stenosis of the right coronary artery that remains unchanged compared to previous, widely patent stents of the LAD with severe LV dysfunction of approximately 25%.   Left heart catheterization status post drug-eluting stent to the distal LAD and mid-RCA with an EF of 20% to 25% by Dr. Diego Ayala, 04/20/2015.   Left heart catheterization 1/16/17; nonobstructive CAD with patent previously placed stents, dilated cardiomyopathy with severe LV systolic dysfunction, EF less than 20%, normal hemodynamics, recommendations for continued medical therapy and risk factor, evaluation for noncardiac etiology of symptoms.  Residual CCS Class I/II angina pectoris/NYHA Class II exertional dyspnea and  "fatigue syndrome, summer 2017  Residual CCS 0 angina pectoris/NYHA class I-II exertional dyspnea and fatigue, February 2018, September 2018, March 2019.  Echocardiogram 5/2/2023: LV dilated, LV systolic function severely decreased, LVEF appears less than 20%, anterior and anterior lateral segments and apex are akinetic, LA cavity dilated, LA volume moderately increased, mild to moderate regurgitation present         Physical Examination:  BP 91/49   Pulse 76   Temp 99 °F (37.2 °C) (Esophageal)   Resp 15   Ht 185.4 cm (72.99\")   Wt 76.1 kg (167 lb 12.3 oz)   SpO2 92%   BMI 22.14 kg/m²      Evaluation for coma:          Gen: NAD, Reggie Coma Scale is 3    Comatose/Stuporous    Eyes: Fundi could not be determined at this time     PERRL: Doll's eye movements are absent conjugate gaze the eyelids are not moving at all.    Corneal reflexes absent    HEENT: Atraumatic      Cough to suction no evidence of any gag response    Neck: No nuchal rigidity, no thyromegaly noted    Respiration: No spontaneous respiration noted.    Ext: No edema, no cyanosis no spontaneous movements noted at this time.    Skin: Warm, dry, no rashes no skin breakdown noted.    Neuro: Does not open eyes to voice, does not follow commands. No response to pain all does not withdraw to any painful stimuli and the Reggie Coma Scale is 3            Diagnoses / Discussion:  78 y.o. who presents with Sx of weakness cardiac arrest with initial myoclonic jerks which are a result of anoxic brain injury.  Currently I am not seeing any jerks.    Plan:  Discussed with the patient's wife and daughter as well as her son on the telephone that usually we wait 72 to 96 hours to make a good determination and all the sedatives need to be off before actual brain death can be determined.    Discussed with the registered nurse about cutting down the propofol drip as well as intravenous fentanyl drip and coming Monday we will repeat bedside EEG with noxious " stimulation and photostimulation to look for reactivity.    No further myoclonic jerks have been noted so far..     I have discussed the above with the patient and family.  We will be following him up tomorrow.  Time spent with patient: 70 minutes in face-to-face evaluation and management of the patient.  Electronically signed by Jacob Covington MD, 11/18/23, 10:46 AM EST.    Dictated using Dragon dictation.            Extremities: no clonus.  Does not withdraw to any noxious stimulation in the inner part of the thighs.

## 2023-11-20 ENCOUNTER — APPOINTMENT (OUTPATIENT)
Dept: CARDIAC REHAB | Facility: HOSPITAL | Age: 78
End: 2023-11-20

## 2023-11-21 ENCOUNTER — APPOINTMENT (OUTPATIENT)
Dept: CARDIAC REHAB | Facility: HOSPITAL | Age: 78
End: 2023-11-21

## 2023-11-21 LAB
QT INTERVAL: 114 MS
QT INTERVAL: 620 MS
QTC INTERVAL: 147 MS
QTC INTERVAL: 669 MS

## 2023-11-21 NOTE — PROGRESS NOTES
"Enter Query Response Below      Query Response: Heart failure due to ischemic cardiomyopathy             If applicable, please update the problem list.     Patient: Jonnie Roth \"Ilda\"        : 1945  Account: [id number]           Admit Date: 2023        How to Respond to this query:       a. Click New Note     b. Answer query within the yellow box.                c. Update the Problem List, if applicable.      If you have any questions about this query contact me at: [email or web address]     ,    This query has been deferred to you by Dr. Zayas with the comment, \"Deferring to the intensivist on the case- I was covering cardiology service over the weekend. Likely HF due to ischemic cardiomyopathy.\"    Patient is diagnosed with cardiac arrest and acute on chronic systolic heart failure.  He has history of hypertension and ischemic cardiomyopathy.  Treatment included cardiac consult and Aspirin.  He is on vasopressors this stay for cardiogenic shock.    Please clarify the etiology of the patient’s heart failure:    Heart failure due to hypertension  Heart failure due to ischemic cardiomyopathy  Heart failure due to hypertension and ischemic cardiomyopathy  Other- specify__________  Unable to determine    By submitting this query, we are merely seeking further clarification of documentation to accurately reflect all conditions that you are monitoring, evaluating, treating or that extend the hospitalization or utilize additional resources of care. Please utilize your independent clinical judgment when addressing the question(s) above.     This query and your response, once completed, will be entered into the legal medical record.    Sincerely,  Beata LIMA, RN  Clinical Documentation Integrity Program   [email or web address]  "

## 2023-11-22 ENCOUNTER — APPOINTMENT (OUTPATIENT)
Dept: CARDIAC REHAB | Facility: HOSPITAL | Age: 78
End: 2023-11-22

## 2023-11-22 LAB — BACTERIA SPEC AEROBE CULT: NORMAL

## 2023-11-24 ENCOUNTER — APPOINTMENT (OUTPATIENT)
Dept: CARDIAC REHAB | Facility: HOSPITAL | Age: 78
End: 2023-11-24

## 2023-11-27 ENCOUNTER — APPOINTMENT (OUTPATIENT)
Dept: CARDIAC REHAB | Facility: HOSPITAL | Age: 78
End: 2023-11-27

## 2023-11-28 ENCOUNTER — APPOINTMENT (OUTPATIENT)
Dept: CARDIAC REHAB | Facility: HOSPITAL | Age: 78
End: 2023-11-28

## 2023-11-29 ENCOUNTER — APPOINTMENT (OUTPATIENT)
Dept: CARDIAC REHAB | Facility: HOSPITAL | Age: 78
End: 2023-11-29

## 2023-11-30 ENCOUNTER — APPOINTMENT (OUTPATIENT)
Dept: CARDIAC REHAB | Facility: HOSPITAL | Age: 78
End: 2023-11-30

## 2024-09-02 NOTE — PROGRESS NOTES
Pt. Attended [facility]. See flow sheet for details.    52M with CAD (s/p CT coronary in July revealing triple vessel disease, scheduled for PCI on 9/6), DM2, hx hepatitis B, chronic pancreatitis, NAFLD, and HTN presenting with volume overload and chest pain and found to have rising troponins.    #Rising troponin  Type 2 MI in the setting of volume overload vs. NSTEMI; no ST elevations on EKG  - cardiology consult to determine whether to treat as NSTEMI, and whether need cardiac cath more urgently  - continue to trend troponin until peak    #Volume overload  Possibly related to heart failure; cannot find an echo in the system  - check an echo  - diuresis with IV Lasix; bid BMPs while being diuresed  - monitor I/O  - daily weights    #Hx of hyppertriglyceridemia induced pancreatitis  #HLD  - check lipid panel with morning labs  - continue home statin  - previously on gemfibrozil, hasn't taken in some time    #NAFLD  #Transaminase elevation  - trend LFTs; previously seen by hepatology, thought to be related to metabolic dysfunction and/or hx of hepatitis B virus    #T2DM  - holding home Jardiance and metformin  - ISS qac and qhs    #DVT ppx - HSQ  Dispo - pending cardiology eval

## 2025-07-17 NOTE — PROGRESS NOTES
Onset: Last night.     Location / description: Daughter Maureen calling. Fell last night, denies any head injury. States was moving from recliner to wheelchair and slipped down to the floor onto buttocks. Was helped up by family members.   Patient is confused and hallucinating this morning. States \" we have to pack to go to [place]\" but have no plans to go anywhere and is seeing things that are not there, states saw a child this morning.  Confusion comes and goes.  Is concerned may have an infection and or is dehydrated.  States saw cardiologist yesterday, labs performed and is asking if labs show an infection.  Lowered carvedilol dose yesterday r/t appeared dehydrated and is due to start lower dose today.  Is not drinking enough fluids.     Precipitating Factors: Decreased fluids, more confused    Pain Scale (0 - 10), 10 highest: N/A    What improves/worsens symptoms: N/A    Symptom specific medications: Carvedilol    Temperature (route and time): Unsure. No thermometer to check.    Recent visits (last 3-4 weeks) for same reason or recent surgery:  Yes, yesterday 7/16/25 with cardiologist    Reproductive History   LMP: N/A    Contraception: N/A    Pregnant:  N/A    Breastfeeding:  N/A    PLAN:    Call 911    Patient/Caller agrees to follow recommendations.    _______________________________________           Reason for Disposition   Difficult to awaken or acting confused (disoriented, slurred speech) and has diabetes mellitus    Protocols used: Confusion - Delirium-A-OH     Pt. Attended Phase III [facility]iac[facility]. See flow sheet for details.

## (undated) DEVICE — CATH DIAG EXPO M/ PK 5F FL4/FR4 PIG

## (undated) DEVICE — PAD GRND REM POLYHESIVE A/ DISP

## (undated) DEVICE — GW CORNRY QUIKCAS

## (undated) DEVICE — INTRO SHEATH ENGAGE W/50 GW .038 7F12

## (undated) DEVICE — ST INF PRI SMRTSTE 20DRP 2VLV 24ML 117

## (undated) DEVICE — Device: Brand: THERMOCOOL SMARTTOUCH SF

## (undated) DEVICE — ST EXT IV SMARTSITE 2VLV SP M LL 5ML IV1

## (undated) DEVICE — IRRIGATOR BULB ASEPTO 60CC STRL

## (undated) DEVICE — LIMB HOLDERS: Brand: DEROYAL

## (undated) DEVICE — CAUTERY TIP POLISHER: Brand: DEVON

## (undated) DEVICE — TUBING, SUCTION, 1/4" X 10', STRAIGHT: Brand: MEDLINE

## (undated) DEVICE — ACQGUIDE MINI-S, 65CM - L2 WITH ACQCROSS QX: Brand: ACQGUIDE MINI-S

## (undated) DEVICE — TR BAND RADIAL ARTERY COMPRESSION DEVICE: Brand: TR BAND

## (undated) DEVICE — ADULT, W/LG. BACK PAD, RADIOTRANSPARENT ELEMENT AND LEAD WIRE: Brand: DEFIBRILLATION ELECTRODES

## (undated) DEVICE — Device: Brand: SOUNDSTAR

## (undated) DEVICE — CATH QUAD CRD 6F5MM

## (undated) DEVICE — DECANT BG O JET

## (undated) DEVICE — SOL NACL 0.9PCT 1000ML

## (undated) DEVICE — INTRO SHEATH FASTCATH TRNSEP MULLINS .032IN 8.5F 61CM

## (undated) DEVICE — GW INQWIRE FC PTFE STD J/1.5 .035 260

## (undated) DEVICE — LIMB HOLDER, WRIST/ANKLE: Brand: DEROYAL

## (undated) DEVICE — 3M™ STERI-STRIP™ REINFORCED ADHESIVE SKIN CLOSURES, R1547, 1/2 IN X 4 IN (12 MM X 100 MM), 6 STRIPS/ENVELOPE: Brand: 3M™ STERI-STRIP™

## (undated) DEVICE — Device: Brand: VIZIGO

## (undated) DEVICE — SET PRIMARY GRVTY 10DP MALE LL 104IN

## (undated) DEVICE — PENCL E/S HNDSWCH ROCKRBTN HOLSTR 10FT

## (undated) DEVICE — MEDI-VAC YANKAUER SUCTION HANDLE W/BULBOUS TIP: Brand: CARDINAL HEALTH

## (undated) DEVICE — CANNULA,ADULT,SOFT-TOUCH,7'TUBE,UC: Brand: PENDING

## (undated) DEVICE — DECANTER: Brand: UNBRANDED

## (undated) DEVICE — SYS CLS VASC/VENI VASCADE MVP 6TO12F

## (undated) DEVICE — DRSNG SURESITE123 4X4.8IN

## (undated) DEVICE — LEX ELECTRO PHYSIOLOGY: Brand: MEDLINE INDUSTRIES, INC.

## (undated) DEVICE — INTRO SHEATH ART/FEM ENGAGE .038 5F12CM

## (undated) DEVICE — CANN NASL CO2 DIVIDED A/

## (undated) DEVICE — Device: Brand: MEDEX

## (undated) DEVICE — ADULT, W/LG. BACK PAD, RADIOTRANSPARENT ELEMENT AND LEAD WIRE COMPATIBLE W/: Brand: DEFIBRILLATION ELECTRODES

## (undated) DEVICE — MODEL AT P54, P/N 700608-035KIT CONTENTS: HAND CONTROLLER, 3-WAY HIGH-PRESSURE STOPCOCK WITH ROTATING END AND PREMIUM HIGH-PRESSURE TUBING: Brand: ANGIOTOUCH® KIT

## (undated) DEVICE — PLASMABLADE PS210-030S 3.0S LOCK: Brand: PLASMABLADE™

## (undated) DEVICE — MODEL BT2000 P/N 700287-012KIT CONTENTS: MANIFOLD WITH SALINE AND CONTRAST PORTS, SALINE TUBING WITH SPIKE AND HAND SYRINGE, TRANSDUCER: Brand: BT2000 AUTOMATED MANIFOLD KIT

## (undated) DEVICE — CATH DIAG EXPO .045 FL3  5F 100CM

## (undated) DEVICE — GLIDESHEATH SLENDER STAINLESS STEEL KIT: Brand: GLIDESHEATH SLENDER

## (undated) DEVICE — INTRO SHEATH ENGAGE W/50 GW .038 9F12

## (undated) DEVICE — Device: Brand: REFERENCE PATCH CARTO 3

## (undated) DEVICE — OCTA,GALAXY,3-3-3-3-3,F-CURVE: Brand: OCTARAY MAPPING CATHETER

## (undated) DEVICE — PK CATH CARD 10

## (undated) DEVICE — ST EXT IV SMRTSTE 2VLV FIX M LL 6ML 41

## (undated) DEVICE — INTRO SHEATH FAST/CATH STD 8.5F .038IN 12CM